# Patient Record
Sex: MALE | Race: WHITE | NOT HISPANIC OR LATINO | Employment: UNEMPLOYED
[De-identification: names, ages, dates, MRNs, and addresses within clinical notes are randomized per-mention and may not be internally consistent; named-entity substitution may affect disease eponyms.]

---

## 2019-10-15 ENCOUNTER — RECORDS - HEALTHEAST (OUTPATIENT)
Dept: ADMINISTRATIVE | Facility: OTHER | Age: 54
End: 2019-10-15

## 2020-06-23 ENCOUNTER — SURGERY - HEALTHEAST (OUTPATIENT)
Dept: CARDIOLOGY | Facility: CLINIC | Age: 55
End: 2020-06-23

## 2020-06-23 ENCOUNTER — SURGERY - HEALTHEAST (OUTPATIENT)
Dept: ADMINISTRATIVE | Facility: CLINIC | Age: 55
End: 2020-06-23

## 2020-06-23 DIAGNOSIS — I21.4 NSTEMI (NON-ST ELEVATED MYOCARDIAL INFARCTION) (H): ICD-10-CM

## 2020-06-23 DIAGNOSIS — I25.10 CAD (CORONARY ARTERY DISEASE): ICD-10-CM

## 2020-06-23 ASSESSMENT — MIFFLIN-ST. JEOR
SCORE: 1711.28

## 2020-06-24 ENCOUNTER — SURGERY - HEALTHEAST (OUTPATIENT)
Dept: SURGERY | Facility: CLINIC | Age: 55
End: 2020-06-24

## 2020-06-24 ENCOUNTER — ANESTHESIA - HEALTHEAST (OUTPATIENT)
Dept: SURGERY | Facility: CLINIC | Age: 55
End: 2020-06-24

## 2020-06-24 ENCOUNTER — SURGERY - HEALTHEAST (OUTPATIENT)
Dept: CARDIOLOGY | Facility: CLINIC | Age: 55
End: 2020-06-24

## 2020-06-25 ASSESSMENT — MIFFLIN-ST. JEOR
SCORE: 1744.39

## 2020-06-26 ASSESSMENT — MIFFLIN-ST. JEOR
SCORE: 1750.29

## 2020-06-27 ASSESSMENT — MIFFLIN-ST. JEOR
SCORE: 1697.22

## 2020-06-28 ASSESSMENT — MIFFLIN-ST. JEOR
SCORE: 1694.04

## 2020-06-29 ASSESSMENT — MIFFLIN-ST. JEOR
SCORE: 1673.63

## 2020-06-30 ENCOUNTER — HOME CARE/HOSPICE - HEALTHEAST (OUTPATIENT)
Dept: HOME HEALTH SERVICES | Facility: HOME HEALTH | Age: 55
End: 2020-06-30

## 2020-06-30 ASSESSMENT — MIFFLIN-ST. JEOR
SCORE: 1665.92

## 2020-07-02 ENCOUNTER — COMMUNICATION - HEALTHEAST (OUTPATIENT)
Dept: CARDIOLOGY | Facility: CLINIC | Age: 55
End: 2020-07-02

## 2020-07-04 ENCOUNTER — HOME CARE/HOSPICE - HEALTHEAST (OUTPATIENT)
Dept: HOME HEALTH SERVICES | Facility: HOME HEALTH | Age: 55
End: 2020-07-04

## 2020-07-06 ENCOUNTER — HOME CARE/HOSPICE - HEALTHEAST (OUTPATIENT)
Dept: HOME HEALTH SERVICES | Facility: HOME HEALTH | Age: 55
End: 2020-07-06

## 2020-07-07 ENCOUNTER — HOME CARE/HOSPICE - HEALTHEAST (OUTPATIENT)
Dept: HOME HEALTH SERVICES | Facility: HOME HEALTH | Age: 55
End: 2020-07-07

## 2020-07-08 ENCOUNTER — HOME CARE/HOSPICE - HEALTHEAST (OUTPATIENT)
Dept: HOME HEALTH SERVICES | Facility: HOME HEALTH | Age: 55
End: 2020-07-08

## 2020-07-09 ENCOUNTER — HOME CARE/HOSPICE - HEALTHEAST (OUTPATIENT)
Dept: HOME HEALTH SERVICES | Facility: HOME HEALTH | Age: 55
End: 2020-07-09

## 2020-07-10 ENCOUNTER — HOME CARE/HOSPICE - HEALTHEAST (OUTPATIENT)
Dept: HOME HEALTH SERVICES | Facility: HOME HEALTH | Age: 55
End: 2020-07-10

## 2020-07-13 ENCOUNTER — HOME CARE/HOSPICE - HEALTHEAST (OUTPATIENT)
Dept: HOME HEALTH SERVICES | Facility: HOME HEALTH | Age: 55
End: 2020-07-13

## 2020-07-14 ENCOUNTER — OFFICE VISIT - HEALTHEAST (OUTPATIENT)
Dept: CARDIOLOGY | Facility: CLINIC | Age: 55
End: 2020-07-14

## 2020-07-14 ENCOUNTER — HOME CARE/HOSPICE - HEALTHEAST (OUTPATIENT)
Dept: HOME HEALTH SERVICES | Facility: HOME HEALTH | Age: 55
End: 2020-07-14

## 2020-07-14 DIAGNOSIS — Z95.1 S/P CABG (CORONARY ARTERY BYPASS GRAFT): ICD-10-CM

## 2020-07-15 ENCOUNTER — COMMUNICATION - HEALTHEAST (OUTPATIENT)
Dept: SCHEDULING | Facility: CLINIC | Age: 55
End: 2020-07-15

## 2020-07-16 ENCOUNTER — HOME CARE/HOSPICE - HEALTHEAST (OUTPATIENT)
Dept: HOME HEALTH SERVICES | Facility: HOME HEALTH | Age: 55
End: 2020-07-16

## 2020-07-17 ENCOUNTER — HOME CARE/HOSPICE - HEALTHEAST (OUTPATIENT)
Dept: HOME HEALTH SERVICES | Facility: HOME HEALTH | Age: 55
End: 2020-07-17

## 2020-07-21 ENCOUNTER — AMBULATORY - HEALTHEAST (OUTPATIENT)
Dept: CARDIAC REHAB | Facility: CLINIC | Age: 55
End: 2020-07-21

## 2020-07-21 DIAGNOSIS — Z95.1 S/P CABG (CORONARY ARTERY BYPASS GRAFT): ICD-10-CM

## 2020-07-28 ENCOUNTER — COMMUNICATION - HEALTHEAST (OUTPATIENT)
Dept: SCHEDULING | Facility: CLINIC | Age: 55
End: 2020-07-28

## 2020-08-03 ENCOUNTER — AMBULATORY - HEALTHEAST (OUTPATIENT)
Dept: CARDIAC REHAB | Facility: CLINIC | Age: 55
End: 2020-08-03

## 2020-08-03 DIAGNOSIS — Z95.1 S/P CABG (CORONARY ARTERY BYPASS GRAFT): ICD-10-CM

## 2020-08-06 ENCOUNTER — COMMUNICATION - HEALTHEAST (OUTPATIENT)
Dept: CARDIOLOGY | Facility: CLINIC | Age: 55
End: 2020-08-06

## 2020-08-06 ENCOUNTER — AMBULATORY - HEALTHEAST (OUTPATIENT)
Dept: CARDIAC REHAB | Facility: CLINIC | Age: 55
End: 2020-08-06

## 2020-08-06 DIAGNOSIS — Z95.1 S/P CABG (CORONARY ARTERY BYPASS GRAFT): ICD-10-CM

## 2020-08-07 ENCOUNTER — OFFICE VISIT - HEALTHEAST (OUTPATIENT)
Dept: CARDIOLOGY | Facility: CLINIC | Age: 55
End: 2020-08-07

## 2020-08-07 DIAGNOSIS — I25.83 CORONARY ARTERY DISEASE DUE TO LIPID RICH PLAQUE: ICD-10-CM

## 2020-08-07 DIAGNOSIS — I25.10 CORONARY ARTERY DISEASE DUE TO LIPID RICH PLAQUE: ICD-10-CM

## 2020-08-07 DIAGNOSIS — E78.5 DYSLIPIDEMIA, GOAL LDL BELOW 70: ICD-10-CM

## 2020-08-07 DIAGNOSIS — I10 ESSENTIAL HYPERTENSION: ICD-10-CM

## 2020-08-07 LAB — LDLC SERPL CALC-MCNC: 41 MG/DL

## 2020-08-07 ASSESSMENT — MIFFLIN-ST. JEOR: SCORE: 1588.35

## 2020-08-10 ENCOUNTER — COMMUNICATION - HEALTHEAST (OUTPATIENT)
Dept: CARDIOLOGY | Facility: CLINIC | Age: 55
End: 2020-08-10

## 2020-08-10 ENCOUNTER — AMBULATORY - HEALTHEAST (OUTPATIENT)
Dept: CARDIAC REHAB | Facility: CLINIC | Age: 55
End: 2020-08-10

## 2020-08-10 DIAGNOSIS — Z95.1 S/P CABG (CORONARY ARTERY BYPASS GRAFT): ICD-10-CM

## 2020-08-13 ENCOUNTER — COMMUNICATION - HEALTHEAST (OUTPATIENT)
Dept: CARDIOLOGY | Facility: CLINIC | Age: 55
End: 2020-08-13

## 2020-08-14 ENCOUNTER — COMMUNICATION - HEALTHEAST (OUTPATIENT)
Dept: CARDIOLOGY | Facility: CLINIC | Age: 55
End: 2020-08-14

## 2020-08-14 ENCOUNTER — AMBULATORY - HEALTHEAST (OUTPATIENT)
Dept: CARDIAC REHAB | Facility: CLINIC | Age: 55
End: 2020-08-14

## 2020-08-14 DIAGNOSIS — Z95.1 S/P CABG (CORONARY ARTERY BYPASS GRAFT): ICD-10-CM

## 2020-08-17 ENCOUNTER — AMBULATORY - HEALTHEAST (OUTPATIENT)
Dept: CARDIAC REHAB | Facility: CLINIC | Age: 55
End: 2020-08-17

## 2020-08-17 DIAGNOSIS — Z95.1 S/P CABG (CORONARY ARTERY BYPASS GRAFT): ICD-10-CM

## 2020-08-19 ENCOUNTER — AMBULATORY - HEALTHEAST (OUTPATIENT)
Dept: CARDIAC REHAB | Facility: CLINIC | Age: 55
End: 2020-08-19

## 2020-08-19 DIAGNOSIS — Z95.1 S/P CABG (CORONARY ARTERY BYPASS GRAFT): ICD-10-CM

## 2020-08-24 ENCOUNTER — AMBULATORY - HEALTHEAST (OUTPATIENT)
Dept: CARDIAC REHAB | Facility: CLINIC | Age: 55
End: 2020-08-24

## 2020-08-24 DIAGNOSIS — Z95.1 S/P CABG (CORONARY ARTERY BYPASS GRAFT): ICD-10-CM

## 2020-08-26 ENCOUNTER — COMMUNICATION - HEALTHEAST (OUTPATIENT)
Dept: INTERNAL MEDICINE | Facility: CLINIC | Age: 55
End: 2020-08-26

## 2020-08-26 ENCOUNTER — COMMUNICATION - HEALTHEAST (OUTPATIENT)
Dept: CARDIOLOGY | Facility: CLINIC | Age: 55
End: 2020-08-26

## 2020-08-28 ENCOUNTER — AMBULATORY - HEALTHEAST (OUTPATIENT)
Dept: CARDIAC REHAB | Facility: CLINIC | Age: 55
End: 2020-08-28

## 2020-08-28 DIAGNOSIS — Z95.1 S/P CABG (CORONARY ARTERY BYPASS GRAFT): ICD-10-CM

## 2020-09-01 ENCOUNTER — COMMUNICATION - HEALTHEAST (OUTPATIENT)
Dept: CARDIOLOGY | Facility: CLINIC | Age: 55
End: 2020-09-01

## 2020-09-01 ENCOUNTER — AMBULATORY - HEALTHEAST (OUTPATIENT)
Dept: CARDIAC REHAB | Facility: CLINIC | Age: 55
End: 2020-09-01

## 2020-09-01 DIAGNOSIS — Z95.1 S/P CABG (CORONARY ARTERY BYPASS GRAFT): ICD-10-CM

## 2020-09-04 ENCOUNTER — COMMUNICATION - HEALTHEAST (OUTPATIENT)
Dept: CARDIOLOGY | Facility: CLINIC | Age: 55
End: 2020-09-04

## 2020-09-04 ENCOUNTER — AMBULATORY - HEALTHEAST (OUTPATIENT)
Dept: CARDIAC REHAB | Facility: CLINIC | Age: 55
End: 2020-09-04

## 2020-09-04 DIAGNOSIS — Z95.1 S/P CABG (CORONARY ARTERY BYPASS GRAFT): ICD-10-CM

## 2020-09-08 ENCOUNTER — AMBULATORY - HEALTHEAST (OUTPATIENT)
Dept: CARDIAC REHAB | Facility: CLINIC | Age: 55
End: 2020-09-08

## 2020-09-08 DIAGNOSIS — Z95.1 S/P CABG (CORONARY ARTERY BYPASS GRAFT): ICD-10-CM

## 2020-09-11 ENCOUNTER — AMBULATORY - HEALTHEAST (OUTPATIENT)
Dept: CARDIAC REHAB | Facility: CLINIC | Age: 55
End: 2020-09-11

## 2020-09-11 DIAGNOSIS — Z95.1 S/P CABG (CORONARY ARTERY BYPASS GRAFT): ICD-10-CM

## 2020-09-15 ENCOUNTER — AMBULATORY - HEALTHEAST (OUTPATIENT)
Dept: CARDIAC REHAB | Facility: CLINIC | Age: 55
End: 2020-09-15

## 2020-09-15 DIAGNOSIS — Z95.1 S/P CABG (CORONARY ARTERY BYPASS GRAFT): ICD-10-CM

## 2020-09-18 ENCOUNTER — AMBULATORY - HEALTHEAST (OUTPATIENT)
Dept: CARDIAC REHAB | Facility: CLINIC | Age: 55
End: 2020-09-18

## 2020-09-18 DIAGNOSIS — Z95.1 S/P CABG (CORONARY ARTERY BYPASS GRAFT): ICD-10-CM

## 2020-09-22 ENCOUNTER — AMBULATORY - HEALTHEAST (OUTPATIENT)
Dept: LAB | Facility: CLINIC | Age: 55
End: 2020-09-22

## 2020-09-22 ENCOUNTER — OFFICE VISIT - HEALTHEAST (OUTPATIENT)
Dept: INTERNAL MEDICINE | Facility: CLINIC | Age: 55
End: 2020-09-22

## 2020-09-22 ENCOUNTER — AMBULATORY - HEALTHEAST (OUTPATIENT)
Dept: CARDIAC REHAB | Facility: CLINIC | Age: 55
End: 2020-09-22

## 2020-09-22 DIAGNOSIS — K29.30 CHRONIC SUPERFICIAL GASTRITIS WITHOUT BLEEDING: ICD-10-CM

## 2020-09-22 DIAGNOSIS — I10 ESSENTIAL HYPERTENSION: ICD-10-CM

## 2020-09-22 DIAGNOSIS — I25.10 CORONARY ARTERY DISEASE DUE TO LIPID RICH PLAQUE: ICD-10-CM

## 2020-09-22 DIAGNOSIS — Z12.5 SCREENING FOR PROSTATE CANCER: ICD-10-CM

## 2020-09-22 DIAGNOSIS — Z95.1 S/P CABG (CORONARY ARTERY BYPASS GRAFT): ICD-10-CM

## 2020-09-22 DIAGNOSIS — K21.00 GASTROESOPHAGEAL REFLUX DISEASE WITH ESOPHAGITIS: ICD-10-CM

## 2020-09-22 DIAGNOSIS — R35.1 NOCTURIA: ICD-10-CM

## 2020-09-22 DIAGNOSIS — R68.81 EARLY SATIETY: ICD-10-CM

## 2020-09-22 DIAGNOSIS — R73.03 PRE-DIABETES: ICD-10-CM

## 2020-09-22 DIAGNOSIS — E78.5 DYSLIPIDEMIA, GOAL LDL BELOW 70: ICD-10-CM

## 2020-09-22 DIAGNOSIS — I25.83 CORONARY ARTERY DISEASE DUE TO LIPID RICH PLAQUE: ICD-10-CM

## 2020-09-22 DIAGNOSIS — F41.8 DEPRESSION WITH ANXIETY: ICD-10-CM

## 2020-09-22 LAB
ALBUMIN UR-MCNC: ABNORMAL MG/DL
APPEARANCE UR: CLEAR
BACTERIA #/AREA URNS HPF: ABNORMAL HPF
BILIRUB UR QL STRIP: NEGATIVE
COLOR UR AUTO: YELLOW
ERYTHROCYTE [DISTWIDTH] IN BLOOD BY AUTOMATED COUNT: 12.8 % (ref 11–14.5)
GLUCOSE UR STRIP-MCNC: NEGATIVE MG/DL
HCT VFR BLD AUTO: 40.8 % (ref 40–54)
HGB BLD-MCNC: 13.6 G/DL (ref 14–18)
HGB UR QL STRIP: NEGATIVE
KETONES UR STRIP-MCNC: NEGATIVE MG/DL
LEUKOCYTE ESTERASE UR QL STRIP: NEGATIVE
MCH RBC QN AUTO: 28.8 PG (ref 27–34)
MCHC RBC AUTO-ENTMCNC: 33.3 G/DL (ref 32–36)
MCV RBC AUTO: 86 FL (ref 80–100)
MUCOUS THREADS #/AREA URNS LPF: ABNORMAL LPF
NITRATE UR QL: NEGATIVE
PH UR STRIP: 5.5 [PH] (ref 4.5–8)
PLATELET # BLD AUTO: 242 THOU/UL (ref 140–440)
PMV BLD AUTO: 10 FL (ref 8.5–12.5)
PSA SERPL-MCNC: 1.1 NG/ML (ref 0–3.5)
RBC # BLD AUTO: 4.73 MILL/UL (ref 4.4–6.2)
RBC #/AREA URNS AUTO: ABNORMAL HPF
SP GR UR STRIP: 1.02 (ref 1–1.03)
SQUAMOUS #/AREA URNS AUTO: ABNORMAL LPF
T4 FREE SERPL-MCNC: 1 NG/DL (ref 0.7–1.8)
TSH SERPL DL<=0.005 MIU/L-ACNC: 0.17 UIU/ML (ref 0.3–5)
UROBILINOGEN UR STRIP-ACNC: ABNORMAL
WBC #/AREA URNS AUTO: ABNORMAL HPF
WBC: 6.1 THOU/UL (ref 4–11)

## 2020-09-22 ASSESSMENT — PATIENT HEALTH QUESTIONNAIRE - PHQ9: SUM OF ALL RESPONSES TO PHQ QUESTIONS 1-9: 0

## 2020-09-25 ENCOUNTER — AMBULATORY - HEALTHEAST (OUTPATIENT)
Dept: CARDIAC REHAB | Facility: CLINIC | Age: 55
End: 2020-09-25

## 2020-09-25 ENCOUNTER — HOSPITAL ENCOUNTER (OUTPATIENT)
Dept: ADMINISTRATIVE | Facility: OTHER | Age: 55
Discharge: HOME | End: 2020-09-25

## 2020-09-25 DIAGNOSIS — Z95.1 S/P CABG (CORONARY ARTERY BYPASS GRAFT): ICD-10-CM

## 2020-09-28 LAB — H PYLORI AG STL QL IA: NEGATIVE

## 2020-09-29 ENCOUNTER — AMBULATORY - HEALTHEAST (OUTPATIENT)
Dept: CARDIAC REHAB | Facility: CLINIC | Age: 55
End: 2020-09-29

## 2020-09-29 DIAGNOSIS — Z95.1 S/P CABG (CORONARY ARTERY BYPASS GRAFT): ICD-10-CM

## 2020-09-29 LAB — SCHISTOSOMA IGG SER QL: NEGATIVE

## 2020-09-30 ENCOUNTER — OFFICE VISIT - HEALTHEAST (OUTPATIENT)
Dept: INTERNAL MEDICINE | Facility: CLINIC | Age: 55
End: 2020-09-30

## 2020-09-30 DIAGNOSIS — E04.2 NON-TOXIC MULTINODULAR GOITER: ICD-10-CM

## 2020-09-30 DIAGNOSIS — I25.10 CORONARY ARTERY DISEASE DUE TO LIPID RICH PLAQUE: ICD-10-CM

## 2020-09-30 DIAGNOSIS — M79.2 NEUROPATHIC PAIN: ICD-10-CM

## 2020-09-30 DIAGNOSIS — E78.5 DYSLIPIDEMIA, GOAL LDL BELOW 70: ICD-10-CM

## 2020-09-30 DIAGNOSIS — N40.1 BENIGN PROSTATIC HYPERPLASIA WITH NOCTURIA: ICD-10-CM

## 2020-09-30 DIAGNOSIS — I25.83 CORONARY ARTERY DISEASE DUE TO LIPID RICH PLAQUE: ICD-10-CM

## 2020-09-30 DIAGNOSIS — F43.10 PTSD (POST-TRAUMATIC STRESS DISORDER): ICD-10-CM

## 2020-09-30 DIAGNOSIS — R14.0 ABDOMINAL DISTENTION: ICD-10-CM

## 2020-09-30 DIAGNOSIS — K21.00 GASTROESOPHAGEAL REFLUX DISEASE WITH ESOPHAGITIS: ICD-10-CM

## 2020-09-30 DIAGNOSIS — I10 ESSENTIAL HYPERTENSION: ICD-10-CM

## 2020-09-30 DIAGNOSIS — R10.84 ABDOMINAL PAIN, GENERALIZED: ICD-10-CM

## 2020-09-30 DIAGNOSIS — F33.41 RECURRENT MAJOR DEPRESSIVE DISORDER, IN PARTIAL REMISSION (H): ICD-10-CM

## 2020-09-30 DIAGNOSIS — J84.115 RESPIRATORY BRONCHIOLITIS ASSOCIATED INTERSTITIAL LUNG DISEASE (H): ICD-10-CM

## 2020-09-30 DIAGNOSIS — R68.81 EARLY SATIETY: ICD-10-CM

## 2020-09-30 DIAGNOSIS — E55.9 VITAMIN D DEFICIENCY: ICD-10-CM

## 2020-09-30 DIAGNOSIS — R35.1 BENIGN PROSTATIC HYPERPLASIA WITH NOCTURIA: ICD-10-CM

## 2020-09-30 RX ORDER — GABAPENTIN 300 MG/1
300 CAPSULE ORAL AT BEDTIME
Qty: 90 CAPSULE | Refills: 3 | Status: SHIPPED | OUTPATIENT
Start: 2020-09-30 | End: 2021-09-13

## 2020-09-30 ASSESSMENT — MIFFLIN-ST. JEOR: SCORE: 1606.5

## 2020-10-05 LAB
ANION GAP SERPL CALCULATED.3IONS-SCNC: 8 MMOL/L (ref 5–18)
BUN SERPL-MCNC: 13 MG/DL (ref 8–22)
CALCIUM SERPL-MCNC: 9.4 MG/DL (ref 8.5–10.5)
CHLORIDE BLD-SCNC: 107 MMOL/L (ref 98–107)
CO2 SERPL-SCNC: 26 MMOL/L (ref 22–31)
CREAT SERPL-MCNC: 0.97 MG/DL (ref 0.7–1.3)
GFR SERPL CREATININE-BSD FRML MDRD: >60 ML/MIN/1.73M2
GLUCOSE BLD-MCNC: 110 MG/DL (ref 70–125)
POTASSIUM BLD-SCNC: 4.3 MMOL/L (ref 3.5–5)
SODIUM SERPL-SCNC: 141 MMOL/L (ref 136–145)

## 2020-10-06 ENCOUNTER — AMBULATORY - HEALTHEAST (OUTPATIENT)
Dept: CARDIAC REHAB | Facility: CLINIC | Age: 55
End: 2020-10-06

## 2020-10-06 ENCOUNTER — COMMUNICATION - HEALTHEAST (OUTPATIENT)
Dept: CARDIOLOGY | Facility: CLINIC | Age: 55
End: 2020-10-06

## 2020-10-06 DIAGNOSIS — Z95.1 S/P CABG (CORONARY ARTERY BYPASS GRAFT): ICD-10-CM

## 2020-10-07 ENCOUNTER — COMMUNICATION - HEALTHEAST (OUTPATIENT)
Dept: CARDIOLOGY | Facility: CLINIC | Age: 55
End: 2020-10-07

## 2020-10-07 ENCOUNTER — AMBULATORY - HEALTHEAST (OUTPATIENT)
Dept: CARDIOLOGY | Facility: CLINIC | Age: 55
End: 2020-10-07

## 2020-10-07 ENCOUNTER — COMMUNICATION - HEALTHEAST (OUTPATIENT)
Dept: INTERNAL MEDICINE | Facility: CLINIC | Age: 55
End: 2020-10-07

## 2020-10-07 DIAGNOSIS — I25.83 CORONARY ARTERY DISEASE DUE TO LIPID RICH PLAQUE: ICD-10-CM

## 2020-10-07 DIAGNOSIS — E55.9 VITAMIN D DEFICIENCY: ICD-10-CM

## 2020-10-07 DIAGNOSIS — M17.2 POST-TRAUMATIC OSTEOARTHRITIS OF BOTH KNEES: ICD-10-CM

## 2020-10-07 DIAGNOSIS — I25.10 CORONARY ARTERY DISEASE DUE TO LIPID RICH PLAQUE: ICD-10-CM

## 2020-10-08 ENCOUNTER — OFFICE VISIT - HEALTHEAST (OUTPATIENT)
Dept: CARDIOLOGY | Facility: CLINIC | Age: 55
End: 2020-10-08

## 2020-10-08 DIAGNOSIS — I10 ESSENTIAL HYPERTENSION: ICD-10-CM

## 2020-10-08 DIAGNOSIS — E78.5 DYSLIPIDEMIA, GOAL LDL BELOW 70: ICD-10-CM

## 2020-10-08 RX ORDER — ROSUVASTATIN CALCIUM 40 MG/1
40 TABLET, COATED ORAL DAILY
Qty: 90 TABLET | Refills: 3 | Status: SHIPPED | OUTPATIENT
Start: 2020-10-08 | End: 2021-10-08

## 2020-10-08 ASSESSMENT — MIFFLIN-ST. JEOR: SCORE: 1614.43

## 2020-10-09 ENCOUNTER — AMBULATORY - HEALTHEAST (OUTPATIENT)
Dept: CARDIAC REHAB | Facility: CLINIC | Age: 55
End: 2020-10-09

## 2020-10-09 DIAGNOSIS — Z95.1 S/P CABG (CORONARY ARTERY BYPASS GRAFT): ICD-10-CM

## 2020-10-13 ENCOUNTER — HOSPITAL ENCOUNTER (OUTPATIENT)
Dept: CT IMAGING | Facility: HOSPITAL | Age: 55
Discharge: HOME | End: 2020-10-13
Attending: INTERNAL MEDICINE

## 2020-10-13 ENCOUNTER — HOSPITAL ENCOUNTER (OUTPATIENT)
Dept: CT IMAGING | Facility: HOSPITAL | Age: 55
Discharge: HOME | End: 2020-10-13
Attending: SURGERY

## 2020-10-13 ENCOUNTER — COMMUNICATION - HEALTHEAST (OUTPATIENT)
Dept: SCHEDULING | Facility: CLINIC | Age: 55
End: 2020-10-13

## 2020-10-13 DIAGNOSIS — R14.0 ABDOMINAL DISTENTION: ICD-10-CM

## 2020-10-13 DIAGNOSIS — I25.83 CORONARY ARTERY DISEASE DUE TO LIPID RICH PLAQUE: ICD-10-CM

## 2020-10-13 DIAGNOSIS — R10.84 ABDOMINAL PAIN, GENERALIZED: ICD-10-CM

## 2020-10-13 DIAGNOSIS — I25.10 CORONARY ARTERY DISEASE DUE TO LIPID RICH PLAQUE: ICD-10-CM

## 2020-10-13 DIAGNOSIS — R68.81 EARLY SATIETY: ICD-10-CM

## 2020-10-16 ENCOUNTER — COMMUNICATION - HEALTHEAST (OUTPATIENT)
Dept: INTERNAL MEDICINE | Facility: CLINIC | Age: 55
End: 2020-10-16

## 2020-10-16 ENCOUNTER — AMBULATORY - HEALTHEAST (OUTPATIENT)
Dept: CARDIAC REHAB | Facility: CLINIC | Age: 55
End: 2020-10-16

## 2020-10-16 DIAGNOSIS — Z95.1 S/P CABG (CORONARY ARTERY BYPASS GRAFT): ICD-10-CM

## 2020-10-19 ENCOUNTER — COMMUNICATION - HEALTHEAST (OUTPATIENT)
Dept: INTERNAL MEDICINE | Facility: CLINIC | Age: 55
End: 2020-10-19

## 2020-10-19 DIAGNOSIS — Z95.1 S/P CABG (CORONARY ARTERY BYPASS GRAFT): ICD-10-CM

## 2020-10-23 ENCOUNTER — OFFICE VISIT - HEALTHEAST (OUTPATIENT)
Dept: CARDIOLOGY | Facility: CLINIC | Age: 55
End: 2020-10-23

## 2020-10-23 ENCOUNTER — AMBULATORY - HEALTHEAST (OUTPATIENT)
Dept: CARDIAC REHAB | Facility: CLINIC | Age: 55
End: 2020-10-23

## 2020-10-23 DIAGNOSIS — M79.652 PAIN IN BOTH THIGHS: ICD-10-CM

## 2020-10-23 DIAGNOSIS — Z95.1 S/P CABG (CORONARY ARTERY BYPASS GRAFT): ICD-10-CM

## 2020-10-23 DIAGNOSIS — M79.651 PAIN IN BOTH THIGHS: ICD-10-CM

## 2020-10-23 DIAGNOSIS — Z95.1 HX OF CABG: ICD-10-CM

## 2020-10-23 ASSESSMENT — MIFFLIN-ST. JEOR: SCORE: 1625.77

## 2020-10-27 ENCOUNTER — AMBULATORY - HEALTHEAST (OUTPATIENT)
Dept: CARDIAC REHAB | Facility: CLINIC | Age: 55
End: 2020-10-27

## 2020-10-27 DIAGNOSIS — Z95.1 S/P CABG (CORONARY ARTERY BYPASS GRAFT): ICD-10-CM

## 2020-10-28 ENCOUNTER — OFFICE VISIT - HEALTHEAST (OUTPATIENT)
Dept: INTERNAL MEDICINE | Facility: CLINIC | Age: 55
End: 2020-10-28

## 2020-10-28 ENCOUNTER — COMMUNICATION - HEALTHEAST (OUTPATIENT)
Dept: INTERNAL MEDICINE | Facility: CLINIC | Age: 55
End: 2020-10-28

## 2020-10-28 DIAGNOSIS — M79.602 LEFT ARM PAIN: ICD-10-CM

## 2020-10-28 DIAGNOSIS — I25.10 CORONARY ARTERY DISEASE DUE TO LIPID RICH PLAQUE: ICD-10-CM

## 2020-10-28 DIAGNOSIS — F33.41 RECURRENT MAJOR DEPRESSIVE DISORDER, IN PARTIAL REMISSION (H): ICD-10-CM

## 2020-10-28 DIAGNOSIS — K21.00 GASTROESOPHAGEAL REFLUX DISEASE WITH ESOPHAGITIS WITHOUT HEMORRHAGE: ICD-10-CM

## 2020-10-28 DIAGNOSIS — R20.2 PARESTHESIA OF BILATERAL LEGS: ICD-10-CM

## 2020-10-28 DIAGNOSIS — F43.10 PTSD (POST-TRAUMATIC STRESS DISORDER): ICD-10-CM

## 2020-10-28 DIAGNOSIS — I10 ESSENTIAL HYPERTENSION: ICD-10-CM

## 2020-10-28 DIAGNOSIS — E04.2 NON-TOXIC MULTINODULAR GOITER: ICD-10-CM

## 2020-10-28 DIAGNOSIS — I25.83 CORONARY ARTERY DISEASE DUE TO LIPID RICH PLAQUE: ICD-10-CM

## 2020-10-28 DIAGNOSIS — Z95.1 S/P CABG (CORONARY ARTERY BYPASS GRAFT): ICD-10-CM

## 2020-10-28 RX ORDER — CLOPIDOGREL BISULFATE 75 MG/1
75 TABLET ORAL EVERY MORNING
Qty: 90 TABLET | Refills: 3 | Status: SHIPPED | OUTPATIENT
Start: 2020-10-28 | End: 2021-07-23

## 2020-10-28 ASSESSMENT — MIFFLIN-ST. JEOR: SCORE: 1628.04

## 2020-10-30 ENCOUNTER — HOSPITAL ENCOUNTER (OUTPATIENT)
Dept: RADIOLOGY | Facility: CLINIC | Age: 55
Discharge: HOME | End: 2020-10-30
Attending: INTERNAL MEDICINE

## 2020-10-30 ENCOUNTER — AMBULATORY - HEALTHEAST (OUTPATIENT)
Dept: CARDIAC REHAB | Facility: CLINIC | Age: 55
End: 2020-10-30

## 2020-10-30 DIAGNOSIS — Z95.1 S/P CABG (CORONARY ARTERY BYPASS GRAFT): ICD-10-CM

## 2020-10-30 DIAGNOSIS — M79.602 LEFT ARM PAIN: ICD-10-CM

## 2020-10-30 DIAGNOSIS — R20.2 PARESTHESIA OF BILATERAL LEGS: ICD-10-CM

## 2020-11-03 ENCOUNTER — RECORDS - HEALTHEAST (OUTPATIENT)
Dept: ADMINISTRATIVE | Facility: OTHER | Age: 55
End: 2020-11-03

## 2020-11-06 ENCOUNTER — AMBULATORY - HEALTHEAST (OUTPATIENT)
Dept: CARDIAC REHAB | Facility: CLINIC | Age: 55
End: 2020-11-06

## 2020-11-06 DIAGNOSIS — Z95.1 S/P CABG (CORONARY ARTERY BYPASS GRAFT): ICD-10-CM

## 2020-11-12 ENCOUNTER — COMMUNICATION - HEALTHEAST (OUTPATIENT)
Dept: INTERNAL MEDICINE | Facility: CLINIC | Age: 55
End: 2020-11-12

## 2020-11-12 DIAGNOSIS — F43.10 PTSD (POST-TRAUMATIC STRESS DISORDER): ICD-10-CM

## 2020-11-13 ENCOUNTER — AMBULATORY - HEALTHEAST (OUTPATIENT)
Dept: CARDIAC REHAB | Facility: CLINIC | Age: 55
End: 2020-11-13

## 2020-11-13 DIAGNOSIS — Z95.1 S/P CABG (CORONARY ARTERY BYPASS GRAFT): ICD-10-CM

## 2020-11-17 ENCOUNTER — COMMUNICATION - HEALTHEAST (OUTPATIENT)
Dept: CARE COORDINATION | Facility: CLINIC | Age: 55
End: 2020-11-17

## 2020-11-17 ENCOUNTER — COMMUNICATION - HEALTHEAST (OUTPATIENT)
Dept: INTERNAL MEDICINE | Facility: CLINIC | Age: 55
End: 2020-11-17

## 2020-11-17 DIAGNOSIS — R35.1 NOCTURIA: ICD-10-CM

## 2020-11-18 RX ORDER — TAMSULOSIN HYDROCHLORIDE 0.4 MG/1
0.4 CAPSULE ORAL DAILY
Qty: 90 CAPSULE | Refills: 3 | Status: SHIPPED | OUTPATIENT
Start: 2020-11-18 | End: 2021-11-15

## 2020-11-19 ENCOUNTER — COMMUNICATION - HEALTHEAST (OUTPATIENT)
Dept: NURSING | Facility: CLINIC | Age: 55
End: 2020-11-19

## 2020-11-19 ASSESSMENT — ACTIVITIES OF DAILY LIVING (ADL): DEPENDENT_IADLS:: CLEANING;COOKING;LAUNDRY;SHOPPING;MEAL PREPARATION;MEDICATION MANAGEMENT;TRANSPORTATION

## 2020-11-20 ENCOUNTER — AMBULATORY - HEALTHEAST (OUTPATIENT)
Dept: CARDIAC REHAB | Facility: CLINIC | Age: 55
End: 2020-11-20

## 2020-11-20 DIAGNOSIS — Z95.1 S/P CABG (CORONARY ARTERY BYPASS GRAFT): ICD-10-CM

## 2020-11-21 ENCOUNTER — COMMUNICATION - HEALTHEAST (OUTPATIENT)
Dept: CARDIOLOGY | Facility: CLINIC | Age: 55
End: 2020-11-21

## 2020-11-23 ENCOUNTER — COMMUNICATION - HEALTHEAST (OUTPATIENT)
Dept: NURSING | Facility: CLINIC | Age: 55
End: 2020-11-23

## 2020-11-23 ASSESSMENT — ACTIVITIES OF DAILY LIVING (ADL): DEPENDENT_IADLS:: CLEANING;COOKING;LAUNDRY;SHOPPING;MEAL PREPARATION;MEDICATION MANAGEMENT;TRANSPORTATION

## 2020-11-25 ENCOUNTER — OFFICE VISIT - HEALTHEAST (OUTPATIENT)
Dept: INTERNAL MEDICINE | Facility: CLINIC | Age: 55
End: 2020-11-25

## 2020-11-25 ENCOUNTER — RECORDS - HEALTHEAST (OUTPATIENT)
Dept: GENERAL RADIOLOGY | Facility: CLINIC | Age: 55
End: 2020-11-25

## 2020-11-25 ENCOUNTER — COMMUNICATION - HEALTHEAST (OUTPATIENT)
Dept: INTERNAL MEDICINE | Facility: CLINIC | Age: 55
End: 2020-11-25

## 2020-11-25 DIAGNOSIS — M79.661 PAIN IN RIGHT LOWER LEG: ICD-10-CM

## 2020-11-25 DIAGNOSIS — R22.1 MASS OF LEFT SIDE OF NECK: ICD-10-CM

## 2020-11-25 DIAGNOSIS — M79.662 PAIN OF LEFT LOWER LEG: ICD-10-CM

## 2020-11-25 DIAGNOSIS — I10 ESSENTIAL HYPERTENSION: ICD-10-CM

## 2020-11-25 DIAGNOSIS — I25.83 CORONARY ARTERY DISEASE DUE TO LIPID RICH PLAQUE: ICD-10-CM

## 2020-11-25 DIAGNOSIS — I25.10 CORONARY ARTERY DISEASE DUE TO LIPID RICH PLAQUE: ICD-10-CM

## 2020-11-25 DIAGNOSIS — R07.89 CHEST WALL PAIN: ICD-10-CM

## 2020-11-25 DIAGNOSIS — M79.662 PAIN IN LEFT LOWER LEG: ICD-10-CM

## 2020-11-25 DIAGNOSIS — F43.10 PTSD (POST-TRAUMATIC STRESS DISORDER): ICD-10-CM

## 2020-11-25 DIAGNOSIS — M79.661 PAIN OF RIGHT LOWER LEG: ICD-10-CM

## 2020-11-25 ASSESSMENT — MIFFLIN-ST. JEOR: SCORE: 1632.57

## 2020-11-27 ENCOUNTER — AMBULATORY - HEALTHEAST (OUTPATIENT)
Dept: CARDIAC REHAB | Facility: CLINIC | Age: 55
End: 2020-11-27

## 2020-11-27 DIAGNOSIS — Z95.1 S/P CABG (CORONARY ARTERY BYPASS GRAFT): ICD-10-CM

## 2020-11-30 ENCOUNTER — COMMUNICATION - HEALTHEAST (OUTPATIENT)
Dept: CARDIOLOGY | Facility: CLINIC | Age: 55
End: 2020-11-30

## 2020-11-30 ENCOUNTER — HOSPITAL ENCOUNTER (OUTPATIENT)
Dept: ULTRASOUND IMAGING | Facility: HOSPITAL | Age: 55
Discharge: HOME | End: 2020-11-30
Attending: INTERNAL MEDICINE

## 2020-11-30 DIAGNOSIS — R22.1 MASS OF LEFT SIDE OF NECK: ICD-10-CM

## 2020-12-01 ENCOUNTER — RECORDS - HEALTHEAST (OUTPATIENT)
Dept: ADMINISTRATIVE | Facility: OTHER | Age: 55
End: 2020-12-01

## 2020-12-04 ENCOUNTER — AMBULATORY - HEALTHEAST (OUTPATIENT)
Dept: CARDIAC REHAB | Facility: CLINIC | Age: 55
End: 2020-12-04

## 2020-12-04 DIAGNOSIS — Z95.1 S/P CABG (CORONARY ARTERY BYPASS GRAFT): ICD-10-CM

## 2020-12-09 ENCOUNTER — COMMUNICATION - HEALTHEAST (OUTPATIENT)
Dept: INTERNAL MEDICINE | Facility: CLINIC | Age: 55
End: 2020-12-09

## 2020-12-09 DIAGNOSIS — Z95.1 S/P CABG (CORONARY ARTERY BYPASS GRAFT): ICD-10-CM

## 2020-12-09 RX ORDER — ASPIRIN 81 MG/1
81 TABLET, CHEWABLE ORAL DAILY
Qty: 90 TABLET | Refills: 3 | Status: SHIPPED | OUTPATIENT
Start: 2020-12-09 | End: 2021-12-28

## 2020-12-11 ENCOUNTER — COMMUNICATION - HEALTHEAST (OUTPATIENT)
Dept: FAMILY MEDICINE | Facility: CLINIC | Age: 55
End: 2020-12-11

## 2020-12-15 ENCOUNTER — COMMUNICATION - HEALTHEAST (OUTPATIENT)
Dept: NURSING | Facility: CLINIC | Age: 55
End: 2020-12-15

## 2020-12-15 ENCOUNTER — AMBULATORY - HEALTHEAST (OUTPATIENT)
Dept: CARDIAC REHAB | Facility: CLINIC | Age: 55
End: 2020-12-15

## 2020-12-15 DIAGNOSIS — Z95.1 S/P CABG (CORONARY ARTERY BYPASS GRAFT): ICD-10-CM

## 2020-12-16 ENCOUNTER — COMMUNICATION - HEALTHEAST (OUTPATIENT)
Dept: CARE COORDINATION | Facility: CLINIC | Age: 55
End: 2020-12-16

## 2020-12-22 ENCOUNTER — AMBULATORY - HEALTHEAST (OUTPATIENT)
Dept: CARDIAC REHAB | Facility: CLINIC | Age: 55
End: 2020-12-22

## 2020-12-22 DIAGNOSIS — Z95.1 S/P CABG (CORONARY ARTERY BYPASS GRAFT): ICD-10-CM

## 2020-12-24 ENCOUNTER — COMMUNICATION - HEALTHEAST (OUTPATIENT)
Dept: NURSING | Facility: CLINIC | Age: 55
End: 2020-12-24

## 2020-12-29 ENCOUNTER — COMMUNICATION - HEALTHEAST (OUTPATIENT)
Dept: INTERNAL MEDICINE | Facility: CLINIC | Age: 55
End: 2020-12-29

## 2020-12-29 DIAGNOSIS — M17.2 POST-TRAUMATIC OSTEOARTHRITIS OF BOTH KNEES: ICD-10-CM

## 2020-12-29 DIAGNOSIS — E55.9 VITAMIN D DEFICIENCY: ICD-10-CM

## 2020-12-30 ENCOUNTER — COMMUNICATION - HEALTHEAST (OUTPATIENT)
Dept: INTERNAL MEDICINE | Facility: CLINIC | Age: 55
End: 2020-12-30

## 2021-01-07 ENCOUNTER — AMBULATORY - HEALTHEAST (OUTPATIENT)
Dept: LAB | Facility: CLINIC | Age: 56
End: 2021-01-07

## 2021-01-07 ENCOUNTER — COMMUNICATION - HEALTHEAST (OUTPATIENT)
Dept: CARDIOLOGY | Facility: CLINIC | Age: 56
End: 2021-01-07

## 2021-01-07 ENCOUNTER — COMMUNICATION - HEALTHEAST (OUTPATIENT)
Dept: INTERNAL MEDICINE | Facility: CLINIC | Age: 56
End: 2021-01-07

## 2021-01-07 DIAGNOSIS — E78.5 DYSLIPIDEMIA, GOAL LDL BELOW 70: ICD-10-CM

## 2021-01-07 DIAGNOSIS — E55.9 VITAMIN D INSUFFICIENCY: ICD-10-CM

## 2021-01-07 LAB
CHOLEST SERPL-MCNC: 94 MG/DL
FASTING STATUS PATIENT QL REPORTED: NO
HDLC SERPL-MCNC: 29 MG/DL
LDLC SERPL CALC-MCNC: 14 MG/DL
TRIGL SERPL-MCNC: 253 MG/DL

## 2021-01-08 LAB
25(OH)D3 SERPL-MCNC: 37.2 NG/ML (ref 30–80)
25(OH)D3 SERPL-MCNC: 37.2 NG/ML (ref 30–80)

## 2021-01-11 ENCOUNTER — COMMUNICATION - HEALTHEAST (OUTPATIENT)
Dept: INTERNAL MEDICINE | Facility: CLINIC | Age: 56
End: 2021-01-11

## 2021-01-20 ENCOUNTER — OFFICE VISIT - HEALTHEAST (OUTPATIENT)
Dept: INTERNAL MEDICINE | Facility: CLINIC | Age: 56
End: 2021-01-20

## 2021-01-20 DIAGNOSIS — I25.83 CORONARY ARTERY DISEASE DUE TO LIPID RICH PLAQUE: ICD-10-CM

## 2021-01-20 DIAGNOSIS — R10.84 ABDOMINAL PAIN, GENERALIZED: ICD-10-CM

## 2021-01-20 DIAGNOSIS — E55.9 VITAMIN D DEFICIENCY: ICD-10-CM

## 2021-01-20 DIAGNOSIS — F43.10 PTSD (POST-TRAUMATIC STRESS DISORDER): ICD-10-CM

## 2021-01-20 DIAGNOSIS — M79.661 PAIN OF RIGHT LOWER LEG: ICD-10-CM

## 2021-01-20 DIAGNOSIS — Z12.11 SCREEN FOR COLON CANCER: ICD-10-CM

## 2021-01-20 DIAGNOSIS — R07.89 CHEST WALL PAIN: ICD-10-CM

## 2021-01-20 DIAGNOSIS — F33.41 RECURRENT MAJOR DEPRESSIVE DISORDER, IN PARTIAL REMISSION (H): ICD-10-CM

## 2021-01-20 DIAGNOSIS — M79.662 PAIN OF LEFT LOWER LEG: ICD-10-CM

## 2021-01-20 DIAGNOSIS — R14.0 ABDOMINAL DISTENTION: ICD-10-CM

## 2021-01-20 DIAGNOSIS — I25.10 CORONARY ARTERY DISEASE DUE TO LIPID RICH PLAQUE: ICD-10-CM

## 2021-01-20 ASSESSMENT — MIFFLIN-ST. JEOR: SCORE: 1668.86

## 2021-01-21 ENCOUNTER — COMMUNICATION - HEALTHEAST (OUTPATIENT)
Dept: NURSING | Facility: CLINIC | Age: 56
End: 2021-01-21

## 2021-01-22 ENCOUNTER — HOSPITAL ENCOUNTER (OUTPATIENT)
Dept: ULTRASOUND IMAGING | Facility: HOSPITAL | Age: 56
Discharge: HOME | End: 2021-01-22
Attending: INTERNAL MEDICINE

## 2021-01-22 ENCOUNTER — COMMUNICATION - HEALTHEAST (OUTPATIENT)
Dept: INTERNAL MEDICINE | Facility: CLINIC | Age: 56
End: 2021-01-22

## 2021-01-22 DIAGNOSIS — R14.0 ABDOMINAL DISTENTION: ICD-10-CM

## 2021-01-22 DIAGNOSIS — R10.84 ABDOMINAL PAIN, GENERALIZED: ICD-10-CM

## 2021-01-23 ENCOUNTER — COMMUNICATION - HEALTHEAST (OUTPATIENT)
Dept: INTERNAL MEDICINE | Facility: CLINIC | Age: 56
End: 2021-01-23

## 2021-01-23 DIAGNOSIS — R07.89 CHEST WALL PAIN: ICD-10-CM

## 2021-01-23 DIAGNOSIS — M79.661 PAIN OF RIGHT LOWER LEG: ICD-10-CM

## 2021-01-23 DIAGNOSIS — M79.662 PAIN OF LEFT LOWER LEG: ICD-10-CM

## 2021-01-25 ENCOUNTER — TRANSFERRED RECORDS (OUTPATIENT)
Dept: HEALTH INFORMATION MANAGEMENT | Facility: CLINIC | Age: 56
End: 2021-01-25
Payer: COMMERCIAL

## 2021-01-26 ENCOUNTER — COMMUNICATION - HEALTHEAST (OUTPATIENT)
Dept: CARE COORDINATION | Facility: CLINIC | Age: 56
End: 2021-01-26

## 2021-01-26 ENCOUNTER — RECORDS - HEALTHEAST (OUTPATIENT)
Dept: ADMINISTRATIVE | Facility: OTHER | Age: 56
End: 2021-01-26

## 2021-01-26 ENCOUNTER — COMMUNICATION - HEALTHEAST (OUTPATIENT)
Dept: INTERNAL MEDICINE | Facility: CLINIC | Age: 56
End: 2021-01-26

## 2021-01-27 ENCOUNTER — COMMUNICATION - HEALTHEAST (OUTPATIENT)
Dept: FAMILY MEDICINE | Facility: CLINIC | Age: 56
End: 2021-01-27

## 2021-01-27 ENCOUNTER — COMMUNICATION - HEALTHEAST (OUTPATIENT)
Dept: ADMINISTRATIVE | Facility: CLINIC | Age: 56
End: 2021-01-27

## 2021-01-28 ENCOUNTER — COMMUNICATION - HEALTHEAST (OUTPATIENT)
Dept: CARE COORDINATION | Facility: CLINIC | Age: 56
End: 2021-01-28

## 2021-01-29 ASSESSMENT — ACTIVITIES OF DAILY LIVING (ADL): DEPENDENT_IADLS:: CLEANING;COOKING;LAUNDRY;SHOPPING;MEAL PREPARATION;MEDICATION MANAGEMENT;TRANSPORTATION

## 2021-02-01 ENCOUNTER — COMMUNICATION - HEALTHEAST (OUTPATIENT)
Dept: INTERNAL MEDICINE | Facility: CLINIC | Age: 56
End: 2021-02-01

## 2021-02-02 ENCOUNTER — RECORDS - HEALTHEAST (OUTPATIENT)
Dept: ADMINISTRATIVE | Facility: OTHER | Age: 56
End: 2021-02-02

## 2021-02-04 ENCOUNTER — COMMUNICATION - HEALTHEAST (OUTPATIENT)
Dept: NURSING | Facility: CLINIC | Age: 56
End: 2021-02-04

## 2021-02-04 ENCOUNTER — COMMUNICATION - HEALTHEAST (OUTPATIENT)
Dept: CARE COORDINATION | Facility: CLINIC | Age: 56
End: 2021-02-04

## 2021-02-17 ENCOUNTER — OFFICE VISIT - HEALTHEAST (OUTPATIENT)
Dept: BEHAVIORAL HEALTH | Facility: CLINIC | Age: 56
End: 2021-02-17

## 2021-02-17 DIAGNOSIS — F43.10 PTSD (POST-TRAUMATIC STRESS DISORDER): ICD-10-CM

## 2021-02-18 ENCOUNTER — COMMUNICATION - HEALTHEAST (OUTPATIENT)
Dept: BEHAVIORAL HEALTH | Facility: CLINIC | Age: 56
End: 2021-02-18

## 2021-02-18 ENCOUNTER — COMMUNICATION - HEALTHEAST (OUTPATIENT)
Dept: CARE COORDINATION | Facility: CLINIC | Age: 56
End: 2021-02-18

## 2021-02-23 ENCOUNTER — OFFICE VISIT - HEALTHEAST (OUTPATIENT)
Dept: INTERNAL MEDICINE | Facility: CLINIC | Age: 56
End: 2021-02-23

## 2021-02-23 DIAGNOSIS — F43.10 PTSD (POST-TRAUMATIC STRESS DISORDER): ICD-10-CM

## 2021-02-23 DIAGNOSIS — E55.9 VITAMIN D DEFICIENCY: ICD-10-CM

## 2021-02-23 DIAGNOSIS — E66.01 SEVERE OBESITY (H): ICD-10-CM

## 2021-02-23 DIAGNOSIS — I10 ESSENTIAL HYPERTENSION: ICD-10-CM

## 2021-02-23 DIAGNOSIS — R10.84 ABDOMINAL PAIN, GENERALIZED: ICD-10-CM

## 2021-02-23 DIAGNOSIS — R14.0 ABDOMINAL DISTENTION: ICD-10-CM

## 2021-02-23 ASSESSMENT — MIFFLIN-ST. JEOR: SCORE: 1683.38

## 2021-02-23 ASSESSMENT — PATIENT HEALTH QUESTIONNAIRE - PHQ9: SUM OF ALL RESPONSES TO PHQ QUESTIONS 1-9: 4

## 2021-02-25 ENCOUNTER — COMMUNICATION - HEALTHEAST (OUTPATIENT)
Dept: CARE COORDINATION | Facility: CLINIC | Age: 56
End: 2021-02-25

## 2021-03-04 ENCOUNTER — COMMUNICATION - HEALTHEAST (OUTPATIENT)
Dept: CARE COORDINATION | Facility: CLINIC | Age: 56
End: 2021-03-04

## 2021-03-05 ENCOUNTER — COMMUNICATION - HEALTHEAST (OUTPATIENT)
Dept: NURSING | Facility: CLINIC | Age: 56
End: 2021-03-05

## 2021-03-12 ENCOUNTER — COMMUNICATION - HEALTHEAST (OUTPATIENT)
Dept: CARE COORDINATION | Facility: CLINIC | Age: 56
End: 2021-03-12

## 2021-03-15 ENCOUNTER — RECORDS - HEALTHEAST (OUTPATIENT)
Dept: ADMINISTRATIVE | Facility: OTHER | Age: 56
End: 2021-03-15

## 2021-03-17 ENCOUNTER — COMMUNICATION - HEALTHEAST (OUTPATIENT)
Dept: CARDIOLOGY | Facility: CLINIC | Age: 56
End: 2021-03-17

## 2021-03-17 ENCOUNTER — COMMUNICATION - HEALTHEAST (OUTPATIENT)
Dept: INTERNAL MEDICINE | Facility: CLINIC | Age: 56
End: 2021-03-17

## 2021-03-18 ENCOUNTER — COMMUNICATION - HEALTHEAST (OUTPATIENT)
Dept: INTERNAL MEDICINE | Facility: CLINIC | Age: 56
End: 2021-03-18

## 2021-03-18 DIAGNOSIS — K21.00 GASTROESOPHAGEAL REFLUX DISEASE WITH ESOPHAGITIS WITHOUT HEMORRHAGE: ICD-10-CM

## 2021-03-18 DIAGNOSIS — F41.8 DEPRESSION WITH ANXIETY: ICD-10-CM

## 2021-03-19 RX ORDER — CLONAZEPAM 0.5 MG/1
0.5 TABLET ORAL 2 TIMES DAILY PRN
Qty: 30 TABLET | Refills: 0 | Status: SHIPPED | OUTPATIENT
Start: 2021-03-19 | End: 2021-07-23

## 2021-03-19 RX ORDER — ESCITALOPRAM OXALATE 20 MG/1
20 TABLET ORAL DAILY
Qty: 90 TABLET | Refills: 1 | Status: SHIPPED | OUTPATIENT
Start: 2021-03-19 | End: 2021-07-23

## 2021-03-22 ENCOUNTER — COMMUNICATION - HEALTHEAST (OUTPATIENT)
Dept: CARDIOLOGY | Facility: CLINIC | Age: 56
End: 2021-03-22

## 2021-03-23 ENCOUNTER — COMMUNICATION - HEALTHEAST (OUTPATIENT)
Dept: INTERNAL MEDICINE | Facility: CLINIC | Age: 56
End: 2021-03-23

## 2021-03-23 ENCOUNTER — OFFICE VISIT - HEALTHEAST (OUTPATIENT)
Dept: INTERNAL MEDICINE | Facility: CLINIC | Age: 56
End: 2021-03-23

## 2021-03-23 ENCOUNTER — OFFICE VISIT - HEALTHEAST (OUTPATIENT)
Dept: CARDIOLOGY | Facility: CLINIC | Age: 56
End: 2021-03-23

## 2021-03-23 DIAGNOSIS — I25.10 CORONARY ARTERY DISEASE DUE TO LIPID RICH PLAQUE: ICD-10-CM

## 2021-03-23 DIAGNOSIS — Z11.4 SCREENING FOR HIV (HUMAN IMMUNODEFICIENCY VIRUS): ICD-10-CM

## 2021-03-23 DIAGNOSIS — I25.83 CORONARY ARTERY DISEASE DUE TO LIPID RICH PLAQUE: ICD-10-CM

## 2021-03-23 DIAGNOSIS — Z11.59 NEED FOR HEPATITIS C SCREENING TEST: ICD-10-CM

## 2021-03-23 DIAGNOSIS — Z11.59 NEED FOR HEPATITIS B SCREENING TEST: ICD-10-CM

## 2021-03-23 DIAGNOSIS — Z11.6: ICD-10-CM

## 2021-03-23 DIAGNOSIS — Z12.11 SCREEN FOR COLON CANCER: ICD-10-CM

## 2021-03-23 DIAGNOSIS — K21.00 GASTROESOPHAGEAL REFLUX DISEASE WITH ESOPHAGITIS WITHOUT HEMORRHAGE: ICD-10-CM

## 2021-03-23 LAB
ALBUMIN SERPL-MCNC: 4.1 G/DL (ref 3.5–5)
ALP SERPL-CCNC: 87 U/L (ref 45–120)
ALT SERPL W P-5'-P-CCNC: 44 U/L (ref 0–45)
ANION GAP SERPL CALCULATED.3IONS-SCNC: 9 MMOL/L (ref 5–18)
AST SERPL W P-5'-P-CCNC: 21 U/L (ref 0–40)
ATRIAL RATE - MUSE: 66 BPM
BILIRUB SERPL-MCNC: 0.6 MG/DL (ref 0–1)
BUN SERPL-MCNC: 15 MG/DL (ref 8–22)
CALCIUM SERPL-MCNC: 8.6 MG/DL (ref 8.5–10.5)
CHLORIDE BLD-SCNC: 106 MMOL/L (ref 98–107)
CO2 SERPL-SCNC: 26 MMOL/L (ref 22–31)
CREAT SERPL-MCNC: 1.17 MG/DL (ref 0.7–1.3)
DIASTOLIC BLOOD PRESSURE - MUSE: NORMAL
ERYTHROCYTE [DISTWIDTH] IN BLOOD BY AUTOMATED COUNT: 11.7 % (ref 11–14.5)
GFR SERPL CREATININE-BSD FRML MDRD: >60 ML/MIN/1.73M2
GLUCOSE BLD-MCNC: 104 MG/DL (ref 70–125)
HCT VFR BLD AUTO: 45.1 % (ref 40–54)
HGB BLD-MCNC: 14.8 G/DL (ref 14–18)
HIV 1+2 AB+HIV1 P24 AG SERPL QL IA: NEGATIVE
INTERPRETATION ECG - MUSE: NORMAL
MCH RBC QN AUTO: 28.4 PG (ref 27–34)
MCHC RBC AUTO-ENTMCNC: 32.8 G/DL (ref 32–36)
MCV RBC AUTO: 86 FL (ref 80–100)
P AXIS - MUSE: 40 DEGREES
PLATELET # BLD AUTO: 208 THOU/UL (ref 140–440)
PMV BLD AUTO: 9.7 FL (ref 7–10)
POTASSIUM BLD-SCNC: 4.3 MMOL/L (ref 3.5–5)
PR INTERVAL - MUSE: 210 MS
PROT SERPL-MCNC: 6.9 G/DL (ref 6–8)
QRS DURATION - MUSE: 100 MS
QT - MUSE: 386 MS
QTC - MUSE: 404 MS
R AXIS - MUSE: -8 DEGREES
RBC # BLD AUTO: 5.22 MILL/UL (ref 4.4–6.2)
SODIUM SERPL-SCNC: 141 MMOL/L (ref 136–145)
SYSTOLIC BLOOD PRESSURE - MUSE: NORMAL
T AXIS - MUSE: 66 DEGREES
VENTRICULAR RATE- MUSE: 66 BPM
WBC: 6.4 THOU/UL (ref 4–11)

## 2021-03-23 ASSESSMENT — MIFFLIN-ST. JEOR: SCORE: 1643.91

## 2021-03-24 ENCOUNTER — COMMUNICATION - HEALTHEAST (OUTPATIENT)
Dept: INTERNAL MEDICINE | Facility: CLINIC | Age: 56
End: 2021-03-24

## 2021-03-24 LAB
HBV SURFACE AG SERPL QL IA: NEGATIVE
HCV AB SERPL QL IA: NEGATIVE
HEPATITIS B SURFACE ANTIBODY LHE- HISTORICAL: POSITIVE

## 2021-03-26 ENCOUNTER — COMMUNICATION - HEALTHEAST (OUTPATIENT)
Dept: CARE COORDINATION | Facility: CLINIC | Age: 56
End: 2021-03-26

## 2021-03-30 LAB — SCHISTOSOMA IGG SER QL: NEGATIVE

## 2021-03-31 ENCOUNTER — COMMUNICATION - HEALTHEAST (OUTPATIENT)
Dept: BEHAVIORAL HEALTH | Facility: CLINIC | Age: 56
End: 2021-03-31

## 2021-04-01 ENCOUNTER — COMMUNICATION - HEALTHEAST (OUTPATIENT)
Dept: CARE COORDINATION | Facility: CLINIC | Age: 56
End: 2021-04-01

## 2021-04-01 ASSESSMENT — ACTIVITIES OF DAILY LIVING (ADL): DEPENDENT_IADLS:: CLEANING;COOKING;LAUNDRY;SHOPPING;MEAL PREPARATION

## 2021-04-02 ENCOUNTER — COMMUNICATION - HEALTHEAST (OUTPATIENT)
Dept: CARE COORDINATION | Facility: CLINIC | Age: 56
End: 2021-04-02

## 2021-04-07 ENCOUNTER — COMMUNICATION - HEALTHEAST (OUTPATIENT)
Dept: BEHAVIORAL HEALTH | Facility: CLINIC | Age: 56
End: 2021-04-07

## 2021-04-08 ENCOUNTER — COMMUNICATION - HEALTHEAST (OUTPATIENT)
Dept: NURSING | Facility: CLINIC | Age: 56
End: 2021-04-08

## 2021-04-10 ENCOUNTER — COMMUNICATION - HEALTHEAST (OUTPATIENT)
Dept: INTERNAL MEDICINE | Facility: CLINIC | Age: 56
End: 2021-04-10

## 2021-04-10 ENCOUNTER — TELEPHONE (OUTPATIENT)
Dept: NURSING | Facility: CLINIC | Age: 56
End: 2021-04-10

## 2021-04-10 ENCOUNTER — COMMUNICATION - HEALTHEAST (OUTPATIENT)
Dept: SCHEDULING | Facility: CLINIC | Age: 56
End: 2021-04-10

## 2021-04-10 DIAGNOSIS — I10 ESSENTIAL HYPERTENSION: ICD-10-CM

## 2021-04-12 RX ORDER — METOPROLOL SUCCINATE 100 MG/1
150 TABLET, EXTENDED RELEASE ORAL AT BEDTIME
Qty: 135 TABLET | Refills: 3 | Status: SHIPPED | OUTPATIENT
Start: 2021-04-12 | End: 2021-09-28

## 2021-04-15 ENCOUNTER — COMMUNICATION - HEALTHEAST (OUTPATIENT)
Dept: CARE COORDINATION | Facility: CLINIC | Age: 56
End: 2021-04-15

## 2021-04-23 ENCOUNTER — OFFICE VISIT - HEALTHEAST (OUTPATIENT)
Dept: INTERNAL MEDICINE | Facility: CLINIC | Age: 56
End: 2021-04-23

## 2021-04-23 DIAGNOSIS — K21.00 GASTROESOPHAGEAL REFLUX DISEASE WITH ESOPHAGITIS WITHOUT HEMORRHAGE: ICD-10-CM

## 2021-04-23 DIAGNOSIS — R07.89 CHEST WALL PAIN: ICD-10-CM

## 2021-04-23 DIAGNOSIS — R07.9 CHEST PAIN, UNSPECIFIED TYPE: ICD-10-CM

## 2021-04-23 DIAGNOSIS — I25.10 CORONARY ARTERY DISEASE DUE TO LIPID RICH PLAQUE: ICD-10-CM

## 2021-04-23 DIAGNOSIS — I25.83 CORONARY ARTERY DISEASE DUE TO LIPID RICH PLAQUE: ICD-10-CM

## 2021-04-23 DIAGNOSIS — R06.09 DOE (DYSPNEA ON EXERTION): ICD-10-CM

## 2021-04-23 DIAGNOSIS — M79.602 PAIN OF LEFT UPPER EXTREMITY: ICD-10-CM

## 2021-04-23 RX ORDER — ACETAMINOPHEN 500 MG
1000 TABLET ORAL EVERY 6 HOURS PRN
Qty: 300 TABLET | Refills: 3 | Status: SHIPPED | OUTPATIENT
Start: 2021-04-23 | End: 2022-05-16

## 2021-04-26 ENCOUNTER — COMMUNICATION - HEALTHEAST (OUTPATIENT)
Dept: INTERNAL MEDICINE | Facility: CLINIC | Age: 56
End: 2021-04-26

## 2021-04-28 ENCOUNTER — COMMUNICATION - HEALTHEAST (OUTPATIENT)
Dept: CARE COORDINATION | Facility: CLINIC | Age: 56
End: 2021-04-28

## 2021-05-06 ENCOUNTER — COMMUNICATION - HEALTHEAST (OUTPATIENT)
Dept: CARE COORDINATION | Facility: CLINIC | Age: 56
End: 2021-05-06

## 2021-05-07 ENCOUNTER — COMMUNICATION - HEALTHEAST (OUTPATIENT)
Dept: NURSING | Facility: CLINIC | Age: 56
End: 2021-05-07

## 2021-05-14 ENCOUNTER — COMMUNICATION - HEALTHEAST (OUTPATIENT)
Dept: CARE COORDINATION | Facility: CLINIC | Age: 56
End: 2021-05-14

## 2021-05-14 ASSESSMENT — ACTIVITIES OF DAILY LIVING (ADL): DEPENDENT_IADLS:: CLEANING;COOKING;LAUNDRY;SHOPPING;MEAL PREPARATION

## 2021-05-21 ENCOUNTER — HOSPITAL ENCOUNTER (OUTPATIENT)
Dept: NUCLEAR MEDICINE | Facility: HOSPITAL | Age: 56
Discharge: HOME | End: 2021-05-21
Attending: INTERNAL MEDICINE

## 2021-05-21 ENCOUNTER — HOSPITAL ENCOUNTER (OUTPATIENT)
Dept: CARDIOLOGY | Facility: HOSPITAL | Age: 56
Discharge: HOME | End: 2021-05-21
Attending: INTERNAL MEDICINE

## 2021-05-21 DIAGNOSIS — I25.10 CORONARY ARTERY DISEASE DUE TO LIPID RICH PLAQUE: ICD-10-CM

## 2021-05-21 DIAGNOSIS — M79.602 PAIN OF LEFT UPPER EXTREMITY: ICD-10-CM

## 2021-05-21 DIAGNOSIS — R06.09 DOE (DYSPNEA ON EXERTION): ICD-10-CM

## 2021-05-21 DIAGNOSIS — I25.83 CORONARY ARTERY DISEASE DUE TO LIPID RICH PLAQUE: ICD-10-CM

## 2021-05-21 DIAGNOSIS — R07.9 CHEST PAIN, UNSPECIFIED TYPE: ICD-10-CM

## 2021-05-21 LAB
CV STRESS CURRENT BP HE: NORMAL
CV STRESS CURRENT HR HE: 103
CV STRESS CURRENT HR HE: 103
CV STRESS CURRENT HR HE: 108
CV STRESS CURRENT HR HE: 110
CV STRESS CURRENT HR HE: 110
CV STRESS CURRENT HR HE: 111
CV STRESS CURRENT HR HE: 111
CV STRESS CURRENT HR HE: 112
CV STRESS CURRENT HR HE: 113
CV STRESS CURRENT HR HE: 61
CV STRESS CURRENT HR HE: 62
CV STRESS CURRENT HR HE: 63
CV STRESS CURRENT HR HE: 64
CV STRESS CURRENT HR HE: 64
CV STRESS CURRENT HR HE: 67
CV STRESS CURRENT HR HE: 68
CV STRESS CURRENT HR HE: 69
CV STRESS CURRENT HR HE: 70
CV STRESS CURRENT HR HE: 71
CV STRESS CURRENT HR HE: 71
CV STRESS CURRENT HR HE: 72
CV STRESS CURRENT HR HE: 73
CV STRESS CURRENT HR HE: 76
CV STRESS CURRENT HR HE: 78
CV STRESS CURRENT HR HE: 78
CV STRESS CURRENT HR HE: 82
CV STRESS CURRENT HR HE: 86
CV STRESS CURRENT HR HE: 88
CV STRESS CURRENT HR HE: 89
CV STRESS CURRENT HR HE: 90
CV STRESS CURRENT HR HE: 93
CV STRESS CURRENT HR HE: 97
CV STRESS CURRENT HR HE: 97
CV STRESS CURRENT HR HE: 98
CV STRESS CURRENT HR HE: 99
CV STRESS DEVIATION TIME HE: NORMAL
CV STRESS ECHO PERCENT HR HE: NORMAL
CV STRESS EXERCISE STAGE HE: NORMAL
CV STRESS EXERCISE STAGE REACHED HE: NORMAL
CV STRESS FINAL RESTING BP HE: NORMAL
CV STRESS FINAL RESTING HR HE: 71
CV STRESS MAX HR HE: 114
CV STRESS MAX TREADMILL GRADE HE: 12
CV STRESS MAX TREADMILL SPEED HE: 2.5
CV STRESS PEAK DIA BP HE: NORMAL
CV STRESS PEAK SYS BP HE: NORMAL
CV STRESS PHASE HE: NORMAL
CV STRESS PROTOCOL HE: NORMAL
CV STRESS RESTING PT POSITION HE: NORMAL
CV STRESS RESTING PT POSITION HE: NORMAL
CV STRESS ST DEVIATION AMOUNT HE: NORMAL
CV STRESS ST DEVIATION ELEVATION HE: NORMAL
CV STRESS ST EVELATION AMOUNT HE: NORMAL
CV STRESS TEST TYPE HE: NORMAL
CV STRESS TOTAL STAGE TIME MIN 1 HE: NORMAL
NUC STRESS EJECTION FRACTION: 65 %
RATE PRESSURE PRODUCT: NORMAL
STRESS ECHO BASELINE DIASTOLIC HE: 64
STRESS ECHO BASELINE HR: 63
STRESS ECHO BASELINE SYSTOLIC BP: 124
STRESS ECHO CALCULATED PERCENT HR: 68 %
STRESS ECHO LAST STRESS DIASTOLIC BP: 70
STRESS ECHO LAST STRESS HR: 113
STRESS ECHO LAST STRESS SYSTOLIC BP: 152
STRESS ECHO TARGET HR: 167

## 2021-05-24 ENCOUNTER — OFFICE VISIT - HEALTHEAST (OUTPATIENT)
Dept: INTERNAL MEDICINE | Facility: CLINIC | Age: 56
End: 2021-05-24

## 2021-05-24 DIAGNOSIS — F43.10 PTSD (POST-TRAUMATIC STRESS DISORDER): ICD-10-CM

## 2021-05-24 DIAGNOSIS — F33.41 RECURRENT MAJOR DEPRESSIVE DISORDER, IN PARTIAL REMISSION (H): ICD-10-CM

## 2021-05-24 DIAGNOSIS — Z12.11 SCREEN FOR COLON CANCER: ICD-10-CM

## 2021-05-24 DIAGNOSIS — R10.9 RIGHT SIDED ABDOMINAL PAIN: ICD-10-CM

## 2021-05-24 DIAGNOSIS — J84.115 RESPIRATORY BRONCHIOLITIS ASSOCIATED INTERSTITIAL LUNG DISEASE (H): ICD-10-CM

## 2021-05-24 DIAGNOSIS — I25.119 ATHEROSCLEROSIS OF NATIVE CORONARY ARTERY WITH ANGINA PECTORIS, UNSPECIFIED WHETHER NATIVE OR TRANSPLANTED HEART (H): ICD-10-CM

## 2021-05-24 LAB
ALBUMIN SERPL-MCNC: 3.9 G/DL (ref 3.5–5)
ALBUMIN UR-MCNC: NEGATIVE G/DL
ALP SERPL-CCNC: 75 U/L (ref 45–120)
ALT SERPL W P-5'-P-CCNC: 38 U/L (ref 0–45)
ANION GAP SERPL CALCULATED.3IONS-SCNC: 8 MMOL/L (ref 5–18)
APPEARANCE UR: CLEAR
AST SERPL W P-5'-P-CCNC: 24 U/L (ref 0–40)
BACTERIA #/AREA URNS HPF: ABNORMAL /[HPF]
BILIRUB SERPL-MCNC: 0.6 MG/DL (ref 0–1)
BILIRUB UR QL STRIP: NEGATIVE
BUN SERPL-MCNC: 16 MG/DL (ref 8–22)
CALCIUM SERPL-MCNC: 8.8 MG/DL (ref 8.5–10.5)
CHLORIDE BLD-SCNC: 106 MMOL/L (ref 98–107)
CO2 SERPL-SCNC: 26 MMOL/L (ref 22–31)
COLOR UR AUTO: YELLOW
CREAT SERPL-MCNC: 1.09 MG/DL (ref 0.7–1.3)
GFR SERPL CREATININE-BSD FRML MDRD: >60 ML/MIN/1.73M2
GLUCOSE BLD-MCNC: 96 MG/DL (ref 70–125)
GLUCOSE UR STRIP-MCNC: NEGATIVE MG/DL
HGB UR QL STRIP: NEGATIVE
KETONES UR STRIP-MCNC: NEGATIVE MG/DL
LEUKOCYTE ESTERASE UR QL STRIP: NEGATIVE
LIPASE SERPL-CCNC: 25 U/L (ref 0–52)
MUCOUS THREADS #/AREA URNS LPF: ABNORMAL LPF
NITRATE UR QL: NEGATIVE
PH UR STRIP: 6 [PH] (ref 5–8)
POTASSIUM BLD-SCNC: 4.4 MMOL/L (ref 3.5–5)
PROT SERPL-MCNC: 6.7 G/DL (ref 6–8)
RBC #/AREA URNS AUTO: ABNORMAL HPF
SODIUM SERPL-SCNC: 140 MMOL/L (ref 136–145)
SP GR UR STRIP: >=1.03 (ref 1–1.03)
SQUAMOUS #/AREA URNS AUTO: ABNORMAL LPF
UROBILINOGEN UR STRIP-ACNC: ABNORMAL
WBC #/AREA URNS AUTO: ABNORMAL HPF

## 2021-05-24 ASSESSMENT — MIFFLIN-ST. JEOR: SCORE: 1665.46

## 2021-05-26 ENCOUNTER — COMMUNICATION - HEALTHEAST (OUTPATIENT)
Dept: CARE COORDINATION | Facility: CLINIC | Age: 56
End: 2021-05-26

## 2021-05-26 VITALS
HEART RATE: 63 BPM | SYSTOLIC BLOOD PRESSURE: 110 MMHG | RESPIRATION RATE: 16 BRPM | TEMPERATURE: 97.7 F | OXYGEN SATURATION: 98 % | DIASTOLIC BLOOD PRESSURE: 78 MMHG

## 2021-05-26 VITALS
DIASTOLIC BLOOD PRESSURE: 78 MMHG | HEART RATE: 72 BPM | OXYGEN SATURATION: 95 % | TEMPERATURE: 98.6 F | SYSTOLIC BLOOD PRESSURE: 126 MMHG

## 2021-05-26 VITALS
HEART RATE: 61 BPM | OXYGEN SATURATION: 97 % | DIASTOLIC BLOOD PRESSURE: 76 MMHG | SYSTOLIC BLOOD PRESSURE: 114 MMHG | TEMPERATURE: 98.5 F

## 2021-05-27 VITALS
OXYGEN SATURATION: 91 % | TEMPERATURE: 97.9 F | RESPIRATION RATE: 20 BRPM | HEART RATE: 76 BPM | SYSTOLIC BLOOD PRESSURE: 110 MMHG | DIASTOLIC BLOOD PRESSURE: 70 MMHG

## 2021-05-27 VITALS
DIASTOLIC BLOOD PRESSURE: 70 MMHG | SYSTOLIC BLOOD PRESSURE: 104 MMHG | OXYGEN SATURATION: 94 % | TEMPERATURE: 97.7 F | HEART RATE: 71 BPM

## 2021-05-27 VITALS
OXYGEN SATURATION: 92 % | DIASTOLIC BLOOD PRESSURE: 76 MMHG | HEART RATE: 71 BPM | TEMPERATURE: 98.1 F | SYSTOLIC BLOOD PRESSURE: 112 MMHG | RESPIRATION RATE: 21 BRPM

## 2021-05-27 VITALS — TEMPERATURE: 97 F | OXYGEN SATURATION: 91 % | SYSTOLIC BLOOD PRESSURE: 114 MMHG | DIASTOLIC BLOOD PRESSURE: 77 MMHG

## 2021-05-27 VITALS
HEART RATE: 72 BPM | OXYGEN SATURATION: 94 % | SYSTOLIC BLOOD PRESSURE: 102 MMHG | DIASTOLIC BLOOD PRESSURE: 69 MMHG | TEMPERATURE: 97.4 F

## 2021-05-27 VITALS
TEMPERATURE: 96.2 F | RESPIRATION RATE: 21 BRPM | DIASTOLIC BLOOD PRESSURE: 72 MMHG | HEART RATE: 82 BPM | OXYGEN SATURATION: 91 % | SYSTOLIC BLOOD PRESSURE: 110 MMHG

## 2021-05-27 VITALS
SYSTOLIC BLOOD PRESSURE: 118 MMHG | OXYGEN SATURATION: 95 % | HEART RATE: 65 BPM | TEMPERATURE: 98.4 F | DIASTOLIC BLOOD PRESSURE: 88 MMHG | DIASTOLIC BLOOD PRESSURE: 66 MMHG | HEART RATE: 67 BPM | TEMPERATURE: 97.1 F | RESPIRATION RATE: 18 BRPM | OXYGEN SATURATION: 96 % | SYSTOLIC BLOOD PRESSURE: 133 MMHG

## 2021-05-27 VITALS
SYSTOLIC BLOOD PRESSURE: 103 MMHG | HEART RATE: 71 BPM | TEMPERATURE: 98.7 F | OXYGEN SATURATION: 95 % | DIASTOLIC BLOOD PRESSURE: 70 MMHG

## 2021-05-27 VITALS
SYSTOLIC BLOOD PRESSURE: 111 MMHG | OXYGEN SATURATION: 94 % | DIASTOLIC BLOOD PRESSURE: 79 MMHG | HEART RATE: 69 BPM | TEMPERATURE: 98 F

## 2021-05-27 ASSESSMENT — PATIENT HEALTH QUESTIONNAIRE - PHQ9
SUM OF ALL RESPONSES TO PHQ QUESTIONS 1-9: 0
SUM OF ALL RESPONSES TO PHQ QUESTIONS 1-9: 4

## 2021-05-30 ENCOUNTER — RECORDS - HEALTHEAST (OUTPATIENT)
Dept: ADMINISTRATIVE | Facility: OTHER | Age: 56
End: 2021-05-30

## 2021-06-04 ENCOUNTER — COMMUNICATION - HEALTHEAST (OUTPATIENT)
Dept: NURSING | Facility: CLINIC | Age: 56
End: 2021-06-04

## 2021-06-04 ENCOUNTER — OFFICE VISIT - HEALTHEAST (OUTPATIENT)
Dept: INTERNAL MEDICINE | Facility: CLINIC | Age: 56
End: 2021-06-04

## 2021-06-04 VITALS — WEIGHT: 182 LBS | BODY MASS INDEX: 30.29 KG/M2

## 2021-06-04 VITALS
WEIGHT: 187 LBS | SYSTOLIC BLOOD PRESSURE: 130 MMHG | BODY MASS INDEX: 30.05 KG/M2 | OXYGEN SATURATION: 98 % | HEIGHT: 66 IN | DIASTOLIC BLOOD PRESSURE: 80 MMHG | HEART RATE: 74 BPM

## 2021-06-04 VITALS — BODY MASS INDEX: 29.95 KG/M2 | WEIGHT: 180 LBS

## 2021-06-04 VITALS — WEIGHT: 178 LBS | BODY MASS INDEX: 29.62 KG/M2

## 2021-06-04 VITALS — BODY MASS INDEX: 30.32 KG/M2 | WEIGHT: 185 LBS

## 2021-06-04 VITALS — BODY MASS INDEX: 30.12 KG/M2 | WEIGHT: 181 LBS

## 2021-06-04 VITALS — WEIGHT: 186 LBS | BODY MASS INDEX: 30.48 KG/M2

## 2021-06-04 VITALS — BODY MASS INDEX: 30.32 KG/M2 | WEIGHT: 182.2 LBS

## 2021-06-04 VITALS
HEIGHT: 65 IN | RESPIRATION RATE: 16 BRPM | HEART RATE: 68 BPM | SYSTOLIC BLOOD PRESSURE: 116 MMHG | WEIGHT: 180 LBS | BODY MASS INDEX: 29.99 KG/M2 | DIASTOLIC BLOOD PRESSURE: 74 MMHG

## 2021-06-04 VITALS — WEIGHT: 188.6 LBS | BODY MASS INDEX: 30.91 KG/M2

## 2021-06-04 VITALS — WEIGHT: 187 LBS | BODY MASS INDEX: 30.65 KG/M2

## 2021-06-04 VITALS
HEIGHT: 65 IN | DIASTOLIC BLOOD PRESSURE: 60 MMHG | BODY MASS INDEX: 30.66 KG/M2 | HEART RATE: 71 BPM | OXYGEN SATURATION: 95 % | SYSTOLIC BLOOD PRESSURE: 108 MMHG | WEIGHT: 184 LBS

## 2021-06-04 VITALS
HEIGHT: 65 IN | WEIGHT: 197.1 LBS | BODY MASS INDEX: 32.84 KG/M2 | HEIGHT: 65 IN | WEIGHT: 197.1 LBS | BODY MASS INDEX: 32.84 KG/M2 | HEIGHT: 65 IN | BODY MASS INDEX: 32.84 KG/M2 | WEIGHT: 197.1 LBS

## 2021-06-04 VITALS
HEIGHT: 66 IN | BODY MASS INDEX: 29.57 KG/M2 | WEIGHT: 184 LBS | RESPIRATION RATE: 16 BRPM | HEART RATE: 63 BPM | SYSTOLIC BLOOD PRESSURE: 108 MMHG | DIASTOLIC BLOOD PRESSURE: 80 MMHG

## 2021-06-04 VITALS — BODY MASS INDEX: 30.45 KG/M2 | WEIGHT: 183 LBS

## 2021-06-04 VITALS — BODY MASS INDEX: 30.52 KG/M2 | WEIGHT: 183.4 LBS

## 2021-06-04 VITALS
WEIGHT: 186.5 LBS | HEART RATE: 72 BPM | BODY MASS INDEX: 29.97 KG/M2 | DIASTOLIC BLOOD PRESSURE: 80 MMHG | RESPIRATION RATE: 16 BRPM | HEIGHT: 66 IN | SYSTOLIC BLOOD PRESSURE: 106 MMHG

## 2021-06-04 VITALS — WEIGHT: 181 LBS | BODY MASS INDEX: 30.12 KG/M2

## 2021-06-04 VITALS — BODY MASS INDEX: 30.29 KG/M2 | WEIGHT: 182 LBS

## 2021-06-04 VITALS — BODY MASS INDEX: 30.48 KG/M2 | WEIGHT: 186 LBS

## 2021-06-04 VITALS — WEIGHT: 180 LBS | BODY MASS INDEX: 29.95 KG/M2

## 2021-06-04 VITALS — WEIGHT: 179.6 LBS | BODY MASS INDEX: 29.89 KG/M2

## 2021-06-04 VITALS — WEIGHT: 183 LBS | BODY MASS INDEX: 30.45 KG/M2

## 2021-06-04 VITALS — WEIGHT: 180.5 LBS | BODY MASS INDEX: 30.04 KG/M2

## 2021-06-04 VITALS — WEIGHT: 179 LBS | BODY MASS INDEX: 29.79 KG/M2

## 2021-06-04 VITALS — BODY MASS INDEX: 30.15 KG/M2 | WEIGHT: 184 LBS

## 2021-06-04 DIAGNOSIS — H11.31 SUBCONJUNCTIVAL HEMORRHAGE OF RIGHT EYE: ICD-10-CM

## 2021-06-04 DIAGNOSIS — I25.119 ATHEROSCLEROSIS OF NATIVE CORONARY ARTERY WITH ANGINA PECTORIS, UNSPECIFIED WHETHER NATIVE OR TRANSPLANTED HEART (H): ICD-10-CM

## 2021-06-04 DIAGNOSIS — I10 ESSENTIAL HYPERTENSION: ICD-10-CM

## 2021-06-04 ASSESSMENT — MIFFLIN-ST. JEOR: SCORE: 1646.41

## 2021-06-05 VITALS
WEIGHT: 194 LBS | SYSTOLIC BLOOD PRESSURE: 112 MMHG | DIASTOLIC BLOOD PRESSURE: 72 MMHG | BODY MASS INDEX: 31.79 KG/M2 | HEART RATE: 66 BPM | OXYGEN SATURATION: 96 %

## 2021-06-05 VITALS
SYSTOLIC BLOOD PRESSURE: 98 MMHG | OXYGEN SATURATION: 96 % | RESPIRATION RATE: 14 BRPM | DIASTOLIC BLOOD PRESSURE: 76 MMHG | HEART RATE: 77 BPM | WEIGHT: 192.6 LBS | BODY MASS INDEX: 31.56 KG/M2

## 2021-06-05 VITALS
DIASTOLIC BLOOD PRESSURE: 76 MMHG | HEIGHT: 66 IN | RESPIRATION RATE: 18 BRPM | HEART RATE: 70 BPM | BODY MASS INDEX: 30.62 KG/M2 | SYSTOLIC BLOOD PRESSURE: 108 MMHG | OXYGEN SATURATION: 96 % | WEIGHT: 190.5 LBS

## 2021-06-05 VITALS — BODY MASS INDEX: 30.81 KG/M2 | WEIGHT: 188 LBS

## 2021-06-05 VITALS
HEIGHT: 66 IN | SYSTOLIC BLOOD PRESSURE: 114 MMHG | BODY MASS INDEX: 32.02 KG/M2 | DIASTOLIC BLOOD PRESSURE: 80 MMHG | OXYGEN SATURATION: 97 % | HEART RATE: 74 BPM | WEIGHT: 199.2 LBS

## 2021-06-05 VITALS — BODY MASS INDEX: 31.79 KG/M2 | WEIGHT: 194 LBS

## 2021-06-05 VITALS
BODY MASS INDEX: 30.22 KG/M2 | WEIGHT: 188 LBS | OXYGEN SATURATION: 97 % | HEIGHT: 66 IN | DIASTOLIC BLOOD PRESSURE: 74 MMHG | HEART RATE: 68 BPM | SYSTOLIC BLOOD PRESSURE: 116 MMHG

## 2021-06-05 VITALS
SYSTOLIC BLOOD PRESSURE: 118 MMHG | OXYGEN SATURATION: 96 % | BODY MASS INDEX: 31.5 KG/M2 | HEART RATE: 75 BPM | HEIGHT: 66 IN | WEIGHT: 196 LBS | DIASTOLIC BLOOD PRESSURE: 78 MMHG

## 2021-06-05 VITALS — WEIGHT: 188 LBS | BODY MASS INDEX: 30.81 KG/M2

## 2021-06-05 VITALS — WEIGHT: 190 LBS | BODY MASS INDEX: 31.14 KG/M2

## 2021-06-09 ENCOUNTER — OFFICE VISIT - HEALTHEAST (OUTPATIENT)
Dept: BEHAVIORAL HEALTH | Facility: CLINIC | Age: 56
End: 2021-06-09

## 2021-06-09 DIAGNOSIS — F41.8 DEPRESSION WITH ANXIETY: ICD-10-CM

## 2021-06-09 DIAGNOSIS — F43.10 PTSD (POST-TRAUMATIC STRESS DISORDER): ICD-10-CM

## 2021-06-09 RX ORDER — HYDROXYZINE PAMOATE 25 MG/1
25 CAPSULE ORAL 3 TIMES DAILY PRN
Qty: 90 CAPSULE | Refills: 1 | Status: SHIPPED | OUTPATIENT
Start: 2021-06-09 | End: 2021-11-15

## 2021-06-09 RX ORDER — QUETIAPINE FUMARATE 100 MG/1
150 TABLET, FILM COATED ORAL AT BEDTIME
Qty: 45 TABLET | Refills: 2 | Status: SHIPPED | OUTPATIENT
Start: 2021-06-09 | End: 2021-11-10

## 2021-06-09 NOTE — PATIENT INSTRUCTIONS - HE
- Hemodynamics are stable on review of home nursing notes. No medication changes were needed today.  - OK to stop Plavix 6/2021  - Continue amlodipine (Norvasc) for at least one month after surgery for radial artery graft protection.   - Incision reportedly healing well.   - Follow up with your cardiologist as scheduled (Dr. Malhotra on 8/7/2020).  - Start and continue Cardiac Rehab until completed.   - Continue strict sternal precautions until 8/5. No lifting >10bs; may gradually increase at this point (6 weeks post-op).   - May start driving next Wednesday, 7/22 (4 weeks post-op) if not using narcotic pain medications.  - No need for further follow-up with CV surgery unless concerns. Feel free to call our office with questions. [phone].

## 2021-06-09 NOTE — ANESTHESIA PROCEDURE NOTES
CECE    Patient location during procedure: OR  Start time: 6/24/2020 2:30 PM  Staffing:  Performing  Anesthesiologist: Mana Gutierrez MD  CECE:  Type/Reason: Monitoring CECE  Technique: blind insertion  Difficulty: easy  Anesthesia Monitoring: see additional note

## 2021-06-09 NOTE — ANESTHESIA POSTPROCEDURE EVALUATION
Patient: Nadya Blake  Procedure(s):  CORONARY ARTERY BYPASS GRAFT X 4 USING INTERNAL MAMMARY ARTERY AND LEFT RADIAL ARTERY, WITH ENDOSCOPIC VESSEL PROCUREMENT, ANESTHESIA TRANSESOPHAGEAL ECHOCARDIOGRAM, EPIAORTIC ULTRASOUND  Anesthesia type: general    Patient location: ICU  Last vitals:   Vitals Value Taken Time   BP  6/24/2020  9:22 PM   Temp  6/24/2020  9:22 PM   Pulse 81 6/24/2020  9:20 PM   Resp 16 6/24/2020  8:10 PM   SpO2 100 % 6/24/2020  9:20 PM   Vitals shown include unvalidated device data.  Post vital signs: stable  Level of consciousness: sedated  Post-anesthesia pain: pain controlled  Post-anesthesia nausea and vomiting: no  Pulmonary: ETT, ventilator  Cardiovascular: stable  Hydration: adequate  Anesthetic events: no    QCDR Measures:  ASA# 11 - Lisa-op Cardiac Arrest: ASA11B - Patient did NOT experience unanticipated cardiac arrest  ASA# 12 - Lisa-op Mortality Rate: ASA12B - Patient did NOT die  ASA# 13 - PACU Re-Intubation Rate: ASA13X - Exclusion: organ donor or direct ICU transfer  ASA# 10 - Composite Anes Safety: ASA10A - No serious adverse event    Additional Notes:

## 2021-06-09 NOTE — PROGRESS NOTES
ITP ASSESSMENT   Assessment Day: Initial    Session Number: 1  Precautions: Sternal    Diagnosis: MI;CAB    Risk Stratification: High    Referring Provider: Mart Ragsdale, *  EXERCISE  Exercise Assessment: Initial       6 Minute Walk Test   Pre   Pre Exercise HR: 68                    Pre Exercise BP: 106/54      Peak  Peak HR: 76                   Peak BP: 120/68    Peak feet: 570    Peak O2 SAT: 98%    Peak RPE: 4    Peak MPH: 1.08      Symptoms:  Peak Symptoms: none      5 mins. Post  5 Min Post HR: 67    5 Min Post BP: 102/60                           Exercise Plan  Goals Next 30 days   STG #1: Patient will be able to walk from the lobby to Cardiac Rehab without taking a break and be able to walk from Cardiac Rehab to the lobby without taking a break once completed exercise session.    STG #2: Patient will tolerate 20-30 minutes on a variety of exercise modalities at a MET level of  2.0-2.5.     LTG: Patient will return to walking 45 minutes 5x/week by 10/21/20. Patient will return to shooting baskets and playing 1 on 1 basketball (4.5-6.0 METs) with his son by 10/21/20.      Education Goals: All goals in this section met    Education Goals Met: Patient can state cardiac s/s and appropriate emergency response.;Has system for taking medication.;Medication review.      Exercise Prescription  Exercise Mode: Treadmill;Nustep;Bike    Frequency: 2-3x/week    Duration: 20-30 minutes    Intensity / THR: 20-30 beats above resting heart rate    RPE 11-14  Progression / Met level: 2.0-3.0    Resistive Training?: No      Current Exercise (mins/week): 70      Interventions  Home Exercise:  Mode: walking    Frequency: 10 minutes    Duration: daily      Education Material : Individual education and counseling      Education Completed  Exercise Education Completed: Cardiac Anatomy;Signs and Symptoms;Medication review;RPE;Emergency Plan;Home Exercise;Warm up/cool down;FITT Principles;BP/HR Reponse to exercise;Benefits of  "Exercise;End point of exercise              Exercise Follow-up/Discharge  Follow up/Discharge: Skilled therapy is needed to continue to monitor for EKG changes (patient had a bout of Afib RVR post op, currently on Amiodarone) and proper CV response to exercise while working towards achieving a MET level of 4.5-6.  Educate on safe progression of activity and exercise. Provide education and support for risk factor modification, continued smoking cessation and pre diabetes management.  Offer emotional support as needed.   NUTRITION  Nutrition Assessment: Initial      Nutrition Risk Factors:  Nutrition Risk Factors: Overweight      Nutrition Plan  Interventions  Other Nutrition Intervention: Diet Class;Therapist/Pt Discussion;Provide with Written Material      Education Completed  Nutrition Education Completed: Low Saturated fat diet;Risk factor overview;Low sodium diet      Goals  Nutrition Goals (Next 30 days): Patient will lose weight;Patient can identify their risk factors for CAD      Goals Met  Nutrition Goals Met: Patient follows a low sodium diet;Completed Nutritional Risk Screen;Provided Rate your Plate Survey;Patient knows appropriate portion size      Height, Weight, and  BMI  Weight: 182 lb 3.2 oz (82.6 kg)  Height: 5' 5\" (1.651 m)  BMI: 30.32      Nutrition Follow-up  Follow-up/Discharge: Patient's wife does all the cooking. Patient has decreased CHO and sugars. Patient is pre diabetic.  Patient is watching sodium and is eating small portion sizes.           Other Risk Factors  Other Risk Factor Assessment: Initial      HTN Risk Factor: Hypertension      Pre Exercise BP: 106/54  Post Exercise BP: 102/60      Hypertension Plan  Goals  HTN Goals: Take medication as prescribed;Follow low sodium diet;Exercises regularly      Goals Met  HTN Goals Met: Take medication as prescribed;Follow low sodium diet      HTN Interventions  HTN Interventions: Diet consult;Therapist/patient discussion;Provide written " material      HTN Education Completed  HTN Education Completed: Low sodium diet;Medication review;Risk factor overview      Tobacco Risk Factor: Tobacco      Initial Use:: 1/2 PPD  Current Use:: none      Tobacco Plan  Tobacco Goals  Tobacco Goals: Patient remain tobacco free      Goals Met  Tobacco Goals Met: Patient remain tobacco free      Tobacco Interventions  Tobacco Interventions: Therapist/patient discussion      Tobacco Education Completed  Tobacco Education Completed: Health benefits of tobacco cessation;Risk factor overview      Risk Factor Follow-up   Follow-up/Discharge: Pt smoked for 34 years. Patient quit 3/2020. Patient is doing well with smoking cessation.     PSYCHOSOCIAL  Psychosocial Assessment: Initial       [facility] Q of L Summary Score: 25      PHQ-9 Total Score: 16 (Pt sees a psychologist once a week and is on antidepressants)      Psychosocial Risk Factor: Stress      Psychosocial Plan  Interventions  If PHQ-9 is >9, send letter to MD  Interventions: Offer educational videos and classes;Provide written material;Individual education and counseling       Education Completed  Education Completed: S/S of depression;Effects of stress on body      Goals  Goals (Next 30 days): Identify stressors      Goals Met  Goals Met: Identified Support system      Psychosocial Follow-up  Follow-up/Discharge: Patient sees a psychologist once a week and is on antidepressants.  Patient has PTSD along with depression/anxiety.  Patient's wife is a good support.             Patient involved in Goal setting?: Yes      Signature: _____________________________________________________________    Date: __________________    Time: __________________

## 2021-06-09 NOTE — ANESTHESIA PROCEDURE NOTES
Arterial Line  Reason for Procedure: hemodynamic monitoring and multiple ABGs  Patient location during procedure: OR pre-induction  Start time: 6/24/2020 1:45 PM  End time: 6/24/2020 1:55 PM  Staffing:  Performing  Anesthesiologist: Mana Gutierrez MD  Sterile Precautions:  sterile barriers used during insertion: cap, mask, sterile gloves, large sheet, and hand hygiene used.  Arterial Line:   Immediately prior to procedure a time out was called to verify the correct patient, procedure, equipment, support staff and site/side marked as required  Laterality: right  Location: brachial  Prepped with: ChloroPrep    Needle gauge: 20 G  Number of Attempts: 1  Secured with: tape, transparent dressing and pressure dressing  Flushed with: saline  1% lidocaine local anesthesia used for skin prep.   See MAR for additional medications given.  Ultrasound evaluation of access site: yes  Vessel patent by US exam    Concurrent real time visualization of needle entry    Permanent ultrasound image captured

## 2021-06-09 NOTE — ANESTHESIA PROCEDURE NOTES
Central line    Start time: 6/24/2020 2:12 PM  End time: 6/24/2020 2:25 PM  Patient location: OR Post-induction  Indications: central pressure monitoring and vascular access  Performing Anesthesiologist: Mana Gutierrez MD  Pre-procedure Checklist  Completed: patient identified, site marked, risks, benefits, and alternatives discussed, timeout performed, consent obtained, hand hygiene performed, all elements of maximal sterile barriers used including cap, mask, gown, sterile gloves, and large sheet and skin prep agent completely dried prior to procedure    Procedure Details:  Preparation: 2% chlorhexidine  Location details: right internal jugular  Catheter type: Introducer with [place]  Introducer type: MAC  Lumens:double lumenWedged at: 55   Withdrawn and locked at: 50Number of attempts: 1    Ultrasound evaluation of access site: yes   Sterile gel and probe cover used in ultrasound-guided central venous catheter insertion  Vessel patent by US exam  Concurrent real time visualization of needle entry  Visualized anatomic structures normal  Ultrasound permanent image saved  Manometry confirmation of venous access    Post-procedure:   line sutured and Antimicrobial disks with CHG applied  Assessment: blood return through all ports and free fluid flow  Complications: none

## 2021-06-09 NOTE — TELEPHONE ENCOUNTER
Patient's wife calling - verbal consent given by patient over the phone.    Says patient just washed his face and some blood came from left nostril.  Was two small blood clots.  Bleeding has stopped.  Wife says he was possibly washing his face too hard.  Patient is on blood thinners due to recent history of heart surgery.    Triaged to disposition of See PCP Within 24 Hours.  Patient's PCP is through [facility].  Wife says she will call in the morning to get an appointment.    Advised to call back if bleeding occurs again, lightheadedness or weakness occurs or he becomes worse.    Neha Newsome, RN  Triage Nurse Advisor    COVID 19 Nurse Triage Plan/Patient Instructions    Please be aware that novel coronavirus (COVID-19) may be circulating in the community. If you develop symptoms such as fever, cough, or SOB or if you have concerns about the presence of another infection including coronavirus (COVID-19), please contact your health care provider or visit [email or web address].     Disposition/Instructions    Virtual Visit with provider recommended. Reference Visit Selection Guide.    Thank you for taking steps to prevent the spread of this virus.  o Limit your contact with others.  o Wear a simple mask to cover your cough.  o Wash your hands well and often.    Resources    [facility]: About COVID-19: [email or web address]    CDC: What to Do If You're Sick: www.cdc.gov/coronavirus/2019-ncov/about/steps-when-sick.html    CDC: Ending Home Isolation: www.cdc.gov/coronavirus/2019-ncov/hcp/disposition-in-home-patients.html     CDC: Caring for Someone: www.cdc.gov/coronavirus/2019-ncov/if-you-are-sick/care-for-someone.html     [facility]: Interim Guidance for Hospital Discharge to Home: www.health.[facility].[place]/diseases/coronavirus/hcp/hospdischarge.pdf    [facility] clinical trials (COVID-19 research studies): clinicalaffairs.[email or web address]/umn-clinical-trials     Below are the COVID-19 hotlines at the [place]  Department of Health ([facility]). Interpreters are available.   o For health questions: Call 739-655-2997 or 1-846.583.9592 (7 a.m. to 7 p.m.)  o For questions about schools and childcare: Call 292-401-6787 or 1-137.341.1487 (7 a.m. to 7 p.m.)   Reason for Disposition    Taking Coumadin (warfarin) or other strong blood thinner, or known bleeding disorder (e.g., thrombocytopenia)    Additional Information    Negative: Fainted or too weak to stand following large blood loss    Negative: Sounds like a life-threatening emergency to the triager    Negative: Nosebleed followed a nose injury    Negative: [1] Bleeding present > 30 minutes AND [2] using correct method of direct pressure    Negative: [1] Bleeding now AND [2] second call after being instructed in correct technique of direct pressure    Negative: Dizziness or lightheadedness    Negative: Pale skin (pallor) of new onset or worsening    Negative: [1] Has nasal packing (inserted by health care provider to control bleeding) AND [2] new rash    Negative: [1] Has nasal packing AND [2] now bleeding around the packing (Exception: few drops or ooze)    Negative: Patient sounds very sick or weak to the triager    Protocols used: NOSEBLEED-ASurjit

## 2021-06-09 NOTE — PROGRESS NOTES
Your patient was discharged from [facility] on 7/16/2020 because all of all goals have been met and pateint/spouse request. Thank you for allowing us to provide care to this patient.  A Discharge summary is available upon request.....[phone]    MICHELLE Walter Deanne E, CNP   [address] [facility] [address]   Phone: [phone]   Fax: [phone]   NPI: [id number]

## 2021-06-09 NOTE — ANESTHESIA PREPROCEDURE EVALUATION
Anesthesia Evaluation      Patient summary reviewed   No history of anesthetic complications     Airway   Mallampati: III  Neck ROM: full   Pulmonary - normal exam   (-) pneumonia                         Cardiovascular - normal exam  Exercise tolerance: > or = 4 METS  (+) hypertension, CAD, angina at rest and with exertion, , PVD    ECG reviewed        Neuro/Psych    (+) neuromuscular disease,  depression, anxiety/panic attacks,     Endo/Other    (+) arthritis, obesity,      GI/Hepatic/Renal    (+) GERD well controlled,             Dental      Comment: Missing teeth                       Anesthesia Plan  Planned anesthetic: general endotracheal  50 mg ketamine IV on induction.  20 mg methadone IV on induction.  4 grams magnesium IV.  CECE  ASA 4   Induction: intravenous   Anesthetic plan and risks discussed with: patient, spouse and  services used  Anesthesia plan special considerations: video-assisted, antiemetics, CVP line, arterial catheterization, pulmonary artery catheterization, IV therapy two IVs, dexmedetomidine  Post-op plan: extended intubation/vent support

## 2021-06-09 NOTE — TELEPHONE ENCOUNTER
Called pt with [place] phone . Wife was comfortable answering the following questions without :    POST OP MONITORING  How is your pain on a 0-10 scale, how are you managing your pain? Pt is taking tylenol at night. Spouse states pt had some chest pain last night that she called about, but never received a call back. Pt described the pain as the same as what he has been experiencing after surgery and while admitted. He made RN aware of pain while admitted. Pain is sharp in nature, and sometimes occurs with a deep breath. It lasted about 30 minutes last night. Advised spouse to bring pt to ED immediately if chest pain changes or worsens in nature. Confirmed contact info for CV team and CV RN.    Pt's vitals last night while experiencing chest pain: /98, HR 80.    ACTIVITY  How is your activity tolerance? Pt is getting up multiple times a day and tolerating it well.  Are you still doing sternal precautions? Yes.  Do you hear any clicking when you are moving or taking a deep breath? No.    Are you weighing yourself daily? Spouse has not weighed pt yet, friends will bring a scale over today that she will use for pt.    SIGNS AND SYMPTOMS OF INFECTION  1. INCREASE IN PAIN - No  2. FEVER - No  3. DRAINAGE - No If so, color: NA  4. REDNESS - No  5. SWELLING - No    ASSISTANCE  Do you have someone at home to assist you with your daily activities? Spouse is assisting pt at home.    MEDICATIONS  Is someone helping you to set up your medications? Spouse and pt are managing meds.  Do you have any questions about your medications? No. Reinforced that pt has refills on newly prescribed medications, and that pt or spouse may transfer the prescription to a local pharmacy if needed.    Are you on a blood thinner? Plavix.  Who is managing your INRs? NA    FOLLOW UP  Are you scheduled for cardiac rehab? 7/9.    You are scheduled to see our surgery advanced practive provider for post operative follow up on  7/14.  You are scheduled to see your cardiologist on 8/7.  You are scheduled to see your primary care physician on - spouse has called for an appt and is waiting to hear back to schedule.    CONTACT INFORMATION  Please feel free to call us with any questions or symptoms that are concerning for you at [phone]. If it is after 4:30 in the afternoon, or a weekend please call [phone] and ask for the on call specialist. We want to do everything we can to help prevent you needing to return to the ED, so please do not hesitate to call us.    Allie Fried, RNCC  Cardiovascular Surgery

## 2021-06-09 NOTE — PROGRESS NOTES
Cardiac Rehab  Phase II Assessment    Assessment Date: 7/21/20    Diagnosis: NSTEMI, CABG x 4     Date of Onset: 6/23/20, 6/24/20  ICD/Pacemaker: No          Parameters: NA  Post-op Complications: afib RVR, pericarditis   ECG History: NSR, ST elevation inferior leads nonspecific ST abnormality EF%:71%  Past Medical History:   Past Medical History:   Diagnosis Date     Adenomatous polyp of colon      Ascending aorta dilatation (H)      Carpal tunnel syndrome      Chronic superficial gastritis      Depression with anxiety      Essential hypertension 9/2/2012     Fatty liver disease, nonalcoholic      GERD (gastroesophageal reflux disease)      IBS (irritable bowel syndrome)      Left lumbar radiculopathy      Multinodular goiter      Old torn meniscus of knee      Perianal abscess      Post herpetic neuralgia      Post-traumatic osteoarthritis of both knees      Primary osteoarthritis of left hip      Primary osteoarthritis of right hip      Pulmonary nodule      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 In the past 12 months, have you been in a relationship where you have been abused physically, emotionally, sexually or financially? No  notified: NA  2. Who do you turn to for emotional support?: wife  3. Do you have cultural or spiritual needs? No  4. Have there been any major life changes in the past 12 months? No    Referral Information  Primary Physician:  Margareth Marcelo CNP  Cardiologist: Crow Malhotra MD  Surgeon: Mart Ragsdale MD    [place]  exercise/Equipment: none    Patient's long-term goal(s): To return to walking for 45 minutes 5x per week. To return to playing basketball 1 on 1 with son.    1. Living Accommodations: [facility] Steps: Yes-10      Support people at home: wife, 2 children 13 and 14   2. Marital Status:   3. Family is able to assist with cares      Faith/Community involvement: NA  4. Recreation/Hobbies: painting

## 2021-06-09 NOTE — ANESTHESIA CARE TRANSFER NOTE
Last vitals:   Vitals:    06/24/20 2010   BP:    Pulse: 80   Resp: 16   Temp:    SpO2: 100%     Patient's level of consciousness is unresponsive  Spontaneous respirations: no: Controlled ventilation.  Maintains airway independently: no: Endotracheal tube in place.  Dentition unchanged: yes  Oropharynx: endotracheal tube in place    QCDR Measures:  ASA# 20 - Surgical Safety Checklist: WHO surgical safety checklist completed prior to induction    PQRS# 430 - Adult PONV Prevention: 4558F - Pt received => 2 anti-emetic agents (different classes) preop & intraop  ASA# 8 - Peds PONV Prevention: NA - Not pediatric patient, not GA or 2 or more risk factors NOT present  PQRS# 424 - Lisa-op Temp Management: 4559F - At least one body temp DOCUMENTED => 35.5C or 95.9F within required timeframe  PQRS# 426 - PACU Transfer Protocol:NA - Patient did not go to PACU  ASA# 14 - Acute Post-op Pain: ASA14B - Patient did NOT experience pain >= 7 out of 10

## 2021-06-09 NOTE — PROGRESS NOTES
CARDIOTHORACIC SURGERY FOLLOW-UP VISIT -- TELEPHONE     Nadya Blake  1965  [id number]                  Reason for visit: Post operative telephone check-in. Patient underwent CABGx4 with Dr. Ragsdale on 6/24/2020.     HPI: Patient is a 54 y.o. male s/p CABG. Patient has past medical history as below. Hospital course was remarkable for a-fib with RVR and reluctance of patient to participate in rehab. Patient was discharged to home with home care.     Patient has been doing overall well since discharge although is lagging a bit from a rehab standpoint at home. PT/OT/RN coming to his home. Patient did follow-up with primary care since leaving the hospital; no concerns at this visit. Reports that incision is healing well. Denies fevers, peripheral edema. Appetite is improving and patient is voiding without difficulty. Weight has been stable. Pain management at this point with Tylenol only. He is on Plavix for one year per surgeon preference for graft protection. BPs recorded at home care are in a good spot (-120s).       Past Medical History:   Diagnosis Date     Adenomatous polyp of colon      Ascending aorta dilatation (H)      Carpal tunnel syndrome      Chronic superficial gastritis      Depression with anxiety      Essential hypertension 9/2/2012     Fatty liver disease, nonalcoholic      GERD (gastroesophageal reflux disease)      IBS (irritable bowel syndrome)      Left lumbar radiculopathy      Multinodular goiter      Old torn meniscus of knee      Perianal abscess      Post herpetic neuralgia      Post-traumatic osteoarthritis of both knees      Primary osteoarthritis of left hip      Primary osteoarthritis of right hip      Pulmonary nodule      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)      Thyroid nodule      TMJ arthritis        Past Surgical History:   Procedure Laterality Date     APPENDECTOMY  1995     CV CORONARY ANGIOGRAM N/A 6/23/2020    Procedure: Coronary  Angiogram;  Surgeon: Ariane Lester MD;  Location: [facility] Cath Lab;  Service: Cardiology     CV IABP INSERT N/A 6/24/2020    Procedure: Intra Aortic Balloon Pump Insertion;  Surgeon: Ariane Lester MD;  Location: [facility] Cath [facility];  Service: Cardiology     CV LEFT HEART CATHETERIZATION WO LEFT VETRICULOGRAM Left 6/23/2020    Procedure: Left Heart Catheterization Without Left Ventriculogram;  Surgeon: Ariane Lester MD;  Location: [facility] Cath Lab;  Service: Cardiology         Current Outpatient Medications:      acetaminophen (TYLENOL) 500 MG tablet, Take 1,000 mg by mouth every 6 (six) hours as needed for pain., Disp: , Rfl:      albuterol (PROAIR HFA;PROVENTIL HFA;VENTOLIN HFA) 90 mcg/actuation inhaler, Inhale 1-2 puffs every 4 (four) hours as needed for wheezing., Disp: , Rfl:      amiodarone (PACERONE) 200 MG tablet, Take 1 tablet (200 mg total) by mouth daily. Take daily for 30 days then stop., Disp: 30 tablet, Rfl: 0     amLODIPine (NORVASC) 2.5 MG tablet, Take 1 tablet (2.5 mg total) by mouth daily. Take for one month than stopl, Disp: 30 tablet, Rfl: 0     aspirin 81 mg chewable tablet, 1 tablet (81 mg total) by Enteral Tube route daily., Disp: , Rfl: 0     budesonide-formoteroL (SYMBICORT) 160-4.5 mcg/actuation inhaler, Inhale 2 puffs 2 (two) times a day., Disp: , Rfl:      clonazePAM (KLONOPIN) 0.5 MG tablet, Take 0.5 mg by mouth 2 (two) times a day as needed for anxiety., Disp: , Rfl:      clopidogreL (PLAVIX) 75 mg tablet, Take 1 tablet (75 mg total) by mouth every morning., Disp: 30 tablet, Rfl: 11     ergocalciferol (VITAMIN D2) 1,250 mcg (50,000 unit) capsule, Take 50,000 Units by mouth 2 (two) times a week. Mondays & Thursdays, Disp: , Rfl:      escitalopram oxalate (LEXAPRO) 20 MG tablet, Take 20 mg by mouth daily., Disp: , Rfl:      gabapentin (NEURONTIN) 100 MG capsule, Take 100 mg by mouth 3 (three) times a day as needed (back pain)., Disp: , Rfl:      lidocaine 4 % patch, Place  2 patches on the skin daily. Remove and discard patch with 12 hours or as directed by MD., Disp: 10 patch, Rfl: 0     lisinopriL (PRINIVIL,ZESTRIL) 2.5 MG tablet, Take 1 tablet (2.5 mg total) by mouth daily., Disp: 30 tablet, Rfl: 3     metoprolol tartrate (LOPRESSOR) 25 MG tablet, Take 1.5 tablets (37.5 mg total) by mouth 2 (two) times a day., Disp: 60 tablet, Rfl: 3     omeprazole (PRILOSEC) 20 MG capsule, Take 40 mg by mouth 2 (two) times a day before meals., Disp: , Rfl:      QUEtiapine (SEROQUEL) 50 MG tablet, Take 50 mg by mouth at bedtime., Disp: , Rfl:      rosuvastatin (CRESTOR) 40 MG tablet, Take 1 tablet (40 mg total) by mouth at bedtime., Disp: 30 tablet, Rfl: 11  No current facility-administered medications for this visit.     Facility-Administered Medications Ordered in Other Visits:      dexmedetomidine (PRECEDEX) 400 mcg/100 mL in NS (4 mcg/mL) infusion, 0.2-1.4 mcg/kg/hr, Intravenous, Continuous, Dana Alves CNP, Last Rate: 11.75 mL/hr at 06/24/20 1432, 0.5 mcg/kg/hr at 06/24/20 1432    Allergies   Allergen Reactions     Atorvastatin Diarrhea     Cortisone Other (See Comments)     pain     Methylprednisolone Other (See Comments)     ?       ROS:  Gen: No fevers, weight loss/gain  CV: Denies chest pain, peripheral edema  Pulm: Denies SOB  GI/: Voiding without problems, appetite improving.      LABS:  None     IMAGING:  None     PHYSICAL EXAM:   None. Telephone visit.       ASSESSMENT/PLAN: Patient is a 54 y.o. male status-post CABG.     - Hemodynamics are stable on review of home nursing notes. No medication changes were needed today.  - OK to stop Plavix 6/2021  - Continue amlodipine (Norvasc) for at least one month after surgery for radial artery graft protection.   - Incision reportedly healing well.   - Follow up with your cardiologist as scheduled (Dr. Malhotra on 8/7/2020).  - Start and continue Cardiac Rehab until completed.   - Continue strict sternal precautions until 8/5. No lifting  >10bs; may gradually increase at this point (6 weeks post-op).   - May start driving next Wednesday, 7/22 (4 weeks post-op) if not using narcotic pain medications.  - No need for further follow-up with CV surgery unless concerns. Feel free to call our office with questions. [phone].          _______  Nelsy Mccormick PA-C  Cardiothoracic Surgery  [phone]

## 2021-06-10 ENCOUNTER — COMMUNICATION - HEALTHEAST (OUTPATIENT)
Dept: NURSING | Facility: CLINIC | Age: 56
End: 2021-06-10

## 2021-06-10 NOTE — TELEPHONE ENCOUNTER
Left message for patient with assistance of [place] , requesting call back to complete Wellness screen prior to clinic appt tomorrow.    Wellness Screening Tool  Symptom Screening:  Do you have one of the following NEW symptoms:    Fever (subjective or >100.0)?  No    A new cough?  No    Shortness of breath?  No     Chills? No     New loss of taste or smell? No     Generalized body aches? No     New persistent headache? No     New sore throat? No     Nausea, vomiting, or diarrhea?  No    Within the past 3 weeks, have you been exposed to someone with a known positive illness below:    COVID-19 (known or suspected)?  per pt, recent covid test as inpt, (-). mg  Component  7/27/20 0243   COVID-19 VIRUS SPECIMEN SOURCE  Nasopharyngeal     2019-nCOV  Not Detected            Chicken pox?  No    Mealses?  No    Pertussis?  No    Patient notified of visitor policy- They may have one person accompany them to their appointment, but they will need to wear a mask and will be screened upon arrival for symptoms: Yes - wife will accompany pt to visit.  mg  Pt informed to wear a mask: Yes  Pt notified if they develop any symptoms listed above, prior to their appointment, they are to call the clinic directly at [phone] for further instructions.  Yes  Patient's appointment status: Patient will be seen in clinic as scheduled on Fri. 8-7-20 @ 0810 in  clinic with Dr. Malhotra.  mg

## 2021-06-10 NOTE — TELEPHONE ENCOUNTER
----- Message from Charis Ndiaye sent at 8/13/2020 11:43 AM CDT -----  Regarding: ALIE PT / UPDATE ON PT'S BP  General phone call:    Caller: Pt's wife, Carlee     Primary cardiologist: ALIE     Detailed reason for call: Carlee wants to update MICHELLE Arana and ALIE that she was able to get an in clinic appt with pt's pcp tomorrow, 8/14 at 1pm. She will also continue to monitor pt's bp throughout the day as well. If there are any questions or concerns, please call Carlee.     New or active symptoms? Yes     Best phone number: [phone]    Best time to contact: Anytime     Ok to leave a detailed message? Yes     Device? No     Additional Info:

## 2021-06-10 NOTE — TELEPHONE ENCOUNTER
New Appointment Needed  What is the reason for the visit:    Briana who is a nurse from the [facility] would like this patient to see Dr Briseno.  [facility] strongly suggest patient see Dr. Briseno.  Provider Preference: PCP only  How soon do you need to be seen?: Dr. Briseno is recommended by multiple providers and staff at the [facility] to see this patient.  Briana from the [facility] is advocating for him.  Waitlist offered?: No  Okay to leave a detailed message:  Yes.  Any questions please call Briana from the [facility] at [phone].

## 2021-06-10 NOTE — PATIENT INSTRUCTIONS - HE
Nadya,    It was a pleasure to see you today at [facility].    Please stop the lisinopril as it is likely to be causing your cough.    Change the twice daily metoprolol tartrate (dispose of it now or take it as 50 mg twice daily until you run out) to once daily 100 mg of metoprolol succinate, which is long acting.    We will call your wife with the LDL results from today.    You may drive. You do not need to restrict your salt intake.     The numbness in your legs and chest may take a year to go away.    You will see Dr. Malhotra in one year after a new lipid profile.     Please call us if you have any questions or concerns about your heart.      Crow Malhotra M.D.  [facility]

## 2021-06-10 NOTE — TELEPHONE ENCOUNTER
Message rec'd 8-13-20 @ 1047:        I called Carlee and reinforced what you said. She made an appointment for him to be seen today at her primary clinic.     Brandon Hong MD

## 2021-06-10 NOTE — PROGRESS NOTES
ITP ASSESSMENT   Assessment Day: 30 Day    Session Number: 5  Precautions: Sternal    Diagnosis: MI;CAB    Risk Stratification: High    Referring Provider: Mart Ragsdale, *  EXERCISE  Exercise Assessment: Reassessment       6 Minute Walk Test   Pre   Pre Exercise HR: 68                    Pre Exercise BP: 106/54      Peak  Peak HR: 76                   Peak BP: 120/68    Peak feet: 570    Peak O2 SAT: 98%    Peak RPE: 4    Peak MPH: 1.08      Symptoms:  Peak Symptoms: none      5 mins. Post  5 Min Post HR: 67    5 Min Post BP: 102/60                           Exercise Plan  Goals Next 30 days  STG #1 Pt. will tolerate 40 min. at a 2.5-3.0 met level doing 2 modalities    STG #2 Pt. pt. will begin using arms on the arm ergometer each session.     LTG: Patient will return to walking 45 minutes 5x/week by 10/21/20. Patient will return to shooting baskets and playing 1 on 1 basketball (4.5-6.0 METs) with his son by 10/21/20.      Education Goals: All goals in this section met    Education Goals Met: Patient can state cardiac s/s and appropriate emergency response.;Has system for taking medication.;Medication review.                          Goals Met  Initial ADL's goals met: Goal met: Pt. is able to walk from lobby to rehab and back each session,    Initial Leisure goals met: Goal met: Pt. is tolerating 35 min of exercise at a 2-2.7 met level for 30-35 min.      Initial Progression: Pt. has shown improvement in activity tolerance, he continues to struggle with his recovery, he gets very anxious and fearful iwith ant increase in activity or if he gets overly tired. Will cont. to provide support.      Exercise Prescription  Exercise Mode: Treadmill;Nustep;Bike    Frequency: 2-3x/week    Duration: 40 minutes    Intensity / THR: 20-30 beats above resting heart rate    RPE 11-14  Progression / Met level: 2.0-3.0    Resistive Training?: Yes      Current Exercise (mins/week): 150      Interventions  Home  "Exercise:  Mode: walking    Frequency: 20-30 min./day    Duration: daily      Education Material : Individual education and counseling      Education Completed  Exercise Education Completed: Cardiac Anatomy;Signs and Symptoms;Medication review;RPE;Emergency Plan;Home Exercise;Warm up/cool down;FITT Principles;BP/HR Reponse to exercise;Benefits of Exercise;End point of exercise              Exercise Follow-up/Discharge  Follow up/Discharge: Skilled therapy is needed to continue to monitor for EKG changes (patient had a bout of Afib RVR post op, currently on Amiodarone) and proper CV response to exercise while working towards achieving a MET level of 4.5-6.  Educate on safe progression of activity and exercise. Provide education and support for risk factor modification, continued smoking cessation and pre diabetes management.  Provide support and feedback on vitals so pt. regains his confidence to exercise independently and increase activity.            NUTRITION  Nutrition Assessment: Reassessment      Nutrition Risk Factors:  Nutrition Risk Factors: Overweight      Nutrition Plan  Interventions  Other Nutrition Intervention: Diet Class;Therapist/Pt Discussion;Provide with Written Material      Education Completed  Nutrition Education Completed: Low Saturated fat diet;Risk factor overview;Low sodium diet      Goals  Nutrition Goals (Next 30 days): Patient will lose weight;Patient can identify their risk factors for CAD      Goals Met  Nutrition Goals Met: Patient follows a low sodium diet;Completed Nutritional Risk Screen;Provided Rate your Plate Survey;Patient knows appropriate portion size      Height, Weight, and  BMI  Weight: 178 lb (80.7 kg)  Height: 5' 5\" (1.651 m)  BMI: 29.62      Nutrition Follow-up  Follow-up/Discharge: Patient's wife does all the cooking. Patient has decreased CHO and sugars. Patient is pre diabetic.  Patient is watching sodium and is eating small portion sizes.         Other Risk " Factors  Other Risk Factor Assessment: Reassessment      HTN Risk Factor: Hypertension      Pre Exercise BP: 116/72  Post Exercise BP: 114/62      Hypertension Plan  Goals  HTN Goals: Take medication as prescribed;Follow low sodium diet;Exercises regularly      Goals Met  HTN Goals Met: Take medication as prescribed;Follow low sodium diet      HTN Interventions  HTN Interventions: Diet consult;Therapist/patient discussion;Provide written material      HTN Education Completed  HTN Education Completed: Low sodium diet;Medication review;Risk factor overview      Tobacco Risk Factor: Tobacco      Initial Use:: 1/2 PPD  Current Use:: none      Tobacco Plan  Tobacco Goals  Tobacco Goals: Patient remain tobacco free      Goals Met  Tobacco Goals Met: Patient remain tobacco free      Tobacco Interventions  Tobacco Interventions: Therapist/patient discussion      Tobacco Education Completed  Tobacco Education Completed: Health benefits of tobacco cessation;Risk factor overview      Risk Factor Follow-up   Follow-up/Discharge: Pt smoked for 34 years. Patient quit 3/2020. Patient is doing well with smoking cessation.         PSYCHOSOCIAL  Psychosocial Assessment: Reassessment       [facility] Q of L Summary Score: 25      PHQ-9 Total Score: 16 (Pt sees a psychologist once a week and is on antidepressants)      Psychosocial Risk Factor: Stress      Psychosocial Plan  Interventions  Interventions: Offer educational videos and classes;Provide written material;Individual education and counseling      Education Completed  Education Completed: S/S of depression;Effects of stress on body      Goals  Goals (Next 30 days): Identify stressors      Goals Met  Goals Met: Identified Support system      Psychosocial Follow-up  Follow-up/Discharge: Patient sees a psychologist once a week and is on antidepressants.  Patient has PTSD along with depression/anxiety.  Patient's wife is a good support.           Patient involved in Goal  setting?: Yes      Signature: _____________________________________________________________    Date: __________________    Time: __________________

## 2021-06-10 NOTE — TELEPHONE ENCOUNTER
Message rec'd 8-13-20 @ 0847:        Please call Carlee and advise her to increase his metoprolol to 150 mg po daily in a single dose. The he should go see his PCP for BP management. This does not need an ER visit.     Brandon Hong MD

## 2021-06-10 NOTE — TELEPHONE ENCOUNTER
Phone call to patient's wife Carlee - informed her of Dr. Malhotra's response/recommendations - Carlee reported that she checked patient's VS this am at 0915 which was just after patient took Metoprolol Succ 100mg  /90, HR 58 - Carlee verbalized concerns about patient's frequent urination and that evening BP's (after 2100) are consistently high - Carlee questioned if patient should take additional 50mg in pm.    Attempted to explain to Carlee that Dr. Malhotra recommended Metoprolol Succ be taken as single dose and that patient should f/u with PCP for BP mgmt - recommended she ck patient's BP 2x/day for next wk and f/u with PCP.  Carlee continued to offer questions/concerns about frequent urination and VS despite continued attempts to address them - instructed Carlee to have patient f/u with PCP this week to address her questions/concerns before making any medication changes - understanding verbalized.    Update forwarded to Dr. Malhotra.  mg

## 2021-06-10 NOTE — TELEPHONE ENCOUNTER
Called Briana back to let her know below and assist in scheduling an appointment.    LVM for Briana to return call.    Tiesha AMARAL LPN .......... 4:53 PM  08/26/20  [facility]

## 2021-06-10 NOTE — TELEPHONE ENCOUNTER
Dr. Briseno,    Your panel is closed. Please advise if you'd like to have patient establish care.    Thank you.    Tiesha AMARAL LPN .......... 2:40 PM  08/26/20  [facility]

## 2021-06-10 NOTE — TELEPHONE ENCOUNTER
Ok for appt, I might now have openings for a while.  I'm pretty sure my Thursdays in September will be in real life, so could schedule then

## 2021-06-11 NOTE — PROGRESS NOTES
ITP ASSESSMENT   Assessment Day: 60 Day    Session Number: 12  Precautions: Surgical    Diagnosis: MI;CAB    Risk Stratification: High    Referring Provider: Mart Ragsdale, *  EXERCISE  Exercise Assessment: Reassessment                            Exercise Plan  Goals Next 30 days   STG #1 Pt. will tolerate 40 min. at a 4 met level doing 2 modalities     STG #2 Pt. pt. will begin U/E wts with 2-3lbs. 2x/week.     LTG: Patient will return to walking 45 minutes 5x/week by 10/21/20. Patient will return to shooting baskets and playing 1 on 1 basketball (4.5-6.0 METs) with his son by 10/21/20.      Education Goals: All goals in this section met    Education Goals Met: Patient can state cardiac s/s and appropriate emergency response.;Has system for taking medication.;Medication review.                          Goals Met  30 day  goals met: Pt. met STG #1:  Pt. tolerated 40 min. at a 2.5-3.0 met level doing 2 modalities    30 day  goals met: Pt. met STG #2 : he tolerated using arm erg. each session,    30 Day Progression: Pt. is tolerating a 3.1-4.5 met level. He continues to have difficulty with sleeping. will increse U/E exercise.      Initial  goals met: Goal met: Pt. is able to walk from lobby to rehab and back each session,    Initial  goals met: Goal met: Pt. is tolerating 35 min of exercise at a 2-2.7 met level for 30-35 min.    Initial Progression: Pt. has shown improvement in activity tolerance, he continues to struggle with his recovery, he gets very anxious and fearful iwith ant increase in activity or if he gets overly tired. Will cont. to provide support.      Exercise Prescription  Exercise Mode: Treadmill;Nustep;Bike    Frequency: 2-3x/week    Duration: 40 minutes    Intensity / THR: 20-30 beats above resting heart rate    RPE 11-14  Progression / Met level: 4    Resistive Training?: Yes      Current Exercise (mins/week): 250      Interventions  Home Exercise:  Mode: walking    Frequency: 30 min  "daily    Duration: daily      Education Material : Individual education and counseling;Educational videos;Provide written material;Offer educational classes      Education Completed  Exercise Education Completed: Cardiac Anatomy;Signs and Symptoms;Medication review;RPE;Emergency Plan;Home Exercise;Warm up/cool down;FITT Principles;BP/HR Reponse to exercise;Benefits of Exercise;End point of exercise              Exercise Follow-up/Discharge  Follow up/Discharge: Skilled therapy is needed to continue to monitor for EKG changes (patient had a bout of Afib RVR post op, currently on Amiodarone) and proper CV response to exercise while working towards achieving a MET level of 4.5-6.  Educate on safe progression of activity and exercise. Provide education and support for risk factor modification, continued smoking cessation and pre diabetes management.  Provide support and feedback on vitals so pt. regains his confidence to exercise independently and increase activity. Pt, continues to struggle with sleep will discuss relaxation and other strategies to asssist with sleep.   NUTRITION  Nutrition Assessment: Reassessment      Nutrition Risk Factors:  Nutrition Risk Factors: Overweight      Nutrition Plan  Interventions  Diet Consult: Completed    Other Nutrition Intervention: Diet Class;Therapist/Pt Discussion;Provide with Written Material      Education Completed  Nutrition Education Completed: Low Saturated fat diet;Low sodium diet;Weight management      Goals  Nutrition Goals (Next 30 days): Patient will follow a low saturated fat diet;Patient knows appropriate portion size      Goals Met  Nutrition Goals Met: Patient follows a low sodium diet;Patient states following a low saturated fat diet;Patient knows appropriate portion size      Height, Weight, and  BMI  Weight: 182 lb (82.6 kg)  Height: 5' 5\" (1.651 m)  BMI: 30.29      Nutrition Follow-up  Follow-up/Discharge: WM diet educ with wife present 8/19/2020      "     Other Risk Factors  Other Risk Factor Assessment: Reassessment      HTN Risk Factor: Hypertension      Pre Exercise BP: 108/60  Post Exercise BP: 110/60      Hypertension Plan  Goals  HTN Goals: Take medication as prescribed;Follow low sodium diet;Exercises regularly      Goals Met  HTN Goals Met: Take medication as prescribed;Follow low sodium diet      HTN Interventions  HTN Interventions: Diet consult;Therapist/patient discussion;Provide written material      HTN Education Completed  HTN Education Completed: Low sodium diet;Medication review;Risk factor overview      Tobacco Risk Factor: Tobacco      Initial Use:: 1/2 PPD  Current Use:: none      Tobacco Plan  Tobacco Goals  Tobacco Goals: Patient remain tobacco free      Goals Met  Tobacco Goals Met: Patient remain tobacco free      Tobacco Interventions  Tobacco Interventions: Therapist/patient discussion      Tobacco Education Completed  Tobacco Education Completed: Health benefits of tobacco cessation;Risk factor overview      Risk Factor Follow-up   Follow-up/Discharge: Pt smoked for 34 years. Patient quit 3/2020. Patient is doing well with smoking cessation.     PSYCHOSOCIAL  Psychosocial Assessment: Reassessment           Psychosocial Risk Factor: Stress      Psychosocial Plan  Interventions  Interventions: Offer educational videos and classes;Provide written material;Individual education and counseling      Education Completed  Education Completed: S/S of depression;Effects of stress on body      Goals  Goals (Next 30 days): Identify stressors      Goals Met  Goals Met: Identified Support system      Psychosocial Follow-up  Follow-up/Discharge: Patient sees a psychologist once a week and is on antidepressants.  Patient has PTSD along with depression/anxiety.  Patient's wife is a good support.             Patient involved in Goal setting?: Yes      Signature: _____________________________________________________________    Date:  __________________    Time: __________________

## 2021-06-11 NOTE — PROGRESS NOTES
"Nadya Blake is a 55 y.o. male who is being evaluated via a billable telephone visit.      The patient has been notified of following:     \"This telephone visit will be conducted via a call between you and your physician/provider. We have found that certain health care needs can be provided without the need for a physical exam.  This service lets us provide the care you need with a short phone conversation.  If a prescription is necessary we can send it directly to your pharmacy.  If lab work is needed we can place an order for that and you can then stop by our lab to have the test done at a later time.    Telephone visits are billed at different rates depending on your insurance coverage. During this emergency period, for some insurers they may be billed the same as an in-person visit.  Please reach out to your insurance provider with any questions.    If during the course of the call the physician/provider feels a telephone visit is not appropriate, you will not be charged for this service.\"    Patient has given verbal consent to a Telephone visit? Yes    What phone number would you like to be contacted at? [phone]    Patient would like to receive their AVS by AVS Preference: Mail a copy.    Additional provider notes: This is a 55-year-old man, complicated, who is seen telephonically with the aid of an  to establish care and for review of a few medical issues.  He has an appoint with me in 1 week in person.  Recent coronary bypass grafting.  He has had some nocturia.  Some loss of appetite mild abdominal discomfort.  No chest pain or shortness of breath.  Overall doing well post surgery.  His blood pressures have been a little bit elevated at home but okay a cardiac rehab    Speech normal breathing unlabored, normal mental status, exceedingly pleasant    Assessment/Plan:  1. Coronary artery disease due to lipid rich plaque  To new current plan, follow blood pressures closely see me in " clinic  - Thyroid Cascade; Future    2. Early satiety  Previously had H. pylori in 2013, believes this was treated, I do not see any follow-up testing.  I did review extensive labs to the [facility] system.  - H. pylori Antigen, Stool; Future  - Schistosoma Antibody, IgG; Future    3. Nocturia  Likely related to BPH although needs further evaluation, check labs as below start Flomax which may help with some of his hypertension and follow-up with me in 1 week  - Urinalysis-UC if Indicated; Future  - Basic Metabolic Panel; Future  - HM2(CBC w/o Differential); Future  - tamsulosin (FLOMAX) 0.4 mg cap; Take 1 capsule (0.4 mg total) by mouth daily.  Dispense: 30 capsule; Refill: 1    4. Screening for prostate cancer  - PSA (Prostatic-Specific Antigen), Annual Screen; Future    5. Chronic superficial gastritis without bleeding  As above    6. Depression with anxiety  We will discuss in 1 week    7. Dyslipidemia, goal LDL below 70  Continue statin    8. Essential hypertension  Home blood pressure is elevated okay a cardiac rehab I have asked him to bring in his device so we can correlate it to our readings as well    9. Gastroesophageal reflux disease with esophagitis  As above    10. Pre-diabetes  We will discuss at visit and 1 week      Phone call duration:  22 minutes    Az Briseno MD

## 2021-06-11 NOTE — TELEPHONE ENCOUNTER
Appt scheduled for 9/30/20.    Tiesha AMARAL LPN .......... 1:46 PM  08/31/20  [facility]

## 2021-06-11 NOTE — PROGRESS NOTES
Office Visit - New Patient   Nadya Blake   55 y.o.  male    Date of visit: 9/30/2020  Physician: Az Briseno MD     Assessment and Plan   1. Coronary artery disease, CABG 6/2020  Continue secondary prevention, cardiac rehab, patient no longer smoking    2. Essential hypertension  Blood pressure control continue same    3. Dyslipidemia, goal LDL below 70  Continue statin    4. Gastroesophageal reflux disease with esophagitis  Tinea PPI    5. Abdominal pain, generalized  Urology of abdominal pain uncertain, check CT.  If this looks okay consider gastric emptying study  - CT Abdomen Pelvis With Oral With IV Contrast; Future    6. Abdominal distention  - CT Abdomen Pelvis With Oral With IV Contrast; Future    7. Early satiety  - CT Abdomen Pelvis With Oral With IV Contrast; Future    8. Benign prostatic hyperplasia with nocturia  Continue Flomax seems to be helping    9. Non-toxic multinodular goiter  Now with suppressed TSH normal T4, follow-up with Dr. Deshawn Falcon over    10. Recurrent major depressive disorder, in partial remission (H)  11. PTSD (post-traumatic stress disorder)  Management per psychiatry    12. 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)  Now not smoking, no respiratory symptoms lungs clear    13. Neuropathic pain  Trial of gabapentin  - gabapentin (NEURONTIN) 300 MG capsule; Take 1 capsule (300 mg total) by mouth at bedtime.  Dispense: 90 capsule; Refill: 3    14. Vitamin D deficiency  Continue vitamin D    Return in about 4 weeks (around 10/28/2020) for recheck.     Chief Complaint   Chief Complaint   Patient presents with     Hospital Visit Follow Up        Patient Profile   Social History     Social History Narrative    , Carlee, BA chemistry and taking AA courses at [employer].  From [place]; bachelors in geology and [employer] science. Son, Grace (2005) and Sherrie (2008).  On SSDI, PTSD.          Past Medical History   Patient Active Problem List    Diagnosis     Ascending aorta dilatation (H)     Recurrent major depressive disorder, in partial remission (H)     Fatty liver disease, nonalcoholic     Gastroesophageal reflux disease with esophagitis     Essential hypertension     Non-toxic multinodular goiter     Post-traumatic osteoarthritis of both knees     Primary osteoarthritis of left hip     Primary osteoarthritis of right hip     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)     Coronary artery disease, CABG 6/2020     Pre-diabetes     Dyslipidemia, goal LDL below 70     Benign prostatic hyperplasia with nocturia     Vitamin D deficiency     PTSD (post-traumatic stress disorder)       Past Surgical History  He has a past surgical history that includes Appendectomy (1995); Coronary Angiogram (N/A, 6/23/2020); Left Heart Catheterization Without Left Ventriculogram (Left, 6/23/2020); Intra Aortic Balloon Pump Insertion (N/A, 6/24/2020); and Coronary artery bypass graft (06/24/2020).     History of Present Illness   This 55 y.o. old man is seen with his wife, to establish care.  Briefly saw him via video visit.  Overall he is doing generally well after his coronary bypass grafting in June 2020.  He continues with cardiac rehab.  His blood pressure has been controlled.  He had some urinary symptoms, nocturia and we checked a UA which looked okay.  Started him on Flomax which is significantly help.  Has had BPH noted on previous imaging studies.  He continues to have early satiety abdominal distention and some right-sided abdominal pain.  He has had multiple EGDs in he does have a goiter and follows with Dr. Deshawn Quevedo  Prior history of H. pylori which is been treated.  H. pylori stool antigen was negative.  And his TSH has been suppressed.  [facility], is on social hearing disability for this and sees psychiatry.  Some very disease felt to be related to smoking breathing better.  Has  had some neuropathic pain mainly in his thighs since the time of surgery.  Also history of vitamin D deficiency taking oral vitamin D    Review of Systems: A comprehensive review of systems was negative except as noted.     Medications and Allergies   Current Outpatient Medications   Medication Sig Dispense Refill     acetaminophen (TYLENOL) 500 MG tablet Take 1,000 mg by mouth every 6 (six) hours as needed for pain.       albuterol (PROAIR HFA;PROVENTIL HFA;VENTOLIN HFA) 90 mcg/actuation inhaler Inhale 1-2 puffs every 4 (four) hours as needed for wheezing.       aspirin 81 mg chewable tablet 1 tablet (81 mg total) by Enteral Tube route daily.  0     budesonide-formoteroL (SYMBICORT) 160-4.5 mcg/actuation inhaler Inhale 2 puffs 2 (two) times a day.       clonazePAM (KLONOPIN) 0.5 MG tablet Take 0.5 mg by mouth 2 (two) times a day as needed for anxiety.       clopidogreL (PLAVIX) 75 mg tablet Take 1 tablet (75 mg total) by mouth every morning. 30 tablet 11     ergocalciferol (VITAMIN D2) 1,250 mcg (50,000 unit) capsule Take 50,000 Units by mouth 2 (two) times a week. Mondays & Thursdays       escitalopram oxalate (LEXAPRO) 20 MG tablet Take 20 mg by mouth daily.       gabapentin (NEURONTIN) 300 MG capsule Take 1 capsule (300 mg total) by mouth at bedtime. 90 capsule 3     metoprolol succinate (TOPROL-XL) 100 MG 24 hr tablet Take 1 tablet (100 mg total) by mouth daily. 90 tablet 3     omeprazole (PRILOSEC) 20 MG capsule Take 20 mg by mouth daily.        QUEtiapine (SEROQUEL) 50 MG tablet Take 50 mg by mouth at bedtime.       rosuvastatin (CRESTOR) 40 MG tablet Take 1 tablet (40 mg total) by mouth at bedtime. 30 tablet 11     tamsulosin (FLOMAX) 0.4 mg cap Take 1 capsule (0.4 mg total) by mouth daily. 30 capsule 1     No current facility-administered medications for this visit.      Allergies   Allergen Reactions     Atorvastatin Diarrhea     Cortisone Other (See Comments)     pain     Lisinopril Cough     started  "after CABG for unclear reason     Methylprednisolone Other (See Comments)     ?        Family and Social History   Family History   Problem Relation Age of Onset     No Medical Problems Mother      Lung cancer Father 56        fatal     No Medical Problems Daughter      Other Son         congenital deformity of right leg        Social History     Tobacco Use     Smoking status: Former Smoker     Packs/day: 1.00     Years: 30.00     Pack years: 30.00     Types: Cigarettes     Last attempt to quit: 3/23/2020     Years since quittin.5     Smokeless tobacco: Never Used   Substance Use Topics     Alcohol use: No     Drug use: Never        Physical Exam   General Appearance:   No acute distress    /60 (Patient Site: Left Arm, Patient Position: Sitting, Cuff Size: Adult Regular)   Pulse 71   Ht 5' 5\" (1.651 m)   Wt 184 lb (83.5 kg)   SpO2 95%   BMI 30.62 kg/m      EYES: Eyelids, conjunctiva, and sclera were normal. Pupils were normal. Cornea, iris, and lens were normal bilaterally.  HEAD, EARS, NOSE, MOUTH, AND THROAT: Head and face were normal. Hearing was normal to voice and the ears were normal to external exam. Nose appearance was normal and there was no discharge. Oropharynx was normal.  NECK: Neck appearance was normal. There were no neck masses and the thyroid was not enlarged.  RESPIRATORY: Breathing pattern was normal and the chest moved symmetrically.  Percussion/auscultatory percussion was normal.  Lung sounds were normal and there were no abnormal sounds.  CARDIOVASCULAR: Heart rate and rhythm were normal.  S1 and S2 were normal and there were no extra sounds or murmurs. Peripheral pulses in arms and legs were normal.  Jugular venous pressure was normal.  There was no peripheral edema.  GASTROINTESTINAL: The abdomen was normal in contour.  Bowel sounds were present.  Percussion detected no organ enlargement or tenderness.  Palpation detected no tenderness, mass, or enlarged organs. "   MUSCULOSKELETAL: Skeletal configuration was normal and muscle mass was normal for age. Joint appearance was overall normal.  LYMPHATIC: There were no enlarged nodes.  SKIN/HAIR/NAILS: Skin color was normal.  There were no skin lesions.  Hair and nails were normal.  NEUROLOGIC: The patient was alert and oriented to person, place, time, and circumstance. Speech was normal. Cranial nerves were normal. Motor strength was normal for age. The patient was normally coordinated.  PSYCHIATRIC:  Mood and affect were normal and the patient had normal recent and remote memory. The patient's judgment and insight were normal.       Additional Information   Review and/or order of clinical lab tests: Reviewed  Review and/or order of radiology tests: Reviewed and ordered  Review and/or order of medicine tests:  Discussion of test results with performing physician:  Decision to obtain old records and/or obtain history from someone other than the patient:  Review and summarization of old records and/or obtaining history from someone other than the patient and.or discussion of case with another health care provider: Tensive review of the chart summarized above  Independent visualization of image, tracing or specimen itself:    Time: total time spent with the patient was 45 minutes of which >50% was spent in counseling and coordination of care     Az Briseno MD  Internal Medicine  Contact me at [phone]

## 2021-06-12 NOTE — PROGRESS NOTES
Cardiac surgery attending    Mr. Blake is a 55 year-old man with severe multivessel coronary artery disease and a history of non-ST elevation myocardial infarction who underwent coronary artery bypass grafting x 4 (radial artery graft to the right posterior descending coronary artery, reversed saphenous vein graft to the obtuse marginal branch of the left circumflex coronary artery, reversed saphenous vein graft to the second diagonal branch of the left anterior descending coronary artery, and pedicled left internal mammary artery to left anterior descending coronary artery) on 6/24/2020.    In follow up evaluation, he endorses tingling left sided chest wall discomfort and bilateral pain or paresthesias of the anterolateral thighs. Physical exam is unremarkable. CT chest demonstrates normal apposition of the sternal edges and no fluid collection.    His left chest tingling is to be expected and not concerning. I think it will resolve in the coming months and I reassured him.    The bilateral thigh paresthesia are puzzling to me. I am going to contact his primary internist, Dr. Suzanna Dunn and see what thoughts he has about this.    Mart Ragsdale MD PhD

## 2021-06-12 NOTE — TELEPHONE ENCOUNTER
Wife calling triage to find out if Nadya needs to fast before his CT scans scheduled for this evening.

## 2021-06-12 NOTE — PROGRESS NOTES
Office Visit - Follow Up   Nadya Blake   55 y.o. male    Date of Visit: 10/28/2020    Chief Complaint   Patient presents with     Follow-up     still has leg pain, pluse was high a few days ago     Establish Care        Assessment and Plan   1. S/P CABG (coronary artery bypass graft)  Doing well postoperatively, communication from Dr. Mart Ragsdale appreciated, continue current plan    2. Gastroesophageal reflux disease with esophagitis without hemorrhage  I think some of his GI symptoms are likely related to reflux.  He has had endoscopies in the past.  We recently checked H. pylori which was negative.  Retrial of omeprazole  - omeprazole (PRILOSEC) 20 MG capsule; Take 1 capsule (20 mg total) by mouth daily before breakfast.  Dispense: 90 capsule; Refill: 3    3. Left arm pain  I think this is likely a neuropathic type pain, either cervical or referred from the carpal tunnel/median nerve.  We will check an x-ray of the cervical spine.  I demonstrated nerve glide exercises that he can do 3 times a day and will follow up in 1 month to see if this is better.    4. Paresthesia of bilateral legs  This is most consistent with meralgia paresthetica.  I described this in detail to him.  We discussed the importance of wearing looser fitting clothing.  I recommended some capsaicin cream and we will check an x-ray of the spine.  I'm not sure re: positioning for cor bypass surgery and if there could have been prolonged pressure on the inguinal canals.      5. Coronary artery disease, CABG 6/2020  As above    6. Essential hypertension  Blood pressure okay continue same    7. Non-toxic multinodular goiter  Reviewed endocrinology notes, follow-up in 3 to 6 months    8. PTSD (post-traumatic stress disorder)  9. Recurrent major depressive disorder, in partial remission (H)  Peers stable, may be some underlying mental health issues in regards to some of his symptomatology.    Return in about 4 weeks (around 11/25/2020)  "for recheck.     History of Present Illness   This 55 y.o. old man comes in with his wife for follow-up.  He continues to have some neuropathic type pain in his bilateral thighs anterior laterally.  This is well demarcated.  It can be itching burning and is very sensitive to light touch.  This came on after surgery.  He had some ulnar radiculopathy of the right arm that is now resolved.  He still has aching especially at night in the left arm.  He also has some paresthesias of his chest wall.  He continues to have some issues with early satiety and what sound like possible reflux.  He had stopped his antacid about the time of surgery.  Able to eat lighter foods but greasy and heavier foods do not sit well with him.    Review of Systems: A comprehensive review of systems was negative except as noted.     Medications, Allergies and Problem List   Reviewed, reconciled and updated  Post Discharge Medication Reconciliation Status:      Physical Exam   General Appearance:   No acute distress    /80   Pulse 74   Ht 5' 5.5\" (1.664 m)   Wt 187 lb (84.8 kg)   SpO2 98%   BMI 30.65 kg/m      HEENT exam is unremarkable  Neck supple no thyromegaly or nodule palpable  Lymphatic no cervical lymphadenopathy  Cardiovascular regular rate and rhythm no murmur gallop or rub  Pulmonary lungs are clear to auscultation bilaterally  Gastrointestinal abdomen soft nontender nondistended no organomegaly  Neurologic exam is non focal  Psychiatric pleasant, no confusion or agitation        Additional Information   Current Outpatient Medications   Medication Sig Dispense Refill     acetaminophen (TYLENOL) 500 MG tablet Take 1,000 mg by mouth every 6 (six) hours as needed for pain.       albuterol (PROAIR HFA;PROVENTIL HFA;VENTOLIN HFA) 90 mcg/actuation inhaler Inhale 1-2 puffs every 4 (four) hours as needed for wheezing.       aspirin 81 mg chewable tablet 1 tablet (81 mg total) by Enteral Tube route daily. 90 tablet 3     " budesonide-formoteroL (SYMBICORT) 160-4.5 mcg/actuation inhaler Inhale 2 puffs 2 (two) times a day.       clonazePAM (KLONOPIN) 0.5 MG tablet Take 0.5 mg by mouth 2 (two) times a day as needed for anxiety.       clopidogreL (PLAVIX) 75 mg tablet Take 1 tablet (75 mg total) by mouth every morning. 90 tablet 3     ergocalciferol (VITAMIN D2) 1,250 mcg (50,000 unit) capsule Take 1 capsule (50,000 Units total) by mouth 2 (two) times a week.  &  24 capsule 0     escitalopram oxalate (LEXAPRO) 20 MG tablet Take 20 mg by mouth daily.       gabapentin (NEURONTIN) 300 MG capsule Take 1 capsule (300 mg total) by mouth at bedtime. 90 capsule 3     metoprolol succinate (TOPROL-XL) 100 MG 24 hr tablet Take 1 tablet (100 mg total) by mouth daily. 90 tablet 3     QUEtiapine (SEROQUEL) 50 MG tablet Take 50 mg by mouth at bedtime.       rosuvastatin (CRESTOR) 40 MG tablet Take 1 tablet (40 mg total) by mouth daily. 90 tablet 3     tamsulosin (FLOMAX) 0.4 mg cap Take 1 capsule (0.4 mg total) by mouth daily. 30 capsule 1     omeprazole (PRILOSEC) 20 MG capsule Take 1 capsule (20 mg total) by mouth daily before breakfast. 90 capsule 3     No current facility-administered medications for this visit.      Allergies   Allergen Reactions     Atorvastatin Diarrhea     Cortisone Other (See Comments)     pain     Lisinopril Cough     started after CABG for unclear reason     Methylprednisolone Other (See Comments)     ?     Social History     Tobacco Use     Smoking status: Former Smoker     Packs/day: 1.00     Years: 30.00     Pack years: 30.00     Types: Cigarettes     Quit date: 3/23/2020     Years since quittin.6     Smokeless tobacco: Never Used   Substance Use Topics     Alcohol use: No     Drug use: Never       Review and/or order of clinical lab tests: Reviewed labs  Review and/or order of radiology tests: Reviewed imaging  Review and/or order of medicine tests:  Discussion of test results with performing  physician:  Decision to obtain old records and/or obtain history from someone other than the patient:  Review and summarization of old records and/or obtaining history from someone other than the patient and.or discussion of case with another health care provider: I communicated directly with Dr. Mart Ragsdale and I reviewed his endocrinology consultative notes  Independent visualization of image, tracing or specimen itself:    Time:      Az Briseno MD

## 2021-06-12 NOTE — PROGRESS NOTES
ITP ASSESSMENT   Assessment Day: 120 Day    Session Number: 22  Precautions: Sternal/ Surgical    Diagnosis: MI;CAB    Risk Stratification: High    Referring Provider: Mart Ragsdale, *  EXERCISE  Exercise Assessment: Reassessment                              Exercise Plan  Goals Next 30 days  ADL'S: STG #1 Pt. will tolerate a 6 met level for (4) 2 min. intervals on the TM for a total of 20 min.    Leisure: STG #2 Pt. will begin U/E wts with 2-3lbs. 2x/week.     Work: LTG: Patient will return to walking 45 minutes 5x/week by 10/21/20. Patient will return to shooting baskets and playing 1 on 1 basketball (4.5-6.0 METs) with his son by 12/31/20.      Education Goals: All goals in this section met    Education Goals Met: Patient can state cardiac s/s and appropriate emergency response.;Has system for taking medication.;Medication review.                          Goals Met    90 day Leisure goals met: STG #3: pt. has kept a BP log  to show M.D. Pt. states its been helpful.      90 Day Progress: This past 30 days have been more difficult for him. his chest has become more sore, a CT scan was done which showed proper healing. The frequency of his PTSD sx has inncreased, definitely affecting his sleep and energy level.  He is tolerating a a 4-5.5 met level consistently.      60 day ADL'S goals met: STG #1 Met. Pt. is tolerating 40 min. at a 4 met level doing 2 modalities    60 Day Progression: Pt. is progressing well with activity tolerating a 5-6 met level. He continues to have an elevated BP at times, lazaro. at home in the evenings. Pt. suffers from PTSD. He is fearful of exercising on his own, and feels reassured when he comes to rehab. His Surgical pain in chest and both of his legs are very sore. Wll try and do wt. training again this ITP, as pt. was too sore after trying them a couple of weeks ago.      30 day ADL'S goals met: Pt. met STG #1:  Pt. tolerated 40 min. at a 2.5-3.0 met level doing 2 modalities    30  day Leisure goals met: Pt. met STG #2 : he tolerated using arm erg. each session,      30 Day Progression: Pt. is tolerating a 3.1-4.5 met level. He continues to have difficulty with sleeping. will increse U/E exercise.      Initial ADL's goals met: Goal met: Pt. is able to walk from lobby to rehab and back each session,    Initial Leisure goals met: Goal met: Pt. is tolerating 35 min of exercise at a 2-2.7 met level for 30-35 min.      Initial Progression: Pt. has shown improvement in activity tolerance, he continues to struggle with his recovery, he gets very anxious and fearful iwith ant increase in activity or if he gets overly tired. Will cont. to provide support.      Exercise Prescription  Exercise Mode: Treadmill;Nustep;Bike    Frequency: 2-3x week    Duration: 40-45 minutes    Intensity / THR: 20-30 beats above resting heart rate    RPE 11-14  Progression / Met level: 6    Resistive Training?: Yes      Current Exercise (mins/week): 250      Interventions  Home Exercise:  Mode: walking    Frequency: 30-60 min    Duration: daily      Education Material : Individual education and counseling;Educational videos;Provide written material;Offer educational classes      Education Completed  Exercise Education Completed: Cardiac Anatomy;Signs and Symptoms;Medication review;RPE;Emergency Plan;Home Exercise;Warm up/cool down;FITT Principles;BP/HR Reponse to exercise;Benefits of Exercise;End point of exercise              Exercise Follow-up/Discharge  Follow up/Discharge: Skilled therapy is needed to monitor for EKG changes (patient had a bout of Afib RVR post op, currently on Amiodarone) and proper CV response to exercise while working towards achieving a MET level of 6 consistently.  Educate on safe progression of activity and exercise. Provide education and support for risk factor modification, continued smoking cessation and pre diabetes management.  Provide support and feedback on vitals so pt. regains his  "confidence to exercise independently and increase activity. Pt. continues to struggle with sleep will reinforce relaxation techniques and othersist with sleep. Pt. will also continue to keep a log of his BP. Will introduce interval training on the TM working towards HIT.   NUTRITION  Nutrition Assessment: Reassessment      Nutrition Risk Factors:  Nutrition Risk Factors: Overweight      Nutrition Plan  Interventions  Diet Consult: Completed    Other Nutrition Intervention: Diet Class;Therapist/Pt Discussion;Provide with Written Material      Education Completed  Nutrition Education Completed: Low Saturated fat diet;Low sodium diet;Weight management      Goals  Nutrition Goals (Next 30 days): Patient will follow a low saturated fat diet;Patient knows appropriate portion size      Goals Met  Nutrition Goals Met: Patient follows a low sodium diet;Patient states following a low saturated fat diet;Patient knows appropriate portion size      Height, Weight, and  BMI  Weight: 184 lb (83.5 kg)  Height: 5' 5\" (1.651 m)  BMI: 30.62      Nutrition Follow-up  Follow-up/Discharge: WM diet educ with wife present 8/19/2020          Other Risk Factors  Other Risk Factor Assessment: Reassessment      HTN Risk Factor: Hypertension      Pre Exercise BP: 114/70  Post Exercise BP: 110/70      Hypertension Plan  Goals  HTN Goals: Take medication as prescribed;Follow low sodium diet;Exercises regularly      Goals Met  HTN Goals Met: Take medication as prescribed;Follow low sodium diet;Exercises regularly      HTN Interventions  HTN Interventions: Diet consult;Therapist/patient discussion;Provide written material      HTN Education Completed  HTN Education Completed: Low sodium diet;Medication review;Risk factor overview      Tobacco Risk Factor: Tobacco      Initial Use:: 1/2 PPD  Current Use:: none      Tobacco Plan  Tobacco Goals  Tobacco Goals: Patient remain tobacco free      Goals Met  Tobacco Goals Met: Patient remain tobacco " free      Tobacco Interventions  Tobacco Interventions: Therapist/patient discussion      Tobacco Education Completed  Tobacco Education Completed: Health benefits of tobacco cessation;Risk factor overview;Identifies triggers      Risk Factor Follow-up   Follow-up/Discharge: Pt smoked for 34 years. Patient quit 3/2020. Patient is doing well with smoking cessation.     PSYCHOSOCIAL  Psychosocial Assessment: Reassessment           PHQ-9 Total Score: 15      Psychosocial Risk Factor: Stress      Psychosocial Plan  Interventions  Interventions: Offer educational videos and classes;Provide written material;Individual education and counseling      Education Completed  Education Completed: S/S of depression;Effects of stress on body;Relaxation/Coping Techniques      Goals  Goals (Next 30 days): Identify stressors      Goals Met  Goals Met: Identified Support system;Identify stressors;Practicing stress management skills      Psychosocial Follow-up  Follow-up/Discharge: Patient sees a psychologist once a week and is on antidepressants.  Patient has PTSD along with depression/anxiety.  Patient's wife is a good support.             Patient involved in Goal setting?: Yes      Signature: _____________________________________________________________    Date: __________________    Time: __________________

## 2021-06-12 NOTE — TELEPHONE ENCOUNTER
Will be addressed at  today.    Tiesha AMARAL LPN .......... 9:21 AM  10/28/20  [facility]

## 2021-06-12 NOTE — PATIENT INSTRUCTIONS - HE
Nadya,    It was a pleasure to see you today at [facility].     If his blood pressure is 240/120, then he should go to the ER. Otherwise, please give us a call in the office to discuss his blood pressure management.    I agree with the plans of Quincy Briseno and Jn to investigate your discomfort. I do not think it is from your heart.    Please call us if you have any questions or concerns about your heart.      Crow Malhotra M.D.  [facility]

## 2021-06-12 NOTE — PROGRESS NOTES
ITP ASSESSMENT   Assessment Day: 90 Day    Session Number: 18  Precautions: Sternal/Surgical precautions    Diagnosis: MI;CAB    Risk Stratification: High    Referring Provider: Mart Ragsdale, *  EXERCISE  Exercise Assessment: Reassessment                              Exercise Plan  Goals Next 30 days  STG #1 Pt. will tolerate 40 min. at a 6 met level doing 2 modalities     STG #2 Pt. pt. will begin U/E wts with 2-3lbs. 2x/week. STG # 3 Pt. will keep log of BP. to have to discuss gema MULLEN    LTG: Patient will return to walking 45 minutes 5x/week by 10/21/20. Patient will return to shooting baskets and playing 1 on 1 basketball (4.5-6.0 METs) with his son by 10/21/20.      Education Goals: All goals in this section met    Education Goals Met: Patient can state cardiac s/s and appropriate emergency response.;Has system for taking medication.;Medication review.                          Goals Met  60 day  goals met: STG #1 Met. Pt. is tolerating 40 min. at a 4 met level doing 2 modalities    60 Day Progression: Pt. is progressing well with activity tolerating a 5-6 met level. He continues to have an elevated BP at times, lazaro. at home in the evenings. Pt. suffers from PTSD. He is fearful of exercising on his own, and feels reassured when he comes to rehab. His Surgical pain in chest and both of his legs are very sore. Wll try and do wt. training again this ITP, as pt. was too sore after trying them a couple of weeks ago.      30 day  goals met: Pt. met STG #1:  Pt. tolerated 40 min. at a 2.5-3.0 met level doing 2 modalities    30 day Leisure goals met: Pt. met STG #2 : he tolerated using arm erg. each session,    30 Day Progression: Pt. is tolerating a 3.1-4.5 met level. He continues to have difficulty with sleeping. will increse U/E exercise.      Initial  goals met: Goal met: Pt. is able to walk from lobby to rehab and back each session,    Initial Leisure goals met: Goal met: Pt. is tolerating 35 min of  exercise at a 2-2.7 met level for 30-35 min.    Initial Progression: Pt. has shown improvement in activity tolerance, he continues to struggle with his recovery, he gets very anxious and fearful iwith ant increase in activity or if he gets overly tired. Will cont. to provide support.      Exercise Prescription  Exercise Mode: Treadmill;Nustep;Bike    Frequency: 2-3x/week    Duration: 40 min    Intensity / THR: 20-30 beats above resting heart rate    RPE 11-14  Progression / Met level: 6    Resistive Training?: Yes      Current Exercise (mins/week): 250      Interventions  Home Exercise:  Mode: walking    Frequency: 30 min daily    Duration: daily      Education Material : Individual education and counseling;Educational videos;Provide written material;Offer educational classes      Education Completed  Exercise Education Completed: Cardiac Anatomy;Signs and Symptoms;Medication review;RPE;Emergency Plan;Home Exercise;Warm up/cool down;FITT Principles;BP/HR Reponse to exercise;Benefits of Exercise;End point of exercise              Exercise Follow-up/Discharge  Follow up/Discharge: Skilled therapy was needed to continue to monitor for EKG changes (patient had a bout of Afib RVR post op, currently on Amiodarone) and proper CV response to exercise while working towards achieving a MET level of 6 consistently.  Educate on safe progression of activity and exercise. Provide education and support for risk factor modification, continued smoking cessation and pre diabetes management.  Provide support and feedback on vitals so pt. regains his confidence to exercise independently and increase activity. Pt. continues to struggle with sleep will reinforce relaxation techniques and other strategies to asssist with sleep. Pt. will also keep a log of his BP.   NUTRITION  Nutrition Assessment: Reassessment      Nutrition Risk Factors:  Nutrition Risk Factors: Overweight      Nutrition Plan  Interventions  Diet Consult:  "Completed    Other Nutrition Intervention: Diet Class;Therapist/Pt Discussion;Provide with Written Material      Education Completed  Nutrition Education Completed: Low Saturated fat diet;Low sodium diet;Weight management      Goals  Nutrition Goals (Next 30 days): Patient will follow a low saturated fat diet;Patient knows appropriate portion size      Goals Met  Nutrition Goals Met: Patient follows a low sodium diet;Patient states following a low saturated fat diet;Patient knows appropriate portion size      Height, Weight, and  BMI  Weight: 182 lb (82.6 kg)  Height: 5' 5\" (1.651 m)  BMI: 30.29      Nutrition Follow-up  Follow-up/Discharge: RD diet educ with wife present 8/19/2020          Other Risk Factors  Other Risk Factor Assessment: Reassessment      HTN Risk Factor: Hypertension      Pre Exercise BP: 122/70  Post Exercise BP: 140/70      Hypertension Plan  Goals  HTN Goals: Take medication as prescribed;Follow low sodium diet;Exercises regularly      Goals Met  HTN Goals Met: Take medication as prescribed;Follow low sodium diet;Exercises regularly      HTN Interventions  HTN Interventions: Diet consult;Therapist/patient discussion;Provide written material      HTN Education Completed  HTN Education Completed: Low sodium diet;Medication review;Risk factor overview      Tobacco Risk Factor: Tobacco      Initial Use:: 1/2 PPD  Current Use:: none      Tobacco Plan  Tobacco Goals  Tobacco Goals: Patient remain tobacco free      Goals Met  Tobacco Goals Met: Patient remain tobacco free      Tobacco Interventions  Tobacco Interventions: Therapist/patient discussion      Tobacco Education Completed  Tobacco Education Completed: Health benefits of tobacco cessation;Risk factor overview;Identifies triggers      Risk Factor Follow-up   Follow-up/Discharge: Pt smoked for 34 years. Patient quit 3/2020. Patient is doing well with smoking cessation.     PSYCHOSOCIAL  Psychosocial Assessment: Reassessment     "     Psychosocial Risk Factor: Stress      Psychosocial Plan  Interventions  Interventions: Offer educational videos and classes;Provide written material;Individual education and counseling      Education Completed  Education Completed: S/S of depression;Effects of stress on body;Relaxation/Coping Techniques      Goals  Goals (Next 30 days): Identify stressors (Will repeat PHQ9, as scored 16 on initial.)      Goals Met  Goals Met: Identified Support system;Identify stressors;Practicing stress management skills      Psychosocial Follow-up  Follow-up/Discharge: Patient sees a psychologist once a week and is on antidepressants.  Patient has PTSD along with depression/anxiety.  Patient's wife is a good support.             Patient involved in Goal setting?: Yes      Signature: _____________________________________________________________    Date: __________________    Time: __________________

## 2021-06-12 NOTE — TELEPHONE ENCOUNTER
Wellness Screening Tool  Symptom Screening:  Do you have one of the following NEW symptoms:    Fever (subjective or >100.0)?  No    A new cough?  No    Shortness of breath?  No     Chills? No     New loss of taste or smell? No     Generalized body aches? No     New persistent headache? No     New sore throat? No     Nausea, vomiting, or diarrhea?  No    Within the past 2 weeks, have you been exposed to someone with a known positive illness below:    COVID-19 (known or suspected)?  No    Chicken pox?  No    Mealses?  No    Pertussis?  No    Patient notified of visitor policy- They may have one person accompany them to their appointment, but they will need to wear a mask and will be screened upon arrival for symptoms: Yes - wife will accompany pt to visit and serve as  per pt request.  mg  Pt informed to wear a mask: Yes  Pt notified if they develop any symptoms listed above, prior to their appointment, they are to call the clinic directly at [phone] for further instructions.  Yes  Patient's appointment status: Patient will be seen in clinic as scheduled on Thurs 10-8-20 @ 0820 in [facility] with Dr. Malhotra.  mg

## 2021-06-13 NOTE — PROGRESS NOTES
Office Visit - Follow Up   Ndaya Blake   55 y.o. male    Date of Visit: 11/25/2020    Chief Complaint   Patient presents with     Hospital Visit Follow Up        Assessment and Plan   1. PTSD (post-traumatic stress disorder)  I think a lot of his symptoms are manifestation of PTSD.  He is taking his beta-blocker in the evening around suppertime.  I asked him to take this even later.  We discussed that when he is feeling stressed and his blood pressure is elevated in the evening to try and employ some of the coping strategies he learned through psychotherapy to relax.  Once he is in a more relaxed state then he should recheck his blood pressure.    2. Essential hypertension  As above, seems to be okay when he is in a relaxed state    3. Coronary artery disease, CABG 6/2020  Tinea secondary prevention    4. Pain of left lower leg  Etiology uncertain, last visit I thought it might be meralgia paresthetica but now he is more tender along the IT band.  Seems myofascial in nature versus neuropathic.  We will check an x-ray.  Consider some physical therapy  - XR Femur Left 2 VWS; Future  - diclofenac sodium (VOLTAREN) 1 % Gel; Apply 4 g topically 4 (four) times a day.  Dispense: 500 g; Refill: 11    5. Pain of right lower leg  As above  - XR Femur Right 2 VWS; Future  - diclofenac sodium (VOLTAREN) 1 % Gel; Apply 4 g topically 4 (four) times a day.  Dispense: 500 g; Refill: 11    6. Mass of left side of neck  Wonder about a lipoma, will evaluate with ultrasound  - US Neck Limited; Future    7. Chest wall pain  Secondary to sternotomy, trial of Voltaren gel which he can apply to areas of discomfort  - diclofenac sodium (VOLTAREN) 1 % Gel; Apply 4 g topically 4 (four) times a day.  Dispense: 500 g; Refill: 11    Return in about 6 weeks (around 1/6/2021) for recheck.     History of Present Illness   This 55 y.o. old man comes in with his wife for ER follow-up.  He was having hypertension at home which prompted ER  "visit.  He had a negative ischemic evaluation including negative cardiac enzyme, troponin, and EKG without ischemic changes.  Unfortunately things were a bit disorganized in the ER and he ended up having to wait quite some time and there were some issues with his ride home.  This was very challenging for him.  Since that time he has relived his experience in the ER especially in the evening.  He continues to experience anxiety and symptoms of PTSD in the evening and then will check his blood pressure and will be quite high.  He seems to do well during the day and has normal blood pressure.  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  He continues to have pain in the lateral aspects of his legs on his chest wall and on the left side of his neck.    Review of Systems: A comprehensive review of systems was negative except as noted.     Medications, Allergies and Problem List   Reviewed, reconciled and updated  Post Discharge Medication Reconciliation Status:      Physical Exam   General Appearance:   Exceedingly pleasant, no acute distress    /74 (Patient Site: Right Arm, Patient Position: Sitting, Cuff Size: Adult Regular)   Pulse 68   Ht 5' 5.5\" (1.664 m)   Wt 188 lb (85.3 kg)   SpO2 97%   BMI 30.81 kg/m      HEENT exam is unremarkable  Neck he does have a fullness in the left side of his neck, question lipoma  Lymphatic no cervical lymphadenopathy  Cardiovascular regular rate and rhythm no murmur gallop or rub  Pulmonary lungs are clear to auscultation bilaterally  Gastrointestinal abdomen soft nontender nondistended no organomegaly  Neurologic exam is non focal  Psychiatric pleasant, no confusion or agitation   Tenderness with pressure over the lateral aspects of his thighs and chest wall     Additional Information   Current Outpatient Medications   Medication Sig Dispense Refill     acetaminophen (TYLENOL) 500 MG tablet Take 1,000 mg by mouth every 6 (six) hours as needed for pain.       " albuterol (PROAIR HFA;PROVENTIL HFA;VENTOLIN HFA) 90 mcg/actuation inhaler Inhale 1-2 puffs every 4 (four) hours as needed for wheezing.       aspirin 81 mg chewable tablet 1 tablet (81 mg total) by Enteral Tube route daily. 90 tablet 3     budesonide-formoteroL (SYMBICORT) 160-4.5 mcg/actuation inhaler Inhale 2 puffs 2 (two) times a day. Taking as needed.       clonazePAM (KLONOPIN) 0.5 MG tablet Take 0.5 mg by mouth 2 (two) times a day as needed for anxiety.       clopidogreL (PLAVIX) 75 mg tablet Take 1 tablet (75 mg total) by mouth every morning. 90 tablet 3     ergocalciferol (VITAMIN D2) 1,250 mcg (50,000 unit) capsule Take 1 capsule (50,000 Units total) by mouth 2 (two) times a week. Mondays & Thursdays 24 capsule 0     escitalopram oxalate (LEXAPRO) 20 MG tablet Take 20 mg by mouth daily.       gabapentin (NEURONTIN) 300 MG capsule Take 1 capsule (300 mg total) by mouth at bedtime. 90 capsule 3     metoprolol succinate (TOPROL-XL) 100 MG 24 hr tablet Take 1 tablet (100 mg total) by mouth daily. 90 tablet 3     omeprazole (PRILOSEC) 20 MG capsule Take 1 capsule (20 mg total) by mouth daily before breakfast. 90 capsule 3     QUEtiapine (SEROQUEL) 50 MG tablet Take 50 mg by mouth at bedtime.       rosuvastatin (CRESTOR) 40 MG tablet Take 1 tablet (40 mg total) by mouth daily. 90 tablet 3     tamsulosin (FLOMAX) 0.4 mg cap Take 1 capsule (0.4 mg total) by mouth daily. 90 capsule 3     diclofenac sodium (VOLTAREN) 1 % Gel Apply 4 g topically 4 (four) times a day. 500 g 11     No current facility-administered medications for this visit.      Allergies   Allergen Reactions     Atorvastatin Diarrhea     Cortisone Other (See Comments)     pain     Lisinopril Cough     started after CABG for unclear reason     Methylprednisolone Other (See Comments)     ?     Social History     Tobacco Use     Smoking status: Former Smoker     Packs/day: 1.00     Years: 30.00     Pack years: 30.00     Types: Cigarettes     Quit date:  3/23/2020     Years since quittin.6     Smokeless tobacco: Never Used   Substance Use Topics     Alcohol use: No     Drug use: Never       Review and/or order of clinical lab tests:  Review and/or order of radiology tests:  Review and/or order of medicine tests:  Discussion of test results with performing physician:  Decision to obtain old records and/or obtain history from someone other than the patient:  Review and summarization of old records and/or obtaining history from someone other than the patient and.or discussion of case with another health care provider:  Independent visualization of image, tracing or specimen itself:    Time:      Az Briseno MD

## 2021-06-13 NOTE — PROGRESS NOTES
Clinic Care Coordination Contact    Follow Up Progress Note      Assessment: Talked to patient using .  He was in ED at [facility] on 11-21 with high blood pressure. He had some tests done and was released.  Patient's wife Carlee has sent My Chart message to Dr. Queen, cardiologist with some concerns.  Carlee was on speaker phone with patient and reviewed their concerns over the ED episode.  Carlee felt that they should have done additional test 3 hours after the first test was done.  But he was discharged to home at that time.  Unfortunately, the staff said a cab would come and they did not use his medical ride service through his insurance.  He waited for two hours.  His phone had lost its charge and no one would provide him with a .  Wife was at home.  Unsure how he notified his wife, but she called a relative who lives far away to go and pick him up. This was a traumatic experience for them both.  He has not taken his blood pressure since coming home as he doesn't want to know. He feels his high blood pressure is due to physical reasons, not anxiety.  Encouraged them to talk with Dr. Queen about their questions about the tests done in the hospital.  Reviewed that they can call triage at [facility] and talk to a RN or can call his insurance which also has nurse line available 24-7 to review symptoms.  He did not feel that they had time to do that before calling 911.  He is unsure if he will feel comfortable with going to ED again.  Encouraged him to discuss with cardiologist and PCP, with whom he has appointment later this week. He is attending cardiac rehab tomorrow.  Empathized with how difficult and scary that was.  Offered phone number for the patient relations contact at [facility] and he declined calling.  He does not have the energy to do that.      Discussed ARMHS program and reviewed criteria and how it works.  He is currently using [facility] for mental health, but they  will be closing him and transferring his care to [facility], which offers ARMHS.  He is comfortable with having writer send referral to [facility] and he is comfortable with either man or woman.  Called [facility] and spoke to Chintan.  He suggests having online referral sent to [place] and she can gather the releases and coordinate with [facility].      Goals addressed this encounter:   Goals Addressed                 This Visit's Progress       Patient Stated      Mental Health Management (pt-stated)   20%     Goal Statement: I would like to have an ARM worker for additional mental health support in the next 3 months.   Date Goal set: 11/19/20  Barriers: History of PTSD  Strengths: Strong advocate for himself. Supportive wife. Currently seeing a therapist and a psychiatrist, support from [facility].   Date to Achieve By: 2/19/21  Patient expressed understanding of goal: Yes  Action steps to achieve this goal:  1. I will speak with [facility] Jannette SPANGLER at scheduled phone visit on 11/23/20 to discuss the process for getting an ARMHS worker. DONE  2. I will provide any necessary documentation and complete any required assessments related to this goal.   3. I will report progress towards this goal at scheduled outreach telephone calls from the CCC team.   11-23 Discussed               Intervention/Education provided during outreach: Discussed patient relations services, and ARMHS      Outreach Frequency: monthly    Plan:   Will complete intake online and send via secured email to [facility].  Patient to call with questions or concerns.  Delegating to CHW to contact in less than 30 days.   Care Coordinator will follow up in 45 days and as needed.   CRYS Soriano  Clinic Care Coordinator  [phone]

## 2021-06-13 NOTE — PROGRESS NOTES
Clinic Care Coordination Contact  Community Health Worker Initial Outreach            Patient accepts CC: Yes. Patient scheduled for assessment with RN CC on November 19th at 9 AM. Patient noted desire to discuss general concerns.      suggested to reserve his time as patient wanted him as his .  Called Language Line and they blocked his schedule for one hour beginning at 9.      Jannette Patel [facility]  Clinic Care Coordinator  [phone]

## 2021-06-13 NOTE — PROGRESS NOTES
Clinic Care Coordination Contact    Follow Up Progress Note      Assessment: CCC RN received a message that patient wanted to talk to him about Clinic Care Coordination. Writer contacted patient today today. Patient stated he did not have any concerns at this time and thanked writer for the call. Discussed his current goal around getting an ARHMS worker with him. He said he has been working with a psychologist at [facility], but has heard nothing about the ARHMS worker yet. Encouraged patient to discuss with his therapist. Will also have [facility] LSW follow up additionally.     Goals addressed this encounter:   Goals        Patient Stated      Mental Health Management (pt-stated)      Goal Statement: I would like to have an ARMHS worker for additional mental health support in the next 3 months.   Date Goal set: 11/19/20  Barriers: History of PTSD  Strengths: Strong advocate for himself. Supportive wife. Currently seeing a therapist and a psychiatrist, support from [facility].   Date to Achieve By: 2/19/21  Patient expressed understanding of goal: Yes  Action steps to achieve this goal:  1. I will speak with [facility] Jannette SPANGLER at scheduled phone visit on 11/23/20 to discuss the process for getting an ARMHS worker. DONE  2. I will provide any necessary documentation and complete any required assessments related to this goal.   3. I will report progress towards this goal at scheduled outreach telephone calls from the CCC team.   11-23 Discussed  12/15/20               Intervention/Education provided during outreach: Discussed the importance of taking his medications as directed. Encouraged patient to attend all scheduled appointments.           Plan: CCC RN will continue to monitor and will be available as nursing needs arise.     Care Coordinator will do Chart Review in 45 days.

## 2021-06-13 NOTE — PROGRESS NOTES
ITP ASSESSMENT   Assessment Day: 180 Day    Session Number: 29  Precautions: Surgical    Diagnosis: MI;CAB    Risk Stratification: High    Referring Provider: Mart Ragsdale, *  EXERCISE  Exercise Assessment: Reassessment                              Exercise Plan  Goals Next 30 days  ADL'S: STG #1 Pt. will tolerate a 6.8 met level consistently for (4) 2 min. intervals on the TM for a total of 20 min.    Leisure: STG #2 Pt. will do U/E wts with 2-3lbs. 2x/week. STG #3: Pt. will try rower and Elliptical for 10-15 min. without sx.    Work: LTG: Patient will return to walking 45 minutes 5x/week by 10/21/20. Patient will return to shooting baskets and playing 1 on 1 basketball (4.5-6.0 METs) with his son by 12/31/20.      Education Goals: All goals in this section met    Education Goals Met: Patient can state cardiac s/s and appropriate emergency response.;Has system for taking medication.;Medication review.                          Goals Met             150+ Day Progress: Pt. is tolerating a 6-6.3 met level, had a set back this ITP, struggling with anxiety, PTSD and family stress. He is not sleeping well, his anxiety is increased . Will continue to provide support and increase intensity as able'                150+ Day Progress: Pt. is tolerating a 6-6.3 met level, had a set back this ITP, struggling with anxiety, PTSD and family stress. He is not sleeping well, his anxiety is increased . Will continue to provide support and increase intensity as able'     120 day ADL'S goals met: STG #1 met. Pt.tolerating a 6 met level for (4) 2 min. intervals on the TM for a total of 20 min.    120 Day Progress: Pt. has been tolerating Interval exercise primarily a 3.7-6.3 met level.  He has been struggling with his PTSD, resulting in high BP and anxiety.        90 day Leisure goals met: STG #3: pt. has kept a BP log  to show M.D. Pt. states its been helpful.      90 Day Progress: This past 30 days have been more difficult for  him. his chest has become more sore, a CT scan was done which showed proper healing. The frequency of his PTSD sx has inncreased, definitly effecting his sleep and energy level.  He is tolerating a a 4-5.5 met level consistently.      60 day ADL'S goals met: STG #1 Met. Pt. is tolerating 40 min. at a 4 met level doing 2 modalities    60 Day Progression: Pt. is progressing well with activity tolerating a 5-6 met level. He continues to have an elevated BP at times, lazaro. at home in the evenings. Pt. suffers from PTSD. He is fearful of exercising on his own, and feels reassured when he comes to rehab. His Surgical pain in chest and both of his legs are very sore. Wll try and do wt. training again this ITP, as pt. was too sore after trying them a couple of weeks ago.      30 day ADL'S goals met: Pt. met STG #1:  Pt. tolerated 40 min. at a 2.5-3.0 met level doing 2 modalities    30 day Leisure goals met: Pt. met STG #2 : he tolerated using arm erg. each session,    No data recorded  30 Day Progression: Pt. is tolerating a 3.1-4.5 met level. He continues to have difficulty with sleeping. will increse U/E exercise.        Exercise Prescription  Exercise Mode: Treadmill;Nustep;Bike;Elliptical (Rower)    Frequency: 2-3x/week    Duration: 40-45 min    Intensity / THR: 20-30 beats above resting heart rate    RPE 11-14  Progression / Met level: 6-6.8    Resistive Training?: Yes      Current Exercise (mins/week): 250      Interventions  Home Exercise:  Mode: walking/TM    Frequency: 30-60 min    Duration: daily      Education Material : Individual education and counseling;Educational videos;Provide written material;Offer educational classes      Education Completed  Exercise Education Completed: Cardiac Anatomy;Signs and Symptoms;Medication review;RPE;Emergency Plan;Home Exercise;Warm up/cool down;FITT Principles;BP/HR Reponse to exercise;Benefits of Exercise;End point of exercise;Strength training;Stretching               "Exercise Follow-up/Discharge  Follow up/Discharge: Skilled therapy is needed to monitor for EKG changes with a hx of Afib RVR post op) and proper CV response to exercise while working towards achieving a MET level of 6-7 consistently.  Educate on safe progression of activity and exercise. Provide  support for risk factor modification, continued smoking cessation and pre diabetes management.  Will encourage confidence and having a positive attitude by providing feedback on vitals, positive reinforcement that he is doing well, so pt. regains his confidence and return to his previous level of independence. Pt. continues to struggle with sleep will reinforce relaxation techniques and improving sleep strategies.sleep.    NUTRITION  Nutrition Assessment: Reassessment      Nutrition Risk Factors:  Nutrition Risk Factors: Overweight      Nutrition Plan  Interventions  Diet Consult: Completed    Other Nutrition Intervention: Diet Class;Therapist/Pt Discussion;Provide with Written Material      Education Completed  Nutrition Education Completed: Low Saturated fat diet;Risk factor overview;Low sodium diet;Weight management      Goals  Nutrition Goals (Next 30 days): Patient will lose weight;Improve Rate Your Plate Survey Score      Goals Met  Nutrition Goals Met: Patient can identify their risk factors for CAD;Patient follows a low sodium diet;Completed Nutritional Risk Screen;Provided Rate your Plate Survey;Reviewed Dietitian schedule;Patient states following a low saturated fat diet;Patient knows appropriate portion size      Height, Weight, and  BMI  Weight: 190 lb (86.2 kg)  Height: 5' 5\" (1.651 m)  BMI: 31.62    Pre BMI: 31.62     Nutrition Follow-up  Follow-up/Discharge: WM diet educ with wife present 8/19/2020          Other Risk Factors  Other Risk Factor Assessment: Reassessment      HTN Risk Factor: Hypertension      Pre Exercise BP: 118/70  Post Exercise BP: 120/64      Hypertension Plan  Goals  HTN Goals: Take " medication as prescribed;Follow low sodium diet;Exercises regularly      Goals Met  HTN Goals Met: Take medication as prescribed;Follow low sodium diet;Exercises regularly      HTN Interventions  HTN Interventions: Diet consult;Therapist/patient discussion;Provide written material      HTN Education Completed  HTN Education Completed: Low sodium diet;Medication review;Risk factor overview      Tobacco Risk Factor: Tobacco      Initial Use:: 1/2 ppd  Current Use:: none      Tobacco Plan  Tobacco Goals  Tobacco Goals: Patient remain tobacco free      Goals Met  Tobacco Goals Met: Patient remain tobacco free      Tobacco Interventions  Tobacco Interventions: Therapist/patient discussion      Tobacco Education Completed  Tobacco Education Completed: Health benefits of tobacco cessation;Risk factor overview;Identifies triggers      Risk Factor Follow-up   Follow-up/Discharge: Pt smoked for 34 years. Patient quit 3/2020. Patient is doing well with smoking cessation.     PSYCHOSOCIAL  Psychosocial Assessment: Reassessment       No data recorded  Pre       PHQ-9 Total Score: 15    Pre PHQ-9 Total Score: 15     Psychosocial Risk Factor: Stress      Psychosocial Plan  Interventions  If PHQ-9 is >9, send letter to MD  Interventions: Offer educational videos and classes;Provide written material;Individual education and counseling      Education Completed  Education Completed: S/S of depression;Effects of stress on body;Relaxation/Coping Techniques      Goals  Goals (Next 30 days): Practicing stress management skills      Goals Met  Goals Met: Identified Support system;Identify stressors;Practicing stress management skills      Psychosocial Follow-up  Follow-up/Discharge: Patient sees a psychologist once a week and is on antidepressants.  Patient has PTSD along with depression/anxiety.  Patient's wife is a good support. His sx have increased over the past few weeks resulting in increase in BP and anxiety.             Patient  involved in Goal setting?: Yes      Signature: _____________________________________________________________    Date: __________________    Time: __________________

## 2021-06-13 NOTE — TELEPHONE ENCOUNTER
Referral Request  Type of referral: Psychiatry. For continuation for services CVT  Who s requesting: Briana from [facility]  Why the request: Continuation of care. Also is it possible for patient to get referral for a care coordinator to support him with his multiple medical issues.   Have you been seen for this request: N/A  Does patient have a preference on a group/provider? No, but would like someone in the [place] area close to where patient resides.  Okay to leave a detailed message?  Yes

## 2021-06-13 NOTE — PROGRESS NOTES
Nadya Blake has participated in 31 sessions of Phase II Cardiac Rehab.    Progress Report:   Cardiac Rehab Treatment Progress Report 11/25/2020 11/27/2020 12/4/2020 12/15/2020 12/22/2020   Weight 188 lbs 188 lbs 188 lbs 190 lbs 194 lbs   Pre Exercise  HR - 66 70 79 74   Pre Exercise BP - 112/78 120/72 118/70 120/72   Pre Blood Sugar (mg/dl) - - - - -   Treadmill Peak HR - 107 134 131 130   Treadmill Peak Blood Pressure - 122/76 138/82 144/82 154/80   Nustep Peak Heart Rate - - 113 - -   Nustep Peak Blood Pressure - - - - -   Heart Rate - 70 72 70 77   Post Exercise BP - 120/70 120/68 120/64 120/70   Post Blood Sugar (mg/dl) - - - - -   ECG - SR-ST SR-ST SR-ST SR-ST   Total Exercise Minutes - 20 30 30 30         Current Status: Blaires weight has been increasing gradually over the past few sessions (6 lbs. Since Dec. 4)  Today he complains of feeling more short of breath with stair climbing. He looks swollen around the edges of his ribs and on the left side of his sternum. His lungs are clear. ECG is SR/ST, BP is WNL. He states he is struggling with his PTSD, the nights are much worse, often having chest discomfort.         If Physician recommends change in treatment plan, please place orders.        __________________________________________________      _____________  Signature                                                                                                  Date

## 2021-06-13 NOTE — TELEPHONE ENCOUNTER
Please see note from Dr. Brandon Malhotra, patient should schedule follow-up with me to discuss his symptoms of stress and PTSD.  We can also recheck his blood pressure at that time.  Unfortunately I am not in clinic until January 11.  I am happy to see him at that time or he could see one of my partners.

## 2021-06-13 NOTE — PROGRESS NOTES
ITP ASSESSMENT   Assessment Day: 150 Day    Session Number: 26  Precautions: Sternal/ Surgical    Diagnosis: MI;CAB    Risk Stratification: High    Referring Provider: Mart Ragsdale, *  EXERCISE  Exercise Assessment: Reassessment                            Exercise Plan  Goals Next 30 days  STG #1 Pt. will tolerate a 6.8 met level consistently for (4) 2 min. intervals on the TM for a total of 20 min.     STG #2 Pt. will begin U/E wts with 2-3lbs. 2x/week.     LTG: Patient will return to walking 45 minutes 5x/week by 10/21/20. Patient will return to shooting baskets and playing 1 on 1 basketball (4.5-6.0 METs) with his son by 12/31/20.      Education Goals: All goals in this section met    Education Goals Met: Patient can state cardiac s/s and appropriate emergency response.;Has system for taking medication.;Medication review.                          Goals Met    120 day goals met: STG #1 met. Pt.tolerating a 6 met level for (4) 2 min. intervals on the TM for a total of 20 min.    120 Day Progress: Pt. has been tolerating Interval exercise primarily a 3.7-6.3 met level.  He has been struggling with his PTSD, resulting in high BP and anxiety.      90 daygoals met: STG #3: pt. has kept a BP log  to show M.D. Pt. states its been helpful.    90 Day Progress: This past 30 days have been more difficult for him. his chest has become more sore, a CT scan was done which showed proper healing. The frequency of his PTSD sx has inncreased, definitly effecting his sleep and energy level.  He is tolerating a a 4-5.5 met level consistently.      60 day  goals met: STG #1 Met. Pt. is tolerating 40 min. at a 4 met level doing 2 modalities    60 Day Progression: Pt. is progressing well with activity tolerating a 5-6 met level. He continues to have an elevated BP at times, lazaro. at home in the evenings. Pt. suffers from PTSD. He is fearful of exercising on his own, and feels reassured when he comes to rehab. His Surgical pain  in chest and both of his legs are very sore. Wll try and do wt. training again this ITP, as pt. was too sore after trying them a couple of weeks ago.      30 day goals met: Pt. met STG #1:  Pt. tolerated 40 min. at a 2.5-3.0 met level doing 2 modalities    30 day goals met: Pt. met STG #2 : he tolerated using arm erg. each session,    30 Day Progression: Pt. is tolerating a 3.1-4.5 met level. He continues to have difficulty with sleeping. will increse U/E exercise.        Exercise Prescription  Exercise Mode: Treadmill;Nustep;Bike    Frequency: 2-3x/week    Duration: 40-45 minutes    Intensity / THR: 20-30 beats above resting heart rate    RPE 11-14  Progression / Met level: 6.1-6.8    Resistive Training?: Yes      Current Exercise (mins/week): 250      Interventions  Home Exercise:  Mode: walking    Frequency: 30-60 min    Duration: daily      Education Material : Individual education and counseling;Educational videos;Provide written material;Offer educational classes      Education Completed  Exercise Education Completed: Cardiac Anatomy;Signs and Symptoms;Medication review;RPE;Emergency Plan;Home Exercise;Warm up/cool down;FITT Principles;BP/HR Reponse to exercise;Benefits of Exercise;End point of exercise              Exercise Follow-up/Discharge  Follow up/Discharge: Skilled therapy is needed to monitor for EKG changes (patient had a bout of Afib RVR post op, currently on Amiodarone) and proper CV response to exercise while working towards achieving a MET level of 6 consistently.  Educate on safe progression of activity and exercise. Provide education and support for risk factor modification, continued smoking cessation and pre diabetes management.  Provide support and feedback on vitals so pt. regains his confidence to exercise independently and increase activity. Pt. continues to struggle with sleep will reinforce relaxation techniques and othersist with sleep. Pt. will also continue to keep a log of his BP.  "   NUTRITION  Nutrition Assessment: Reassessment      Nutrition Risk Factors:  Nutrition Risk Factors: Overweight      Nutrition Plan  Interventions  Diet Consult: Completed    Other Nutrition Intervention: Diet Class;Therapist/Pt Discussion;Provide with Written Material        Education Completed  Nutrition Education Completed: Low Saturated fat diet;Low sodium diet;Weight management      Goals  Nutrition Goals (Next 30 days): Patient will follow a low saturated fat diet;Patient knows appropriate portion size      Goals Met  Nutrition Goals Met: Patient follows a low sodium diet;Patient states following a low saturated fat diet;Patient knows appropriate portion size      Height, Weight, and  BMI  Weight: 188 lb (85.3 kg)  Height: 5' 5\" (1.651 m)  BMI: 31.28    Pre BMI: 31.28     Nutrition Follow-up  Follow-up/Discharge: WM diet educ with wife present 8/19/2020          Other Risk Factors  Other Risk Factor Assessment: Reassessment      HTN Risk Factor: Hypertension      Pre Exercise BP: 108/68  Post Exercise BP: 110/60      Hypertension Plan  Goals  HTN Goals: Take medication as prescribed;Follow low sodium diet;Exercises regularly      Goals Met  HTN Goals Met: Take medication as prescribed;Follow low sodium diet;Exercises regularly      HTN Interventions  HTN Interventions: Diet consult;Therapist/patient discussion;Provide written material      HTN Education Completed  HTN Education Completed: Low sodium diet;Medication review;Risk factor overview      Tobacco Risk Factor: Tobacco      Initial Use:: 1/2 PPD  Current Use:: none      Tobacco Plan  Tobacco Goals  Tobacco Goals: Patient remain tobacco free      Goals Met  Tobacco Goals Met: Patient remain tobacco free      Tobacco Interventions  Tobacco Interventions: Therapist/patient discussion      Tobacco Education Completed  Tobacco Education Completed: Health benefits of tobacco cessation;Risk factor overview;Identifies triggers      Risk Factor Follow-up   " Follow-up/Discharge: Pt smoked for 34 years. Patient quit 3/2020. Patient is doing well with smoking cessation.     PSYCHOSOCIAL  Psychosocial Assessment: Reassessment         Psychosocial Risk Factor: Stress      Psychosocial Plan  Interventions  If PHQ-9 is >9, send letter to MD  Interventions: Offer educational videos and classes;Provide written material;Individual education and counseling      Education Completed  Education Completed: S/S of depression;Effects of stress on body;Relaxation/Coping Techniques      Goals  Goals (Next 30 days): Practicing stress management skills      Goals Met  Goals Met: Identified Support system;Identify stressors;Practicing stress management skills      Psychosocial Follow-up  Follow-up/Discharge: Patient sees a psychologist once a week and is on antidepressants.  Patient has PTSD along with depression/anxiety.  Patient's wife is a good support. His sx have increased over the past few weeks resulting in increase in BP and anxiety.             Patient involved in Goal setting?: Yes      Signature: _____________________________________________________________    Date: __________________    Time: __________________

## 2021-06-13 NOTE — PROGRESS NOTES
Clinic Care Coordination Contact  Care Team Conversations   Message from RNCC asking writer to contact patient regarding his request to get [facility] worker.  He has not been contacted yet.     Sent email to Ny at [facility] to get update on when patient may be assigned a worker.  Email from Ny:   I will be contacting him this week and will begin his documentation and he will be assigned an [facility] practitioner in around 2 weeks. I will be checking in with him until then.   He was referred from T for therapy.   Called patient and he is not feeling well. Would like a call tomorrow to discuss.  Plan- Call patient tomorrow.    CRYS Soriano  Clinic Care Coordinator  [phone]      Clinic Care Coordination Contact    Follow Up Progress Note      Assessment: Called patient using . He is not feeling well and has cancelled all appointments. He has heard from [facility] and told them he is too sick at this time, but will be getting support in the future for [facility] and therapy.  Did not want to talk long. Did not think he needed to set up PCP appointment, but needs to rest.     Goals addressed this encounter:   Goals Addressed                 This Visit's Progress       Patient Stated      Mental Health Management (pt-stated)        Goal Statement: I would like to have an [facility] worker for additional mental health support in the next 3 months.   Date Goal set: 11/19/20  Barriers: History of PTSD  Strengths: Strong advocate for himself. Supportive wife. Currently seeing a therapist and a psychiatrist, support from [facility].   Date to Achieve By: 2/19/21  Patient expressed understanding of goal: Yes  Action steps to achieve this goal:  1. I will speak with [facility] Jannette SPANGLER at scheduled phone visit on 11/23/20 to discuss the process for getting an [facility] worker. DONE  2. I will provide any necessary documentation and complete any required assessments related to this goal.   3. I will report  progress towards this goal at scheduled outreach telephone calls from the CCC team.   11-23 Discussed  12/15/20 discussed               Outreach Frequency: monthly    Plan:   Delegating to CHW to contact in less than one month.   Care Coordinator will follow up in 45 days and as needed.  Jannette Patel, [facility]  Clinic Care Coordinator  [phone]

## 2021-06-13 NOTE — TELEPHONE ENCOUNTER
Reason for Call:  Was told to call Goran to set up care coordination     Detailed comments: No additional comments were left by caller    Phone Number Patient can be reached at: Other phone number: Rochelle FUNEZ @ [phone]    Best Time: not indicated    Can we leave a detailed message on this number?: Yes     This message was left on [facility]  VM on 12/7/20.    Call taken on 12/11/2020 at 11:07 AM by Hanna Temple

## 2021-06-13 NOTE — PROGRESS NOTES
CHW reviewed CCC RN Assessment from 11/19/2020    CHW delegations:  CCC CHW will continue to support patient with current goal through scheduled outreach telephone calls and will provide resources as needed.    Next outreach due: 12/14/2020

## 2021-06-14 NOTE — PROGRESS NOTES
Clinic Care Coordination Contact     Situation: Patient chart reviewed by care coordinator.     Background: Pts initial assessment and enrollment to Care Coordination was 11-.   Patient centered goals were developed with participation from patient.  RN CC handed patient off to CHW for continued outreach every 30 days.      Assessment: CHW has been in contact with patient monthly, leaving one message a week ago. Patient has made progress to goals.       Plan/Recommendations: CHW will involve SW CC as needed or if patient is ready to move to maintenance.  SW CC will continue to monitor progress to goals and CHW outreaches every 6 weeks.     Care Plan updated and mailed to patient: no

## 2021-06-14 NOTE — TELEPHONE ENCOUNTER
Reason for Call:  Medication or medication refill:    Do you use a [facility]?  Name of the pharmacy and phone number for the current request: [id number] [place] [id number] AT [facility] [address]  Name of the medication requested: diclofenac sodium (VOLTAREN) 1 % Gel    Other request: Pt states insurance no longer cover this medication since the new year. Can provider do a PA? Pt needs this gel asap. He has PTSD and this gel helps relieve his pain in chest, shoulders, legs, and knees so he can sleep at night.    Can we leave a detailed message on this number? Yes    Phone number patient can be reached at:   Cell number on file:    Telephone Information:   Mobile [phone]   wife's [phone]    Best Time: anytime    Call taken on 1/27/2021 at 3:49 PM by Whitley Alcantar

## 2021-06-14 NOTE — TELEPHONE ENCOUNTER
Central PA team  [phone]  Pool: HE PA MED ([id number])          PA has been initiated.       PA form completed and faxed insurance via Cover My Meds     Key:  MICHAELA DANIEL (Key: [id number])     Medication:  diclofenac sodium (VOLTAREN) 1 % Gel    Insurance:  [facility] Blue Plus        Response will be received via fax and may take up to 5-10 business days depending on plan

## 2021-06-14 NOTE — TELEPHONE ENCOUNTER
Unclear if patient still needs this high dose twice a week.  Please have him follow-up with Dr. Briseno on his return.  In the meantime he can take 2000 units of vitamin D3 daily which is over-the-counter.

## 2021-06-14 NOTE — TELEPHONE ENCOUNTER
Patient unable to take other medications due to high risk of bleeding, need for dual antiplatelet therapy, history of peptic ulcer disease and coronary heart disease.

## 2021-06-14 NOTE — PROGRESS NOTES
Clinic Care Coordination Contact  [facility]/Voicemail       Clinical Data: Care Coordinator Outreach  Outreach attempted x 1.  Left message on patient's voicemail with call back information and requested return call.  Plan:  Care Coordinator will try to reach patient again in 3-5 business days.  Next outreach due: 1/28/21

## 2021-06-14 NOTE — PROGRESS NOTES
Office Visit - Follow Up   Nadya Blake   55 y.o. male    Date of Visit: 1/20/2021    Chief Complaint   Patient presents with     Diabetes     fasting     Hypertension        Assessment and Plan   1. Coronary artery disease, CABG 6/2020  He is doing well postoperatively, continue cardiac rehab and secondary prevention    2. Chest wall pain  Related to surgery responding nicely to Voltaren gel  - diclofenac sodium (VOLTAREN) 1 % Gel; Apply 4 g topically 4 (four) times a day.  Dispense: 500 g; Refill: 11    3. Pain of left lower leg  As above  - diclofenac sodium (VOLTAREN) 1 % Gel; Apply 4 g topically 4 (four) times a day.  Dispense: 500 g; Refill: 11    4. Pain of right lower leg  As above  - diclofenac sodium (VOLTAREN) 1 % Gel; Apply 4 g topically 4 (four) times a day.  Dispense: 500 g; Refill: 11    5. PTSD (post-traumatic stress disorder)  6. Recurrent major depressive disorder, in partial remission (H)  I think it is quite possible that his symptoms as the day winds down before bed are related to PTSD and anxiety.  He is understanding of this and he will work with his psychiatrist therapist.    7. Abdominal pain, generalized  He appears to have: Weight gain, I do not notice any ascites on examination.  That said he is quite adamant that he is not eating much and he is having some pain and distention.  Obtain ultrasound as below  - US Abdomen Complete; Future    8. Abdominal distention  - US Abdomen Complete; Future    9. Vitamin D deficiency  - cholecalciferol, vitamin D3, (VITAMIN D3) 25 mcg (1,000 unit) capsule; Take 1 capsule (1,000 Units total) by mouth daily.  Dispense: 90 capsule; Refill: 3    10. Screen for colon cancer  - Ambulatory referral for Colonoscopy      Return in about 4 weeks (around 2/17/2021) for recheck.     History of Present Illness   This 55 y.o. old man comes in for follow-up.  Overall doing okay.  He does well with cardiac rehab.  He is exercising with some regularity without  "difficulty.  He has had nice response to Voltaren gel to areas of discomfort including his chest wall and legs.  His main issue is that in the evening when he walks up to bed he starts to have a hard time breathing some chest discomfort and anxiety.  He generally struggles with the evening in regards to PTSD and anxiety.  This is under her care.  He also checks his blood pressure at this time and finds that it is elevated.  When he is at rest which seems much better.  He also is complaining of abdominal distention and states he does not eat much and does not think he should be having fluids aching.    Review of Systems: A comprehensive review of systems was negative except as noted.     Medications, Allergies and Problem List   Reviewed, reconciled and updated  Post Discharge Medication Reconciliation Status:      Physical Exam   General Appearance:   No acute distress    /78 (Patient Site: Left Arm, Patient Position: Sitting, Cuff Size: Adult Regular)   Pulse 75   Ht 5' 5.5\" (1.664 m)   Wt 196 lb (88.9 kg)   SpO2 96%   BMI 32.12 kg/m      HEENT exam is unremarkable  Neck supple no thyromegaly or nodule palpable  Lymphatic no cervical lymphadenopathy  Cardiovascular regular rate and rhythm no murmur gallop or rub  Pulmonary lungs are clear to auscultation bilaterally  Gastrointestinal abdomen soft nontender nondistended no organomegaly  Neurologic exam is non focal  Psychiatric pleasant, no confusion or agitation        Additional Information   Current Outpatient Medications   Medication Sig Dispense Refill     acetaminophen (TYLENOL) 500 MG tablet Take 1,000 mg by mouth every 6 (six) hours as needed for pain.       albuterol (PROAIR HFA;PROVENTIL HFA;VENTOLIN HFA) 90 mcg/actuation inhaler Inhale 1-2 puffs every 4 (four) hours as needed for wheezing.       aspirin 81 mg chewable tablet 1 tablet (81 mg total) by Enteral Tube route daily. 90 tablet 3     budesonide-formoteroL (SYMBICORT) 160-4.5 " mcg/actuation inhaler Inhale 2 puffs 2 (two) times a day. Taking as needed.       clonazePAM (KLONOPIN) 0.5 MG tablet Take 0.5 mg by mouth 2 (two) times a day as needed for anxiety.       clopidogreL (PLAVIX) 75 mg tablet Take 1 tablet (75 mg total) by mouth every morning. 90 tablet 3     diclofenac sodium (VOLTAREN) 1 % Gel Apply 4 g topically 4 (four) times a day. 500 g 11     escitalopram oxalate (LEXAPRO) 20 MG tablet Take 20 mg by mouth daily.       gabapentin (NEURONTIN) 300 MG capsule Take 1 capsule (300 mg total) by mouth at bedtime. 90 capsule 3     metoprolol succinate (TOPROL-XL) 100 MG 24 hr tablet Take 1 tablet (100 mg total) by mouth daily. 90 tablet 3     omeprazole (PRILOSEC) 20 MG capsule Take 1 capsule (20 mg total) by mouth daily before breakfast. 90 capsule 3     QUEtiapine (SEROQUEL) 50 MG tablet Take 50 mg by mouth at bedtime.       rosuvastatin (CRESTOR) 40 MG tablet Take 1 tablet (40 mg total) by mouth daily. 90 tablet 3     tamsulosin (FLOMAX) 0.4 mg cap Take 1 capsule (0.4 mg total) by mouth daily. 90 capsule 3     cholecalciferol, vitamin D3, (VITAMIN D3) 25 mcg (1,000 unit) capsule Take 1 capsule (1,000 Units total) by mouth daily. 90 capsule 3     No current facility-administered medications for this visit.      Allergies   Allergen Reactions     Atorvastatin Diarrhea     Cortisone Other (See Comments)     pain     Lisinopril Cough     started after CABG for unclear reason     Methylprednisolone Other (See Comments)     ?     Social History     Tobacco Use     Smoking status: Former Smoker     Packs/day: 1.00     Years: 30.00     Pack years: 30.00     Types: Cigarettes     Quit date: 3/23/2020     Years since quittin.8     Smokeless tobacco: Never Used   Substance Use Topics     Alcohol use: No     Drug use: Never       Review and/or order of clinical lab tests:  Review and/or order of radiology tests:  Review and/or order of medicine tests:  Discussion of test results with  performing physician:  Decision to obtain old records and/or obtain history from someone other than the patient:  Review and summarization of old records and/or obtaining history from someone other than the patient and.or discussion of case with another health care provider:  Independent visualization of image, tracing or specimen itself:    Time:      Az Briseno MD

## 2021-06-14 NOTE — TELEPHONE ENCOUNTER
Central PA team  [phone]  Pool: [facility] PA MED ([id number])          Medication Appeal has been initiated.       Medication Appeal form completed and faxed insurance via Cover My Meds     Key:  Manually faxed     Medication:  Diclofenac 1% Gel    Insurance:  Blue Plus        Response will be received via fax and may take up to 5-10 business days depending on plan

## 2021-06-14 NOTE — PROGRESS NOTES
Clinic Care Coordination Contact    Follow Up Progress Note      Assessment: Talked to patient and reviewed that the appeal of the PA for medication has been sent to [employer] and hopefully he will hear soon that the pharmacy has it ready for .  He has established with [facility] and has a psychologist Allie that he is working with twice a week. He is very happy with her.  He would like GERALDO SHERIFF to call him weekly as well.      Goals addressed this encounter:   Goals Addressed                 This Visit's Progress       Patient Stated      Mental Health Management (pt-stated)   80%     Goal Statement: I would like to have an [facility] worker for additional mental health support in the next 3 months.   Date Goal set: 11/19/20  Barriers: History of PTSD  Strengths: Strong advocate for himself. Supportive wife. Currently seeing a therapist and a psychiatrist, support from [facility].   Date to Achieve By: 2/19/21  Patient expressed understanding of goal: Yes  Action steps to achieve this goal:  1. I will speak with [facility] Jannette SPANGLER at scheduled phone visit on 11/23/20 to discuss the process for getting an [facility] worker. DONE  2. I will provide any necessary documentation and complete any required assessments related to this goal.   3. I will report progress towards this goal at scheduled outreach telephone calls from the CCC team.   11-23 Discussed  12/15/20 discussed  Not discussed 1-  Discussed 1-             Intervention/Education provided during outreach: Discussed appeal for PA that is pending. Gave him GERALDO SHERIFF phone number.     Outreach Frequency: weekly    Plan:   Will call patient in one week.  Patient to call GERALDO SHERIFF if needs arise prior to then.   CRYS Soriano  Clinic Care Coordinator  [phone]          Clinic Care Coordination Contact    Follow Up Progress Note      Assessment: Call from [facility] RN who works with patient and he wants to talk with care  coordination staff as he has not heard from anyone for some time and didn't have phone number.  Called patient with . Patient frequently interrupted .  He is frustrated that the Voltaren gel he has found works for his pain is not available at this time.  The PA was submitted yesterday and [employer] requires it this year.  It may take 5-10 days to get decision from [employer].        Discussed options he has at this time: pay privately. He cannot pay for it as it costs $264.  He could call [employer] to advocate to get a decision ASAP.  He could work with his PCP to find any other options for a covered prescription while he waits.  Patient was told these options several times and kept focusing on that he cannot afford to buy it out of pocket.      He then wanted to terminate the call as he had to go to appointment with his wife.  He states that he is available anytime to continue the discussion. Did not address his goal.     Goals addressed this encounter:   Goals Addressed                 This Visit's Progress       Patient Stated      Mental Health Management (pt-stated)        Goal Statement: I would like [facility] for additional mental health support in the next 3 months.   Date Goal set: 11/19/20  Barriers: History of PTSD  Strengths: Strong advocate for himself. Supportive wife. Currently seeing a therapist and a psychiatrist, support from [facility].   Date to Achieve By: 2/19/21  Patient expressed understanding of goal: Yes  Action steps to achieve this goal:  1. I will speak with [facility] Jannette SPANGLER at scheduled phone visit on 11/23/20 to discuss the process for getting an [facility] worker. DONE  2. I will provide any necessary documentation and complete any required assessments related to this goal.   3. I will report progress towards this goal at scheduled outreach telephone calls from the CCC team.   11-23 Discussed  12/15/20 discussed  Not discussed 1-              Intervention/Education provided during outreach: Options while waiting for [employer] to make decision on Voltaren.      Outreach Frequency: monthly    Plan:   Will call patient on 1-29 to continue discussion.   Jannette Patel [facility]  Clinic Care Coordinator  [phone]

## 2021-06-14 NOTE — PROGRESS NOTES
ITP ASSESSMENT   Assessment Day: 210 Day    Session Number: 30  Precautions: Sternal/surgical    Diagnosis: MI;CAB    Risk Stratification: High    Referring Provider: Mart Ragsdale, *  EXERCISE  Exercise Assessment: Reassessment                              Exercise Plan  Goals Next 30 days   STG #1 Pt. will tolerate a 6.8 met level consistently for (4) 2 min. intervals on the TM for a total of 20 min.    STG #2 Pt. will do U/E wts with 2-3lbs. 2x/week. STG #3: Pt. will try rower and Elliptical for 10-15 min. without sx.     LTG: Patient will return to walking 45 minutes 5x/week by 2/28/21. Patient will return to shooting baskets and playing 1 on 1 basketball (4.5-6.0 METs) with his son by 2/28/21.      Education Goals: All goals in this section met    Education Goals Met: Patient can state cardiac s/s and appropriate emergency response.;Has system for taking medication.;Medication review.                          Goals Met             150+ Day Progress: Patient has only attended one session since last update.  He has been in the ED for palpitations, swollen neck, chest and abdomen.  He has had to have tests to determine cause of swelling.                    120 day ADL'S goals met: STG #1 met. Pt.tolerating a 6 met level for (4) 2 min. intervals on the TM for a total of 20 min.    120 Day Progress: Pt. has been tolerating Interval exercise primarily a 3.7-6.3 met level.  He has been struggling with his PTSD, resulting in high BP and anxiety.        90 day Leisure goals met: STG #3: pt. has kept a BP log  to show M.D. Pt. states its been helpful.      90 Day Progress: This past 30 days have been more difficult for him. his chest has become more sore, a CT scan was done which showed proper healing. The frequency of his PTSD sx has inncreased, definitly effecting his sleep and energy level.  He is tolerating a a 4-5.5 met level consistently.      60 day ADL'S goals met: STG #1 Met. Pt. is tolerating 40 min. at  a 4 met level doing 2 modalities    60 Day Progression: Pt. is progressing well with activity tolerating a 5-6 met level. He continues to have an elevated BP at times, lazaro. at home in the evenings. Pt. suffers from PTSD. He is fearful of exercising on his own, and feels reassured when he comes to rehab. His Surgical pain in chest and both of his legs are very sore. Wll try and do wt. training again this ITP, as pt. was too sore after trying them a couple of weeks ago.      30 day ADL'S goals met: Pt. met STG #1:  Pt. tolerated 40 min. at a 2.5-3.0 met level doing 2 modalities    30 day Leisure goals met: Pt. met STG #2 : he tolerated using arm erg. each session,      30 Day Progression: Pt. is tolerating a 3.1-4.5 met level. He continues to have difficulty with sleeping. will increse U/E exercise.        Exercise Prescription  Exercise Mode: Treadmill;Nustep;Bike;Elliptical    Frequency: 2 x week    Duration: 30-40 minutes    Intensity / THR: 20-30 beats above resting heart rate    RPE 11-14  Progression / Met level: 6-6.8 METS    Resistive Training?: Yes      Current Exercise (mins/week): 60      Interventions  Home Exercise:  Mode: walking    Frequency: 15-30 minutes    Duration: daily      Education Material : Individual education and counseling;Educational videos;Provide written material;Offer educational classes      Education Completed  Exercise Education Completed: Cardiac Anatomy;Signs and Symptoms;Medication review;RPE;Emergency Plan;Home Exercise;Warm up/cool down;FITT Principles;BP/HR Reponse to exercise;Benefits of Exercise;End point of exercise;Strength training;Stretching              Exercise Follow-up/Discharge  Follow up/Discharge: Skilled therapy is needed to monitor for EKG changes with a hx of Afib RVR post op) and proper CV response to exercise while working towards achieving a MET level of 6-7 consistently.  Educate on safe progression of activity and exercise. Provide  support for risk factor  "modification, continued smoking cessation and pre diabetes management.  Will encourage confidence and having a positive attitude by providing feedback on vitals, positive reinforcement that he is doing well, so pt. regains his confidence and return to his previous level of independence. Pt. continues to struggle with sleep will reinforce relaxation techniques and improving sleep strategies.sleep.    NUTRITION  Nutrition Assessment: Reassessment      Nutrition Risk Factors:  Nutrition Risk Factors: Overweight      Nutrition Plan  Interventions  Diet Consult: Completed    Other Nutrition Intervention: Diet Class;Therapist/Pt Discussion;Provide with Written Material      Education Completed  Nutrition Education Completed: Low Saturated fat diet;Risk factor overview;Low sodium diet;Weight management      Goals  Nutrition Goals (Next 30 days): Patient will lose weight;Improve Rate Your Plate Survey Score      Goals Met  Nutrition Goals Met: Patient can identify their risk factors for CAD;Patient follows a low sodium diet;Completed Nutritional Risk Screen;Provided Rate your Plate Survey;Reviewed Dietitian schedule;Patient states following a low saturated fat diet;Patient knows appropriate portion size      Height, Weight, and  BMI  Weight: 194 lb (88 kg)  Height: 5' 5\" (1.651 m)  BMI: 32.28    Pre BMI: 32.28     Nutrition Follow-up  Follow-up/Discharge: WM diet educ with wife present 8/19/2020          Other Risk Factors  Other Risk Factor Assessment: Reassessment      HTN Risk Factor: Hypertension      Pre Exercise BP: 120/72  Post Exercise BP: 120/70      Hypertension Plan  Goals  HTN Goals: Take medication as prescribed;Follow low sodium diet;Exercises regularly      Goals Met  HTN Goals Met: Take medication as prescribed;Follow low sodium diet;Exercises regularly      HTN Interventions  HTN Interventions: Diet consult;Therapist/patient discussion;Provide written material      HTN Education Completed  HTN Education " Completed: Low sodium diet;Medication review;Risk factor overview      Tobacco Risk Factor: Tobacco      Initial Use:: 1/2 ppd  Current Use:: none      Tobacco Plan  Tobacco Goals  Tobacco Goals: Patient remain tobacco free      Goals Met  Tobacco Goals Met: Patient remain tobacco free      Tobacco Interventions  Tobacco Interventions: Therapist/patient discussion      Tobacco Education Completed  Tobacco Education Completed: Health benefits of tobacco cessation;Risk factor overview;Identifies triggers      Risk Factor Follow-up   Follow-up/Discharge: Pt smoked for 34 years. Patient quit 3/2020. Patient is doing well with smoking cessation.     PSYCHOSOCIAL  Psychosocial Assessment: Reassessment         Psychosocial Risk Factor: Stress      Psychosocial Plan  Interventions  If PHQ-9 is >9, send letter to MD  Interventions: Offer educational videos and classes;Provide written material;Individual education and counseling      Education Completed  Education Completed: S/S of depression;Effects of stress on body;Relaxation/Coping Techniques      Goals  Goals (Next 30 days): Practicing stress management skills      Goals Met  Goals Met: Identified Support system;Identify stressors;Practicing stress management skills      Psychosocial Follow-up  Follow-up/Discharge: Patient sees a psychologist once a week and is on antidepressants.  Patient has PTSD along with depression/anxiety.  Patient's wife is a good support.              Patient involved in Goal setting?: No      Signature: _____________________________________________________________    Date: __________________    Time: __________________

## 2021-06-14 NOTE — PROGRESS NOTES
Patient spoke with [facility] [facility] on 12/16.     CHW delegations: Delegating to CHW to contact in less than one month.     Next outreach due: 1/12/21

## 2021-06-15 NOTE — PROGRESS NOTES
Patient and his spouse spoke with [facility] GERALDO on 3/4/21 to discuss goals and accommodations for his colonoscopy.     CHW delegations: None at this time    Next outreach due: 4/1/21      
Detail Level: Generalized
Detail Level: Detailed

## 2021-06-15 NOTE — PROGRESS NOTES
Clinic Care Coordination Contact  [facility]/Voicemail       Clinical Data: Care Coordinator Outreach  Outreach attempted x 1.  Left message on patient's voicemail with call back information and requested return call.  Plan:  Care Coordinator will try to reach patient again in 3-5 business days.    Call back from patient that went to . He was meeting with his [facility] worker when CC call came in.      Plan- call on Thursday.   Jannette Patel KEN  Clinic Care Coordinator  [phone]
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
## 2021-06-15 NOTE — PROGRESS NOTES
Office Visit - Follow Up   Nadya Blake   55 y.o. male    Date of Visit: 2/23/2021    Chief Complaint   Patient presents with     Follow-up        Assessment and Plan   1. PTSD (post-traumatic stress disorder)  Continue follow-up with psychiatry and with cognitive behavioral therapy.  Sarcoidosis increased but he is only taking 100 mg instead of 150 mg.  He does have follow-up with Dr. Quevedo    2. Abdominal pain, generalized  Etiology uncertain, question from the patient regarding small bowel bacterial overgrowth.  We will have him follow up with gastroenterology  - Ambulatory referral to Gastroenterology    3. Essential hypertension  BP ok, but some tachycardia at night.  Will increase metoprolol in the evening  - metoprolol succinate (TOPROL-XL) 100 MG 24 hr tablet; Take 1.5 tablets (150 mg total) by mouth at bedtime.  Dispense: 135 tablet; Refill: 3    4. Abdominal distention  As above  - Ambulatory referral to Gastroenterology    5. Vitamin D deficiency  - cholecalciferol, vitamin D3, (VITAMIN D3) 25 mcg (1,000 unit) capsule; Take 2 capsules (2,000 Units total) by mouth daily.  Dispense: 90 capsule; Refill: 3    6. Severe obesity (H)  Discussed removing sugar from tea  The following high BMI interventions were performed this visit: encouragement to exercise and lifestyle education regarding diet    Return in about 4 weeks (around 3/23/2021) for recheck.     History of Present Illness   This 55 y.o. old man comes in for follow-up.  He remains stable.  He saw Dr. Franco napier for PTSD.  Seroquel dose increased.  He is only taking 100 mg because of 150 mg made him too sleepy.  He continues to have abdominal pain especially after eating.  He kept a food diary we reviewed this.  He does not appear to need although extremely low I am not familiar with some of the foods he does eat.  He does drink a lot of tea with added sugar.  He has seen gastroenterology in the past and he wonders about fructose  "intolerance or small bowel bacterial overgrowth.  He is most worried about fluid in his abdomen though we did an ultrasound in December showed.    Review of Systems: A comprehensive review of systems was negative except as noted.     Medications, Allergies and Problem List   Reviewed, reconciled and updated  Post Discharge Medication Reconciliation Status:      Physical Exam   General Appearance:   No acute distress    /80 (Patient Site: Left Arm, Patient Position: Sitting, Cuff Size: Adult Regular)   Pulse 74   Ht 5' 5.5\" (1.664 m)   Wt 199 lb 3.2 oz (90.4 kg)   SpO2 97%   BMI 32.64 kg/m           Additional Information   Current Outpatient Medications   Medication Sig Dispense Refill     acetaminophen (TYLENOL) 500 MG tablet Take 1,000 mg by mouth every 6 (six) hours as needed for pain.       albuterol (PROAIR HFA;PROVENTIL HFA;VENTOLIN HFA) 90 mcg/actuation inhaler Inhale 1-2 puffs every 4 (four) hours as needed for wheezing.       aspirin 81 mg chewable tablet 1 tablet (81 mg total) by Enteral Tube route daily. 90 tablet 3     budesonide-formoteroL (SYMBICORT) 160-4.5 mcg/actuation inhaler Inhale 2 puffs 2 (two) times a day. Taking as needed.       cholecalciferol, vitamin D3, (VITAMIN D3) 25 mcg (1,000 unit) capsule Take 2 capsules (2,000 Units total) by mouth daily. 90 capsule 3     clonazePAM (KLONOPIN) 0.5 MG tablet Take 0.5 mg by mouth 2 (two) times a day as needed for anxiety.       clopidogreL (PLAVIX) 75 mg tablet Take 1 tablet (75 mg total) by mouth every morning. 90 tablet 3     diclofenac sodium (VOLTAREN) 1 % Gel Apply 4 g topically 4 (four) times a day. 500 g 11     escitalopram oxalate (LEXAPRO) 20 MG tablet Take 20 mg by mouth daily.       gabapentin (NEURONTIN) 300 MG capsule Take 1 capsule (300 mg total) by mouth at bedtime. 90 capsule 3     metoprolol succinate (TOPROL-XL) 100 MG 24 hr tablet Take 1.5 tablets (150 mg total) by mouth at bedtime. 135 tablet 3     omeprazole (PRILOSEC) " 20 MG capsule Take 1 capsule (20 mg total) by mouth daily before breakfast. 90 capsule 3     QUEtiapine (SEROQUEL) 100 MG tablet Take 1.5 tablets (150 mg total) by mouth at bedtime. (Patient taking differently: Take 100 mg by mouth at bedtime. ) 90 tablet 2     rosuvastatin (CRESTOR) 40 MG tablet Take 1 tablet (40 mg total) by mouth daily. 90 tablet 3     tamsulosin (FLOMAX) 0.4 mg cap Take 1 capsule (0.4 mg total) by mouth daily. 90 capsule 3     No current facility-administered medications for this visit.      Allergies   Allergen Reactions     Atorvastatin Diarrhea     Cortisone Other (See Comments)     pain     Lisinopril Cough     started after CABG for unclear reason     Methylprednisolone Other (See Comments)     ?     Social History     Tobacco Use     Smoking status: Former Smoker     Packs/day: 1.00     Years: 30.00     Pack years: 30.00     Types: Cigarettes     Quit date: 3/23/2020     Years since quittin.9     Smokeless tobacco: Never Used     Tobacco comment: stopped 3/24/2020   Substance Use Topics     Alcohol use: No     Drug use: Never       Review and/or order of clinical lab tests:  Review and/or order of radiology tests:  Review and/or order of medicine tests:  Discussion of test results with performing physician:  Decision to obtain old records and/or obtain history from someone other than the patient:  Review and summarization of old records and/or obtaining history from someone other than the patient and.or discussion of case with another health care provider:  Independent visualization of image, tracing or specimen itself:    Time:      Az Briseno MD

## 2021-06-15 NOTE — PROGRESS NOTES
Clinic Care Coordination Contact    Follow Up Progress Note      Assessment: Talked to patient who is doing well. His Voltaren was approved for one year and he got it yesterday.  Had appointment regarding his thyroid yesterday and does not need to do any follow up.  After his heart surgery, there were some concerns about enzyme, but seems to have resolved.  His mental health is very good and has upcoming psychiatry appointment on 2-17 with Dr. Quevedo.  He is seeing his [facility] worker and therapist through [facility].  Feeling supported.  Wants to get vaccine, and set up goal.     Goals addressed this encounter:   Goals Addressed                 This Visit's Progress       Patient Stated      Medical (pt-stated)        Goal Statement: I will have COVID vaccine as soon as I am able within 4 months.   Date Goal set: 2-4-2021  Barriers: Unclear when I can get it.   Strengths: Motivated to get it ASAP.   Date to Achieve By: 6-4-2021  Patient expressed understanding of goal: yes  Action steps to achieve this goal:  1. I will continue to discuss with my health team.    2. I will schedule appointment at clinic or pharmacy when able.   3. I will update care coordination team monthly and as needed.                Intervention/Education provided during outreach: Vaccines are not yet available for him.  Will discuss during weekly calls.       Outreach Frequency: weekly    Plan:   Will call patient in two weeks. He understands that [facility] is on vacation next week.  Patient to call with any concerns.  Jannette Patel, [facility]  Clinic Care Coordinator  [phone]

## 2021-06-15 NOTE — PROGRESS NOTES
Patient spoke with [facility] SW on 1/28 and discussed goals.   No CHW delegations at this time.    Next outreach due: 2/23/21

## 2021-06-15 NOTE — PROGRESS NOTES
Clinic Care Coordination Contact    Follow Up Progress Note      Assessment: Talked with patient using .  He is doing ok. Had to cancel GI appointment which was set up for 3-29.  He set it up and then got a call from KARY NOLAN telling him that his wife cannot be at the appointment since there was a limit to the number of people who could be in the room. With an , there are too many people.  He is comfortable using a phone  if that would work.  Wife got on the call and explained that she has to attend his appointments as he cannot remember things and has PTSD.      Called KARY NOLAN in [place] and left a message for Mana, scheduling supervisor to call back to discuss options.      3-5 update- [facility] staff Radha.  Discussed options and patient requested in person appointment and there can only be three people in the exam room and they use in person .  She is willing to have  on computer in the room, allowing patient, his wife and provider to be in the room. Called patient and he is agreeable to that plan.  KARY NOLAN will call on 3-8 to set up appointment.   Goals addressed this encounter:   Goals Addressed                 This Visit's Progress       Patient Stated      Medical (pt-stated)        Goal Statement: I will have COVID vaccine as soon as I am able within 4 months.   Date Goal set: 2-4-2021  Barriers: Unclear when I can get it.   Strengths: Motivated to get it ASAP.   Date to Achieve By: 6-4-2021  Patient expressed understanding of goal: yes  Action steps to achieve this goal:  1. I will continue to discuss with my health team.    2. I will schedule appointment at clinic or pharmacy when able.   3. I will update care coordination team monthly and as needed.   2-.3-4 discussed          Mental Health Management (pt-stated)        Goal Statement: I would like to have an [facility] worker for additional mental health support in the next 3 months.   Date Goal set:  11/19/20  Barriers: History of PTSD  Strengths: Strong advocate for himself. Supportive wife. Currently seeing a therapist and a psychiatrist, support from [facility].   Date to Achieve By: 2/19/21  Patient expressed understanding of goal: Yes  Action steps to achieve this goal:  1. I will speak with [facility] Jannette SPANGLER at scheduled phone visit on 11/23/20 to discuss the process for getting an [facility]placed by [facility] worker. DONE  2. I will provide any necessary documentation and complete any required assessments related to this goal.   3. I will report progress towards this goal at scheduled outreach telephone calls from the CCC team.   11-23 Discussed  12/15/20 discussed  Not discussed 1-  Discussed 1-, 2-, 3-4             Intervention/Education provided during outreach: help with getting appointment with KARY NOLAN that his wife can attend.      Outreach Frequency: 2 weeks    Plan:     Care Coordinator will call patient in 3-5 business days.  CRYS Soriano  Clinic Care Coordinator  [phone]

## 2021-06-15 NOTE — PROGRESS NOTES
Clinic Care Coordination Contact    Follow Up Progress Note      Assessment: Called patient and he is very pleased as he has appointment to get vaccine on 3-25 at 9:40 AM at [facility].  No other concerns.    Goals addressed this encounter:   Goals Addressed                 This Visit's Progress       Patient Stated      Medical (pt-stated)   60%     Goal Statement: I will have COVID vaccine as soon as I am able within 4 months.   Date Goal set: 2-4-2021  Barriers: Unclear when I can get it.   Strengths: Motivated to get it ASAP.   Date to Achieve By: 6-4-2021  Patient expressed understanding of goal: yes  Action steps to achieve this goal:  1. I will continue to discuss with my health team.    2. I will schedule appointment at clinic or pharmacy when able.   3. I will update care coordination team monthly and as needed.   2-.3-4 discussed  3-12 scheduled for 3-25 at [facility]              Intervention/Education provided during outreach: None     Outreach Frequency: monthly    Plan:   Do outreach in two weeks.    CRYS Soriano  Clinic Care Coordinator  [phone]

## 2021-06-15 NOTE — PATIENT INSTRUCTIONS - HE
I have increased the patient's Seroquel to 150 mg at at bedtime.  We will continue with his Wellbutrin and Lexapro as ordered.  He will continue with his individual therapies as well as he is case management services.  The patient will return to this clinic in 6 weeks for medication check.  He agrees to call before then or return with any questions or concerns.

## 2021-06-15 NOTE — PROGRESS NOTES
"Clinic Care Coordination Contact    Follow Up Progress Note      Assessment: Talked to patient using . He had his first appointment with psychiatrist Dr. Quevedo yesterday and was somewhat uncomfortable talking to him as he asked a lot of questions and has a \"dry\" manner.  He thought about it overnight. He does have another appointment for 3-31 and will keep that to see if he feels more comfortable. If not, writer can assist with finding other provider. He agreed to the plan.  He is worried about getting the vaccine and sent him link via email for him to sign up for the state vaccine locater website. He will work with his [facility] worker to sign up online.  No other concerns.     Goals addressed this encounter:   Goals Addressed                 This Visit's Progress       Patient Stated      Medical (pt-stated)   0%     Goal Statement: I will have COVID vaccine as soon as I am able within 4 months.   Date Goal set: 2-4-2021  Barriers: Unclear when I can get it.   Strengths: Motivated to get it ASAP.   Date to Achieve By: 6-4-2021  Patient expressed understanding of goal: yes  Action steps to achieve this goal:  1. I will continue to discuss with my health team.    2. I will schedule appointment at clinic or pharmacy when able.   3. I will update care coordination team monthly and as needed.   2- discussed          Mental Health Management (pt-stated)        Goal Statement: I would like to have an [facility] worker for additional mental health support in the next 3 months.   Date Goal set: 11/19/20  Barriers: History of PTSD  Strengths: Strong advocate for himself. Supportive wife. Currently seeing a therapist and a psychiatrist, support from [facility].   Date to Achieve By: 2/19/21  Patient expressed understanding of goal: Yes  Action steps to achieve this goal:  1. I will speak with [facility] Jannette SPANGLER at scheduled phone visit on 11/23/20 to discuss the process for getting an [facility] worker. " DONE  2. I will provide any necessary documentation and complete any required assessments related to this goal.   3. I will report progress towards this goal at scheduled outreach telephone calls from the CCC team.   11-23 Discussed  12/15/20 discussed  Not discussed 1-  Discussed 1-, 2-             Intervention/Education provided during outreach: [facility] website about vaccine distribution.      Outreach Frequency: monthly    Plan:   Will call patient in one week.    Jannette Patel [facility]  Clinic Care Coordinator  [phone]

## 2021-06-16 PROBLEM — K76.0 FATTY LIVER DISEASE, NONALCOHOLIC: Chronic | Status: ACTIVE | Noted: 2017-05-29

## 2021-06-16 PROBLEM — J84.115 RESPIRATORY BRONCHIOLITIS ASSOCIATED INTERSTITIAL LUNG DISEASE (H): Chronic | Status: ACTIVE | Noted: 2020-02-16

## 2021-06-16 PROBLEM — M16.12 PRIMARY OSTEOARTHRITIS OF LEFT HIP: Chronic | Status: ACTIVE | Noted: 2017-04-28

## 2021-06-16 PROBLEM — M16.11 PRIMARY OSTEOARTHRITIS OF RIGHT HIP: Chronic | Status: ACTIVE | Noted: 2017-04-28

## 2021-06-16 PROBLEM — R73.03 PRE-DIABETES: Chronic | Status: ACTIVE | Noted: 2020-06-29

## 2021-06-16 PROBLEM — I25.10 CORONARY ARTERY DISEASE DUE TO LIPID RICH PLAQUE: Chronic | Status: ACTIVE | Noted: 2020-06-23

## 2021-06-16 PROBLEM — E78.5 DYSLIPIDEMIA, GOAL LDL BELOW 70: Chronic | Status: ACTIVE | Noted: 2020-06-23

## 2021-06-16 PROBLEM — I25.83 CORONARY ARTERY DISEASE DUE TO LIPID RICH PLAQUE: Chronic | Status: ACTIVE | Noted: 2020-06-23

## 2021-06-16 PROBLEM — N40.1 BENIGN PROSTATIC HYPERPLASIA WITH NOCTURIA: Chronic | Status: ACTIVE | Noted: 2020-09-30

## 2021-06-16 PROBLEM — F43.10 PTSD (POST-TRAUMATIC STRESS DISORDER): Chronic | Status: ACTIVE | Noted: 2020-09-30

## 2021-06-16 PROBLEM — I25.119 ATHEROSCLEROSIS OF NATIVE CORONARY ARTERY WITH ANGINA PECTORIS, UNSPECIFIED WHETHER NATIVE OR TRANSPLANTED HEART (H): Status: ACTIVE | Noted: 2021-05-24

## 2021-06-16 PROBLEM — R35.1 BENIGN PROSTATIC HYPERPLASIA WITH NOCTURIA: Chronic | Status: ACTIVE | Noted: 2020-09-30

## 2021-06-16 NOTE — TELEPHONE ENCOUNTER
The [employer]  called and advised she has not gotten  Request link and in order to use her... she nadvised the  needs to be requested through JESSIKA PENA, please call prior to appointment at:  Prefers  Frandy Oconnell from [employer] Pena Translation: [phone]  . For  over the phone please call [phone] option 0 or use iPad

## 2021-06-16 NOTE — PROGRESS NOTES
Office Visit - Follow Up   Nadya Blake   55 y.o. male    Date of Visit: 4/23/2021    Chief Complaint   Patient presents with     Follow Up     1 month        Assessment and Plan   1. Chest pain, unspecified type  Given change in his symptoms with some fairly atypical features we will obtain a nuclear medicine stress test.  I discussed with the patient and his wife that he is on excellent medical management and that certainly stress and his fasting could be also contributing.  - NM Exercise Stress Test; Future    2. Pain of left upper extremity  - NM Exercise Stress Test; Future    3. ALVES (dyspnea on exertion)  - NM Exercise Stress Test; Future    4. Coronary artery disease, CABG 6/2020  - NM Exercise Stress Test; Future    5. Gastroesophageal reflux disease with esophagitis without hemorrhage  - omeprazole (PRILOSEC) 20 MG capsule; Take 1 capsule (20 mg total) by mouth 2 (two) times a day before meals.  Dispense: 180 capsule; Refill: 3    6. Chest wall pain  - acetaminophen (TYLENOL) 500 MG tablet; Take 2 tablets (1,000 mg total) by mouth every 6 (six) hours as needed for pain.  Dispense: 300 tablet; Refill: 3      Return in about 4 weeks (around 5/21/2021) for recheck.     History of Present Illness   This 55 y.o. old man comes in for follow-up.  Starting 15 days ago he has had progressive shortness of breath walking up stairs.  This will last for about 10 minutes and then subside.  At night he will developed left-sided chest pain which is tightness and stabbing type pain which radiates down his left arm.  This will resolve spontaneously.  Does not generally occur with his dyspnea with exertion.  He is quite concerned about his heart.  He is currently fasting for Ramadan but his symptoms started before this.    Review of Systems: A comprehensive review of systems was negative except as noted.     Medications, Allergies and Problem List   Reviewed, reconciled and updated  Post Discharge Medication  Reconciliation Status:      Physical Exam   General Appearance:   No acute distress    /72 (Patient Site: Left Arm, Patient Position: Sitting, Cuff Size: Adult Large)   Pulse 66   Wt 194 lb (88 kg)   SpO2 96%   BMI 31.79 kg/m      HEENT exam is unremarkable  Cardiovascular regular rate and rhythm no murmur gallop or rub  Pulmonary lungs are clear to auscultation bilaterally  Gastrointestinal abdomen soft nontender nondistended no organomegaly  Neurologic exam is non focal  Psychiatric pleasant, no confusion or agitation        Additional Information   Current Outpatient Medications   Medication Sig Dispense Refill     acetaminophen (TYLENOL) 500 MG tablet Take 2 tablets (1,000 mg total) by mouth every 6 (six) hours as needed for pain. 300 tablet 3     albuterol (PROAIR HFA;PROVENTIL HFA;VENTOLIN HFA) 90 mcg/actuation inhaler Inhale 1-2 puffs every 4 (four) hours as needed for wheezing.       aspirin 81 mg chewable tablet 1 tablet (81 mg total) by Enteral Tube route daily. 90 tablet 3     budesonide-formoteroL (SYMBICORT) 160-4.5 mcg/actuation inhaler Inhale 2 puffs 2 (two) times a day. Taking as needed.       cholecalciferol, vitamin D3, (VITAMIN D3) 25 mcg (1,000 unit) capsule Take 2 capsules (2,000 Units total) by mouth daily. 90 capsule 3     clonazePAM (KLONOPIN) 0.5 MG tablet Take 1 tablet (0.5 mg total) by mouth 2 (two) times a day as needed for anxiety. 30 tablet 0     clopidogreL (PLAVIX) 75 mg tablet Take 1 tablet (75 mg total) by mouth every morning. 90 tablet 3     diclofenac sodium (VOLTAREN) 1 % Gel Apply 4 g topically 4 (four) times a day. 500 g 11     escitalopram oxalate (LEXAPRO) 20 MG tablet Take 1 tablet (20 mg total) by mouth daily. 90 tablet 1     gabapentin (NEURONTIN) 300 MG capsule Take 1 capsule (300 mg total) by mouth at bedtime. 90 capsule 3     metoprolol succinate (TOPROL-XL) 100 MG 24 hr tablet Take 1.5 tablets (150 mg total) by mouth at bedtime. 135 tablet 3     omeprazole  (PRILOSEC) 20 MG capsule Take 1 capsule (20 mg total) by mouth 2 (two) times a day before meals. 180 capsule 3     QUEtiapine (SEROQUEL) 100 MG tablet Take 1.5 tablets (150 mg total) by mouth at bedtime. (Patient taking differently: Take 100 mg by mouth at bedtime. ) 90 tablet 2     rosuvastatin (CRESTOR) 40 MG tablet Take 1 tablet (40 mg total) by mouth daily. 90 tablet 3     tamsulosin (FLOMAX) 0.4 mg cap Take 1 capsule (0.4 mg total) by mouth daily. 90 capsule 3     No current facility-administered medications for this visit.      Allergies   Allergen Reactions     Atorvastatin Diarrhea     Cortisone Other (See Comments)     pain     Lisinopril Cough     started after CABG for unclear reason     Methylprednisolone Other (See Comments)     ?     Social History     Tobacco Use     Smoking status: Former Smoker     Packs/day: 1.00     Years: 30.00     Pack years: 30.00     Types: Cigarettes     Quit date: 3/23/2020     Years since quittin.0     Smokeless tobacco: Never Used     Tobacco comment: stopped 3/24/2020   Substance Use Topics     Alcohol use: No     Drug use: Never       Review and/or order of clinical lab tests:  Review and/or order of radiology tests:  Review and/or order of medicine tests:  Discussion of test results with performing physician:  Decision to obtain old records and/or obtain history from someone other than the patient:  Review and summarization of old records and/or obtaining history from someone other than the patient and.or discussion of case with another health care provider:  Independent visualization of image, tracing or specimen itself:    Time:      Az Briseno MD

## 2021-06-16 NOTE — PATIENT INSTRUCTIONS - HE
Nadya,    It was a pleasure to see you today at [facility].    Your heart status is stable for GI procedures. If your aspirin and clopidogrel are held, please restart them as soon as possible after the procedure.    You will see Dr. Malhotra in August.     Please call us if you have any questions or concerns about your heart.      Crow Malhotra M.D.  [facility]

## 2021-06-16 NOTE — PROGRESS NOTES
Patient spoke with CCC SW on 4/1 and discussed goals.     CHW delegations: None at this time    Next outreach due: 4/30/21

## 2021-06-16 NOTE — PROGRESS NOTES
Clinic Care Coordination Contact  Care Team Conversations   Call from patient, therapist from [facility], patient's wife and  Vladimir Oconnell.  Patient had appointment with psychiatrist Dr. Siddiqui yesterday and expected to be able to use Samececi Oconnell as the  for the virtual appointment. He had spoken to heart doctor who said they can use this preferred .  When patient arrived at the appointment, there was  from [employer] and they was a dialect difference and he did not feel comfortable using that . He was upset, felt disrespected and was frustrated that this happened since they thought they would be able to use [employer].  This  is used for his therapy and he trusts her.  He did not complete the appointment. They ask for [facility] assistance.  Apologized for the frustrations and reviewed how [facility] uses [employer] and that has been what  [employer] has used. Unsure if system can use Samer, through [employer] interpreters.  Offered to check with supervisor to see what accommodations can be made, and will contact patient with update.      He would like to find a different psychiatric provider in the [facility] system if he can use [employer] interpreters.  If not, he would consider using a psychiatrist elsewhere.     Worked out a plan with supervisor and Wong Mcdonald.  Patient can use [employer]  and he notified  services and Geno Metz.  The preferred  is ok with getting calls directly to her.    Called [facility] to determine if a different psychiatric provider is available to work with patient.  They need to check to see if patient has to establish with a new provider or can schedule as return appointment.  will call back with update.    Plan- contact patient  Jannette Patel, [facility]  Clinic Care Coordinator  [phone]

## 2021-06-16 NOTE — TELEPHONE ENCOUNTER
Patient gave verbal consent to speak with wife regarding health care. They needed clarification called to pharmacy regarding TOPROL-XL, as they are running into insurance issues.    Called [facility] to confirm they had 2-23-21 order. They ran it through and no issues arose.    Mari Diaz RN   [facility] Nurse Advisor    COVID 19 Nurse Triage Plan/Patient Instructions    Please be aware that novel coronavirus (COVID-19) may be circulating in the community. If you develop symptoms such as fever, cough, or SOB or if you have concerns about the presence of another infection including coronavirus (COVID-19), please contact your health care provider or visit  [email or web address].    Disposition/Instructions    Home care recommended. Follow home care protocol based instructions.    Thank you for taking steps to prevent the spread of this virus.  o Limit your contact with others.  o Wear a simple mask to cover your cough.  o Wash your hands well and often.    Resources    [facility]: About COVID-19: [email or web address]    CDC: What to Do If You're Sick: www.cdc.gov/coronavirus/2019-ncov/about/steps-when-sick.html    CDC: Ending Home Isolation: www.cdc.gov/coronavirus/2019-ncov/hcp/disposition-in-home-patients.html     CDC: Caring for Someone: www.cdc.gov/coronavirus/2019-ncov/if-you-are-sick/care-for-someone.html     [facility]: Interim Guidance for Hospital Discharge to Home: www.health.[facility].mn.us/diseases/coronavirus/hcp/hospdischarge.pdf    [facility] clinical trials (COVID-19 research studies): clinicalaffairs.[facility]/umn-clinical-trials     Below are the COVID-19 hotlines at the [facility] ([facility]). Interpreters are available.   o For health questions: Call 343-934-0365 or [phone] (7 a.m. to 7 p.m.)  o For questions about schools and childcare: Call 078-201-6639 or 1-113.776.2360 (7 a.m. to 7 p.m.)

## 2021-06-16 NOTE — PROGRESS NOTES
Clinic Care Coordination Contact  [facility]/Voicemail       Clinical Data: Care Coordinator Outreach  Outreach attempted x 1.  Left message on patient's voicemail with call back information and requested return call.  Plan: Care Coordinator will try to reach patient again in 10 business days.  Jannette Patel [facility]  Clinic Care Coordinator  [phone]

## 2021-06-16 NOTE — PROGRESS NOTES
Clinic Care Coordination Contact  Care Team Conversations   Called [facility] scheduling and the request to set up new provider is with care team. They will review and reach out directly to patient to schedule using .      Plan- call patient in one week to assess needs.  Jannette Patel [facility]  Clinic Care Coordinator  [phone]            Clinic Care Coordination Contact    Follow Up Progress Note      Assessment: Called patient using Frandy Oconnell, with [facility], [phone] and he was informed that he can use this  for all phone visits.  Am working with mental health clinic to schedule psychiatric appt and will call him early next week with an update.     He appreciates the update and the assistance.           Plan:   Will call when new psychiatric provider is identified.  Jannette Patel [facility]  Clinic Care Coordinator  [phone]

## 2021-06-16 NOTE — PROGRESS NOTES
Office Visit - Follow Up   Nadya Blake   55 y.o. male    Date of Visit: 3/23/2021    Chief Complaint   Patient presents with     Follow-up     1 mo f/u        Assessment and Plan   1. Gastroesophageal reflux disease with esophagitis without hemorrhage  Medications adjusted by Dr. Javon Horton and plan for additional evaluation pending cardiac visit with Dr. Brandon Malhotra.  He is doing much better with the change in diet  - omeprazole (PRILOSEC) 20 MG capsule; Take 1 capsule (20 mg total) by mouth 2 (two) times a day before meals.  Dispense: 180 capsule; Refill: 3    2. Coronary artery disease, CABG 6/2020  Continue secondary prevention and follow-up with cardiology as planned in the near future  - HM2(CBC w/o Differential)  - Comprehensive Metabolic Panel    3. Screen for colon cancer  - Cologuard    4. Need for hepatitis C screening test  - Hepatitis C Antibody (Anti-HCV)    5. Need for hepatitis B screening test  - Hepatitis B Surface Antibody (Anti-HBs)  - Hepatitis B Surface Antigen (HBsAG)    6. Screening for HIV (human immunodeficiency virus)  - HIV Antigen/Antibody Screening [place]    7. Screening for schistosomiasis  - Schistosoma Antibody, IgG    Return in about 4 weeks (around 4/20/2021) for recheck.     History of Present Illness   This 55 y.o. old man comes in for follow-up.  Overall he is doing well.  He has lost 9 pounds since I saw him last.  We had reviewed his food diary and he significantly cut down on calories and is especially eliminated sugar in his drinks.  He actually feels much better with the change in diet.  He saw Dr. Javon Horton with [place] [facility].  He outlines plan for comprehensive evaluation Sania's GI complaints but first would like him to follow-up with Dr. Mart Malhotra to make sure that none of his symptoms could be cardiac related.  He continues to have some anxiety in the evening and his blood pressures rise mildly.    Review of Systems: A  "comprehensive review of systems was negative except as noted.     Medications, Allergies and Problem List   Reviewed, reconciled and updated  Post Discharge Medication Reconciliation Status:      Physical Exam   General Appearance:   No acute distress    /76 (Patient Site: Left Arm, Patient Position: Sitting, Cuff Size: Adult Regular)   Pulse 70   Resp 18   Ht 5' 5.5\" (1.664 m)   Wt 190 lb 8 oz (86.4 kg)   SpO2 96%   BMI 31.22 kg/m      HEENT exam is unremarkable  Cardiovascular regular rate and rhythm no murmur gallop or rub  Pulmonary lungs are clear to auscultation bilaterally  Gastrointestinal abdomen soft nontender nondistended no organomegaly  Neurologic exam is non focal  Psychiatric pleasant, no confusion or agitation        Additional Information   Current Outpatient Medications   Medication Sig Dispense Refill     acetaminophen (TYLENOL) 500 MG tablet Take 1,000 mg by mouth every 6 (six) hours as needed for pain.       albuterol (PROAIR HFA;PROVENTIL HFA;VENTOLIN HFA) 90 mcg/actuation inhaler Inhale 1-2 puffs every 4 (four) hours as needed for wheezing.       aspirin 81 mg chewable tablet 1 tablet (81 mg total) by Enteral Tube route daily. 90 tablet 3     budesonide-formoteroL (SYMBICORT) 160-4.5 mcg/actuation inhaler Inhale 2 puffs 2 (two) times a day. Taking as needed.       cholecalciferol, vitamin D3, (VITAMIN D3) 25 mcg (1,000 unit) capsule Take 2 capsules (2,000 Units total) by mouth daily. 90 capsule 3     clonazePAM (KLONOPIN) 0.5 MG tablet Take 1 tablet (0.5 mg total) by mouth 2 (two) times a day as needed for anxiety. 30 tablet 0     clopidogreL (PLAVIX) 75 mg tablet Take 1 tablet (75 mg total) by mouth every morning. 90 tablet 3     diclofenac sodium (VOLTAREN) 1 % Gel Apply 4 g topically 4 (four) times a day. 500 g 11     escitalopram oxalate (LEXAPRO) 20 MG tablet Take 1 tablet (20 mg total) by mouth daily. 90 tablet 1     gabapentin (NEURONTIN) 300 MG capsule Take 1 capsule (300 " mg total) by mouth at bedtime. 90 capsule 3     metoprolol succinate (TOPROL-XL) 100 MG 24 hr tablet Take 1.5 tablets (150 mg total) by mouth at bedtime. 135 tablet 3     QUEtiapine (SEROQUEL) 100 MG tablet Take 1.5 tablets (150 mg total) by mouth at bedtime. (Patient taking differently: Take 100 mg by mouth at bedtime. ) 90 tablet 2     rosuvastatin (CRESTOR) 40 MG tablet Take 1 tablet (40 mg total) by mouth daily. 90 tablet 3     tamsulosin (FLOMAX) 0.4 mg cap Take 1 capsule (0.4 mg total) by mouth daily. 90 capsule 3     omeprazole (PRILOSEC) 20 MG capsule Take 1 capsule (20 mg total) by mouth 2 (two) times a day before meals. 180 capsule 3     omeprazole (PRILOSEC) 40 MG capsule Take 1 capsule (40 mg total) by mouth daily. 90 capsule 1     No current facility-administered medications for this visit.      Allergies   Allergen Reactions     Atorvastatin Diarrhea     Cortisone Other (See Comments)     pain     Lisinopril Cough     started after CABG for unclear reason     Methylprednisolone Other (See Comments)     ?     Social History     Tobacco Use     Smoking status: Former Smoker     Packs/day: 1.00     Years: 30.00     Pack years: 30.00     Types: Cigarettes     Quit date: 3/23/2020     Years since quittin.0     Smokeless tobacco: Never Used     Tobacco comment: stopped 3/24/2020   Substance Use Topics     Alcohol use: No     Drug use: Never       Review and/or order of clinical lab tests:  Review and/or order of radiology tests:  Review and/or order of medicine tests:  Discussion of test results with performing physician:  Decision to obtain old records and/or obtain history from someone other than the patient:  Review and summarization of old records and/or obtaining history from someone other than the patient and.or discussion of case with another health care provider:  Independent visualization of image, tracing or specimen itself:    Time:      Az Briseno MD

## 2021-06-16 NOTE — TELEPHONE ENCOUNTER
Writer tried checking in patient using Language line. Pt got upset that writer could not use his preferred  Frandy Oconnell with [facility].    For telephone visit; staff is to use language line for all virtual visit and check in.    If Patient desires this  then the  should be scheduled at time of visit.  Provider at this time is only scheduling virtual visits via Telephone or video. We do not have control of what language services are used via phone.     All this was explained with  ID [id number] via language line [phone] option 0 Hebrew.     Pt chose to cancel this appointment today. We can not guarantee a certain  when using the language line.       Provider was notified and Pt can schedule again but we will use the language line again.

## 2021-06-16 NOTE — TELEPHONE ENCOUNTER
"Pt's care coordinator Jannette is calling in regards to getting him to connect with a new provider. She advised he no [facility] as he is not using the  the patient wants to use. They are requesting to transfer to another provider, in hopes they do not have to go into another \"NP\" slot.     Care Coordinator Jannette, advised LUCILLE Riley is the provider they are interested in.     She advised care team can follow up with Patient using [facility]  [phone] (in permanent comments)  "

## 2021-06-16 NOTE — PROGRESS NOTES
Clinic Care Coordination Contact    Follow Up Progress Note      Assessment: Call from [facility] therapist who had , patient and patient's wife on the call. He has been scheduled on June 9th with Dr. Quevedo and would like to move that sooner if possible. Tried to clarify who the appointment was scheduled with Dr. Quevedo as he indicated he wanted a new provider.  He does.  Clarified that writer does not have new information and will call to see if he can transfer to a different provider. He is concerned that there may be interactions with his heart medications and his other medications.  Offered to have him speak to [facility] and he declined needing that service.    Called [facility] and spoke to [facility],  and was transferred to nurse triage line where a message was left.    Call back from triage nurse. There is another provider who is leaving [facility] and this is leaving them short staffed.    Goals addressed this encounter:   Goals Addressed                 This Visit's Progress       Patient Stated      Mental Health Management (pt-stated)        Goal Statement: I would like to have an [facility] worker for additional mental health support in the next 3 months.   Date Goal set: 11/19/20  Barriers: History of PTSD  Strengths: Strong advocate for himself. Supportive wife. Currently seeing a therapist and a psychiatrist, support from [facility].   Date to Achieve By: 2/19/21  Patient expressed understanding of goal: Yes  Action steps to achieve this goal:  1. I will speak with [facility] Jannette SPANGLER at scheduled phone visit on 11/23/20 to discuss the process for getting an [facility] worker. DONE  2. I will provide any necessary documentation and complete any required assessments related to this goal.   3. I will report progress towards this goal at scheduled outreach telephone calls from the CCC team.   11-23 Discussed  12/15/20 discussed  Not discussed 1-  Discussed 1-, 2-,  3-4, 4-15             Intervention/Education provided during outreach: none      Outreach Frequency: monthly    Plan:   Will work with [facility] psychiatry to see what options patient has.   Care Coordinator will follow up in one week or sooner if information is gained.   Jannette Patel [facility] Care Coordinator  [phone]    Clinic Care Coordination Contact    Follow Up Progress Note      Assessment: Called [facility] scheduling line and reviewed options within system.  Other psychiatric providers within [facility] see patients between 3-5 times, and then are discharged to their PCP for ongoing support.  Options at [facility] is limited due to staffing.  Coincidently, he talked to Frandy  yesterday about scheduling patient with outside clinic and that was declined.      Checking with co workers.  Recommending [facility].      Called Frandy and left VM to review options with patient.     Plan:   Review options with patient using Frandy .   Care Coordinator will follow up in 5-10 business days.   Jannette Patel [facility]  Clinic Care Coordinator  [phone]

## 2021-06-17 NOTE — TELEPHONE ENCOUNTER
Telephone Encounter by Dominic Gross at 1/28/2021  2:09 PM     Author: Dominic Gross Service: -- Author Type: Patient Access    Filed: 1/28/2021  2:18 PM Encounter Date: 1/27/2021 Status: Signed    : Dominic Gross (Patient Access)       PRIOR AUTHORIZATION DENIED    Denial Rational: The patient needs to have tried/failed 3 drugs for their condition:  CELECOXIB (ORAL)  DICLOFENAC IR TABLETS, DR TABLET, 50MG, 75MG, 100MG  Over The Counter Voltaren 1% Gel and OTC Diclofenac 1% Gel  IBUPROFEN 400 MG, 600 MG, 800 MG (ORAL)  MELOXICAM TABLET (ORAL)  NAPROXEN TABLET (ORAL)  FLURIBIPROFEN (ORAL)  INDOMETHACIN CAPSULE (ORAL)  DICLOFENAC SODIUM (ORAL)  DICLOFENAC SR (ORAL)  SULINDAC (ORAL)  NAPROXEN EC (ORAL)  NABUMETONE (ORAL)  KETOPROFEN (ORAL)  KETOROLAC (ORAL)  NAPROXEN SODIUM (ORAL)        Appeal Information: If the provider would like to appeal this denial, please provide a letter of medical necessity and once it has been completed and placed in the patient's chart, notify the Central PA Team ([facility] MED [id number]) and the appeal can be initiated on behalf of the patient and provider.  Please also include any therapies that the patient has tried and their outcomes.

## 2021-06-17 NOTE — PROGRESS NOTES
Clinic Care Coordination Contact    Follow Up Progress Note      Assessment: Call from Tamiko, therapist who was in session with patient and had  Frandy. Frandy was unable to contact writer as she found full voice mail.  Reviewed options for psychiatry within [facility] and patient will discuss with his wife and will call writer back with decision. Encouraged him to consider outside psychiatrists as well.  Tamiko will talk at her agency to see if there is anyone they work with that would be a good fit as well.    Going forward, writer is to call wife who can interpret for him. If they want an  during that call, then we can add Samera on the line. She is unable to take a call from writer and then call patient.  He needs to be on the line or at a visit then she can be contacted.  Patient and his [facility] worker will contact clinics prior to appointments to clarify his preference for Samera to act as his .      Completed goal for covid vaccine.     Goals addressed this encounter:   Goals Addressed                 This Visit's Progress       Patient Stated      COMPLETED: Medical (pt-stated)   100%     Goal Statement: I will have COVID vaccine as soon as I am able within 4 months.   Date Goal set: 2-4-2021  Barriers: Unclear when I can get it.   Strengths: Motivated to get it ASAP.   Date to Achieve By: 6-4-2021  Patient expressed understanding of goal: yes  Action steps to achieve this goal:  1. I will continue to discuss with my health team.    2. I will schedule appointment at clinic or pharmacy when able.   3. I will update care coordination team monthly and as needed.   2-.3-4 discussed  3-12 scheduled for 3-25 at [facility]   4-28 completed        Mental Health Management (pt-stated)        Goal Statement: I would like to have an [facility] worker for additional mental health support in the next 3 months.   Date Goal set: 11/19/20  Barriers: History of PTSD  Strengths: Strong  advocate for himself. Supportive wife. Currently seeing a therapist and a psychiatrist, support from [facility].   Date to Achieve By: 2/19/21  Patient expressed understanding of goal: Yes  Action steps to achieve this goal:  1. I will speak with [facility] Jannette SPANGLER at scheduled phone visit on 11/23/20 to discuss the process for getting an [facility] worker. DONE  2. I will provide any necessary documentation and complete any required assessments related to this goal.   3. I will report progress towards this goal at scheduled outreach telephone calls from the CCC team.   11-23 Discussed  12/15/20 discussed  Not discussed 1-  Discussed 1-, 2-, 3-4, 4-15. 4-28             Intervention/Education provided during outreach: Clarified  process and options for psychiatry.       Outreach Frequency: monthly    Plan:   Patient to call [facility] with next steps for psychiatry.    Care Coordinator will follow up in one week.  CRYS Soriano  Clinic Care Coordinator  [phone]          Clinic Care Coordination Contact  [facility]/Voicemail       Clinical Data: Care Coordinator Outreach  Outreach attempted x 2.  Left message on Field Agent's  voicemail with call back information and requested return call. Samera is preferred . Follow up on request for new psychiatrist.  Plan:  Care Coordinator will try to reach patient again in 10 business days.  CRYS Sroiano  [facility] Care Coordinator  [phone]

## 2021-06-17 NOTE — TELEPHONE ENCOUNTER
Central PA team  [phone]  Pool: HE PA MED ([id number])          PA has been initiated.       PA form completed and faxed insurance via Cover My Meds     Key:  MICHAELA DANIEL Key: [id number]     Medication:  omeprazole (PRILOSEC) 20 MG capsule    Insurance:  [facility]        Response will be received via fax and may take up to 5-10 business days depending on plan

## 2021-06-17 NOTE — TELEPHONE ENCOUNTER
Telephone Encounter by Dominic Gross at 2/2/2021 10:48 AM     Author: Dominic Gross Service: -- Author Type: Patient Access    Filed: 2/2/2021 10:49 AM Encounter Date: 1/27/2021 Status: Addendum    : Dominic Gross (Patient Access)    Related Notes: Original Note by Dominic Gross (Patient Access) filed at 2/2/2021 10:49 AM       Medication Appeal APPROVED:    Approval start date: 01/01/2021  Approval end date:  02/01/2022    Pharmacy has been notified of approval and will contact patient when medication is ready for pickup.

## 2021-06-17 NOTE — PROGRESS NOTES
Clinic Care Coordination Contact    Follow Up Progress Note      Assessment: Call back from [facility] and they are only seeing internal patients.  Called [facility] and began intake.  He can expect a call in 2-5 days from new patient intake team and they have office in [place].      Called patient and talked with his wife.  He is comfortable with the referral and will work with them to see what options are available.      Patient is having swelling on left shoulder and neck. It is larger than yesterday. Reviewed options and she will send picture to PCP for review. Encouraged going into urgent care if he needs to be seen and evaluated.  She agreed to send pic as first step.     Goals addressed this encounter:   Goals Addressed                 This Visit's Progress       Patient Stated      Healthy Coping (pt-stated)   10%     Goal Statement: I will have a different psychiatrist within 4 months.  Date Goal set: May 6, 2021  Barriers: wants frequent contact with psychiatrist.   Strengths: has insurance.  Date to Achieve By: 9-6-2021  Patient expressed understanding of goal: yes  Action steps to achieve this goal:  1. I will set up appointment once [facility] gives recommendation.    2. I will attend appointments.   3. I will report progress towards this goal at scheduled outreach telephone calls from the [facility] team.   5-14 discussed, referred to [facility]            COMPLETED: Mental Health Management (pt-stated)        Goal Statement: I would like to have an [facility] worker for additional mental health support in the next 3 months.   Date Goal set: 11/19/20  Barriers: History of PTSD  Strengths: Strong advocate for himself. Supportive wife. Currently seeing a therapist and a psychiatrist, support from [facility].   Date to Achieve By: 2/19/21  Patient expressed understanding of goal: Yes  Action steps to achieve this goal:  1. I will speak with [facility] Jannette SPANGLER at scheduled phone visit on 11/23/20 to  discuss the process for getting an [facility] worker. DONE  2. I will provide any necessary documentation and complete any required assessments related to this goal.   3. I will report progress towards this goal at scheduled outreach telephone calls from the CCC team.   11-23 Discussed  12/15/20 discussed  Not discussed 1-  Discussed 1-, 2-, 3-4, 4-15. 4-28, 5-14             Intervention/Education provided during outreach: Referral for Psychiatric support. Discussed how often a psychiatrist may reach out to him so he understands it will not be weekly.      Outreach Frequency: monthly    Plan:   Call patient in two weeks to assess needs.   Jannette Patel [facility] Care Coordinator  [phone]        Clinic Care Coordination Contact  Care Team Conversations   Called [facility] to inquire about psychiatric service.  LM.   Plan- work with clinic to see if this clinic would be option for psychiatric support.   Jannette Patel [facility] Care Coordinator  [phone]

## 2021-06-17 NOTE — TELEPHONE ENCOUNTER
Telephone Encounter by Dominic Gross at 4/30/2021  2:07 PM     Author: Dominic Gross Service: -- Author Type: Patient Access    Filed: 4/30/2021  2:11 PM Encounter Date: 4/26/2021 Status: Signed    : Dominic Gross (Patient Access)       PA APPROVED:    Approval start date: 04/26/21  Approval end date:  04/30/22    Pharmacy has been notified of approval and will contact patient when medication is ready for pickup.

## 2021-06-17 NOTE — TELEPHONE ENCOUNTER
Reason for Call:  Other call back      Detailed comments: Pt calling stating a  new PA needs to be done for the Omeprazole after 120 days    Fax PA to:  [employer]  Phone:  [phone]    Phone Number Patient can be reached at:   Cell number on file:    No relevant phone numbers on file.       Best Time: anytime    Can we leave a detailed message on this number?: Yes    Call taken on 4/26/2021 at 4:30 PM by Ella Anand

## 2021-06-17 NOTE — TELEPHONE ENCOUNTER
Telephone Encounter by Yasmeen Medina RN at 10/7/2020 11:55 AM     Author: Yasmeen Medina RN Service: -- Author Type: Registered Nurse    Filed: 10/7/2020 12:00 PM Encounter Date: 10/6/2020 Status: Signed    : Yasmeen Medina RN (Registered Nurse)       Message rec'd 10-7-20 @ 1131:  Brandon Malhotra MD Gorshe, Maureen, RN; Radha Juarez   Cc: Mart Ragsdale MD; Az Briseno MD   Phone Number: [phone]             Please arrange for Mohammad to see me as soon as possible.     S       instructed to contact patient's wife to arrange RAC appt for tomorrow.  mg

## 2021-06-17 NOTE — PROGRESS NOTES
Patient spoke with CCC SW on 5/6 and discussed goals.    CHW delegations: None    Next outreach due: 6/2/21

## 2021-06-17 NOTE — TELEPHONE ENCOUNTER
Telephone Encounter by Yasmeen Medina RN at 3/17/2021  2:48 PM     Author: Yasmeen Medina RN Service: -- Author Type: Registered Nurse    Filed: 3/17/2021  2:52 PM Encounter Date: 3/17/2021 Status: Signed    : Yasmeen Medina RN (Registered Nurse)       Msg rec'd 3-17-21 @ 1416:  Brandon Malhotra MD Gorshe, Maureen, RN         Please call [facility] and say [facility] [place]. Also, please have him come and see me or his PCP (Dr. Briseno?) ASAP.     S

## 2021-06-17 NOTE — PROGRESS NOTES
"  Office Visit - Follow Up   Nadya Blake   55 y.o. male    Date of Visit: 5/24/2021    Chief Complaint   Patient presents with     Follow-up        Assessment and Plan   1. Right sided abdominal pain  Etiology uncertain, reviewed prior CT scan which looked okay.  Check labs as below if okay continue to monitor this  - Urinalysis-UC if Indicated  - Comprehensive Metabolic Panel  - Lipase    2. Atherosclerosis of native coronary artery with angina pectoris, unspecified whether native or transplanted heart (H)  Recent stress test okay continue secondary prevention    3. 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)  Stable    4. Screen for colon cancer  Schedule colonoscopy    5. Recurrent major depressive disorder, in partial remission (H)  6. PTSD (post-traumatic stress disorder)  Continue management per psychiatry and [facility]    Return in about 4 weeks (around 6/21/2021) for recheck.     History of Present Illness   This 55 y.o. old man comes in for follow-up.  Last visit he had been complaining of chest pain and we did a stress test which looked okay.  He is now complaining of some right-sided abdominal and flank pain.  This can be positional.  Radiates to his mid abdomen.  Has had some heartburn especially after large meal the other night.  No significant vomiting.  No diarrhea.  No constipation.  No burning with urination or blood in the urine.    Review of Systems: A comprehensive review of systems was negative except as noted.     Medications, Allergies and Problem List   Reviewed, reconciled and updated  Post Discharge Medication Reconciliation Status:      Physical Exam   General Appearance:   No acute distress    /68 (Patient Site: Left Arm, Patient Position: Sitting, Cuff Size: Adult Regular)   Pulse 60   Ht 5' 5.5\" (1.664 m)   Wt 195 lb 4 oz (88.6 kg)   SpO2 97%   BMI 32.00 kg/m      HEENT exam is unremarkable  Cardiovascular regular rate and " rhythm no murmur gallop or rub  Pulmonary lungs are clear to auscultation bilaterally  Gastrointestinal professional tenderness over the right lateral flank, no evidence of rash  Neurologic exam is non focal  Psychiatric pleasant, no confusion or agitation        Additional Information   Current Outpatient Medications   Medication Sig Dispense Refill     acetaminophen (TYLENOL) 500 MG tablet Take 2 tablets (1,000 mg total) by mouth every 6 (six) hours as needed for pain. 300 tablet 3     albuterol (PROAIR HFA;PROVENTIL HFA;VENTOLIN HFA) 90 mcg/actuation inhaler Inhale 1-2 puffs every 4 (four) hours as needed for wheezing.       aspirin 81 mg chewable tablet 1 tablet (81 mg total) by Enteral Tube route daily. 90 tablet 3     budesonide-formoteroL (SYMBICORT) 160-4.5 mcg/actuation inhaler Inhale 2 puffs 2 (two) times a day. Taking as needed.       cholecalciferol, vitamin D3, (VITAMIN D3) 25 mcg (1,000 unit) capsule Take 2 capsules (2,000 Units total) by mouth daily. 90 capsule 3     clonazePAM (KLONOPIN) 0.5 MG tablet Take 1 tablet (0.5 mg total) by mouth 2 (two) times a day as needed for anxiety. 30 tablet 0     clopidogreL (PLAVIX) 75 mg tablet Take 1 tablet (75 mg total) by mouth every morning. 90 tablet 3     diclofenac sodium (VOLTAREN) 1 % Gel Apply 4 g topically 4 (four) times a day. 500 g 11     escitalopram oxalate (LEXAPRO) 20 MG tablet Take 1 tablet (20 mg total) by mouth daily. 90 tablet 1     gabapentin (NEURONTIN) 300 MG capsule Take 1 capsule (300 mg total) by mouth at bedtime. 90 capsule 3     metoprolol succinate (TOPROL-XL) 100 MG 24 hr tablet Take 1.5 tablets (150 mg total) by mouth at bedtime. 135 tablet 3     omeprazole (PRILOSEC) 20 MG capsule Take 1 capsule (20 mg total) by mouth 2 (two) times a day before meals. 180 capsule 3     QUEtiapine (SEROQUEL) 100 MG tablet Take 1.5 tablets (150 mg total) by mouth at bedtime. (Patient taking differently: Take 100 mg by mouth at bedtime. ) 90 tablet 2      rosuvastatin (CRESTOR) 40 MG tablet Take 1 tablet (40 mg total) by mouth daily. 90 tablet 3     tamsulosin (FLOMAX) 0.4 mg cap Take 1 capsule (0.4 mg total) by mouth daily. 90 capsule 3     No current facility-administered medications for this visit.      Allergies   Allergen Reactions     Atorvastatin Diarrhea     Cortisone Other (See Comments)     pain     Lisinopril Cough     started after CABG for unclear reason     Methylprednisolone Other (See Comments)     ?     Social History     Tobacco Use     Smoking status: Former Smoker     Packs/day: 1.00     Years: 30.00     Pack years: 30.00     Types: Cigarettes     Quit date: 3/23/2020     Years since quittin.1     Smokeless tobacco: Never Used     Tobacco comment: stopped 3/24/2020   Substance Use Topics     Alcohol use: No     Drug use: Never       Review and/or order of clinical lab tests:  Review and/or order of radiology tests:  Review and/or order of medicine tests:  Discussion of test results with performing physician:  Decision to obtain old records and/or obtain history from someone other than the patient:  Review and summarization of old records and/or obtaining history from someone other than the patient and.or discussion of case with another health care provider:  Independent visualization of image, tracing or specimen itself:    Time:      Az Briseno MD

## 2021-06-17 NOTE — PROGRESS NOTES
Clinic Care Coordination Contact    Follow Up Progress Note      Assessment: Call to patient and he was with his wife in the car. They agreed that GERALDO  should find a new psychiatrist outside of [facility].      Goals addressed this encounter:   Goals Addressed                 This Visit's Progress       Patient Stated      Healthy Coping (pt-stated)        Goal Statement: I will have a different psychiatrist within 4 months.  Date Goal set: May 6, 2021  Barriers: wants frequent contact with psychiatrist.   Strengths: has insurance.  Date to Achieve By: 9-6-2021  Patient expressed understanding of goal: yes  Action steps to achieve this goal:  1. I will set up appointment once GERALDO SHERIFF gives recommendation.    2. I will attend appointments.   3. I will report progress towards this goal at scheduled outreach telephone calls from the CCC team.                  Intervention/Education provided during outreach: Will contact them after identifying some options.      Outreach Frequency: monthly    Plan:   Will ask for recommendations from co workers and will call patient with options.  Care Coordinator will follow up in one week.  CRYS Soriano  Clinic Care Coordinator  [phone]

## 2021-06-21 NOTE — LETTER
Letter by Az Briseno MD at      Author: Az rBiseno MD Service: -- Author Type: --    Filed:  Encounter Date: 2021 Status: (Other)       2021      Patient:  Nadya Blake  :  1965      To Whom it May Concern:      I am writing to request an appeal to the denial of the prescription for Diclofenac Sodium 1% Gel.  The denial letter stated that my patient should try/fail at least three other drugs for his condition (see denial letter for list of drugs).      My patient is unable to take any of these other medications due to high risk of bleeding, need for dual antiplatelet therapy, history of peptic ulcer disease and coronary heart disease.    I urge you to reconsider your denial.  If you have further questions, please do not hesitate to contact my office.      Electronically Signed,      Az Briseno MD

## 2021-06-21 NOTE — LETTER
Letter by Myhre, David J, RN at      Author: Myhre, David J, RN Service: -- Author Type: --    Filed:  Encounter Date: 11/19/2020 Status: (Other)       Care Plan  About Me:    Patient Name:  Nadya Blake    YOB: 1965  Age:         55 y.o.   [facility] MRN:    [id number] Telephone Information:  Home Phone [phone]   Mobile [phone]       Address:  [address]  [place] [id number] Email address:  [email or web address]      Emergency Contact(s)  Extended Emergency Contact Information      Name: Carlee Montes  Address:       [address] [place] [address]  Home Phone Number: [phone]  Relation: Spouse          Primary language:  Urdu     needed? Yes   [employer]:  [phone] op. 1  Other communication barriers: Language barrier  Preferred Method of Communication:  Javierhart  Current living arrangement: I live in a private home with family  Mobility Status/ Medical Equipment: Independent    Health Maintenance Health Maintenance Reviewed: Reviewed will discuss with PCP at next Office Visit.     My Access Plan  Medical Emergency 911   Primary Clinic Line Az Briseno MD - [phone]   24 Hour Appointment Line [phone] or  [phone]) (toll-free)   24 Hour Nurse Line [phone] (toll-free)   Preferred Urgent Care [facility], [phone]   Preferred Hospital [facility]  [phone]   Preferred Pharmacy [id number] [address] AT [facility] [address]     Behavioral Health Crisis Line The National Suicide Prevention Lifeline at 1-162.309.3738 or 911             My Care Team Members  Patient Care Team       Relationship Specialty Notifications Start End    Az Briseno MD PCP - General Internal Medicine  9/22/20     Phone: [phone] Fax: [phone]         [address] [place]  [place] [address]    Az Briseno MD Assigned PCP   9/12/20     Phone: [phone] Fax: [phone]         [address] [place] [address]    Brandon Malhotra MD Assigned Heart and Vascular Provider   10/23/20     Phone: [phone] Fax: [phone]         [address] [facility] [address] [facility] [address]    Myhre, David J, RN Clinic Care Coordinator Primary Care - CC Admissions 11/20/20     Fax: [phone]         Amelie Britton CHW Community Health Worker Primary Care - CC Admissions 11/20/20     Phone: [phone] Fax: [phone]        Jannette Patel SW Lead Care Coordinator Primary Care - CC Admissions 11/23/20      [place]            My Care Plans  Self Management and Treatment Plan  Goals and (Comments)  Goals        General    Mental Health Management (pt-stated)     Notes - Note edited  11/23/2020  6:08 AM by Myhre, David J, RN    Goal Statement: I would like to have an [facility] worker for additional mental health support in the next 3 months.   Date Goal set: 11/19/20  Barriers: History of PTSD  Strengths: Strong advocate for himself. Supportive wife. Currently seeing a therapist and a psychiatrist, support from [facility].   Date to Achieve By: 2/19/21  Patient expressed understanding of goal: Yes  Action steps to achieve this goal:  1. I will speak with [facility] Jannette SPANGLER at scheduled phone visit on 11/23/20 to discuss the process for getting an [facility] worker.   2. I will provide any necessary documentation and complete any required assessments related to this goal.   3. I will report progress towards this goal at scheduled outreach telephone calls from the CCC team.                  Advance Care Plans/Directives Type:        My Medical and Care Information  Problem List   Patient Active Problem List   Diagnosis   ? Recurrent major depressive disorder, in partial remission (H)   ? Fatty liver disease, nonalcoholic   ? Gastroesophageal reflux disease with esophagitis   ?  Essential hypertension   ? Non-toxic multinodular goiter   ? Post-traumatic osteoarthritis of both knees   ? Primary osteoarthritis of left hip   ? Primary osteoarthritis of right hip   ? 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)   ? Coronary artery disease, CABG 6/2020   ? Pre-diabetes   ? Dyslipidemia, goal LDL below 70   ? Benign prostatic hyperplasia with nocturia   ? PTSD (post-traumatic stress disorder)      Current Medications and Allergies:  See printed Medication Report.    Care Coordination Start Date: 11/19/2020   Frequency of Care Coordination:     Form Last Updated: 11/23/2020

## 2021-06-21 NOTE — LETTER
Letter by Myhre, David J, RN at      Author: Myhre, David J, RN Service: -- Author Type: --    Filed:  Encounter Date: 11/19/2020 Status: (Other)       CARE COORDINATION  [facility]  [address] [place] [address]    November 23, 2020    Nadya Blake  [address]  [facility] [address]      Dear Nadya,    I am a clinic care coordinator who works with Az Briseno MD at the [facility]. I wanted to thank you for spending the time to talk with me.  Below is a description of clinic care coordination and how I can further assist you.      The clinic care coordination team is made up of a registered nurse,  and community health worker who understand the health care system. The goal of clinic care coordination is to help you manage your health and improve access to the health care system in the most efficient manner. The team can assist you in meeting your health care goals by providing education, coordinating services, strengthening the communication among your providers and supporting you with any resource needs.    Please feel free to contact the Community Health Worker Amelie at [phone] with any questions or concerns. We are focused on providing you with the highest-quality healthcare experience possible and that all starts with you.     Sincerely,     David Myhre, RN  CCC RN    Enclosed: I have enclosed a copy of the Care Plan. This has helpful information and goals that we have talked about. Please keep this in an easy to access place to use as needed.

## 2021-06-25 ENCOUNTER — OFFICE VISIT - HEALTHEAST (OUTPATIENT)
Dept: INTERNAL MEDICINE | Facility: CLINIC | Age: 56
End: 2021-06-25

## 2021-06-25 DIAGNOSIS — F43.10 PTSD (POST-TRAUMATIC STRESS DISORDER): ICD-10-CM

## 2021-06-25 DIAGNOSIS — I25.119 ATHEROSCLEROSIS OF NATIVE CORONARY ARTERY WITH ANGINA PECTORIS, UNSPECIFIED WHETHER NATIVE OR TRANSPLANTED HEART (H): ICD-10-CM

## 2021-06-25 DIAGNOSIS — Z12.11 SCREEN FOR COLON CANCER: ICD-10-CM

## 2021-06-25 DIAGNOSIS — I10 ESSENTIAL HYPERTENSION: ICD-10-CM

## 2021-06-25 ASSESSMENT — MIFFLIN-ST. JEOR: SCORE: 1645.96

## 2021-06-25 NOTE — PROGRESS NOTES
Clinic Care Coordination Contact    Follow Up Progress Note      Assessment: Call back from patient and his wife.  They had appointments this morning.  They did not hear from [facility] to set up medication management/psychiatric services.  Called [facility] and they left message on May 17th and did not hear from them.  Sent via My Chart the phone number for them to call to set up  [phone].    Patient met with PCP and had heart monitoring done.  The stress test showed no concerns.  And blood work is good.  He is to have colonoscopy and endoscopy and prefers to wait for one year.    Goals addressed this encounter:   Goals Addressed                 This Visit's Progress       Patient Stated      Healthy Coping (pt-stated)        Goal Statement: I will have a different psychiatrist within 4 months.  Date Goal set: May 6, 2021  Barriers: wants frequent contact with psychiatrist.   Strengths: has insurance.  Date to Achieve By: 9-6-2021  Patient expressed understanding of goal: yes  Action steps to achieve this goal:  1. I will set up appointment once [facility] gives recommendation.    2. I will attend appointments.   3. I will report progress towards this goal at scheduled outreach telephone calls from the [facility] team.   5-14 discussed, referred to [facility] 5-26 discussed, hasn't heard from [facility]            Medical (pt-stated)        Goal Statement: I will have colonoscopy and endoscopy within 3 months.   Date Goal set: 5-  Barriers: None identified.   Strengths: willing to have them done.   Date to Achieve By: 8-  Patient expressed understanding of goal: yes  Action steps to achieve this goal:  1. I will scheduled these procedures for this summer.   2. I will discuss with PCP and GI clinic any questions.  3. I will report progress towards this goal at scheduled outreach telephone calls from the CCC team.                       Outreach Frequency: monthly    Plan: Delegating to CHW to try to  reach patient and his wife who speaks English at patient's number in two weeks to check on contact from [facility].    Care Coordinator will follow up in one month.         Clinic Care Coordination Contact  [facility]/Voicemail       Clinical Data: Care Coordinator Outreach  Outreach attempted x 1.  Left message on patient's voicemail with call back information and requested return call.  Plan:Care Coordinator will try to reach patient again in 1 month.  Delegating to CHW to try to reach patient and his wife who speaks English at patient's number in two weeks.  Jannette Patel, [facility]  Clinic Care Coordinator  [phone]

## 2021-06-26 ENCOUNTER — HEALTH MAINTENANCE LETTER (OUTPATIENT)
Age: 56
End: 2021-06-26

## 2021-06-26 NOTE — PROGRESS NOTES
"  Office Visit - Follow Up   Nadya Blake   55 y.o. male    Date of Visit: 6/4/2021    Chief Complaint   Patient presents with     Hospital Visit Follow Up     ED, [facility], 5/31/21, right eye         Assessment and Plan   1. Subconjunctival hemorrhage of right eye  Reassurance provided    2. Atherosclerosis of native coronary artery with angina pectoris, unspecified whether native or transplanted heart (H)  Continue secondary prevention    3. Essential hypertension  Blood pressure controlled continue same    Return in about 4 weeks (around 7/2/2021) for recheck.     History of Present Illness   This 55 y.o. old man comes in for follow-up after he developed a red eye.  This was not painful and has since resolved.  He otherwise is generally feeling okay.  Occasional right-sided cynthia pain unchanged.  No chest pain.  Breathing stable.  Tolerating medications and blood pressure has been controlled.    Review of Systems: A comprehensive review of systems was negative except as noted.     Medications, Allergies and Problem List   Reviewed, reconciled and updated  Post Discharge Medication Reconciliation Status:      Physical Exam   General Appearance:   No acute distress    /82 (Patient Site: Left Arm, Patient Position: Sitting, Cuff Size: Adult Regular)   Pulse 70   Ht 5' 4\" (1.626 m)   Wt 196 lb 4.8 oz (89 kg)   SpO2 96%   BMI 33.69 kg/m      No significant some conjunctival hemorrhage present.     Additional Information   Current Outpatient Medications   Medication Sig Dispense Refill     acetaminophen (TYLENOL) 500 MG tablet Take 2 tablets (1,000 mg total) by mouth every 6 (six) hours as needed for pain. 300 tablet 3     albuterol (PROAIR HFA;PROVENTIL HFA;VENTOLIN HFA) 90 mcg/actuation inhaler Inhale 1-2 puffs every 4 (four) hours as needed for wheezing.       aspirin 81 mg chewable tablet 1 tablet (81 mg total) by Enteral Tube route daily. 90 tablet 3     budesonide-formoteroL " (SYMBICORT) 160-4.5 mcg/actuation inhaler Inhale 2 puffs 2 (two) times a day. Taking as needed.       cholecalciferol, vitamin D3, (VITAMIN D3) 25 mcg (1,000 unit) capsule Take 2 capsules (2,000 Units total) by mouth daily. 90 capsule 3     clonazePAM (KLONOPIN) 0.5 MG tablet Take 1 tablet (0.5 mg total) by mouth 2 (two) times a day as needed for anxiety. 30 tablet 0     clopidogreL (PLAVIX) 75 mg tablet Take 1 tablet (75 mg total) by mouth every morning. 90 tablet 3     diclofenac sodium (VOLTAREN) 1 % Gel Apply 4 g topically 4 (four) times a day. 500 g 11     escitalopram oxalate (LEXAPRO) 20 MG tablet Take 1 tablet (20 mg total) by mouth daily. 90 tablet 1     gabapentin (NEURONTIN) 300 MG capsule Take 1 capsule (300 mg total) by mouth at bedtime. 90 capsule 3     metoprolol succinate (TOPROL-XL) 100 MG 24 hr tablet Take 1.5 tablets (150 mg total) by mouth at bedtime. 135 tablet 3     omeprazole (PRILOSEC) 20 MG capsule Take 1 capsule (20 mg total) by mouth 2 (two) times a day before meals. 180 capsule 3     QUEtiapine (SEROQUEL) 100 MG tablet Take 1.5 tablets (150 mg total) by mouth at bedtime. (Patient taking differently: Take 100 mg by mouth at bedtime. ) 90 tablet 2     rosuvastatin (CRESTOR) 40 MG tablet Take 1 tablet (40 mg total) by mouth daily. 90 tablet 3     tamsulosin (FLOMAX) 0.4 mg cap Take 1 capsule (0.4 mg total) by mouth daily. 90 capsule 3     No current facility-administered medications for this visit.      Allergies   Allergen Reactions     Atorvastatin Diarrhea     Cortisone Other (See Comments)     pain     Lisinopril Cough     started after CABG for unclear reason     Methylprednisolone Other (See Comments)     ?     Social History     Tobacco Use     Smoking status: Former Smoker     Packs/day: 1.00     Years: 30.00     Pack years: 30.00     Types: Cigarettes     Quit date: 3/23/2020     Years since quittin.2     Smokeless tobacco: Never Used     Tobacco comment: stopped 3/24/2020    Substance Use Topics     Alcohol use: No     Drug use: Never       Review and/or order of clinical lab tests:  Review and/or order of radiology tests:  Review and/or order of medicine tests:  Discussion of test results with performing physician:  Decision to obtain old records and/or obtain history from someone other than the patient:  Review and summarization of old records and/or obtaining history from someone other than the patient and.or discussion of case with another health care provider:  Independent visualization of image, tracing or specimen itself:    Time:      Az Briseno MD

## 2021-06-26 NOTE — PROGRESS NOTES
Patient spoke with CCC RN on 6/4/21 and discussed goals     CHW delegations: follow up in 1 month    Next outreach due: 6/30/21

## 2021-06-26 NOTE — PATIENT INSTRUCTIONS - HE
I am going to increase the patient's Seroquel to 150 mg at at bedtime.  Also I have started the patient on Vistaril 25 mg 3 times a day as a as needed for anxiety.  Risks and benefits were discussed.  The patient will return to this clinic in 1 month for medication check.  He agrees to call before then with any questions or concerns.
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
## 2021-06-26 NOTE — PROGRESS NOTES
Clinic Care Coordination Contact    Follow Up Progress Note      Assessment: [facility] RN spoke with patient today to follow up after recent ED visit and to discuss follow up on his goals. Patient said he had a ED follow up with his PCP today. He said he felt reassured by the visit and said the subconjunctival hemorrhage of right eye has resolved.   With regards to his psychiatry goal, patient said he has decided to continue to work with Dr. Quevedo. His next appointment is on 6/9/21.   Briefly discussed his colonoscopy/endoscopy goal. Patient stated he plans to schedule these procedures next month.   No other needs or concerns at this time.     Goals addressed this encounter:   Goals Addressed                 This Visit's Progress       Patient Stated      Healthy Coping (pt-stated)   30%     Goal Statement: I will have a different psychiatrist within 4 months.  Date Goal set: May 6, 2021  Barriers: wants frequent contact with psychiatrist.   Strengths: has insurance.  Date to Achieve By: 9-6-2021  Patient expressed understanding of goal: yes  Action steps to achieve this goal:  1. I will set up appointment once [facility] gives recommendation.    2. I will attend appointments.   3. I will report progress towards this goal at scheduled outreach telephone calls from the [facility] team.   5-14 discussed, referred to [facility] 5-26 discussed, hasn't heard from [facility]              Medical (pt-stated)   30%     Goal Statement: I will have colonoscopy and endoscopy within 3 months.   Date Goal set: 5-  Barriers: None identified.   Strengths: willing to have them done.   Date to Achieve By: 8-  Patient expressed understanding of goal: yes  Action steps to achieve this goal:  1. I will scheduled these procedures for this summer.   2. I will discuss with PCP and GI clinic any questions.  3. I will report progress towards this goal at scheduled outreach telephone calls from the [facility] team.   Discussed on 6/4/21                  Intervention/Education provided during outreach: Encouraged patient to attend all scheduled follow up appointments. Discussed the importance of taking his medications as directed.      Outreach Frequency: monthly    Plan: [facility] RN will continue to monitor and will be available at nursing needs arise.     Care Coordinator will follow as needed.

## 2021-06-28 ENCOUNTER — COMMUNICATION - HEALTHEAST (OUTPATIENT)
Dept: CARE COORDINATION | Facility: CLINIC | Age: 56
End: 2021-06-28

## 2021-06-29 NOTE — PROGRESS NOTES
Progress Notes by Brandon Malhotra MD at 8/7/2020  8:10 AM     Author: Brandon Malhotra MD Service: -- Author Type: Physician    Filed: 8/7/2020  9:00 AM Encounter Date: 8/7/2020 Status: Signed    : Brandon Malhotra MD (Physician)             Assessment:    1. Coronary artery disease due to lipid rich plaque     2. Dyslipidemia, goal LDL below 70  LDL Cholesterol, Direct    Lipid Profile   3. Essential hypertension  metoprolol succinate (TOPROL-XL) 100 MG 24 hr tablet       Plan:    1. Discontinue lisinopril; it is most certainly causing his nocturnal cough and it is not clear to me why it was prescribed during his hospitalization as he has normal left ventricular systolic performance and does not have diabetes mellitus.  2. Increase metoprolol to 100 mg daily of the succinate for ease of dosing.  He may use up his metoprolol tartrate by taking it 50 mg twice daily.  3. He may take rosuvastatin at anytime of the day, it does not need to be a bedtime medication.  4. He was advised that he could start driving as of July 22 by the cardiac surgical team.  5. He will complete his 36 sessions of cardiac rehabilitation.  6. He was counseled that it may take a year or more for his numbness to resolve after surgery.  7. He does not need to be on a 2 g sodium restriction as he does not have heart failure, renal failure, or hepatic failure.  This was instituted at the hospital for unclear reasons.  8. He will discuss his postprandial epigastric discomfort with his primary care team.  9. We will call him with the results of today's direct LDL measurement.  10. He will return to see Dr. Malhotra in 1 year.    An After Visit Summary was printed and given to the patient.    Subjective:    Nadya Blake returned for a planned post hospital follow up visit.  His wife accompanied him to the visit.  Both are college-educated and speak excellent English and state they are American citizens, but an Faroese  was used  via the telephone as scheduled.    He has a history of anxiety and depression and they have persisted after heart surgery.  He reports occasional nocturia, constipation, nocturnal dry cough, muscular weakness and positional chest discomfort.  He also has numbness along his left parasternal area and adjacent to his leg incision at the site of vein harvest.    He had a telephone follow-up with the cardiac surgical team only 2 weeks after discharge.  He was discharged from their care at that time.  Please see that note for details.    He stopped both amlodipine and amiodarone 1 month after surgery as instructed.  His blood pressure checks at home have been satisfactory as they have been at cardiac rehab.  He has not had any symptomatic recurrences of atrial fibrillation.    Past Medical History:    Patient Active Problem List   Diagnosis   ? Ascending aorta dilatation (H)   ? Chronic superficial gastritis   ? Depression with anxiety   ? Fatty liver disease, nonalcoholic   ? Gastroesophageal reflux disease with esophagitis   ? Essential hypertension   ? IBS (irritable bowel syndrome)   ? Left lumbar radiculopathy   ? Post herpetic neuralgia   ? Post-traumatic osteoarthritis of both knees   ? Primary osteoarthritis of left hip   ? Primary osteoarthritis of right hip   ? 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)   ? Coronary artery disease due to lipid rich plaque   ? Pre-diabetes   ? Dyslipidemia, goal LDL below 70       Past Medical History:   Diagnosis Date   ? Acute non-ST elevation myocardial infarction (NSTEMI) (H) 6/22/2020   ? Adenomatous polyp of colon    ? Ascending aorta dilatation (H)    ? Carpal tunnel syndrome    ? Chronic superficial gastritis    ? Coronary artery disease due to lipid rich plaque 6/23/2020   ? Depression with anxiety    ? Dyslipidemia, goal LDL below 70 6/23/2020   ? Essential hypertension 9/2/2012   ? Fatty liver disease, nonalcoholic    ? GERD (gastroesophageal  reflux disease)    ? IBS (irritable bowel syndrome)    ? Left lumbar radiculopathy    ? Multinodular goiter    ? Old torn meniscus of knee    ? Paroxysmal atrial fibrillation after CABG (H)    ? Perianal abscess    ? Post herpetic neuralgia    ? Post-traumatic osteoarthritis of both knees    ? Primary osteoarthritis of left hip    ? Primary osteoarthritis of right hip    ? Pulmonary nodule    ? 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)    ? Thyroid nodule    ? TMJ arthritis    ? Tobacco use disorder 11/1/2013       Past Surgical History:   Procedure Laterality Date   ? APPENDECTOMY  1995   ? CORONARY ARTERY BYPASS GRAFT  06/24/2020    Dr. Ragsdale   ? CV CORONARY ANGIOGRAM N/A 6/23/2020    Procedure: Coronary Angiogram;  Surgeon: Ariane Lester MD;  Location: [facility];  Service: Cardiology   ? CV IABP INSERT N/A 6/24/2020    Procedure: Intra Aortic Balloon Pump Insertion;  Surgeon: Ariane Lester MD;  Location: [facility] Cath [facility];  Service: Cardiology   ? CV LEFT HEART CATHETERIZATION WO LEFT VETRICULOGRAM Left 6/23/2020    Procedure: Left Heart Catheterization Without Left Ventriculogram;  Surgeon: Ariane Lester MD;  Location: [facility] Cath Lab;  Service: Cardiology        reports that he quit smoking about 4 months ago. His smoking use included cigarettes. He has a 30.00 pack-year smoking history. He has never used smokeless tobacco. He reports that he does not drink alcohol or use drugs.    Family History   Problem Relation Age of Onset   ? No Medical Problems Mother    ? Lung cancer Father 56        fatal   ? No Medical Problems Daughter    ? No Medical Problems Son        Outpatient Encounter Medications as of 8/7/2020   Medication Sig Dispense Refill   ? acetaminophen (TYLENOL) 500 MG tablet Take 1,000 mg by mouth every 6 (six) hours as needed for pain.     ? albuterol (PROAIR HFA;PROVENTIL HFA;VENTOLIN HFA) 90 mcg/actuation inhaler Inhale 1-2 puffs every 4  (four) hours as needed for wheezing.     ? aspirin 81 mg chewable tablet 1 tablet (81 mg total) by Enteral Tube route daily.  0   ? budesonide-formoteroL (SYMBICORT) 160-4.5 mcg/actuation inhaler Inhale 2 puffs 2 (two) times a day.     ? clonazePAM (KLONOPIN) 0.5 MG tablet Take 0.5 mg by mouth 2 (two) times a day as needed for anxiety.     ? clopidogreL (PLAVIX) 75 mg tablet Take 1 tablet (75 mg total) by mouth every morning. 30 tablet 11   ? ergocalciferol (VITAMIN D2) 1,250 mcg (50,000 unit) capsule Take 50,000 Units by mouth 2 (two) times a week. Mondays & Thursdays     ? escitalopram oxalate (LEXAPRO) 20 MG tablet Take 20 mg by mouth daily.     ? gabapentin (NEURONTIN) 100 MG capsule Take 100 mg by mouth 3 (three) times a day as needed (back pain).     ? omeprazole (PRILOSEC) 20 MG capsule Take 20 mg by mouth daily.      ? QUEtiapine (SEROQUEL) 50 MG tablet Take 50 mg by mouth at bedtime.     ? rosuvastatin (CRESTOR) 40 MG tablet Take 1 tablet (40 mg total) by mouth at bedtime. 30 tablet 11   ? [DISCONTINUED] lisinopriL (PRINIVIL,ZESTRIL) 2.5 MG tablet Take 1 tablet (2.5 mg total) by mouth daily. 30 tablet 3   ? [DISCONTINUED] metoprolol tartrate (LOPRESSOR) 25 MG tablet Take 1.5 tablets (37.5 mg total) by mouth 2 (two) times a day. 60 tablet 3   ? metoprolol succinate (TOPROL-XL) 100 MG 24 hr tablet Take 1 tablet (100 mg total) by mouth daily. 90 tablet 3   ? [DISCONTINUED] amiodarone (PACERONE) 200 MG tablet Take 1 tablet (200 mg total) by mouth daily. Take daily for 30 days then stop. 30 tablet 0   ? [DISCONTINUED] amLODIPine (NORVASC) 2.5 MG tablet Take 1 tablet (2.5 mg total) by mouth daily. Take for one month than stopl 30 tablet 0   ? [DISCONTINUED] lidocaine 4 % patch Place 2 patches on the skin daily. Remove and discard patch with 12 hours or as directed by MD. 10 patch 0     Facility-Administered Encounter Medications as of 8/7/2020   Medication Dose Route Frequency Provider Last Rate Last Dose   ?  "dexmedetomidine (PRECEDEX) 400 mcg/100 mL in NS (4 mcg/mL) infusion  0.2-1.4 mcg/kg/hr Intravenous Continuous Dana Alves, CNP 11.75 mL/hr at 06/24/20 1432 0.5 mcg/kg/hr at 06/24/20 1432       Review of Systems:     General: WNL  Eyes: WNL  Ears/Nose/Throat: WNL  Lungs: Cough  Heart: Chest Pain  Stomach: Constipation  Bladder: Frequent Urination at Night  Muscle/Joints: Muscle Weakness  Skin: WNL  Nervous System: WNL  Mental Health: Depression, Anxiety     Blood: WNL    Objective:     /74 (Patient Site: Left Arm, Patient Position: Sitting, Cuff Size: Adult Regular)   Pulse 68   Resp 16   Ht 5' 5\" (1.651 m)   Wt 180 lb (81.6 kg)   BMI 29.95 kg/m    Wt Readings from Last 5 Encounters:   08/07/20 180 lb (81.6 kg)   08/06/20 179 lb 9.6 oz (81.5 kg)   08/03/20 182 lb (82.6 kg)   07/27/20 182 lb (82.6 kg)   07/21/20 182 lb 3.2 oz (82.6 kg)        Physical Exam:    GENERAL APPEARANCE: alert, no apparent distress  EYES: no scleral icterus  NECK: no carotid bruits or adenopathy, jugular venous pressure is less than 5 cm  CHEST: symmetric, the lungs are clear to auscultation; there is a small amount of swelling and minimal ecchymosis at the medial border of the left pectoral muscle  CARDIOVASCULAR: regular rhythm without murmur or gallop  EXTREMITIES: no cyanosis, clubbing or edema  SKIN: no xanthelasma  NEUROLOGIC: normal gait and coordination  PSYCHIATRIC: mood and affect are normal    Cardiac Testing:    Echocardiogram:   Results for orders placed during the hospital encounter of 06/23/20   Echo Complete [ECH10] 06/27/2020    Narrative   Normal left ventricular size and systolic function. The left ventricular   wall motion is normal. The calculated left ventricular ejection fraction   is 71%.    Normal right ventricular size and systolic function.    No hemodynamically significant valvular heart abnormalities.    The ascending aorta is mildly dilated.    When compared to the previous study dated 6/23/2020, " no significant   change.          Cardiac Catheterization:   Results for orders placed during the hospital encounter of 06/23/20   Cardiac Catheterization [CATH01] 06/24/2020    Narrative   Angina on nitroglycerin gtt, awaiting CABG today.    50 cc IABP placed via the right common femoral artery using ultrasound   guidance without complication.    Estimated blood loss was <20 ml.          Imaging:    Xr Chest 1 View Portable    Result Date: 7/27/2020  EXAM: XR CHEST 1 VIEW PORTABLE LOCATION: [facility] DATE/TIME: 7/27/2020 1:52 AM INDICATION: Cough COMPARISON: None.     Lordotic positioning accentuates heart size. Allowing for this there is a mild cardiac enlargement. Normal pulmonary vascularity. Sternotomy with mediastinal clips. No focal infiltrate or consolidation. Minimal degenerative changes in the spine.      Lab Results:    Lab Results   Component Value Date    CREATININE 1.18 07/27/2020    BUN 10 07/27/2020     07/27/2020    K 3.7 07/27/2020     07/27/2020    CO2 25 07/27/2020     Lab Results   Component Value Date    CHOL 147 06/23/2020    TRIG 150 (H) 06/23/2020    HDL 30 (L) 06/23/2020    LDLDIRECT 107 06/23/2020     BNP (pg/mL)   Date Value   07/27/2020 79 (H)   06/22/2020 <10     Creatinine (mg/dL)   Date Value   07/27/2020 1.18   06/28/2020 1.01   06/27/2020 1.26   06/26/2020 1.04     Magnesium (mg/dL)   Date Value   06/30/2020 2.2   06/29/2020 2.5   06/28/2020 2.4     LDL Calculated (mg/dL)   Date Value   06/23/2020 87     Lab Results   Component Value Date    WBC 10.0 07/27/2020    HGB 11.9 (L) 07/27/2020    HCT 36.9 (L) 07/27/2020    MCV 89 07/27/2020     07/27/2020           Much or all of the text in this note was generated through the use of the Dragon Dictate voice-to-text software. Errors in spelling or words which seem out of context are unintentional. Sound alike errors, in particular, may have escaped editing.
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
## 2021-06-30 NOTE — PROGRESS NOTES
Progress Notes by Rosana Nayak LPN at 2/17/2021 12:30 PM     Author: Rosana Nayak LPN Service: -- Author Type: Licensed Nurse    Filed: 2/17/2021  2:20 PM Encounter Date: 2/17/2021 Status: Signed    : Rosana Nayak LPN (Licensed Nurse)       This video/telephone visit will be conducted via a call between you and your physician/provider. We have found that certain health care needs can be provided without the need for an in-person physical exam. This service lets us provide the care you need with a video /telephone conversation. If a prescription is necessary we can send it directly to your pharmacy. If lab work is needed we can place an order for that and you can then stop by our lab to have the test done at a later time.   Just as we bill insurance for in-person visits, we also bill insurance for video/telephone visits. If you have questions about your insurance coverage, we recommend that you speak with your insurance company.   Patient has given verbal consent for video/Telephone visit? yes  Patient would like telephone visit, please call: Pt will be available [phone]   For  over the phone please call [phone] option [phone]   TEMO/REBA : Chaitanya FOLEY LPN  Pt told he is still recovering from open heart surgery from last June . he would like to establish care with psychiatrist to review his current med regime und to get his depression/anxiety/PTSD, and insomnia under control.   Sleep: reports waking up every 2 hours, waking up screaming due to nightmares, believes that streess lead to cardiac problems.   C/O of being easily stressed, depression, and ongoing anxiety.   Patient verified allergies, medications and pharmacy via phone.Patient states he  is ready for visit.                ________________________________________  Medications Phoned  to Pharmacy [] yes []no  Name of Pharmacist:  List Medications, including dose, quantity and  instructions    Medications ordered this visit were e-scribed.  Verified by order class [] yes  [] no    Medication changes or discontinuations were communicated to patient's pharmacy: [] yes  [] no    Dictation completed at time of chart check: [] yes  [] no    I have checked the documentation for todays encounters and the above information has been reviewed and completed.

## 2021-06-30 NOTE — PROGRESS NOTES
Progress Notes by Myhre, David J, RN at 11/19/2020  9:15 AM     Author: Myhre, David J, RN Service: -- Author Type: Registered Nurse    Filed: 11/23/2020  6:58 AM Encounter Date: 11/19/2020 Status: Signed    : Myhre, David J, RN (Registered Nurse)       Clinic Care Coordination Contact    Clinic Care Coordination Contact  OUTREACH    Referral Information:  Referral Source: PCP    Primary Diagnosis: Psychosocial    Chief Complaint   Patient presents with   ? Clinic Care Coordination - Initial        Universal Utilization:   Clinic Utilization  Difficulty keeping appointments:: No  Compliance Concerns: No  No-Show Concerns: No  No PCP office visit in Past Year: No  Utilization    Last refreshed: 11/22/2020  1:44 PM: Hospital Admissions 3           Last refreshed: 11/22/2020  1:44 PM: ED Visits 3           Last refreshed: 11/22/2020  1:44 PM: No Show Count (past year) 1              Current as of: 11/22/2020  1:44 PM              Clinical Concerns:  Current Medical Concerns: Patient is a 55 year old man with a history of coronary artery disease, acute non-ST elevation myocardial infarction, ascending aorta dilatation, depression and anxiety, fatty liver, non-alcoholic, paroxysmal atrial fibrillation after CABG, PTSD, primary osteoarthritis in his knees bilaterally.   Patient reported he is doing well after CABG surgical procedure. He is currently participating in Cardiac Rehab at [facility] every Tuesday and Friday. He stated he is also working the with [facility] to get a treadmill so he can work out on a regular basis at home. He said he has an appointment scheduled with an RN from [facility] this morning to discuss this further.   Patient reported let arm pain and paresthesia of both legs. He has discussed these concerns with his PCP and has been given recommendations for the pain. He will again discuss at scheduled follow-up visit on 11/25/20 with his PCP.   Patient  reported he is taking his cardiac medications as directed and continues to attend all scheduled appointments with his Cardiologist, Dr. Mart Ragsdale.    Patient continues to express discomfort after eating. He has not been taking his omeprazole as prescribed by his PCP. It was unclear to this writer why he is not taking this medication during assessment Encouraged him to discuss further with PCP on 11/25/20  Current Behavioral Concerns: Patient reported he history PTSD, depression and anxiety. He stated he a sees a psychiatrist, Dr. Shirlene Dang for his psychiatric medications and sees a therapist through [facility]. Patient is interested in getting an [employer] worker for additional mental health support. He stated he feels lonely at times and would appreciate someone else to talk to on a regular basis. He was scheduled with phone visit with the [facility] LSW to discuss the process for getting an [employer] worker. Patient denied any suicidal ideation during today's assessment.     Education Provided to patient: Discussed the importance of taking his medications as directed. Encouraged patient to attend all upcoming appointments as scheduled. Discussed the importance of washing his hands frequently, keeping his hands away from his face, masking in public and engaging in social distancing at this time related to the COVID-19 pandemic and his chronic health concerns.      Health Maintenance Reviewed: Reviewed during assessment. Patient will discuss with PCP at next Office Visit.      Medication Management:  Patient stated his wife manages his medications. He denied needing assistance with medication set-up at this time. He stated he compliant with all scheduled medications.     Functional Status:  Dependent ADLs:: Independent, Bathing  Dependent IADLs:: Cleaning, Cooking, Laundry, Shopping, Meal Preparation, Medication Management, Transportation  Bed or wheelchair confined:: No  Mobility Status: Independent  Fallen 2 or more times  "in the past year?: No  Any fall with injury in the past year?: No   Patient stated his wife assists him with his bathing currently. She also does the cleaning, cooking, shopping meal preparation, shopping and transportation. Writer discussed PCA services with patient and his wife, but they declined at this time. Patient's wife stated, \"In our culture it is my job to take care of my  and children\". Patient and his wife reported they have two children at home. One of their children is disabled.     Living Situation:  Current living arrangement:: I live in a private home with family  Type of residence:: Town home    Lifestyle & Psychosocial Needs:        Diet:: Low cholesterol, Low saturated fat(Only eats chicken fro protein)  Inadequate nutrition (GOAL):: No  Tube Feeding: No  Inadequate activity/exercise (GOAL):: No  Significant changes in sleep pattern (GOAL): No  Transportation means:: Regular car     Mormonism or spiritual beliefs that impact treatment:: No  Mental health DX:: Yes  Mental health DX how managed:: Medication, Outpatient Counseling, Psychiatrist  Mental health management concern (GOAL):: No  Chemical Dependency Status: Not Applicable  Informal Support system:: Children   Socioeconomic History   ? Marital status:      Spouse name: Not on file   ? Number of children: 2   ? Years of education: Not on file   ? Highest education level: Bachelor's degree (e.g., BA, AB, BS)   Occupational History     Employer: DISABLED     Comment: SSI     Tobacco Use   ? Smoking status: Former Smoker     Packs/day: 1.00     Years: 30.00     Pack years: 30.00     Types: Cigarettes     Quit date: 3/23/2020     Years since quittin.6   ? Smokeless tobacco: Never Used   Substance and Sexual Activity   ? Alcohol use: No   ? Drug use: Never   ? Sexual activity: Yes     Partners: Female          Financial concerns: Patient currently receiving Social Security Disability. He denied any financial concerns currently. "       Resources and Interventions:  Current Resources:      Community Resources: None  Supplies Currently Used at Home: None  Equipment Currently Used at Home: none  Type of Employment: Disability       Advance Care Plan/Directive  Advanced Care Plans/Directives on file:: No  Advanced Care Plan/Directive Status: Declined Further Information    Referrals Placed: None     Goals:   Goals        General    Mental Health Management (pt-stated)     Notes - Note edited  11/23/2020  6:08 AM by Myhre, David J, RN    Goal Statement: I would like to have an [facility] worker for additional mental health support in the next 3 months.   Date Goal set: 11/19/20  Barriers: History of PTSD  Strengths: Strong advocate for himself. Supportive wife. Currently seeing a therapist and a psychiatrist, support from [facility].   Date to Achieve By: 2/19/21  Patient expressed understanding of goal: Yes  Action steps to achieve this goal:  1. I will speak with [facility] Jannette SPANGLER at scheduled phone visit on 11/23/20 to discuss the process for getting an [facility] worker.   2. I will provide any necessary documentation and complete any required assessments related to this goal.   3. I will report progress towards this goal at scheduled outreach telephone calls from the [facility] team.               Patient/Caregiver understanding: Verbalized understanding of goals and other information discussed at today's assessment.        Future Appointments              Today DTN [facility]    Tomorrow  CARDIAC REHAB [facility]    In 2 days Az Briseno MD [facility]    In 4 days  CARDIAC REHAB [facility]    In 1 week  CARDIAC REHAB [facility]          Plan: [facility] RN will continue to monitor and will be available as nursing  needs arise. [facility] LSW will speak with patient at scheduled phone visit regarding [facility] worker on 11/23/20, [facility] CHW will continue to support patient with current goal through scheduled outreach telephone calls and will provide resources as needed.

## 2021-06-30 NOTE — PROGRESS NOTES
Progress Notes by Brandon Malhotra MD at 3/23/2021  3:10 PM     Author: Brandon Malhotra MD Service: -- Author Type: Physician    Filed: 3/23/2021  3:39 PM Encounter Date: 3/23/2021 Status: Signed    : Brandon Malhotra MD (Physician)             Assessment:    1. Coronary artery disease, CABG 6/2020  [facility] - Today       Plan:    1. There are no cardiac contraindications to moderate conscious sedation, endoscopy, and colonoscopy.  If his aspirin and clopidogrel are held prior to the procedures, this should be restarted as soon as possible after the procedures.  2. Return to see Dr. Malhotra in August of this year.    An After Visit Summary was printed and given to the patient.    Subjective:    Nadya Blake returned for an unplanned follow up visit.  He was accompanied by his wife, Carlee.  They both have bachelor's degrees from universities in [place] and is a client using an [employer] .    The reason he is here is that his gastroenterologist wanted the advice of Nadya's cardiologist prior to proceeding with moderate conscious sedation and upper and lower endoscopy.    Nadya has been troubled by nonexertional vomiting for some time.  He is having difficulty eating and is losing weight.  Despite this, he walks for up to an hour every day without angina pectoris, breathlessness or lightheadedness.  He does not experience orthopnea, syncope or lower extremity edema.    His wife states he faithfully takes his cardiovascular medications.    Past Medical History:    Patient Active Problem List   Diagnosis   ? Recurrent major depressive disorder, in partial remission (H)   ? Fatty liver disease, nonalcoholic   ? Gastroesophageal reflux disease with esophagitis   ? Essential hypertension   ? Non-toxic multinodular goiter   ? Post-traumatic osteoarthritis of both knees   ? Primary osteoarthritis of left hip   ? Primary osteoarthritis of right hip   ? Respiratory bronchiolitis associated  interstitial lung disease (H)   ? Coronary artery disease, CABG 6/2020   ? Pre-diabetes   ? Dyslipidemia, goal LDL below 70   ? Benign prostatic hyperplasia with nocturia   ? PTSD (post-traumatic stress disorder)       Past Medical History:   Diagnosis Date   ? Acute non-ST elevation myocardial infarction (NSTEMI) (H) 6/22/2020   ? Adenomatous polyp of colon    ? Ascending aorta dilatation (H)    ? Carpal tunnel syndrome    ? Chronic superficial gastritis    ? Coronary artery disease due to lipid rich plaque 6/23/2020   ? Depression with anxiety    ? Dyslipidemia, goal LDL below 70 6/23/2020   ? Essential hypertension 9/2/2012   ? Fatty liver disease, nonalcoholic    ? GERD (gastroesophageal reflux disease)    ? IBS (irritable bowel syndrome)    ? Left lumbar radiculopathy    ? Multinodular goiter    ? Old torn meniscus of knee    ? Paroxysmal atrial fibrillation after CABG (H)    ? Perianal abscess    ? Post herpetic neuralgia    ? Post-traumatic osteoarthritis of both knees    ? Primary osteoarthritis of left hip    ? Primary osteoarthritis of right hip    ? Pulmonary nodule    ? 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)    ? Thyroid nodule    ? TMJ arthritis    ? Tobacco use disorder 11/1/2013   ? Vitamin D deficiency 9/30/2020       Past Surgical History:   Procedure Laterality Date   ? APPENDECTOMY  1995   ? CORONARY ARTERY BYPASS GRAFT  06/24/2020    Dr. Ragsdale   ? CV CORONARY ANGIOGRAM N/A 6/23/2020    Procedure: Coronary Angiogram;  Surgeon: Ariane Lester MD;  Location: [facility] Lab;  Service: Cardiology   ? CV IABP INSERT N/A 6/24/2020    Procedure: Intra Aortic Balloon Pump Insertion;  Surgeon: Ariane Lester MD;  Location: [facility] Lab;  Service: Cardiology   ? CV LEFT HEART CATHETERIZATION WO LEFT VETRICULOGRAM Left 6/23/2020    Procedure: Left Heart Catheterization Without Left Ventriculogram;  Surgeon: Ariane Lester MD;  Location: [facility]  Lab;  Service: Cardiology        reports that he quit smoking about a year ago. His smoking use included cigarettes. He has a 30.00 pack-year smoking history. He has never used smokeless tobacco. He reports that he does not drink alcohol or use drugs.  Social History     Socioeconomic History   ? Marital status:      Spouse name: Not on file   ? Number of children: 2   ? Years of education: Not on file   ? Highest education level: Bachelor's degree (e.g., BA, AB, BS)   Occupational History     Employer: DISABLED     Comment: SSI   Social Needs   ? Financial resource strain: Not on file   ? Food insecurity     Worry: Not on file     Inability: Not on file   ? Transportation needs     Medical: Not on file     Non-medical: Not on file   Tobacco Use   ? Smoking status: Former Smoker     Packs/day: 1.00     Years: 30.00     Pack years: 30.00     Types: Cigarettes     Quit date: 3/23/2020     Years since quittin.0   ? Smokeless tobacco: Never Used   ? Tobacco comment: stopped 3/24/2020   Substance and Sexual Activity   ? Alcohol use: No   ? Drug use: Never   ? Sexual activity: Yes     Partners: Female   Lifestyle   ? Physical activity     Days per week: Not on file     Minutes per session: Not on file   ? Stress: Not on file   Relationships   ? Social connections     Talks on phone: Not on file     Gets together: Not on file     Attends Mu-ism service: Not on file     Active member of club or organization: Not on file     Attends meetings of clubs or organizations: Not on file     Relationship status: Not on file   ? Intimate partner violence     Fear of current or ex partner: Not on file     Emotionally abused: Not on file     Physically abused: Not on file     Forced sexual activity: Not on file   Other Topics Concern   ? 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  From [place]; bachelors in geology and computer science. Son, Grace () and Sherrie  (2008).  On SSDI, PTSD.         Family History   Problem Relation Age of Onset   ? No Medical Problems Mother    ? Lung cancer Father 56        fatal   ? No Medical Problems Daughter    ? Other Son         congenital deformity of right leg       Outpatient Encounter Medications as of 3/23/2021   Medication Sig Dispense Refill   ? acetaminophen (TYLENOL) 500 MG tablet Take 1,000 mg by mouth every 6 (six) hours as needed for pain.     ? albuterol (PROAIR HFA;PROVENTIL HFA;VENTOLIN HFA) 90 mcg/actuation inhaler Inhale 1-2 puffs every 4 (four) hours as needed for wheezing.     ? aspirin 81 mg chewable tablet 1 tablet (81 mg total) by Enteral Tube route daily. 90 tablet 3   ? budesonide-formoteroL (SYMBICORT) 160-4.5 mcg/actuation inhaler Inhale 2 puffs 2 (two) times a day. Taking as needed.     ? cholecalciferol, vitamin D3, (VITAMIN D3) 25 mcg (1,000 unit) capsule Take 2 capsules (2,000 Units total) by mouth daily. 90 capsule 3   ? clonazePAM (KLONOPIN) 0.5 MG tablet Take 1 tablet (0.5 mg total) by mouth 2 (two) times a day as needed for anxiety. 30 tablet 0   ? clopidogreL (PLAVIX) 75 mg tablet Take 1 tablet (75 mg total) by mouth every morning. 90 tablet 3   ? diclofenac sodium (VOLTAREN) 1 % Gel Apply 4 g topically 4 (four) times a day. 500 g 11   ? escitalopram oxalate (LEXAPRO) 20 MG tablet Take 1 tablet (20 mg total) by mouth daily. 90 tablet 1   ? gabapentin (NEURONTIN) 300 MG capsule Take 1 capsule (300 mg total) by mouth at bedtime. 90 capsule 3   ? metoprolol succinate (TOPROL-XL) 100 MG 24 hr tablet Take 1.5 tablets (150 mg total) by mouth at bedtime. 135 tablet 3   ? omeprazole (PRILOSEC) 20 MG capsule Take 1 capsule (20 mg total) by mouth 2 (two) times a day before meals. 180 capsule 3   ? QUEtiapine (SEROQUEL) 100 MG tablet Take 1.5 tablets (150 mg total) by mouth at bedtime. (Patient taking differently: Take 100 mg by mouth at bedtime. ) 90 tablet 2   ? rosuvastatin (CRESTOR) 40 MG tablet Take 1 tablet  (40 mg total) by mouth daily. 90 tablet 3   ? tamsulosin (FLOMAX) 0.4 mg cap Take 1 capsule (0.4 mg total) by mouth daily. 90 capsule 3     No facility-administered encounter medications on file as of 3/23/2021.        Review of Systems:     General: WNL  Eyes: WNL  Ears/Nose/Throat: WNL  Lungs: WNL  Heart: Chest Pain  Stomach: Heartburn, Nausea  Bladder: Frequent Urination at Night  Muscle/Joints: Joint Pain, Muscle Pain  Skin: WNL  Nervous System: WNL  Mental Health: Depression, Anxiety     Blood: WNL    Objective:     BP 98/76 (Patient Site: Left Arm, Patient Position: Sitting, Cuff Size: Adult Regular)   Pulse 77   Resp 14   Wt 192 lb 9.6 oz (87.4 kg)   SpO2 96%   BMI 31.56 kg/m    Wt Readings from Last 5 Encounters:   03/23/21 192 lb 9.6 oz (87.4 kg)   03/23/21 190 lb 8 oz (86.4 kg)   02/23/21 199 lb 3.2 oz (90.4 kg)   01/20/21 196 lb (88.9 kg)   12/22/20 194 lb (88 kg)        Physical Exam:    GENERAL APPEARANCE: alert, no apparent distress  EYES: no scleral icterus  NECK: no carotid bruits or adenopathy, jugular venous pressure is difficult to evaluate due to obesity  CHEST: symmetric, the lungs are clear to auscultation  CARDIOVASCULAR: regular rhythm without murmur or gallop  EXTREMITIES: no cyanosis, clubbing or edema  SKIN: no xanthelasma  NEUROLOGIC: normal gait and coordination  PSYCHIATRIC: mood and affect are normal    Cardiac Testing:    EKG: Sinus rhythm with first-degree AV block with a SD interval of 210 ms, otherwise normal EKG per my personal review.    Echocardiogram:   Results for orders placed during the hospital encounter of 06/23/20   Echo Complete [ECH10] 06/27/2020    Narrative   Normal left ventricular size and systolic function. The left ventricular   wall motion is normal. The calculated left ventricular ejection fraction   is 71%.    Normal right ventricular size and systolic function.    No hemodynamically significant valvular heart abnormalities.    The ascending aorta is  mildly dilated.    When compared to the previous study dated 6/23/2020, no significant   change.          Cardiac Catheterization:   Results for orders placed during the hospital encounter of 06/23/20   Cardiac Catheterization [CATH01] 06/24/2020    Narrative   Angina on nitroglycerin gtt, awaiting CABG today.    50 cc IABP placed via the right common femoral artery using ultrasound   guidance without complication.    Estimated blood loss was <20 ml.          Imaging:    No results found.    Lab Results:    Lab Results   Component Value Date    CREATININE 1.42 (H) 11/21/2020    BUN 15 11/21/2020     11/21/2020    K 4.0 11/21/2020     11/21/2020    CO2 28 11/21/2020     Lab Results   Component Value Date    CHOL 94 01/07/2021    TRIG 253 (H) 01/07/2021    HDL 29 (L) 01/07/2021    LDLDIRECT 41 08/07/2020     BNP (pg/mL)   Date Value   07/27/2020 79 (H)   06/22/2020 <10     Creatinine (mg/dL)   Date Value   11/21/2020 1.42 (H)   09/22/2020 0.97   07/27/2020 1.18   06/28/2020 1.01     Magnesium (mg/dL)   Date Value   06/30/2020 2.2   06/29/2020 2.5   06/28/2020 2.4     LDL Calculated (mg/dL)   Date Value   01/07/2021 14   06/23/2020 87     Lab Results   Component Value Date    WBC 6.4 03/23/2021    HGB 14.8 03/23/2021    HCT 45.1 03/23/2021    MCV 86 03/23/2021     03/23/2021           Much or all of the text in this note was generated through the use of the Dragon Dictate voice-to-text software. Errors in spelling or words which seem out of context are unintentional. Sound alike errors, in particular, may have escaped editing.

## 2021-07-04 NOTE — LETTER
Letter by Jannette Patel SW at      Author: Jannette Patel SW Service: -- Author Type: --    Filed:  Encounter Date: 5/26/2021 Status: (Other)         Nadya Blake  [address] [place] [facility] [address]      May 26, 2021      Dear Mr. Blake,    I called [facility] and to see about the referral for psychiatry or medication management, and they said they called and left you a message on May 17th.      You can call them to set up appointment by calling [phone] and they are open from 8 AM until 6 PM.   Hope you can get something scheduled soon!    Sincerely,        Electronically signed by GERALDO Padilla

## 2021-07-05 PROBLEM — I25.119 ATHEROSCLEROSIS OF NATIVE CORONARY ARTERY WITH ANGINA PECTORIS, UNSPECIFIED WHETHER NATIVE OR TRANSPLANTED HEART (H): Status: RESOLVED | Noted: 2021-05-24 | Resolved: 2021-06-25

## 2021-07-06 VITALS
HEIGHT: 64 IN | BODY MASS INDEX: 33.51 KG/M2 | SYSTOLIC BLOOD PRESSURE: 136 MMHG | HEART RATE: 70 BPM | DIASTOLIC BLOOD PRESSURE: 82 MMHG | OXYGEN SATURATION: 96 % | WEIGHT: 196.3 LBS

## 2021-07-06 VITALS
SYSTOLIC BLOOD PRESSURE: 114 MMHG | HEART RATE: 60 BPM | BODY MASS INDEX: 31.38 KG/M2 | HEIGHT: 66 IN | WEIGHT: 195.25 LBS | OXYGEN SATURATION: 97 % | DIASTOLIC BLOOD PRESSURE: 68 MMHG

## 2021-07-06 VITALS
DIASTOLIC BLOOD PRESSURE: 72 MMHG | WEIGHT: 196.2 LBS | SYSTOLIC BLOOD PRESSURE: 120 MMHG | OXYGEN SATURATION: 97 % | HEIGHT: 64 IN | BODY MASS INDEX: 33.49 KG/M2 | HEART RATE: 64 BPM

## 2021-07-07 NOTE — PROGRESS NOTES
"  Office Visit - Follow Up   Nadya Blake   55 y.o. male    Date of Visit: 6/25/2021    Chief Complaint   Patient presents with     Follow-up     Colon Cancer Screening     patient due, orders pending     Fatigue     Gastroesophageal Reflux     burning in the chest area     Flank Pain     right side, sharp, short pain        Assessment and Plan   1. Atherosclerosis of native coronary artery with angina pectoris, unspecified whether native or transplanted heart (H)  Continue secondary prevention, reassurance provided on his noncardiac chest pain.      2. PTSD (post-traumatic stress disorder)  [facility], recent adjustment in Seroquel dose.  Some worsening symptoms at the anniversary of his heart surgery    3. Essential hypertension  Blood pressure okay continue same    4. Screen for colon cancer  - Cologuard      Return in about 4 weeks (around 7/23/2021) for recheck.     History of Present Illness   This 55 y.o. old and comes in for follow-up.  He continues to have some superficial chest pain.  Worse with twisting and pushing on his chest wall, reproducible.  Improved with Voltaren gel.  Also very intermittent right-sided stabbing for a few seconds lower chest upper abdominal pain.  This has been stable.  Evaluated with CT ultrasound and labs.  He is on gabapentin.  Recent increase in Seroquel dose.  And worsening PTSD symptoms as he is at the anniversary of his chest surgery    Review of Systems: A comprehensive review of systems was negative except as noted.     Medications, Allergies and Problem List   Reviewed, reconciled and updated  Post Discharge Medication Reconciliation Status:      Physical Exam   General Appearance:   No acute distress    /72 (Patient Site: Left Arm, Patient Position: Sitting, Cuff Size: Adult Regular)   Pulse 64   Ht 5' 4\" (1.626 m)   Wt 196 lb 3.2 oz (89 kg)   SpO2 97%   BMI 33.68 kg/m      His chest pain is " reproducible on exam.  Mild tenderness in the right upper quadrant laterally heart rate controlled lungs clear     Additional Information   Current Outpatient Medications   Medication Sig Dispense Refill     acetaminophen (TYLENOL) 500 MG tablet Take 2 tablets (1,000 mg total) by mouth every 6 (six) hours as needed for pain. 300 tablet 3     albuterol (PROAIR HFA;PROVENTIL HFA;VENTOLIN HFA) 90 mcg/actuation inhaler Inhale 1-2 puffs every 4 (four) hours as needed for wheezing.       aspirin 81 mg chewable tablet 1 tablet (81 mg total) by Enteral Tube route daily. 90 tablet 3     budesonide-formoteroL (SYMBICORT) 160-4.5 mcg/actuation inhaler Inhale 2 puffs 2 (two) times a day. Taking as needed.       cholecalciferol, vitamin D3, (VITAMIN D3) 25 mcg (1,000 unit) capsule Take 2 capsules (2,000 Units total) by mouth daily. 90 capsule 3     clonazePAM (KLONOPIN) 0.5 MG tablet Take 1 tablet (0.5 mg total) by mouth 2 (two) times a day as needed for anxiety. 30 tablet 0     clopidogreL (PLAVIX) 75 mg tablet Take 1 tablet (75 mg total) by mouth every morning. 90 tablet 3     diclofenac sodium (VOLTAREN) 1 % Gel Apply 4 g topically 4 (four) times a day. 500 g 11     escitalopram oxalate (LEXAPRO) 20 MG tablet Take 1 tablet (20 mg total) by mouth daily. 90 tablet 1     gabapentin (NEURONTIN) 300 MG capsule Take 1 capsule (300 mg total) by mouth at bedtime. 90 capsule 3     hydrOXYzine pamoate (VISTARIL) 25 MG capsule Take 1 capsule (25 mg total) by mouth 3 (three) times a day as needed for anxiety. 90 capsule 1     metoprolol succinate (TOPROL-XL) 100 MG 24 hr tablet Take 1.5 tablets (150 mg total) by mouth at bedtime. 135 tablet 3     omeprazole (PRILOSEC) 20 MG capsule Take 1 capsule (20 mg total) by mouth 2 (two) times a day before meals. 180 capsule 3     QUEtiapine (SEROQUEL) 100 MG tablet Take 1.5 tablets (150 mg total) by mouth at bedtime. 45 tablet 2     rosuvastatin (CRESTOR) 40 MG tablet Take 1 tablet (40 mg total)  by mouth daily. 90 tablet 3     tamsulosin (FLOMAX) 0.4 mg cap Take 1 capsule (0.4 mg total) by mouth daily. 90 capsule 3     No current facility-administered medications for this visit.      Allergies   Allergen Reactions     Atorvastatin Diarrhea     Cortisone Other (See Comments)     pain     Lisinopril Cough     started after CABG for unclear reason     Methylprednisolone Other (See Comments)     ?     Social History     Tobacco Use     Smoking status: Former Smoker     Packs/day: 1.00     Years: 30.00     Pack years: 30.00     Types: Cigarettes     Quit date: 3/23/2020     Years since quittin.2     Smokeless tobacco: Never Used     Tobacco comment: stopped 3/24/2020   Substance Use Topics     Alcohol use: No     Drug use: Never       Review and/or order of clinical lab tests:  Review and/or order of radiology tests:  Review and/or order of medicine tests:  Discussion of test results with performing physician:  Decision to obtain old records and/or obtain history from someone other than the patient:  Review and summarization of old records and/or obtaining history from someone other than the patient and.or discussion of case with another health care provider:  Independent visualization of image, tracing or specimen itself:    Time:      Az Briseno MD

## 2021-07-07 NOTE — PROGRESS NOTES
Clinic Care Coordination Contact    Follow Up Progress Note      Assessment: Talked with patient and his wife.  Has been having ongoing chest discomfort and had appt with PCP to review.  One year anniversary from his heart surgery which worsened his PTSD. He is getting out to shop and goes with wife when driving son to appointments.  Their 15 year old son had his covid vaccination today.  Feeling comfortable being out.  He has decided that he is going to keep seeing Dr. Quevedo and thinks he is a good doctor.      Will be setting up colonoscopy and endoscopy sometime later this summer.     Goals addressed this encounter:   Goals Addressed                 This Visit's Progress       Patient Stated      COMPLETED: Healthy Coping (pt-stated)        Goal Statement: I will have a different psychiatrist within 4 months.  Date Goal set: May 6, 2021  Barriers: wants frequent contact with psychiatrist.   Strengths: has insurance.  Date to Achieve By: 9-6-2021  Patient expressed understanding of goal: yes  Action steps to achieve this goal:  1. I will set up appointment once [facility] gives recommendation.    2. I will attend appointments.   3. I will report progress towards this goal at scheduled outreach telephone calls from the [facility] team.   5-14 discussed, referred to [facility] 5-26 discussed, hasn't heard from [facility]  6-28 discussed.  Keeping Dr. Quevedo.            Medical (pt-stated)   30%     Goal Statement: I will have colonoscopy and endoscopy within 3 months.   Date Goal set: 5-  Barriers: None identified.   Strengths: willing to have them done.   Date to Achieve By: 8-  Patient expressed understanding of goal: yes  Action steps to achieve this goal:  1. I will scheduled these procedures for this summer.   2. I will discuss with PCP and GI clinic any questions.  3. I will report progress towards this goal at scheduled outreach telephone calls from the [facility] team.   Discussed on 6/4/21, 6-28                   Intervention/Education provided during outreach: NA     Outreach Frequency: 2 weeks    Plan:   Will call patient in two weeks.  Jannette Patel [facility]  Clinic Care Coordinator  [phone]

## 2021-07-14 PROBLEM — I21.4 ACUTE NON-ST ELEVATION MYOCARDIAL INFARCTION (NSTEMI) (H): Status: RESOLVED | Noted: 2020-06-22 | Resolved: 2020-08-07

## 2021-07-14 PROBLEM — I25.119 ATHEROSCLEROSIS OF NATIVE CORONARY ARTERY WITH ANGINA PECTORIS, UNSPECIFIED WHETHER NATIVE OR TRANSPLANTED HEART (H): Status: RESOLVED | Noted: 2021-05-24 | Resolved: 2021-06-25

## 2021-07-14 PROBLEM — I21.4 NSTEMI (NON-ST ELEVATED MYOCARDIAL INFARCTION) (H): Status: RESOLVED | Noted: 2020-06-23 | Resolved: 2020-08-07

## 2021-07-23 ENCOUNTER — OFFICE VISIT (OUTPATIENT)
Dept: CARDIOLOGY | Facility: CLINIC | Age: 56
End: 2021-07-23
Payer: COMMERCIAL

## 2021-07-23 VITALS
RESPIRATION RATE: 16 BRPM | HEART RATE: 64 BPM | WEIGHT: 196.6 LBS | SYSTOLIC BLOOD PRESSURE: 104 MMHG | BODY MASS INDEX: 33.75 KG/M2 | DIASTOLIC BLOOD PRESSURE: 72 MMHG

## 2021-07-23 DIAGNOSIS — R07.9 CHEST PAIN, UNSPECIFIED TYPE: ICD-10-CM

## 2021-07-23 DIAGNOSIS — I25.10 CORONARY ARTERY DISEASE DUE TO LIPID RICH PLAQUE: Primary | Chronic | ICD-10-CM

## 2021-07-23 DIAGNOSIS — E78.5 DYSLIPIDEMIA, GOAL LDL BELOW 70: Chronic | ICD-10-CM

## 2021-07-23 DIAGNOSIS — I25.83 CORONARY ARTERY DISEASE DUE TO LIPID RICH PLAQUE: Primary | Chronic | ICD-10-CM

## 2021-07-23 PROBLEM — D12.6 ADENOMATOUS POLYP OF COLON: Status: RESOLVED | Noted: 2017-09-19 | Resolved: 2021-07-23

## 2021-07-23 PROBLEM — M26.649 TMJ ARTHRITIS: Status: ACTIVE | Noted: 2017-05-23

## 2021-07-23 PROBLEM — K61.0 PERIANAL ABSCESS: Status: ACTIVE | Noted: 2017-03-24

## 2021-07-23 PROBLEM — I77.810 ASCENDING AORTA DILATATION (H): Status: ACTIVE | Noted: 2020-02-16

## 2021-07-23 PROBLEM — K29.30 CHRONIC SUPERFICIAL GASTRITIS: Status: ACTIVE | Noted: 2017-09-19

## 2021-07-23 PROBLEM — K61.0 PERIANAL ABSCESS: Status: RESOLVED | Noted: 2017-03-24 | Resolved: 2021-07-23

## 2021-07-23 PROBLEM — B02.29 POST HERPETIC NEURALGIA: Status: ACTIVE | Noted: 2018-11-19

## 2021-07-23 PROBLEM — D12.6 ADENOMATOUS POLYP OF COLON: Status: ACTIVE | Noted: 2017-09-19

## 2021-07-23 LAB
ATRIAL RATE - MUSE: 65 BPM
DIASTOLIC BLOOD PRESSURE - MUSE: NORMAL MMHG
INTERPRETATION ECG - MUSE: NORMAL
P AXIS - MUSE: 34 DEGREES
PR INTERVAL - MUSE: 216 MS
QRS DURATION - MUSE: 98 MS
QT - MUSE: 418 MS
QTC - MUSE: 434 MS
R AXIS - MUSE: -26 DEGREES
SYSTOLIC BLOOD PRESSURE - MUSE: NORMAL MMHG
T AXIS - MUSE: 59 DEGREES
VENTRICULAR RATE- MUSE: 65 BPM

## 2021-07-23 PROCEDURE — 93000 ELECTROCARDIOGRAM COMPLETE: CPT | Performed by: INTERNAL MEDICINE

## 2021-07-23 PROCEDURE — 99215 OFFICE O/P EST HI 40 MIN: CPT | Performed by: INTERNAL MEDICINE

## 2021-07-23 ASSESSMENT — PAIN SCALES - GENERAL: PAINLEVEL: SEVERE PAIN (6)

## 2021-07-23 NOTE — PROGRESS NOTES
Assessment:    Problem List Items Addressed This Visit        Endocrine    Dyslipidemia, goal LDL below 70 (Chronic)       Circulatory    Coronary artery disease, CABG 6/2020 - Primary (Chronic)      Other Visit Diagnoses     Chest pain, unspecified type               Plan:    1. Discontinue clopidogrel as it has been more than a year since coronary bypass surgery.  He will stay on 81 mg of aspirin daily lifelong.  2. I advised him to proceed with endoscopy and colonoscopy as already recommended by his gastroenterologist.  Aspirin can be held for the procedure, if clinically necessary.  3. I do not recommend any further cardiac testing at this time for his chronic chest discomfort.  4. I shared with the patient and his wife that I will retire next month and our team will make arrangements for him to see my colleague, Dr. Sloan Burns, in 1 year.    An After Visit Summary was printed and given to the patient.    This visit comprised 40 minutes of time from 0946 to 1026 of which more than 50% was spent in counseling and care coordination.    Subjective:    Nadya Blake returned for a planned follow up visit. His wife, Carlee, accompanied him to the visit.    He continues to have nonexertional chest discomfort, unchanged from my previous descriptions.  He underwent nuclear stress testing for this in May at the request of his personal physician, Dr. Az Briseno, and the examination was normal.  He now describes an intermittent sharp right lower flank pain that is not related to exertion and has no associated symptoms.    When I saw him in March I advised him to go ahead with endoscopy and colonoscopy.  He never did so.    Past Medical History:    Patient Active Problem List   Diagnosis     Recurrent major depressive disorder, in partial remission (H)     Fatty liver disease, nonalcoholic     Gastroesophageal reflux disease with esophagitis     Essential hypertension     Non-toxic multinodular goiter      Post-traumatic osteoarthritis of both knees     Primary osteoarthritis of left hip     Primary osteoarthritis of right hip     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)     Coronary artery disease, CABG 6/2020     Pre-diabetes     Dyslipidemia, goal LDL below 70     Benign prostatic hyperplasia with nocturia     PTSD (post-traumatic stress disorder)     Ascending aorta dilatation (H)     Chronic superficial gastritis     GERD (gastroesophageal reflux disease)     IBS (irritable bowel syndrome)     Post herpetic neuralgia     TMJ arthritis       Past Medical History:   Diagnosis Date     Acute non-ST elevation myocardial infarction (NSTEMI) (H) 6/22/2020     Adenomatous polyp of colon      Ascending aorta dilatation (H)      Carpal tunnel syndrome      Chronic superficial gastritis      Coronary artery disease due to lipid rich plaque 6/23/2020     Depression with anxiety      Dyslipidemia, goal LDL below 70 6/23/2020     Essential hypertension 9/2/2012     Fatty liver disease, nonalcoholic      GERD (gastroesophageal reflux disease)      IBS (irritable bowel syndrome)      Left lumbar radiculopathy      Multinodular goiter      Old torn meniscus of knee      Paroxysmal atrial fibrillation (H)      Perianal abscess      Post herpetic neuralgia      Post-traumatic osteoarthritis of both knees      Primary osteoarthritis of left hip      Primary osteoarthritis of right hip      Pulmonary nodule      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)      Thyroid nodule      TMJ arthritis      Tobacco use disorder 11/1/2013     Vitamin D deficiency 9/30/2020       Past Surgical History:   Procedure Laterality Date     APPENDECTOMY  1995     BYPASS GRAFT ARTERY CORONARY  06/24/2020    Dr. Ragsdale     CV CORONARY ANGIOGRAM N/A 6/23/2020    Procedure: Coronary Angiogram;  Surgeon: Ariane Lester MD;  Location: [facility] Cath Lab;  Service: Cardiology     CV IABP INSERT N/A  2020    Procedure: Intra Aortic Balloon Pump Insertion;  Surgeon: Ariane Lester MD;  Location: [facility] Cath Lab;  Service: Cardiology     CV LEFT HEART CATHETERIZATION WITHOUT LEFT VENTRICULOGRAM Left 2020    Procedure: Left Heart Catheterization Without Left Ventriculogram;  Surgeon: Ariane Lester MD;  Location: [facility] Cath Lab;  Service: Cardiology        reports that he quit smoking about 16 months ago. His smoking use included cigarettes and cigarettes. He has a 30.00 pack-year smoking history. He has never used smokeless tobacco. He reports that he does not drink alcohol and does not use drugs.  Social History     Socioeconomic History     Marital status:      Spouse name: Not on file     Number of children: 2     Years of education: Not on file     Highest education level: Not on file   Occupational History     Not on file   Tobacco Use     Smoking status: Former Smoker     Packs/day: 1.00     Years: 30.00     Pack years: 30.00     Types: Cigarettes, Cigarettes     Quit date: 3/23/2020     Years since quittin.3     Smokeless tobacco: Never Used     Tobacco comment: stopped 3/24/2020   Substance and Sexual Activity     Alcohol use: No     Drug use: Never     Sexual activity: Yes     Partners: Female   Other Topics Concern     Not on file   Social History Narrative    , Carlee, BA chemistry and taking AA courses at [employer].  From [place]; bachelors in geology and computer science. Son, Grace (2005) and Sherrie (2008).  On SSDI, PTSD.       Social Determinants of Health     Financial Resource Strain:      Difficulty of Paying Living Expenses:    Food Insecurity:      Worried About Running Out of Food in the Last Year:      Ran Out of Food in the Last Year:    Transportation Needs:      Lack of Transportation (Medical):      Lack of Transportation (Non-Medical):    Physical Activity:      Days of Exercise per Week:      Minutes of Exercise per Session:    Stress:      Feeling  of Stress :    Social Connections:      Frequency of Communication with Friends and Family:      Frequency of Social Gatherings with Friends and Family:      Attends Oriental orthodox Services:      Active Member of Clubs or Organizations:      Attends Club or Organization Meetings:      Marital Status:    Intimate Partner Violence:      Fear of Current or Ex-Partner:      Emotionally Abused:      Physically Abused:      Sexually Abused:        Family History   Problem Relation Age of Onset     No Known Problems Mother      Lung Cancer Father 56.00        fatal     No Known Problems Daughter      Other - See Comments Son         congenital deformity of right leg       Outpatient Encounter Medications as of 7/23/2021   Medication Sig Dispense Refill     acetaminophen (TYLENOL) 500 MG tablet [ACETAMINOPHEN (TYLENOL) 500 MG TABLET] Take 2 tablets (1,000 mg total) by mouth every 6 (six) hours as needed for pain. 300 tablet 3     albuterol (PROAIR HFA;PROVENTIL HFA;VENTOLIN HFA) 90 mcg/actuation inhaler [ALBUTEROL (PROAIR HFA;PROVENTIL HFA;VENTOLIN HFA) 90 MCG/ACTUATION INHALER] Inhale 1-2 puffs every 4 (four) hours as needed for wheezing.       aspirin 81 mg chewable tablet [ASPIRIN 81 MG CHEWABLE TABLET] 1 tablet (81 mg total) by Enteral Tube route daily. 90 tablet 3     budesonide-formoteroL (SYMBICORT) 160-4.5 mcg/actuation inhaler [BUDESONIDE-FORMOTEROL (SYMBICORT) 160-4.5 MCG/ACTUATION INHALER] Inhale 2 puffs 2 (two) times a day. Taking as needed.       cholecalciferol, vitamin D3, (VITAMIN D3) 25 mcg (1,000 unit) capsule [CHOLECALCIFEROL, VITAMIN D3, (VITAMIN D3) 25 MCG (1,000 UNIT) CAPSULE] Take 2 capsules (2,000 Units total) by mouth daily. 90 capsule 3     clopidogreL (PLAVIX) 75 mg tablet [CLOPIDOGREL (PLAVIX) 75 MG TABLET] Take 1 tablet (75 mg total) by mouth every morning. 90 tablet 3     diclofenac sodium (VOLTAREN) 1 % Gel [DICLOFENAC SODIUM (VOLTAREN) 1 % GEL] Apply 4 g topically 4 (four) times a day. 500 g 11      gabapentin (NEURONTIN) 300 MG capsule [GABAPENTIN (NEURONTIN) 300 MG CAPSULE] Take 1 capsule (300 mg total) by mouth at bedtime. 90 capsule 3     hydrOXYzine pamoate (VISTARIL) 25 MG capsule [HYDROXYZINE PAMOATE (VISTARIL) 25 MG CAPSULE] Take 1 capsule (25 mg total) by mouth 3 (three) times a day as needed for anxiety. 90 capsule 1     metoprolol succinate (TOPROL-XL) 100 MG 24 hr tablet [METOPROLOL SUCCINATE (TOPROL-XL) 100 MG 24 HR TABLET] Take 1.5 tablets (150 mg total) by mouth at bedtime. 135 tablet 3     omeprazole (PRILOSEC) 20 MG capsule [OMEPRAZOLE (PRILOSEC) 20 MG CAPSULE] Take 1 capsule (20 mg total) by mouth 2 (two) times a day before meals. 180 capsule 3     QUEtiapine (SEROQUEL) 100 MG tablet [QUETIAPINE (SEROQUEL) 100 MG TABLET] Take 1.5 tablets (150 mg total) by mouth at bedtime. 45 tablet 2     QUEtiapine (SEROQUEL) 50 MG tablet [QUETIAPINE (SEROQUEL) 50 MG TABLET] Take 1 tablet (50 mg total) by mouth 2 (two) times a day as needed. 60 tablet 3     rosuvastatin (CRESTOR) 40 MG tablet [ROSUVASTATIN (CRESTOR) 40 MG TABLET] Take 1 tablet (40 mg total) by mouth daily. 90 tablet 3     tamsulosin (FLOMAX) 0.4 mg cap [TAMSULOSIN (FLOMAX) 0.4 MG CAP] Take 1 capsule (0.4 mg total) by mouth daily. 90 capsule 3     clonazePAM (KLONOPIN) 0.5 MG tablet [CLONAZEPAM (KLONOPIN) 0.5 MG TABLET] Take 1 tablet (0.5 mg total) by mouth 2 (two) times a day as needed for anxiety. (Patient not taking: Reported on 7/23/2021) 30 tablet 0     escitalopram oxalate (LEXAPRO) 20 MG tablet [ESCITALOPRAM OXALATE (LEXAPRO) 20 MG TABLET] Take 1 tablet (20 mg total) by mouth daily. (Patient not taking: Reported on 7/23/2021) 90 tablet 1     hydrOXYzine pamoate (VISTARIL) 25 MG capsule [HYDROXYZINE PAMOATE (VISTARIL) 25 MG CAPSULE] Take 1-2 capsules (25-50 mg total) by mouth 3 (three) times a day as needed for anxiety. (Patient not taking: Reported on 7/23/2021) 90 capsule 2     No facility-administered encounter medications on file  as of 7/23/2021.       Review of Systems:     Please see the [facility] review of systems report, which I personally reviewed.    Objective:     /72 (BP Location: Left arm, Patient Position: Sitting, Cuff Size: Adult Large)   Pulse 64   Resp 16   Wt 89.2 kg (196 lb 9.6 oz)   BMI 33.75 kg/m    Wt Readings from Last 5 Encounters:   07/23/21 89.2 kg (196 lb 9.6 oz)   06/25/21 89 kg (196 lb 3.2 oz)   06/04/21 89 kg (196 lb 4.8 oz)   05/24/21 88.6 kg (195 lb 4 oz)   04/23/21 88 kg (194 lb)        Physical Exam:    GENERAL APPEARANCE: alert, no apparent distress  EYES: no scleral icterus  NECK: no carotid bruits or adenopathy, jugular venous pressure is difficult to assess due to obesity  CHEST: symmetric, the lungs are clear to auscultation  CARDIOVASCULAR: regular rhythm without murmur or gallop  EXTREMITIES: no cyanosis, clubbing or edema  SKIN: no xanthelasma  NEUROLOGIC: normal gait and coordination  PSYCHIATRIC: mood is subdued and affect is concerned    Cardiac Testing:    EKG: Sinus rhythm with first degree AV block, otherwise normal EKG per my personal review.    Imaging:    NM Lexiscan stress test  Order: [id number]  Status:  Final result   Visible to patient:  Yes (MyChart) Next appt:  07/29/2021 at 08:40 AM in Internal Medicine (Az Briseno MD) Dx:  ALVES (dyspnea on exertion); Pain of le...   0 Result Notes     1 Patient Communication    Reading Physician Reading Date Result Priority   Brandon Malhotra MD  [phone] 5/21/2021 Routine   Provider, Historical 5/21/2021       Result Text     The nuclear stress test is negative for inducible myocardial ischemia or infarction.     The left ventricular ejection fraction at stress is 65%.     There is no prior study for comparison.          Lab Results:    Creatinine   Date Value Ref Range Status   05/24/2021 1.09 0.70 - 1.30 mg/dL Final   03/23/2021 1.17 0.70 - 1.30 mg/dL Final   11/21/2020 1.42 (H) 0.70 - 1.30 mg/dL Final   09/22/2020 0.97 0.70 - 1.30 mg/dL  Final      Potassium   Date Value Ref Range Status   05/24/2021 4.4 3.5 - 5.0 mmol/L Final   03/23/2021 4.3 3.5 - 5.0 mmol/L Final   11/21/2020 4.0 3.5 - 5.0 mmol/L Final      CBC RESULTS:   Recent Labs   Lab Test 03/23/21  1040   WBC 6.4   RBC 5.22   HGB 14.8   HCT 45.1   MCV 86   MCH 28.4   MCHC 32.8   RDW 11.7         LDL Cholesterol Calculated   Date Value Ref Range Status   01/07/2021 14 <=129 mg/dL Final     LDL Cholesterol Direct   Date Value Ref Range Status   08/07/2020 41 <=129 mg/dl Final      INR   Date Value Ref Range Status   07/27/2020 1.10 0.90 - 1.10 Final         Lab Results   Component Value Date    BUN 16 05/24/2021     05/24/2021    CO2 26 05/24/2021     Lab Results   Component Value Date    CHOL 94 01/07/2021    TRIG 253 01/07/2021    HDL 29 01/07/2021     BNP (pg/mL)   Date Value   07/27/2020 79 (H)   06/22/2020 <10     Lab Results   Component Value Date    WBC 6.4 03/23/2021    HGB 14.8 03/23/2021    HCT 45.1 03/23/2021    MCV 86 03/23/2021     03/23/2021           Much or all of the text in this note was generated through the use of the Dragon Dictate voice-to-text software. Errors in spelling or words which seem out of context are unintentional. Sound alike errors, in particular, may have escaped editing.

## 2021-07-23 NOTE — LETTER
7/23/2021    Az Briseno MD  [facility] [address] [facility] [address]    RE: Nadya Blake       Dear Colleague,    I had the pleasure of seeing Nadya Blake in the [facility].          Assessment:    Problem List Items Addressed This Visit        Endocrine    Dyslipidemia, goal LDL below 70 (Chronic)       Circulatory    Coronary artery disease, CABG 6/2020 - Primary (Chronic)      Other Visit Diagnoses     Chest pain, unspecified type               Plan:    1. Discontinue clopidogrel as it has been more than a year since coronary bypass surgery.  He will stay on 81 mg of aspirin daily lifelong.  2. I advised him to proceed with endoscopy and colonoscopy as already recommended by his gastroenterologist.  Aspirin can be held for the procedure, if clinically necessary.  3. I do not recommend any further cardiac testing at this time for his chronic chest discomfort.  4. I shared with the patient and his wife that I will retire next month and our team will make arrangements for him to see my colleague, Dr. Sloan Burns, in 1 year.    An After Visit Summary was printed and given to the patient.    This visit comprised 40 minutes of time from 0946 to 1026 of which more than 50% was spent in counseling and care coordination.    Subjective:    Nadya Blake returned for a planned follow up visit. His wife, Carlee, accompanied him to the visit.    He continues to have nonexertional chest discomfort, unchanged from my previous descriptions.  He underwent nuclear stress testing for this in May at the request of his personal physician, Dr. Az Briseno, and the examination was normal.  He now describes an intermittent sharp right lower flank pain that is not related to exertion and has no associated symptoms.    When I saw him in March I advised him to go ahead with endoscopy and colonoscopy.  He never did so.    Past Medical  History:    Patient Active Problem List   Diagnosis     Recurrent major depressive disorder, in partial remission (H)     Fatty liver disease, nonalcoholic     Gastroesophageal reflux disease with esophagitis     Essential hypertension     Non-toxic multinodular goiter     Post-traumatic osteoarthritis of both knees     Primary osteoarthritis of left hip     Primary osteoarthritis of right hip     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)     Coronary artery disease, CABG 6/2020     Pre-diabetes     Dyslipidemia, goal LDL below 70     Benign prostatic hyperplasia with nocturia     PTSD (post-traumatic stress disorder)     Ascending aorta dilatation (H)     Chronic superficial gastritis     GERD (gastroesophageal reflux disease)     IBS (irritable bowel syndrome)     Post herpetic neuralgia     TMJ arthritis       Past Medical History:   Diagnosis Date     Acute non-ST elevation myocardial infarction (NSTEMI) (H) 6/22/2020     Adenomatous polyp of colon      Ascending aorta dilatation (H)      Carpal tunnel syndrome      Chronic superficial gastritis      Coronary artery disease due to lipid rich plaque 6/23/2020     Depression with anxiety      Dyslipidemia, goal LDL below 70 6/23/2020     Essential hypertension 9/2/2012     Fatty liver disease, nonalcoholic      GERD (gastroesophageal reflux disease)      IBS (irritable bowel syndrome)      Left lumbar radiculopathy      Multinodular goiter      Old torn meniscus of knee      Paroxysmal atrial fibrillation (H)      Perianal abscess      Post herpetic neuralgia      Post-traumatic osteoarthritis of both knees      Primary osteoarthritis of left hip      Primary osteoarthritis of right hip      Pulmonary nodule      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)      Thyroid nodule      TMJ arthritis      Tobacco use disorder 11/1/2013     Vitamin D deficiency 9/30/2020       Past Surgical History:   Procedure Laterality  Date     APPENDECTOMY  1995     BYPASS GRAFT ARTERY CORONARY  2020    Dr. Ragsdale     CV CORONARY ANGIOGRAM N/A 2020    Procedure: Coronary Angiogram;  Surgeon: Ariane Lester MD;  Location: [facility];  Service: Cardiology     CV IABP INSERT N/A 2020    Procedure: Intra Aortic Balloon Pump Insertion;  Surgeon: Ariane Lester MD;  Location: [facility];  Service: Cardiology     CV LEFT HEART CATHETERIZATION WITHOUT LEFT VENTRICULOGRAM Left 2020    Procedure: Left Heart Catheterization Without Left Ventriculogram;  Surgeon: Ariane Lester MD;  Location: [facility];  Service: Cardiology        reports that he quit smoking about 16 months ago. His smoking use included cigarettes and cigarettes. He has a 30.00 pack-year smoking history. He has never used smokeless tobacco. He reports that he does not drink alcohol and does not use drugs.  Social History     Socioeconomic History     Marital status:      Spouse name: Not on file     Number of children: 2     Years of education: Not on file     Highest education level: Not on file   Occupational History     Not on file   Tobacco Use     Smoking status: Former Smoker     Packs/day: 1.00     Years: 30.00     Pack years: 30.00     Types: Cigarettes, Cigarettes     Quit date: 3/23/2020     Years since quittin.3     Smokeless tobacco: Never Used     Tobacco comment: stopped 3/24/2020   Substance and Sexual Activity     Alcohol use: No     Drug use: Never     Sexual activity: Yes     Partners: Female   Other Topics Concern     Not on file   Social History Narrative    , Carlee, BA chemistry and taking AA courses at [employer].  From [place]; bachelors in geology and computer science. Son, Grace () and Sherrie (2008).  On SSDI, PTSD.       Social Determinants of Health     Financial Resource Strain:      Difficulty of Paying Living Expenses:    Food Insecurity:      Worried About Running Out of Food in the  Last Year:      Ran Out of Food in the Last Year:    Transportation Needs:      Lack of Transportation (Medical):      Lack of Transportation (Non-Medical):    Physical Activity:      Days of Exercise per Week:      Minutes of Exercise per Session:    Stress:      Feeling of Stress :    Social Connections:      Frequency of Communication with Friends and Family:      Frequency of Social Gatherings with Friends and Family:      Attends Alevism Services:      Active Member of Clubs or Organizations:      Attends Club or Organization Meetings:      Marital Status:    Intimate Partner Violence:      Fear of Current or Ex-Partner:      Emotionally Abused:      Physically Abused:      Sexually Abused:        Family History   Problem Relation Age of Onset     No Known Problems Mother      Lung Cancer Father 56.00        fatal     No Known Problems Daughter      Other - See Comments Son         congenital deformity of right leg       Outpatient Encounter Medications as of 7/23/2021   Medication Sig Dispense Refill     acetaminophen (TYLENOL) 500 MG tablet [ACETAMINOPHEN (TYLENOL) 500 MG TABLET] Take 2 tablets (1,000 mg total) by mouth every 6 (six) hours as needed for pain. 300 tablet 3     albuterol (PROAIR HFA;PROVENTIL HFA;VENTOLIN HFA) 90 mcg/actuation inhaler [ALBUTEROL (PROAIR HFA;PROVENTIL HFA;VENTOLIN HFA) 90 MCG/ACTUATION INHALER] Inhale 1-2 puffs every 4 (four) hours as needed for wheezing.       aspirin 81 mg chewable tablet [ASPIRIN 81 MG CHEWABLE TABLET] 1 tablet (81 mg total) by Enteral Tube route daily. 90 tablet 3     budesonide-formoteroL (SYMBICORT) 160-4.5 mcg/actuation inhaler [BUDESONIDE-FORMOTEROL (SYMBICORT) 160-4.5 MCG/ACTUATION INHALER] Inhale 2 puffs 2 (two) times a day. Taking as needed.       cholecalciferol, vitamin D3, (VITAMIN D3) 25 mcg (1,000 unit) capsule [CHOLECALCIFEROL, VITAMIN D3, (VITAMIN D3) 25 MCG (1,000 UNIT) CAPSULE] Take 2 capsules (2,000 Units total) by mouth daily. 90 capsule  3     clopidogreL (PLAVIX) 75 mg tablet [CLOPIDOGREL (PLAVIX) 75 MG TABLET] Take 1 tablet (75 mg total) by mouth every morning. 90 tablet 3     diclofenac sodium (VOLTAREN) 1 % Gel [DICLOFENAC SODIUM (VOLTAREN) 1 % GEL] Apply 4 g topically 4 (four) times a day. 500 g 11     gabapentin (NEURONTIN) 300 MG capsule [GABAPENTIN (NEURONTIN) 300 MG CAPSULE] Take 1 capsule (300 mg total) by mouth at bedtime. 90 capsule 3     hydrOXYzine pamoate (VISTARIL) 25 MG capsule [HYDROXYZINE PAMOATE (VISTARIL) 25 MG CAPSULE] Take 1 capsule (25 mg total) by mouth 3 (three) times a day as needed for anxiety. 90 capsule 1     metoprolol succinate (TOPROL-XL) 100 MG 24 hr tablet [METOPROLOL SUCCINATE (TOPROL-XL) 100 MG 24 HR TABLET] Take 1.5 tablets (150 mg total) by mouth at bedtime. 135 tablet 3     omeprazole (PRILOSEC) 20 MG capsule [OMEPRAZOLE (PRILOSEC) 20 MG CAPSULE] Take 1 capsule (20 mg total) by mouth 2 (two) times a day before meals. 180 capsule 3     QUEtiapine (SEROQUEL) 100 MG tablet [QUETIAPINE (SEROQUEL) 100 MG TABLET] Take 1.5 tablets (150 mg total) by mouth at bedtime. 45 tablet 2     QUEtiapine (SEROQUEL) 50 MG tablet [QUETIAPINE (SEROQUEL) 50 MG TABLET] Take 1 tablet (50 mg total) by mouth 2 (two) times a day as needed. 60 tablet 3     rosuvastatin (CRESTOR) 40 MG tablet [ROSUVASTATIN (CRESTOR) 40 MG TABLET] Take 1 tablet (40 mg total) by mouth daily. 90 tablet 3     tamsulosin (FLOMAX) 0.4 mg cap [TAMSULOSIN (FLOMAX) 0.4 MG CAP] Take 1 capsule (0.4 mg total) by mouth daily. 90 capsule 3     clonazePAM (KLONOPIN) 0.5 MG tablet [CLONAZEPAM (KLONOPIN) 0.5 MG TABLET] Take 1 tablet (0.5 mg total) by mouth 2 (two) times a day as needed for anxiety. (Patient not taking: Reported on 7/23/2021) 30 tablet 0     escitalopram oxalate (LEXAPRO) 20 MG tablet [ESCITALOPRAM OXALATE (LEXAPRO) 20 MG TABLET] Take 1 tablet (20 mg total) by mouth daily. (Patient not taking: Reported on 7/23/2021) 90 tablet 1     hydrOXYzine pamoate  (VISTARIL) 25 MG capsule [HYDROXYZINE PAMOATE (VISTARIL) 25 MG CAPSULE] Take 1-2 capsules (25-50 mg total) by mouth 3 (three) times a day as needed for anxiety. (Patient not taking: Reported on 7/23/2021) 90 capsule 2     No facility-administered encounter medications on file as of 7/23/2021.       Review of Systems:     Please see the [facility] review of systems report, which I personally reviewed.    Objective:     /72 (BP Location: Left arm, Patient Position: Sitting, Cuff Size: Adult Large)   Pulse 64   Resp 16   Wt 89.2 kg (196 lb 9.6 oz)   BMI 33.75 kg/m    Wt Readings from Last 5 Encounters:   07/23/21 89.2 kg (196 lb 9.6 oz)   06/25/21 89 kg (196 lb 3.2 oz)   06/04/21 89 kg (196 lb 4.8 oz)   05/24/21 88.6 kg (195 lb 4 oz)   04/23/21 88 kg (194 lb)        Physical Exam:    GENERAL APPEARANCE: alert, no apparent distress  EYES: no scleral icterus  NECK: no carotid bruits or adenopathy, jugular venous pressure is difficult to assess due to obesity  CHEST: symmetric, the lungs are clear to auscultation  CARDIOVASCULAR: regular rhythm without murmur or gallop  EXTREMITIES: no cyanosis, clubbing or edema  SKIN: no xanthelasma  NEUROLOGIC: normal gait and coordination  PSYCHIATRIC: mood is subdued and affect is concerned    Cardiac Testing:    EKG: Sinus rhythm with first degree AV block, otherwise normal EKG per my personal review.    Imaging:    NM Lexiscan stress test  Order: [id number]  Status:  Final result   Visible to patient:  Yes (MyChart) Next appt:  07/29/2021 at 08:40 AM in Internal Medicine (Az Briseno MD) Dx:  ALVES (dyspnea on exertion); Pain of le...   0 Result Notes     1 Patient Communication    Reading Physician Reading Date Result Priority   Brandon Malhotra MD  [phone] 5/21/2021 Routine   Provider, Historical 5/21/2021       Result Text     The nuclear stress test is negative for inducible myocardial ischemia or infarction.     The left ventricular ejection fraction at stress is  65%.     There is no prior study for comparison.          Lab Results:    Creatinine   Date Value Ref Range Status   05/24/2021 1.09 0.70 - 1.30 mg/dL Final   03/23/2021 1.17 0.70 - 1.30 mg/dL Final   11/21/2020 1.42 (H) 0.70 - 1.30 mg/dL Final   09/22/2020 0.97 0.70 - 1.30 mg/dL Final      Potassium   Date Value Ref Range Status   05/24/2021 4.4 3.5 - 5.0 mmol/L Final   03/23/2021 4.3 3.5 - 5.0 mmol/L Final   11/21/2020 4.0 3.5 - 5.0 mmol/L Final      CBC RESULTS:   Recent Labs   Lab Test 03/23/21  1040   WBC 6.4   RBC 5.22   HGB 14.8   HCT 45.1   MCV 86   MCH 28.4   MCHC 32.8   RDW 11.7         LDL Cholesterol Calculated   Date Value Ref Range Status   01/07/2021 14 <=129 mg/dL Final     LDL Cholesterol Direct   Date Value Ref Range Status   08/07/2020 41 <=129 mg/dl Final      INR   Date Value Ref Range Status   07/27/2020 1.10 0.90 - 1.10 Final         Lab Results   Component Value Date    BUN 16 05/24/2021     05/24/2021    CO2 26 05/24/2021     Lab Results   Component Value Date    CHOL 94 01/07/2021    TRIG 253 01/07/2021    HDL 29 01/07/2021     BNP (pg/mL)   Date Value   07/27/2020 79 (H)   06/22/2020 <10     Lab Results   Component Value Date    WBC 6.4 03/23/2021    HGB 14.8 03/23/2021    HCT 45.1 03/23/2021    MCV 86 03/23/2021     03/23/2021           Much or all of the text in this note was generated through the use of the Dragon Dictate voice-to-text software. Errors in spelling or words which seem out of context are unintentional. Sound alike errors, in particular, may have escaped editing.          Thank you for allowing me to participate in the care of your patient.      Sincerely,     Brandon Malhotra MD     [facility]  cc:   No referring provider defined for this encounter.

## 2021-07-23 NOTE — PATIENT INSTRUCTIONS
Nadya,    It was a pleasure to see you today at [facility].    Your chest pain is not coming from your heart.    Please go ahead with the endoscopy and colonoscopy.     Please stop clopidogrel as it has been a year since your heart surgery. Please stay on 81 mg of aspirin by mouth each day.    You will see Dr. Sloan Burns in one year.     Please call us if you have any questions or concerns about your heart.      Crow Malhotra M.D.  [facility]

## 2021-07-25 ENCOUNTER — PATIENT OUTREACH (OUTPATIENT)
Dept: CARE COORDINATION | Facility: CLINIC | Age: 56
End: 2021-07-25

## 2021-07-25 NOTE — PROGRESS NOTES
Contact   Chart Review     Situation: Patient chart reviewed by .    Assessment: chart updated after epic integration.     Plan/Recommendations: CHW delegation in place. [facility] to call on July 29.    Jannette Patel,   [facility]  [phone]

## 2021-07-27 ENCOUNTER — TELEPHONE (OUTPATIENT)
Dept: CARDIOLOGY | Facility: CLINIC | Age: 56
End: 2021-07-27

## 2021-07-27 ENCOUNTER — PATIENT OUTREACH (OUTPATIENT)
Dept: NURSING | Facility: CLINIC | Age: 56
End: 2021-07-27

## 2021-07-27 NOTE — PROGRESS NOTES
Clinic Care Coordination Contact    Follow Up Progress Note      Assessment: Message from wife asking for a call back regarding appt with [facility] being scheduled for late September and they do not want to wait that long.  Called and talked first to patient who is having some chest and shoulder pain.  He had appt with Dr. Malhotra, cardiologist who took him off Plavix and replaced it with baby aspirin. Since then, he has not been able to sleep well at night. Wife is also not sleeping well since he isn't sleeping well. He was sleeping well before the medication change and they wonder if the medication change is the reason. He continues to take his other medications.  He took his blood pressure one night and it was 163/108. He does the driving for the family and if he went to ED by ambulance, she couldn't go with him and that would make him anxious.  They are both sad that Dr. Malhotra is retiring and thye will be seeing Dr. Burns instead.  Discussed options and wife will contact Dr. Malhotra to get advice.  Patient also has appt with pcp in two days.      They called [facility] to set up appointments and were disappointed it is so far away. They do not have a wait list.  She is going to call again to see if there is a PA who may have openings before late September.      Goals addressed this encounter:   Goals Addressed                    This Visit's Progress       Patient Stated       Medical (pt-stated)   50%      Goal Statement: I will have colonoscopy and endoscopy within 3 months.   Date Goal set: 5-   Barriers: None identified.   Strengths: willing to have them done.   Date to Achieve By: 8-   Patient expressed understanding of goal: yes   Action steps to achieve this goal:   1. I will scheduled these procedures for this summer.   2. I will discuss with PCP and GI clinic any questions.   3. I will report progress towards this goal at scheduled outreach telephone calls from the CCC team.   Discussed on 6/4/21,  6-28, 7-27 appt late September with [facility]             Intervention/Education provided during outreach: How to proceed with concerns.      Outreach Frequency: monthly    Plan:   [facility] to do outreach in 2 weeks.   Jannette Patel,   [facility]  [phone]

## 2021-07-27 NOTE — TELEPHONE ENCOUNTER
Per Dr. Malhotra's 7-23-21 visit note:  Plan:     1. Discontinue clopidogrel as it has been more than a year since coronary bypass surgery.  He will stay on 81 mg of aspirin daily lifelong.  2. I advised him to proceed with endoscopy and colonoscopy as already recommended by his gastroenterologist.  Aspirin can be held for the procedure, if clinically necessary.  3. I do not recommend any further cardiac testing at this time for his chronic chest discomfort.  4. I shared with the patient and his wife that I will retire next month and our team will make arrangements for him to see my colleague, Dr. Sloan Burns, in 1 year.

## 2021-07-27 NOTE — TELEPHONE ENCOUNTER
"Return call to patient's wife Carlee who stated patient is not actively having CP or had another episode since Monday night - both episodes started around 1900 and lasted until am - per Carlee, patient described sx as \"feeling like his heart was squeezing\" and discomfort was in chest/shoulders and moved into hands - patient denied sx of dyspnea, dizziness, nausea, diaphoresis during episodes and reported that pain was similar to what he experienced prior to heart surgery in June.    Carlee explained that she does not drive and did not want patient to drive so she offered to call 911 and patient declined - Carlee thinks patient is nervous about recently stopping Plavix which is contributing to sx and takes daily medications for psychiatric issues but does not have prn anti-anxiety Rx.    Informed Carlee that patient's most recent nuc on 5-21-21 did not show any new blood flow issues but if sx recur, patient should be evaluated in ED - encouraged her to have patient f/u with psychiatrist r.e. anxiety.    Carlee verbalized understanding and reported that patient has follow-up with PCP 7-29-21 and will discuss recent events - reassired her that PCP could refer patient to [facility] if urgent cardiology appt warranted and update would also be forwarded to Dr. Malhotra's colleague in his absence.    Please review patient update - any new orders prior to Dr. Malhotra's return?  mg  "

## 2021-07-27 NOTE — TELEPHONE ENCOUNTER
----- Message from Imelda Marshall sent at 7/27/2021 11:52 AM CDT -----  Regarding: ALIE pt  General phone call:    Caller: Spouse - Carlee  Primary cardiologist: ALIE  Detailed reason for call: 7/23 ALIE saw pt and said to stop taking Clopidogrel.  Sunday and Monday night he had chest pain.  He felt pain going to his hands and his BP was 163/118 two nights ago.   He wouldn't go to the ER.   Best phone number: [phone] her # and his cell is [phone]   Best time to contact: any  Ok to leave a detailed message? yes  Device? no    Additional Info:

## 2021-07-29 ENCOUNTER — OFFICE VISIT (OUTPATIENT)
Dept: INTERNAL MEDICINE | Facility: CLINIC | Age: 56
End: 2021-07-29
Payer: COMMERCIAL

## 2021-07-29 VITALS
BODY MASS INDEX: 33.48 KG/M2 | WEIGHT: 196.1 LBS | HEART RATE: 70 BPM | SYSTOLIC BLOOD PRESSURE: 122 MMHG | DIASTOLIC BLOOD PRESSURE: 80 MMHG | OXYGEN SATURATION: 95 % | HEIGHT: 64 IN

## 2021-07-29 DIAGNOSIS — K21.00 GASTROESOPHAGEAL REFLUX DISEASE WITH ESOPHAGITIS WITHOUT HEMORRHAGE: Primary | ICD-10-CM

## 2021-07-29 DIAGNOSIS — I25.83 CORONARY ARTERY DISEASE DUE TO LIPID RICH PLAQUE: Chronic | ICD-10-CM

## 2021-07-29 DIAGNOSIS — I10 ESSENTIAL HYPERTENSION: Chronic | ICD-10-CM

## 2021-07-29 DIAGNOSIS — J02.0 STREPTOCOCCAL SORE THROAT: ICD-10-CM

## 2021-07-29 DIAGNOSIS — J02.0 PHARYNGITIS DUE TO GROUP A BETA HEMOLYTIC STREPTOCOCCI: Primary | ICD-10-CM

## 2021-07-29 DIAGNOSIS — Z12.11 ENCOUNTER FOR SCREENING COLONOSCOPY: ICD-10-CM

## 2021-07-29 DIAGNOSIS — I25.10 CORONARY ARTERY DISEASE DUE TO LIPID RICH PLAQUE: Chronic | ICD-10-CM

## 2021-07-29 DIAGNOSIS — R10.13 DYSPEPSIA: ICD-10-CM

## 2021-07-29 DIAGNOSIS — R13.19 ESOPHAGEAL DYSPHAGIA: ICD-10-CM

## 2021-07-29 LAB — DEPRECATED S PYO AG THROAT QL EIA: POSITIVE

## 2021-07-29 PROCEDURE — 99214 OFFICE O/P EST MOD 30 MIN: CPT | Performed by: INTERNAL MEDICINE

## 2021-07-29 RX ORDER — PENICILLIN V POTASSIUM 500 MG/1
500 TABLET, FILM COATED ORAL 2 TIMES DAILY
Qty: 20 TABLET | Refills: 0 | Status: SHIPPED | OUTPATIENT
Start: 2021-07-29 | End: 2021-08-08

## 2021-07-29 ASSESSMENT — MIFFLIN-ST. JEOR: SCORE: 1640.5

## 2021-07-29 NOTE — PROGRESS NOTES
Office Visit - Follow Up   Nadya Blake   56 year old male    Date of Visit: 7/29/2021    Chief Complaint   Patient presents with     RECHECK     patient has been having chest pain, communicating with Dr. Malhotra, came in for EKG 7/23/21        Assessment and Plan   1. Gastroesophageal reflux disease with esophagitis without hemorrhage  Prior to seeing Dr. Javon Rae will get an upper endoscopy performed  - Adult Gastro Ref - Procedure Only; Future    2. Dyspepsia  - Adult Gastro Ref - Procedure Only; Future    3. Esophageal dysphagia  - Adult Gastro Ref - Procedure Only; Future    4. Encounter for screening colonoscopy  - Adult Gastro Ref - Procedure Only; Future    5. Streptococcal sore throat  Strep test was positive treat with penicillin  - Streptococcus A Rapid Scr w Reflx to PCR - Lab Collect; Future  - Streptococcus A Rapid Scr w Reflx to PCR - Lab Collect    6. Coronary artery disease, CABG 6/2020  Stable,, chest pain most consistent with anxiety, continue same    7. Essential hypertension  Blood pressure okay continue same    Return in about 4 weeks (around 8/26/2021).     History of Present Illness   This 56 year old man comes in for follow-up.  Overall doing well.  Recently stop clopidogrel under the guidance of Dr. Brandon Malhotra as its been over 1 year since his coronary bypass grafting.  Since that time he said increased anxiety about possibility of a heart attack.  He has had some left-sided chest discomfort.  Not currently experiencing this.  This is usually in the evening when his anxiety is worse.  He has no associated diaphoresis or shortness of breath.  Extensive recent evaluation shows patent grafts etc.  Been a bit of a sore throat.  Has not yet had his endoscopy, has follow-up with Dr. Javon Horton in September.    Review of Systems: A comprehensive review of systems was negative except as noted.     Medications, Allergies and Problem List   Reviewed, reconciled and updated  Post  "Discharge Medication Reconciliation Status:      Physical Exam   General Appearance:   No acute distress    /80 (BP Location: Left arm, Patient Position: Sitting, Cuff Size: Adult Regular)   Pulse 70   Ht 1.626 m (5' 4\")   Wt 89 kg (196 lb 1.6 oz)   SpO2 95%   BMI 33.66 kg/m      HEENT exam is unremarkable  Neck supple no thyromegaly or nodule palpable  Lymphatic no cervical lymphadenopathy  Cardiovascular regular rate and rhythm no murmur gallop or rub  Pulmonary lungs are clear to auscultation bilaterally  Gastrointestinal abdomen soft nontender nondistended no organomegaly  Neurologic exam is non focal  Psychiatric pleasant, no confusion or agitation        Additional Information   Current Outpatient Medications   Medication Sig Dispense Refill     acetaminophen (TYLENOL) 500 MG tablet [ACETAMINOPHEN (TYLENOL) 500 MG TABLET] Take 2 tablets (1,000 mg total) by mouth every 6 (six) hours as needed for pain. 300 tablet 3     albuterol (PROAIR HFA;PROVENTIL HFA;VENTOLIN HFA) 90 mcg/actuation inhaler [ALBUTEROL (PROAIR HFA;PROVENTIL HFA;VENTOLIN HFA) 90 MCG/ACTUATION INHALER] Inhale 1-2 puffs every 4 (four) hours as needed for wheezing.       aspirin 81 mg chewable tablet [ASPIRIN 81 MG CHEWABLE TABLET] 1 tablet (81 mg total) by Enteral Tube route daily. 90 tablet 3     budesonide-formoteroL (SYMBICORT) 160-4.5 mcg/actuation inhaler [BUDESONIDE-FORMOTEROL (SYMBICORT) 160-4.5 MCG/ACTUATION INHALER] Inhale 2 puffs 2 (two) times a day. Taking as needed.       cholecalciferol, vitamin D3, (VITAMIN D3) 25 mcg (1,000 unit) capsule [CHOLECALCIFEROL, VITAMIN D3, (VITAMIN D3) 25 MCG (1,000 UNIT) CAPSULE] Take 2 capsules (2,000 Units total) by mouth daily. 90 capsule 3     diclofenac sodium (VOLTAREN) 1 % Gel [DICLOFENAC SODIUM (VOLTAREN) 1 % GEL] Apply 4 g topically 4 (four) times a day. 500 g 11     gabapentin (NEURONTIN) 300 MG capsule [GABAPENTIN (NEURONTIN) 300 MG CAPSULE] Take 1 capsule (300 mg total) by mouth " at bedtime. 90 capsule 3     hydrOXYzine pamoate (VISTARIL) 25 MG capsule [HYDROXYZINE PAMOATE (VISTARIL) 25 MG CAPSULE] Take 1 capsule (25 mg total) by mouth 3 (three) times a day as needed for anxiety. 90 capsule 1     metoprolol succinate (TOPROL-XL) 100 MG 24 hr tablet [METOPROLOL SUCCINATE (TOPROL-XL) 100 MG 24 HR TABLET] Take 1.5 tablets (150 mg total) by mouth at bedtime. 135 tablet 3     omeprazole (PRILOSEC) 20 MG capsule [OMEPRAZOLE (PRILOSEC) 20 MG CAPSULE] Take 1 capsule (20 mg total) by mouth 2 (two) times a day before meals. 180 capsule 3     QUEtiapine (SEROQUEL) 100 MG tablet [QUETIAPINE (SEROQUEL) 100 MG TABLET] Take 1.5 tablets (150 mg total) by mouth at bedtime. 45 tablet 2     QUEtiapine (SEROQUEL) 50 MG tablet [QUETIAPINE (SEROQUEL) 50 MG TABLET] Take 1 tablet (50 mg total) by mouth 2 (two) times a day as needed. 60 tablet 3     rosuvastatin (CRESTOR) 40 MG tablet [ROSUVASTATIN (CRESTOR) 40 MG TABLET] Take 1 tablet (40 mg total) by mouth daily. 90 tablet 3     tamsulosin (FLOMAX) 0.4 mg cap [TAMSULOSIN (FLOMAX) 0.4 MG CAP] Take 1 capsule (0.4 mg total) by mouth daily. 90 capsule 3     penicillin V (VEETID) 500 MG tablet Take 1 tablet (500 mg) by mouth 2 times daily for 10 days 20 tablet 0     Allergies   Allergen Reactions     Atorvastatin Diarrhea     Cortisone Other (See Comments)     pain     Lisinopril Cough     started after CABG for unclear reason     Methylprednisolone Other (See Comments)     ?     Social History     Tobacco Use     Smoking status: Former Smoker     Packs/day: 1.00     Years: 30.00     Pack years: 30.00     Types: Cigarettes, Cigarettes     Quit date: 3/23/2020     Years since quittin.3     Smokeless tobacco: Never Used     Tobacco comment: stopped 3/24/2020   Substance Use Topics     Alcohol use: No     Drug use: Never       Review and/or order of clinical lab tests:  Review and/or order of radiology tests:  Review and/or order of medicine tests:  Discussion of test  results with performing physician:  Decision to obtain old records and/or obtain history from someone other than the patient:  Review and summarization of old records and/or obtaining history from someone other than the patient and.or discussion of case with another health care provider:  Independent visualization of image, tracing or specimen itself:    Time:      Az Briseno MD

## 2021-08-03 PROBLEM — I77.810 ASCENDING AORTA DILATATION (H): Chronic | Status: RESOLVED | Noted: 2020-02-16 | Resolved: 2020-10-08

## 2021-08-10 ENCOUNTER — PATIENT OUTREACH (OUTPATIENT)
Dept: CARE COORDINATION | Facility: CLINIC | Age: 56
End: 2021-08-10

## 2021-08-10 NOTE — PROGRESS NOTES
Patient spoke with [facility] SW on 7/27/21 and discussed goals     CHW delegations: CHW will continue to support patient with goals through routine scheduled outreach.     Next outreach due: 8/26/21

## 2021-08-17 ENCOUNTER — PATIENT OUTREACH (OUTPATIENT)
Dept: NURSING | Facility: CLINIC | Age: 56
End: 2021-08-17

## 2021-08-17 NOTE — PROGRESS NOTES
Clinic Care Coordination Contact    Follow Up Progress Note      Assessment: Talked with patient and his wife.  They have had two deaths in their family and this has been very difficult for them. A 32 yr old nephew  in motorcycle crash. He was an orphan and they have known him since he was a child.  They also had a cousin who  of covid.  Patient is having what his wife thinks, is grief reaction. He is feeling that he has nerve pain in different parts of his body, like an electrical shock.  She thinks he is anxious and thinking a lot of the family who have .  She will continue to monitor. They have an appt with GI for mid September and are comfortable with waiting until then.      Care Gaps:    Health Maintenance Due   Topic Date Due     PREVENTIVE CARE VISIT  Never done     ADVANCE CARE PLANNING  Never done     DEPRESSION ACTION PLAN  Never done     Pneumococcal Vaccine: Pediatrics (0 to 5 Years) and At-Risk Patients (6 to 64 Years) (1 of 2 - PPSV23) Never done     COLORECTAL CANCER SCREENING  Never done     ZOSTER IMMUNIZATION (1 of 2) Never done       Postponed to not wanting to set up at this time.      Goals addressed this encounter:   Goals Addressed                    This Visit's Progress       Patient Stated       Medical (pt-stated)         Goal Statement: I will have colonoscopy and endoscopy within 3 months.   Date Goal set: 2021   Barriers: None identified.   Strengths: willing to have them done.   Date to Achieve By: 2021   Patient expressed understanding of goal: yes   Action steps to achieve this goal:   1. I will scheduled these procedures for this summer.   2. I will discuss with PCP and GI clinic any questions.   3. I will report progress towards this goal at scheduled outreach telephone calls from the CCC team.   Discussed on 21, ,  appt late September with [facility],  discussed            Intervention/Education provided during outreach: Will send care plan via My  Chart. Has appt with Dr. Quevedo next week and they like him.      Outreach Frequency: 2 weeks    Plan:   Will call in two weeks.    Jannette Patel,   [facility]  [phone]

## 2021-08-17 NOTE — LETTER
[facility] CARE COORDINATION  [facility]    August 17, 2021        Nadya Blake  [address]  [place] [address]          Dear Jacinda Covington is an updated Complex Care Plan for your continued enrollment in Care Coordination. Please let us know if you have additional questions, concerns, or goals that we can assist with.    Sincerely,    Jannette Patel,   [facility] [phone]        [facility]  Patient Centered Plan of Care  About Me:        Patient Name:  Nadya Blake    YOB: 1965  Age:         56 year old   [place] MRN:    [id number] Telephone Information:  Home Phone [phone]   Mobile [phone]       Address:  [address]  [place] [address] Email address:  [email or web address]      Emergency Contact(s)    Name Relationship Lgl Grd Work Phone Home Phone Mobile Phone   1. RUSSELLJUANITA CAMP Spouse   [phone]            Primary language:  Turkish     needed? Yes   [place] [facility]:  [phone] op. 1  Other communication barriers: Language barrier  Preferred Method of Communication:     Current living arrangement:    Mobility Status/ Medical Equipment:      Health Maintenance  Health Maintenance Reviewed: Due/Overdue   Health Maintenance Due   Topic Date Due     PREVENTIVE CARE VISIT  Never done     ADVANCE CARE PLANNING  Never done     DEPRESSION ACTION PLAN  Never done     Pneumococcal Vaccine: Pediatrics (0 to 5 Years) and At-Risk Patients (6 to 64 Years) (1 of 2 - PPSV23) Never done     COLORECTAL CANCER SCREENING  Never done     ZOSTER IMMUNIZATION (1 of 2) Never done         My Access Plan  Medical Emergency 911   Primary Clinic Line [facility] - [phone]   24 Hour Appointment Line [phone] or  [phone]) (toll-free)   24 Hour Nurse Line [phone] (toll-free)   Preferred Urgent Care     Preferred Hospital     Preferred  Pharmacy [id number] - [place], [place] - [address] AT [facility] [address]     Behavioral Health Crisis Line The National Suicide Prevention Lifeline at 1-824.878.6167 or 911             My Care Team Members  Patient Care Team       Relationship Specialty Notifications Start End    Az Briseno MD PCP - General   9/22/20     Phone: [phone] Fax: [phone]         [facility] [address] [facility] [address]    Amelie Britton Community Health Worker Primary Care - CC Admissions 11/20/20     Phone: [phone] Fax: [phone]        Jannette Patel LSW Lead Care Coordinator Primary Care - [facility] Admissions 11/23/20     Fax: [phone]         Az Briseno MD Assigned PCP   6/16/21     Phone: [phone] Fax: [phone]         [facility] [address] [facility] [address]    Brandon Malhotra MD Assigned Heart and Vascular Provider   7/16/21     Phone: [phone] Fax: [phone] [address] [facility] [address] [facility] [address]    Nelsy Mccormick PA-C Assigned Surgical Provider   7/16/21     Phone: [phone] Fax: [phone]         [address] [facility] [address]    Franco Quevedo MD Assigned Behavioral Health Provider   7/16/21     Phone: [phone]          [address] [facility] [address] [facility] [address]            My Care Plans  Self Management and Treatment Plan  Goals and (Comments)  Goals        General     Medical (pt-stated)      Notes - Note edited  8/17/2021  1:37 PM by Jannette aPtel LSW     Goal Statement: I will have colonoscopy and endoscopy within 3 months.   Date Goal set: 5-   Barriers: None identified.   Strengths: willing to have them done.   Date to Achieve By: 8-   Patient expressed understanding of goal: yes   Action steps to achieve this goal:   1. I will scheduled these procedures for this summer.   2. I will discuss with PCP and GI clinic any  questions.   3. I will report progress towards this goal at scheduled outreach telephone calls from the CCC team.   Discussed on 6/4/21, 6-28, 7-27 appt late September with TIERRA, 8-17 discussed             Action Plans on File:                       Advance Care Plans/Directives Type:        My Medical and Care Information  Problem List   Patient Active Problem List   Diagnosis     Recurrent major depressive disorder, in partial remission (H)     Fatty liver disease, nonalcoholic     Gastroesophageal reflux disease with esophagitis     Essential hypertension     Non-toxic multinodular goiter     Post-traumatic osteoarthritis of both knees     Primary osteoarthritis of left hip     Primary osteoarthritis of right hip     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)     Coronary artery disease, CABG 6/2020     Pre-diabetes     Dyslipidemia, goal LDL below 70     Benign prostatic hyperplasia with nocturia     PTSD (post-traumatic stress disorder)     Ascending aorta dilatation (H)     Chronic superficial gastritis     GERD (gastroesophageal reflux disease)     IBS (irritable bowel syndrome)     Post herpetic neuralgia     TMJ arthritis      Current Medications and Allergies:   Current Outpatient Medications   Medication Instructions     acetaminophen (TYLENOL) 1,000 mg, Oral, EVERY 6 HOURS PRN     albuterol (PROAIR HFA;PROVENTIL HFA;VENTOLIN HFA) 90 mcg/actuation inhaler 1-2 puffs, EVERY 4 HOURS PRN     aspirin (ASA) 81 mg, Per Feeding Tube, DAILY     budesonide-formoteroL (SYMBICORT) 160-4.5 mcg/actuation inhaler 2 puffs, 2 TIMES DAILY     cholecalciferol (VITAMIN D3) 2,000 Units, Oral, DAILY     diclofenac (VOLTAREN) 4 g, Topical, 4 TIMES DAILY     gabapentin (NEURONTIN) 300 mg, Oral, AT BEDTIME     hydrOXYzine (VISTARIL) 25 mg, Oral, 3 TIMES DAILY PRN     metoprolol succinate ER (TOPROL-XL) 150 mg, Oral, AT BEDTIME     omeprazole (PRILOSEC) 20 mg, Oral, 2 TIMES DAILY BEFORE MEALS     QUEtiapine  (SEROQUEL) 150 mg, Oral, AT BEDTIME     QUEtiapine (SEROQUEL) 50 mg, Oral, 2 TIMES DAILY PRN     rosuvastatin (CRESTOR) 40 mg, Oral, DAILY     tamsulosin (FLOMAX) 0.4 mg, Oral, DAILY         Care Coordination Start Date: 11/19/2020   Frequency of Care Coordination: 2 weeks   Form Last Updated: 08/17/2021

## 2021-08-23 ENCOUNTER — VIRTUAL VISIT (OUTPATIENT)
Dept: BEHAVIORAL HEALTH | Facility: CLINIC | Age: 56
End: 2021-08-23
Payer: COMMERCIAL

## 2021-08-23 DIAGNOSIS — F43.10 PTSD (POST-TRAUMATIC STRESS DISORDER): Primary | ICD-10-CM

## 2021-08-23 PROCEDURE — 99213 OFFICE O/P EST LOW 20 MIN: CPT | Mod: 95 | Performed by: PSYCHIATRY & NEUROLOGY

## 2021-08-23 RX ORDER — ESCITALOPRAM OXALATE 10 MG/1
10 TABLET ORAL DAILY
Qty: 30 TABLET | Refills: 3 | Status: SHIPPED | OUTPATIENT
Start: 2021-08-23 | End: 2021-12-01

## 2021-08-23 NOTE — PROGRESS NOTES
This video/telephone visit will be conducted via a call between you and your physician/provider. We have found that certain health care needs can be provided without the need for an in-person physical exam. This service lets us provide the care you need with a video /telephone conversation. If a prescription is necessary we can send it directly to your pharmacy. If lab work is needed we can place an order for that and you can then stop by our lab to have the test done at a later time.    Just as we bill insurance for in-person visits, we also bill insurance for video/telephone visits. If you have questions about your insurance coverage, we recommend that you speak with your insurance company.    Patient has given verbal consent for video/Telephone visit? yes  Patient would like the video visit invitation sent by: Call to 's phone:[phone]    Patient verified allergies, medications and pharmacy via phone. Patient states he  is ready for visit.
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
## 2021-08-23 NOTE — PROGRESS NOTES
________________________________________  Medications Phoned  to Pharmacy [] yes [x]no  Name of Pharmacist:  List Medications, including dose, quantity and instructions    Medications ordered this visit were e-scribed.  Verified by order class [x] yes  [] no   escitalopram 10mg    Medication changes or discontinuations were communicated to patient's pharmacy: [] yes  [x] no    Dictation completed at time of chart check: [] yes  [x] no    I have checked the documentation for today s encounters and the above information has been reviewed and completed.

## 2021-08-30 PROBLEM — K60.30 ANAL FISTULA: Status: ACTIVE | Noted: 2021-08-30

## 2021-08-31 ENCOUNTER — PATIENT OUTREACH (OUTPATIENT)
Dept: NURSING | Facility: CLINIC | Age: 56
End: 2021-08-31

## 2021-08-31 SDOH — ECONOMIC STABILITY: FOOD INSECURITY: WITHIN THE PAST 12 MONTHS, THE FOOD YOU BOUGHT JUST DIDN'T LAST AND YOU DIDN'T HAVE MONEY TO GET MORE.: NEVER TRUE

## 2021-08-31 SDOH — ECONOMIC STABILITY: TRANSPORTATION INSECURITY
IN THE PAST 12 MONTHS, HAS LACK OF TRANSPORTATION KEPT YOU FROM MEETINGS, WORK, OR FROM GETTING THINGS NEEDED FOR DAILY LIVING?: NO

## 2021-08-31 SDOH — ECONOMIC STABILITY: TRANSPORTATION INSECURITY
IN THE PAST 12 MONTHS, HAS THE LACK OF TRANSPORTATION KEPT YOU FROM MEDICAL APPOINTMENTS OR FROM GETTING MEDICATIONS?: NO

## 2021-08-31 SDOH — ECONOMIC STABILITY: INCOME INSECURITY: IN THE LAST 12 MONTHS, WAS THERE A TIME WHEN YOU WERE NOT ABLE TO PAY THE MORTGAGE OR RENT ON TIME?: NO

## 2021-08-31 SDOH — ECONOMIC STABILITY: FOOD INSECURITY: WITHIN THE PAST 12 MONTHS, YOU WORRIED THAT YOUR FOOD WOULD RUN OUT BEFORE YOU GOT MONEY TO BUY MORE.: NEVER TRUE

## 2021-08-31 NOTE — PROGRESS NOTES
Clinic Care Coordination Contact    Follow Up Progress Note      Assessment: Talked to patient briefly and requested writer call his wife on the cell phone.  Talked to Carlee and he had appt with PCP yesterday and they discussed his anal fistula. Since he already has appoitment with [facility] in September, they will discuss this concern at that time.  He is taking his medication Lexapro each morning. He had stopped taking it until he was instructed to take it after last appt with Dr. Quevedo.      He is grieving the deaths in the family and is most comfortable being outside in the greenery or [place]. He will drive there and sit.  Wife checks on him frequently on the phone when he is out.  No other concerns.     Care Gaps:    Health Maintenance Due   Topic Date Due     PREVENTIVE CARE VISIT  Never done     ADVANCE CARE PLANNING  Never done     DEPRESSION ACTION PLAN  Never done     Pneumococcal Vaccine: Pediatrics (0 to 5 Years) and At-Risk Patients (6 to 64 Years) (1 of 2 - PPSV23) Never done     COLORECTAL CANCER SCREENING  Never done     ZOSTER IMMUNIZATION (1 of 2) Never done     INFLUENZA VACCINE (1) 09/01/2021       Declined due to getting other appointments done.      Goals addressed this encounter:   Goals Addressed                    This Visit's Progress       Patient Stated       Medical (pt-stated)   60%      Goal Statement: I will have colonoscopy and endoscopy within 3 months.   Date Goal set: 5-   Barriers: None identified.   Strengths: willing to have them done.   Date to Achieve By: 8-   Patient expressed understanding of goal: yes   Action steps to achieve this goal:   1. I will scheduled these procedures for this summer.   2. I will discuss with PCP and GI clinic any questions.   3. I will report progress towards this goal at scheduled outreach telephone calls from the CCC team.   Discussed on 6/4/21, 6-28, 7-27 appt late September with [facility], 8-17, 8-31 discussed             Intervention/Education provided during outreach: none provided.      Outreach Frequency: 2 weeks    Plan:   Will call patient/wife in 2 weeks.   Care Coordinator will follow up in as needed.  Jannette Patel,   [facility]  [phone]

## 2021-09-14 ENCOUNTER — PATIENT OUTREACH (OUTPATIENT)
Dept: CARE COORDINATION | Facility: CLINIC | Age: 56
End: 2021-09-14

## 2021-09-14 NOTE — PROGRESS NOTES
Patient spoke with CCC SW on 8/31/21 and discussed goals     CHW delegations: none listed    Next outreach due: 9/30/21

## 2021-09-17 ENCOUNTER — TELEPHONE (OUTPATIENT)
Dept: INTERNAL MEDICINE | Facility: CLINIC | Age: 56
End: 2021-09-17

## 2021-09-28 ENCOUNTER — TELEPHONE (OUTPATIENT)
Dept: INTERNAL MEDICINE | Facility: CLINIC | Age: 56
End: 2021-09-28

## 2021-09-28 ENCOUNTER — OFFICE VISIT (OUTPATIENT)
Dept: INTERNAL MEDICINE | Facility: CLINIC | Age: 56
End: 2021-09-28
Payer: COMMERCIAL

## 2021-09-28 VITALS
DIASTOLIC BLOOD PRESSURE: 78 MMHG | HEIGHT: 64 IN | HEART RATE: 58 BPM | OXYGEN SATURATION: 98 % | BODY MASS INDEX: 32.08 KG/M2 | WEIGHT: 187.9 LBS | SYSTOLIC BLOOD PRESSURE: 112 MMHG

## 2021-09-28 DIAGNOSIS — K60.30 ANAL FISTULA: ICD-10-CM

## 2021-09-28 DIAGNOSIS — I25.83 CORONARY ARTERY DISEASE DUE TO LIPID RICH PLAQUE: Chronic | ICD-10-CM

## 2021-09-28 DIAGNOSIS — M25.512 CHRONIC LEFT SHOULDER PAIN: ICD-10-CM

## 2021-09-28 DIAGNOSIS — K21.9 GASTROESOPHAGEAL REFLUX DISEASE WITHOUT ESOPHAGITIS: ICD-10-CM

## 2021-09-28 DIAGNOSIS — G89.29 CHRONIC LEFT SHOULDER PAIN: ICD-10-CM

## 2021-09-28 DIAGNOSIS — I25.10 CORONARY ARTERY DISEASE DUE TO LIPID RICH PLAQUE: Chronic | ICD-10-CM

## 2021-09-28 DIAGNOSIS — I10 ESSENTIAL HYPERTENSION: Primary | ICD-10-CM

## 2021-09-28 PROCEDURE — 90471 IMMUNIZATION ADMIN: CPT | Performed by: INTERNAL MEDICINE

## 2021-09-28 PROCEDURE — 99214 OFFICE O/P EST MOD 30 MIN: CPT | Mod: 25 | Performed by: INTERNAL MEDICINE

## 2021-09-28 PROCEDURE — 90682 RIV4 VACC RECOMBINANT DNA IM: CPT | Performed by: INTERNAL MEDICINE

## 2021-09-28 RX ORDER — METOPROLOL SUCCINATE 100 MG/1
150 TABLET, EXTENDED RELEASE ORAL AT BEDTIME
Qty: 135 TABLET | Refills: 3 | Status: SHIPPED | OUTPATIENT
Start: 2021-09-28 | End: 2022-09-14

## 2021-09-28 ASSESSMENT — MIFFLIN-ST. JEOR: SCORE: 1603.31

## 2021-09-28 NOTE — PROGRESS NOTES
"  Office Visit - Follow Up   Nadya Balke   56 year old male    Date of Visit: 9/28/2021    Chief Complaint   Patient presents with     RECHECK        Assessment and Plan   1. Essential hypertension  Blood pressure okay continue same  - metoprolol succinate ER (TOPROL-XL) 100 MG 24 hr tablet; Take 1.5 tablets (150 mg) by mouth At Bedtime  Dispense: 135 tablet; Refill: 3    2. Chronic left shoulder pain  - Physical Therapy Referral; Future    3. Gastroesophageal reflux disease without esophagitis  We will have an upper endoscopy in October, will be seeing Dr. Javon Horton tomorrow    4. Anal fistula  Encourage increase in fiber, may need to see colorectal surgery will also be seeing Dr. Javon Rae tomorrow    5. Coronary artery disease, CABG 6/2020  Stable continue same    Return in about 4 weeks (around 10/26/2021) for Follow up.     History of Present Illness   This 56 year old man comes in for follow-up.  Overall doing okay.  Has been having some left shoulder pain.  Worse with overhead range of motion.  No exertional chest pain or shortness of breath.  He has a possible anal fistula which has previously been evaluated by colorectal surgery.  He does see Dr. Javon Rae tomorrow.  Upcoming colonoscopy and upper endoscopy and October    Review of Systems: A comprehensive review of systems was negative except as noted.     Medications, Allergies and Problem List   Reviewed, reconciled and updated  Post Discharge Medication Reconciliation Status:      Physical Exam   General Appearance:   No acute distress    /78 (BP Location: Left arm, Patient Position: Sitting, Cuff Size: Adult Regular)   Pulse 58   Ht 1.626 m (5' 4\")   Wt 85.2 kg (187 lb 14.4 oz)   SpO2 98%   BMI 32.25 kg/m      HEENT exam is unremarkable  Cardiovascular regular rate and rhythm no murmur gallop or rub  Pulmonary lungs are clear to auscultation bilaterally  Gastrointestinal abdomen soft nontender nondistended no " organomegaly  Neurologic exam is non focal  Psychiatric pleasant, no confusion or agitation        Additional Information   Current Outpatient Medications   Medication Sig Dispense Refill     acetaminophen (TYLENOL) 500 MG tablet [ACETAMINOPHEN (TYLENOL) 500 MG TABLET] Take 2 tablets (1,000 mg total) by mouth every 6 (six) hours as needed for pain. 300 tablet 3     albuterol (PROAIR HFA;PROVENTIL HFA;VENTOLIN HFA) 90 mcg/actuation inhaler [ALBUTEROL (PROAIR HFA;PROVENTIL HFA;VENTOLIN HFA) 90 MCG/ACTUATION INHALER] Inhale 1-2 puffs every 4 (four) hours as needed for wheezing.       aspirin 81 mg chewable tablet [ASPIRIN 81 MG CHEWABLE TABLET] 1 tablet (81 mg total) by Enteral Tube route daily. 90 tablet 3     budesonide-formoteroL (SYMBICORT) 160-4.5 mcg/actuation inhaler [BUDESONIDE-FORMOTEROL (SYMBICORT) 160-4.5 MCG/ACTUATION INHALER] Inhale 2 puffs 2 (two) times a day. Taking as needed.       cholecalciferol, vitamin D3, (VITAMIN D3) 25 mcg (1,000 unit) capsule [CHOLECALCIFEROL, VITAMIN D3, (VITAMIN D3) 25 MCG (1,000 UNIT) CAPSULE] Take 2 capsules (2,000 Units total) by mouth daily. 90 capsule 3     diclofenac sodium (VOLTAREN) 1 % Gel [DICLOFENAC SODIUM (VOLTAREN) 1 % GEL] Apply 4 g topically 4 (four) times a day. 500 g 11     escitalopram (LEXAPRO) 10 MG tablet Take 1 tablet (10 mg) by mouth daily 30 tablet 3     gabapentin (NEURONTIN) 300 MG capsule Take 1 capsule (300 mg) by mouth daily 90 capsule 3     hydrOXYzine pamoate (VISTARIL) 25 MG capsule [HYDROXYZINE PAMOATE (VISTARIL) 25 MG CAPSULE] Take 1 capsule (25 mg total) by mouth 3 (three) times a day as needed for anxiety. 90 capsule 1     metoprolol succinate ER (TOPROL-XL) 100 MG 24 hr tablet Take 1.5 tablets (150 mg) by mouth At Bedtime 135 tablet 3     omeprazole (PRILOSEC) 20 MG capsule [OMEPRAZOLE (PRILOSEC) 20 MG CAPSULE] Take 1 capsule (20 mg total) by mouth 2 (two) times a day before meals. 180 capsule 3     QUEtiapine (SEROQUEL) 100 MG tablet  [QUETIAPINE (SEROQUEL) 100 MG TABLET] Take 1.5 tablets (150 mg total) by mouth at bedtime. 45 tablet 2     QUEtiapine (SEROQUEL) 50 MG tablet [QUETIAPINE (SEROQUEL) 50 MG TABLET] Take 1 tablet (50 mg total) by mouth 2 (two) times a day as needed. 60 tablet 3     rosuvastatin (CRESTOR) 40 MG tablet [ROSUVASTATIN (CRESTOR) 40 MG TABLET] Take 1 tablet (40 mg total) by mouth daily. 90 tablet 3     tamsulosin (FLOMAX) 0.4 mg cap [TAMSULOSIN (FLOMAX) 0.4 MG CAP] Take 1 capsule (0.4 mg total) by mouth daily. 90 capsule 3     Allergies   Allergen Reactions     Atorvastatin Diarrhea     Cortisone Other (See Comments)     pain     Lisinopril Cough     started after CABG for unclear reason     Methylprednisolone Other (See Comments)     ?     Social History     Tobacco Use     Smoking status: Former Smoker     Packs/day: 1.00     Years: 30.00     Pack years: 30.00     Types: Cigarettes, Cigarettes     Quit date: 3/23/2020     Years since quittin.5     Smokeless tobacco: Never Used     Tobacco comment: stopped 3/24/2020   Substance Use Topics     Alcohol use: No     Drug use: Never       Review and/or order of clinical lab tests:  Review and/or order of radiology tests:  Review and/or order of medicine tests:  Discussion of test results with performing physician:  Decision to obtain old records and/or obtain history from someone other than the patient:  Review and summarization of old records and/or obtaining history from someone other than the patient and.or discussion of case with another health care provider:  Independent visualization of image, tracing or specimen itself:    Time:      Az Briseno MD

## 2021-09-29 ENCOUNTER — TRANSFERRED RECORDS (OUTPATIENT)
Dept: HEALTH INFORMATION MANAGEMENT | Facility: CLINIC | Age: 56
End: 2021-09-29

## 2021-10-01 ENCOUNTER — PATIENT OUTREACH (OUTPATIENT)
Dept: CARE COORDINATION | Facility: CLINIC | Age: 56
End: 2021-10-01

## 2021-10-01 NOTE — PROGRESS NOTES
Clinic Care Coordination Contact    Follow Up Progress Note      Assessment: Call back from wife.  Patient is doing ok. His colonoscopy is on 10-25 in [place] with Dr. Sanders. He is finding sessions with psychiatrist helpful.  He is attending all of his appointments. No other concerns.     Care Gaps:    Health Maintenance Due   Topic Date Due     PREVENTIVE CARE VISIT  Never done     ADVANCE CARE PLANNING  Never done     Pneumococcal Vaccine: Pediatrics (0 to 5 Years) and At-Risk Patients (6 to 64 Years) (1 of 2 - PPSV23) Never done     COLORECTAL CANCER SCREENING  Never done     ZOSTER IMMUNIZATION (1 of 2) Never done       Postponed to next PCP appointment     Goals addressed this encounter:   Goals Addressed                    This Visit's Progress       Patient Stated       Medical (pt-stated)         Goal Statement: I will have colonoscopy and endoscopy within 3 months.   Date Goal set: 5-   Barriers: None identified.   Strengths: willing to have them done.   Date to Achieve By: 8-   Patient expressed understanding of goal: yes   Action steps to achieve this goal:   1. I will scheduled these procedures for this summer.   2. I will discuss with PCP and GI clinic any questions.   3. I will report progress towards this goal at scheduled outreach telephone calls from the CCC team.   Discussed on 6/4/21, 6-28, 7-27 appt late September with [facility], 8-17, 8-31 discussed, 10-25 scheduled colonoscopy            Intervention/Education provided during outreach: NA     Outreach Frequency: 2 weeks    Plan:     Care Coordinator will follow up in 2-3 weeks.     Contact  [facility]/Voicemail    Referral Source: Care Team  Clinical Data:  Outreach  Outreach attempted x 1.  Left message on wife's voicemail with call back information and requested return call.  Plan:   will try to reach patient again in 3-5 business days.  Jannette Patel,   [facility]  [facility]  [phone]

## 2021-10-07 ENCOUNTER — MYC MEDICAL ADVICE (OUTPATIENT)
Dept: INTERNAL MEDICINE | Facility: CLINIC | Age: 56
End: 2021-10-07

## 2021-10-07 DIAGNOSIS — E78.5 DYSLIPIDEMIA, GOAL LDL BELOW 70: ICD-10-CM

## 2021-10-08 RX ORDER — ROSUVASTATIN CALCIUM 40 MG/1
40 TABLET, COATED ORAL DAILY
Qty: 90 TABLET | Refills: 3 | Status: SHIPPED | OUTPATIENT
Start: 2021-10-08 | End: 2022-05-25

## 2021-10-14 ENCOUNTER — OFFICE VISIT (OUTPATIENT)
Dept: INTERNAL MEDICINE | Facility: CLINIC | Age: 56
End: 2021-10-14
Payer: COMMERCIAL

## 2021-10-14 VITALS
HEART RATE: 54 BPM | WEIGHT: 187.4 LBS | BODY MASS INDEX: 31.99 KG/M2 | DIASTOLIC BLOOD PRESSURE: 62 MMHG | OXYGEN SATURATION: 97 % | HEIGHT: 64 IN | SYSTOLIC BLOOD PRESSURE: 116 MMHG

## 2021-10-14 DIAGNOSIS — Z12.5 SCREENING FOR PROSTATE CANCER: ICD-10-CM

## 2021-10-14 DIAGNOSIS — G89.29 CHRONIC LEFT SHOULDER PAIN: ICD-10-CM

## 2021-10-14 DIAGNOSIS — K21.9 GASTROESOPHAGEAL REFLUX DISEASE WITHOUT ESOPHAGITIS: ICD-10-CM

## 2021-10-14 DIAGNOSIS — I10 ESSENTIAL HYPERTENSION: ICD-10-CM

## 2021-10-14 DIAGNOSIS — I25.10 CORONARY ARTERY DISEASE DUE TO LIPID RICH PLAQUE: ICD-10-CM

## 2021-10-14 DIAGNOSIS — M25.512 CHRONIC LEFT SHOULDER PAIN: ICD-10-CM

## 2021-10-14 DIAGNOSIS — R22.1 NECK FULLNESS: ICD-10-CM

## 2021-10-14 DIAGNOSIS — Z23 HIGH PRIORITY FOR 2019-NCOV VACCINE: ICD-10-CM

## 2021-10-14 DIAGNOSIS — M54.2 NECK PAIN ON LEFT SIDE: Primary | ICD-10-CM

## 2021-10-14 DIAGNOSIS — K60.30 ANAL FISTULA: ICD-10-CM

## 2021-10-14 DIAGNOSIS — I25.83 CORONARY ARTERY DISEASE DUE TO LIPID RICH PLAQUE: ICD-10-CM

## 2021-10-14 LAB
ALBUMIN SERPL-MCNC: 4.1 G/DL (ref 3.5–5)
ALP SERPL-CCNC: 84 U/L (ref 45–120)
ALT SERPL W P-5'-P-CCNC: 73 U/L (ref 0–45)
ANION GAP SERPL CALCULATED.3IONS-SCNC: 10 MMOL/L (ref 5–18)
AST SERPL W P-5'-P-CCNC: 34 U/L (ref 0–40)
BILIRUB SERPL-MCNC: 0.5 MG/DL (ref 0–1)
BUN SERPL-MCNC: 13 MG/DL (ref 8–22)
CALCIUM SERPL-MCNC: 9.4 MG/DL (ref 8.5–10.5)
CHLORIDE BLD-SCNC: 103 MMOL/L (ref 98–107)
CHOLEST SERPL-MCNC: 109 MG/DL
CO2 SERPL-SCNC: 27 MMOL/L (ref 22–31)
CREAT SERPL-MCNC: 1.29 MG/DL (ref 0.7–1.3)
ERYTHROCYTE [DISTWIDTH] IN BLOOD BY AUTOMATED COUNT: 12.1 % (ref 10–15)
FASTING STATUS PATIENT QL REPORTED: YES
GFR SERPL CREATININE-BSD FRML MDRD: 62 ML/MIN/1.73M2
GLUCOSE BLD-MCNC: 101 MG/DL (ref 70–125)
HCT VFR BLD AUTO: 46.1 % (ref 40–53)
HDLC SERPL-MCNC: 33 MG/DL
HGB BLD-MCNC: 15.1 G/DL (ref 13.3–17.7)
LDLC SERPL CALC-MCNC: 53 MG/DL
MCH RBC QN AUTO: 28.4 PG (ref 26.5–33)
MCHC RBC AUTO-ENTMCNC: 32.8 G/DL (ref 31.5–36.5)
MCV RBC AUTO: 87 FL (ref 78–100)
PLATELET # BLD AUTO: 207 10E3/UL (ref 150–450)
POTASSIUM BLD-SCNC: 4.6 MMOL/L (ref 3.5–5)
PROT SERPL-MCNC: 7 G/DL (ref 6–8)
PSA SERPL-MCNC: 1.03 UG/L (ref 0–3.5)
RBC # BLD AUTO: 5.31 10E6/UL (ref 4.4–5.9)
SODIUM SERPL-SCNC: 140 MMOL/L (ref 136–145)
TRIGL SERPL-MCNC: 115 MG/DL
WBC # BLD AUTO: 5.9 10E3/UL (ref 4–11)

## 2021-10-14 PROCEDURE — 99214 OFFICE O/P EST MOD 30 MIN: CPT | Mod: 25 | Performed by: INTERNAL MEDICINE

## 2021-10-14 PROCEDURE — 91300 COVID-19,PF,PFIZER (12+ YRS): CPT | Performed by: INTERNAL MEDICINE

## 2021-10-14 PROCEDURE — 36415 COLL VENOUS BLD VENIPUNCTURE: CPT | Performed by: INTERNAL MEDICINE

## 2021-10-14 PROCEDURE — 0003A COVID-19,PF,PFIZER (12+ YRS): CPT | Performed by: INTERNAL MEDICINE

## 2021-10-14 PROCEDURE — 80061 LIPID PANEL: CPT | Performed by: INTERNAL MEDICINE

## 2021-10-14 PROCEDURE — G0103 PSA SCREENING: HCPCS | Performed by: INTERNAL MEDICINE

## 2021-10-14 PROCEDURE — 80053 COMPREHEN METABOLIC PANEL: CPT | Performed by: INTERNAL MEDICINE

## 2021-10-14 PROCEDURE — 85027 COMPLETE CBC AUTOMATED: CPT | Performed by: INTERNAL MEDICINE

## 2021-10-14 ASSESSMENT — MIFFLIN-ST. JEOR: SCORE: 1601.04

## 2021-10-14 NOTE — PROGRESS NOTES
Office Visit - Follow Up   Nadya Blake   56 year old male    Date of Visit: 10/14/2021    Chief Complaint   Patient presents with     Rectal Problem     pain, bleeding x 1 week     Neck Problem     swelling on the left side     Chest Pain     x 10 days     Imm/Inj     COVID-19 VACCINE        Assessment and Plan   1. Neck pain on left side  - CT Soft Tissue Neck w/o Contrast; Future    2. Neck fullness  - CT Soft Tissue Neck w/o Contrast; Future    3. Chest pain  This is generally stable    4. Chronic left shoulder pain  We will have him do some PT  - Physical Therapy Referral; Future    5. High priority for 2019-nCoV vaccine  - COVID-19,PF,PFIZER    6. Essential hypertension  Pressure okay continue    7. Coronary artery disease due to lipid rich plaque  Continue secondary prevention  - CBC with platelets; Future  - Comprehensive metabolic panel; Future  - Lipid panel reflex to direct LDL Fasting; Future  - CBC with platelets  - Comprehensive metabolic panel  - Lipid panel reflex to direct LDL Fasting    8. Screening for prostate cancer  - Prostate Specific Antigen Screen; Future  - Prostate Specific Antigen Screen    9. Anal fistula  He will be having a colonoscopy and this will be evaluated, consider colorectal surgery consultation pending GI evaluation    10. Gastroesophageal reflux disease without esophagitis  I believe he will also have an upper endoscopy with his colonoscopy attending October      Return in about 4 weeks (around 11/11/2021) for Follow up.     History of Present Illness   This 56 year old man comes in for follow-up.  He continues to struggle with some left-sided chest and neck pain and left shoulder pain.  Physical therapy: Is scheduled but he was unable to schedule at the time he has not received a return phone call.  He continues to note some fullness in his left neck.  We reviewed his ultrasound from last fall which looked okay, nothing abnormal.  He recently met with GI, struggling  "with what he believes is an anal fistula.  He will be having upcoming endoscopy.    Review of Systems: A comprehensive review of systems was negative except as noted.     Medications, Allergies and Problem List   Reviewed, reconciled and updated  Post Discharge Medication Reconciliation Status:      Physical Exam   General Appearance:   No acute distress    /62 (BP Location: Left arm, Patient Position: Sitting, Cuff Size: Adult Regular)   Pulse 54   Ht 1.626 m (5' 4\")   Wt 85 kg (187 lb 6.4 oz)   SpO2 97%   BMI 32.17 kg/m      HEENT exam is unremarkable  Neck does appear to be a bit asymmetrical but no masses or nodules palpated  Lymphatic no cervical lymphadenopathy  Cardiovascular regular rate and rhythm no murmur gallop or rub  Pulmonary lungs are clear to auscultation bilaterally  Gastrointestinal abdomen soft nontender nondistended no organomegaly  Neurologic exam is non focal  Psychiatric pleasant, no confusion or agitation        Additional Information   Current Outpatient Medications   Medication Sig Dispense Refill     acetaminophen (TYLENOL) 500 MG tablet [ACETAMINOPHEN (TYLENOL) 500 MG TABLET] Take 2 tablets (1,000 mg total) by mouth every 6 (six) hours as needed for pain. 300 tablet 3     albuterol (PROAIR HFA;PROVENTIL HFA;VENTOLIN HFA) 90 mcg/actuation inhaler [ALBUTEROL (PROAIR HFA;PROVENTIL HFA;VENTOLIN HFA) 90 MCG/ACTUATION INHALER] Inhale 1-2 puffs every 4 (four) hours as needed for wheezing.       aspirin 81 mg chewable tablet [ASPIRIN 81 MG CHEWABLE TABLET] 1 tablet (81 mg total) by Enteral Tube route daily. 90 tablet 3     budesonide-formoteroL (SYMBICORT) 160-4.5 mcg/actuation inhaler [BUDESONIDE-FORMOTEROL (SYMBICORT) 160-4.5 MCG/ACTUATION INHALER] Inhale 2 puffs 2 (two) times a day. Taking as needed.       cholecalciferol, vitamin D3, (VITAMIN D3) 25 mcg (1,000 unit) capsule [CHOLECALCIFEROL, VITAMIN D3, (VITAMIN D3) 25 MCG (1,000 UNIT) CAPSULE] Take 2 capsules (2,000 Units total) " by mouth daily. 90 capsule 3     diclofenac sodium (VOLTAREN) 1 % Gel [DICLOFENAC SODIUM (VOLTAREN) 1 % GEL] Apply 4 g topically 4 (four) times a day. 500 g 11     escitalopram (LEXAPRO) 10 MG tablet Take 1 tablet (10 mg) by mouth daily 30 tablet 3     gabapentin (NEURONTIN) 300 MG capsule Take 1 capsule (300 mg) by mouth daily 90 capsule 3     hydrOXYzine pamoate (VISTARIL) 25 MG capsule [HYDROXYZINE PAMOATE (VISTARIL) 25 MG CAPSULE] Take 1 capsule (25 mg total) by mouth 3 (three) times a day as needed for anxiety. 90 capsule 1     metoprolol succinate ER (TOPROL-XL) 100 MG 24 hr tablet Take 1.5 tablets (150 mg) by mouth At Bedtime 135 tablet 3     omeprazole (PRILOSEC) 20 MG capsule [OMEPRAZOLE (PRILOSEC) 20 MG CAPSULE] Take 1 capsule (20 mg total) by mouth 2 (two) times a day before meals. 180 capsule 3     QUEtiapine (SEROQUEL) 100 MG tablet [QUETIAPINE (SEROQUEL) 100 MG TABLET] Take 1.5 tablets (150 mg total) by mouth at bedtime. 45 tablet 2     QUEtiapine (SEROQUEL) 50 MG tablet [QUETIAPINE (SEROQUEL) 50 MG TABLET] Take 1 tablet (50 mg total) by mouth 2 (two) times a day as needed. 60 tablet 3     rosuvastatin (CRESTOR) 40 MG tablet Take 1 tablet (40 mg) by mouth daily 90 tablet 3     tamsulosin (FLOMAX) 0.4 mg cap [TAMSULOSIN (FLOMAX) 0.4 MG CAP] Take 1 capsule (0.4 mg total) by mouth daily. 90 capsule 3     Allergies   Allergen Reactions     Atorvastatin Diarrhea     Cortisone Other (See Comments)     pain     Lisinopril Cough     started after CABG for unclear reason     Methylprednisolone Other (See Comments)     ?     Social History     Tobacco Use     Smoking status: Former Smoker     Packs/day: 1.00     Years: 30.00     Pack years: 30.00     Types: Cigarettes, Cigarettes     Quit date: 3/23/2020     Years since quittin.5     Smokeless tobacco: Never Used     Tobacco comment: stopped 3/24/2020   Substance Use Topics     Alcohol use: No     Drug use: Never       Review and/or order of clinical lab  tests:  Review and/or order of radiology tests:  Review and/or order of medicine tests:  Discussion of test results with performing physician:  Decision to obtain old records and/or obtain history from someone other than the patient:  Review and summarization of old records and/or obtaining history from someone other than the patient and.or discussion of case with another health care provider:  Independent visualization of image, tracing or specimen itself:    Time:      Az Briseno MD

## 2021-10-20 ENCOUNTER — PATIENT OUTREACH (OUTPATIENT)
Dept: CARE COORDINATION | Facility: CLINIC | Age: 56
End: 2021-10-20

## 2021-10-20 NOTE — PROGRESS NOTES
Contact  [facility]/Voicemail    Referral Source: Care Team  Clinical Data:  Outreach  Outreach attempted x 1.  Left message on voicemail with call back information and requested return call.    VM from wife- He is continuing to have a sore shoulder, doesn't eat well.  He has his colonoscopy and endoscopy on 10-25 and she will help with the prep.  He is worried about it.      Plan:   will try to reach patient/wife again in 10 business days.  Jannette Patel,   [facility]  [phone]

## 2021-10-22 DIAGNOSIS — F43.10 PTSD (POST-TRAUMATIC STRESS DISORDER): ICD-10-CM

## 2021-10-22 NOTE — TELEPHONE ENCOUNTER
Date of Last Office Visit: 8/23/21  Date of Next Office Visit: 12/10/21  No shows since last visit: None   Cancellations since last visit: None     Medication requested: QUEtiapine (SEROQUEL) 50 MG tablet Date last ordered: 7/5/2021 Qty: 60 Refills: 3     Review of MN ?: N/A    Lapse in medication adherence greater than 5 days?: PRN  If yes, call patient and gather details: N/A  Medication refill request verified as identical to current order?: yes  Result of Last DAM, VPA, Li+ Level, CBC, or Carbamazepine Level (at or since last visit): N/A    Last visit treatment plan:     []Medication refilled per  Medication Refill in Ambulatory Care  policy.  [x]Medication unable to be refilled by RN due to criteria not met as indicated below:    []Eligibility - not seen in the last year   []Supervision - no future appointment   []Compliance - no shows, cancellations or lapse in therapy   [x]Verification - order discrepancy   []Controlled medication   [x]Medication not included in policy   []90-day supply request   []Other

## 2021-10-23 RX ORDER — QUETIAPINE FUMARATE 50 MG/1
TABLET, FILM COATED ORAL
Qty: 60 TABLET | Refills: 3 | Status: SHIPPED | OUTPATIENT
Start: 2021-10-23 | End: 2022-10-05

## 2021-10-25 ENCOUNTER — TRANSFERRED RECORDS (OUTPATIENT)
Dept: HEALTH INFORMATION MANAGEMENT | Facility: CLINIC | Age: 56
End: 2021-10-25
Payer: COMMERCIAL

## 2021-10-27 ENCOUNTER — HOSPITAL ENCOUNTER (OUTPATIENT)
Dept: CT IMAGING | Facility: HOSPITAL | Age: 56
Discharge: HOME | End: 2021-10-27
Attending: INTERNAL MEDICINE | Admitting: INTERNAL MEDICINE
Payer: COMMERCIAL

## 2021-10-27 DIAGNOSIS — R22.1 NECK FULLNESS: ICD-10-CM

## 2021-10-27 DIAGNOSIS — M54.2 NECK PAIN ON LEFT SIDE: ICD-10-CM

## 2021-10-27 PROCEDURE — 70490 CT SOFT TISSUE NECK W/O DYE: CPT

## 2021-10-28 DIAGNOSIS — E04.1 THYROID NODULE: Primary | ICD-10-CM

## 2021-11-01 ENCOUNTER — PATIENT OUTREACH (OUTPATIENT)
Dept: NURSING | Facility: CLINIC | Age: 56
End: 2021-11-01

## 2021-11-01 NOTE — PROGRESS NOTES
Clinic Care Coordination Contact  Care Team Conversations  11-2  Call from wife who saw her pcp and was diagnosed with bronchitis and was prescribed antibiotics. Patient has a virtual appt tomorrow and she is concerned that pcp may not prescribe antibiotics due to being a virtual visit.  She thinks he also has bronchitis due to having the very same symptoms.  She was comfortable with forwarding this message onto PCP for consideration.  She was also offered having patient seen today at the walk in clinic or urgent care or [facility].  She will keep this in mind and keep a close watch over him today.  She is willing to change the appointment to in person if needed.    Plan- will call patient in 2 weeks and am available as needed.  Jannette Patel,   [facility]  [phone]  Clinic Care Coordination Contact    Follow Up Progress Note      Assessment: Call from wife who is concerned about her family who all have cough and flu symptoms and are having trouble sleeping due to cough.  She has raspy voice.   She and patient did covid test at [facility] which was negative.  The kids were fine and went to school today, but nurse called and they have covid symptoms and were sent home.  One child is sleeping.  Patient does not have temperature, but has green and yellow phelm he is coughing up.  She is worried that he has a bacterial infection and may need medicines.  She already sent a My Chart message into PCP at 4:30 AM.  She tried to set up appt with Dr. Briseno on My Chart, but next available is not until 11-9.  She is anxious to learn what they should be doing. Reviewed option through [facility] at [place].  She would rather to discuss options with Dr. Briseno.      Care Gaps: did not review care gaps or goal as wife is worried about today's illness.     Intervention/Education provided during outreach: Options for walk in clinic.      Outreach Frequency: 2 weeks    Plan:   Will route to PCP  pool for follow up.    Care Coordinator will follow up in two weeks and as needed.  Jannette Patel,   [facility]  [phone]

## 2021-11-03 ENCOUNTER — VIRTUAL VISIT (OUTPATIENT)
Dept: INTERNAL MEDICINE | Facility: CLINIC | Age: 56
End: 2021-11-03
Payer: COMMERCIAL

## 2021-11-03 DIAGNOSIS — J20.9 ACUTE BRONCHITIS, UNSPECIFIED ORGANISM: Primary | ICD-10-CM

## 2021-11-03 DIAGNOSIS — R05.9 COUGH: ICD-10-CM

## 2021-11-03 PROCEDURE — 99214 OFFICE O/P EST MOD 30 MIN: CPT | Mod: 95 | Performed by: INTERNAL MEDICINE

## 2021-11-03 RX ORDER — BENZONATATE 200 MG/1
100 CAPSULE ORAL 3 TIMES DAILY PRN
Qty: 30 CAPSULE | Refills: 1 | Status: SHIPPED | OUTPATIENT
Start: 2021-11-03 | End: 2021-12-15

## 2021-11-03 RX ORDER — AZITHROMYCIN 250 MG/1
TABLET, FILM COATED ORAL
Qty: 6 TABLET | Refills: 0 | Status: SHIPPED | OUTPATIENT
Start: 2021-11-03 | End: 2021-11-08

## 2021-11-03 NOTE — TELEPHONE ENCOUNTER
Contacted patient using [facility]  but did not know Patient already seen today by Dr. Briseno. No concerns or questions at this time from the patient.

## 2021-11-03 NOTE — PROGRESS NOTES
Nadya is a 56 year old who is being evaluated via a billable video visit.      How would you like to obtain your AVS? MyChart  If the video visit is dropped, the invitation should be resent by: Text to cell phone: [phone]  Will anyone else be joining your video visit? No      Video Start Time: 8:09 AM    1. Acute bronchitis, unspecified organism  Patient with symptoms of bronchitis, viral versus bacterial.  Covid testing negative.  Wife improving on medications as below and so we will give him similar medications.  - azithromycin (ZITHROMAX) 250 MG tablet; Take 2 tablets (500 mg) by mouth daily for 1 day, THEN 1 tablet (250 mg) daily for 4 days.  Dispense: 6 tablet; Refill: 0  - benzonatate (TESSALON) 200 MG capsule; Take 0.5 capsules (100 mg) by mouth 3 times daily as needed for cough  Dispense: 30 capsule; Refill: 1    2. Cough  - benzonatate (TESSALON) 200 MG capsule; Take 0.5 capsules (100 mg) by mouth 3 times daily as needed for cough  Dispense: 30 capsule; Refill: 1    Subjective   Nadya is a 56 year old who presents for the following health issues this 62-year-old man is seen telephonically for symptoms of upper respiratory infection.  Is been going on past week.  His wife has the same problem as well as his daughter and son.  A lot tested negative for Covid.  They have been vaccinated against Covid.  His wife is now on azithromycin and Tessalon Perles and some Flonase and has had significant improvement.  He is looking for the same.  He has no fever.  Breathing okay.  Cough with inflammatory sputum.    HPI           Review of Systems         Objective           Vitals:  No vitals were obtained today due to virtual visit.    Physical Exam                 Video-Visit Details    Type of service:  Video Visit    Video End Time:8:24 AM    Originating Location (pt. Location): Home    Distant Location (provider location):  [facility]     Platform used for Video Visit:  Doximity

## 2021-11-04 ENCOUNTER — HOSPITAL ENCOUNTER (OUTPATIENT)
Dept: ULTRASOUND IMAGING | Facility: HOSPITAL | Age: 56
Discharge: HOME | End: 2021-11-04
Attending: INTERNAL MEDICINE | Admitting: INTERNAL MEDICINE
Payer: COMMERCIAL

## 2021-11-04 DIAGNOSIS — E04.1 THYROID NODULE: ICD-10-CM

## 2021-11-04 DIAGNOSIS — J20.9 ACUTE BRONCHITIS, UNSPECIFIED ORGANISM: Primary | ICD-10-CM

## 2021-11-04 PROCEDURE — 76536 US EXAM OF HEAD AND NECK: CPT

## 2021-11-04 RX ORDER — BENZONATATE 200 MG/1
200 CAPSULE ORAL 3 TIMES DAILY PRN
Qty: 30 CAPSULE | Refills: 1 | Status: SHIPPED | OUTPATIENT
Start: 2021-11-04 | End: 2021-12-15

## 2021-11-04 NOTE — TELEPHONE ENCOUNTER
Current Rx for Tessalon 200 mg capsule - can't be cut in half due to capsule form.    Pharmacy is requesting ok for 100 mg capsule instead. Rx pended to sign if agreeable.

## 2021-11-05 DIAGNOSIS — E04.1 THYROID NODULE: Primary | ICD-10-CM

## 2021-11-08 DIAGNOSIS — Z11.59 ENCOUNTER FOR SCREENING FOR OTHER VIRAL DISEASES: ICD-10-CM

## 2021-11-10 DIAGNOSIS — F43.10 PTSD (POST-TRAUMATIC STRESS DISORDER): ICD-10-CM

## 2021-11-10 RX ORDER — QUETIAPINE FUMARATE 100 MG/1
150 TABLET, FILM COATED ORAL AT BEDTIME
Qty: 45 TABLET | Refills: 10 | Status: SHIPPED | OUTPATIENT
Start: 2021-11-10 | End: 2022-10-05 | Stop reason: DRUGHIGH

## 2021-11-10 NOTE — TELEPHONE ENCOUNTER
Refill Request:    Pt has recently received an adjustment of a medication  The MG do not match the bottle that patient currently has.     Quetiapine dose is 150mg tablet, patient has 50mg tablets and needs a refill. Would like the 100mg tablets rather than then 50 mg tablets    Writer is requesting a refill of pt's 100mg tablets of Quetiapine.     Ariane Wolf RN  [facility] Nurse Advisor 12:43 PM 11/10/2021

## 2021-11-10 NOTE — TELEPHONE ENCOUNTER
"Refill failed, but it is confirmed that patient was seen by PCP within the last 30 days.    Last Written Prescription Date:  6/9/2021  Last Fill Quantity: 45,  # refills: 2   Last office visit provider:  10/14/2021     Requested Prescriptions   Pending Prescriptions Disp Refills     QUEtiapine (SEROQUEL) 100 MG tablet 45 tablet 2     Sig: Take 1.5 tablets (150 mg) by mouth At Bedtime       Antipsychotic Medications Failed - 11/10/2021 12:45 PM        Failed - Recent (6 mo) or future (30 days) visit within the authorizing provider's specialty     Patient had office visit in the last 6 months or has a visit in the next 30 days with authorizing provider or within the authorizing provider's specialty.  See \"Patient Info\" tab in inbasket, or \"Choose Columns\" in Meds & Orders section of the refill encounter.            Passed - Blood pressure under 140/90 in past 12 months     BP Readings from Last 3 Encounters:   10/14/21 116/62   09/28/21 112/78   08/30/21 118/78                 Passed - Patient is 12 years of age or older        Passed - Lipid panel on file within the past 12 months     Recent Labs   Lab Test 10/14/21  0903   CHOL 109   TRIG 115   HDL 33*   LDL 53               Passed - CBC on file in past 12 months     Recent Labs   Lab Test 10/14/21  0903   WBC 5.9   RBC 5.31   HGB 15.1   HCT 46.1                    Passed - Heart Rate on file within past 12 months     Pulse Readings from Last 3 Encounters:   10/14/21 54   09/28/21 58   08/30/21 58               Passed - A1c or Glucose on file in past 12 months     Recent Labs   Lab Test 10/14/21  0903 06/24/20  0811 06/24/20  0631      < >  --    A1C  --   --  5.9*    < > = values in this interval not displayed.       Please review patients last 3 weights. If a weight gain of >10 lbs exists, you may refill the prescription once after instructing the patient to schedule an appointment within the next 30 days.    Wt Readings from Last 3 Encounters: "   10/14/21 85 kg (187 lb 6.4 oz)   09/28/21 85.2 kg (187 lb 14.4 oz)   08/30/21 88.5 kg (195 lb)             Passed - Medication is active on med list

## 2021-11-15 ENCOUNTER — OFFICE VISIT (OUTPATIENT)
Dept: INTERNAL MEDICINE | Facility: CLINIC | Age: 56
End: 2021-11-15
Payer: COMMERCIAL

## 2021-11-15 ENCOUNTER — PATIENT OUTREACH (OUTPATIENT)
Dept: CARE COORDINATION | Facility: CLINIC | Age: 56
End: 2021-11-15

## 2021-11-15 VITALS
BODY MASS INDEX: 32.27 KG/M2 | SYSTOLIC BLOOD PRESSURE: 120 MMHG | HEART RATE: 58 BPM | HEIGHT: 64 IN | OXYGEN SATURATION: 95 % | WEIGHT: 189 LBS | DIASTOLIC BLOOD PRESSURE: 66 MMHG

## 2021-11-15 DIAGNOSIS — R35.1 NOCTURIA: ICD-10-CM

## 2021-11-15 DIAGNOSIS — M25.512 CHRONIC LEFT SHOULDER PAIN: ICD-10-CM

## 2021-11-15 DIAGNOSIS — G89.29 CHRONIC LEFT SHOULDER PAIN: ICD-10-CM

## 2021-11-15 DIAGNOSIS — E04.1 THYROID NODULE: Primary | ICD-10-CM

## 2021-11-15 DIAGNOSIS — R06.09 DOE (DYSPNEA ON EXERTION): ICD-10-CM

## 2021-11-15 DIAGNOSIS — F43.10 PTSD (POST-TRAUMATIC STRESS DISORDER): ICD-10-CM

## 2021-11-15 PROCEDURE — 99214 OFFICE O/P EST MOD 30 MIN: CPT | Performed by: INTERNAL MEDICINE

## 2021-11-15 RX ORDER — HYDROXYZINE PAMOATE 25 MG/1
25 CAPSULE ORAL 3 TIMES DAILY PRN
Qty: 90 CAPSULE | Refills: 1 | Status: SHIPPED | OUTPATIENT
Start: 2021-11-15 | End: 2022-05-04

## 2021-11-15 RX ORDER — TAMSULOSIN HYDROCHLORIDE 0.4 MG/1
0.4 CAPSULE ORAL DAILY
Qty: 90 CAPSULE | Refills: 3 | Status: SHIPPED | OUTPATIENT
Start: 2021-11-15 | End: 2022-05-25

## 2021-11-15 ASSESSMENT — MIFFLIN-ST. JEOR: SCORE: 1608.3

## 2021-11-15 NOTE — LETTER
November 15, 2021      Nadya Blake  [address]  [place] [address]        Dear [facility]:    Nadya Blake is a patient of mine with complex medical problems.  He requires reliable transportation to attend numerous medical appointments each month.  Any assistance that you can provide Mr. Blake and his family to repair his car is greatly appreciated.          Sincerely,        Az Briseno MD

## 2021-11-15 NOTE — PROGRESS NOTES
Contact  [facility]/Voicemail    Referral Source: Care Team  Clinical Data:  Outreach  VM left by [facility] worker Inessa asking for a call back to discuss patient need for a letter by his PCP. He is trying to access funding to repair their car and needs a letter documenting that the car is needed for him to attend to medical appointments.      She asked for a call back to discuss.   Outreach Left message on voicemail with call back information and requested return call.    Talked to Inessa.  Patient's car has a leaking oil pan and it order to access a todd to help cover the cost of the repair, they need a statement/letter from PCP documenting that patient needs to have a car in order to attend his medical appointments. It can be sent to his My Chart when completed.       Plan:  will work with [facility] worker and PCP regarding the application and letter from PCP.  Social Alex Soriano  [facility]  [phone]

## 2021-11-15 NOTE — PROGRESS NOTES
Office Visit - Follow Up   Nadya Blake   56 year old male    Date of Visit: 11/15/2021    Chief Complaint   Patient presents with     RECHECK        Assessment and Plan   1. Thyroid nodule  Biopsy ordered    2. PTSD (post-traumatic stress disorder)  - hydrOXYzine (VISTARIL) 25 MG capsule; Take 1 capsule (25 mg) by mouth 3 times daily as needed  Dispense: 90 capsule; Refill: 1    3. Nocturia  - tamsulosin (FLOMAX) 0.4 MG capsule; Take 1 capsule (0.4 mg) by mouth daily  Dispense: 90 capsule; Refill: 3    4. Chronic left shoulder pain  Noncardiac musculoskeletal pain, will be working with physical therapy continue with Voltaren gel    5. ALVES (dyspnea on exertion)  We reviewed his recent cardiac evaluation which looked okay.  At this point I do not think we need to do additional cardiac evaluation.  He will be establishing with Dr. Sloan Burns in cardiology and can discuss at that appointment as well.  In the meantime I will see him back in 1 month and we can reassess his symptoms.    Return in about 4 weeks (around 12/13/2021) for Follow up.     History of Present Illness   This 56 year old man comes in for follow-up.  Recently we obtained an ultrasound which showed some thyroid nodules.  He is set up to have a biopsy of this on 11/22/2021.  Chest wall pain on the left.  Voltaren gel helps with this.  He has been having some dyspnea exertion when he walks upstairs.  No other features such as diaphoresis or exertional chest pain.  He had a stress test in May which looked okay.  He is taking his medication.  He is going to establish care with cardiology, Dr. Obed Burns.    Review of Systems: A comprehensive review of systems was negative except as noted.     Medications, Allergies and Problem List   Reviewed, reconciled and updated  Post Discharge Medication Reconciliation Status:      Physical Exam   General Appearance:   No acute distress    /66 (BP Location: Left arm, Patient Position: Sitting, Cuff  "Size: Adult Regular)   Pulse 58   Ht 1.626 m (5' 4\")   Wt 85.7 kg (189 lb)   SpO2 95%   BMI 32.44 kg/m      HEENT exam is unremarkable  Tenderness to palpation over his chest wall on the left  Cardiovascular regular rate and rhythm no murmur gallop or rub  Pulmonary lungs are clear to auscultation bilaterally  Gastrointestinal abdomen soft nontender nondistended no organomegaly  Neurologic exam is non focal  Psychiatric pleasant, no confusion or agitation        Additional Information   Current Outpatient Medications   Medication Sig Dispense Refill     acetaminophen (TYLENOL) 500 MG tablet [ACETAMINOPHEN (TYLENOL) 500 MG TABLET] Take 2 tablets (1,000 mg total) by mouth every 6 (six) hours as needed for pain. 300 tablet 3     albuterol (PROAIR HFA;PROVENTIL HFA;VENTOLIN HFA) 90 mcg/actuation inhaler [ALBUTEROL (PROAIR HFA;PROVENTIL HFA;VENTOLIN HFA) 90 MCG/ACTUATION INHALER] Inhale 1-2 puffs every 4 (four) hours as needed for wheezing.       aspirin 81 mg chewable tablet [ASPIRIN 81 MG CHEWABLE TABLET] 1 tablet (81 mg total) by Enteral Tube route daily. 90 tablet 3     benzonatate (TESSALON) 200 MG capsule Take 1 capsule (200 mg) by mouth 3 times daily as needed for cough 30 capsule 1     benzonatate (TESSALON) 200 MG capsule Take 0.5 capsules (100 mg) by mouth 3 times daily as needed for cough 30 capsule 1     budesonide-formoteroL (SYMBICORT) 160-4.5 mcg/actuation inhaler [BUDESONIDE-FORMOTEROL (SYMBICORT) 160-4.5 MCG/ACTUATION INHALER] Inhale 2 puffs 2 (two) times a day. Taking as needed.       cholecalciferol, vitamin D3, (VITAMIN D3) 25 mcg (1,000 unit) capsule [CHOLECALCIFEROL, VITAMIN D3, (VITAMIN D3) 25 MCG (1,000 UNIT) CAPSULE] Take 2 capsules (2,000 Units total) by mouth daily. 90 capsule 3     diclofenac sodium (VOLTAREN) 1 % Gel [DICLOFENAC SODIUM (VOLTAREN) 1 % GEL] Apply 4 g topically 4 (four) times a day. 500 g 11     escitalopram (LEXAPRO) 10 MG tablet Take 1 tablet (10 mg) by mouth daily 30 " tablet 3     gabapentin (NEURONTIN) 300 MG capsule Take 1 capsule (300 mg) by mouth daily 90 capsule 3     hydrOXYzine (VISTARIL) 25 MG capsule Take 1 capsule (25 mg) by mouth 3 times daily as needed 90 capsule 1     metoprolol succinate ER (TOPROL-XL) 100 MG 24 hr tablet Take 1.5 tablets (150 mg) by mouth At Bedtime 135 tablet 3     omeprazole (PRILOSEC) 20 MG capsule [OMEPRAZOLE (PRILOSEC) 20 MG CAPSULE] Take 1 capsule (20 mg total) by mouth 2 (two) times a day before meals. 180 capsule 3     QUEtiapine (SEROQUEL) 100 MG tablet Take 1.5 tablets (150 mg) by mouth At Bedtime 45 tablet 10     QUEtiapine (SEROQUEL) 50 MG tablet TAKE 1 TABLET(50 MG) BY MOUTH TWICE DAILY AS NEEDED 60 tablet 3     rosuvastatin (CRESTOR) 40 MG tablet Take 1 tablet (40 mg) by mouth daily 90 tablet 3     tamsulosin (FLOMAX) 0.4 MG capsule Take 1 capsule (0.4 mg) by mouth daily 90 capsule 3     Allergies   Allergen Reactions     Atorvastatin Diarrhea     Cortisone Other (See Comments)     pain     Lisinopril Cough     started after CABG for unclear reason     Methylprednisolone Other (See Comments)     ?     Social History     Tobacco Use     Smoking status: Former Smoker     Packs/day: 1.00     Years: 30.00     Pack years: 30.00     Types: Cigarettes, Cigarettes     Quit date: 3/23/2020     Years since quittin.6     Smokeless tobacco: Never Used     Tobacco comment: stopped 3/24/2020   Substance Use Topics     Alcohol use: No     Drug use: Never       Review and/or order of clinical lab tests:  Review and/or order of radiology tests:  Review and/or order of medicine tests:  Discussion of test results with performing physician:  Decision to obtain old records and/or obtain history from someone other than the patient:  Review and summarization of old records and/or obtaining history from someone other than the patient and.or discussion of case with another health care provider:  Independent visualization of image, tracing or specimen  itself:    Time:      Az Briseno MD

## 2021-11-17 ENCOUNTER — HOSPITAL ENCOUNTER (OUTPATIENT)
Dept: PHYSICAL THERAPY | Facility: REHABILITATION | Age: 56
End: 2021-11-17
Attending: INTERNAL MEDICINE
Payer: COMMERCIAL

## 2021-11-17 DIAGNOSIS — G89.29 CHRONIC LEFT SHOULDER PAIN: ICD-10-CM

## 2021-11-17 DIAGNOSIS — M25.512 CHRONIC LEFT SHOULDER PAIN: ICD-10-CM

## 2021-11-17 PROCEDURE — 97110 THERAPEUTIC EXERCISES: CPT | Mod: GP

## 2021-11-17 PROCEDURE — 97161 PT EVAL LOW COMPLEX 20 MIN: CPT | Mod: GP

## 2021-11-17 NOTE — PROGRESS NOTES
[facility]    OUTPATIENT PHYSICAL THERAPY ORTHOPEDIC EVALUATION  PLAN OF TREATMENT FOR OUTPATIENT REHABILITATION  (COMPLETE FOR INITIAL CLAIMS ONLY)  Patient's Last Name, First Name, M.I.  YOB: 1965  Nadya Blake    Provider s Name:  [facility]   Medical Record No.  [id number]   Start of Care Date:  11/17/21   Onset Date:      Type:     _X__PT   ___OT   ___SLP Medical Diagnosis:  M25.512, G89.29 (ICD-10-CM) - Chronic left shoulder pain     PT Diagnosis:  L pectoral/shoulder/neck pain, shoulder and scapular weakness, decreased cervical and shoulder ROM   Visits from SOC:  1      _________________________________________________________________________________  Plan of Treatment/Functional Goals:  ADL retraining,balance training,joint mobilization,manual therapy,ROM,strengthening,stretching,neuromuscular re-education     Biofeedback,Cryotherapy,Electrical stimulation,Hot packs,TENS,Traction,Ultrasound     Goals  Goal Identifier: HEP  Goal Description: Patient will demonstrate independence with home exercise program to facilitate improvement in function.   Target Date: 12/15/21    Goal Identifier: Pain  Goal Description: Patient will report no greater than 3/10 pain to improve quality of life.   Target Date: 12/29/21    Goal Identifier: L shoulder ROM  Goal Description: Patient will acheive L shoulder ROM WNL in all planes to improve functional mobility.   Target Date: 01/12/22    Goal Identifier: Sleeping  Goal Description: Patient will be able to sleep through a full night without pain to improve health and quality of life.   Target Date: 01/12/22                  Therapy Frequency:  1 time/week  Predicted Duration of Therapy Intervention:  10 weeks    Scott Parmer, PT, DPT                 I CERTIFY THE NEED FOR THESE SERVICES FURNISHED UNDER        THIS PLAN  OF TREATMENT AND WHILE UNDER MY CARE     (Physician co-signature of this document indicates review and certification of the therapy plan).                       Certification Date From:  11/17/21   Certification Date To:  02/14/22    Referring Provider:  Az Briseno MD    Initial Assessment        See Epic Evaluation Start of Care Date: 11/17/21 11/17/21 1000   General Information   Type of Visit Initial OP Ortho PT Evaluation   Start of Care Date 11/17/21   Referring Physician Az Briseno MD   Orders Evaluate and Treat   Date of Order 10/14/21   Certification Required? Yes   Medical Diagnosis M25.512, G89.29 (ICD-10-CM) - Chronic left shoulder pain   Surgical/Medical history reviewed Yes   Precautions/Limitations no known precautions/limitations   General Information Comments Patient is s/p heart bypass surgery, 6/20/2020       Present No   Body Part(s)   Body Part(s) Cervical Spine;Shoulder   Presentation and Etiology   Pertinent history of current problem (include personal factors and/or comorbidities that impact the POC) Patient had heart bypass surgery on June 20th, 2020 and has been having some L chest and shoulder pain. Patient has had trouble sleeping due to pain since the surgery. Patient has also had trouble reaching and lifting. Patient has difficulty with morning stiffness, particularly in the mornings.  Patient walks every day for exercise.    Impairments A. Pain   Functional Limitations perform activities of daily living;perform desired leisure / sports activities   Chronicity Chronic   Pain rating (0-10 point scale) Best (/10);Worst (/10)   Best (/10) 4-5   Worst (/10) 9   Pain quality B. Dull;C. Aching;E. Shooting   Frequency of pain/symptoms A. Constant   Pain/symptoms are: Worse during the night   Pain/symptoms exacerbated by H. Overhead reach;C. Lifting;D. Carrying;I. Bending   Pain/symptoms eased by C. Rest;H. Cold;I. OTC medication(s)   Progression of  symptoms since onset: Unchanged   Fall Risk Screen   Fall screen completed by PT   Have you fallen 2 or more times in the past year? No   Have you fallen and had an injury in the past year? No   Is patient a fall risk? No   Abuse Screen (yes response referral indicated)   Feels Unsafe at Home or Work/School no   Feels Threatened by Someone no   Does Anyone Try to Keep You From Having Contact with Others or Doing Things Outside Your Home? no   Physical Signs of Abuse Present no   Cervical Spine   Observation Patient has forward posture   Integumentary  Well healed scar over sternum    Posture Forward head, forward shoulders, thoracic kyphosis, internally rotated shoulders   Cervical Flexion ROM 52   Cervical Extension ROM 23   Cervical Right Side Bending ROM 17   Cervical Left Side Bending ROM 28   Cervical Right Rotation ROM 32   Cervical Left Rotation ROM 45   Shoulder Shrug (C2-C4) Strength 5/5 B, pain L    Shoulder Abd (C5) Strength 5/5 R, 4/5 L    Shoulder ER (C5, C6) Strength 5/5 R, 4/5 L pain   Shoulder IR (C5, C6) Strength 5/5 R, 4/5 L pain   Elbow Flexion (C5, C6) Strength 5/5 B, L pain   Elbow Extension (C7) Strength 5/5 B, L pain    Wrist Extension (C6) Strength 5/5 B   Wrist Flexion (C7) Strength 5/5 B   Thumb Abd (C8) Strength 5/5 B   Upper Trapezius Flexibility Limited L    Levator Scapula Flexibility Limited L    Scalene Flexibility Limited L    Pectoralis Minor Flexibility Assessed in supine, protracted shoulder L    Spurling Test (+) L    Cervical Distraction Test Decreased symptoms with manual cervical distraction    Neck Flexor Endurance Test (normal 39 sec) unable to complete due to pain   Mayorga Impingement Test (+) L    Palpation Tenderness noted to the L UT, L levator, posterior shoulder, anterior shoulder/bicipital groove, through L pectoral muscle belly    Dermatome/Sensory Testing Intact through dermatomal pattern B    UE Neural Tension UE Neural Tension Tests   ULTT I (Median and Anterior  Interosseous) Positive   ULTT II (Median and Musculocutaneous and Axillary) Positive   ULTT III (Radial) Positive   ULTT IV (Ulnar) Negative   Shoulder Objective Findings   Side (if bilateral, select both right and left) Right;Left   Scapulothoracic Rhythm Early elevation and early return on L    Right Shoulder Flexion AROM 150   Right Shoulder Flexion PROM 175   Right Shoulder Abduction AROM 180   Right Shoulder ER AROM T1   Right Shoulder ER PROM 85   Right Shoulder IR AROM T5   Right Shoulder IR PROM 50   Left Shoulder Flexion AROM 110, pain   Left Shoulder Flexion PROM 130 with guarding due to pain   Left Shoulder Abduction AROM 107, pain   Left Shoulder ER AROM L cranium   Left Shoulder ER PROM 90 abd: 65 degrees pain   Left Shoulder IR AROM L iliac crest    Planned Therapy Interventions   Planned Therapy Interventions ADL retraining;balance training;joint mobilization;manual therapy;ROM;strengthening;stretching;neuromuscular re-education   Planned Modality Interventions   Planned Modality Interventions Biofeedback;Cryotherapy;Electrical stimulation;Hot packs;TENS;Traction;Ultrasound   Clinical Impression   Criteria for Skilled Therapeutic Interventions Met yes, treatment indicated   PT Diagnosis L pectoral/shoulder/neck pain, shoulder and scapular weakness, decreased cervical and shoulder ROM   Functional limitations due to impairments Reaching, lifting/carrying, head turning, driving, sleeping   Clinical Presentation Evolving/Changing   Clinical Presentation Rationale s/p cardiac bypass, PTSD    Clinical Decision Making (Complexity) Moderate complexity   Therapy Frequency 1 time/week   Predicted Duration of Therapy Intervention (days/wks) 10 weeks   Risk & Benefits of therapy have been explained Yes   Patient, Family & other staff in agreement with plan of care Yes   Clinical Impression Comments Patient is a 56 year old male presenting to physical therapy s/p cardiac bypass surgery in June of 2020 with  cervical, pectoral, and L sided shoulder pain. Patient's pain likely triggered from L sided cervical and pec tightness from the surgery and muscle guarding. Patient presents with the following impairments: L sided cervical, pectoral, and shoulder pain, L shoulder and scapular weakness, decreased L shoulder ROM, difficulty reaching and lifting. Patient's prognosis to improve pain and function is good due to patient's willingness to participate in physical therapy. Patient would benefit from continued physical therapy to address noted limitations.    ORTHO GOALS   PT Ortho Eval Goals 1;2;3;4   Ortho Goal 1   Goal Identifier HEP   Goal Description Patient will demonstrate independence with home exercise program to facilitate improvement in function.    Target Date 12/15/21   Ortho Goal 2   Goal Identifier Pain   Goal Description Patient will report no greater than 3/10 pain to improve quality of life.    Target Date 12/29/21   Ortho Goal 3   Goal Identifier L shoulder ROM   Goal Description Patient will acheive L shoulder ROM WNL in all planes to improve functional mobility.    Target Date 01/12/22   Ortho Goal 4   Goal Identifier Sleeping   Goal Description Patient will be able to sleep through a full night without pain to improve health and quality of life.    Target Date 01/12/22   Total Evaluation Time   PT Millie Low Complexity Minutes (55332) 30   Therapy Certification   Certification date from 11/17/21   Certification date to 02/14/22   Medical Diagnosis M25.512, G89.29 (ICD-10-CM) - Chronic left shoulder pain     Scott Parmer, PT, DPT

## 2021-11-19 ENCOUNTER — LAB (OUTPATIENT)
Dept: FAMILY MEDICINE | Facility: CLINIC | Age: 56
End: 2021-11-19
Attending: INTERNAL MEDICINE
Payer: COMMERCIAL

## 2021-11-19 DIAGNOSIS — Z11.59 ENCOUNTER FOR SCREENING FOR OTHER VIRAL DISEASES: ICD-10-CM

## 2021-11-19 PROCEDURE — U0003 INFECTIOUS AGENT DETECTION BY NUCLEIC ACID (DNA OR RNA); SEVERE ACUTE RESPIRATORY SYNDROME CORONAVIRUS 2 (SARS-COV-2) (CORONAVIRUS DISEASE [COVID-19]), AMPLIFIED PROBE TECHNIQUE, MAKING USE OF HIGH THROUGHPUT TECHNOLOGIES AS DESCRIBED BY CMS-2020-01-R: HCPCS

## 2021-11-19 PROCEDURE — U0005 INFEC AGEN DETEC AMPLI PROBE: HCPCS

## 2021-11-20 LAB — SARS-COV-2 RNA RESP QL NAA+PROBE: NEGATIVE

## 2021-11-22 ENCOUNTER — HOSPITAL ENCOUNTER (OUTPATIENT)
Dept: ULTRASOUND IMAGING | Facility: HOSPITAL | Age: 56
Discharge: HOME | End: 2021-11-22
Attending: INTERNAL MEDICINE | Admitting: INTERNAL MEDICINE
Payer: COMMERCIAL

## 2021-11-22 DIAGNOSIS — E04.1 THYROID NODULE: ICD-10-CM

## 2021-11-22 PROCEDURE — 88173 CYTOPATH EVAL FNA REPORT: CPT | Mod: TC | Performed by: INTERNAL MEDICINE

## 2021-11-22 PROCEDURE — 88305 TISSUE EXAM BY PATHOLOGIST: CPT | Mod: TC | Performed by: INTERNAL MEDICINE

## 2021-11-22 PROCEDURE — 10005 FNA BX W/US GDN 1ST LES: CPT

## 2021-11-24 ENCOUNTER — PATIENT OUTREACH (OUTPATIENT)
Dept: NURSING | Facility: CLINIC | Age: 56
End: 2021-11-24

## 2021-11-24 LAB
PATH REPORT.COMMENTS IMP SPEC: NO
PATH REPORT.COMMENTS IMP SPEC: NORMAL
PATH REPORT.FINAL DX SPEC: NORMAL
PATH REPORT.GROSS SPEC: NORMAL
PATH REPORT.MICROSCOPIC SPEC OTHER STN: NORMAL
PATH REPORT.RELEVANT HX SPEC: NORMAL

## 2021-11-24 PROCEDURE — 88173 CYTOPATH EVAL FNA REPORT: CPT | Mod: 26 | Performed by: PATHOLOGY

## 2021-11-24 PROCEDURE — 88305 TISSUE EXAM BY PATHOLOGIST: CPT | Mod: 26 | Performed by: PATHOLOGY

## 2021-11-24 NOTE — PROGRESS NOTES
Clinic Care Coordination Contact    Follow Up Progress Note      Assessment: Talked with patient and his wife who were driving to  kids from school.  They are all feeling well. Patient had thyroid biopsy recently and are waiting for results.  It was a painful event.  They got the letter from PCP for documentation for application for care repair assistance.  He completed the colonoscopy and endoscopy.      Care Gaps:    Health Maintenance Due   Topic Date Due     PREVENTIVE CARE VISIT  Never done     ADVANCE CARE PLANNING  Never done     Pneumococcal Vaccine: Pediatrics (0 to 5 Years) and At-Risk Patients (6 to 64 Years) (1 of 2 - PPSV23) Never done     ZOSTER IMMUNIZATION (1 of 2) Never done     LUNG CANCER SCREENING  10/13/2021       Postponed to next appointment     Goals addressed this encounter:   Goals Addressed                    This Visit's Progress       Patient Stated       Medical (pt-stated)   100%      Goal Statement: I will have colonoscopy and endoscopy within 3 months.   Date Goal set: 5-   Barriers: None identified.   Strengths: willing to have them done.   Date to Achieve By: 8-   Patient expressed understanding of goal: yes   Action steps to achieve this goal:   1. I will scheduled these procedures for this summer.   2. I will discuss with PCP and GI clinic any questions.   3. I will report progress towards this goal at scheduled outreach telephone calls from the CCC team.   Discussed on 6/4/21, 6-28, 7-27 appt late September with [facility], 8-17, 8-31 discussed, 10-25 scheduled colonoscopy, 11-24 done,             Intervention/Education provided during outreach: None provided.      Outreach Frequency: 2 weeks     Plan:   At next outreach, set up re assessment and set up new goal.   Care Coordinator will follow up in two weeks.    Jannette Patel,   [facility]  [phone]

## 2021-11-30 ENCOUNTER — TELEPHONE (OUTPATIENT)
Dept: CARDIOLOGY | Facility: CLINIC | Age: 56
End: 2021-11-30

## 2021-11-30 ENCOUNTER — HOSPITAL ENCOUNTER (OUTPATIENT)
Dept: PHYSICAL THERAPY | Facility: REHABILITATION | Age: 56
End: 2021-11-30
Payer: COMMERCIAL

## 2021-11-30 DIAGNOSIS — G89.29 CHRONIC LEFT SHOULDER PAIN: Primary | ICD-10-CM

## 2021-11-30 DIAGNOSIS — M25.512 CHRONIC LEFT SHOULDER PAIN: Primary | ICD-10-CM

## 2021-11-30 PROCEDURE — 97140 MANUAL THERAPY 1/> REGIONS: CPT | Mod: GP

## 2021-11-30 PROCEDURE — 97110 THERAPEUTIC EXERCISES: CPT | Mod: GP

## 2021-11-30 NOTE — TELEPHONE ENCOUNTER
Noted. No call needed at this time.  ---------------------------------  Sloan Burns MD  You 28 minutes ago (9:25 AM)     BW    Agree with recommendations. No new recs. Will assess further at his follow-up appointment with me next week.         pt presents with c/o abd pain since 11am. Pt states she has hx of diverticulosis. Pt denies n/v.

## 2021-11-30 NOTE — TELEPHONE ENCOUNTER
----- Message from Imelda Marshall sent at 11/30/2021  8:42 AM CST -----  Regarding: ERBECCA PTformer ALIE pt  General phone call:    Caller: IAIN - ALEX  Primary cardiologist: REBECCA FORMER ALIE PT   Detailed reason for call: Patient is having shoulder and chest pain more recently   Best phone number: [phone]  Best time to contact: any  Ok to leave a detailedmessage? yes  Device? no    Additional Info:

## 2021-11-30 NOTE — TELEPHONE ENCOUNTER
"Dr. Burns,  Please see patient update.   Follow up is scheduled with you 12/8/21.  Any new orders or recommendations in the interim?  Thank you  Eloisa  --------------------------------------  Reached out to patient's wife, Carlee, to discuss concerns.  Patient is experiencing left sided chest and shoulder pain \"especially at night\" and shortness of breath after going up stairs. She feels \"something is different\" because symptoms are not better.  Noted tele note 7/27/21 with similar symptoms.   Review of chart shows patient evaluation by Dr. Briseno   11/15/21. NM Stress Test 5/21/21:  Result Text     The nuclear stress test is negative for inducible myocardial ischemia or infarction.     The left ventricular ejection fraction at stress is 65%.     There is no prior study for comparison    Follow up is planned with Dr. Briseno for re-evaluation of symptoms 12/15/21.  Patient will have second PT session today for chronic left shoulder pain. He is using Voltaren gel for left shoulder discomfort.   Offered RAC to evaluate patient's symptoms and Carlee request follow up with only Dr. Burns as per recommendation from Dr. Malhotra.   Instructed Carlee to call 911 for Sania if he should have ongoing or unrelieved chest discomfort.   Charlette verbalized understanding and is agreeable to plan.    Call transferred to scheduling to arrange follow up with Dr. uBrns which is scheduled 12/8/21.          "

## 2021-12-01 ENCOUNTER — VIRTUAL VISIT (OUTPATIENT)
Dept: BEHAVIORAL HEALTH | Facility: CLINIC | Age: 56
End: 2021-12-01
Payer: COMMERCIAL

## 2021-12-01 DIAGNOSIS — F43.10 PTSD (POST-TRAUMATIC STRESS DISORDER): ICD-10-CM

## 2021-12-01 DIAGNOSIS — F43.10 PTSD (POST-TRAUMATIC STRESS DISORDER): Primary | ICD-10-CM

## 2021-12-01 PROCEDURE — 99214 OFFICE O/P EST MOD 30 MIN: CPT | Mod: 95 | Performed by: PSYCHIATRY & NEUROLOGY

## 2021-12-01 RX ORDER — ESCITALOPRAM OXALATE 10 MG/1
15 TABLET ORAL DAILY
Qty: 30 TABLET | Refills: 3 | Status: SHIPPED | OUTPATIENT
Start: 2021-12-01 | End: 2022-01-26

## 2021-12-01 RX ORDER — QUETIAPINE FUMARATE 50 MG/1
TABLET, FILM COATED ORAL
Qty: 60 TABLET | Refills: 3 | Status: CANCELLED | OUTPATIENT
Start: 2021-12-01

## 2021-12-01 NOTE — TELEPHONE ENCOUNTER
PA Initiation    Medication: escitalopram (LEXAPRO) 10 MG tab- INITIATED  Insurance Company: [employer] - Phone [phone] Fax [phone]  Pharmacy Filling the Rx: [id number] [place], [place] - [facility] [facility] [address]  Filling Pharmacy Phone: [phone]  Filling Pharmacy Fax: [phone]  Start Date: 12/1/2021

## 2021-12-01 NOTE — PROGRESS NOTES
This video/telephone visit will be conducted via a call between you and your physician/provider. We have found that certain health care needs can be provided without the need for an in-person physical exam. This service lets us provide the care you need with a video /telephone conversation. If a prescription is necessary we can send it directly to your pharmacy. If lab work is needed we can place an order for that and you can then stop by our lab to have the test done at a later time.    Just as we bill insurance for in-person visits, we also bill insurance for video/telephone visits. If you have questions about your insurance coverage, we recommend that you speak with your insurance company.    Patient has given verbal consent for Telephone visit? Yes   Patient would like telephone visit please call: Nepali  [phone] option 0 Nepali Pts: [phone]  TEMO/REBA GONZALEZ CMA   Per Pt Dr. Briseno started him on Seroquel 150 mg at HS. Pt is no longer taking Seroquel 50 mg BID PRN since starting the bedtime dose.  Patient verified allergies, medications and pharmacy via phone.  Patient states he is ready for visit.   [place]  to be reviewed by provider.
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
## 2021-12-01 NOTE — PROGRESS NOTES
________________________________  Medications Phoned  to Pharmacy [] yes [x]no  Name of Pharmacist:  List Medications, including dose, quantity and instructions     Medications ordered this visit were e-scribed.  Verified by order class [x] yes  [] no  Lexapro 10 mg (increased dose to 15 mg daily; Provider did not adjust QTY with this dose adjustment)  Medication changes or discontinuations were communicated to patient's pharmacy: [] yes  [x] no     Dictation completed at time of chart check: [x] yes  [] no     I have checked the documentation for today s encounters and the above information has been reviewed and completed.      Ana Carballo on December 1, 2021 at 1:01 PM

## 2021-12-01 NOTE — TELEPHONE ENCOUNTER
Prior Authorization Retail Medication Request    Medication/Dose: escitalopram (LEXAPRO) 10 MG tablet  ICD code (if different than what is on RX): PTSD (post-traumatic stress disorder) [F43.10]  Previously Tried and Failed:   Rationale:      Insurance Name:  [facility]  Insurance ID:  [id number]    Pharmacy Information (if different than what is on RX)  Name: [id number] [place], [place] - [address] AT [facility] [address]  Phone:  [phone]

## 2021-12-01 NOTE — PATIENT INSTRUCTIONS
I have increased the patient's Lexapro to 15 mg a day.  He should follow-up with this clinic in 3 months.  He agrees to call before then with any questions or concerns.  He will continue with his medical follow-up and physical therapy as ordered.

## 2021-12-02 NOTE — TELEPHONE ENCOUNTER
If the insurance company will not allow 15 mg by using a 10 and a 5 we can instead prescribed 20 mg a day.  I would prefer 15 mg a day but apparently that is not a possibility.  Would you do that for me?

## 2021-12-02 NOTE — TELEPHONE ENCOUNTER
PRIOR AUTHORIZATION DENIED    Medication: escitalopram (LEXAPRO) 10 MG tab- DENIED    Denial Date: 12/2/2021    Denial Rational: QUANTITY LIMIT          Appeal Information:

## 2021-12-03 NOTE — TELEPHONE ENCOUNTER
I do not know that that is the case.  It seems like I jump through these loops no matter if it is 1 or 2 pills.  Would you mind asking them first if they would except it so we do not have to play phone tag with another incorrect prescription?

## 2021-12-04 NOTE — TELEPHONE ENCOUNTER
I am fine with sending a letter of necessity.  Could you have the clinic do that?  In the past this was typically done by the staff without calling me in to the loop.  If we are things differently and I need to do letters of necessity for my medication orders I would like to reexamine our clinic protocol.

## 2021-12-06 NOTE — TELEPHONE ENCOUNTER
"Refill request r'cd from [facility] via fax for Escitalopram 10 mg denied due to request too soon. Faxed \"not authorized\" back to pharmacy.    Abbey Burnett RN December 6, 2021 8:13 AM    "

## 2021-12-06 NOTE — TELEPHONE ENCOUNTER
Central Prior Authorization Team   Phone: [phone]      Our team is directed to send denials directly too the provider if a care team or clinic pool is not provided, in this case we sent the denial directly to the provider. If there is a certain person you would like to handle the appeal from the clinic the request can be sent to them. The only requirement is the providers signature at the bottom of the letter.

## 2021-12-06 NOTE — TELEPHONE ENCOUNTER
Central Prior Authorization Team   Phone: [phone]      Yes, if you have a care team to provide to us we can direct your denials to them moving forward.

## 2021-12-06 NOTE — TELEPHONE ENCOUNTER
Instead of sending into the provider could we send it to the clinical staff that should have the support services in place to do those types of letters and responses?  Thanks.

## 2021-12-07 NOTE — TELEPHONE ENCOUNTER
I do not know who the contact people would be for this clinic or my other clinic.  Could you call the clinic and figure out where they wanted to go.  The clinic phone number is [phone].  Thank you.

## 2021-12-07 NOTE — TELEPHONE ENCOUNTER
Tried calling the pt's pharmacy to ask them that since the prescription was denied for the prescription listed below here, would the insurance pay for the prescriptions if Dr. Quevedo was to send 2 separate prescriptions, one that is 10 mg and one that is 5 mg, totaling to 15 mg.   If the pharmacy says yes, will set up the prescriptions for Dr. Quevedo to approve.    escitalopram (LEXAPRO) 10 MG tablet 30 tablet 3 12/1/2021  No   Sig - Route: Take 1.5 tablets (15 mg) by mouth daily - Oral         [id number] - [place], [place] - [address] AT [facility] [address] at number [phone] but I was on hold for 18 mins and 34 seconds. I will try again tomorrow.

## 2021-12-07 NOTE — TELEPHONE ENCOUNTER
I left a message with the physcology dept at the number [phone] asking for a routing pool for Dr. Quevedo patients.     Debbie [facility] Prior Authorization Team  Phone: [phone]

## 2021-12-08 ENCOUNTER — OFFICE VISIT (OUTPATIENT)
Dept: CARDIOLOGY | Facility: CLINIC | Age: 56
End: 2021-12-08
Attending: INTERNAL MEDICINE
Payer: COMMERCIAL

## 2021-12-08 VITALS
RESPIRATION RATE: 16 BRPM | BODY MASS INDEX: 32.47 KG/M2 | DIASTOLIC BLOOD PRESSURE: 82 MMHG | HEIGHT: 64 IN | HEART RATE: 64 BPM | SYSTOLIC BLOOD PRESSURE: 108 MMHG | WEIGHT: 190.2 LBS

## 2021-12-08 DIAGNOSIS — I10 BENIGN ESSENTIAL HYPERTENSION: Primary | ICD-10-CM

## 2021-12-08 DIAGNOSIS — R06.09 DYSPNEA ON EXERTION: ICD-10-CM

## 2021-12-08 DIAGNOSIS — Z87.891 FORMER SMOKER: ICD-10-CM

## 2021-12-08 DIAGNOSIS — E78.5 DYSLIPIDEMIA, GOAL LDL BELOW 70: Chronic | ICD-10-CM

## 2021-12-08 DIAGNOSIS — I25.709 CORONARY ARTERY DISEASE INVOLVING CORONARY BYPASS GRAFT OF NATIVE HEART WITH ANGINA PECTORIS (H): ICD-10-CM

## 2021-12-08 PROCEDURE — 99214 OFFICE O/P EST MOD 30 MIN: CPT | Performed by: GENERAL ACUTE CARE HOSPITAL

## 2021-12-08 RX ORDER — ESCITALOPRAM OXALATE 5 MG/1
5 TABLET ORAL DAILY
Qty: 30 TABLET | Refills: 0 | Status: SHIPPED | OUTPATIENT
Start: 2021-12-08 | End: 2021-12-10

## 2021-12-08 RX ORDER — ESCITALOPRAM OXALATE 10 MG/1
10 TABLET ORAL DAILY
Qty: 30 TABLET | Refills: 0 | Status: SHIPPED | OUTPATIENT
Start: 2021-12-08 | End: 2021-12-15

## 2021-12-08 ASSESSMENT — MIFFLIN-ST. JEOR: SCORE: 1613.74

## 2021-12-08 NOTE — LETTER
12/8/2021    Az Briseno MD  [address] [facility] [address]    RE: Nadya Blake       Dear Colleague,     I had the pleasure of seeing Nadya Blake in the [facility].  HEART CARE ENCOUNTER NOTE          Assessment/Recommendations   Assessment:    1. Dyspnea on exertion. This could be an anginal equivalent, possibly development of new obstructive disease in one of his bypass grafts. He is not showing any other signs or symptoms of congestive heart failure although his LVEDP on his cardiac catheterization prior to surgery was elevated at 20 mm Hg.  2. Coronary artery disease status post four-vessel coronary artery bypass grafting (left internal mammary artery to the left anterior descending artery, right radial artery to the right posterior descending artery, reversed saphenous venous grafts to obtuse marginal and diagonal artery branches) on 6/24/2020. Nuclear stress testing earlier this year was negative for ischemia, but he is now having new symptoms which may be anginal.  3. Benign essential hypertension. Controlled.  4. Dyslipidemia. Last LDL 53.  5. Former smoker.  6. BMI 32.65.    Plan:  1. Cardiac stress MRI.  2. If there is no evidence of ischemia, we could try a course of a loop diuretic.  3. Continue current cardiac medications.  4. Follow-up with me in 2 months.       A total time of 40 minutes was spent on the date of this encounter.    History of Present Illness   Mr. Nadya Blake is a 56 year old male with a significant past history of CAD with history of NSTEMI s/p 4V CABG (LIMA to LAD, right radial artery to RPDA, reversed SVGs to an OM and diagonal artery branch) on 6/24/2020, HTN, dyslipidemia, and former smoker presenting to establish care and discuss dyspnea on exertion. He previously followed in cardiology clinic with my former colleague Dr. Brandon Malhotra, who has since retired.    For the past few months, he has been feeling out of breath with  activity such as going up the stairs. Earlier in the year, he was having chest pain which was different than how he presented with his NSTEMI. A nuclear stress test done on 7/23/2021 was negative for ischemia, but he says his dyspnea started after this. He continues to deny exertional chest discomfort.     He occasionally feels lightheaded but no near syncope or syncope. He lower extremity swelling, palpitations, paroxysmal nocturnal dyspnea (PND), or orthopnea.     Cardiac Problems and Cardiac Diagnostics     Most Recent Cardiac testing:  ECG dated 7/23/2021 (personaly reviewed and interpreted): SR, 1st degree AV block with  ms, left axis deviation    ECHO 6/27/2020 (report reviewed):     Normal left ventricular size and systolic function. The left ventricular wall motion is normal. The calculated left ventricular ejection fraction is 71%.    Normal right ventricular size and systolic function.    No hemodynamically significant valvular heart abnormalities.    The ascending aorta is mildly dilated.    When compared to the previous study dated 6/23/2020, no significant change.    Stress test 5/21/2021 (report reviewed):     The nuclear stress test is negative for inducible myocardial ischemia or infarction.     The left ventricular ejection fraction at stress is 65%.     There is no prior study for comparison.    Cardiac cath 6/23/2020 (report reviewed):     Severe mid LAD and critical first diagonal disease.    Severe OM-3 disease; OM-3 is the dominant lateral wall vessel.    Severe proximal RCA and right PDA disease.    LV EDP 20 mmHg       Medications  Allergies   Current Outpatient Medications   Medication Sig Dispense Refill     acetaminophen (TYLENOL) 500 MG tablet [ACETAMINOPHEN (TYLENOL) 500 MG TABLET] Take 2 tablets (1,000 mg total) by mouth every 6 (six) hours as needed for pain. 300 tablet 3     albuterol (PROAIR HFA;PROVENTIL HFA;VENTOLIN HFA) 90 mcg/actuation inhaler [ALBUTEROL (PROAIR  HFA;PROVENTIL HFA;VENTOLIN HFA) 90 MCG/ACTUATION INHALER] Inhale 1-2 puffs every 4 (four) hours as needed for wheezing.       aspirin 81 mg chewable tablet [ASPIRIN 81 MG CHEWABLE TABLET] 1 tablet (81 mg total) by Enteral Tube route daily. 90 tablet 3     budesonide-formoteroL (SYMBICORT) 160-4.5 mcg/actuation inhaler [BUDESONIDE-FORMOTEROL (SYMBICORT) 160-4.5 MCG/ACTUATION INHALER] Inhale 2 puffs 2 (two) times a day. Taking as needed.       cholecalciferol, vitamin D3, (VITAMIN D3) 25 mcg (1,000 unit) capsule [CHOLECALCIFEROL, VITAMIN D3, (VITAMIN D3) 25 MCG (1,000 UNIT) CAPSULE] Take 2 capsules (2,000 Units total) by mouth daily. 90 capsule 3     diclofenac sodium (VOLTAREN) 1 % Gel [DICLOFENAC SODIUM (VOLTAREN) 1 % GEL] Apply 4 g topically 4 (four) times a day. 500 g 11     escitalopram (LEXAPRO) 10 MG tablet Take 1.5 tablets (15 mg) by mouth daily 30 tablet 3     gabapentin (NEURONTIN) 300 MG capsule Take 1 capsule (300 mg) by mouth daily 90 capsule 3     hydrOXYzine (VISTARIL) 25 MG capsule Take 1 capsule (25 mg) by mouth 3 times daily as needed 90 capsule 1     metoprolol succinate ER (TOPROL-XL) 100 MG 24 hr tablet Take 1.5 tablets (150 mg) by mouth At Bedtime 135 tablet 3     omeprazole (PRILOSEC) 20 MG capsule [OMEPRAZOLE (PRILOSEC) 20 MG CAPSULE] Take 1 capsule (20 mg total) by mouth 2 (two) times a day before meals. 180 capsule 3     QUEtiapine (SEROQUEL) 100 MG tablet Take 1.5 tablets (150 mg) by mouth At Bedtime 45 tablet 10     rosuvastatin (CRESTOR) 40 MG tablet Take 1 tablet (40 mg) by mouth daily 90 tablet 3     tamsulosin (FLOMAX) 0.4 MG capsule Take 1 capsule (0.4 mg) by mouth daily 90 capsule 3     benzonatate (TESSALON) 200 MG capsule Take 1 capsule (200 mg) by mouth 3 times daily as needed for cough (Patient not taking: Reported on 12/8/2021) 30 capsule 1     benzonatate (TESSALON) 200 MG capsule Take 0.5 capsules (100 mg) by mouth 3 times daily as needed for cough (Patient not taking: Reported  "on 12/8/2021) 30 capsule 1     QUEtiapine (SEROQUEL) 50 MG tablet TAKE 1 TABLET(50 MG) BY MOUTH TWICE DAILY AS NEEDED (Patient not taking: Reported on 12/1/2021) 60 tablet 3      Allergies   Allergen Reactions     Atorvastatin Diarrhea     Cortisone Other (See Comments)     pain     Lisinopril Cough     started after CABG for unclear reason     Methylprednisolone Other (See Comments)     ?        Physical Examination Review of Systems   /82 (BP Location: Right arm, Patient Position: Sitting, Cuff Size: Adult Regular)   Pulse 64   Resp 16   Ht 1.626 m (5' 4\")   Wt 86.3 kg (190 lb 3.2 oz)   BMI 32.65 kg/m    Body mass index is 32.65 kg/m .  Wt Readings from Last 3 Encounters:   12/08/21 86.3 kg (190 lb 3.2 oz)   11/15/21 85.7 kg (189 lb)   10/14/21 85 kg (187 lb 6.4 oz)       General Appearance:   Pleasant  male, appears  stated age. no acute distress, obese body habitus   ENT/Mouth: Facemask.     EYES:  no scleral icterus, normal conjunctivae   Neck: no carotid bruits. No anterior cervical lymphadenopaty   Respiratory:   lungs are clear to auscultation, no rales or wheezing, well-healed sternal scar, equal chest wall expansion    Cardiovascular:   Regular rhythm, normal rate. Normal first and second heart sounds with no murmurs, rubs, or gallops; the carotid, radial and posterior tibial pulses are intact, Jugular venous pressure normal, no edema bilaterally    Abdomen/GI:  no organomegaly, masses, bruits, or tenderness; bowel sounds are present   Extremities: no cyanosis or clubbing   Skin: no xanthelasma, warm.    Heme/lymph/ Immunology No apparent bleeding noted.   Neurologic: Alert and oriented. normal gait, no tremors     Psychiatric: Pleasant, calm, appropriate affect.    A complete 10 system review of systems was performed and is negative except as mentioned in the HPI/subjective.         Past History   Past Medical History:   Past Medical History:   Diagnosis Date     Acute non-ST elevation " myocardial infarction (NSTEMI) (H) 6/22/2020     Adenomatous polyp of colon      Ascending aorta dilatation (H)      Carpal tunnel syndrome      Chronic superficial gastritis      Coronary artery disease due to lipid rich plaque 6/23/2020     Depression with anxiety      Dyslipidemia, goal LDL below 70 6/23/2020     Essential hypertension 9/2/2012     Fatty liver disease, nonalcoholic      GERD (gastroesophageal reflux disease)      IBS (irritable bowel syndrome)      Left lumbar radiculopathy      Multinodular goiter      Old torn meniscus of knee      Paroxysmal atrial fibrillation (H)      Perianal abscess      Post herpetic neuralgia      Post-traumatic osteoarthritis of both knees      Primary osteoarthritis of left hip      Primary osteoarthritis of right hip      Pulmonary nodule      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 Ragsdale     CV CORONARY ANGIOGRAM N/A 6/23/2020    Procedure: Coronary Angiogram;  Surgeon: Ariane Lester MD;  Location: [facility] Cath Lab;  Service: Cardiology     CV IABP INSERT N/A 6/24/2020    Procedure: Intra Aortic Balloon Pump Insertion;  Surgeon: Ariane Lester MD;  Location: [facility] Lab;  Service: Cardiology     CV LEFT HEART CATHETERIZATION WITHOUT LEFT VENTRICULOGRAM Left 6/23/2020    Procedure: Left Heart Catheterization Without Left Ventriculogram;  Surgeon: Ariane Lester MD;  Location: [facility] Cath Lab;  Service: Cardiology       Family History:   Family History   Problem Relation Age of Onset     Lung Cancer Father 56        fatal     No Known Problems Mother      No Known Problems Daughter      Other - See Comments Son         congenital deformity of right  leg; he uses a leg prosthesis       Social History:   Social History     Socioeconomic History     Marital status:      Spouse name: Carlee     Number of children: 2     Years of education: Not on file     Highest education level: Not on file   Occupational History     Not on file   Tobacco Use     Smoking status: Former Smoker     Packs/day: 1.00     Years: 30.00     Pack years: 30.00     Types: Cigarettes, Cigarettes     Quit date: 3/23/2020     Years since quittin.7     Smokeless tobacco: Never Used     Tobacco comment: stopped 3/24/2020   Substance and Sexual Activity     Alcohol use: No     Drug use: Never     Sexual activity: Yes     Partners: Female   Other Topics Concern     Not on file   Social History Narrative    , Carlee, BA chemistry and taking AA courses at [employer].  From [place]; bachelors in geology and computer science. Son, Grace (2005) and Sherrie (2008).  On SSDI, PTSD.       Social Determinants of Health     Financial Resource Strain: Not on file   Food Insecurity: No Food Insecurity     Worried About Running Out of Food in the Last Year: Never true     Ran Out of Food in the Last Year: Never true   Transportation Needs: No Transportation Needs     Lack of Transportation (Medical): No     Lack of Transportation (Non-Medical): No   Physical Activity: Not on file   Stress: Not on file   Social Connections: Not on file   Intimate Partner Violence: Not on file   Housing Stability: Low Risk      Unable to Pay for Housing in the Last Year: No     Number of Places Lived in the Last Year: 1     Unstable Housing in the Last Year: No              Lab Results    Chemistry/lipid CBC Cardiac Enzymes/BNP/TSH/INR   Lab Results   Component Value Date    CHOL 109 10/14/2021    HDL 33 (L) 10/14/2021    LDL 53 10/14/2021    TRIG 115 10/14/2021    CR 1.29 10/14/2021    BUN 13 10/14/2021    POTASSIUM 4.6 10/14/2021     10/14/2021    CO2 27 10/14/2021      Lab Results   Component Value Date    WBC  5.9 10/14/2021    HGB 15.1 10/14/2021    HCT 46.1 10/14/2021    MCV 87 10/14/2021     10/14/2021    A1C 5.9 (H) 06/24/2020     Lab Results   Component Value Date    A1C 5.9 (H) 06/24/2020    Lab Results   Component Value Date    TROPONINI <0.01 11/21/2020    BNP 79 (H) 07/27/2020    TSH 0.17 (L) 09/22/2020    INR 1.10 07/27/2020            Sloan Burns MD [facility]  Non-Invasive Cardiologist  [facility]  Pager [phone]      cc:   Brandon Malhotra MD  [address] [place] [address]  [address]  [place] [address]

## 2021-12-08 NOTE — PATIENT INSTRUCTIONS
1. We will schedule an MRI stress test of your heart.  2. Continue on your current medications.  3. See me back in 2 months.

## 2021-12-08 NOTE — PROGRESS NOTES
HEART CARE ENCOUNTER NOTE          Assessment/Recommendations   Assessment:    1. Dyspnea on exertion. This could be an anginal equivalent, possibly development of new obstructive disease in one of his bypass grafts. He is not showing any other signs or symptoms of congestive heart failure although his LVEDP on his cardiac catheterization prior to surgery was elevated at 20 mm Hg.  2. Coronary artery disease status post four-vessel coronary artery bypass grafting (left internal mammary artery to the left anterior descending artery, right radial artery to the right posterior descending artery, reversed saphenous venous grafts to obtuse marginal and diagonal artery branches) on 6/24/2020. Nuclear stress testing earlier this year was negative for ischemia, but he is now having new symptoms which may be anginal.  3. Benign essential hypertension. Controlled.  4. Dyslipidemia. Last LDL 53.  5. Former smoker.  6. BMI 32.65.    Plan:  1. Cardiac stress MRI.  2. If there is no evidence of ischemia, we could try a course of a loop diuretic.  3. Continue current cardiac medications.  4. Follow-up with me in 2 months.       A total time of 40 minutes was spent on the date of this encounter.    History of Present Illness   Mr. Nadya Blake is a 56 year old male with a significant past history of CAD with history of NSTEMI s/p 4V CABG (LIMA to LAD, right radial artery to RPDA, reversed SVGs to an OM and diagonal artery branch) on 6/24/2020, HTN, dyslipidemia, and former smoker presenting to establish care and discuss dyspnea on exertion. He previously followed in cardiology clinic with my former colleague Dr. Brandon Malhotra, who has since retired.    For the past few months, he has been feeling out of breath with activity such as going up the stairs. Earlier in the year, he was having chest pain which was different than how he presented with his NSTEMI. A nuclear stress test done on 7/23/2021 was negative for ischemia, but  he says his dyspnea started after this. He continues to deny exertional chest discomfort.     He occasionally feels lightheaded but no near syncope or syncope. He lower extremity swelling, palpitations, paroxysmal nocturnal dyspnea (PND), or orthopnea.     Cardiac Problems and Cardiac Diagnostics     Most Recent Cardiac testing:  ECG dated 7/23/2021 (personaly reviewed and interpreted): SR, 1st degree AV block with  ms, left axis deviation    ECHO 6/27/2020 (report reviewed):     Normal left ventricular size and systolic function. The left ventricular wall motion is normal. The calculated left ventricular ejection fraction is 71%.    Normal right ventricular size and systolic function.    No hemodynamically significant valvular heart abnormalities.    The ascending aorta is mildly dilated.    When compared to the previous study dated 6/23/2020, no significant change.    Stress test 5/21/2021 (report reviewed):     The nuclear stress test is negative for inducible myocardial ischemia or infarction.     The left ventricular ejection fraction at stress is 65%.     There is no prior study for comparison.    Cardiac cath 6/23/2020 (report reviewed):     Severe mid LAD and critical first diagonal disease.    Severe OM-3 disease; OM-3 is the dominant lateral wall vessel.    Severe proximal RCA and right PDA disease.    LV EDP 20 mmHg       Medications  Allergies   Current Outpatient Medications   Medication Sig Dispense Refill     acetaminophen (TYLENOL) 500 MG tablet [ACETAMINOPHEN (TYLENOL) 500 MG TABLET] Take 2 tablets (1,000 mg total) by mouth every 6 (six) hours as needed for pain. 300 tablet 3     albuterol (PROAIR HFA;PROVENTIL HFA;VENTOLIN HFA) 90 mcg/actuation inhaler [ALBUTEROL (PROAIR HFA;PROVENTIL HFA;VENTOLIN HFA) 90 MCG/ACTUATION INHALER] Inhale 1-2 puffs every 4 (four) hours as needed for wheezing.       aspirin 81 mg chewable tablet [ASPIRIN 81 MG CHEWABLE TABLET] 1 tablet (81 mg total) by Enteral  Tube route daily. 90 tablet 3     budesonide-formoteroL (SYMBICORT) 160-4.5 mcg/actuation inhaler [BUDESONIDE-FORMOTEROL (SYMBICORT) 160-4.5 MCG/ACTUATION INHALER] Inhale 2 puffs 2 (two) times a day. Taking as needed.       cholecalciferol, vitamin D3, (VITAMIN D3) 25 mcg (1,000 unit) capsule [CHOLECALCIFEROL, VITAMIN D3, (VITAMIN D3) 25 MCG (1,000 UNIT) CAPSULE] Take 2 capsules (2,000 Units total) by mouth daily. 90 capsule 3     diclofenac sodium (VOLTAREN) 1 % Gel [DICLOFENAC SODIUM (VOLTAREN) 1 % GEL] Apply 4 g topically 4 (four) times a day. 500 g 11     escitalopram (LEXAPRO) 10 MG tablet Take 1.5 tablets (15 mg) by mouth daily 30 tablet 3     gabapentin (NEURONTIN) 300 MG capsule Take 1 capsule (300 mg) by mouth daily 90 capsule 3     hydrOXYzine (VISTARIL) 25 MG capsule Take 1 capsule (25 mg) by mouth 3 times daily as needed 90 capsule 1     metoprolol succinate ER (TOPROL-XL) 100 MG 24 hr tablet Take 1.5 tablets (150 mg) by mouth At Bedtime 135 tablet 3     omeprazole (PRILOSEC) 20 MG capsule [OMEPRAZOLE (PRILOSEC) 20 MG CAPSULE] Take 1 capsule (20 mg total) by mouth 2 (two) times a day before meals. 180 capsule 3     QUEtiapine (SEROQUEL) 100 MG tablet Take 1.5 tablets (150 mg) by mouth At Bedtime 45 tablet 10     rosuvastatin (CRESTOR) 40 MG tablet Take 1 tablet (40 mg) by mouth daily 90 tablet 3     tamsulosin (FLOMAX) 0.4 MG capsule Take 1 capsule (0.4 mg) by mouth daily 90 capsule 3     benzonatate (TESSALON) 200 MG capsule Take 1 capsule (200 mg) by mouth 3 times daily as needed for cough (Patient not taking: Reported on 12/8/2021) 30 capsule 1     benzonatate (TESSALON) 200 MG capsule Take 0.5 capsules (100 mg) by mouth 3 times daily as needed for cough (Patient not taking: Reported on 12/8/2021) 30 capsule 1     QUEtiapine (SEROQUEL) 50 MG tablet TAKE 1 TABLET(50 MG) BY MOUTH TWICE DAILY AS NEEDED (Patient not taking: Reported on 12/1/2021) 60 tablet 3      Allergies   Allergen Reactions      "Atorvastatin Diarrhea     Cortisone Other (See Comments)     pain     Lisinopril Cough     started after CABG for unclear reason     Methylprednisolone Other (See Comments)     ?        Physical Examination Review of Systems   /82 (BP Location: Right arm, Patient Position: Sitting, Cuff Size: Adult Regular)   Pulse 64   Resp 16   Ht 1.626 m (5' 4\")   Wt 86.3 kg (190 lb 3.2 oz)   BMI 32.65 kg/m    Body mass index is 32.65 kg/m .  Wt Readings from Last 3 Encounters:   12/08/21 86.3 kg (190 lb 3.2 oz)   11/15/21 85.7 kg (189 lb)   10/14/21 85 kg (187 lb 6.4 oz)       General Appearance:   Pleasant  male, appears  stated age. no acute distress, obese body habitus   ENT/Mouth: Facemask.     EYES:  no scleral icterus, normal conjunctivae   Neck: no carotid bruits. No anterior cervical lymphadenopaty   Respiratory:   lungs are clear to auscultation, no rales or wheezing, well-healed sternal scar, equal chest wall expansion    Cardiovascular:   Regular rhythm, normal rate. Normal first and second heart sounds with no murmurs, rubs, or gallops; the carotid, radial and posterior tibial pulses are intact, Jugular venous pressure normal, no edema bilaterally    Abdomen/GI:  no organomegaly, masses, bruits, or tenderness; bowel sounds are present   Extremities: no cyanosis or clubbing   Skin: no xanthelasma, warm.    Heme/lymph/ Immunology No apparent bleeding noted.   Neurologic: Alert and oriented. normal gait, no tremors     Psychiatric: Pleasant, calm, appropriate affect.    A complete 10 system review of systems was performed and is negative except as mentioned in the HPI/subjective.         Past History   Past Medical History:   Past Medical History:   Diagnosis Date     Acute non-ST elevation myocardial infarction (NSTEMI) (H) 6/22/2020     Adenomatous polyp of colon      Ascending aorta dilatation (H)      Carpal tunnel syndrome      Chronic superficial gastritis      Coronary artery disease due to lipid rich " plaque 6/23/2020     Depression with anxiety      Dyslipidemia, goal LDL below 70 6/23/2020     Essential hypertension 9/2/2012     Fatty liver disease, nonalcoholic      GERD (gastroesophageal reflux disease)      IBS (irritable bowel syndrome)      Left lumbar radiculopathy      Multinodular goiter      Old torn meniscus of knee      Paroxysmal atrial fibrillation (H)      Perianal abscess      Post herpetic neuralgia      Post-traumatic osteoarthritis of both knees      Primary osteoarthritis of left hip      Primary osteoarthritis of right hip      Pulmonary nodule      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 Ragsdale     CV CORONARY ANGIOGRAM N/A 6/23/2020    Procedure: Coronary Angiogram;  Surgeon: Ariane Lester MD;  Location: [facility];  Service: Cardiology     CV IABP INSERT N/A 6/24/2020    Procedure: Intra Aortic Balloon Pump Insertion;  Surgeon: Ariane Lester MD;  Location: [facility];  Service: Cardiology     CV LEFT HEART CATHETERIZATION WITHOUT LEFT VENTRICULOGRAM Left 6/23/2020    Procedure: Left Heart Catheterization Without Left Ventriculogram;  Surgeon: Ariane Lester MD;  Location: [facility];  Service: Cardiology       Family History:   Family History   Problem Relation Age of Onset     Lung Cancer Father 56        fatal     No Known Problems Mother      No Known Problems Daughter      Other - See Comments Son         congenital deformity of right leg; he uses a leg prosthesis       Social History:   Social History     Socioeconomic History     Marital status:      Spouse name: Carlee     Number of children: 2     Years of education: Not on file     Highest  education level: Not on file   Occupational History     Not on file   Tobacco Use     Smoking status: Former Smoker     Packs/day: 1.00     Years: 30.00     Pack years: 30.00     Types: Cigarettes, Cigarettes     Quit date: 3/23/2020     Years since quittin.7     Smokeless tobacco: Never Used     Tobacco comment: stopped 3/24/2020   Substance and Sexual Activity     Alcohol use: No     Drug use: Never     Sexual activity: Yes     Partners: Female   Other Topics Concern     Not on file   Social History Narrative    , Carlee, BA chemistry and taking AA courses at [employer].  From [place]; bachelors in geology and ResQU science. Son, Grace (2005) and Sherrie (2008).  On SSDI, PTSD.       Social Determinants of Health     Financial Resource Strain: Not on file   Food Insecurity: No Food Insecurity     Worried About Running Out of Food in the Last Year: Never true     Ran Out of Food in the Last Year: Never true   Transportation Needs: No Transportation Needs     Lack of Transportation (Medical): No     Lack of Transportation (Non-Medical): No   Physical Activity: Not on file   Stress: Not on file   Social Connections: Not on file   Intimate Partner Violence: Not on file   Housing Stability: Low Risk      Unable to Pay for Housing in the Last Year: No     Number of Places Lived in the Last Year: 1     Unstable Housing in the Last Year: No              Lab Results    Chemistry/lipid CBC Cardiac Enzymes/BNP/TSH/INR   Lab Results   Component Value Date    CHOL 109 10/14/2021    HDL 33 (L) 10/14/2021    LDL 53 10/14/2021    TRIG 115 10/14/2021    CR 1.29 10/14/2021    BUN 13 10/14/2021    POTASSIUM 4.6 10/14/2021     10/14/2021    CO2 27 10/14/2021      Lab Results   Component Value Date    WBC 5.9 10/14/2021    HGB 15.1 10/14/2021    HCT 46.1 10/14/2021    MCV 87 10/14/2021     10/14/2021    A1C 5.9 (H) 2020     Lab Results   Component Value Date    A1C 5.9 (H) 2020    Lab Results   Component  Value Date    TROPONINI <0.01 11/21/2020    BNP 79 (H) 07/27/2020    TSH 0.17 (L) 09/22/2020    INR 1.10 07/27/2020          Sloan Burns MD [facility]  Non-Invasive Cardiologist  [facility]  Pager [phone]

## 2021-12-08 NOTE — TELEPHONE ENCOUNTER
I called and spoke to Olvin at the pharmacy and per him, you can send 2 prescriptions and the insurance should cover them. I've set them up for you to review and authorize if appropriate.     Medication T'd for review and signature    RHINA Schneider on 12/8/2021 at 11:41 AM

## 2021-12-09 ENCOUNTER — PATIENT OUTREACH (OUTPATIENT)
Dept: NURSING | Facility: CLINIC | Age: 56
End: 2021-12-09
Payer: COMMERCIAL

## 2021-12-09 NOTE — PROGRESS NOTES
Clinic Care Coordination Contact    Follow Up Progress Note      Assessment: Call from wife as they have been communicating with Dr. Quevedo's office as he prescribed a 5 mg increase in Escitalopram and they are having trouble getting the medication.  Chart reviewed and looks like it has been sent to pharmacy. Wife will check.  Call back from wife and it is waiting for them to .  She is grateful.     Care Gaps:    Health Maintenance Due   Topic Date Due     PREVENTIVE CARE VISIT  Never done     ADVANCE CARE PLANNING  Never done     Pneumococcal Vaccine: Pediatrics (0 to 5 Years) and At-Risk Patients (6 to 64 Years) (1 of 2 - PPSV23) Never done     ZOSTER IMMUNIZATION (1 of 2) Never done     LUNG CANCER SCREENING  10/13/2021       Postponed to next appointment.     Goals addressed this encounter:   Goals Addressed                    This Visit's Progress       Patient Stated       COMPLETED: Medical (pt-stated)         Goal Statement: I will have colonoscopy and endoscopy within 3 months.   Date Goal set: 5-   Barriers: None identified.   Strengths: willing to have them done.   Date to Achieve By: 8-   Patient expressed understanding of goal: yes   Action steps to achieve this goal:   1. I will scheduled these procedures for this summer.   2. I will discuss with PCP and GI clinic any questions.   3. I will report progress towards this goal at scheduled outreach telephone calls from the CCC team.   Discussed on 6/4/21, 6-28, 7-27 appt late September with [facility], 8-17, 8-31 discussed, 10-25 scheduled colonoscopy, 11-24 done,             Intervention/Education provided during outreach: help with medications     Outreach Frequency: 2 weeks    Plan:   Will send My Chart to wife to set up reassessment which is due.    Care Coordinator will follow up in one week.

## 2021-12-09 NOTE — LETTER
Miguel Gaspar,   Hope there were no other concerns with the medication.       We first completed your assessment to get into our program over a year ago. We should redo it sometime soon.  I see you have appointment on the 15th here in the clinic. If you want, we could meet after that if you have time, and are comfortable meeting in person.  Otherwise, we can set up a phone visit.  I am off on Monday and Tuesday, but will be in here on Wednesday.  Thanks again, Jannette

## 2021-12-10 ENCOUNTER — HOSPITAL ENCOUNTER (OUTPATIENT)
Dept: PHYSICAL THERAPY | Facility: REHABILITATION | Age: 56
End: 2021-12-10
Payer: COMMERCIAL

## 2021-12-10 DIAGNOSIS — G89.29 CHRONIC LEFT SHOULDER PAIN: Primary | ICD-10-CM

## 2021-12-10 DIAGNOSIS — F43.10 PTSD (POST-TRAUMATIC STRESS DISORDER): ICD-10-CM

## 2021-12-10 DIAGNOSIS — M25.512 CHRONIC LEFT SHOULDER PAIN: Primary | ICD-10-CM

## 2021-12-10 PROCEDURE — 97110 THERAPEUTIC EXERCISES: CPT | Mod: GP

## 2021-12-10 PROCEDURE — 97140 MANUAL THERAPY 1/> REGIONS: CPT | Mod: GP

## 2021-12-10 RX ORDER — ESCITALOPRAM OXALATE 5 MG/1
TABLET ORAL
Qty: 30 TABLET | Refills: 0 | Status: SHIPPED | OUTPATIENT
Start: 2021-12-10 | End: 2022-01-26

## 2021-12-10 NOTE — TELEPHONE ENCOUNTER
Refill request for Escitalopram 5mg  Last f/u 12/1. Next f/u 3/2/22 with Dr. Emy Wasserman T'andreina for review and signature  Rod JOHANSEN ATC  
152

## 2021-12-14 DIAGNOSIS — E55.9 VITAMIN D DEFICIENCY: ICD-10-CM

## 2021-12-15 ENCOUNTER — OFFICE VISIT (OUTPATIENT)
Dept: INTERNAL MEDICINE | Facility: CLINIC | Age: 56
End: 2021-12-15
Payer: COMMERCIAL

## 2021-12-15 VITALS
SYSTOLIC BLOOD PRESSURE: 118 MMHG | BODY MASS INDEX: 32.44 KG/M2 | HEART RATE: 54 BPM | DIASTOLIC BLOOD PRESSURE: 68 MMHG | HEIGHT: 64 IN | OXYGEN SATURATION: 95 % | WEIGHT: 190 LBS

## 2021-12-15 DIAGNOSIS — E04.1 THYROID NODULE: ICD-10-CM

## 2021-12-15 DIAGNOSIS — G89.29 CHRONIC LEFT SHOULDER PAIN: ICD-10-CM

## 2021-12-15 DIAGNOSIS — R06.09 DOE (DYSPNEA ON EXERTION): ICD-10-CM

## 2021-12-15 DIAGNOSIS — F43.10 PTSD (POST-TRAUMATIC STRESS DISORDER): Primary | ICD-10-CM

## 2021-12-15 DIAGNOSIS — M25.512 CHRONIC LEFT SHOULDER PAIN: ICD-10-CM

## 2021-12-15 DIAGNOSIS — I25.10 CORONARY ARTERY DISEASE DUE TO LIPID RICH PLAQUE: ICD-10-CM

## 2021-12-15 DIAGNOSIS — K21.00 GASTROESOPHAGEAL REFLUX DISEASE WITH ESOPHAGITIS WITHOUT HEMORRHAGE: ICD-10-CM

## 2021-12-15 DIAGNOSIS — K29.50 CHRONIC GASTRITIS WITHOUT BLEEDING, UNSPECIFIED GASTRITIS TYPE: ICD-10-CM

## 2021-12-15 DIAGNOSIS — I10 ESSENTIAL HYPERTENSION: Chronic | ICD-10-CM

## 2021-12-15 DIAGNOSIS — I25.83 CORONARY ARTERY DISEASE DUE TO LIPID RICH PLAQUE: ICD-10-CM

## 2021-12-15 PROCEDURE — 90471 IMMUNIZATION ADMIN: CPT | Performed by: INTERNAL MEDICINE

## 2021-12-15 PROCEDURE — 90732 PPSV23 VACC 2 YRS+ SUBQ/IM: CPT | Performed by: INTERNAL MEDICINE

## 2021-12-15 PROCEDURE — 99214 OFFICE O/P EST MOD 30 MIN: CPT | Mod: 25 | Performed by: INTERNAL MEDICINE

## 2021-12-15 RX ORDER — HYDROXYZINE PAMOATE 25 MG/1
25 CAPSULE ORAL 3 TIMES DAILY PRN
Qty: 90 CAPSULE | Refills: 1 | Status: CANCELLED | OUTPATIENT
Start: 2021-12-15

## 2021-12-15 ASSESSMENT — MIFFLIN-ST. JEOR: SCORE: 1612.83

## 2021-12-15 NOTE — LETTER
December 15, 2021      Nadya Blake  [address]  [facility] [address]        To Whom It May Concern:    Nadya Blake  was seen on 12/15/2021 and was accompanied by his wife, Carlee.  Please provide appropriate accommodation for the time she lost in test preparation while accompanying her  to his doctor visit.        Sincerely,        Az Briseno MD

## 2021-12-15 NOTE — PROGRESS NOTES
Office Visit - Follow Up   Nadya Blake   56 year old male    Date of Visit: 12/15/2021    Chief Complaint   Patient presents with     Recheck Medication     fasting for labs if needed        Assessment and Plan   1. PTSD (post-traumatic stress disorder)  Working with psychiatry and psychology, significant anxiety in the evening likely contributing to his symptoms of dyspnea with exertion and his elevated blood pressure.  He will be getting a cardiac evaluation as ordered by Dr. Sloan Burns    2. Thyroid nodule  Biopsy benign    3. Chronic left shoulder pain  Working with physical therapy and having improvement    4. Coronary artery disease due to lipid rich plaque  As above, will get a cardiac MRI    5. Essential hypertension  Controlled except in the evenings likely anxiety driven    6. ALVES (dyspnea on exertion)  As above    7. Chronic gastritis without bleeding, unspecified gastritis type  8. Gastroesophageal reflux disease with esophagitis without hemorrhage  Increase omeprazole to 20 mg twice a day    Return in about 4 weeks (around 1/12/2022) for Follow up.     History of Present Illness   This 56 year old man comes in for follow-up.  He has had a few test done since I last saw him.  He had an upper endoscopy and a colonoscopy.  He is found to have some gastritis and esophagitis.  No evidence of eosinophilic esophagitis on pathology.  Inflammation most consistent with NSAID type injury.  He has been taking omeprazole once a day.  Having some reflux symptoms.  He also had a thyroid biopsy which showed a benign thyroid nodule.  He met with cardiology and will be having a cardiac MRI.  He continues to have significant anxiety in the evenings and elevated blood pressure corresponding to this.  He continues to have dyspnea with exertion especially going upstairs in the evening.    Review of Systems: A comprehensive review of systems was negative except as noted.     Medications, Allergies and Problem List  "  Reviewed, reconciled and updated  Post Discharge Medication Reconciliation Status:      Physical Exam   General Appearance:   No acute distress    /68   Pulse 54   Ht 1.626 m (5' 4\")   Wt 86.2 kg (190 lb)   SpO2 95%   BMI 32.61 kg/m           Additional Information   Current Outpatient Medications   Medication Sig Dispense Refill     acetaminophen (TYLENOL) 500 MG tablet [ACETAMINOPHEN (TYLENOL) 500 MG TABLET] Take 2 tablets (1,000 mg total) by mouth every 6 (six) hours as needed for pain. 300 tablet 3     albuterol (PROAIR HFA;PROVENTIL HFA;VENTOLIN HFA) 90 mcg/actuation inhaler [ALBUTEROL (PROAIR HFA;PROVENTIL HFA;VENTOLIN HFA) 90 MCG/ACTUATION INHALER] Inhale 1-2 puffs every 4 (four) hours as needed for wheezing.       aspirin 81 mg chewable tablet [ASPIRIN 81 MG CHEWABLE TABLET] 1 tablet (81 mg total) by Enteral Tube route daily. 90 tablet 3     budesonide-formoteroL (SYMBICORT) 160-4.5 mcg/actuation inhaler [BUDESONIDE-FORMOTEROL (SYMBICORT) 160-4.5 MCG/ACTUATION INHALER] Inhale 2 puffs 2 (two) times a day. Taking as needed.       diclofenac sodium (VOLTAREN) 1 % Gel [DICLOFENAC SODIUM (VOLTAREN) 1 % GEL] Apply 4 g topically 4 (four) times a day. 500 g 11     escitalopram (LEXAPRO) 10 MG tablet Take 1.5 tablets (15 mg) by mouth daily 30 tablet 3     escitalopram (LEXAPRO) 5 MG tablet TAKE 1 TABLET BY MOUTH DAILY ALONG WITH 10MG 30 tablet 0     gabapentin (NEURONTIN) 300 MG capsule Take 1 capsule (300 mg) by mouth daily 90 capsule 3     hydrOXYzine (VISTARIL) 25 MG capsule Take 1 capsule (25 mg) by mouth 3 times daily as needed 90 capsule 1     metoprolol succinate ER (TOPROL-XL) 100 MG 24 hr tablet Take 1.5 tablets (150 mg) by mouth At Bedtime 135 tablet 3     omeprazole (PRILOSEC) 20 MG capsule [OMEPRAZOLE (PRILOSEC) 20 MG CAPSULE] Take 1 capsule (20 mg total) by mouth 2 (two) times a day before meals. 180 capsule 3     QUEtiapine (SEROQUEL) 100 MG tablet Take 1.5 tablets (150 mg) by mouth At " Bedtime 45 tablet 10     QUEtiapine (SEROQUEL) 50 MG tablet TAKE 1 TABLET(50 MG) BY MOUTH TWICE DAILY AS NEEDED 60 tablet 3     rosuvastatin (CRESTOR) 40 MG tablet Take 1 tablet (40 mg) by mouth daily 90 tablet 3     tamsulosin (FLOMAX) 0.4 MG capsule Take 1 capsule (0.4 mg) by mouth daily 90 capsule 3     cholecalciferol (VITAMIN D3) 25 mcg (1000 units) capsule Take 1 capsule (25 mcg) by mouth daily [CHOLECALCIFEROL, VITAMIN D3, (VITAMIN D3) 25 MCG (1,000 UNIT) CAPSULE] Take 2 capsules (2,000 Units total) by mouth daily. 180 capsule 1     Allergies   Allergen Reactions     Atorvastatin Diarrhea     Cortisone Other (See Comments)     pain     Lisinopril Cough     started after CABG for unclear reason     Methylprednisolone Other (See Comments)     ?     Social History     Tobacco Use     Smoking status: Former Smoker     Packs/day: 1.00     Years: 30.00     Pack years: 30.00     Types: Cigarettes, Cigarettes     Quit date: 3/23/2020     Years since quittin.7     Smokeless tobacco: Never Used     Tobacco comment: stopped 3/24/2020   Substance Use Topics     Alcohol use: No     Drug use: Never       Review and/or order of clinical lab tests:  Review and/or order of radiology tests:  Review and/or order of medicine tests:  Discussion of test results with performing physician:  Decision to obtain old records and/or obtain history from someone other than the patient:  Review and summarization of old records and/or obtaining history from someone other than the patient and.or discussion of case with another health care provider:  Independent visualization of image, tracing or specimen itself:    Time:      Az Briseno MD

## 2021-12-17 ENCOUNTER — HOSPITAL ENCOUNTER (OUTPATIENT)
Dept: PHYSICAL THERAPY | Facility: REHABILITATION | Age: 56
End: 2021-12-17
Payer: COMMERCIAL

## 2021-12-17 DIAGNOSIS — M25.512 CHRONIC LEFT SHOULDER PAIN: Primary | ICD-10-CM

## 2021-12-17 DIAGNOSIS — G89.29 CHRONIC LEFT SHOULDER PAIN: Primary | ICD-10-CM

## 2021-12-17 PROCEDURE — 97140 MANUAL THERAPY 1/> REGIONS: CPT | Mod: GP

## 2021-12-17 PROCEDURE — 97110 THERAPEUTIC EXERCISES: CPT | Mod: GP

## 2021-12-22 ENCOUNTER — PATIENT OUTREACH (OUTPATIENT)
Dept: NURSING | Facility: CLINIC | Age: 56
End: 2021-12-22
Payer: COMMERCIAL

## 2021-12-22 NOTE — PROGRESS NOTES
Clinic Care Coordination Contact     Contact   Chart Review   12-23 update  Assessment: Call from wife who sees results from his MRI and EKG on My Chart and has questions.  Gave her phone number to call Dr. Burns's cardiac clinic to discuss any questions.      Plan/Recommendations: Plan below.          Clinic Care Coordination Contact    Follow Up Progress Note      Assessment: Called wife and they would like to stay involved with care coordination.  No current goal.  Set up re assessment with RN CC for 1-4 at 10 AM.  He has cardiac MRI tomorrow as he is getting winded going up stairs.  He is followed closely by cardiology.     Care Gaps:    Health Maintenance Due   Topic Date Due     PREVENTIVE CARE VISIT  Never done     ADVANCE CARE PLANNING  Never done     ZOSTER IMMUNIZATION (1 of 2) Never done     LUNG CANCER SCREENING  10/13/2021       Postponed to next PCP appointment     Intervention/Education provided during outreach: Set up re assessment.       Outreach Frequency: 2 weeks    Plan:   RN CC to complete phone re assessment with wife, no  needed.   Care Coordinator will follow up in one month.

## 2021-12-23 ENCOUNTER — HOSPITAL ENCOUNTER (OUTPATIENT)
Dept: MRI IMAGING | Facility: HOSPITAL | Age: 56
End: 2021-12-23
Attending: GENERAL ACUTE CARE HOSPITAL
Payer: COMMERCIAL

## 2021-12-23 VITALS — DIASTOLIC BLOOD PRESSURE: 90 MMHG | OXYGEN SATURATION: 95 % | SYSTOLIC BLOOD PRESSURE: 130 MMHG | HEART RATE: 73 BPM

## 2021-12-23 DIAGNOSIS — I25.709 CORONARY ARTERY DISEASE INVOLVING CORONARY BYPASS GRAFT OF NATIVE HEART WITH ANGINA PECTORIS (H): ICD-10-CM

## 2021-12-23 DIAGNOSIS — R06.09 DYSPNEA ON EXERTION: ICD-10-CM

## 2021-12-23 LAB
ATRIAL RATE - MUSE: 65 BPM
ATRIAL RATE - MUSE: 68 BPM
DIASTOLIC BLOOD PRESSURE - MUSE: NORMAL MMHG
DIASTOLIC BLOOD PRESSURE - MUSE: NORMAL MMHG
INTERPRETATION ECG - MUSE: NORMAL
INTERPRETATION ECG - MUSE: NORMAL
P AXIS - MUSE: 28 DEGREES
P AXIS - MUSE: 32 DEGREES
PR INTERVAL - MUSE: 194 MS
PR INTERVAL - MUSE: 196 MS
QRS DURATION - MUSE: 96 MS
QRS DURATION - MUSE: 98 MS
QT - MUSE: 392 MS
QT - MUSE: 422 MS
QTC - MUSE: 407 MS
QTC - MUSE: 448 MS
R AXIS - MUSE: -12 DEGREES
R AXIS - MUSE: -17 DEGREES
SYSTOLIC BLOOD PRESSURE - MUSE: NORMAL MMHG
SYSTOLIC BLOOD PRESSURE - MUSE: NORMAL MMHG
T AXIS - MUSE: 60 DEGREES
T AXIS - MUSE: 67 DEGREES
VENTRICULAR RATE- MUSE: 65 BPM
VENTRICULAR RATE- MUSE: 68 BPM

## 2021-12-23 PROCEDURE — 93018 CV STRESS TEST I&R ONLY: CPT | Performed by: INTERNAL MEDICINE

## 2021-12-23 PROCEDURE — 93010 ELECTROCARDIOGRAM REPORT: CPT | Performed by: INTERNAL MEDICINE

## 2021-12-23 PROCEDURE — 93016 CV STRESS TEST SUPVJ ONLY: CPT | Performed by: INTERNAL MEDICINE

## 2021-12-23 PROCEDURE — 75563 CARD MRI W/STRESS IMG & DYE: CPT

## 2021-12-23 PROCEDURE — A9585 GADOBUTROL INJECTION: HCPCS | Performed by: GENERAL ACUTE CARE HOSPITAL

## 2021-12-23 PROCEDURE — 255N000002 HC RX 255 OP 636: Performed by: GENERAL ACUTE CARE HOSPITAL

## 2021-12-23 PROCEDURE — 93005 ELECTROCARDIOGRAM TRACING: CPT

## 2021-12-23 PROCEDURE — 999N000122 MR MYOCARDIUM  OVERREAD

## 2021-12-23 PROCEDURE — 250N000011 HC RX IP 250 OP 636: Performed by: GENERAL ACUTE CARE HOSPITAL

## 2021-12-23 PROCEDURE — 75563 CARD MRI W/STRESS IMG & DYE: CPT | Mod: 26 | Performed by: INTERNAL MEDICINE

## 2021-12-23 RX ORDER — GADOBUTROL 604.72 MG/ML
16 INJECTION INTRAVENOUS ONCE
Status: COMPLETED | OUTPATIENT
Start: 2021-12-23 | End: 2021-12-23

## 2021-12-23 RX ORDER — REGADENOSON 0.08 MG/ML
0.4 INJECTION, SOLUTION INTRAVENOUS ONCE
Status: COMPLETED | OUTPATIENT
Start: 2021-12-23 | End: 2021-12-23

## 2021-12-23 RX ORDER — CAFFEINE 200 MG
200 TABLET ORAL
Status: DISCONTINUED | OUTPATIENT
Start: 2021-12-23 | End: 2021-12-23 | Stop reason: HOSPADM

## 2021-12-23 RX ORDER — CAFFEINE CITRATE 20 MG/ML
60 SOLUTION INTRAVENOUS
Status: DISCONTINUED | OUTPATIENT
Start: 2021-12-23 | End: 2021-12-23 | Stop reason: HOSPADM

## 2021-12-23 RX ORDER — AMINOPHYLLINE 25 MG/ML
50 INJECTION, SOLUTION INTRAVENOUS
Status: DISCONTINUED | OUTPATIENT
Start: 2021-12-23 | End: 2021-12-23 | Stop reason: HOSPADM

## 2021-12-23 RX ORDER — ALBUTEROL SULFATE 0.83 MG/ML
2.5 SOLUTION RESPIRATORY (INHALATION)
Status: DISCONTINUED | OUTPATIENT
Start: 2021-12-23 | End: 2021-12-23 | Stop reason: HOSPADM

## 2021-12-23 RX ADMIN — GADOBUTROL 16 ML: 604.72 INJECTION INTRAVENOUS at 13:29

## 2021-12-23 RX ADMIN — REGADENOSON 0.4 MG: 0.08 INJECTION, SOLUTION INTRAVENOUS at 13:34

## 2021-12-26 DIAGNOSIS — Z95.1 S/P CABG (CORONARY ARTERY BYPASS GRAFT): ICD-10-CM

## 2021-12-27 ENCOUNTER — TELEPHONE (OUTPATIENT)
Dept: CARDIOLOGY | Facility: CLINIC | Age: 56
End: 2021-12-27
Payer: COMMERCIAL

## 2021-12-27 DIAGNOSIS — R06.09 DYSPNEA ON EXERTION: Primary | ICD-10-CM

## 2021-12-27 RX ORDER — FUROSEMIDE 40 MG
40 TABLET ORAL DAILY
Qty: 90 TABLET | Refills: 3 | Status: SHIPPED | OUTPATIENT
Start: 2021-12-27 | End: 2022-01-14

## 2021-12-27 NOTE — TELEPHONE ENCOUNTER
Called patient via [place] , [place] [phone]. Patient verbalized understanding and continues feeling short of breath. He is willing to start furosemide and will have a bmp checked in 2 weeks at the outpatient [facility]. Encouraged patient to call prior to follow up scheduled 2/10/21 with any questions or concerns. Understanding verbalized. Rx sent to patient's preferred pharmacy.

## 2021-12-27 NOTE — TELEPHONE ENCOUNTER
----- Message from Sloan Burns MD sent at 12/24/2021  4:29 PM CST -----  Miguel Amin,    Can you let Nadya know that his MRI shows his heart is functioning normally and there is no evidence he has any new blocked arteries or problems with his bypass grafts? He could be a bit congested with fluid in the heart. If he is still feeling out of breath, I would try furosemide 40 mg once daily with a basic metabolic panel checked in 2 weeks. If he is feeling better, we can wait on doing anything until he sees me back in follow-up.    Thanks,  Sloan

## 2021-12-28 RX ORDER — ASPIRIN 81 MG/1
TABLET, CHEWABLE ORAL
Qty: 90 TABLET | Refills: 1 | Status: SHIPPED | OUTPATIENT
Start: 2021-12-28 | End: 2022-06-16

## 2021-12-28 NOTE — TELEPHONE ENCOUNTER
"Last Written Prescription Date:  12/9/20  Last Fill Quantity: 90,  # refills: 3   Last office visit provider:  6/25/21     Requested Prescriptions   Pending Prescriptions Disp Refills     aspirin (ASA) 81 MG chewable tablet [Pharmacy Med Name: ASPIRIN 81MG CHEWABLE TABLETS] 90 tablet 3     Sig: TAKE 1 TABLET BY ENTERAL TUBE ROUTE DAILY       Analgesics (Non-Narcotic Tylenol and ASA Only) Passed - 12/26/2021  5:04 PM        Passed - Recent (12 mo) or future (30 days) visit within the authorizing provider's specialty     Patient has had an office visit with the authorizing provider or a provider within the authorizing providers department within the previous 12 mos or has a future within next 30 days. See \"Patient Info\" tab in inbasket, or \"Choose Columns\" in Meds & Orders section of the refill encounter.              Passed - Patient is age 20 years or older     If ASA is flagged for ages under 20 years old. Forward to provider for confirmation Ryes Syndrome is not a concern.              Passed - Medication is active on med list             mayra powell RN 12/28/21 3:59 PM  "

## 2021-12-31 ENCOUNTER — HOSPITAL ENCOUNTER (OUTPATIENT)
Dept: PHYSICAL THERAPY | Facility: REHABILITATION | Age: 56
End: 2021-12-31
Payer: COMMERCIAL

## 2021-12-31 DIAGNOSIS — M25.512 CHRONIC LEFT SHOULDER PAIN: Primary | ICD-10-CM

## 2021-12-31 DIAGNOSIS — G89.29 CHRONIC LEFT SHOULDER PAIN: Primary | ICD-10-CM

## 2021-12-31 PROCEDURE — 97140 MANUAL THERAPY 1/> REGIONS: CPT | Mod: GP

## 2022-01-04 ENCOUNTER — PATIENT OUTREACH (OUTPATIENT)
Dept: NURSING | Facility: CLINIC | Age: 57
End: 2022-01-04
Payer: COMMERCIAL

## 2022-01-04 ASSESSMENT — ACTIVITIES OF DAILY LIVING (ADL): DEPENDENT_IADLS:: CLEANING;COOKING;LAUNDRY;SHOPPING;MEAL PREPARATION;MEDICATION MANAGEMENT

## 2022-01-04 NOTE — LETTER
[facility] CARE COORDINATION  [facility]    January 18, 2022    Nadya Blake  [address]  [facility] [address]      Dear Nadya,    I am a clinic care coordinator who works with Az Briseno MD at the [facility]. I wanted to thank you for spending the time to talk with me.  Below is a description of clinic care coordination and how I can further assist you.      The clinic care coordination team is made up of a registered nurse,  and community health worker who understand the health care system. The goal of clinic care coordination is to help you manage your health and improve access to the health care system in the most efficient manner. The team can assist you in meeting your health care goals by providing education, coordinating services, strengthening the communication among your providers and supporting you with any resource needs.    Please feel free to contact me at [phone] with any questions or concerns. We are focused on providing you with the highest-quality healthcare experience possible and that all starts with you.     Sincerely,     David Myhre, RN  CCC RN    Enclosed: I have enclosed a copy of the Patient Centered Plan of Care. This has helpful information and goals that we have talked about. Please keep this in an easy to access place to use as needed.

## 2022-01-04 NOTE — LETTER
[facility]  Patient Centered Plan of Care  About Me:        Patient Name:  Nadya Blake    YOB: 1965  Age:         56 year old   [place]acy MRN:    [id number] Telephone Information:  Home Phone [phone]   Mobile [phone]       Address:  [address]  [facility] [address] Email address:  [email or web address]      Emergency Contact(s)    Name Relationship Lgl Grd Work Phone Home Phone Mobile Phone   1. JUANITA BEASLEY Spouse   [phone]            Primary language:  Spanish     needed? No   [facility] [employer]:  [phone] op. 1  Other communication barriers:None    Preferred Method of Communication:     Current living arrangement: I live in a private home with family    Mobility Status/ Medical Equipment: Independent        Health Maintenance  Health Maintenance Reviewed:     Health Maintenance Due   Topic Date Due     PREVENTIVE CARE VISIT  Never done     ADVANCE CARE PLANNING  Never done     ZOSTER IMMUNIZATION (1 of 2) Never done     LUNG CANCER SCREENING  10/13/2021         My Access Plan  Medical Emergency 911   Primary Clinic Line [facility] - [phone]   24 Hour Appointment Line [phone] or  [phone]) (toll-free)   24 Hour Nurse Line [phone] (toll-free)   Preferred Urgent Care [facility] [place], [phone]     [facility]  [phone]     [id number] [address] AT [facility] [address]     Behavioral Health Crisis Line The National Suicide Prevention Lifeline at 1-345.860.9360 or 911             My Care Team Members  Patient Care Team       Relationship Specialty Notifications Start End    Az Briseno MD PCP - General   9/22/20     Phone: [phone] Fax: [phone] [address] [facility] [address]    Az Briseno MD Assigned PCP   6/16/21      Phone: [phone] Fax: [phone]         [address] [facility] [address]    Nelsy Mccormick PA-C Assigned Surgical Provider   7/16/21     Phone: [phone] Fax: [phone]         [address] [facility] [address]    Franco Quevedo MD Assigned Behavioral Health Provider   7/16/21     Phone: [phone] Fax: [phone]         [address] [facility] [address] [facility] [address]    Inessa Cade ARM worker   11/15/21     Phone: [phone]         Sloan Burns MD Assigned Heart and Vascular Provider   12/12/21     Phone: [phone] Fax: [phone]         [address] [facility] [address] [facility] [address]    Myhre, David J, RN Lead Care Coordinator Primary Care - CC Admissions 1/18/22      Phone: [phone]            My Care Plans  Self Management and Treatment Plan  Goals and (Comments)  Goals        General     Psychosocial (pt-stated)      Notes - Note created  1/18/2022 12:02 PM by Myhre, David J, RN     Goal Statement: I would like to start painting as an outlet to express myself in the next 6 months.   Date Goal set: 1/4/22  Barriers: None  Strengths: Motivated. Strong family support.   Date to Achieve By: 6/4/22  Patient expressed understanding of goal: Yes  Action steps to achieve this goal:  1. I will look into the options of what type of painting interests me.   2. I will also look into community offered classes that may help get me started towards his goal.   3. I will report progress towards this goal at schedule outreach telephone calls from my CCC team.         Psychosocial (pt-stated)      Notes - Note created  1/18/2022 12:14 PM by Myhre, David J, RN     Goal Statement: I would like to be more supportive of my son in the next 6 months.   Date Goal set: 1/4/22  Barriers: Health concerns.  Strengths: Motivated. Strong support from family. Strong advocate for himself.   Date to Achieve By: 6/4/22  Patient expressed understanding of goal: Yes  Action steps to  achieve this goal:  1. I will support my son with the activities her participates in, like basket ball.   2. I will continue to find time during the day to spend time with my son that may allow us to build a closer relationship.   3. I will report progress towards this goal at scheduled outreach telephone calls from my CCC team.               Action Plans on File:                       Advance Care Plans/Directives Type:   No data recorded    My Medical and Care Information  Problem List   Patient Active Problem List   Diagnosis     Recurrent major depressive disorder, in partial remission (H)     Fatty liver disease, nonalcoholic     Gastroesophageal reflux disease with esophagitis     Essential hypertension     Non-toxic multinodular goiter     Post-traumatic osteoarthritis of both knees     Primary osteoarthritis of left hip     Primary osteoarthritis of right hip     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)     Coronary artery disease, CABG 6/2020     Pre-diabetes     Dyslipidemia, goal LDL below 70     Benign prostatic hyperplasia with nocturia     PTSD (post-traumatic stress disorder)     Ascending aorta dilatation (H)     Chronic superficial gastritis     GERD (gastroesophageal reflux disease)     IBS (irritable bowel syndrome)     Post herpetic neuralgia     TMJ arthritis     Anal fistula      Current Medications and Allergies:  See printed Medication Report.    Care Coordination Start Date: 11/19/2020   Frequency of Care Coordination: 2 weeks     Form Last Updated: 01/18/2022

## 2022-01-06 ENCOUNTER — LAB (OUTPATIENT)
Dept: LAB | Facility: HOSPITAL | Age: 57
End: 2022-01-06
Payer: COMMERCIAL

## 2022-01-06 DIAGNOSIS — R06.09 DYSPNEA ON EXERTION: ICD-10-CM

## 2022-01-06 LAB
ANION GAP SERPL CALCULATED.3IONS-SCNC: 9 MMOL/L (ref 5–18)
BUN SERPL-MCNC: 17 MG/DL (ref 8–22)
CALCIUM SERPL-MCNC: 9.6 MG/DL (ref 8.5–10.5)
CHLORIDE BLD-SCNC: 101 MMOL/L (ref 98–107)
CO2 SERPL-SCNC: 32 MMOL/L (ref 22–31)
CREAT SERPL-MCNC: 1.39 MG/DL (ref 0.7–1.3)
GFR SERPL CREATININE-BSD FRML MDRD: 60 ML/MIN/1.73M2
GLUCOSE BLD-MCNC: 115 MG/DL (ref 70–125)
POTASSIUM BLD-SCNC: 4 MMOL/L (ref 3.5–5)
SODIUM SERPL-SCNC: 142 MMOL/L (ref 136–145)

## 2022-01-06 PROCEDURE — 82374 ASSAY BLOOD CARBON DIOXIDE: CPT

## 2022-01-06 PROCEDURE — 36415 COLL VENOUS BLD VENIPUNCTURE: CPT

## 2022-01-07 ENCOUNTER — TELEPHONE (OUTPATIENT)
Dept: CARDIOLOGY | Facility: CLINIC | Age: 57
End: 2022-01-07
Payer: COMMERCIAL

## 2022-01-07 NOTE — TELEPHONE ENCOUNTER
Spoke with wife with  service. She states pt is much better with wt and the sob. He is able to go upstairs and has more energy. Instructed to stay on Lasix and see Dr. Burns in Feb.

## 2022-01-07 NOTE — TELEPHONE ENCOUNTER
----- Message from Sloan Burns MD sent at 1/7/2022  7:53 AM CST -----  Can we call Nadya to see if he is feeling any better with the diuretic? His labs show he may be a little dried out, but if he is feeling better, I may have him continue this until he sees me back in follow-up.    Thanks,  Sloan

## 2022-01-14 ENCOUNTER — OFFICE VISIT (OUTPATIENT)
Dept: INTERNAL MEDICINE | Facility: CLINIC | Age: 57
End: 2022-01-14
Payer: COMMERCIAL

## 2022-01-14 ENCOUNTER — HOSPITAL ENCOUNTER (OUTPATIENT)
Dept: PHYSICAL THERAPY | Facility: REHABILITATION | Age: 57
End: 2022-01-14
Payer: COMMERCIAL

## 2022-01-14 VITALS
DIASTOLIC BLOOD PRESSURE: 82 MMHG | OXYGEN SATURATION: 97 % | HEART RATE: 64 BPM | BODY MASS INDEX: 32.39 KG/M2 | SYSTOLIC BLOOD PRESSURE: 128 MMHG | HEIGHT: 64 IN | WEIGHT: 189.7 LBS

## 2022-01-14 DIAGNOSIS — R06.09 DYSPNEA ON EXERTION: ICD-10-CM

## 2022-01-14 DIAGNOSIS — I25.83 CORONARY ARTERY DISEASE DUE TO LIPID RICH PLAQUE: Chronic | ICD-10-CM

## 2022-01-14 DIAGNOSIS — M25.512 CHRONIC LEFT SHOULDER PAIN: Primary | ICD-10-CM

## 2022-01-14 DIAGNOSIS — K92.1 HEMATOCHEZIA: ICD-10-CM

## 2022-01-14 DIAGNOSIS — K60.2 ANAL FISSURE: Primary | ICD-10-CM

## 2022-01-14 DIAGNOSIS — G89.29 CHRONIC LEFT SHOULDER PAIN: Primary | ICD-10-CM

## 2022-01-14 DIAGNOSIS — I25.10 CORONARY ARTERY DISEASE DUE TO LIPID RICH PLAQUE: Chronic | ICD-10-CM

## 2022-01-14 PROCEDURE — 99214 OFFICE O/P EST MOD 30 MIN: CPT | Performed by: INTERNAL MEDICINE

## 2022-01-14 PROCEDURE — 97140 MANUAL THERAPY 1/> REGIONS: CPT | Mod: GP

## 2022-01-14 RX ORDER — FUROSEMIDE 20 MG
20 TABLET ORAL DAILY
Qty: 90 TABLET | Refills: 3 | Status: SHIPPED | OUTPATIENT
Start: 2022-01-14 | End: 2022-02-15

## 2022-01-14 ASSESSMENT — MIFFLIN-ST. JEOR: SCORE: 1611.47

## 2022-01-14 NOTE — PROGRESS NOTES
"  Office Visit - Follow Up   Nadya Blake   56 year old male    Date of Visit: 1/14/2022    Chief Complaint   Patient presents with     RECHECK        Assessment and Plan   1. Anal fissure  - Colorectal Surgery Referral; Future    2. Hematochezia  - Colorectal Surgery Referral; Future    3. Coronary artery disease, CABG 6/2020  Reviewed cardiac MRI, looks okay, continue secondary prevention  4. Dyspnea on exertion  Has done better with furosemide but his bicarb was 32 last check he is quite thirsty, I would recommend backing off on furosemide to 20 mg daily  - furosemide (LASIX) 20 MG tablet; Take 1 tablet (20 mg) by mouth daily  Dispense: 90 tablet; Refill: 3    Return in about 4 weeks (around 2/11/2022) for Follow up.     History of Present Illness   This 56 year old man comes in for follow-up.  Since I last saw him he has had a cardiac MRI stress test which was unremarkable.  He was having some dyspnea with exertion and his cardiologist started him on furosemide 40 mg daily which is worked quite well for his dyspnea.  Only issue is that he is quite thirsty.  He also is experiencing some bleeding with passage of stool and thinks his anal fissure is return.  He is previously seen colorectal surgery.  Recent colonoscopy looked okay.    Review of Systems: A comprehensive review of systems was negative except as noted.     Medications, Allergies and Problem List   Reviewed, reconciled and updated  Post Discharge Medication Reconciliation Status:      Physical Exam   General Appearance:   No acute distress    /82 (BP Location: Right arm, Patient Position: Sitting, Cuff Size: Adult Regular)   Pulse 64   Ht 1.626 m (5' 4\")   Wt 86 kg (189 lb 11.2 oz)   SpO2 97%   BMI 32.56 kg/m      HEENT exam is unremarkable  Cardiovascular regular rate and rhythm no murmur gallop or rub  Pulmonary lungs are clear to auscultation bilaterally  Gastrointestinal abdomen soft nontender nondistended no " organomegaly  Neurologic exam is non focal  Psychiatric pleasant, no confusion or agitation        Additional Information   Current Outpatient Medications   Medication Sig Dispense Refill     acetaminophen (TYLENOL) 500 MG tablet [ACETAMINOPHEN (TYLENOL) 500 MG TABLET] Take 2 tablets (1,000 mg total) by mouth every 6 (six) hours as needed for pain. 300 tablet 3     albuterol (PROAIR HFA;PROVENTIL HFA;VENTOLIN HFA) 90 mcg/actuation inhaler [ALBUTEROL (PROAIR HFA;PROVENTIL HFA;VENTOLIN HFA) 90 MCG/ACTUATION INHALER] Inhale 1-2 puffs every 4 (four) hours as needed for wheezing.       aspirin (ASA) 81 MG chewable tablet TAKE 1 TABLET BY ENTERAL TUBE ROUTE DAILY 90 tablet 1     budesonide-formoteroL (SYMBICORT) 160-4.5 mcg/actuation inhaler [BUDESONIDE-FORMOTEROL (SYMBICORT) 160-4.5 MCG/ACTUATION INHALER] Inhale 2 puffs 2 (two) times a day. Taking as needed.       cholecalciferol (VITAMIN D3) 25 mcg (1000 units) capsule Take 1 capsule (25 mcg) by mouth daily [CHOLECALCIFEROL, VITAMIN D3, (VITAMIN D3) 25 MCG (1,000 UNIT) CAPSULE] Take 2 capsules (2,000 Units total) by mouth daily. 180 capsule 1     diclofenac sodium (VOLTAREN) 1 % Gel [DICLOFENAC SODIUM (VOLTAREN) 1 % GEL] Apply 4 g topically 4 (four) times a day. 500 g 11     escitalopram (LEXAPRO) 10 MG tablet Take 1.5 tablets (15 mg) by mouth daily 30 tablet 3     escitalopram (LEXAPRO) 5 MG tablet TAKE 1 TABLET BY MOUTH DAILY ALONG WITH 10MG 30 tablet 0     furosemide (LASIX) 20 MG tablet Take 1 tablet (20 mg) by mouth daily 90 tablet 3     gabapentin (NEURONTIN) 300 MG capsule Take 1 capsule (300 mg) by mouth daily 90 capsule 3     hydrOXYzine (VISTARIL) 25 MG capsule Take 1 capsule (25 mg) by mouth 3 times daily as needed 90 capsule 1     metoprolol succinate ER (TOPROL-XL) 100 MG 24 hr tablet Take 1.5 tablets (150 mg) by mouth At Bedtime 135 tablet 3     omeprazole (PRILOSEC) 20 MG capsule [OMEPRAZOLE (PRILOSEC) 20 MG CAPSULE] Take 1 capsule (20 mg total) by mouth  2 (two) times a day before meals. 180 capsule 3     QUEtiapine (SEROQUEL) 100 MG tablet Take 1.5 tablets (150 mg) by mouth At Bedtime 45 tablet 10     QUEtiapine (SEROQUEL) 50 MG tablet TAKE 1 TABLET(50 MG) BY MOUTH TWICE DAILY AS NEEDED 60 tablet 3     rosuvastatin (CRESTOR) 40 MG tablet Take 1 tablet (40 mg) by mouth daily 90 tablet 3     tamsulosin (FLOMAX) 0.4 MG capsule Take 1 capsule (0.4 mg) by mouth daily 90 capsule 3     Allergies   Allergen Reactions     Atorvastatin Diarrhea     Cortisone Other (See Comments)     pain     Lisinopril Cough     started after CABG for unclear reason     Methylprednisolone Other (See Comments)     ?     Social History     Tobacco Use     Smoking status: Former Smoker     Packs/day: 1.00     Years: 30.00     Pack years: 30.00     Types: Cigarettes, Cigarettes     Quit date: 3/23/2020     Years since quittin.8     Smokeless tobacco: Never Used     Tobacco comment: stopped 3/24/2020   Substance Use Topics     Alcohol use: No     Drug use: Never       Review and/or order of clinical lab tests:  Review and/or order of radiology tests:  Review and/or order of medicine tests:  Discussion of test results with performing physician:  Decision to obtain old records and/or obtain history from someone other than the patient:  Review and summarization of old records and/or obtaining history from someone other than the patient and.or discussion of case with another health care provider:  Independent visualization of image, tracing or specimen itself:    Time:      Az Briseno MD

## 2022-01-18 NOTE — PROGRESS NOTES
Clinic Care Coordination Contact    Clinic Care Coordination Contact  OUTREACH    Referral Information:  Referral Source: PCP    Primary Diagnosis: Psychosocial    Chief Complaint   Patient presents with     Clinic Care Coordination - Initial        Universal Utilization:   Clinic Utilization  Difficulty keeping appointments:: No  Compliance Concerns: No  No-Show Concerns: No  No PCP office visit in Past Year: No  Utilization    Hospital Admissions  0             ED Visits  0             No Show Count (past year)  2                Current as of: 1/18/2022  2:40 AM              Clinical Concerns:  Current Medical Concerns: patient is a 56 year old man with a history of fatty liver, GERD, pre-diabetes, Coronary artery disease, CABG 6/2020, 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   Patient denied any medical concerns during today's assessment he stated takes his medication daily as directed and is good about attending his medical appointments. Patient stated he had been experiencing some shortness of breath, but this has gotten better after his Cardiologist prescribed furosemide to him.   Current Behavioral Concerns: Patient reported a history of depression, anxiety and depression. He stated he feels his mental health is stable. Patient currently working with a therapist, psychiatrist and [facility] worker. He stated he feels he has enough mental health support at this time. Patient reported he is taking his psychiatric medications daily as directed. He denied any suicidal ideation during today's assessment. Patient centered goals were established today around starting to paint as a way of self expression and building a closer relationship with his son per his request.    Education Provided to patient: Discussed the importance of taking his medications as directed. Encouraged him to to attend all upcoming appointments.  Encouraged him to work on his goals and to contact writer for any additional needs or concerns.   Pain  Pain (GOAL):: No  Health Maintenance Reviewed:     Health Maintenance Due   Topic Date Due     PREVENTIVE CARE VISIT  Never done     ADVANCE CARE PLANNING  Never done     ZOSTER IMMUNIZATION (1 of 2) Never done     LUNG CANCER SCREENING  10/13/2021       Medication Management:  Medication review status: Medications reviewed and no changes reported per patient.        Patient's wife Carlee assist him his medications. He stated he is compliant with his medications.      Functional Status:  Dependent ADLs:: Independent,Bathing  Dependent IADLs:: Cleaning,Cooking,Laundry,Shopping,Meal Preparation,Medication Management  Bed or wheelchair confined:: No  Mobility Status: Independent  Fallen 2 or more times in the past year?: No  Any fall with injury in the past year?: No    Living Situation:  Current living arrangement:: I live in a private home with family  Type of residence:: [facility]    Lifestyle & Psychosocial Needs:    Social Determinants of Health     Tobacco Use: Medium Risk     Smoking Tobacco Use: Former Smoker     Smokeless Tobacco Use: Never Used   Alcohol Use: Not on file   Financial Resource Strain: Not on file   Food Insecurity: No Food Insecurity     Worried About Running Out of Food in the Last Year: Never true     Ran Out of Food in the Last Year: Never true   Transportation Needs: No Transportation Needs     Lack of Transportation (Medical): No     Lack of Transportation (Non-Medical): No   Physical Activity: Not on file   Stress: Not on file   Social Connections: Not on file   Intimate Partner Violence: Not on file   Depression: Not at risk     PHQ-2 Score: 0   Housing Stability: Low Risk      Unable to Pay for Housing in the Last Year: No     Number of Places Lived in the Last Year: 1     Unstable Housing in the Last Year: No     Diet:: Regular  Inadequate nutrition (GOAL):: No  Tube Feeding:  No  Inadequate activity/exercise (GOAL):: No  Significant changes in sleep pattern (GOAL): No  Transportation means:: Regular car,Medical transport     Anabaptist or spiritual beliefs that impact treatment:: No  Mental health DX:: Yes  Mental health DX how managed:: Medication,Outpatient Counseling,Psychiatrist ([facility])  Mental health management concern (GOAL):: No  Chemical Dependency Status: No Current Concerns  Informal Support system:: Children,Spouse,Family        Financial Concerns: Patient denied any financial concerns at this time.      Resources and Interventions:  Current Resources:      Community Resources: PCA,County Worker,OP Mental Health ([facility])  Supplies used at home:: None  Equipment Currently Used at Home: none  Employment Status: disabled         Advance Care Plan/Directive  Advanced Care Plans/Directives on file:: No  Advanced Care Plan/Directive Status: Declined Further Information    Referrals Placed: None     Goals:   Goals        General     Psychosocial (pt-stated)      Notes - Note created  1/18/2022 12:02 PM by Myhre, David J, RN     Goal Statement: I would like to start painting as an outlet to express myself in the next 6 months.   Date Goal set: 1/4/22  Barriers: None  Strengths: Motivated. Strong family support.   Date to Achieve By: 6/4/22  Patient expressed understanding of goal: Yes  Action steps to achieve this goal:  1. I will look into the options of what type of painting interests me.   2. I will also look into community offered classes that may help get me started towards his goal.   3. I will report progress towards this goal at schedule outreach telephone calls from my CCC team.         Psychosocial (pt-stated)      Notes - Note created  1/18/2022 12:14 PM by Myhre, David J, RN     Goal Statement: I would like to be more supportive of my son in the next 6 months.   Date Goal set: 1/4/22  Barriers: Health concerns.  Strengths: Motivated. Strong support from family. Strong  advocate for himself.   Date to Achieve By: 6/4/22  Patient expressed understanding of goal: Yes  Action steps to achieve this goal:  1. I will support my son with the activities her participates in, like basket ball.   2. I will continue to find time during the day to spend time with my son that may allow us to build a closer relationship.   3. I will report progress towards this goal at scheduled outreach telephone calls from my CCC team.              Patient/Caregiver understanding: Verbalized understanding of goals and other information discussed at today's assessment.     Outreach Frequency: 2 weeks  Future Appointments              In 3 days Parmer, Scott, PT [facility], [facility]    In 1 week Parmer, Scott, PT [facility], [facility]    In 2 weeks Parmer, Scott, PT [facility], [facility]    In 3 weeks Sloan Burns MD [facility], [facility]    In 4 weeks Az Briseno MD [facility], [facility]    In 1 month Franco Quevedo MD M [facility], [facility]          Plan: CCC RN will continue to monitor, support patient with current goals and will be available to assist as nursing needs arise.

## 2022-01-19 ENCOUNTER — TRANSFERRED RECORDS (OUTPATIENT)
Dept: HEALTH INFORMATION MANAGEMENT | Facility: CLINIC | Age: 57
End: 2022-01-19
Payer: COMMERCIAL

## 2022-01-26 ENCOUNTER — MYC MEDICAL ADVICE (OUTPATIENT)
Dept: BEHAVIORAL HEALTH | Facility: CLINIC | Age: 57
End: 2022-01-26
Payer: COMMERCIAL

## 2022-01-26 DIAGNOSIS — F43.10 PTSD (POST-TRAUMATIC STRESS DISORDER): ICD-10-CM

## 2022-01-26 RX ORDER — ESCITALOPRAM OXALATE 5 MG/1
TABLET ORAL
Qty: 30 TABLET | Refills: 0 | Status: SHIPPED | OUTPATIENT
Start: 2022-01-26 | End: 2022-02-28

## 2022-01-26 RX ORDER — ESCITALOPRAM OXALATE 10 MG/1
15 TABLET ORAL DAILY
Qty: 30 TABLET | Refills: 3 | Status: SHIPPED | OUTPATIENT
Start: 2022-01-26 | End: 2022-02-28

## 2022-01-26 NOTE — TELEPHONE ENCOUNTER
"RN spoke with Olvin who stated Lexapro 10 mg stated \"Closed out per dr instruction\" Reports they had issues at the beginning of the year with a closing of a company.     Date of Last Office Visit: 12/1/2021  Date of Next Office Visit: 3/2/22  No shows since last visit: 0  Cancellations since last visit: 0    Medication requested: escitalopram (LEXAPRO) 5 MG tablet Date last ordered: 12/10/2021 Qty: 30 Refills: 0    Medication requested: escitalopram (LEXAPRO) 10 MG tablet Date last ordered: 12/1/21 Qty: 30 Refills: 3      Review of [place] ?: NA    Lapse in medication adherence greater than 5 days?: NO  If yes, call patient and gather details: Refer to MollyWatrt message and RN message from pharmacistOlvin, written above  Medication refill request verified as identical to current order?: yes  Result of Last DAM, VPA, Li+ Level, CBC, or Carbamazepine Level (at or since last visit): N/A    Last visit treatment plan: I am going to increase the Lexapro to 15 mg a day.  Risks and benefits were discussed.  He will continue with his therapies.     The patient will return to clinic in 3 months. He agrees to call before then with any questions or concerns.    []Medication refilled per  Medication Refill in Ambulatory Care  policy.  [x]Medication unable to be refilled by RN due to criteria not met as indicated below:    []Eligibility - not seen in the last year   []Supervision - no future appointment   []Compliance - no shows, cancellations or lapse in therapy   [x]Verification - order discrepancy   []Controlled medication   [x]Medication not included in policy   [x]90-day supply request   []Other    "

## 2022-01-28 ENCOUNTER — HOSPITAL ENCOUNTER (OUTPATIENT)
Dept: PHYSICAL THERAPY | Facility: REHABILITATION | Age: 57
End: 2022-01-28
Payer: COMMERCIAL

## 2022-01-28 DIAGNOSIS — M25.512 CHRONIC LEFT SHOULDER PAIN: Primary | ICD-10-CM

## 2022-01-28 DIAGNOSIS — G89.29 CHRONIC LEFT SHOULDER PAIN: Primary | ICD-10-CM

## 2022-01-28 PROCEDURE — 97140 MANUAL THERAPY 1/> REGIONS: CPT | Mod: GP

## 2022-02-04 ENCOUNTER — HOSPITAL ENCOUNTER (OUTPATIENT)
Dept: PHYSICAL THERAPY | Facility: REHABILITATION | Age: 57
End: 2022-02-04
Payer: COMMERCIAL

## 2022-02-04 DIAGNOSIS — G89.29 CHRONIC LEFT SHOULDER PAIN: Primary | ICD-10-CM

## 2022-02-04 DIAGNOSIS — M25.512 CHRONIC LEFT SHOULDER PAIN: Primary | ICD-10-CM

## 2022-02-04 PROCEDURE — 97140 MANUAL THERAPY 1/> REGIONS: CPT | Mod: GP

## 2022-02-07 DIAGNOSIS — B02.29 POST HERPETIC NEURALGIA: Primary | ICD-10-CM

## 2022-02-07 DIAGNOSIS — M16.11 PRIMARY OSTEOARTHRITIS OF RIGHT HIP: ICD-10-CM

## 2022-02-07 DIAGNOSIS — M16.12 PRIMARY OSTEOARTHRITIS OF LEFT HIP: ICD-10-CM

## 2022-02-10 ENCOUNTER — OFFICE VISIT (OUTPATIENT)
Dept: CARDIOLOGY | Facility: CLINIC | Age: 57
End: 2022-02-10
Payer: COMMERCIAL

## 2022-02-10 VITALS
DIASTOLIC BLOOD PRESSURE: 80 MMHG | BODY MASS INDEX: 32.61 KG/M2 | HEIGHT: 64 IN | RESPIRATION RATE: 12 BRPM | SYSTOLIC BLOOD PRESSURE: 118 MMHG | HEART RATE: 57 BPM | WEIGHT: 191 LBS

## 2022-02-10 DIAGNOSIS — I50.32 CHRONIC HEART FAILURE WITH PRESERVED EJECTION FRACTION (H): Primary | ICD-10-CM

## 2022-02-10 DIAGNOSIS — E78.5 DYSLIPIDEMIA, GOAL LDL BELOW 70: ICD-10-CM

## 2022-02-10 DIAGNOSIS — Z87.891 FORMER SMOKER: ICD-10-CM

## 2022-02-10 DIAGNOSIS — I25.709 CORONARY ARTERY DISEASE INVOLVING CORONARY BYPASS GRAFT OF NATIVE HEART WITH ANGINA PECTORIS (H): ICD-10-CM

## 2022-02-10 DIAGNOSIS — I10 BENIGN ESSENTIAL HYPERTENSION: ICD-10-CM

## 2022-02-10 PROCEDURE — 99214 OFFICE O/P EST MOD 30 MIN: CPT | Performed by: GENERAL ACUTE CARE HOSPITAL

## 2022-02-10 RX ORDER — POLYETHYLENE GLYCOL 3350 17 G/17G
1 POWDER, FOR SOLUTION ORAL DAILY
COMMUNITY
End: 2022-09-14

## 2022-02-10 ASSESSMENT — MIFFLIN-ST. JEOR: SCORE: 1617.37

## 2022-02-10 NOTE — PATIENT INSTRUCTIONS
1. We can stay off the furosemide for now. If you have more shortness of breath, let us know and we may have you restart the furosemide.  2. See me back in 3 months.

## 2022-02-10 NOTE — PROGRESS NOTES
HEART CARE ENCOUNTER NOTE        Assessment/Recommendations   Assessment:    1. Dyspnea on exertion. Suspect he had some degree of fluid-overload, improved after a course of loop diuretics. No ischemia or valvular disease seen on his stress cardiac MRI.  2. Chronic congestive heart failure with preserved left ventricular ejection fraction. Appears euvolemic.  3. Coronary artery disease status post four-vessel coronary artery bypass grafting (left internal mammary artery to the left anterior descending artery, right radial artery to the right posterior descending artery, reversed saphenous venous grafts to obtuse marginal and diagonal artery branches) on 6/24/2020. Denies anginal symptoms, no ischemia seen on stress cardiac MRI on 12/23/2021.  4. Benign essential hypertension. Controlled.  5. Dyslipidemia. Last LDL 53.  6. Former smoker.  7. BMI 32.79.    Plan:  1. Continue to hold furosemide. If his symptoms worsen, we can try resuming 20 mg once daily.  2. Continue other cardiac medications.  3. Follow-up with me in 3 months.         History of Present Illness   Mr. Nadya Blake is a 56 year old male with a significant past history of CAD with history of NSTEMI s/p 4V CABG (LIMA to LAD, right radial artery to RPDA, reversed SVGs to an OM and diagonal artery branch) on 6/24/2020, HTN, dyslipidemia, and former smoker presenting for follow-up.     At his last visit, he was complaining of dyspnea on exertion. He was started on furosemide 40 mg daily. He felt his breathing improved on this but he did note increased thirst. He then had the dose reduced to 20 mg daily. He felt his thirst sensation improved without much changing in his breathing. He was instructed to stop furosemide a week ago.    He still gets out of breath with exertion but it is better. He can go up a flight of stairs without difficulty. He denies any chest pain/pressure/tightness, shortness of breath at rest, light headedness/dizziness,  pre-syncope, syncope, lower extremity swelling, palpitations, paroxysmal nocturnal dyspnea (PND), or orthopnea.     Cardiac Problems and Cardiac Diagnostics     Most Recent Cardiac testing:  ECG dated 7/23/2021 (personaly reviewed and interpreted): SR, 1st degree AV block with  ms, left axis deviation     ECHO 6/27/2020 (report reviewed):     Normal left ventricular size and systolic function. The left ventricular wall motion is normal. The calculated left ventricular ejection fraction is 71%.    Normal right ventricular size and systolic function.    No hemodynamically significant valvular heart abnormalities.    The ascending aorta is mildly dilated.    When compared to the previous study dated 6/23/2020, no significant change.     Stress cardiac MRI 12/23/2021 (report reviewed):  1.  Pharmacological Regadenoson stress cardiac MRI is negative for inducible myocardial ischemia.   2.  Pharmacological stress ECG is negative for inducible myocardial ischemia.   3. Normal left ventricular size, wall thickness and systolic function. The quantified left ventricular  ejection fraction is 59 %.  Very small area of non-transmural myocardial scar in the mid inferior wall is  identified.    4.  Normal right ventricular size and systolic function.    5.  No significant valvular abnormalities.  6. Ascending aorta measures 38 mm.     Stress test 5/21/2021 (report reviewed):     The nuclear stress test is negative for inducible myocardial ischemia or infarction.     The left ventricular ejection fraction at stress is 65%.     There is no prior study for comparison.     Cardiac cath 6/23/2020 (report reviewed):     Severe mid LAD and critical first diagonal disease.    Severe OM-3 disease; OM-3 is the dominant lateral wall vessel.    Severe proximal RCA and right PDA disease.    LV EDP 20 mmHg     Medications  Allergies   Current Outpatient Medications   Medication Sig Dispense Refill     acetaminophen (TYLENOL) 500 MG  tablet [ACETAMINOPHEN (TYLENOL) 500 MG TABLET] Take 2 tablets (1,000 mg total) by mouth every 6 (six) hours as needed for pain. 300 tablet 3     albuterol (PROAIR HFA;PROVENTIL HFA;VENTOLIN HFA) 90 mcg/actuation inhaler [ALBUTEROL (PROAIR HFA;PROVENTIL HFA;VENTOLIN HFA) 90 MCG/ACTUATION INHALER] Inhale 1-2 puffs every 4 (four) hours as needed for wheezing.       aspirin (ASA) 81 MG chewable tablet TAKE 1 TABLET BY ENTERAL TUBE ROUTE DAILY 90 tablet 1     budesonide-formoteroL (SYMBICORT) 160-4.5 mcg/actuation inhaler [BUDESONIDE-FORMOTEROL (SYMBICORT) 160-4.5 MCG/ACTUATION INHALER] Inhale 2 puffs 2 (two) times a day. Taking as needed.       cholecalciferol (VITAMIN D3) 25 mcg (1000 units) capsule Take 1 capsule (25 mcg) by mouth daily [CHOLECALCIFEROL, VITAMIN D3, (VITAMIN D3) 25 MCG (1,000 UNIT) CAPSULE] Take 2 capsules (2,000 Units total) by mouth daily. 180 capsule 1     diclofenac sodium (VOLTAREN) 1 % Gel [DICLOFENAC SODIUM (VOLTAREN) 1 % GEL] Apply 4 g topically 4 (four) times a day. 500 g 11     escitalopram (LEXAPRO) 5 MG tablet TAKE 1 TABLET BY MOUTH DAILY ALONG WITH 10MG 30 tablet 0     furosemide (LASIX) 20 MG tablet Take 1 tablet (20 mg) by mouth daily 90 tablet 3     gabapentin (NEURONTIN) 300 MG capsule Take 1 capsule (300 mg) by mouth daily 90 capsule 3     hydrOXYzine (VISTARIL) 25 MG capsule Take 1 capsule (25 mg) by mouth 3 times daily as needed 90 capsule 1     Lidocaine 0.5 % GEL        metoprolol succinate ER (TOPROL-XL) 100 MG 24 hr tablet Take 1.5 tablets (150 mg) by mouth At Bedtime 135 tablet 3     omeprazole (PRILOSEC) 20 MG capsule [OMEPRAZOLE (PRILOSEC) 20 MG CAPSULE] Take 1 capsule (20 mg total) by mouth 2 (two) times a day before meals. 180 capsule 3     polyethylene glycol (MIRALAX) 17 g packet Take 1 packet by mouth daily       QUEtiapine (SEROQUEL) 50 MG tablet TAKE 1 TABLET(50 MG) BY MOUTH TWICE DAILY AS NEEDED 60 tablet 3     rosuvastatin (CRESTOR) 40 MG tablet Take 1 tablet (40  "mg) by mouth daily 90 tablet 3     tamsulosin (FLOMAX) 0.4 MG capsule Take 1 capsule (0.4 mg) by mouth daily 90 capsule 3     diclofenac (VOLTAREN) 1 % topical gel Apply 4 g topically 4 times daily (Patient not taking: Reported on 2/10/2022) 500 g 2     escitalopram (LEXAPRO) 10 MG tablet Take 1.5 tablets (15 mg) by mouth daily (Patient not taking: Reported on 2/10/2022) 30 tablet 3     QUEtiapine (SEROQUEL) 100 MG tablet Take 1.5 tablets (150 mg) by mouth At Bedtime (Patient not taking: Reported on 2/10/2022) 45 tablet 10      Allergies   Allergen Reactions     Atorvastatin Diarrhea     Cortisone Other (See Comments)     pain     Lisinopril Cough     started after CABG for unclear reason     Methylprednisolone Other (See Comments)     ?        Physical Examination Review of Systems   /80 (BP Location: Left arm, Patient Position: Sitting, Cuff Size: Adult Large)   Pulse 57   Resp 12   Ht 1.626 m (5' 4\")   Wt 86.6 kg (191 lb)   BMI 32.79 kg/m    Body mass index is 32.79 kg/m .  Wt Readings from Last 3 Encounters:   02/10/22 86.6 kg (191 lb)   01/14/22 86 kg (189 lb 11.2 oz)   12/15/21 86.2 kg (190 lb)       General Appearance:   Pleasant  male, appears  stated age. no acute distress, normal body habitus   ENT/Mouth: Facemask.   EYES:  no scleral icterus, normal conjunctivae   Neck: no carotid bruits. No anterior cervical lymphadenopaty   Respiratory:   lungs are clear to auscultation, no rales or wheezing, well-healed sternal scar, equal chest wall expansion    Cardiovascular:   Regular rhythm, normal rate. Normal first and second heart sounds with no murmurs, rubs, or gallops; the carotid, radial and posterior tibial pulses are intact, Jugular venous pressure normal, no edema bilaterally    Abdomen/GI:  no organomegaly, masses, bruits, or tenderness; bowel sounds are present   Extremities: no cyanosis or clubbing   Skin: no xanthelasma, warm.    Heme/lymph/ Immunology No apparent bleeding noted. "   Neurologic: Alert and oriented. normal gait, no tremors     Psychiatric: Pleasant, calm, appropriate affect.    A complete 10 system review of systems was performed and is negative except as mentioned in the HPI/subjective.         Past History   Past Medical History:   Past Medical History:   Diagnosis Date     Acute non-ST elevation myocardial infarction (NSTEMI) (H) 6/22/2020     Adenomatous polyp of colon      Ascending aorta dilatation (H)      Carpal tunnel syndrome      Chronic superficial gastritis      Coronary artery disease due to lipid rich plaque 6/23/2020     Depression with anxiety      Dyslipidemia, goal LDL below 70 6/23/2020     Essential hypertension 9/2/2012     Fatty liver disease, nonalcoholic      GERD (gastroesophageal reflux disease)      IBS (irritable bowel syndrome)      Left lumbar radiculopathy      Multinodular goiter      Old torn meniscus of knee      Paroxysmal atrial fibrillation (H)      Perianal abscess      Post herpetic neuralgia      Post-traumatic osteoarthritis of both knees      Primary osteoarthritis of left hip      Primary osteoarthritis of right hip      Pulmonary nodule      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 Ragsdale     CV CORONARY ANGIOGRAM N/A 6/23/2020    Procedure: Coronary Angiogram;  Surgeon: Ariane Lester MD;  Location: [facility];  Service: Cardiology     CV IABP INSERT N/A 6/24/2020    Procedure: Intra Aortic Balloon Pump Insertion;  Surgeon: Ariane Lester MD;  Location: [facility];  Service: Cardiology     CV LEFT HEART CATHETERIZATION WITHOUT LEFT VENTRICULOGRAM Left 6/23/2020    Procedure: Left Heart Catheterization Without Left  Ventriculogram;  Surgeon: Ariane Lester MD;  Location: [facility];  Service: Cardiology       Family History:   Family History   Problem Relation Age of Onset     Lung Cancer Father 56        fatal     No Known Problems Mother      No Known Problems Daughter      Other - See Comments Son         congenital deformity of right leg; he uses a leg prosthesis     Social History:   Social History     Socioeconomic History     Marital status:      Spouse name: Carlee     Number of children: 2     Years of education: Not on file     Highest education level: Not on file   Occupational History     Not on file   Tobacco Use     Smoking status: Former Smoker     Packs/day: 1.00     Years: 30.00     Pack years: 30.00     Types: Cigarettes, Cigarettes     Quit date: 3/23/2020     Years since quittin.8     Smokeless tobacco: Never Used     Tobacco comment: stopped 3/24/2020   Substance and Sexual Activity     Alcohol use: No     Drug use: Never     Sexual activity: Yes     Partners: Female   Other Topics Concern     Not on file   Social History Narrative    , Carlee, BA chemistry and taking AA courses at [employer].  From [place]; bachelors in geology and computer science. Son, Grace () and Sherrie (2008).  On SSDI, PTSD.       Social Determinants of Health     Financial Resource Strain: Not on file   Food Insecurity: No Food Insecurity     Worried About Running Out of Food in the Last Year: Never true     Ran Out of Food in the Last Year: Never true   Transportation Needs: No Transportation Needs     Lack of Transportation (Medical): No     Lack of Transportation (Non-Medical): No   Physical Activity: Not on file   Stress: Not on file   Social Connections: Not on file   Intimate Partner Violence: Not on file   Housing Stability: Low Risk      Unable to Pay for Housing in the Last Year: No     Number of Places Lived in the Last Year: 1     Unstable Housing in the Last Year: No              Lab Results     Chemistry/lipid CBC Cardiac Enzymes/BNP/TSH/INR   Lab Results   Component Value Date    CHOL 109 10/14/2021    HDL 33 (L) 10/14/2021    LDL 53 10/14/2021    TRIG 115 10/14/2021    CR 1.39 (H) 01/06/2022    BUN 17 01/06/2022    POTASSIUM 4.0 01/06/2022     01/06/2022    CO2 32 (H) 01/06/2022      Lab Results   Component Value Date    WBC 5.9 10/14/2021    HGB 15.1 10/14/2021    HCT 46.1 10/14/2021    MCV 87 10/14/2021     10/14/2021    A1C 5.9 (H) 06/24/2020     Lab Results   Component Value Date    A1C 5.9 (H) 06/24/2020    Lab Results   Component Value Date    TROPONINI <0.01 11/21/2020    BNP 79 (H) 07/27/2020    TSH 0.17 (L) 09/22/2020    INR 1.10 07/27/2020          Sloan Burns MD [facility]  Non-Invasive Cardiologist  [facility]  Pager [phone]

## 2022-02-10 NOTE — LETTER
2/10/2022    Az Briseno MD  [address] [facility] [id number]    RE: Nadya Blake       Dear Colleague,     I had the pleasure of seeing Nadya Blake in the [facility].  HEART CARE ENCOUNTER NOTE        Assessment/Recommendations   Assessment:    1. Dyspnea on exertion. Suspect he had some degree of fluid-overload, improved after a course of loop diuretics. No ischemia or valvular disease seen on his stress cardiac MRI.  2. Chronic congestive heart failure with preserved left ventricular ejection fraction. Appears euvolemic.  3. Coronary artery disease status post four-vessel coronary artery bypass grafting (left internal mammary artery to the left anterior descending artery, right radial artery to the right posterior descending artery, reversed saphenous venous grafts to obtuse marginal and diagonal artery branches) on 6/24/2020. Denies anginal symptoms, no ischemia seen on stress cardiac MRI on 12/23/2021.  4. Benign essential hypertension. Controlled.  5. Dyslipidemia. Last LDL 53.  6. Former smoker.  7. BMI 32.79.    Plan:  1. Continue to hold furosemide. If his symptoms worsen, we can try resuming 20 mg once daily.  2. Continue other cardiac medications.  3. Follow-up with me in 3 months.         History of Present Illness   Mr. Nadya Blake is a 56 year old male with a significant past history of CAD with history of NSTEMI s/p 4V CABG (LIMA to LAD, right radial artery to RPDA, reversed SVGs to an OM and diagonal artery branch) on 6/24/2020, HTN, dyslipidemia, and former smoker presenting for follow-up.     At his last visit, he was complaining of dyspnea on exertion. He was started on furosemide 40 mg daily. He felt his breathing improved on this but he did note increased thirst. He then had the dose reduced to 20 mg daily. He felt his thirst sensation improved without much changing in his breathing. He was instructed to stop furosemide a week ago.    He still gets  out of breath with exertion but it is better. He can go up a flight of stairs without difficulty. He denies any chest pain/pressure/tightness, shortness of breath at rest, light headedness/dizziness, pre-syncope, syncope, lower extremity swelling, palpitations, paroxysmal nocturnal dyspnea (PND), or orthopnea.     Cardiac Problems and Cardiac Diagnostics     Most Recent Cardiac testing:  ECG dated 7/23/2021 (personaly reviewed and interpreted): SR, 1st degree AV block with  ms, left axis deviation     ECHO 6/27/2020 (report reviewed):     Normal left ventricular size and systolic function. The left ventricular wall motion is normal. The calculated left ventricular ejection fraction is 71%.    Normal right ventricular size and systolic function.    No hemodynamically significant valvular heart abnormalities.    The ascending aorta is mildly dilated.    When compared to the previous study dated 6/23/2020, no significant change.     Stress cardiac MRI 12/23/2021 (report reviewed):  1.  Pharmacological Regadenoson stress cardiac MRI is negative for inducible myocardial ischemia.   2.  Pharmacological stress ECG is negative for inducible myocardial ischemia.   3. Normal left ventricular size, wall thickness and systolic function. The quantified left ventricular  ejection fraction is 59 %.  Very small area of non-transmural myocardial scar in the mid inferior wall is  identified.    4.  Normal right ventricular size and systolic function.    5.  No significant valvular abnormalities.  6. Ascending aorta measures 38 mm.     Stress test 5/21/2021 (report reviewed):     The nuclear stress test is negative for inducible myocardial ischemia or infarction.     The left ventricular ejection fraction at stress is 65%.     There is no prior study for comparison.     Cardiac cath 6/23/2020 (report reviewed):     Severe mid LAD and critical first diagonal disease.    Severe OM-3 disease; OM-3 is the dominant lateral wall  vessel.    Severe proximal RCA and right PDA disease.    LV EDP 20 mmHg     Medications  Allergies   Current Outpatient Medications   Medication Sig Dispense Refill     acetaminophen (TYLENOL) 500 MG tablet [ACETAMINOPHEN (TYLENOL) 500 MG TABLET] Take 2 tablets (1,000 mg total) by mouth every 6 (six) hours as needed for pain. 300 tablet 3     albuterol (PROAIR HFA;PROVENTIL HFA;VENTOLIN HFA) 90 mcg/actuation inhaler [ALBUTEROL (PROAIR HFA;PROVENTIL HFA;VENTOLIN HFA) 90 MCG/ACTUATION INHALER] Inhale 1-2 puffs every 4 (four) hours as needed for wheezing.       aspirin (ASA) 81 MG chewable tablet TAKE 1 TABLET BY ENTERAL TUBE ROUTE DAILY 90 tablet 1     budesonide-formoteroL (SYMBICORT) 160-4.5 mcg/actuation inhaler [BUDESONIDE-FORMOTEROL (SYMBICORT) 160-4.5 MCG/ACTUATION INHALER] Inhale 2 puffs 2 (two) times a day. Taking as needed.       cholecalciferol (VITAMIN D3) 25 mcg (1000 units) capsule Take 1 capsule (25 mcg) by mouth daily [CHOLECALCIFEROL, VITAMIN D3, (VITAMIN D3) 25 MCG (1,000 UNIT) CAPSULE] Take 2 capsules (2,000 Units total) by mouth daily. 180 capsule 1     diclofenac sodium (VOLTAREN) 1 % Gel [DICLOFENAC SODIUM (VOLTAREN) 1 % GEL] Apply 4 g topically 4 (four) times a day. 500 g 11     escitalopram (LEXAPRO) 5 MG tablet TAKE 1 TABLET BY MOUTH DAILY ALONG WITH 10MG 30 tablet 0     furosemide (LASIX) 20 MG tablet Take 1 tablet (20 mg) by mouth daily 90 tablet 3     gabapentin (NEURONTIN) 300 MG capsule Take 1 capsule (300 mg) by mouth daily 90 capsule 3     hydrOXYzine (VISTARIL) 25 MG capsule Take 1 capsule (25 mg) by mouth 3 times daily as needed 90 capsule 1     Lidocaine 0.5 % GEL        metoprolol succinate ER (TOPROL-XL) 100 MG 24 hr tablet Take 1.5 tablets (150 mg) by mouth At Bedtime 135 tablet 3     omeprazole (PRILOSEC) 20 MG capsule [OMEPRAZOLE (PRILOSEC) 20 MG CAPSULE] Take 1 capsule (20 mg total) by mouth 2 (two) times a day before meals. 180 capsule 3     polyethylene glycol (MIRALAX) 17 g  "packet Take 1 packet by mouth daily       QUEtiapine (SEROQUEL) 50 MG tablet TAKE 1 TABLET(50 MG) BY MOUTH TWICE DAILY AS NEEDED 60 tablet 3     rosuvastatin (CRESTOR) 40 MG tablet Take 1 tablet (40 mg) by mouth daily 90 tablet 3     tamsulosin (FLOMAX) 0.4 MG capsule Take 1 capsule (0.4 mg) by mouth daily 90 capsule 3     diclofenac (VOLTAREN) 1 % topical gel Apply 4 g topically 4 times daily (Patient not taking: Reported on 2/10/2022) 500 g 2     escitalopram (LEXAPRO) 10 MG tablet Take 1.5 tablets (15 mg) by mouth daily (Patient not taking: Reported on 2/10/2022) 30 tablet 3     QUEtiapine (SEROQUEL) 100 MG tablet Take 1.5 tablets (150 mg) by mouth At Bedtime (Patient not taking: Reported on 2/10/2022) 45 tablet 10      Allergies   Allergen Reactions     Atorvastatin Diarrhea     Cortisone Other (See Comments)     pain     Lisinopril Cough     started after CABG for unclear reason     Methylprednisolone Other (See Comments)     ?        Physical Examination Review of Systems   /80 (BP Location: Left arm, Patient Position: Sitting, Cuff Size: Adult Large)   Pulse 57   Resp 12   Ht 1.626 m (5' 4\")   Wt 86.6 kg (191 lb)   BMI 32.79 kg/m    Body mass index is 32.79 kg/m .  Wt Readings from Last 3 Encounters:   02/10/22 86.6 kg (191 lb)   01/14/22 86 kg (189 lb 11.2 oz)   12/15/21 86.2 kg (190 lb)       General Appearance:   Pleasant  male, appears  stated age. no acute distress, normal body habitus   ENT/Mouth: Facemask.   EYES:  no scleral icterus, normal conjunctivae   Neck: no carotid bruits. No anterior cervical lymphadenopaty   Respiratory:   lungs are clear to auscultation, no rales or wheezing, well-healed sternal scar, equal chest wall expansion    Cardiovascular:   Regular rhythm, normal rate. Normal first and second heart sounds with no murmurs, rubs, or gallops; the carotid, radial and posterior tibial pulses are intact, Jugular venous pressure normal, no edema bilaterally    Abdomen/GI:  no " organomegaly, masses, bruits, or tenderness; bowel sounds are present   Extremities: no cyanosis or clubbing   Skin: no xanthelasma, warm.    Heme/lymph/ Immunology No apparent bleeding noted.   Neurologic: Alert and oriented. normal gait, no tremors     Psychiatric: Pleasant, calm, appropriate affect.    A complete 10 system review of systems was performed and is negative except as mentioned in the HPI/subjective.         Past History   Past Medical History:   Past Medical History:   Diagnosis Date     Acute non-ST elevation myocardial infarction (NSTEMI) (H) 6/22/2020     Adenomatous polyp of colon      Ascending aorta dilatation (H)      Carpal tunnel syndrome      Chronic superficial gastritis      Coronary artery disease due to lipid rich plaque 6/23/2020     Depression with anxiety      Dyslipidemia, goal LDL below 70 6/23/2020     Essential hypertension 9/2/2012     Fatty liver disease, nonalcoholic      GERD (gastroesophageal reflux disease)      IBS (irritable bowel syndrome)      Left lumbar radiculopathy      Multinodular goiter      Old torn meniscus of knee      Paroxysmal atrial fibrillation (H)      Perianal abscess      Post herpetic neuralgia      Post-traumatic osteoarthritis of both knees      Primary osteoarthritis of left hip      Primary osteoarthritis of right hip      Pulmonary nodule      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 Ragsdale     CV CORONARY ANGIOGRAM N/A 6/23/2020    Procedure: Coronary Angiogram;  Surgeon: Ariane Lester MD;  Location: [facility] Cath Lab;  Service: Cardiology     CV IABP INSERT N/A 6/24/2020    Procedure: Intra Aortic Balloon Pump Insertion;  Surgeon: Ariane Lester  MD;  Location: [facility] Cath Lab;  Service: Cardiology     CV LEFT HEART CATHETERIZATION WITHOUT LEFT VENTRICULOGRAM Left 2020    Procedure: Left Heart Catheterization Without Left Ventriculogram;  Surgeon: Ariane Lester MD;  Location: [facility] Cath Lab;  Service: Cardiology       Family History:   Family History   Problem Relation Age of Onset     Lung Cancer Father 56        fatal     No Known Problems Mother      No Known Problems Daughter      Other - See Comments Son         congenital deformity of right leg; he uses a leg prosthesis     Social History:   Social History     Socioeconomic History     Marital status:      Spouse name: Carlee     Number of children: 2     Years of education: Not on file     Highest education level: Not on file   Occupational History     Not on file   Tobacco Use     Smoking status: Former Smoker     Packs/day: 1.00     Years: 30.00     Pack years: 30.00     Types: Cigarettes, Cigarettes     Quit date: 3/23/2020     Years since quittin.8     Smokeless tobacco: Never Used     Tobacco comment: stopped 3/24/2020   Substance and Sexual Activity     Alcohol use: No     Drug use: Never     Sexual activity: Yes     Partners: Female   Other Topics Concern     Not on file   Social History Narrative    , Carlee, BA chemistry and taking AA courses at [employer].  From [place]; bachelors in geology and computer science. Son, Grace (2005) and Sherrie (2008).  On SSDI, PTSD.       Social Determinants of Health     Financial Resource Strain: Not on file   Food Insecurity: No Food Insecurity     Worried About Running Out of Food in the Last Year: Never true     Ran Out of Food in the Last Year: Never true   Transportation Needs: No Transportation Needs     Lack of Transportation (Medical): No     Lack of Transportation (Non-Medical): No   Physical Activity: Not on file   Stress: Not on file   Social Connections: Not on file   Intimate Partner Violence: Not on file   Housing  Stability: Low Risk      Unable to Pay for Housing in the Last Year: No     Number of Places Lived in the Last Year: 1     Unstable Housing in the Last Year: No              Lab Results    Chemistry/lipid CBC Cardiac Enzymes/BNP/TSH/INR   Lab Results   Component Value Date    CHOL 109 10/14/2021    HDL 33 (L) 10/14/2021    LDL 53 10/14/2021    TRIG 115 10/14/2021    CR 1.39 (H) 01/06/2022    BUN 17 01/06/2022    POTASSIUM 4.0 01/06/2022     01/06/2022    CO2 32 (H) 01/06/2022      Lab Results   Component Value Date    WBC 5.9 10/14/2021    HGB 15.1 10/14/2021    HCT 46.1 10/14/2021    MCV 87 10/14/2021     10/14/2021    A1C 5.9 (H) 06/24/2020     Lab Results   Component Value Date    A1C 5.9 (H) 06/24/2020    Lab Results   Component Value Date    TROPONINI <0.01 11/21/2020    BNP 79 (H) 07/27/2020    TSH 0.17 (L) 09/22/2020    INR 1.10 07/27/2020            Sloan Burns MD [facility]  Non-Invasive Cardiologist  [facility]  Pager [phone]

## 2022-02-11 ENCOUNTER — HOSPITAL ENCOUNTER (OUTPATIENT)
Dept: PHYSICAL THERAPY | Facility: REHABILITATION | Age: 57
End: 2022-02-11
Payer: COMMERCIAL

## 2022-02-11 DIAGNOSIS — G89.29 CHRONIC LEFT SHOULDER PAIN: Primary | ICD-10-CM

## 2022-02-11 DIAGNOSIS — M25.512 CHRONIC LEFT SHOULDER PAIN: Primary | ICD-10-CM

## 2022-02-11 PROCEDURE — 97140 MANUAL THERAPY 1/> REGIONS: CPT | Mod: GP

## 2022-02-15 ENCOUNTER — MYC MEDICAL ADVICE (OUTPATIENT)
Dept: INTERNAL MEDICINE | Facility: CLINIC | Age: 57
End: 2022-02-15

## 2022-02-15 ENCOUNTER — OFFICE VISIT (OUTPATIENT)
Dept: INTERNAL MEDICINE | Facility: CLINIC | Age: 57
End: 2022-02-15
Payer: COMMERCIAL

## 2022-02-15 VITALS
HEIGHT: 64 IN | HEART RATE: 56 BPM | SYSTOLIC BLOOD PRESSURE: 112 MMHG | WEIGHT: 192.4 LBS | DIASTOLIC BLOOD PRESSURE: 74 MMHG | BODY MASS INDEX: 32.85 KG/M2 | OXYGEN SATURATION: 98 %

## 2022-02-15 DIAGNOSIS — R73.9 HYPERGLYCEMIA: ICD-10-CM

## 2022-02-15 DIAGNOSIS — I10 ESSENTIAL HYPERTENSION: ICD-10-CM

## 2022-02-15 DIAGNOSIS — K60.2 ANAL FISSURE: ICD-10-CM

## 2022-02-15 DIAGNOSIS — E55.9 VITAMIN D DEFICIENCY: Primary | ICD-10-CM

## 2022-02-15 DIAGNOSIS — R35.89 POLYURIA: Primary | ICD-10-CM

## 2022-02-15 DIAGNOSIS — E55.9 VITAMIN D DEFICIENCY: ICD-10-CM

## 2022-02-15 LAB
ALBUMIN UR-MCNC: ABNORMAL MG/DL
ANION GAP SERPL CALCULATED.3IONS-SCNC: 9 MMOL/L (ref 5–18)
APPEARANCE UR: CLEAR
BACTERIA #/AREA URNS HPF: ABNORMAL /HPF
BILIRUB UR QL STRIP: NEGATIVE
BUN SERPL-MCNC: 15 MG/DL (ref 8–22)
CALCIUM SERPL-MCNC: 9.5 MG/DL (ref 8.5–10.5)
CHLORIDE BLD-SCNC: 104 MMOL/L (ref 98–107)
CO2 SERPL-SCNC: 28 MMOL/L (ref 22–31)
COLOR UR AUTO: YELLOW
CREAT SERPL-MCNC: 1.23 MG/DL (ref 0.7–1.3)
GFR SERPL CREATININE-BSD FRML MDRD: 70 ML/MIN/1.73M2
GLUCOSE BLD-MCNC: 109 MG/DL (ref 70–125)
GLUCOSE UR STRIP-MCNC: NEGATIVE MG/DL
HBA1C MFR BLD: 5.8 % (ref 0–5.6)
HGB UR QL STRIP: NEGATIVE
KETONES UR STRIP-MCNC: NEGATIVE MG/DL
LEUKOCYTE ESTERASE UR QL STRIP: NEGATIVE
MUCOUS THREADS #/AREA URNS LPF: PRESENT /LPF
NITRATE UR QL: NEGATIVE
PH UR STRIP: 5.5 [PH] (ref 5–8)
POTASSIUM BLD-SCNC: 4.4 MMOL/L (ref 3.5–5)
RBC #/AREA URNS AUTO: ABNORMAL /HPF
SODIUM SERPL-SCNC: 141 MMOL/L (ref 136–145)
SP GR UR STRIP: >=1.03 (ref 1–1.03)
SQUAMOUS #/AREA URNS AUTO: ABNORMAL /LPF
UROBILINOGEN UR STRIP-ACNC: 0.2 E.U./DL
WBC #/AREA URNS AUTO: ABNORMAL /HPF

## 2022-02-15 PROCEDURE — 99214 OFFICE O/P EST MOD 30 MIN: CPT | Performed by: INTERNAL MEDICINE

## 2022-02-15 PROCEDURE — 80048 BASIC METABOLIC PNL TOTAL CA: CPT | Performed by: INTERNAL MEDICINE

## 2022-02-15 PROCEDURE — 36415 COLL VENOUS BLD VENIPUNCTURE: CPT | Performed by: INTERNAL MEDICINE

## 2022-02-15 PROCEDURE — 83036 HEMOGLOBIN GLYCOSYLATED A1C: CPT | Performed by: INTERNAL MEDICINE

## 2022-02-15 PROCEDURE — 81001 URINALYSIS AUTO W/SCOPE: CPT | Performed by: INTERNAL MEDICINE

## 2022-02-15 RX ORDER — ACETAMINOPHEN 160 MG
1 TABLET,DISINTEGRATING ORAL DAILY
Qty: 90 CAPSULE | Refills: 3 | Status: SHIPPED | OUTPATIENT
Start: 2022-02-15 | End: 2022-12-21

## 2022-02-15 ASSESSMENT — MIFFLIN-ST. JEOR: SCORE: 1623.72

## 2022-02-15 NOTE — PROGRESS NOTES
Office Visit - Follow Up   Nadya Blake   56 year old male    Date of Visit: 2/15/2022    Chief Complaint   Patient presents with     RECHECK        Assessment and Plan   1. Polyuria  Check labs, hold off on furosemide for now although he did seem to have less shortness of breath when he was on it, reassess in 1 month  - UA with Microscopic reflex to Culture - lab collect; Future  - UA with Microscopic reflex to Culture - lab collect  - Urine Microscopic    2. Hyperglycemia  - Hemoglobin A1c; Future  - Hemoglobin A1c    3. Anal fissure  Continue management per colorectal surgery, has follow-up appointment in March    4. Essential hypertension  Blood pressure controlled continue same  - Basic metabolic panel  (Ca, Cl, CO2, Creat, Gluc, K, Na, BUN); Future  - Basic metabolic panel  (Ca, Cl, CO2, Creat, Gluc, K, Na, BUN)    5. Vitamin D deficiency  - cholecalciferol (VITAMIN D3) 25 mcg (1000 units) capsule; Take 2 capsules (50 mcg) by mouth daily  Dispense: 180 capsule; Refill: 1      Return in about 4 weeks (around 3/15/2022) for Follow up.     History of Present Illness   This 56 year old man comes in for follow-up.  Since I saw him last he has been seen in the colorectal surgery clinic and diagnosed with a anal fissure.  This is his most bothersome symptom currently.  He has been using some diltiazem ointment and also taking some MiraLAX.  Valium at night was recommended but he has not started this as he is on Seroquel.  He is also complaining of increased thirst and urination.  He has stopped furosemide which I recommended he take 20 mg daily.  His breathing difficulties going up stairs have worsened somewhat.  He has had slight hyperglycemia in the past.    Review of Systems: A comprehensive review of systems was negative except as noted.     Medications, Allergies and Problem List   Reviewed, reconciled and updated  Post Discharge Medication Reconciliation Status:      Physical Exam   General Appearance:    "No acute distress    /74 (BP Location: Left arm, Patient Position: Sitting, Cuff Size: Adult Regular)   Pulse 56   Ht 1.626 m (5' 4\")   Wt 87.3 kg (192 lb 6.4 oz)   SpO2 98%   BMI 33.03 kg/m      Cardiovascular regular rate and rhythm no murmur gallop or rub  Pulmonary lungs are clear to auscultation bilaterally  Neurologic exam is non focal  Psychiatric pleasant, no confusion or agitation        Additional Information   Current Outpatient Medications   Medication Sig Dispense Refill     acetaminophen (TYLENOL) 500 MG tablet [ACETAMINOPHEN (TYLENOL) 500 MG TABLET] Take 2 tablets (1,000 mg total) by mouth every 6 (six) hours as needed for pain. 300 tablet 3     albuterol (PROAIR HFA;PROVENTIL HFA;VENTOLIN HFA) 90 mcg/actuation inhaler [ALBUTEROL (PROAIR HFA;PROVENTIL HFA;VENTOLIN HFA) 90 MCG/ACTUATION INHALER] Inhale 1-2 puffs every 4 (four) hours as needed for wheezing.       aspirin (ASA) 81 MG chewable tablet TAKE 1 TABLET BY ENTERAL TUBE ROUTE DAILY 90 tablet 1     budesonide-formoteroL (SYMBICORT) 160-4.5 mcg/actuation inhaler [BUDESONIDE-FORMOTEROL (SYMBICORT) 160-4.5 MCG/ACTUATION INHALER] Inhale 2 puffs 2 (two) times a day. Taking as needed.       cholecalciferol (VITAMIN D3) 25 mcg (1000 units) capsule Take 2 capsules (50 mcg) by mouth daily 180 capsule 1     diclofenac (VOLTAREN) 1 % topical gel Apply 4 g topically 4 times daily 500 g 2     diclofenac sodium (VOLTAREN) 1 % Gel [DICLOFENAC SODIUM (VOLTAREN) 1 % GEL] Apply 4 g topically 4 (four) times a day. 500 g 11     escitalopram (LEXAPRO) 10 MG tablet Take 1.5 tablets (15 mg) by mouth daily 30 tablet 3     escitalopram (LEXAPRO) 5 MG tablet TAKE 1 TABLET BY MOUTH DAILY ALONG WITH 10MG 30 tablet 0     gabapentin (NEURONTIN) 300 MG capsule Take 1 capsule (300 mg) by mouth daily 90 capsule 3     hydrOXYzine (VISTARIL) 25 MG capsule Take 1 capsule (25 mg) by mouth 3 times daily as needed 90 capsule 1     Lidocaine 0.5 % GEL        metoprolol " succinate ER (TOPROL-XL) 100 MG 24 hr tablet Take 1.5 tablets (150 mg) by mouth At Bedtime 135 tablet 3     omeprazole (PRILOSEC) 20 MG capsule [OMEPRAZOLE (PRILOSEC) 20 MG CAPSULE] Take 1 capsule (20 mg total) by mouth 2 (two) times a day before meals. 180 capsule 3     polyethylene glycol (MIRALAX) 17 g packet Take 1 packet by mouth daily       QUEtiapine (SEROQUEL) 100 MG tablet Take 1.5 tablets (150 mg) by mouth At Bedtime 45 tablet 10     QUEtiapine (SEROQUEL) 50 MG tablet TAKE 1 TABLET(50 MG) BY MOUTH TWICE DAILY AS NEEDED 60 tablet 3     rosuvastatin (CRESTOR) 40 MG tablet Take 1 tablet (40 mg) by mouth daily 90 tablet 3     tamsulosin (FLOMAX) 0.4 MG capsule Take 1 capsule (0.4 mg) by mouth daily 90 capsule 3     UNABLE TO FIND MEDICATION NAME: Hemorrhoid Spray       UNABLE TO FIND MEDICATION NAME: Diltiazem 2% Ointment       Allergies   Allergen Reactions     Atorvastatin Diarrhea     Cortisone Other (See Comments)     pain     Lisinopril Cough     started after CABG for unclear reason     Methylprednisolone Other (See Comments)     ?     Social History     Tobacco Use     Smoking status: Former Smoker     Packs/day: 1.00     Years: 30.00     Pack years: 30.00     Types: Cigarettes, Cigarettes     Quit date: 3/23/2020     Years since quittin.9     Smokeless tobacco: Never Used     Tobacco comment: stopped 3/24/2020   Substance Use Topics     Alcohol use: No     Drug use: Never       Review and/or order of clinical lab tests:  Review and/or order of radiology tests:  Review and/or order of medicine tests:  Discussion of test results with performing physician:  Decision to obtain old records and/or obtain history from someone other than the patient:  Review and summarization of old records and/or obtaining history from someone other than the patient and.or discussion of case with another health care provider:  Independent visualization of image, tracing or specimen itself:    Time:      Az Briseno  MD

## 2022-02-21 ENCOUNTER — TRANSFERRED RECORDS (OUTPATIENT)
Dept: HEALTH INFORMATION MANAGEMENT | Facility: CLINIC | Age: 57
End: 2022-02-21
Payer: COMMERCIAL

## 2022-02-26 DIAGNOSIS — F43.10 PTSD (POST-TRAUMATIC STRESS DISORDER): ICD-10-CM

## 2022-02-28 ENCOUNTER — PATIENT OUTREACH (OUTPATIENT)
Dept: NURSING | Facility: CLINIC | Age: 57
End: 2022-02-28
Payer: COMMERCIAL

## 2022-02-28 RX ORDER — ESCITALOPRAM OXALATE 5 MG/1
TABLET ORAL
Qty: 30 TABLET | Refills: 3 | Status: SHIPPED | OUTPATIENT
Start: 2022-02-28 | End: 2022-04-25

## 2022-02-28 RX ORDER — ESCITALOPRAM OXALATE 10 MG/1
10 TABLET ORAL DAILY
Qty: 30 TABLET | Refills: 3 | Status: SHIPPED | OUTPATIENT
Start: 2022-02-28 | End: 2022-04-25

## 2022-02-28 NOTE — PROGRESS NOTES
Follow Up Progress Note      Assessment: CCC RN spoke with patient's wife Carlee today. Carlee said patient has started painting and appears to enjoy this very much. She additionally stated he has been supportive of his son and feels that he is getting closer to him. Carlee said patient has an appointment with his surgeon to discuss concerns about his fistula on 3/4/22. No other needs or concerns at this time.     Care Gaps:    Health Maintenance Due   Topic Date Due     PREVENTIVE CARE VISIT  Never done     ADVANCE CARE PLANNING  Never done     ZOSTER IMMUNIZATION (1 of 2) Never done     LUNG CANCER SCREENING  10/13/2021       Scheduled with PCP on 3/21/22      Goals addressed this encounter:   Goals Addressed                    This Visit's Progress       1. Psychosocial (pt-stated)   30%      Goal Statement: I would like to be more supportive of my son in the next 6 months.   Date Goal set: 1/4/22  Barriers: Health concerns.  Strengths: Motivated. Strong support from family. Strong advocate for himself.   Date to Achieve By: 6/4/22  Patient expressed understanding of goal: Yes  Action steps to achieve this goal:  1. I will support my son with the activities her participates in, like basket ball.   2. I will continue to find time during the day to spend time with my son that may allow us to build a closer relationship.   3. I will report progress towards this goal at scheduled outreach telephone calls from my CCC team.      Discussed on 2/28/22         2. 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   Date Goal set: 1/4/22  Barriers: None  Strengths: Motivated. Strong family support.   Date to Achieve By: 6/4/22  Patient expressed understanding of goal: Yes  Action steps to achieve this goal:  1. I will look into the options of what type of painting interests me.   2. I will also look into community offered classes that may  help get me started towards his goal.   3. I will report progress towards this goal at schedule outreach telephone calls from my CCC team.      Discussed on 2/28/22            Intervention/Education provided during outreach: Discussed the importance of patient taking his medications as directed. Encouraged attendance at all upcoming appointments.      Outreach Frequency: monthly    Plan: CCC RN will continue to monitor, support patient with current goals and will be available to assist as nursing needs arise. CCC CHW will continue to reach out to patient on a monthly basis to discuss progression of his goals.     Care Coordinator will follow up in one month.

## 2022-02-28 NOTE — TELEPHONE ENCOUNTER
According to chart review, pt should be on Escitalopram 15 mg a day. Contacted pharmacy and spoke to Campos, this has to be prescribed in two doses  - 10 and 5 mg due to ins requirement/quantity limit. Orders pended for provider to review and sign     Date of Last Office Visit: 12/1/21  Date of Next Office Visit: 3/2/22  No shows since last visit: 0  Cancellations since last visit: 0    Medication requested: Lexapro -TDD 15 mg/day Date last ordered: 1/26/22 Qty: 30 Refills: 3       Disp Refills Start End RENE   escitalopram (LEXAPRO) 10 MG tablet 30 tablet 3 1/26/2022  No   Sig - Route: Take 1.5 tablets (15 mg) by mouth daily - Oral   Sent to pharmacy as: Escitalopram Oxalate 10 MG Oral Tablet (LEXAPRO)   Class: E-Prescribe      Disp Refills Start End RENE   escitalopram (LEXAPRO) 5 MG tablet 30 tablet 0 1/26/2022  No   Sig: TAKE 1 TABLET BY MOUTH DAILY ALONG WITH 10MG   Sent to pharmacy as: Escitalopram Oxalate 5 MG Oral Tablet (LEXAPRO)   Class: E-Prescribe            Review of MN ?: NA      Lapse in medication adherence greater than 5 days?: no  If yes, call patient and gather details: NA  Medication refill request verified as identical to current order?: see note  above  Result of Last DAM, VPA, Li+ Level, CBC, or Carbamazepine Level (at or since last visit): See labs    Last treatment plan:      Plan:  I am going to increase the Lexapro to 15 mg a day.  Risks and benefits were discussed.  He will continue with his therapies.     The patient will return to clinic in 3 months. He agrees to call before then with any questions or concerns.     Total time spent on chart review, patient interview and documentation was 24 minutes.       []Medication refilled per  Medication Refill in Ambulatory Care  policy.  [x]Medication unable to be refilled by RN due to criteria not met as indicated below:    []Eligibility - not seen in the last year   []Supervision - no future appointment   []Compliance - no shows, cancellations or  lapse in therapy   [x]Verification - order discrepancy   []Controlled medication   [x]Medication not included in policy   []90-day supply request   [x]Other: LPN is processing request

## 2022-03-02 ENCOUNTER — VIRTUAL VISIT (OUTPATIENT)
Dept: BEHAVIORAL HEALTH | Facility: CLINIC | Age: 57
End: 2022-03-02
Payer: COMMERCIAL

## 2022-03-02 DIAGNOSIS — F43.10 PTSD (POST-TRAUMATIC STRESS DISORDER): Primary | ICD-10-CM

## 2022-03-02 PROCEDURE — 99214 OFFICE O/P EST MOD 30 MIN: CPT | Mod: 95 | Performed by: PSYCHIATRY & NEUROLOGY
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
## 2022-03-02 NOTE — PROGRESS NOTES
This video/telephone visit will be conducted via a call between you and your physician/provider. We have found that certain health care needs can be provided without the need for an in-person physical exam. This service lets us provide the care you need with a video /telephone conversation. If a prescription is necessary we can send it directly to your pharmacy. If lab work is needed we can place an order for that and you can then stop by our lab to have the test done at a later time.    Just as we bill insurance for in-person visits, we also bill insurance for video/telephone visits. If you have questions about your insurance coverage, we recommend that you speak with your insurance company.    Patient has given verbal consent for video/Telephone visit? yes    Patient would like video visit, please connect : Pt requested over the phone    [phone] opt 0. Patient phone : [phone]  Reason for visit: c/o of pain, according to wife he is following up with cardiology on fluid retention in lungs and around his heart.     Patient verified allergies, medications and pharmacy via phone.     Patient states she  is ready for visit.    Rosana Nayak March 2, 2022 1:37 PM    [facility] Post-Appointment Cart -check      Correct pharmacy verified with patient and updated in chart? [x] yes []no    Charge captured ? [] yes  [x] no [] n/a-virtual     Medications ordered this visit were e-scribed.  Verified by order class [] yes  [x] no    List Medications:    Medication changes or discontinuations were communicated to patient's pharmacy: [] yes  [x] no    UA collected [] yes  [x] no  [] n/a-virtual     Outside referrals / labs, etc support staff to follow up: [] yes  [x] no    Future appointment was made: [x] yes  [] no  [] n/a    Dictation completed at time of chart check: [x] yes  [] no    I have checked the documentation for today s encounters and the above information has been reviewed and completed.       Rosana Nayak on March 2, 2022 at 4:20 PM

## 2022-03-04 ENCOUNTER — HOSPITAL ENCOUNTER (OUTPATIENT)
Dept: PHYSICAL THERAPY | Facility: REHABILITATION | Age: 57
End: 2022-03-04
Payer: COMMERCIAL

## 2022-03-04 ENCOUNTER — TRANSFERRED RECORDS (OUTPATIENT)
Dept: HEALTH INFORMATION MANAGEMENT | Facility: CLINIC | Age: 57
End: 2022-03-04

## 2022-03-04 DIAGNOSIS — M25.512 CHRONIC LEFT SHOULDER PAIN: Primary | ICD-10-CM

## 2022-03-04 DIAGNOSIS — G89.29 CHRONIC LEFT SHOULDER PAIN: Primary | ICD-10-CM

## 2022-03-04 PROCEDURE — 97140 MANUAL THERAPY 1/> REGIONS: CPT | Mod: GP

## 2022-03-04 NOTE — PROGRESS NOTES
[facility]    OUTPATIENT PHYSICAL THERAPY  PLAN OF TREATMENT FOR OUTPATIENT REHABILITATION AND PROGRESS NOTE           Patient's Last Name, First Name, Nadya Ordonez Date of Birth  1965   Provider's Name  [facility] Medical Record No.  [id number]    Onset Date  11/17/2022 Start of Care Date  11/17/2022   Type:     _X_PT   ___OT   ___SLP Medical Diagnosis  S/p coronary bypass; L shoulder pain   PT Diagnosis  L shoulder pain; L shoulder weakness; decreased L shoulder ROM; postural dysfunction Plan of Treatment  Frequency/Duration: Every other week/6 weeks  Certification date from 3/4/2022 to 6/3/2022     Goals:  Goal Identifier HEP   Goal Description Patient will demonstrate independence with home exercise program to facilitate improvement in function.    Target Date 12/15/21   Date Met      Progress (detail required for progress note): MET     Goal Identifier Pain   Goal Description Patient will report no greater than 3/10 pain to improve quality of life.    Target Date 12/29/21   Date Met      Progress (detail required for progress note): Progressing - 5/10     Goal Identifier L shoulder ROM   Goal Description Patient will acheive L shoulder ROM WNL in all planes to improve functional mobility.    Target Date 01/12/22   Date Met      Progress (detail required for progress note): Progressing - improved, but not quite met      Goal Identifier Sleeping   Goal Description Patient will be able to sleep through a full night without pain to improve health and quality of life.    Target Date 01/12/22   Date Met      Progress (detail required for progress note): Progressing - improved         Beginning/End Dates of Progress Note Reporting Period:  11/17/2022 to 3/4/2022    Progress Toward Goals:   Progress this reporting period: see above    Client Self  (Subjective) Report for Progress Note Reporting Period: (P) Patient reports continued improvement in L shoulder pain. Notes additional pectoral tightness today.              I CERTIFY THE NEED FOR THESE SERVICES FURNISHED UNDER        THIS PLAN OF TREATMENT AND WHILE UNDER MY CARE     (Physician co-signature of this document indicates review and certification of the therapy plan).                Referring Provider: Thomas Hauth, MD Scott Parmer, PT

## 2022-03-21 ENCOUNTER — OFFICE VISIT (OUTPATIENT)
Dept: INTERNAL MEDICINE | Facility: CLINIC | Age: 57
End: 2022-03-21
Payer: COMMERCIAL

## 2022-03-21 VITALS
HEART RATE: 64 BPM | BODY MASS INDEX: 32.95 KG/M2 | OXYGEN SATURATION: 97 % | WEIGHT: 193 LBS | HEIGHT: 64 IN | DIASTOLIC BLOOD PRESSURE: 78 MMHG | SYSTOLIC BLOOD PRESSURE: 110 MMHG

## 2022-03-21 DIAGNOSIS — K60.2 ANAL FISSURE: Primary | ICD-10-CM

## 2022-03-21 DIAGNOSIS — R80.9 PROTEINURIA, UNSPECIFIED TYPE: ICD-10-CM

## 2022-03-21 DIAGNOSIS — I25.83 CORONARY ARTERY DISEASE DUE TO LIPID RICH PLAQUE: ICD-10-CM

## 2022-03-21 DIAGNOSIS — I10 ESSENTIAL HYPERTENSION: ICD-10-CM

## 2022-03-21 DIAGNOSIS — I25.10 CORONARY ARTERY DISEASE DUE TO LIPID RICH PLAQUE: ICD-10-CM

## 2022-03-21 LAB
ALBUMIN MFR UR ELPH: 12.3 MG/DL
CREAT UR-MCNC: 282 MG/DL
PROT/CREAT 24H UR: 0.04 MG/MG CR

## 2022-03-21 PROCEDURE — 84156 ASSAY OF PROTEIN URINE: CPT | Performed by: INTERNAL MEDICINE

## 2022-03-21 PROCEDURE — 99214 OFFICE O/P EST MOD 30 MIN: CPT | Performed by: INTERNAL MEDICINE

## 2022-03-21 NOTE — PROGRESS NOTES
"  Office Visit - Follow Up   Nadya Blake   56 year old male    Date of Visit: 3/21/2022    Chief Complaint   Patient presents with     RECHECK        Assessment and Plan   1. Anal fissure  Continue to work with colorectal surgery, MiraLAX, topical creams    2. Essential hypertension  Blood pressure okay continue    3. Coronary artery disease, CABG 6/2020  Stable, his symptoms walking up the stairs are likely multifactorial and certainly it seems that his PTSD plays into this.  There is no current evidence of coronary ischemia and he does follow-up with Dr. Sloan Burns.    4. Proteinuria, unspecified type  - Protein  random urine; Future  - Protein  random urine    Return in about 4 weeks (around 4/18/2022) for Follow up.     History of Present Illness   This 56 year old man comes in for follow-up.  Overall he is doing okay.  His main issue is the anal fissure and he is working with colorectal surgery and using topical cream as well as MiraLAX.  This is helping somewhat although he has pain and has had problems sitting for prolonged periods and also has symptoms walking after about 15 minutes.  He continues to have some chest symptoms when he walks upstairs but this is mainly in the evening.  No exertional symptoms when he goes for a walk during the day.    Review of Systems: A comprehensive review of systems was negative except as noted.     Medications, Allergies and Problem List   Reviewed, reconciled and updated  Post Discharge Medication Reconciliation Status:      Physical Exam   General Appearance:   No acute distress    /78 (BP Location: Left arm, Patient Position: Sitting, Cuff Size: Adult Regular)   Pulse 64   Ht 1.626 m (5' 4\")   Wt 87.5 kg (193 lb)   SpO2 97%   BMI 33.13 kg/m      Cardiovascular regular rate and rhythm no murmur gallop or rub  Pulmonary lungs are clear to auscultation bilaterally  Neurologic exam is non focal  Psychiatric pleasant, no confusion or agitation    "     Additional Information   Current Outpatient Medications   Medication Sig Dispense Refill     acetaminophen (TYLENOL) 500 MG tablet [ACETAMINOPHEN (TYLENOL) 500 MG TABLET] Take 2 tablets (1,000 mg total) by mouth every 6 (six) hours as needed for pain. 300 tablet 3     albuterol (PROAIR HFA;PROVENTIL HFA;VENTOLIN HFA) 90 mcg/actuation inhaler [ALBUTEROL (PROAIR HFA;PROVENTIL HFA;VENTOLIN HFA) 90 MCG/ACTUATION INHALER] Inhale 1-2 puffs every 4 (four) hours as needed for wheezing.       aspirin (ASA) 81 MG chewable tablet TAKE 1 TABLET BY ENTERAL TUBE ROUTE DAILY 90 tablet 1     budesonide-formoteroL (SYMBICORT) 160-4.5 mcg/actuation inhaler [BUDESONIDE-FORMOTEROL (SYMBICORT) 160-4.5 MCG/ACTUATION INHALER] Inhale 2 puffs 2 (two) times a day. Taking as needed.       Cholecalciferol (VITAMIN D3) 50 MCG (2000 UT) CAPS Take 1 capsule by mouth daily 90 capsule 3     diclofenac (VOLTAREN) 1 % topical gel Apply 4 g topically 4 times daily 500 g 2     diclofenac sodium (VOLTAREN) 1 % Gel [DICLOFENAC SODIUM (VOLTAREN) 1 % GEL] Apply 4 g topically 4 (four) times a day. 500 g 11     escitalopram (LEXAPRO) 10 MG tablet Take 1 tablet (10 mg) by mouth daily 30 tablet 3     escitalopram (LEXAPRO) 5 MG tablet TAKE 1 TABLET BY MOUTH DAILY WITH 10 MG TABLET FOR TOTAL DAILY DOSE OF 15 MG 30 tablet 3     gabapentin (NEURONTIN) 300 MG capsule Take 1 capsule (300 mg) by mouth daily 90 capsule 3     hydrOXYzine (VISTARIL) 25 MG capsule Take 1 capsule (25 mg) by mouth 3 times daily as needed 90 capsule 1     Lidocaine 0.5 % GEL        metoprolol succinate ER (TOPROL-XL) 100 MG 24 hr tablet Take 1.5 tablets (150 mg) by mouth At Bedtime 135 tablet 3     omeprazole (PRILOSEC) 20 MG capsule [OMEPRAZOLE (PRILOSEC) 20 MG CAPSULE] Take 1 capsule (20 mg total) by mouth 2 (two) times a day before meals. 180 capsule 3     polyethylene glycol (MIRALAX) 17 g packet Take 1 packet by mouth daily       QUEtiapine (SEROQUEL) 100 MG tablet Take 1.5  tablets (150 mg) by mouth At Bedtime 45 tablet 10     QUEtiapine (SEROQUEL) 50 MG tablet TAKE 1 TABLET(50 MG) BY MOUTH TWICE DAILY AS NEEDED 60 tablet 3     rosuvastatin (CRESTOR) 40 MG tablet Take 1 tablet (40 mg) by mouth daily 90 tablet 3     tamsulosin (FLOMAX) 0.4 MG capsule Take 1 capsule (0.4 mg) by mouth daily 90 capsule 3     UNABLE TO FIND MEDICATION NAME: Hemorrhoid Spray       UNABLE TO FIND MEDICATION NAME: Diltiazem 2% Ointment       Allergies   Allergen Reactions     Atorvastatin Diarrhea     Cortisone Other (See Comments)     pain     Lisinopril Cough     started after CABG for unclear reason     Methylprednisolone Other (See Comments)     ?     Social History     Tobacco Use     Smoking status: Former Smoker     Packs/day: 1.00     Years: 30.00     Pack years: 30.00     Types: Cigarettes, Cigarettes     Quit date: 3/23/2020     Years since quittin.9     Smokeless tobacco: Never Used     Tobacco comment: stopped 3/24/2020   Substance Use Topics     Alcohol use: No     Drug use: Never       Review and/or order of clinical lab tests:  Review and/or order of radiology tests:  Review and/or order of medicine tests:  Discussion of test results with performing physician:  Decision to obtain old records and/or obtain history from someone other than the patient:  Review and summarization of old records and/or obtaining history from someone other than the patient and.or discussion of case with another health care provider:  Independent visualization of image, tracing or specimen itself:    Time:      Az Briseno MD

## 2022-04-08 ENCOUNTER — TELEPHONE (OUTPATIENT)
Dept: PHYSICAL THERAPY | Facility: REHABILITATION | Age: 57
End: 2022-04-08
Payer: COMMERCIAL

## 2022-04-21 ENCOUNTER — PATIENT OUTREACH (OUTPATIENT)
Dept: NURSING | Facility: CLINIC | Age: 57
End: 2022-04-21
Payer: COMMERCIAL

## 2022-04-21 ASSESSMENT — ACTIVITIES OF DAILY LIVING (ADL): DEPENDENT_IADLS:: CLEANING;COOKING;LAUNDRY;SHOPPING;MEAL PREPARATION;MEDICATION MANAGEMENT

## 2022-04-21 NOTE — PROGRESS NOTES
Clinic Care Coordination Contact    Follow Up Progress Note      Assessment: CCC RN spoke with patient's wife Carlee this morning. She stated patient is still struggling with pain related to anal fissure. She stated he is getting some relief from two ointments prescribed by his Surgeon's Office. She said pain has not been walking as much related to rectal pain, but she is not good for her heart issues. Patient does have follow up with his PCP on Monday, 4/25/22. She stated he also has an appointment with PT next week to help him with his shoulder pain.   She said patient has been doing some painting, but his pain at times keeps him from doing the things he enjoys. Carlee said patient's relationship with his son has been good.   No other needs or concerns at this time. Carlee has writer phone number if needs or concerns should arise.     Health Maintenance Due   Topic Date Due     PREVENTIVE CARE VISIT  Never done     ADVANCE CARE PLANNING  Never done     ZOSTER IMMUNIZATION (1 of 2) Never done     LUNG CANCER SCREENING  10/13/2021     DTAP/TDAP/TD IMMUNIZATION (2 - Td or Tdap) 04/30/2022       Scheduled with PCP on 4/25/22      Goals addressed this encounter:    Goals Addressed                    This Visit's Progress       1. Psychosocial (pt-stated)         Goal Statement: I would like to be more supportive of my son in the next 6 months.   Date Goal set: 1/4/22  Barriers: Health concerns.  Strengths: Motivated. Strong support from family. Strong advocate for himself.   Date to Achieve By: 6/4/22  Patient expressed understanding of goal: Yes  Action steps to achieve this goal:  1. I will support my son with the activities her participates in, like basket ball.   2. I will continue to find time during the day to spend time with my son that may allow us to build a closer relationship.   3. I will report progress towards this goal at scheduled outreach telephone calls from my CCC team.      Discussed on 4/21/22            2. Psychosocial (pt-stated)         Goal Statement: I would like to start painting as an outlet to express myself in the next 6 months.   Date Goal set: 1/4/22  Barriers: None  Strengths: Motivated. Strong family support.   Date to Achieve By: 6/4/22  Patient expressed understanding of goal: Yes  Action steps to achieve this goal:  1. I will look into the options of what type of painting interests me.   2. I will also look into community offered classes that may help get me started towards his goal.   3. I will report progress towards this goal at schedule outreach telephone calls from my CCC team.      Discussed on 4/21/22              Intervention/Education provided during outreach: Discussed the importance of patient taking his medications as directed. Encouraged attendance at all scheduled upcoming appointments.      Outreach Frequency: monthly        Plan: CCC RN will continue to monitor, support patient with current goals and will be able to assist as nursing needs arise.     Care Coordinator will follow up in one month.

## 2022-04-21 NOTE — LETTER
[facility]  Patient Centered Plan of Care  About Me:        Patient Name:  Nadya Covington,     Hope you are doing well. Here is a copy of the your Care Plan with the goals we are supporting you with at this time. Please let me know if there anything else we can do for you.     Thanks,     Dave Myhre, RN   CCC RN  [phone]    YOB: 1965  Age:         56 year old   Tracy MRN:    [id number] Telephone Information:  Home Phone [phone]   Mobile [phone]       Address:  [facility] [address]  [facility] [place] [facility] [address] Email address:  [email or web address]      Emergency Contact(s)    Name Relationship Lgl Grd Work Phone Home Phone Mobile Phone   1. RUSSELLJUANITA CAMP Spouse   [phone]            Primary language:  Nepali     needed? No   [place] [facility]:  [phone] op. 1  Other communication barriers:None    Preferred Method of Communication:     Current living arrangement: I live in a private home with family    Mobility Status/ Medical Equipment: Independent        Health Maintenance  Health Maintenance Reviewed:   Health Maintenance Due   Topic Date Due     PREVENTIVE CARE VISIT  Never done     ADVANCE CARE PLANNING  Never done     ZOSTER IMMUNIZATION (1 of 2) Never done     LUNG CANCER SCREENING  10/13/2021     DTAP/TDAP/TD IMMUNIZATION (2 - Td or Tdap) 04/30/2022       My Access Plan  Medical Emergency 911   Primary Clinic Line [facility] - [phone]   24 Hour Appointment Line [phone] or  [phone]) (toll-free)   24 Hour Nurse Line [phone] (toll-free)   Preferred Urgent Care [facility] - [place], [phone]     Preferred Hospital [facility]  [phone]     Preferred Pharmacy [id number] - [place], [place] - [address] [facility] [address]     Behavioral Health Crisis Line The National Suicide Prevention Lifeline at  8-311-925-3240 or 911             My Care Team Members  Patient Care Team       Relationship Specialty Notifications Start End    Az Briseno MD PCP - General   9/22/20     Phone: [phone] Fax: [phone]         [address] [facility] [address]    Az Briseno MD Assigned PCP   6/16/21     Phone: [phone] Fax: [phone]         [address] [facility] [address]    Franco Quevedo MD Assigned Behavioral Health Provider   7/16/21     Phone: [phone] Fax: [phone]         [address] [facility] [address] [facility] [address]    Inessa Cade [facility] worker   11/15/21     Phone: [phone]         Sloan Burns MD Assigned Heart and Vascular Provider   12/12/21     Phone: [phone] Fax: [phone]         [address] [facility] [address] [facility] [address]    Myhre, David J, RN Lead Care Coordinator Primary Care - CC Admissions 1/18/22      Phone: [phone]            My Care Plans  Self Management and Treatment Plan  Goals and (Comments)   Goals        General     1. Psychosocial (pt-stated)      Notes - Note edited  4/21/2022  8:51 AM by Myhre, David J, RN     Goal Statement: I would like to be more supportive of my son in the next 6 months.   Date Goal set: 1/4/22  Barriers: Health concerns.  Strengths: Motivated. Strong support from family. Strong advocate for himself.   Date to Achieve By: 6/4/22  Patient expressed understanding of goal: Yes  Action steps to achieve this goal:  1. I will support my son with the activities her participates in, like basket ball.   2. I will continue to find time during the day to spend time with my son that may allow us to build a closer relationship.   3. I will report progress towards this goal at scheduled outreach telephone calls from my CCC team.      Discussed on 4/21/22         2. Psychosocial (pt-stated)      Notes - Note edited  4/21/2022  8:50 AM by Myhre, David J, RN     Goal Statement: I would like to start painting as an outlet  to express myself in the next 6 months.   Date Goal set: 1/4/22  Barriers: None  Strengths: Motivated. Strong family support.   Date to Achieve By: 6/4/22  Patient expressed understanding of goal: Yes  Action steps to achieve this goal:  1. I will look into the options of what type of painting interests me.   2. I will also look into community offered classes that may help get me started towards his goal.   3. I will report progress towards this goal at schedule outreach telephone calls from my CCC team.      Discussed on 4/21/22               Action Plans on File:                       Advance Care Plans/Directives Type:   No data recorded    My Medical and Care Information  Problem List   Patient Active Problem List   Diagnosis     Recurrent major depressive disorder, in partial remission (H)     Fatty liver disease, nonalcoholic     Gastroesophageal reflux disease with esophagitis     Essential hypertension     Non-toxic multinodular goiter     Post-traumatic osteoarthritis of both knees     Primary osteoarthritis of left hip     Primary osteoarthritis of right hip     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)     Coronary artery disease, CABG 6/2020     Pre-diabetes     Dyslipidemia, goal LDL below 70     Benign prostatic hyperplasia with nocturia     PTSD (post-traumatic stress disorder)     Ascending aorta dilatation (H)     Chronic superficial gastritis     GERD (gastroesophageal reflux disease)     IBS (irritable bowel syndrome)     Post herpetic neuralgia     TMJ arthritis     Anal fistula      Current Medications and Allergies:  See printed Medication Report.    Care Coordination Start Date: 11/19/2020   Frequency of Care Coordination: monthly     Form Last Updated: 04/21/2022

## 2022-04-25 ENCOUNTER — OFFICE VISIT (OUTPATIENT)
Dept: INTERNAL MEDICINE | Facility: CLINIC | Age: 57
End: 2022-04-25
Payer: COMMERCIAL

## 2022-04-25 VITALS
WEIGHT: 200.3 LBS | RESPIRATION RATE: 18 BRPM | DIASTOLIC BLOOD PRESSURE: 76 MMHG | BODY MASS INDEX: 34.19 KG/M2 | HEIGHT: 64 IN | HEART RATE: 58 BPM | OXYGEN SATURATION: 97 % | SYSTOLIC BLOOD PRESSURE: 106 MMHG

## 2022-04-25 DIAGNOSIS — Z23 HIGH PRIORITY FOR 2019-NCOV VACCINE: ICD-10-CM

## 2022-04-25 DIAGNOSIS — F43.10 PTSD (POST-TRAUMATIC STRESS DISORDER): ICD-10-CM

## 2022-04-25 DIAGNOSIS — I25.83 CORONARY ARTERY DISEASE DUE TO LIPID RICH PLAQUE: Chronic | ICD-10-CM

## 2022-04-25 DIAGNOSIS — I10 ESSENTIAL HYPERTENSION: Chronic | ICD-10-CM

## 2022-04-25 DIAGNOSIS — I25.10 CORONARY ARTERY DISEASE DUE TO LIPID RICH PLAQUE: Chronic | ICD-10-CM

## 2022-04-25 DIAGNOSIS — K60.2 ANAL FISSURE: Primary | ICD-10-CM

## 2022-04-25 PROCEDURE — 91305 COVID-19,PF,PFIZER (12+ YRS): CPT | Performed by: INTERNAL MEDICINE

## 2022-04-25 PROCEDURE — 0054A COVID-19,PF,PFIZER (12+ YRS): CPT | Performed by: INTERNAL MEDICINE

## 2022-04-25 PROCEDURE — 99214 OFFICE O/P EST MOD 30 MIN: CPT | Performed by: INTERNAL MEDICINE

## 2022-04-25 RX ORDER — ESCITALOPRAM OXALATE 10 MG/1
10 TABLET ORAL DAILY
Qty: 90 TABLET | Refills: 3 | Status: SHIPPED | OUTPATIENT
Start: 2022-04-25 | End: 2022-05-04 | Stop reason: DRUGHIGH

## 2022-04-25 RX ORDER — ESCITALOPRAM OXALATE 5 MG/1
TABLET ORAL
Qty: 90 TABLET | Refills: 3 | Status: SHIPPED | OUTPATIENT
Start: 2022-04-25 | End: 2022-05-04 | Stop reason: DRUGHIGH

## 2022-04-25 ASSESSMENT — PATIENT HEALTH QUESTIONNAIRE - PHQ9: SUM OF ALL RESPONSES TO PHQ QUESTIONS 1-9: 14

## 2022-04-25 NOTE — PROGRESS NOTES
Office Visit - Follow Up   Nadya Blake   56 year old male    Date of Visit: 4/25/2022    Chief Complaint   Patient presents with     Follow Up     Imm/Inj     COVID-19 VACCINE        Assessment and Plan   1. Anal fissure  We will try and get him some nifedipine compounded that he can put on 2-4 times a day.  I am currently on hold with the [place] compounding pharmacy  - nifedipine 0.2% in white petrolatum 0.2 % OINT ointment; Apply topically 4 times daily as needed (anal fissure)  Dispense: 100 g; Refill: 1    2. PTSD (post-traumatic stress disorder)  Stable  - escitalopram (LEXAPRO) 10 MG tablet; Take 1 tablet (10 mg) by mouth daily  Dispense: 90 tablet; Refill: 3  - escitalopram (LEXAPRO) 5 MG tablet; TAKE 1 TABLET BY MOUTH DAILY WITH 10 MG TABLET FOR TOTAL DAILY DOSE OF 15 MG  Dispense: 90 tablet; Refill: 3    3. High priority for 2019-nCoV vaccine  - COVID-19,PF,PFIZER (12+ Yrs GRAY LABEL)    4. Essential hypertension  Blood pressure controlled continue same    5. Coronary artery disease, CABG 6/2020  Continue secondary prevention      Return in about 4 weeks (around 5/23/2022) for Follow up.     History of Present Illness   This 56 year old man comes in for follow-up.  His main issue is his ongoing pain from presumed anal fissure.  He has been using MiraLAX and he is also been using topical lidocaine.  Pain is quite intense and he is having a hard time exercising because of it.  He has met with Dr. Kvng Campoverde from colorectal surgery who has recommended ongoing conservative management although surgery would be an option.  He has not tried any topical vasodilators that I am aware of.  He continues to work with physical therapy for his left shoulder and neck pain    Review of Systems: A comprehensive review of systems was negative except as noted.     Medications, Allergies and Problem List   Reviewed, reconciled and updated  Post Discharge Medication Reconciliation Status:   Social History  "    Social History Narrative    , Carlee, BA chemistry and taking AA courses at [employer].  From [place]; bachelors in geology and computer science. Son, Grace (2005) and Sherrie (2008).  On SSDI, PTSD.          Physical Exam   General Appearance:   No acute distress    /76 (BP Location: Left arm, Patient Position: Sitting, Cuff Size: Adult Large)   Pulse 58   Resp 18   Ht 1.626 m (5' 4\")   Wt 90.9 kg (200 lb 4.8 oz)   SpO2 97%   BMI 34.38 kg/m           Additional Information   Current Outpatient Medications   Medication Sig Dispense Refill     escitalopram (LEXAPRO) 10 MG tablet Take 1 tablet (10 mg) by mouth daily 90 tablet 3     escitalopram (LEXAPRO) 5 MG tablet TAKE 1 TABLET BY MOUTH DAILY WITH 10 MG TABLET FOR TOTAL DAILY DOSE OF 15 MG 90 tablet 3     nifedipine 0.2% in white petrolatum 0.2 % OINT ointment Apply topically 4 times daily as needed (anal fissure) 100 g 1     acetaminophen (TYLENOL) 500 MG tablet [ACETAMINOPHEN (TYLENOL) 500 MG TABLET] Take 2 tablets (1,000 mg total) by mouth every 6 (six) hours as needed for pain. 300 tablet 3     albuterol (PROAIR HFA;PROVENTIL HFA;VENTOLIN HFA) 90 mcg/actuation inhaler [ALBUTEROL (PROAIR HFA;PROVENTIL HFA;VENTOLIN HFA) 90 MCG/ACTUATION INHALER] Inhale 1-2 puffs every 4 (four) hours as needed for wheezing.       aspirin (ASA) 81 MG chewable tablet TAKE 1 TABLET BY ENTERAL TUBE ROUTE DAILY 90 tablet 1     budesonide-formoteroL (SYMBICORT) 160-4.5 mcg/actuation inhaler [BUDESONIDE-FORMOTEROL (SYMBICORT) 160-4.5 MCG/ACTUATION INHALER] Inhale 2 puffs 2 (two) times a day. Taking as needed.       Cholecalciferol (VITAMIN D3) 50 MCG (2000 UT) CAPS Take 1 capsule by mouth daily 90 capsule 3     diclofenac (VOLTAREN) 1 % topical gel Apply 4 g topically 4 times daily 500 g 2     diclofenac sodium (VOLTAREN) 1 % Gel [DICLOFENAC SODIUM (VOLTAREN) 1 % GEL] Apply 4 g topically 4 (four) times a day. 500 g 11     gabapentin (NEURONTIN) 300 MG capsule Take 1 capsule " (300 mg) by mouth daily 90 capsule 3     hydrOXYzine (VISTARIL) 25 MG capsule Take 1 capsule (25 mg) by mouth 3 times daily as needed 90 capsule 1     Lidocaine 0.5 % GEL        metoprolol succinate ER (TOPROL-XL) 100 MG 24 hr tablet Take 1.5 tablets (150 mg) by mouth At Bedtime 135 tablet 3     omeprazole (PRILOSEC) 20 MG capsule [OMEPRAZOLE (PRILOSEC) 20 MG CAPSULE] Take 1 capsule (20 mg total) by mouth 2 (two) times a day before meals. 180 capsule 3     polyethylene glycol (MIRALAX) 17 g packet Take 1 packet by mouth daily       QUEtiapine (SEROQUEL) 100 MG tablet Take 1.5 tablets (150 mg) by mouth At Bedtime 45 tablet 10     QUEtiapine (SEROQUEL) 50 MG tablet TAKE 1 TABLET(50 MG) BY MOUTH TWICE DAILY AS NEEDED 60 tablet 3     rosuvastatin (CRESTOR) 40 MG tablet Take 1 tablet (40 mg) by mouth daily 90 tablet 3     tamsulosin (FLOMAX) 0.4 MG capsule Take 1 capsule (0.4 mg) by mouth daily 90 capsule 3     UNABLE TO FIND MEDICATION NAME: Hemorrhoid Spray       UNABLE TO FIND MEDICATION NAME: Diltiazem 2% Ointment       Allergies   Allergen Reactions     Atorvastatin Diarrhea     Cortisone Other (See Comments)     pain     Lisinopril Cough     started after CABG for unclear reason     Methylprednisolone Other (See Comments)     ?     Social History     Tobacco Use     Smoking status: Former Smoker     Packs/day: 1.00     Years: 30.00     Pack years: 30.00     Types: Cigarettes, Cigarettes     Quit date: 3/23/2020     Years since quittin.0     Smokeless tobacco: Never Used     Tobacco comment: stopped 3/24/2020   Substance Use Topics     Alcohol use: No     Drug use: Never       Review and/or order of clinical lab tests:  Review and/or order of radiology tests:  Review and/or order of medicine tests:  Discussion of test results with performing physician:  Decision to obtain old records and/or obtain history from someone other than the patient:  Review and summarization of old records and/or obtaining history from  someone other than the patient and.or discussion of case with another health care provider:  Independent visualization of image, tracing or specimen itself:    Time:      Az Briseno MD

## 2022-04-25 NOTE — PATIENT INSTRUCTIONS
I sent the nifedipine 0.2% ointment to [facility] pharmacy.  They should contact you in the next 24 hours or so to schedule delivery and to make sure insurance covers etc.  The direct number to the [facility] is [phone].

## 2022-05-03 ENCOUNTER — MYC MEDICAL ADVICE (OUTPATIENT)
Dept: INTERNAL MEDICINE | Facility: CLINIC | Age: 57
End: 2022-05-03
Payer: COMMERCIAL

## 2022-05-03 DIAGNOSIS — M54.2 NECK PAIN ON LEFT SIDE: Primary | ICD-10-CM

## 2022-05-04 ENCOUNTER — VIRTUAL VISIT (OUTPATIENT)
Dept: BEHAVIORAL HEALTH | Facility: CLINIC | Age: 57
End: 2022-05-04
Attending: PSYCHIATRY & NEUROLOGY
Payer: COMMERCIAL

## 2022-05-04 VITALS — BODY MASS INDEX: 34.33 KG/M2 | WEIGHT: 200 LBS

## 2022-05-04 DIAGNOSIS — F43.10 PTSD (POST-TRAUMATIC STRESS DISORDER): ICD-10-CM

## 2022-05-04 PROCEDURE — 99214 OFFICE O/P EST MOD 30 MIN: CPT | Performed by: PSYCHIATRY & NEUROLOGY

## 2022-05-04 RX ORDER — HYDROXYZINE PAMOATE 25 MG/1
25-50 CAPSULE ORAL 3 TIMES DAILY PRN
Qty: 90 CAPSULE | Refills: 1 | Status: SHIPPED | OUTPATIENT
Start: 2022-05-04 | End: 2022-08-05

## 2022-05-04 RX ORDER — ESCITALOPRAM OXALATE 20 MG/1
20 TABLET ORAL DAILY
Qty: 30 TABLET | Refills: 3 | Status: SHIPPED | OUTPATIENT
Start: 2022-05-04 | End: 2022-09-02

## 2022-05-04 NOTE — NURSING NOTE
This video/telephone visit will be conducted via a call between you and your physician/provider. We have found that certain health care needs can be provided without the need for an in-person physical exam. This service lets us provide the care you need with a video /telephone conversation. If a prescription is necessary we can send it directly to your pharmacy. If lab work is needed we can place an order for that and you can then stop by our lab to have the test done at a later time.    Just as we bill insurance for in-person visits, we also bill insurance for video/telephone visits. If you have questions about your insurance coverage, we recommend that you speak with your insurance company.    Patient has given verbal consent for video/Telephone visit? yes    Patient would like telephone visit, please connect : please call  services at [phone] option 0. Patient phone: [phone]    Reason for visit: follow up  Patient verified allergies, medications and pharmacy via phone.     ISI-7 scores:  No flowsheet data found.    PHQ-9 scores:   PHQ-9 SCORE 2/23/2021 4/25/2022   PHQ-9 Total Score 4 14       Patient states he  is ready for visit.  Unable to fall asleep at night. Would like to discuss alternative or increase dosage    Jamar Gasca MA May 4, 2022 2:31 PM
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
## 2022-05-06 ENCOUNTER — OFFICE VISIT (OUTPATIENT)
Dept: CARDIOLOGY | Facility: CLINIC | Age: 57
End: 2022-05-06
Payer: COMMERCIAL

## 2022-05-06 VITALS
HEART RATE: 72 BPM | WEIGHT: 199 LBS | DIASTOLIC BLOOD PRESSURE: 80 MMHG | SYSTOLIC BLOOD PRESSURE: 118 MMHG | RESPIRATION RATE: 14 BRPM | BODY MASS INDEX: 34.16 KG/M2

## 2022-05-06 DIAGNOSIS — Z87.891 FORMER SMOKER: ICD-10-CM

## 2022-05-06 DIAGNOSIS — E78.5 DYSLIPIDEMIA: ICD-10-CM

## 2022-05-06 DIAGNOSIS — I10 ESSENTIAL HYPERTENSION: ICD-10-CM

## 2022-05-06 DIAGNOSIS — I25.709 CORONARY ARTERY DISEASE INVOLVING CORONARY BYPASS GRAFT OF NATIVE HEART WITH ANGINA PECTORIS (H): ICD-10-CM

## 2022-05-06 DIAGNOSIS — I50.32 CHRONIC HEART FAILURE WITH PRESERVED EJECTION FRACTION (H): Primary | ICD-10-CM

## 2022-05-06 PROCEDURE — 99214 OFFICE O/P EST MOD 30 MIN: CPT | Performed by: GENERAL ACUTE CARE HOSPITAL

## 2022-05-06 RX ORDER — SPIRONOLACTONE 25 MG/1
25 TABLET ORAL DAILY
Qty: 90 TABLET | Refills: 3 | Status: SHIPPED | OUTPATIENT
Start: 2022-05-06 | End: 2022-12-21

## 2022-05-06 RX ORDER — ISOSORBIDE MONONITRATE 30 MG/1
30 TABLET, EXTENDED RELEASE ORAL DAILY
Qty: 90 TABLET | Refills: 3 | Status: SHIPPED | OUTPATIENT
Start: 2022-05-06 | End: 2022-12-21

## 2022-05-06 NOTE — LETTER
5/6/2022    Az Briseno MD  [address] [facility] [address]    RE: Nadya Blake       Dear Colleague,     I had the pleasure of seeing Nadya Blake in the [facility].  HEART CARE ENCOUNTER NOTE          Assessment/Recommendations   Assessment:    1. Chronic congestive heart failure with preserved left ventricular ejection fraction. He seems euvolemic but is still having symptoms.  2. Coronary artery disease status post four-vessel coronary artery bypass grafting (left internal mammary artery to the left anterior descending artery, right radial artery to the right posterior descending artery, reversed saphenous venous grafts to obtuse marginal and diagonal artery branches) on 6/24/2020. No ischemia seen on stress cardiac MRI on 12/23/2021 but unclear any of his exertional dyspnea could be anginal.  3. Benign essential hypertension. Controlled.  4. Dyslipidemia. Last LDL 53.  5. Former smoker.  6. BMI 34.16.    Plan:  1. Start spironolactone 25 mg daily.  2. Start isosorbide mononitrate 30 mg daily.  3. Check basic metabolic panel in 1 week.  4. Continue to hold furosemide, but if he continues to gain weight and/or does not have improvement in his symptoms, we may resume furosemide 20 mg once daily.  5. Continue other cardiac medications.  6. Follow-up with me in 2 months.         History of Present Illness   Mr. Nadya Blake is a 56 year old male with a significant past history of CAD with history of NSTEMI s/p 4V CABG (LIMA to LAD, right radial artery to RPDA, reversed SVGs to an OM and diagonal artery branch) on 6/24/2020, HTN, dyslipidemia, and former smoker presenting for follow-up.     At his last visit, he felt his breathing was better but he was noted a lot of dry mouth and thirst. Furosemide was stopped. Since then, he has gained a bit of weight. Breathing has good days and bad days. He feels pain in his neck and left shoulder which seems worse with movement. Not  really having any exertional chest discomfort. He is checking his blood pressure a lot, sometimes at night, finding it to be up to 150s at times. He is still struggling with pain from an anal fissure.    He denies any shortness of breath at rest, light headedness/dizziness, pre-syncope, syncope, lower extremity swelling, palpitations, paroxysmal nocturnal dyspnea (PND), or orthopnea.     Cardiac Problems and Cardiac Diagnostics     Most Recent Cardiac testing:  ECG dated 7/23/2021 (personaly reviewed and interpreted): SR, 1st degree AV block with  ms, left axis deviation     ECHO 6/27/2020 (report reviewed):     Normal left ventricular size and systolic function. The left ventricular wall motion is normal. The calculated left ventricular ejection fraction is 71%.    Normal right ventricular size and systolic function.    No hemodynamically significant valvular heart abnormalities.    The ascending aorta is mildly dilated.    When compared to the previous study dated 6/23/2020, no significant change.     Stress cardiac MRI 12/23/2021 (report reviewed):  1.  Pharmacological Regadenoson stress cardiac MRI is negative for inducible myocardial ischemia.   2.  Pharmacological stress ECG is negative for inducible myocardial ischemia.   3. Normal left ventricular size, wall thickness and systolic function. The quantified left ventricular  ejection fraction is 59 %.  Very small area of non-transmural myocardial scar in the mid inferior wall is  identified.    4.  Normal right ventricular size and systolic function.    5.  No significant valvular abnormalities.  6. Ascending aorta measures 38 mm.      Stress test 5/21/2021 (report reviewed):     The nuclear stress test is negative for inducible myocardial ischemia or infarction.     The left ventricular ejection fraction at stress is 65%.     There is no prior study for comparison.     Cardiac cath 6/23/2020 (report reviewed):     Severe mid LAD and critical first  diagonal disease.    Severe OM-3 disease; OM-3 is the dominant lateral wall vessel.    Severe proximal RCA and right PDA disease.    LV EDP 20 mmHg     Medications  Allergies   Current Outpatient Medications   Medication Sig Dispense Refill     acetaminophen (TYLENOL) 500 MG tablet [ACETAMINOPHEN (TYLENOL) 500 MG TABLET] Take 2 tablets (1,000 mg total) by mouth every 6 (six) hours as needed for pain. 300 tablet 3     albuterol (PROAIR HFA;PROVENTIL HFA;VENTOLIN HFA) 90 mcg/actuation inhaler [ALBUTEROL (PROAIR HFA;PROVENTIL HFA;VENTOLIN HFA) 90 MCG/ACTUATION INHALER] Inhale 1-2 puffs every 4 (four) hours as needed for wheezing.       aspirin (ASA) 81 MG chewable tablet TAKE 1 TABLET BY ENTERAL TUBE ROUTE DAILY 90 tablet 1     budesonide-formoteroL (SYMBICORT) 160-4.5 mcg/actuation inhaler [BUDESONIDE-FORMOTEROL (SYMBICORT) 160-4.5 MCG/ACTUATION INHALER] Inhale 2 puffs 2 (two) times a day. Taking as needed.       Cholecalciferol (VITAMIN D3) 50 MCG (2000 UT) CAPS Take 1 capsule by mouth daily 90 capsule 3     diclofenac (VOLTAREN) 1 % topical gel Apply 4 g topically 4 times daily 500 g 2     diclofenac sodium (VOLTAREN) 1 % Gel [DICLOFENAC SODIUM (VOLTAREN) 1 % GEL] Apply 4 g topically 4 (four) times a day. 500 g 11     escitalopram (LEXAPRO) 20 MG tablet Take 1 tablet (20 mg) by mouth daily 30 tablet 3     gabapentin (NEURONTIN) 300 MG capsule Take 1 capsule (300 mg) by mouth daily 90 capsule 3     hydrOXYzine (VISTARIL) 25 MG capsule Take 1-2 capsules (25-50 mg) by mouth 3 times daily as needed for anxiety 90 capsule 1     Lidocaine 0.5 % GEL        metoprolol succinate ER (TOPROL-XL) 100 MG 24 hr tablet Take 1.5 tablets (150 mg) by mouth At Bedtime 135 tablet 3     nifedipine 0.2% in white petrolatum 0.2 % OINT ointment Apply topically 4 times daily as needed (anal fissure) 100 g 1     omeprazole (PRILOSEC) 20 MG capsule [OMEPRAZOLE (PRILOSEC) 20 MG CAPSULE] Take 1 capsule (20 mg total) by mouth 2 (two) times a day  before meals. 180 capsule 3     polyethylene glycol (MIRALAX) 17 g packet Take 1 packet by mouth daily       QUEtiapine (SEROQUEL) 100 MG tablet Take 1.5 tablets (150 mg) by mouth At Bedtime 45 tablet 10     QUEtiapine (SEROQUEL) 50 MG tablet TAKE 1 TABLET(50 MG) BY MOUTH TWICE DAILY AS NEEDED 60 tablet 3     rosuvastatin (CRESTOR) 40 MG tablet Take 1 tablet (40 mg) by mouth daily 90 tablet 3     tamsulosin (FLOMAX) 0.4 MG capsule Take 1 capsule (0.4 mg) by mouth daily 90 capsule 3     UNABLE TO FIND MEDICATION NAME: Hemorrhoid Spray       UNABLE TO FIND MEDICATION NAME: Diltiazem 2% Ointment        Allergies   Allergen Reactions     Atorvastatin Diarrhea     Cortisone Other (See Comments)     pain     Lisinopril Cough     started after CABG for unclear reason     Methylprednisolone Other (See Comments)     ?        Physical Examination Review of Systems   /80 (BP Location: Left arm, Patient Position: Sitting, Cuff Size: Adult Small)   Pulse 72   Resp 14   Wt 90.3 kg (199 lb)   BMI 34.16 kg/m    Body mass index is 34.16 kg/m .  Wt Readings from Last 3 Encounters:   05/06/22 90.3 kg (199 lb)   04/25/22 90.9 kg (200 lb 4.8 oz)   03/21/22 87.5 kg (193 lb)       General Appearance:   Pleasant  male, appears  stated age. no acute distress, obese body habitus   ENT/Mouth: Facemask.    EYES:  no scleral icterus, normal conjunctivae   Neck: no carotid bruits. No anterior cervical lymphadenopaty   Respiratory:   lungs are clear to auscultation, no rales or wheezing, equal chest wall expansion    Cardiovascular:   Regular rhythm, normal rate. Normal first and second heart sounds with no murmurs, rubs, or gallops; the carotid, radial and posterior tibial pulses are intact, Jugular venous pressure normal, no edema bilaterally    Abdomen/GI:  no organomegaly, masses, bruits, or tenderness; bowel sounds are present   Extremities: no cyanosis or clubbing   Skin: no xanthelasma, warm.    Heme/lymph/ Immunology No apparent  bleeding noted.   Neurologic: Alert and oriented. normal gait, no tremors     Psychiatric: Pleasant, calm, appropriate affect.    A complete 10 system review of systems was performed and is negative except as mentioned in the HPI/subjective.         Past History   Past Medical History:   Past Medical History:   Diagnosis Date     Acute non-ST elevation myocardial infarction (NSTEMI) (H) 6/22/2020     Adenomatous polyp of colon      Ascending aorta dilatation (H)      Carpal tunnel syndrome      Chronic superficial gastritis      Coronary artery disease due to lipid rich plaque 6/23/2020     Depression with anxiety      Dyslipidemia, goal LDL below 70 6/23/2020     Essential hypertension 9/2/2012     Fatty liver disease, nonalcoholic      GERD (gastroesophageal reflux disease)      IBS (irritable bowel syndrome)      Left lumbar radiculopathy      Multinodular goiter      Old torn meniscus of knee      Paroxysmal atrial fibrillation (H)      Perianal abscess      Post herpetic neuralgia      Post-traumatic osteoarthritis of both knees      Primary osteoarthritis of left hip      Primary osteoarthritis of right hip      Pulmonary nodule      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 Ragsdale     CV CORONARY ANGIOGRAM N/A 6/23/2020    Procedure: Coronary Angiogram;  Surgeon: Ariane Lester MD;  Location: [facility];  Service: Cardiology     CV IABP INSERT N/A 6/24/2020    Procedure: Intra Aortic Balloon Pump Insertion;  Surgeon: Ariane Lester MD;  Location: [facility];  Service: Cardiology     CV LEFT HEART CATHETERIZATION WITHOUT LEFT VENTRICULOGRAM Left 6/23/2020    Procedure: Left Heart Catheterization  Without Left Ventriculogram;  Surgeon: Ariane Lester MD;  Location: [facility];  Service: Cardiology       Family History:   Family History   Problem Relation Age of Onset     Lung Cancer Father 56        fatal     No Known Problems Mother      No Known Problems Daughter      Other - See Comments Son         congenital deformity of right leg; he uses a leg prosthesis        Social History:   Social History     Socioeconomic History     Marital status:      Spouse name: Carlee     Number of children: 2     Years of education: Not on file     Highest education level: Not on file   Occupational History     Not on file   Tobacco Use     Smoking status: Former Smoker     Packs/day: 1.00     Years: 30.00     Pack years: 30.00     Types: Cigarettes, Cigarettes     Quit date: 3/23/2020     Years since quittin.1     Smokeless tobacco: Never Used     Tobacco comment: stopped 3/24/2020   Substance and Sexual Activity     Alcohol use: No     Drug use: Never     Sexual activity: Yes     Partners: Female   Other Topics Concern     Not on file   Social History Narrative    , Carlee, BA chemistry and taking AA courses at [employer].  From [place]; bachelors in geology and computer science. Son, Radhasharif () and Sherrie (2008).  On SSDI, PTSD.       Social Determinants of Health     Financial Resource Strain: Not on file   Food Insecurity: No Food Insecurity     Worried About Running Out of Food in the Last Year: Never true     Ran Out of Food in the Last Year: Never true   Transportation Needs: No Transportation Needs     Lack of Transportation (Medical): No     Lack of Transportation (Non-Medical): No   Physical Activity: Not on file   Stress: Not on file   Social Connections: Not on file   Intimate Partner Violence: Not on file   Housing Stability: Low Risk      Unable to Pay for Housing in the Last Year: No     Number of Places Lived in the Last Year: 1     Unstable Housing in the Last Year: No               Lab Results    Chemistry/lipid CBC Cardiac Enzymes/BNP/TSH/INR   Lab Results   Component Value Date    CHOL 109 10/14/2021    HDL 33 (L) 10/14/2021    LDL 53 10/14/2021    TRIG 115 10/14/2021    CR 1.23 02/15/2022    BUN 15 02/15/2022    POTASSIUM 4.4 02/15/2022     02/15/2022    CO2 28 02/15/2022      Lab Results   Component Value Date    WBC 5.9 10/14/2021    HGB 15.1 10/14/2021    HCT 46.1 10/14/2021    MCV 87 10/14/2021     10/14/2021    A1C 5.8 (H) 02/15/2022     Lab Results   Component Value Date    A1C 5.8 (H) 02/15/2022    Lab Results   Component Value Date    TROPONINI <0.01 11/21/2020    BNP 79 (H) 07/27/2020    TSH 0.17 (L) 09/22/2020    INR 1.10 07/27/2020          Sloan Burns MD [facility]  Non-Invasive Cardiologist  [facility]  Pager [phone]    Thank you for allowing me to participate in the care of your patient.      Sincerely,     Sloan Burns MD     [facility]  cc:   Referred Self,

## 2022-05-06 NOTE — PATIENT INSTRUCTIONS
We will start two new heart medicines: spironolactone and isosorbide mononitrate.   Come in for a blood draw in 1 week.  See me back in 2-3 months.
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
## 2022-05-06 NOTE — PROGRESS NOTES
HEART CARE ENCOUNTER NOTE          Assessment/Recommendations   Assessment:    1. Chronic congestive heart failure with preserved left ventricular ejection fraction. He seems euvolemic but is still having symptoms.  2. Coronary artery disease status post four-vessel coronary artery bypass grafting (left internal mammary artery to the left anterior descending artery, right radial artery to the right posterior descending artery, reversed saphenous venous grafts to obtuse marginal and diagonal artery branches) on 6/24/2020. No ischemia seen on stress cardiac MRI on 12/23/2021 but unclear any of his exertional dyspnea could be anginal.  3. Benign essential hypertension. Controlled.  4. Dyslipidemia. Last LDL 53.  5. Former smoker.  6. BMI 34.16.    Plan:  1. Start spironolactone 25 mg daily.  2. Start isosorbide mononitrate 30 mg daily.  3. Check basic metabolic panel in 1 week.  4. Continue to hold furosemide, but if he continues to gain weight and/or does not have improvement in his symptoms, we may resume furosemide 20 mg once daily.  5. Continue other cardiac medications.  6. Follow-up with me in 2 months.         History of Present Illness   Mr. Nadya Blake is a 56 year old male with a significant past history of CAD with history of NSTEMI s/p 4V CABG (LIMA to LAD, right radial artery to RPDA, reversed SVGs to an OM and diagonal artery branch) on 6/24/2020, HTN, dyslipidemia, and former smoker presenting for follow-up.     At his last visit, he felt his breathing was better but he was noted a lot of dry mouth and thirst. Furosemide was stopped. Since then, he has gained a bit of weight. Breathing has good days and bad days. He feels pain in his neck and left shoulder which seems worse with movement. Not really having any exertional chest discomfort. He is checking his blood pressure a lot, sometimes at night, finding it to be up to 150s at times. He is still struggling with pain from an anal fissure.    He  denies any shortness of breath at rest, light headedness/dizziness, pre-syncope, syncope, lower extremity swelling, palpitations, paroxysmal nocturnal dyspnea (PND), or orthopnea.     Cardiac Problems and Cardiac Diagnostics     Most Recent Cardiac testing:  ECG dated 7/23/2021 (personaly reviewed and interpreted): SR, 1st degree AV block with  ms, left axis deviation     ECHO 6/27/2020 (report reviewed):     Normal left ventricular size and systolic function. The left ventricular wall motion is normal. The calculated left ventricular ejection fraction is 71%.    Normal right ventricular size and systolic function.    No hemodynamically significant valvular heart abnormalities.    The ascending aorta is mildly dilated.    When compared to the previous study dated 6/23/2020, no significant change.     Stress cardiac MRI 12/23/2021 (report reviewed):  1.  Pharmacological Regadenoson stress cardiac MRI is negative for inducible myocardial ischemia.   2.  Pharmacological stress ECG is negative for inducible myocardial ischemia.   3. Normal left ventricular size, wall thickness and systolic function. The quantified left ventricular  ejection fraction is 59 %.  Very small area of non-transmural myocardial scar in the mid inferior wall is  identified.    4.  Normal right ventricular size and systolic function.    5.  No significant valvular abnormalities.  6. Ascending aorta measures 38 mm.      Stress test 5/21/2021 (report reviewed):     The nuclear stress test is negative for inducible myocardial ischemia or infarction.     The left ventricular ejection fraction at stress is 65%.     There is no prior study for comparison.     Cardiac cath 6/23/2020 (report reviewed):     Severe mid LAD and critical first diagonal disease.    Severe OM-3 disease; OM-3 is the dominant lateral wall vessel.    Severe proximal RCA and right PDA disease.    LV EDP 20 mmHg     Medications  Allergies   Current Outpatient Medications    Medication Sig Dispense Refill     acetaminophen (TYLENOL) 500 MG tablet [ACETAMINOPHEN (TYLENOL) 500 MG TABLET] Take 2 tablets (1,000 mg total) by mouth every 6 (six) hours as needed for pain. 300 tablet 3     albuterol (PROAIR HFA;PROVENTIL HFA;VENTOLIN HFA) 90 mcg/actuation inhaler [ALBUTEROL (PROAIR HFA;PROVENTIL HFA;VENTOLIN HFA) 90 MCG/ACTUATION INHALER] Inhale 1-2 puffs every 4 (four) hours as needed for wheezing.       aspirin (ASA) 81 MG chewable tablet TAKE 1 TABLET BY ENTERAL TUBE ROUTE DAILY 90 tablet 1     budesonide-formoteroL (SYMBICORT) 160-4.5 mcg/actuation inhaler [BUDESONIDE-FORMOTEROL (SYMBICORT) 160-4.5 MCG/ACTUATION INHALER] Inhale 2 puffs 2 (two) times a day. Taking as needed.       Cholecalciferol (VITAMIN D3) 50 MCG (2000 UT) CAPS Take 1 capsule by mouth daily 90 capsule 3     diclofenac (VOLTAREN) 1 % topical gel Apply 4 g topically 4 times daily 500 g 2     diclofenac sodium (VOLTAREN) 1 % Gel [DICLOFENAC SODIUM (VOLTAREN) 1 % GEL] Apply 4 g topically 4 (four) times a day. 500 g 11     escitalopram (LEXAPRO) 20 MG tablet Take 1 tablet (20 mg) by mouth daily 30 tablet 3     gabapentin (NEURONTIN) 300 MG capsule Take 1 capsule (300 mg) by mouth daily 90 capsule 3     hydrOXYzine (VISTARIL) 25 MG capsule Take 1-2 capsules (25-50 mg) by mouth 3 times daily as needed for anxiety 90 capsule 1     Lidocaine 0.5 % GEL        metoprolol succinate ER (TOPROL-XL) 100 MG 24 hr tablet Take 1.5 tablets (150 mg) by mouth At Bedtime 135 tablet 3     nifedipine 0.2% in white petrolatum 0.2 % OINT ointment Apply topically 4 times daily as needed (anal fissure) 100 g 1     omeprazole (PRILOSEC) 20 MG capsule [OMEPRAZOLE (PRILOSEC) 20 MG CAPSULE] Take 1 capsule (20 mg total) by mouth 2 (two) times a day before meals. 180 capsule 3     polyethylene glycol (MIRALAX) 17 g packet Take 1 packet by mouth daily       QUEtiapine (SEROQUEL) 100 MG tablet Take 1.5 tablets (150 mg) by mouth At Bedtime 45 tablet 10      QUEtiapine (SEROQUEL) 50 MG tablet TAKE 1 TABLET(50 MG) BY MOUTH TWICE DAILY AS NEEDED 60 tablet 3     rosuvastatin (CRESTOR) 40 MG tablet Take 1 tablet (40 mg) by mouth daily 90 tablet 3     tamsulosin (FLOMAX) 0.4 MG capsule Take 1 capsule (0.4 mg) by mouth daily 90 capsule 3     UNABLE TO FIND MEDICATION NAME: Hemorrhoid Spray       UNABLE TO FIND MEDICATION NAME: Diltiazem 2% Ointment        Allergies   Allergen Reactions     Atorvastatin Diarrhea     Cortisone Other (See Comments)     pain     Lisinopril Cough     started after CABG for unclear reason     Methylprednisolone Other (See Comments)     ?        Physical Examination Review of Systems   /80 (BP Location: Left arm, Patient Position: Sitting, Cuff Size: Adult Small)   Pulse 72   Resp 14   Wt 90.3 kg (199 lb)   BMI 34.16 kg/m    Body mass index is 34.16 kg/m .  Wt Readings from Last 3 Encounters:   05/06/22 90.3 kg (199 lb)   04/25/22 90.9 kg (200 lb 4.8 oz)   03/21/22 87.5 kg (193 lb)       General Appearance:   Pleasant  male, appears  stated age. no acute distress, obese body habitus   ENT/Mouth: Facemask.    EYES:  no scleral icterus, normal conjunctivae   Neck: no carotid bruits. No anterior cervical lymphadenopaty   Respiratory:   lungs are clear to auscultation, no rales or wheezing, equal chest wall expansion    Cardiovascular:   Regular rhythm, normal rate. Normal first and second heart sounds with no murmurs, rubs, or gallops; the carotid, radial and posterior tibial pulses are intact, Jugular venous pressure normal, no edema bilaterally    Abdomen/GI:  no organomegaly, masses, bruits, or tenderness; bowel sounds are present   Extremities: no cyanosis or clubbing   Skin: no xanthelasma, warm.    Heme/lymph/ Immunology No apparent bleeding noted.   Neurologic: Alert and oriented. normal gait, no tremors     Psychiatric: Pleasant, calm, appropriate affect.    A complete 10 system review of systems was performed and is negative except  as mentioned in the HPI/subjective.         Past History   Past Medical History:   Past Medical History:   Diagnosis Date     Acute non-ST elevation myocardial infarction (NSTEMI) (H) 6/22/2020     Adenomatous polyp of colon      Ascending aorta dilatation (H)      Carpal tunnel syndrome      Chronic superficial gastritis      Coronary artery disease due to lipid rich plaque 6/23/2020     Depression with anxiety      Dyslipidemia, goal LDL below 70 6/23/2020     Essential hypertension 9/2/2012     Fatty liver disease, nonalcoholic      GERD (gastroesophageal reflux disease)      IBS (irritable bowel syndrome)      Left lumbar radiculopathy      Multinodular goiter      Old torn meniscus of knee      Paroxysmal atrial fibrillation (H)      Perianal abscess      Post herpetic neuralgia      Post-traumatic osteoarthritis of both knees      Primary osteoarthritis of left hip      Primary osteoarthritis of right hip      Pulmonary nodule      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 Ragsdale     CV CORONARY ANGIOGRAM N/A 6/23/2020    Procedure: Coronary Angiogram;  Surgeon: Ariane Lester MD;  Location: [facility];  Service: Cardiology     CV IABP INSERT N/A 6/24/2020    Procedure: Intra Aortic Balloon Pump Insertion;  Surgeon: Ariane Lester MD;  Location: [facility];  Service: Cardiology     CV LEFT HEART CATHETERIZATION WITHOUT LEFT VENTRICULOGRAM Left 6/23/2020    Procedure: Left Heart Catheterization Without Left Ventriculogram;  Surgeon: Ariane Lester MD;  Location: [facility];  Service: Cardiology       Family History:   Family History   Problem Relation Age of Onset     Lung Cancer  Father 56        fatal     No Known Problems Mother      No Known Problems Daughter      Other - See Comments Son         congenital deformity of right leg; he uses a leg prosthesis        Social History:   Social History     Socioeconomic History     Marital status:      Spouse name: Carlee     Number of children: 2     Years of education: Not on file     Highest education level: Not on file   Occupational History     Not on file   Tobacco Use     Smoking status: Former Smoker     Packs/day: 1.00     Years: 30.00     Pack years: 30.00     Types: Cigarettes, Cigarettes     Quit date: 3/23/2020     Years since quittin.1     Smokeless tobacco: Never Used     Tobacco comment: stopped 3/24/2020   Substance and Sexual Activity     Alcohol use: No     Drug use: Never     Sexual activity: Yes     Partners: Female   Other Topics Concern     Not on file   Social History Narrative    , Carlee, BA chemistry and taking AA courses at [employer].  From [place]; bachelors in geology and computer science. Son, Grace () and Sherrie ().  On SSDI, PTSD.       Social Determinants of Health     Financial Resource Strain: Not on file   Food Insecurity: No Food Insecurity     Worried About Running Out of Food in the Last Year: Never true     Ran Out of Food in the Last Year: Never true   Transportation Needs: No Transportation Needs     Lack of Transportation (Medical): No     Lack of Transportation (Non-Medical): No   Physical Activity: Not on file   Stress: Not on file   Social Connections: Not on file   Intimate Partner Violence: Not on file   Housing Stability: Low Risk      Unable to Pay for Housing in the Last Year: No     Number of Places Lived in the Last Year: 1     Unstable Housing in the Last Year: No              Lab Results    Chemistry/lipid CBC Cardiac Enzymes/BNP/TSH/INR   Lab Results   Component Value Date    CHOL 109 10/14/2021    HDL 33 (L) 10/14/2021    LDL 53 10/14/2021    TRIG 115 10/14/2021    CR  1.23 02/15/2022    BUN 15 02/15/2022    POTASSIUM 4.4 02/15/2022     02/15/2022    CO2 28 02/15/2022      Lab Results   Component Value Date    WBC 5.9 10/14/2021    HGB 15.1 10/14/2021    HCT 46.1 10/14/2021    MCV 87 10/14/2021     10/14/2021    A1C 5.8 (H) 02/15/2022     Lab Results   Component Value Date    A1C 5.8 (H) 02/15/2022    Lab Results   Component Value Date    TROPONINI <0.01 11/21/2020    BNP 79 (H) 07/27/2020    TSH 0.17 (L) 09/22/2020    INR 1.10 07/27/2020          Sloan Burns MD [facility]  Non-Invasive Cardiologist  [facility]  Pager [phone]

## 2022-05-09 ENCOUNTER — OFFICE VISIT (OUTPATIENT)
Dept: PHYSICAL MEDICINE AND REHAB | Facility: CLINIC | Age: 57
End: 2022-05-09
Attending: INTERNAL MEDICINE
Payer: COMMERCIAL

## 2022-05-09 VITALS
BODY MASS INDEX: 34.06 KG/M2 | DIASTOLIC BLOOD PRESSURE: 80 MMHG | SYSTOLIC BLOOD PRESSURE: 118 MMHG | HEIGHT: 64 IN | HEART RATE: 59 BPM | WEIGHT: 199.5 LBS

## 2022-05-09 DIAGNOSIS — M54.2 NECK PAIN ON LEFT SIDE: ICD-10-CM

## 2022-05-09 DIAGNOSIS — M54.16 LUMBAR RADICULITIS: ICD-10-CM

## 2022-05-09 DIAGNOSIS — M54.12 CERVICAL RADICULOPATHY: ICD-10-CM

## 2022-05-09 DIAGNOSIS — R22.1 NECK SWELLING: Primary | ICD-10-CM

## 2022-05-09 DIAGNOSIS — R68.84 JAW PAIN: ICD-10-CM

## 2022-05-09 PROCEDURE — 99204 OFFICE O/P NEW MOD 45 MIN: CPT | Performed by: PHYSICIAN ASSISTANT

## 2022-05-09 ASSESSMENT — PAIN SCALES - GENERAL: PAINLEVEL: EXTREME PAIN (8)
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
## 2022-05-09 NOTE — PATIENT INSTRUCTIONS
[facility] Scheduling    Please call [phone] to schedule your image(s) (select option#1). There are 3 different locations, see below. You can do walk-in visits for xray only images if you want.     [facility]  [address] [place] [id number]      [address] [address]

## 2022-05-13 ENCOUNTER — LAB (OUTPATIENT)
Dept: CARDIOLOGY | Facility: CLINIC | Age: 57
End: 2022-05-13
Payer: COMMERCIAL

## 2022-05-13 DIAGNOSIS — I50.32 CHRONIC HEART FAILURE WITH PRESERVED EJECTION FRACTION (H): ICD-10-CM

## 2022-05-13 DIAGNOSIS — R68.84 JAW PAIN: ICD-10-CM

## 2022-05-13 LAB
ANION GAP SERPL CALCULATED.3IONS-SCNC: 6 MMOL/L (ref 5–18)
BUN SERPL-MCNC: 16 MG/DL (ref 8–22)
C REACTIVE PROTEIN LHE: 0.5 MG/DL (ref 0–0.8)
CALCIUM SERPL-MCNC: 9 MG/DL (ref 8.5–10.5)
CHLORIDE BLD-SCNC: 105 MMOL/L (ref 98–107)
CO2 SERPL-SCNC: 28 MMOL/L (ref 22–31)
CREAT SERPL-MCNC: 1.34 MG/DL (ref 0.7–1.3)
ERYTHROCYTE [SEDIMENTATION RATE] IN BLOOD BY WESTERGREN METHOD: 2 MM/HR (ref 0–15)
GFR SERPL CREATININE-BSD FRML MDRD: 63 ML/MIN/1.73M2
GLUCOSE BLD-MCNC: 107 MG/DL (ref 70–125)
POTASSIUM BLD-SCNC: 4.6 MMOL/L (ref 3.5–5)
SODIUM SERPL-SCNC: 139 MMOL/L (ref 136–145)

## 2022-05-13 PROCEDURE — 86140 C-REACTIVE PROTEIN: CPT

## 2022-05-13 PROCEDURE — 80048 BASIC METABOLIC PNL TOTAL CA: CPT

## 2022-05-13 PROCEDURE — 36415 COLL VENOUS BLD VENIPUNCTURE: CPT

## 2022-05-13 PROCEDURE — 85652 RBC SED RATE AUTOMATED: CPT

## 2022-05-14 DIAGNOSIS — R07.89 CHEST WALL PAIN: ICD-10-CM

## 2022-05-16 RX ORDER — PSEUDOEPHED/ACETAMINOPH/DIPHEN 30MG-500MG
TABLET ORAL
Qty: 360 TABLET | Refills: 3 | Status: SHIPPED | OUTPATIENT
Start: 2022-05-16 | End: 2023-11-29

## 2022-05-16 NOTE — TELEPHONE ENCOUNTER
"Last Written Prescription Date:  4/23/21  Last Fill Quantity: 300,  # refills: 3   Last office visit provider:  4/25/22     Requested Prescriptions   Pending Prescriptions Disp Refills     ACETAMINOPHEN EXTRA STRENGTH 500 MG tablet [Pharmacy Med Name: ACETAMINOPHEN 500MG E/S CAPLETS] 300 tablet 3     Sig: TAKE 2 TABLETS(1000 MG) BY MOUTH EVERY 6 HOURS AS NEEDED FOR PAIN       Analgesics (Non-Narcotic Tylenol and ASA Only) Passed - 5/16/2022 12:40 PM        Passed - Recent (12 mo) or future (30 days) visit within the authorizing provider's specialty     Patient has had an office visit with the authorizing provider or a provider within the authorizing providers department within the previous 12 mos or has a future within next 30 days. See \"Patient Info\" tab in inbasket, or \"Choose Columns\" in Meds & Orders section of the refill encounter.              Passed - Patient is 7 months old or older     If patient is a peds patient of the age 7 mos -12 years, ok to refill using weight-based dosing.     If >3g daily and/or sig is not \"prn\", check for liver enzymes. If normal in the last year, ok to refill.  If not, refer to the provider.          Passed - Medication is active on med list             Rick Kim RN 05/16/22 12:40 PM  "

## 2022-05-17 ENCOUNTER — TELEPHONE (OUTPATIENT)
Dept: INTERNAL MEDICINE | Facility: CLINIC | Age: 57
End: 2022-05-17
Payer: COMMERCIAL

## 2022-05-17 NOTE — TELEPHONE ENCOUNTER
Reason for Call:  Other appointment    Detailed comments: patients wife Carlee called today and would like patient to be seen ASAP with only Suzanna Reyes at [facility].  Still sore throat symptoms.  COVID neg 1 week ago.  Please contact wife (if consent)  Thank you.    Phone Number Patient can be reached at: Other phone number:  [phone] Carlee*    Best Time: any    Can we leave a detailed message on this number? NO    Call taken on 5/17/2022 at 8:29 AM by Sherri Perry

## 2022-05-18 ENCOUNTER — NURSE TRIAGE (OUTPATIENT)
Dept: NURSING | Facility: CLINIC | Age: 57
End: 2022-05-18
Payer: COMMERCIAL

## 2022-05-18 ENCOUNTER — MYC MEDICAL ADVICE (OUTPATIENT)
Dept: INTERNAL MEDICINE | Facility: CLINIC | Age: 57
End: 2022-05-18
Payer: COMMERCIAL

## 2022-05-18 NOTE — TELEPHONE ENCOUNTER
Declined .   5/7 daughter tested positive for COVID.  had headache 10 days ago. 2 days ago sore throat. They had been testing every 3 days.   COVID test was positive today. His wife had been instructed to call if positive test for antiviral treatment. Hx of bypass surgery and other health issues.   Vaccinated and booster.   Pulse oximeter during this call = 94% P=65.   Temperature = 96.5 via forehead  Pharmacy: [facility] on [address]  Requesting antiviral medication for COVID.     Message will be forwarded to Dr ADI Briseno.    Kika Christensen RN Triage Nurse Advisor 3:48 PM 5/18/2022  Reason for Disposition    HIGH RISK for severe COVID complications (e.g., weak immune system, age > 64 years, obesity with BMI > 25, pregnant, chronic lung disease or other chronic medical condition)  (Exception: Already seen by PCP and no new or worsening symptoms.)    Additional Information    Negative: SEVERE difficulty breathing (e.g., struggling for each breath, speaks in single words)    Negative: Difficult to awaken or acting confused (e.g., disoriented, slurred speech)    Negative: Bluish (or gray) lips or face now    Negative: Shock suspected (e.g., cold/pale/clammy skin, too weak to stand, low BP, rapid pulse)    Negative: Sounds like a life-threatening emergency to the triager    Negative: [1] Diagnosed or suspected COVID-19 AND [2] symptoms lasting 3 or more weeks    Negative: [1] COVID-19 exposure AND [2] no symptoms    Negative: COVID-19 vaccine reaction suspected (e.g., fever, headache, muscle aches) occurring 1 to 3 days after getting vaccine    Negative: COVID-19 vaccine, questions about    Negative: [1] Lives with someone known to have influenza (flu test positive) AND [2] flu-like symptoms (e.g., cough, runny nose, sore throat, SOB; with or without fever)    Negative: [1] Adult with possible COVID-19 symptoms AND [2] triager concerned about severity of symptoms or other causes     Negative: COVID-19 and breastfeeding, questions about    Negative: SEVERE or constant chest pain or pressure  (Exception: Mild central chest pain, present only when coughing.)    Negative: MODERATE difficulty breathing (e.g., speaks in phrases, SOB even at rest, pulse 100-120)    Negative: [1] Headache AND [2] stiff neck (can't touch chin to chest)    Negative: Oxygen level (e.g., pulse oximetry) 90 percent or lower    Negative: Chest pain or pressure    Negative: Patient sounds very sick or weak to the triager    Negative: MILD difficulty breathing (e.g., minimal/no SOB at rest, SOB with walking, pulse <100)    Negative: Fever > 103 F (39.4 C)    Negative: [1] Fever > 101 F (38.3 C) AND [2] age > 60 years    Negative: [1] Fever > 100.0 F (37.8 C) AND [2] bedridden (e.g., nursing home patient, CVA, chronic illness, recovering from surgery)    Protocols used: CORONAVIRUS (COVID-19) DIAGNOSED OR [id number] 1.18.2022

## 2022-05-19 ENCOUNTER — VIRTUAL VISIT (OUTPATIENT)
Dept: FAMILY MEDICINE | Facility: CLINIC | Age: 57
End: 2022-05-19

## 2022-05-19 ENCOUNTER — MYC MEDICAL ADVICE (OUTPATIENT)
Dept: BEHAVIORAL HEALTH | Facility: CLINIC | Age: 57
End: 2022-05-19

## 2022-05-19 DIAGNOSIS — U07.1 INFECTION DUE TO 2019 NOVEL CORONAVIRUS: Primary | ICD-10-CM

## 2022-05-19 PROCEDURE — 99213 OFFICE O/P EST LOW 20 MIN: CPT | Mod: 95 | Performed by: PHYSICIAN ASSISTANT

## 2022-05-19 NOTE — PROGRESS NOTES
"Nadya is a 56 year old who is being evaluated via a billable video visit.      How would you like to obtain your AVS? MyChart  If the video visit is dropped, the invitation should be resent by: Text to cell phone: [phone]  Will anyone else be joining your video visit? No    Video Start Time: 9:31 AM     Assessment & Plan     Infection due to 2019 novel coronavirus  Advised to hold tamsulosin and crestor. For 8 days while taking paxlovid. Advised close follow-up for SOB, chest pain,or any increase in symptosm.   - nirmatrelvir and ritonavir (PAXLOVID) therapy pack; Take 3 tablets by mouth 2 times daily      BMI:   Estimated body mass index is 34.24 kg/m  as calculated from the following:    Height as of 5/9/22: 1.626 m (5' 4\").    Weight as of 5/9/22: 90.5 kg (199 lb 8 oz).       COVID-19 positive patient.  Encounter for consideration of medication intervention. Patient does qualify for a prescription. Full discussion with patient including medication options, risks and benefits. Potential drug interactions reviewed with patient.     Treatment Planned Paxlovid, Rx sent to [facility]    Temporary change to home medications: stop crestor and flomax while taking paxlovid   None     Estimated body mass index is 34.24 kg/m  as calculated from the following:    Height as of 5/9/22: 1.626 m (5' 4\").    Weight as of 5/9/22: 90.5 kg (199 lb 8 oz).  GFR Estimate   Date Value Ref Range Status   05/13/2022 63 >60 mL/min/1.73m2 Final     Comment:     Effective December 21, 2021 eGFRcr in adults is calculated using the 2021 CKD-EPI creatinine equation which includes age and gender (Tim et al., NEJM, DOI: 10.1056/OJVOyk2971409)   05/24/2021 >60 >60 mL/min/1.73m2 Final     Lab Results   Component Value Date    [id number] Negative 11/19/2021       No follow-ups on file.    Ramona Ann Aaseby-Aguilera, PA-C  [facility]    Meryl Covington is a 56 year old who presents for the following " health issues     HPI     Tested positive via at-home COVID test on 5/18/2022    Patient did note that his creatinine was elevated from his BMP lab on 5/13/22    COVID-19 Symptom Review    How many days ago did these symptoms start? Ongoing for 3 Days  Has stuffy nose and sore throat and headache  Are any of the following symptoms significant for you?    New or worsening difficulty breathing? No    Worsening cough? No    Fever or chills? No    Headache: YES- Occipital HA     Sore throat: YES    Chest pain: no    Diarrhea: no    Body aches? no    What treatments has patient tried? Acetaminophen has helped with HA  Does patient live in a nursing home, group home, or shelter? no  Does patient have a way to get food/medications during quarantined? Yes, I have a friend or family member who can help me.            Review of Systems   Constitutional, HEENT, cardiovascular, pulmonary, gi and gu systems are negative, except as otherwise noted.      Objective           Vitals:  No vitals were obtained today due to virtual visit.    Physical Exam   GENERAL: Healthy, alert and no distress  EYES: Eyes grossly normal to inspection.  No discharge or erythema, or obvious scleral/conjunctival abnormalities.  RESP: No audible wheeze, cough, or visible cyanosis.  No visible retractions or increased work of breathing.    SKIN: Visible skin clear. No significant rash, abnormal pigmentation or lesions.  NEURO: Cranial nerves grossly intact.  Mentation and speech appropriate for age.  PSYCH: Mentation appears normal, affect normal/bright, judgement and insight intact, normal speech and appearance well-groomed.        Video-Visit Details    Type of service:  Video Visit    Video End Time:9:56 AM    Originating Location (pt. Location): Home    Distant Location (provider location):  [facility]     Platform used for Video Visit: "[employer]

## 2022-05-19 NOTE — TELEPHONE ENCOUNTER
Please schedule him for a virtual visit today.  You can add him on and let him know that I will call him when I get a chance between now and 1 PM but I need a visit to be able to prescribe the medication.

## 2022-05-19 NOTE — TELEPHONE ENCOUNTER
Patient has a virtual visit scheduled with another provider today at 9:20 am to discuss Covid treatments. Do you still want to add him on for a virtual visit? Please advise.

## 2022-05-20 NOTE — TELEPHONE ENCOUNTER
Mary msg:  Good afternoon Nadya Harmon and I tested positive Covid-19 right now.   Please let me know what we should take.      Nadya Callaway's wife       Pt was seen virtually regarding treatment for Covid.      Goran Blanco Jr., CMA on 5/20/2022 at 3:41 PM

## 2022-05-25 ENCOUNTER — VIRTUAL VISIT (OUTPATIENT)
Dept: INTERNAL MEDICINE | Facility: CLINIC | Age: 57
End: 2022-05-25
Payer: COMMERCIAL

## 2022-05-25 DIAGNOSIS — U07.1 INFECTION DUE TO 2019 NOVEL CORONAVIRUS: Primary | ICD-10-CM

## 2022-05-25 DIAGNOSIS — R35.1 NOCTURIA: ICD-10-CM

## 2022-05-25 DIAGNOSIS — K60.30 ANAL FISTULA: ICD-10-CM

## 2022-05-25 DIAGNOSIS — E78.5 DYSLIPIDEMIA, GOAL LDL BELOW 70: ICD-10-CM

## 2022-05-25 PROCEDURE — 99214 OFFICE O/P EST MOD 30 MIN: CPT | Mod: 95 | Performed by: INTERNAL MEDICINE

## 2022-05-25 RX ORDER — TAMSULOSIN HYDROCHLORIDE 0.4 MG/1
0.4 CAPSULE ORAL DAILY
Qty: 90 CAPSULE | Refills: 3
Start: 2022-05-25 | End: 2022-07-05

## 2022-05-25 RX ORDER — ROSUVASTATIN CALCIUM 40 MG/1
40 TABLET, COATED ORAL DAILY
Qty: 90 TABLET | Refills: 3
Start: 2022-05-25 | End: 2022-09-30

## 2022-05-25 NOTE — PROGRESS NOTES
Nadya is a 56 year old who is being evaluated via a billable video visit.      How would you like to obtain your AVS? MyChart  If the video visit is dropped, the invitation should be resent by: Text to cell phone: [phone]  Will anyone else be joining your video visit? No      Video Start Time: 9:25 AM    1. Infection due to 2019 novel coronavirus  Status posttreatment with antivirals 8 days ago can resume other medications    2. Nocturia  - tamsulosin (FLOMAX) 0.4 MG capsule; Take 1 capsule (0.4 mg) by mouth daily  Dispense: 90 capsule; Refill: 3    3. Dyslipidemia, goal LDL below 70  - rosuvastatin (CRESTOR) 40 MG tablet; Take 1 tablet (40 mg) by mouth daily  Dispense: 90 tablet; Refill: 3    4. Anal fistula  Doing well with nifedipine cream    Subjective   Nadya is a 56 year old who presents for the following health issues this 56-year-old man is seen with his wife for follow-up of COVID infection.  He was started on antiviral medication and his medications were reviewed and adjusted based on this therapy.  It has now been 8 days and he can resume tamsulosin and rosuvastatin.  He has had improvement in his anal fissure with nifedipine cream which was compounded by her [place] pharmacy.  Otherwise doing well a little bit of sore throat still and a little bit of shortness of breath but feeling much better    HPI           Review of Systems         Objective           Vitals:  No vitals were obtained today due to virtual visit.    Physical Exam                   Video-Visit Details    Type of service:  Video Visit    Video End Time:9:34 AM    Originating Location (pt. Location): Home    Distant Location (provider location):  [facility]     Platform used for Video Visit: [employer]

## 2022-06-02 ENCOUNTER — OFFICE VISIT (OUTPATIENT)
Dept: INTERNAL MEDICINE | Facility: CLINIC | Age: 57
End: 2022-06-02
Payer: COMMERCIAL

## 2022-06-02 VITALS
RESPIRATION RATE: 16 BRPM | TEMPERATURE: 97.9 F | SYSTOLIC BLOOD PRESSURE: 101 MMHG | HEART RATE: 60 BPM | BODY MASS INDEX: 33.53 KG/M2 | HEIGHT: 64 IN | DIASTOLIC BLOOD PRESSURE: 76 MMHG | OXYGEN SATURATION: 96 % | WEIGHT: 196.4 LBS

## 2022-06-02 DIAGNOSIS — I10 ESSENTIAL HYPERTENSION: ICD-10-CM

## 2022-06-02 DIAGNOSIS — F43.10 PTSD (POST-TRAUMATIC STRESS DISORDER): ICD-10-CM

## 2022-06-02 DIAGNOSIS — K60.2 ANAL FISSURE: ICD-10-CM

## 2022-06-02 DIAGNOSIS — U07.1 INFECTION DUE TO 2019 NOVEL CORONAVIRUS: Primary | ICD-10-CM

## 2022-06-02 DIAGNOSIS — R07.89 CHEST WALL PAIN: ICD-10-CM

## 2022-06-02 PROCEDURE — 99214 OFFICE O/P EST MOD 30 MIN: CPT | Performed by: INTERNAL MEDICINE

## 2022-06-02 NOTE — PROGRESS NOTES
"  Office Visit - Follow Up   Nadya Blake   56 year old male    Date of Visit: 6/2/2022    Chief Complaint   Patient presents with     Follow Up     Recheck Medication     Monthly F/U  from [facility] surgery        Assessment and Plan   1. Infection due to 2019 novel coronavirus  Doing well    2. Essential hypertension  Blood pressure okay continue to    3. Anal fissure  Continue with nifedipine cream seems to be helpful    4. Chest wall pain  Clearly musculoskeletal, continue with Voltaren gel    5. PTSD (post-traumatic stress disorder)  Fairly stable follows with psychiatry    Return in about 4 weeks (around 6/30/2022) for Follow up.     History of Present Illness   This 56 year old man comes in for follow-up.  Recently had COVID.  Still feeling a bit tired.  Had a bad headache with this which is 50% improved.  Has responded nicely to nifedipine for his anal fissure.  Still with chest wall pain anteriorly.    Review of Systems: A comprehensive review of systems was negative except as noted.     Medications, Allergies and Problem List   Reviewed, reconciled and updated  Post Discharge Medication Reconciliation Status:   Social History     Social History Narrative    , Carlee, BA chemistry and taking AA courses at [employer].  From [place]; bachelors in geology and [employer] science. Son, Grace (2005) and Sherrie (2008).  On SSDI, PTSD.          Physical Exam   General Appearance:   No acute distress    /76 (BP Location: Left arm, Patient Position: Sitting, Cuff Size: Adult Regular)   Pulse 60   Temp 97.9  F (36.6  C) (Oral)   Resp 16   Ht 1.626 m (5' 4\")   Wt 89.1 kg (196 lb 6.4 oz)   SpO2 96%   BMI 33.71 kg/m      Cardiovascular regular rate and rhythm no murmur gallop or rub  Pulmonary lungs are clear to auscultation bilaterally  Gastrointestinal abdomen soft nontender nondistended no organomegaly  Neurologic exam is non focal  Psychiatric pleasant, no confusion or agitation   Pain over the costal " sternal joint on the left side of his anterior chest wall     Additional Information   Current Outpatient Medications   Medication Sig Dispense Refill     ACETAMINOPHEN EXTRA STRENGTH 500 MG tablet TAKE 2 TABLETS(1000 MG) BY MOUTH EVERY 6 HOURS AS NEEDED FOR PAIN 360 tablet 3     albuterol (PROAIR HFA;PROVENTIL HFA;VENTOLIN HFA) 90 mcg/actuation inhaler [ALBUTEROL (PROAIR HFA;PROVENTIL HFA;VENTOLIN HFA) 90 MCG/ACTUATION INHALER] Inhale 1-2 puffs every 4 (four) hours as needed for wheezing.       aspirin (ASA) 81 MG chewable tablet TAKE 1 TABLET BY ENTERAL TUBE ROUTE DAILY 90 tablet 1     budesonide-formoteroL (SYMBICORT) 160-4.5 mcg/actuation inhaler [BUDESONIDE-FORMOTEROL (SYMBICORT) 160-4.5 MCG/ACTUATION INHALER] Inhale 2 puffs 2 (two) times a day. Taking as needed.       Cholecalciferol (VITAMIN D3) 50 MCG (2000 UT) CAPS Take 1 capsule by mouth daily 90 capsule 3     diclofenac (VOLTAREN) 1 % topical gel Apply 4 g topically 4 times daily 500 g 2     diclofenac sodium (VOLTAREN) 1 % Gel [DICLOFENAC SODIUM (VOLTAREN) 1 % GEL] Apply 4 g topically 4 (four) times a day. 500 g 11     escitalopram (LEXAPRO) 20 MG tablet Take 1 tablet (20 mg) by mouth daily 30 tablet 3     gabapentin (NEURONTIN) 300 MG capsule Take 1 capsule (300 mg) by mouth daily 90 capsule 3     hydrOXYzine (VISTARIL) 25 MG capsule Take 1-2 capsules (25-50 mg) by mouth 3 times daily as needed for anxiety 90 capsule 1     isosorbide mononitrate (IMDUR) 30 MG 24 hr tablet Take 1 tablet (30 mg) by mouth daily 90 tablet 3     Lidocaine 0.5 % GEL        metoprolol succinate ER (TOPROL-XL) 100 MG 24 hr tablet Take 1.5 tablets (150 mg) by mouth At Bedtime 135 tablet 3     nifedipine 0.2% in white petrolatum 0.2 % OINT ointment Apply topically 4 times daily as needed (anal fissure) 100 g 1     omeprazole (PRILOSEC) 20 MG capsule [OMEPRAZOLE (PRILOSEC) 20 MG CAPSULE] Take 1 capsule (20 mg total) by mouth 2 (two) times a day before meals. 180 capsule 3      polyethylene glycol (MIRALAX) 17 g packet Take 1 packet by mouth daily       QUEtiapine (SEROQUEL) 100 MG tablet Take 1.5 tablets (150 mg) by mouth At Bedtime 45 tablet 10     QUEtiapine (SEROQUEL) 50 MG tablet TAKE 1 TABLET(50 MG) BY MOUTH TWICE DAILY AS NEEDED 60 tablet 3     rosuvastatin (CRESTOR) 40 MG tablet Take 1 tablet (40 mg) by mouth daily 90 tablet 3     spironolactone (ALDACTONE) 25 MG tablet Take 1 tablet (25 mg) by mouth daily 90 tablet 3     tamsulosin (FLOMAX) 0.4 MG capsule Take 1 capsule (0.4 mg) by mouth daily 90 capsule 3     Allergies   Allergen Reactions     Atorvastatin Diarrhea     Cortisone Other (See Comments)     pain     Lisinopril Cough     started after CABG for unclear reason     Methylprednisolone Other (See Comments)     ?     Social History     Tobacco Use     Smoking status: Former Smoker     Packs/day: 1.00     Years: 30.00     Pack years: 30.00     Types: Cigarettes, Cigarettes     Quit date: 3/23/2020     Years since quittin.1     Smokeless tobacco: Never Used     Tobacco comment: stopped 3/24/2020   Vaping Use     Vaping Use: Never used   Substance Use Topics     Alcohol use: No     Drug use: Never       Review and/or order of clinical lab tests:  Review and/or order of radiology tests:  Review and/or order of medicine tests:  Discussion of test results with performing physician:  Decision to obtain old records and/or obtain history from someone other than the patient:  Review and summarization of old records and/or obtaining history from someone other than the patient and.or discussion of case with another health care provider:  Independent visualization of image, tracing or specimen itself:    Time:      Az Briseno MD  Answers for HPI/ROS submitted by the patient on 2022  Nitroglycerin use:: never  Do you take an aspirin every day?: Yes  How many servings of fruits and vegetables do you eat daily?: 2-3  On average, how many sweetened beverages do you drink each  day (Examples: soda, juice, sweet tea, etc.  Do NOT count diet or artificially sweetened beverages)?: 2  How many minutes a day do you exercise enough to make your heart beat faster?: 9 or less  How many days a week do you exercise enough to make your heart beat faster?: 3 or less  How many days per week do you miss taking your medication?: 0

## 2022-06-06 ENCOUNTER — HOSPITAL ENCOUNTER (OUTPATIENT)
Dept: PHYSICAL THERAPY | Facility: CLINIC | Age: 57
Discharge: HOME | End: 2022-06-06
Attending: PHYSICIAN ASSISTANT
Payer: COMMERCIAL

## 2022-06-06 DIAGNOSIS — M54.16 LUMBAR RADICULITIS: ICD-10-CM

## 2022-06-06 PROCEDURE — 97110 THERAPEUTIC EXERCISES: CPT | Mod: GP

## 2022-06-06 PROCEDURE — 97161 PT EVAL LOW COMPLEX 20 MIN: CPT | Mod: GP

## 2022-06-07 NOTE — PROGRESS NOTES
[facility]    OUTPATIENT PHYSICAL THERAPY ORTHOPEDIC EVALUATION  PLAN OF TREATMENT FOR OUTPATIENT REHABILITATION  (COMPLETE FOR INITIAL CLAIMS ONLY)  Patient's Last Name, First Name, M.I.  YOB: 1965  Nadya Blake    Provider s Name:  [facility]   Medical Record No.  [id number]   Start of Care Date:  06/06/22   Onset Date:  05/09/22   Type:     _X__PT   ___OT   ___SLP Medical Diagnosis:  M54.16 (ICD-10-CM) - Lumbar radiculitis     PT Diagnosis:  L sided low back pain with radicular symptoms; core and hip weakness; decreased trunk ROM   Visits from SOC:  1      _________________________________________________________________________________  Plan of Treatment/Functional Goals:  ADL retraining, balance training, joint mobilization, manual therapy, neuromuscular re-education, ROM, strengthening, stretching     Biofeedback, Cryotherapy, Electrical stimulation, Hot packs, TENS, Traction     Goals  Goal Identifier: SERAFIN  Goal Description: Patient will report improvement in SERAFIN by at least 10% to show improvement in function.  Target Date: 08/16/22    Goal Identifier: Standing  Goal Description: Patient will improve standing tolerance to at least 30 minutes with no pain to improve ability to complete upright ADLs.  Target Date: 08/16/22    Goal Identifier: Bending  Goal Description: Patient will tolerate bending to pick an object off floor to improve ability for ADLs.  Target Date: 08/16/22                          Therapy Frequency:  1 time/week  Predicted Duration of Therapy Intervention:  10 weeks    Scott Parmer, PT                 I CERTIFY THE NEED FOR THESE SERVICES FURNISHED UNDER        THIS PLAN OF TREATMENT AND WHILE UNDER MY CARE     (Physician co-signature of this document indicates review and certification of the therapy plan).                      Certification Date From:  06/06/22   Certification Date To:  08/16/22    Referring Provider:  Shara Velásquez PA-C    Initial Assessment        See Epic Evaluation Start of Care Date: 06/06/22 06/06/22 1000   General Information   Type of Visit Initial OP Ortho PT Evaluation   Start of Care Date 06/06/22   Referring Physician Shara Velásquez PA-C   Orders Evaluate and Treat   Date of Order 05/09/22   Certification Required? Yes   Medical Diagnosis M54.16 (ICD-10-CM) - Lumbar radiculitis   Surgical/Medical history reviewed Yes   General Information Comments s/p cardiac bypass surgery   Body Part(s)   Body Part(s) Hip;Lumbar Spine/SI   Presentation and Etiology   Pertinent history of current problem (include personal factors and/or comorbidities that impact the POC) Patient reports a more recent onset of low back pain over the last few months or so. He gets pain from prolonged standing for more than 15 minutes at a time. He also can not walk or do much work for more than 10 minutes at a time. He gets pain across both sides of the low back L>R, and it radiates down to the L LE. He had a consult for spine surgery about four years ago which he did not end up getting.   Impairments A. Pain;D. Decreased ROM;E. Decreased flexibility;F. Decreased strength and endurance   Functional Limitations perform activities of daily living;perform required work activities   Symptom Location Low back, L LE   Onset date of current episode/exacerbation 05/09/22   Chronicity Chronic   Pain rating (0-10 point scale) Best (/10);Worst (/10)   Pain quality B. Dull;C. Aching   Frequency of pain/symptoms B. Intermittent   Pain/symptoms are: Worse during the day   Pain/symptoms exacerbated by B. Walking;C. Lifting;D. Carrying;G. Certain positions;I. Bending;J. ADL;K. Home tasks   Pain/symptoms eased by C. Rest;E. Changing positions   Fall Risk Screen   Fall screen completed by PT   Have you fallen 2 or more times in the past year? No    Have you fallen and had an injury in the past year? No   Is patient a fall risk? No   Abuse Screen (yes response referral indicated)   Feels Unsafe at Home or Work/School no   Feels Threatened by Someone no   Does Anyone Try to Keep You From Having Contact with Others or Doing Things Outside Your Home? no   Physical Signs of Abuse Present no   System Outcome Measures   Outcome Measures Low Back Pain (see Oswestry and Jose)  (58%)   Lumbar Spine/SI Objective Findings   Balance/Proprioception (Single Leg Stance) Pain on L   Hamstring Flexibility 90/90: -30 deg L; -25 deg R   Piriformis Flexibility Limited with pain L   Flexion ROM 27 cm fingertip to toes   Extension ROM mod loss with pain   Right Side Bending ROM Fingertip to knee no pain   Left Side Bending ROM Fingertip to mid thigh with pain   Pelvic Screen Landmarks level   Hip Screen Decreased PROM L hip due to pain and stiffness   Hip Flexion (L2) Strength 5/5   Hip Abduction Strength 4-/5 L, 5/5 R   Knee Flexion Strength 5/5   Knee Extension (L3) Strength 5/5   Ankle Dorsiflexion (L4) Strength 5/5   Great Toe Extension (L5) Strength 5/5   Ankle Plantar Flexion (S1) Strength 5/5   SLR (+) L   Segmental Mobility Pain with PA mobs to lower lumbar spine   Palpation Tender to the L lumbar paraspinals, L QL, B hip rotators   Slump Test (+) L   Integumentary Edema to the B lateral cervical area; no deficits to low back   Posture Forward head, forward shoulders, thoracic kyphosis   Sensation Testing Intact   Planned Therapy Interventions   Planned Therapy Interventions ADL retraining;balance training;joint mobilization;manual therapy;neuromuscular re-education;ROM;strengthening;stretching   Planned Modality Interventions   Planned Modality Interventions Biofeedback;Cryotherapy;Electrical stimulation;Hot packs;TENS;Traction   Clinical Impression   Criteria for Skilled Therapeutic Interventions Met yes, treatment indicated   PT Diagnosis L sided low back pain with  radicular symptoms; core and hip weakness; decreased trunk ROM   Functional limitations due to impairments Bending, twisting, lifting, walking, standing   Clinical Presentation Stable/Uncomplicated   Clinical Decision Making (Complexity) Low complexity   Therapy Frequency 1 time/week   Predicted Duration of Therapy Intervention (days/wks) 10 weeks   Risk & Benefits of therapy have been explained Yes   Patient, Family & other staff in agreement with plan of care Yes   Clinical Impression Comments Patient is a 56 year old male with a complex PMH including PTSD and a cardiac bypass surgery following MI. He has had chronic radiating L sided low back pain for many years which has worsened since his heart surgery and over the last several months. Patient presents with decreased activity tolerance and has difficulty with bending, twisting, walking, and lifting. He is appropriate for skilled PT services.   ORTHO GOALS   PT Ortho Eval Goals 1;2;3;4   Ortho Goal 1   Goal Identifier SERAFIN   Goal Description Patient will report improvement in SERAFIN by at least 10% to show improvement in function.   Target Date 08/16/22   Ortho Goal 2   Goal Identifier Standing   Goal Description Patient will improve standing tolerance to at least 30 minutes with no pain to improve ability to complete upright ADLs.   Target Date 08/16/22   Ortho Goal 3   Goal Identifier Bending   Goal Description Patient will tolerate bending to pick an object off floor to improve ability for ADLs.   Target Date 08/16/22   Total Evaluation Time   PT Eval, Low Complexity Minutes (36276) 23   Therapy Certification   Certification date from 06/06/22   Certification date to 08/16/22   Medical Diagnosis M54.16 (ICD-10-CM) - Lumbar radiculitis     Clinical Impression from  (6/6/2022):   Patient is a 56 year old male with a complex PMH including PTSD and a cardiac bypass surgery following MI. He has had chronic radiating L sided low back pain for many years which has  worsened since his heart surgery and over the last several months. Patient presents with decreased activity tolerance and has difficulty with bending, twisting, walking, and lifting. He is appropriate for skilled PT services.    Date 6/7/22   Exercise    Hooklying Hip Adduction X 10, 5 sec holds   Supine Pretzel Stretch X 30 sec holds B    Seated slump sliders X 15 B   Seated forward bend stretch X 30 sec holds                                     Scott Parmer, PT, DPT

## 2022-06-15 ENCOUNTER — MYC MEDICAL ADVICE (OUTPATIENT)
Dept: INTERNAL MEDICINE | Facility: CLINIC | Age: 57
End: 2022-06-15
Payer: COMMERCIAL

## 2022-06-15 DIAGNOSIS — Z95.1 S/P CABG (CORONARY ARTERY BYPASS GRAFT): ICD-10-CM

## 2022-06-15 DIAGNOSIS — J84.115 RESPIRATORY BRONCHIOLITIS ASSOCIATED INTERSTITIAL LUNG DISEASE (H): Primary | ICD-10-CM

## 2022-06-16 ENCOUNTER — HOSPITAL ENCOUNTER (OUTPATIENT)
Dept: PHYSICAL THERAPY | Facility: CLINIC | Age: 57
Discharge: HOME | End: 2022-06-16
Payer: COMMERCIAL

## 2022-06-16 DIAGNOSIS — G89.29 CHRONIC LEFT SHOULDER PAIN: ICD-10-CM

## 2022-06-16 DIAGNOSIS — M54.16 LUMBAR RADICULITIS: Primary | ICD-10-CM

## 2022-06-16 DIAGNOSIS — M25.512 CHRONIC LEFT SHOULDER PAIN: ICD-10-CM

## 2022-06-16 PROCEDURE — 97140 MANUAL THERAPY 1/> REGIONS: CPT | Mod: GP

## 2022-06-16 PROCEDURE — 97110 THERAPEUTIC EXERCISES: CPT | Mod: GP

## 2022-06-16 NOTE — PROGRESS NOTES
Assessment:   Patient is a 56 year old male with a complex PMH including PTSD and a cardiac bypass surgery following MI. He has had chronic radiating L sided low back pain for many years which has worsened since his heart surgery and over the last several months. Responded to manual therapy and exercise well today with reports of decreased pain following session. He would continue to benefit from skilled PT services.     Date 6/16/22 6/7/22   Exercise     Hooklying Hip Adduction X 10, 5 sec holds X 10, 5 sec holds   Supine Pretzel Stretch X 30 sec holds X 30 sec holds B    Seated slump sliders X 15 B X 15 B   Seated forward bend stretch  X 30 sec holds   Supine TA sets X 10, 5 sec holds                                         Scott Parmer, PT, DPT

## 2022-06-17 ENCOUNTER — HOSPITAL ENCOUNTER (OUTPATIENT)
Dept: MRI IMAGING | Facility: HOSPITAL | Age: 57
Discharge: HOME | End: 2022-06-17
Attending: PHYSICIAN ASSISTANT
Payer: COMMERCIAL

## 2022-06-17 DIAGNOSIS — R22.1 NECK SWELLING: ICD-10-CM

## 2022-06-17 DIAGNOSIS — M54.12 CERVICAL RADICULOPATHY: ICD-10-CM

## 2022-06-17 DIAGNOSIS — M54.16 LUMBAR RADICULITIS: ICD-10-CM

## 2022-06-17 PROCEDURE — 72148 MRI LUMBAR SPINE W/O DYE: CPT

## 2022-06-17 PROCEDURE — 72141 MRI NECK SPINE W/O DYE: CPT

## 2022-06-17 RX ORDER — ALBUTEROL SULFATE 90 UG/1
1-2 AEROSOL, METERED RESPIRATORY (INHALATION) EVERY 4 HOURS PRN
Qty: 18 G | Refills: 11 | Status: SHIPPED | OUTPATIENT
Start: 2022-06-17 | End: 2023-03-08

## 2022-06-17 RX ORDER — BUDESONIDE AND FORMOTEROL FUMARATE DIHYDRATE 160; 4.5 UG/1; UG/1
2 AEROSOL RESPIRATORY (INHALATION) 2 TIMES DAILY
Qty: 10.2 G | Refills: 11 | Status: SHIPPED | OUTPATIENT
Start: 2022-06-17 | End: 2022-08-05

## 2022-06-17 RX ORDER — ASPIRIN 81 MG/1
81 TABLET, CHEWABLE ORAL DAILY
Qty: 90 TABLET | Refills: 3 | Status: SHIPPED | OUTPATIENT
Start: 2022-06-17 | End: 2022-09-14

## 2022-06-17 NOTE — TELEPHONE ENCOUNTER
"Routing refill request to provider for review/approval because:  Allergy/contraindications  Provider, Historical    Last Written Prescription Date:  ?  Last Fill Quantity: ?,  # refills: ?   Last office visit provider:  6/2/22     Requested Prescriptions   Pending Prescriptions Disp Refills     albuterol (PROAIR HFA/PROVENTIL HFA/VENTOLIN HFA) 108 (90 Base) MCG/ACT inhaler 18 g      Sig: Inhale 1-2 puffs into the lungs every 4 hours as needed       Asthma Maintenance Inhalers - Anticholinergics Passed - 6/16/2022 12:06 PM        Passed - Patient is age 12 years or older        Passed - Recent (12 mo) or future (30 days) visit within the authorizing provider's specialty     Patient has had an office visit with the authorizing provider or a provider within the authorizing providers department within the previous 12 mos or has a future within next 30 days. See \"Patient Info\" tab in inbasket, or \"Choose Columns\" in Meds & Orders section of the refill encounter.              Passed - Medication is active on med list       Short-Acting Beta Agonist Inhalers Protocol  Passed - 6/16/2022 12:06 PM        Passed - Patient is age 12 or older        Passed - Recent (12 mo) or future (30 days) visit within the authorizing provider's specialty     Patient has had an office visit with the authorizing provider or a provider within the authorizing providers department within the previous 12 mos or has a future within next 30 days. See \"Patient Info\" tab in inbasket, or \"Choose Columns\" in Meds & Orders section of the refill encounter.              Passed - Medication is active on med list           aspirin (ASA) 81 MG chewable tablet 90 tablet 1       Analgesics (Non-Narcotic Tylenol and ASA Only) Passed - 6/16/2022 12:06 PM        Passed - Recent (12 mo) or future (30 days) visit within the authorizing provider's specialty     Patient has had an office visit with the authorizing provider or a provider within the authorizing " "providers department within the previous 12 mos or has a future within next 30 days. See \"Patient Info\" tab in inbasket, or \"Choose Columns\" in Meds & Orders section of the refill encounter.              Passed - Patient is age 20 years or older     If ASA is flagged for ages under 20 years old. Forward to provider for confirmation Ryes Syndrome is not a concern.              Passed - Medication is active on med list           budesonide-formoterol (SYMBICORT) 160-4.5 MCG/ACT Inhaler       Sig: Inhale 2 puffs into the lungs 2 times daily       Inhaled Steroids Protocol Passed - 6/16/2022 12:06 PM        Passed - Patient is age 12 or older        Passed - Recent (12 mo) or future (30 days) visit within the authorizing provider's specialty     Patient has had an office visit with the authorizing provider or a provider within the authorizing providers department within the previous 12 mos or has a future within next 30 days. See \"Patient Info\" tab in inbasket, or \"Choose Columns\" in Meds & Orders section of the refill encounter.              Passed - Medication is active on med list       Long-Acting Beta Agonist Inhalers Protocol  Passed - 6/16/2022 12:06 PM        Passed - Patient is age 12 or older        Passed - Recent (12 mo) or future (30 days) visit within the authorizing provider's specialty     Patient has had an office visit with the authorizing provider or a provider within the authorizing providers department within the previous 12 mos or has a future within next 30 days. See \"Patient Info\" tab in inbasket, or \"Choose Columns\" in Meds & Orders section of the refill encounter.              Passed - Medication is active on med list             Khalida Suresh 06/16/22 8:42 PM  "

## 2022-06-19 DIAGNOSIS — R93.89 ABNORMAL MRI: Primary | ICD-10-CM

## 2022-06-20 ENCOUNTER — HOSPITAL ENCOUNTER (OUTPATIENT)
Dept: PHYSICAL THERAPY | Facility: CLINIC | Age: 57
Discharge: HOME | End: 2022-06-20
Payer: COMMERCIAL

## 2022-06-20 DIAGNOSIS — M54.16 LUMBAR RADICULITIS: Primary | ICD-10-CM

## 2022-06-20 DIAGNOSIS — G89.29 CHRONIC LEFT SHOULDER PAIN: ICD-10-CM

## 2022-06-20 DIAGNOSIS — M25.512 CHRONIC LEFT SHOULDER PAIN: ICD-10-CM

## 2022-06-20 PROCEDURE — 97110 THERAPEUTIC EXERCISES: CPT | Mod: GP

## 2022-06-20 PROCEDURE — 97140 MANUAL THERAPY 1/> REGIONS: CPT | Mod: GP

## 2022-06-20 NOTE — PROGRESS NOTES
Assessment:   Patient is a 56 year old male with a complex PMH including PTSD and a cardiac bypass surgery following MI. He has had chronic radiating L sided low back pain for many years which has worsened since his heart surgery and over the last several months. Responded to manual therapy and exercise well today with reports of decreased pain following session. He would continue to benefit from skilled PT services.     Date 6/20/22 6/16/22 6/7/22   Exercise      Hooklying Hip Adduction  X 10, 5 sec holds X 10, 5 sec holds   Supine Pretzel Stretch  X 30 sec holds X 30 sec holds B    Seated slump sliders  X 15 B X 15 B   Seated forward bend stretch   X 30 sec holds   Supine TA sets X 10, 5 sec holds     Supine marching with TA 2 X 10     Supine hip flexion X 1 min holds                                     Scott Parmer, PT, DPT

## 2022-06-22 NOTE — PROGRESS NOTES
Assessment:   Nadya Blake is a 56 year old y.o. male with past medical history significant for  postherpetic neuralgia, GERD, prediabetes, dyslipidemia, hypertension, coronary artery disease, ascending aorta dilatation, BPH, depression, PTSD  who presents today for follow-up regarding 2 areas of pain.  Patient ports both areas have improved by 20% with physical therapy x3.  1.  Chronic left neck pain and left upper extremity pain.  My review of an MRI cervical spine shows mild bilateral foraminal stenosis at C6-7 related to a small disc osteophyte complex.  Neck pain appears largely myofascial.  I do not think his upper extremity pain is radicular in nature.  It does not follow a dermatomal pattern.  He has only mild foraminal stenosis at C6-7 and that is bilateral, but he does not have any right-sided pain.  He is concerned it may be related to his heart as he does have a history of coronary artery disease.  Recommended follow-up with his cardiologist.  2.  Chronic left low back pain with radiation into the left lower extremity, worse over the past 3 months.  My review of an MRI lumbar spine shows bilateral L5 pars defects with 2 mm spondylolisthesis.  There is a broad-based disc bulge at this level with moderate right and mild to moderate left foraminal stenosis.  Left lower extremity pain does follow a left L5 pattern.  - He is neurologically intact.       Plan:     A shared decision making plan was used.  The patient's values and choices were respected.  The following represents what was discussed and decided upon by the physician assistant and the patient.      1.  DIAGNOSTIC TESTS: I reviewed the MRI scans of the cervical and lumbar spine.  No further diagnostic tests were ordered.  - Patient is scheduled for an ultrasound of his thyroid June 28, 2022 after the MRI cervical spine showed a left thyroid nodule.    2.  PHYSICAL THERAPY: Patient is currently in physical therapy for his neck and lower back  pain.  He has had 3 sessions of far.  Encouraged him to continue with physical therapy and the home exercises.    3.  MEDICATIONS:  No changes are made to the patient's medications.  Patient does not want to add any more medications to his regimen because he feels he already takes too many medications.  - Patient can continue Tylenol as needed.  - Patient can continue gabapentin 300 mg daily.  We can potentially titrate his dose higher if needed.  - Patient can continue applying diclofenac gel as needed.    4.  INTERVENTIONS: No interventions were ordered.  Patient wants to trial additional physical therapy first.  - In regards to left low back and leg pain, I recommended left L5-S1 transforaminal epidural steroid injection.  - If leg pain improves but he continues to have axial low back pain I recommend left L4-5, L5-S1 facet joint injections.    5.  PATIENT EDUCATION: Patient is in agreement the above plan.  All questions were answered.  - Recommended patient follow-up with his cardiologist regarding his chest and arm pain.    6.  FOLLOW-UP: Patient is going to follow-up with me in about 6 weeks, after he completes physical therapy.  If he has questions or concerns in the meantime, he should not hesitate to call.    Subjective:     Nadya Blake is a 56 year old male who presents today for follow-up regarding neck and back pain.  I saw the patient in consultation May 9, 2022.  At that time I ordered MRI scans of the cervical and lumbar spine.  He returns today to review the results and discuss treatment options.  He denies any change in his pain since he was last seen.    Patient complains of left neck pain.  Pain radiates into the left arm.  He feels like the entire left arm is affected.  He also some left chest pain.  This is worse with exertion.  Denies numbness, tingling, weakness down the arms.    Patient complains of left low back pain.  Pain radiates into left buttock, skips the thigh, but involves the  left dorsal foot and lateral calf.  Denies numbness, tingling, weakness down the leg.    Patient rates his pain today as a 4 out of 10.  Pain is aggravated with turning his neck.  He denies any alleviating factors to the pain.  Denies any new symptoms since he was last seen.    Patient is currently in physical therapy.  He has had 3 sessions so far.  He does his home exercises.  He takes Tylenol 1000 mg every 6 hours as needed.  He applies diclofenac gel.  He takes gabapentin 300 mg at night.  Medications are helpful.    Review of Systems:  Positive for inability to urinate, headache.  Negative for numbness/tingling, weakness, loss of bowel/bladder control, pain much worse at night, trip/stumble/falls, difficulty swallowing, difficulty with hand skills, fevers, unintentional weight loss.     Objective:   CONSTITUTIONAL:  Vital signs as above.  No acute distress.  The patient is well nourished and well groomed.    PSYCHIATRIC:  The patient is awake, alert, oriented to person, place and time.  The patient is answering questions appropriately with clear speech.  Normal affect.  HEENT: Normocephalic, atraumatic.  Sclera clear.    LYMPH: No cervical lymphadenopathy  SKIN:  Skin over the face, neck bilateral upper extremities is clean, dry, intact without rashes.  MUSCULOSKELETAL: Tender to palpation left cervical paraspinous muscles.  Tender to palpation left lower lumbar paraspinous muscles.  The patient has 5/5 strength for the bilateral shoulder abductors, elbow flexors/extensors, wrist extensors, finger flexors/abductors, hip flexors, knee flexors/extensors, ankle dorsi/plantar flexors.   NEUROLOGICAL: 1-2+ biceps, brachioradialis, triceps, patellar, and Achilles reflexes bilaterally.  No ankle clonus bilaterally.  Sensation to light touch is intact bilateral upper and lower extremities throughout.    RESULTS:    I reviewed the MRI cervical spine from [facility] dated June 17, 2022.  At C6-7 there is a minimal disc  osteophyte complex with mild bilateral foraminal stenosis.    I reviewed the MRI lumbar spine from [facility] dated June 17, 2022.  This shows mild multilevel degenerative change.  At L3-4 there is a small disc bulge with mild right and mild to moderate left foraminal stenosis.  At L5-S1 there are bilateral pars defects with 2 mm spondylolisthesis.  There is a broad-based disc bulge with moderate right and mild left foraminal stenosis.  There is facet arthropathy throughout the lumbar spine up to moderate bilaterally L2-3, L4-5, L5-S1.

## 2022-06-23 ENCOUNTER — OFFICE VISIT (OUTPATIENT)
Dept: PHYSICAL MEDICINE AND REHAB | Facility: CLINIC | Age: 57
End: 2022-06-23
Payer: COMMERCIAL

## 2022-06-23 ENCOUNTER — PATIENT OUTREACH (OUTPATIENT)
Dept: NURSING | Facility: CLINIC | Age: 57
End: 2022-06-23

## 2022-06-23 VITALS
DIASTOLIC BLOOD PRESSURE: 78 MMHG | HEART RATE: 58 BPM | BODY MASS INDEX: 33.46 KG/M2 | HEIGHT: 64 IN | WEIGHT: 196 LBS | SYSTOLIC BLOOD PRESSURE: 111 MMHG

## 2022-06-23 DIAGNOSIS — M54.16 LUMBAR RADICULITIS: ICD-10-CM

## 2022-06-23 DIAGNOSIS — M54.2 CERVICALGIA: Primary | ICD-10-CM

## 2022-06-23 DIAGNOSIS — M43.16 SPONDYLOLISTHESIS OF LUMBAR REGION: ICD-10-CM

## 2022-06-23 DIAGNOSIS — M47.816 LUMBAR FACET ARTHROPATHY: ICD-10-CM

## 2022-06-23 PROCEDURE — 99214 OFFICE O/P EST MOD 30 MIN: CPT | Performed by: PHYSICIAN ASSISTANT

## 2022-06-23 ASSESSMENT — PAIN SCALES - GENERAL: PAINLEVEL: MODERATE PAIN (4)

## 2022-06-23 NOTE — PATIENT INSTRUCTIONS
I recommend that you continue physical therapy for your neck and lower back.    I recommend you follow-up with your heart doctor about your left chest and arm pain.  I do not think your arm pain is coming from your cervical spine.    If left low back/leg pain does not improve (enough) with physical therapy, we could try a epidural steroid injection (cortisone shot).

## 2022-06-23 NOTE — PROGRESS NOTES
Follow Up Progress Note      Assessment: CCC RN spoke with patient's wife Carlee today. She stated patient was resting at the time of the call. She stated over all patient was doing ok. She said he is attending all of his medical appointments as scheduled and is taking his medications as directed. She stated he continues to work with a physical therapist related to his neck and back pain. Carlee stated they are walking in the evening, which she said also helps relieve some of his pain. Patient continues to work with his psychiatrist, therapist and [facility] worker to support his mental health.   With regards to her goals, patient is painting on occasion. She said he continues to be involved in the lives of both of his children and is very proud of their accomplishments.   No needs or concerns at this time.   Care Gaps:    Health Maintenance Due   Topic Date Due     PREVENTIVE CARE VISIT  Never done     ADVANCE CARE PLANNING  Never done     ZOSTER IMMUNIZATION (1 of 2) Never done     LUNG CANCER SCREENING  10/13/2021     DTAP/TDAP/TD IMMUNIZATION (2 - Td or Tdap) 04/30/2022       Scheduled with his PCP on 7/5/22      Goals addressed this encounter:    Goals Addressed                    This Visit's Progress       1. Psychosocial (pt-stated)   50%      Goal Statement: I would like to be more supportive of my son in the next 6 months.   Date Goal set: 1/4/22  Barriers: Health concerns.  Strengths: Motivated. Strong support from family. Strong advocate for himself.   Date to Achieve By: 8/4/22  Patient expressed understanding of goal: Yes  Action steps to achieve this goal:  1. I will support my son with the activities her participates in, like basket ball.   2. I will continue to find time during the day to spend time with my son that may allow us to build a closer relationship.   3. I will report progress towards this goal at scheduled outreach telephone calls from my CCC team.      Discussed on 6/23/22           2.  Psychosocial (pt-stated)   50%      Goal Statement: I would like to start painting as an outlet to express myself in the next 8 months.   Date Goal set: 1/4/22  Barriers: None  Strengths: Motivated. Strong family support.   Date to Achieve By:8/4/22  Patient expressed understanding of goal: Yes  Action steps to achieve this goal:  1. I will look into the options of what type of painting interests me.   2. I will also look into community offered classes that may help get me started towards his goal.   3. I will report progress towards this goal at schedule outreach telephone calls from my CCC team.      Discussed on 6/23/22              Intervention/Education provided during outreach: Discussed the importance of taking his medications as directed and and attendance at all scheduled follow up appointments. Encouraged them to contact CCC for any needs or concerns.      Outreach Frequency: monthly        Plan: CCC RN will continue to monitor, support patient with current goals and will be available to assist as nursing needs arise.     Care Coordinator will follow up in on month.

## 2022-06-23 NOTE — LETTER
6/23/2022         RE: Nadya Blake  [address] [facility] [id number]        Dear Colleague,    Thank you for referring your patient, Nadya Blake, to the [facility]. Please see a copy of my visit note below.    Assessment:   Nadya Blake is a 56 year old y.o. male with past medical history significant for  postherpetic neuralgia, GERD, prediabetes, dyslipidemia, hypertension, coronary artery disease, ascending aorta dilatation, BPH, depression, PTSD  who presents today for follow-up regarding 2 areas of pain.  Patient ports both areas have improved by 20% with physical therapy x3.  1.  Chronic left neck pain and left upper extremity pain.  My review of an MRI cervical spine shows mild bilateral foraminal stenosis at C6-7 related to a small disc osteophyte complex.  Neck pain appears largely myofascial.  I do not think his upper extremity pain is radicular in nature.  It does not follow a dermatomal pattern.  He has only mild foraminal stenosis at C6-7 and that is bilateral, but he does not have any right-sided pain.  He is concerned it may be related to his heart as he does have a history of coronary artery disease.  Recommended follow-up with his cardiologist.  2.  Chronic left low back pain with radiation into the left lower extremity, worse over the past 3 months.  My review of an MRI lumbar spine shows bilateral L5 pars defects with 2 mm spondylolisthesis.  There is a broad-based disc bulge at this level with moderate right and mild to moderate left foraminal stenosis.  Left lower extremity pain does follow a left L5 pattern.  - He is neurologically intact.       Plan:     A shared decision making plan was used.  The patient's values and choices were respected.  The following represents what was discussed and decided upon by the physician assistant and the patient.      1.  DIAGNOSTIC TESTS: I reviewed the MRI scans of the cervical and lumbar spine.  No  further diagnostic tests were ordered.  - Patient is scheduled for an ultrasound of his thyroid June 28, 2022 after the MRI cervical spine showed a left thyroid nodule.    2.  PHYSICAL THERAPY: Patient is currently in physical therapy for his neck and lower back pain.  He has had 3 sessions of far.  Encouraged him to continue with physical therapy and the home exercises.    3.  MEDICATIONS:  No changes are made to the patient's medications.  Patient does not want to add any more medications to his regimen because he feels he already takes too many medications.  - Patient can continue Tylenol as needed.  - Patient can continue gabapentin 300 mg daily.  We can potentially titrate his dose higher if needed.  - Patient can continue applying diclofenac gel as needed.    4.  INTERVENTIONS: No interventions were ordered.  Patient wants to trial additional physical therapy first.  - In regards to left low back and leg pain, I recommended left L5-S1 transforaminal epidural steroid injection.  - If leg pain improves but he continues to have axial low back pain I recommend left L4-5, L5-S1 facet joint injections.    5.  PATIENT EDUCATION: Patient is in agreement the above plan.  All questions were answered.  - Recommended patient follow-up with his cardiologist regarding his chest and arm pain.    6.  FOLLOW-UP: Patient is going to follow-up with me in about 6 weeks, after he completes physical therapy.  If he has questions or concerns in the meantime, he should not hesitate to call.    Subjective:     Nadya Blake is a 56 year old male who presents today for follow-up regarding neck and back pain.  I saw the patient in consultation May 9, 2022.  At that time I ordered MRI scans of the cervical and lumbar spine.  He returns today to review the results and discuss treatment options.  He denies any change in his pain since he was last seen.    Patient complains of left neck pain.  Pain radiates into the left arm.  He feels  like the entire left arm is affected.  He also some left chest pain.  This is worse with exertion.  Denies numbness, tingling, weakness down the arms.    Patient complains of left low back pain.  Pain radiates into left buttock, skips the thigh, but involves the left dorsal foot and lateral calf.  Denies numbness, tingling, weakness down the leg.    Patient rates his pain today as a 4 out of 10.  Pain is aggravated with turning his neck.  He denies any alleviating factors to the pain.  Denies any new symptoms since he was last seen.    Patient is currently in physical therapy.  He has had 3 sessions so far.  He does his home exercises.  He takes Tylenol 1000 mg every 6 hours as needed.  He applies diclofenac gel.  He takes gabapentin 300 mg at night.  Medications are helpful.    Review of Systems:  Positive for inability to urinate, headache.  Negative for numbness/tingling, weakness, loss of bowel/bladder control, pain much worse at night, trip/stumble/falls, difficulty swallowing, difficulty with hand skills, fevers, unintentional weight loss.     Objective:   CONSTITUTIONAL:  Vital signs as above.  No acute distress.  The patient is well nourished and well groomed.    PSYCHIATRIC:  The patient is awake, alert, oriented to person, place and time.  The patient is answering questions appropriately with clear speech.  Normal affect.  HEENT: Normocephalic, atraumatic.  Sclera clear.    LYMPH: No cervical lymphadenopathy  SKIN:  Skin over the face, neck bilateral upper extremities is clean, dry, intact without rashes.  MUSCULOSKELETAL: Tender to palpation left cervical paraspinous muscles.  Tender to palpation left lower lumbar paraspinous muscles.  The patient has 5/5 strength for the bilateral shoulder abductors, elbow flexors/extensors, wrist extensors, finger flexors/abductors, hip flexors, knee flexors/extensors, ankle dorsi/plantar flexors.   NEUROLOGICAL: 1-2+ biceps, brachioradialis, triceps, patellar, and  Achilles reflexes bilaterally.  No ankle clonus bilaterally.  Sensation to light touch is intact bilateral upper and lower extremities throughout.    RESULTS:    I reviewed the MRI cervical spine from [facility] dated June 17, 2022.  At C6-7 there is a minimal disc osteophyte complex with mild bilateral foraminal stenosis.    I reviewed the MRI lumbar spine from [facility] dated June 17, 2022.  This shows mild multilevel degenerative change.  At L3-4 there is a small disc bulge with mild right and mild to moderate left foraminal stenosis.  At L5-S1 there are bilateral pars defects with 2 mm spondylolisthesis.  There is a broad-based disc bulge with moderate right and mild left foraminal stenosis.  There is facet arthropathy throughout the lumbar spine up to moderate bilaterally L2-3, L4-5, L5-S1.           Again, thank you for allowing me to participate in the care of your patient.        Sincerely,        Shara Velásquez PA-C

## 2022-06-28 ENCOUNTER — HOSPITAL ENCOUNTER (OUTPATIENT)
Dept: ULTRASOUND IMAGING | Facility: HOSPITAL | Age: 57
Discharge: HOME | End: 2022-06-28
Attending: PHYSICIAN ASSISTANT | Admitting: PHYSICIAN ASSISTANT
Payer: COMMERCIAL

## 2022-06-28 DIAGNOSIS — R93.89 ABNORMAL MRI: ICD-10-CM

## 2022-06-28 PROCEDURE — 76536 US EXAM OF HEAD AND NECK: CPT

## 2022-07-05 ENCOUNTER — OFFICE VISIT (OUTPATIENT)
Dept: INTERNAL MEDICINE | Facility: CLINIC | Age: 57
End: 2022-07-05
Payer: COMMERCIAL

## 2022-07-05 VITALS
TEMPERATURE: 97.6 F | WEIGHT: 197.6 LBS | OXYGEN SATURATION: 96 % | DIASTOLIC BLOOD PRESSURE: 79 MMHG | RESPIRATION RATE: 16 BRPM | HEART RATE: 67 BPM | BODY MASS INDEX: 32.92 KG/M2 | SYSTOLIC BLOOD PRESSURE: 111 MMHG | HEIGHT: 65 IN

## 2022-07-05 DIAGNOSIS — R35.1 NOCTURIA: ICD-10-CM

## 2022-07-05 DIAGNOSIS — R42 VERTIGO: ICD-10-CM

## 2022-07-05 DIAGNOSIS — N40.1 BENIGN PROSTATIC HYPERPLASIA WITH NOCTURIA: Chronic | ICD-10-CM

## 2022-07-05 DIAGNOSIS — F43.10 PTSD (POST-TRAUMATIC STRESS DISORDER): Chronic | ICD-10-CM

## 2022-07-05 DIAGNOSIS — K60.30 ANAL FISTULA: ICD-10-CM

## 2022-07-05 DIAGNOSIS — J41.0 SIMPLE CHRONIC BRONCHITIS (H): ICD-10-CM

## 2022-07-05 DIAGNOSIS — I25.10 CORONARY ARTERY DISEASE DUE TO LIPID RICH PLAQUE: Primary | Chronic | ICD-10-CM

## 2022-07-05 DIAGNOSIS — I25.83 CORONARY ARTERY DISEASE DUE TO LIPID RICH PLAQUE: Primary | Chronic | ICD-10-CM

## 2022-07-05 DIAGNOSIS — R35.1 BENIGN PROSTATIC HYPERPLASIA WITH NOCTURIA: Chronic | ICD-10-CM

## 2022-07-05 PROBLEM — J84.115 RESPIRATORY BRONCHIOLITIS ASSOCIATED INTERSTITIAL LUNG DISEASE (H): Chronic | Status: RESOLVED | Noted: 2020-02-16 | Resolved: 2022-07-05

## 2022-07-05 PROCEDURE — 99214 OFFICE O/P EST MOD 30 MIN: CPT | Performed by: INTERNAL MEDICINE

## 2022-07-05 RX ORDER — TAMSULOSIN HYDROCHLORIDE 0.4 MG/1
0.8 CAPSULE ORAL EVERY EVENING
Qty: 180 CAPSULE | Refills: 3 | Status: SHIPPED | OUTPATIENT
Start: 2022-07-05 | End: 2022-12-21

## 2022-07-05 ASSESSMENT — PATIENT HEALTH QUESTIONNAIRE - PHQ9
SUM OF ALL RESPONSES TO PHQ QUESTIONS 1-9: 4
10. IF YOU CHECKED OFF ANY PROBLEMS, HOW DIFFICULT HAVE THESE PROBLEMS MADE IT FOR YOU TO DO YOUR WORK, TAKE CARE OF THINGS AT HOME, OR GET ALONG WITH OTHER PEOPLE: SOMEWHAT DIFFICULT
SUM OF ALL RESPONSES TO PHQ QUESTIONS 1-9: 4

## 2022-07-05 NOTE — PROGRESS NOTES
Office Visit - Follow Up   Chetmanasandreina DAE Blake   56 year old male    Date of Visit: 7/5/2022    Chief Complaint   Patient presents with     Follow Up     Recheck Medication     F/U monthly Bipass surgery        Assessment and Plan   1. Coronary artery disease, CABG 6/2020  Continue same, I think some of his dyspnea with exertion may be too soon deconditioning and I recommended.he do some additional work through the cardiac clinic although he has completed phase 2 of cardiac rehab  - Cardiac Rehab Referral; Future    2. Nocturia  Given his ongoing symptoms I recommended increasing Flomax to 2 capsules in the evening  - tamsulosin (FLOMAX) 0.4 MG capsule; Take 2 capsules (0.8 mg) by mouth every evening  Dispense: 180 capsule; Refill: 3    3. Vertigo  His symptoms are most consistent with vertigo and I recommended that he work with physical or occupational therapy on this  - Physical Therapy Referral; Future    4. PTSD (post-traumatic stress disorder)  Generally stable    5. Benign prostatic hyperplasia with nocturia  As above    6. Simple chronic bronchitis (H)  He could try some albuterol before he exerts himself like going upstairs to see if this helps with his breathing    7. Anal fistula  Better, continue fiber and nifedipine    Return in about 4 weeks (around 8/2/2022) for Routine preventive.     History of Present Illness   This 56 year old man comes in for his follow-up.  He is with his wife.  Overall doing okay.  Has recovered nicely from COVID.  He is still having some dyspnea with exertion going up stairs.  He does have some underlying lung disease from his history of smoking.  Not having any exertional chest tightness.  Occasional musculoskeletal chest wall pain and is working with physical therapy on this.  Occasionally will feel dizzy in the morning since his COVID infection.  This is usually with positional change and lasts just a few seconds and then resolves.  If few more urinary symptoms at night  "since COVID.  Anal fistula improved with nifedipine    Review of Systems: A comprehensive review of systems was negative except as noted.     Medications, Allergies and Problem List   Reviewed, reconciled and updated  Post Discharge Medication Reconciliation Status:      Physical Exam   General Appearance:   No acute distress    /79 (BP Location: Left arm, Patient Position: Sitting, Cuff Size: Adult Regular)   Pulse 67   Temp 97.6  F (36.4  C) (Tympanic)   Resp 16   Ht 1.651 m (5' 5\")   Wt 89.6 kg (197 lb 9.6 oz)   SpO2 96%   BMI 32.88 kg/m      Cardiovascular regular rate and rhythm no murmur gallop or rub  Pulmonary lungs are clear to auscultation bilaterally  Gastrointestinal abdomen soft nontender nondistended no organomegaly  Neurologic exam is non focal  Psychiatric pleasant, no confusion or agitation        Additional Information   Current Outpatient Medications   Medication Sig Dispense Refill     ACETAMINOPHEN EXTRA STRENGTH 500 MG tablet TAKE 2 TABLETS(1000 MG) BY MOUTH EVERY 6 HOURS AS NEEDED FOR PAIN 360 tablet 3     albuterol (PROAIR HFA/PROVENTIL HFA/VENTOLIN HFA) 108 (90 Base) MCG/ACT inhaler Inhale 1-2 puffs into the lungs every 4 hours as needed for shortness of breath / dyspnea 18 g 11     aspirin (ASA) 81 MG chewable tablet Take 1 tablet (81 mg) by mouth daily 90 tablet 3     budesonide-formoterol (SYMBICORT) 160-4.5 MCG/ACT Inhaler Inhale 2 puffs into the lungs 2 times daily 10.2 g 11     Cholecalciferol (VITAMIN D3) 50 MCG (2000 UT) CAPS Take 1 capsule by mouth daily 90 capsule 3     diclofenac (VOLTAREN) 1 % topical gel Apply 4 g topically 4 times daily 500 g 2     diclofenac sodium (VOLTAREN) 1 % Gel [DICLOFENAC SODIUM (VOLTAREN) 1 % GEL] Apply 4 g topically 4 (four) times a day. 500 g 11     escitalopram (LEXAPRO) 20 MG tablet Take 1 tablet (20 mg) by mouth daily 30 tablet 3     gabapentin (NEURONTIN) 300 MG capsule Take 1 capsule (300 mg) by mouth daily 90 capsule 3     " hydrOXYzine (VISTARIL) 25 MG capsule Take 1-2 capsules (25-50 mg) by mouth 3 times daily as needed for anxiety 90 capsule 1     isosorbide mononitrate (IMDUR) 30 MG 24 hr tablet Take 1 tablet (30 mg) by mouth daily 90 tablet 3     Lidocaine 0.5 % GEL        metoprolol succinate ER (TOPROL-XL) 100 MG 24 hr tablet Take 1.5 tablets (150 mg) by mouth At Bedtime 135 tablet 3     nifedipine 0.2% in white petrolatum 0.2 % OINT ointment Apply topically 4 times daily as needed (anal fissure) 100 g 1     omeprazole (PRILOSEC) 20 MG capsule [OMEPRAZOLE (PRILOSEC) 20 MG CAPSULE] Take 1 capsule (20 mg total) by mouth 2 (two) times a day before meals. 180 capsule 3     polyethylene glycol (MIRALAX) 17 g packet Take 1 packet by mouth daily       QUEtiapine (SEROQUEL) 100 MG tablet Take 1.5 tablets (150 mg) by mouth At Bedtime 45 tablet 10     QUEtiapine (SEROQUEL) 50 MG tablet TAKE 1 TABLET(50 MG) BY MOUTH TWICE DAILY AS NEEDED 60 tablet 3     rosuvastatin (CRESTOR) 40 MG tablet Take 1 tablet (40 mg) by mouth daily 90 tablet 3     spironolactone (ALDACTONE) 25 MG tablet Take 1 tablet (25 mg) by mouth daily 90 tablet 3     tamsulosin (FLOMAX) 0.4 MG capsule Take 2 capsules (0.8 mg) by mouth every evening 180 capsule 3     Allergies   Allergen Reactions     Atorvastatin Diarrhea     Cortisone Other (See Comments)     pain     Lisinopril Cough     started after CABG for unclear reason     Methylprednisolone Other (See Comments)     ?     Social History     Tobacco Use     Smoking status: Former Smoker     Packs/day: 1.00     Years: 30.00     Pack years: 30.00     Types: Cigarettes, Cigarettes     Quit date: 3/23/2020     Years since quittin.2     Smokeless tobacco: Never Used     Tobacco comment: stopped 3/24/2020   Vaping Use     Vaping Use: Never used   Substance Use Topics     Alcohol use: No     Drug use: Never       Time:      Az Briseno MD  Answers for HPI/ROS submitted by the patient on 2022  If you checked off  any problems, how difficult have these problems made it for you to do your work, take care of things at home, or get along with other people?: Somewhat difficult  PHQ9 TOTAL SCORE: 4  Nitroglycerin use:: never  Do you take an aspirin every day?: Yes  How many servings of fruits and vegetables do you eat daily?: 2-3  On average, how many sweetened beverages do you drink each day (Examples: soda, juice, sweet tea, etc.  Do NOT count diet or artificially sweetened beverages)?: 2  How many minutes a day do you exercise enough to make your heart beat faster?: 10 to 19  How many days a week do you exercise enough to make your heart beat faster?: 4  How many days per week do you miss taking your medication?: 0

## 2022-07-06 ENCOUNTER — OFFICE VISIT (OUTPATIENT)
Dept: CARDIOLOGY | Facility: CLINIC | Age: 57
End: 2022-07-06
Attending: GENERAL ACUTE CARE HOSPITAL
Payer: COMMERCIAL

## 2022-07-06 VITALS
RESPIRATION RATE: 14 BRPM | HEART RATE: 60 BPM | SYSTOLIC BLOOD PRESSURE: 100 MMHG | DIASTOLIC BLOOD PRESSURE: 74 MMHG | WEIGHT: 196 LBS | BODY MASS INDEX: 32.62 KG/M2

## 2022-07-06 DIAGNOSIS — E78.5 DYSLIPIDEMIA: ICD-10-CM

## 2022-07-06 DIAGNOSIS — I50.32 CHRONIC HEART FAILURE WITH PRESERVED EJECTION FRACTION (H): ICD-10-CM

## 2022-07-06 DIAGNOSIS — U07.1 INFECTION DUE TO 2019 NOVEL CORONAVIRUS: ICD-10-CM

## 2022-07-06 DIAGNOSIS — Z87.891 FORMER SMOKER: ICD-10-CM

## 2022-07-06 DIAGNOSIS — I10 ESSENTIAL HYPERTENSION: Primary | ICD-10-CM

## 2022-07-06 DIAGNOSIS — I25.709 CORONARY ARTERY DISEASE INVOLVING CORONARY BYPASS GRAFT OF NATIVE HEART WITH ANGINA PECTORIS (H): ICD-10-CM

## 2022-07-06 PROCEDURE — 99214 OFFICE O/P EST MOD 30 MIN: CPT | Performed by: GENERAL ACUTE CARE HOSPITAL

## 2022-07-06 NOTE — LETTER
7/6/2022    Az Briseno MD  [address] [facility] [address]    RE: Nadya Blake       Dear Colleague,     I had the pleasure of seeing Nadya lBake in the [facility].  HEART CARE ENCOUNTER NOTE          Assessment/Recommendations   Assessment:    1. Chronic congestive heart failure with preserved left ventricular ejection fraction. He seems euvolemic but is still having symptoms. This could indicate progression of coronary artery disease despite his normal stress test. He may also be inadequately diuresed or be developing pulmonary hypertension. His recent COVID-19 infection could also be having residual symptoms.  2. Coronary artery disease status post four-vessel coronary artery bypass grafting (left internal mammary artery to the left anterior descending artery, right radial artery to the right posterior descending artery, reversed saphenous venous grafts to obtuse marginal and diagonal artery branches) on 6/24/2020. No ischemia seen on stress cardiac MRI on 12/23/2021 but unclear any of his exertional dyspnea could be anginal.  3. Benign essential hypertension. Controlled.  4. Dyslipidemia. Last LDL 53 mg/dL.  5. Former smoker.  6. COVID-19 infection on 5/18/2022.  7. BMI 32.62.    Plan:  1. If is symptoms persist, I think the next step would be right and left heart catheterization with coronary angiography.  2. Continue spironolactone 25 mg daily.  3. Continue isosorbide mononitrate 30 mg daily.  4. Continue to hold furosemide as I do not think he is fluid-overloaded at this time.  5. Continue other cardiac medications.  6. Follow-up with me in 2 months.         History of Present Illness   Mr. Nadya Blake is a 56 year old male with a significant past history of CAD with history of NSTEMI s/p 4V CABG (LIMA to LAD, right radial artery to RPDA, reversed SVGs to an OM and diagonal artery branch) on 6/24/2020, HFpEF, HTN, dyslipidemia, and former smoker presenting for  follow-up.     At his last visit, he was noting progressive dyspnea on exertion. He was started on spironolactone and isosorbide mononitrate. He did note some improvement with this, but then he contracted COVID-19 on 5/18/2022. He has not felt as well since. He is also limited in walking from pain from an anal fissure.     He denies any chest pain/pressure/tightness, shortness of breath at rest, light headedness/dizziness, pre-syncope, syncope, lower extremity swelling, palpitations, paroxysmal nocturnal dyspnea (PND), or orthopnea.     Cardiac Problems and Cardiac Diagnostics     Most Recent Cardiac testing:  ECG dated 7/23/2021 (personaly reviewed and interpreted): SR, 1st degree AV block with  ms, left axis deviation     ECHO 6/27/2020 (report reviewed):     Normal left ventricular size and systolic function. The left ventricular wall motion is normal. The calculated left ventricular ejection fraction is 71%.    Normal right ventricular size and systolic function.    No hemodynamically significant valvular heart abnormalities.    The ascending aorta is mildly dilated.    When compared to the previous study dated 6/23/2020, no significant change.     Stress cardiac MRI 12/23/2021 (report reviewed):  1.  Pharmacological Regadenoson stress cardiac MRI is negative for inducible myocardial ischemia.   2.  Pharmacological stress ECG is negative for inducible myocardial ischemia.   3. Normal left ventricular size, wall thickness and systolic function. The quantified left ventricular  ejection fraction is 59 %.  Very small area of non-transmural myocardial scar in the mid inferior wall is  identified.    4.  Normal right ventricular size and systolic function.    5.  No significant valvular abnormalities.  6. Ascending aorta measures 38 mm.      Stress test 5/21/2021 (report reviewed):     The nuclear stress test is negative for inducible myocardial ischemia or infarction.     The left ventricular ejection fraction  at stress is 65%.     There is no prior study for comparison.     Cardiac cath 6/23/2020 (report reviewed):     Severe mid LAD and critical first diagonal disease.    Severe OM-3 disease; OM-3 is the dominant lateral wall vessel.    Severe proximal RCA and right PDA disease.    LV EDP 20 mmHg     Medications  Allergies   Current Outpatient Medications   Medication Sig Dispense Refill     ACETAMINOPHEN EXTRA STRENGTH 500 MG tablet TAKE 2 TABLETS(1000 MG) BY MOUTH EVERY 6 HOURS AS NEEDED FOR PAIN 360 tablet 3     albuterol (PROAIR HFA/PROVENTIL HFA/VENTOLIN HFA) 108 (90 Base) MCG/ACT inhaler Inhale 1-2 puffs into the lungs every 4 hours as needed for shortness of breath / dyspnea 18 g 11     aspirin (ASA) 81 MG chewable tablet Take 1 tablet (81 mg) by mouth daily 90 tablet 3     budesonide-formoterol (SYMBICORT) 160-4.5 MCG/ACT Inhaler Inhale 2 puffs into the lungs 2 times daily (Patient taking differently: Inhale 2 puffs into the lungs 2 times daily as needed) 10.2 g 11     Cholecalciferol (VITAMIN D3) 50 MCG (2000 UT) CAPS Take 1 capsule by mouth daily 90 capsule 3     diclofenac (VOLTAREN) 1 % topical gel Apply 4 g topically 4 times daily 500 g 2     diclofenac sodium (VOLTAREN) 1 % Gel [DICLOFENAC SODIUM (VOLTAREN) 1 % GEL] Apply 4 g topically 4 (four) times a day. 500 g 11     escitalopram (LEXAPRO) 20 MG tablet Take 1 tablet (20 mg) by mouth daily 30 tablet 3     gabapentin (NEURONTIN) 300 MG capsule Take 1 capsule (300 mg) by mouth daily 90 capsule 3     hydrOXYzine (VISTARIL) 25 MG capsule Take 1-2 capsules (25-50 mg) by mouth 3 times daily as needed for anxiety 90 capsule 1     isosorbide mononitrate (IMDUR) 30 MG 24 hr tablet Take 1 tablet (30 mg) by mouth daily 90 tablet 3     Lidocaine 0.5 % GEL        metoprolol succinate ER (TOPROL-XL) 100 MG 24 hr tablet Take 1.5 tablets (150 mg) by mouth At Bedtime 135 tablet 3     nifedipine 0.2% in white petrolatum 0.2 % OINT ointment Apply topically 4 times daily as  needed (anal fissure) 100 g 1     omeprazole (PRILOSEC) 20 MG capsule [OMEPRAZOLE (PRILOSEC) 20 MG CAPSULE] Take 1 capsule (20 mg total) by mouth 2 (two) times a day before meals. 180 capsule 3     polyethylene glycol (MIRALAX) 17 g packet Take 1 packet by mouth daily       QUEtiapine (SEROQUEL) 100 MG tablet Take 1.5 tablets (150 mg) by mouth At Bedtime 45 tablet 10     QUEtiapine (SEROQUEL) 50 MG tablet TAKE 1 TABLET(50 MG) BY MOUTH TWICE DAILY AS NEEDED 60 tablet 3     rosuvastatin (CRESTOR) 40 MG tablet Take 1 tablet (40 mg) by mouth daily 90 tablet 3     spironolactone (ALDACTONE) 25 MG tablet Take 1 tablet (25 mg) by mouth daily 90 tablet 3     tamsulosin (FLOMAX) 0.4 MG capsule Take 2 capsules (0.8 mg) by mouth every evening 180 capsule 3      Allergies   Allergen Reactions     Atorvastatin Diarrhea     Cortisone Other (See Comments)     pain     Lisinopril Cough     started after CABG for unclear reason     Methylprednisolone Other (See Comments)     ?        Physical Examination Review of Systems   /74 (BP Location: Right arm, Patient Position: Sitting, Cuff Size: Adult Regular)   Pulse 60   Resp 14   Wt 88.9 kg (196 lb)   BMI 32.62 kg/m    Body mass index is 32.62 kg/m .  Wt Readings from Last 3 Encounters:   07/06/22 88.9 kg (196 lb)   07/05/22 89.6 kg (197 lb 9.6 oz)   06/23/22 88.9 kg (196 lb)       General Appearance:   Pleasant  male, appears  stated age. no acute distress, normal body habitus   ENT/Mouth: Facemask.     EYES:  no scleral icterus, normal conjunctivae   Neck: no carotid bruits. No anterior cervical lymphadenopaty   Respiratory:   lungs are clear to auscultation, no rales or wheezing, well-healed sternal scar, equal chest wall expansion    Cardiovascular:   Regular rhythm, normal rate. Normal first and second heart sounds with no murmurs, rubs, or gallops; the carotid, radial and posterior tibial pulses are intact, Jugular venous pressure normal, no edema bilaterally     Abdomen/GI:  no organomegaly, masses, bruits, or tenderness; bowel sounds are present   Extremities: no cyanosis or clubbing   Skin: no xanthelasma, warm.    Heme/lymph/ Immunology No apparent bleeding noted.   Neurologic: Alert and oriented. normal gait, no tremors     Psychiatric: Pleasant, calm, appropriate affect.    A complete 10 system review of systems was performed and is negative except as mentioned in the HPI/subjective.         Past History   Past Medical History:   Past Medical History:   Diagnosis Date     Acute non-ST elevation myocardial infarction (NSTEMI) (H) 6/22/2020     Adenomatous polyp of colon      Ascending aorta dilatation (H)      Carpal tunnel syndrome      Chronic superficial gastritis      Coronary artery disease due to lipid rich plaque 6/23/2020     Depression with anxiety      Dyslipidemia, goal LDL below 70 6/23/2020     Essential hypertension 9/2/2012     Fatty liver disease, nonalcoholic      GERD (gastroesophageal reflux disease)      IBS (irritable bowel syndrome)      Left lumbar radiculopathy      Multinodular goiter      Old torn meniscus of knee      Paroxysmal atrial fibrillation (H)      Perianal abscess      Post herpetic neuralgia      Post-traumatic osteoarthritis of both knees      Primary osteoarthritis of left hip      Primary osteoarthritis of right hip      Pulmonary nodule      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 Ragsdale     CV CORONARY ANGIOGRAM N/A 6/23/2020    Procedure: Coronary Angiogram;  Surgeon: Ariane Lester MD;  Location: [facility];  Service: Cardiology     CV IABP INSERT N/A 6/24/2020    Procedure: Intra Aortic Balloon Pump Insertion;   Surgeon: Ariane Lester MD;  Location: [facility];  Service: Cardiology     CV LEFT HEART CATHETERIZATION WITHOUT LEFT VENTRICULOGRAM Left 2020    Procedure: Left Heart Catheterization Without Left Ventriculogram;  Surgeon: Ariane Lester MD;  Location: [facility];  Service: Cardiology       Family History:   Family History   Problem Relation Age of Onset     Lung Cancer Father 56        fatal     No Known Problems Mother      No Known Problems Daughter      Other - See Comments Son         congenital deformity of right leg; he uses a leg prosthesis       Social History:   Social History     Socioeconomic History     Marital status:      Spouse name: Carlee     Number of children: 2     Years of education: Not on file     Highest education level: Not on file   Occupational History     Not on file   Tobacco Use     Smoking status: Former Smoker     Packs/day: 1.00     Years: 30.00     Pack years: 30.00     Types: Cigarettes, Cigarettes     Quit date: 3/23/2020     Years since quittin.2     Smokeless tobacco: Never Used     Tobacco comment: stopped 3/24/2020   Vaping Use     Vaping Use: Never used   Substance and Sexual Activity     Alcohol use: No     Drug use: Never     Sexual activity: Yes     Partners: Female   Other Topics Concern     Not on file   Social History Narrative    , Carlee, BA chemistry and taking AA courses at [employer].  From [place]; bachelors in geology and computer science. Son, Grace () and Sherrei (2008).  On SSDI, PTSD.       Social Determinants of Health     Financial Resource Strain: Not on file   Food Insecurity: No Food Insecurity     Worried About Running Out of Food in the Last Year: Never true     Ran Out of Food in the Last Year: Never true   Transportation Needs: No Transportation Needs     Lack of Transportation (Medical): No     Lack of Transportation (Non-Medical): No   Physical Activity: Not on file   Stress: Not on file   Social  Connections: Not on file   Intimate Partner Violence: Not on file   Housing Stability: Low Risk      Unable to Pay for Housing in the Last Year: No     Number of Places Lived in the Last Year: 1     Unstable Housing in the Last Year: No              Lab Results    Chemistry/lipid CBC Cardiac Enzymes/BNP/TSH/INR   Lab Results   Component Value Date    CHOL 109 10/14/2021    HDL 33 (L) 10/14/2021    LDL 53 10/14/2021    TRIG 115 10/14/2021    CR 1.34 (H) 05/13/2022    BUN 16 05/13/2022    POTASSIUM 4.6 05/13/2022     05/13/2022    CO2 28 05/13/2022      Lab Results   Component Value Date    WBC 5.9 10/14/2021    HGB 15.1 10/14/2021    HCT 46.1 10/14/2021    MCV 87 10/14/2021     10/14/2021    A1C 5.8 (H) 02/15/2022     Lab Results   Component Value Date    A1C 5.8 (H) 02/15/2022    Lab Results   Component Value Date    TROPONINI <0.01 11/21/2020    BNP 79 (H) 07/27/2020    TSH 0.17 (L) 09/22/2020    INR 1.10 07/27/2020          Sloan Burns MD [facility]  Non-Invasive Cardiologist  [facility]  Pager [phone]      Thank you for allowing me to participate in the care of your patient.      Sincerely,     Sloan Burns MD     [facility]  cc:   Sloan Burns MD  [address] [place], [place] [id number]  [place] [address]

## 2022-07-06 NOTE — PROGRESS NOTES
HEART CARE ENCOUNTER NOTE          Assessment/Recommendations   Assessment:    1. Chronic congestive heart failure with preserved left ventricular ejection fraction. He seems euvolemic but is still having symptoms. This could indicate progression of coronary artery disease despite his normal stress test. He may also be inadequately diuresed or be developing pulmonary hypertension. His recent COVID-19 infection could also be having residual symptoms.  2. Coronary artery disease status post four-vessel coronary artery bypass grafting (left internal mammary artery to the left anterior descending artery, right radial artery to the right posterior descending artery, reversed saphenous venous grafts to obtuse marginal and diagonal artery branches) on 6/24/2020. No ischemia seen on stress cardiac MRI on 12/23/2021 but unclear any of his exertional dyspnea could be anginal.  3. Benign essential hypertension. Controlled.  4. Dyslipidemia. Last LDL 53 mg/dL.  5. Former smoker.  6. COVID-19 infection on 5/18/2022.  7. BMI 32.62.    Plan:  1. If is symptoms persist, I think the next step would be right and left heart catheterization with coronary angiography.  2. Continue spironolactone 25 mg daily.  3. Continue isosorbide mononitrate 30 mg daily.  4. Continue to hold furosemide as I do not think he is fluid-overloaded at this time.  5. Continue other cardiac medications.  6. Follow-up with me in 2 months.         History of Present Illness   Mr. Nadya Blake is a 56 year old male with a significant past history of CAD with history of NSTEMI s/p 4V CABG (LIMA to LAD, right radial artery to RPDA, reversed SVGs to an OM and diagonal artery branch) on 6/24/2020, HFpEF, HTN, dyslipidemia, and former smoker presenting for follow-up.     At his last visit, he was noting progressive dyspnea on exertion. He was started on spironolactone and isosorbide mononitrate. He did note some improvement with this, but then he contracted  COVID-19 on 5/18/2022. He has not felt as well since. He is also limited in walking from pain from an anal fissure.     He denies any chest pain/pressure/tightness, shortness of breath at rest, light headedness/dizziness, pre-syncope, syncope, lower extremity swelling, palpitations, paroxysmal nocturnal dyspnea (PND), or orthopnea.     Cardiac Problems and Cardiac Diagnostics     Most Recent Cardiac testing:  ECG dated 7/23/2021 (personaly reviewed and interpreted): SR, 1st degree AV block with  ms, left axis deviation     ECHO 6/27/2020 (report reviewed):     Normal left ventricular size and systolic function. The left ventricular wall motion is normal. The calculated left ventricular ejection fraction is 71%.    Normal right ventricular size and systolic function.    No hemodynamically significant valvular heart abnormalities.    The ascending aorta is mildly dilated.    When compared to the previous study dated 6/23/2020, no significant change.     Stress cardiac MRI 12/23/2021 (report reviewed):  1.  Pharmacological Regadenoson stress cardiac MRI is negative for inducible myocardial ischemia.   2.  Pharmacological stress ECG is negative for inducible myocardial ischemia.   3. Normal left ventricular size, wall thickness and systolic function. The quantified left ventricular  ejection fraction is 59 %.  Very small area of non-transmural myocardial scar in the mid inferior wall is  identified.    4.  Normal right ventricular size and systolic function.    5.  No significant valvular abnormalities.  6. Ascending aorta measures 38 mm.      Stress test 5/21/2021 (report reviewed):     The nuclear stress test is negative for inducible myocardial ischemia or infarction.     The left ventricular ejection fraction at stress is 65%.     There is no prior study for comparison.     Cardiac cath 6/23/2020 (report reviewed):     Severe mid LAD and critical first diagonal disease.    Severe OM-3 disease; OM-3 is the  dominant lateral wall vessel.    Severe proximal RCA and right PDA disease.    LV EDP 20 mmHg     Medications  Allergies   Current Outpatient Medications   Medication Sig Dispense Refill     ACETAMINOPHEN EXTRA STRENGTH 500 MG tablet TAKE 2 TABLETS(1000 MG) BY MOUTH EVERY 6 HOURS AS NEEDED FOR PAIN 360 tablet 3     albuterol (PROAIR HFA/PROVENTIL HFA/VENTOLIN HFA) 108 (90 Base) MCG/ACT inhaler Inhale 1-2 puffs into the lungs every 4 hours as needed for shortness of breath / dyspnea 18 g 11     aspirin (ASA) 81 MG chewable tablet Take 1 tablet (81 mg) by mouth daily 90 tablet 3     budesonide-formoterol (SYMBICORT) 160-4.5 MCG/ACT Inhaler Inhale 2 puffs into the lungs 2 times daily (Patient taking differently: Inhale 2 puffs into the lungs 2 times daily as needed) 10.2 g 11     Cholecalciferol (VITAMIN D3) 50 MCG (2000 UT) CAPS Take 1 capsule by mouth daily 90 capsule 3     diclofenac (VOLTAREN) 1 % topical gel Apply 4 g topically 4 times daily 500 g 2     diclofenac sodium (VOLTAREN) 1 % Gel [DICLOFENAC SODIUM (VOLTAREN) 1 % GEL] Apply 4 g topically 4 (four) times a day. 500 g 11     escitalopram (LEXAPRO) 20 MG tablet Take 1 tablet (20 mg) by mouth daily 30 tablet 3     gabapentin (NEURONTIN) 300 MG capsule Take 1 capsule (300 mg) by mouth daily 90 capsule 3     hydrOXYzine (VISTARIL) 25 MG capsule Take 1-2 capsules (25-50 mg) by mouth 3 times daily as needed for anxiety 90 capsule 1     isosorbide mononitrate (IMDUR) 30 MG 24 hr tablet Take 1 tablet (30 mg) by mouth daily 90 tablet 3     Lidocaine 0.5 % GEL        metoprolol succinate ER (TOPROL-XL) 100 MG 24 hr tablet Take 1.5 tablets (150 mg) by mouth At Bedtime 135 tablet 3     nifedipine 0.2% in white petrolatum 0.2 % OINT ointment Apply topically 4 times daily as needed (anal fissure) 100 g 1     omeprazole (PRILOSEC) 20 MG capsule [OMEPRAZOLE (PRILOSEC) 20 MG CAPSULE] Take 1 capsule (20 mg total) by mouth 2 (two) times a day before meals. 180 capsule 3      polyethylene glycol (MIRALAX) 17 g packet Take 1 packet by mouth daily       QUEtiapine (SEROQUEL) 100 MG tablet Take 1.5 tablets (150 mg) by mouth At Bedtime 45 tablet 10     QUEtiapine (SEROQUEL) 50 MG tablet TAKE 1 TABLET(50 MG) BY MOUTH TWICE DAILY AS NEEDED 60 tablet 3     rosuvastatin (CRESTOR) 40 MG tablet Take 1 tablet (40 mg) by mouth daily 90 tablet 3     spironolactone (ALDACTONE) 25 MG tablet Take 1 tablet (25 mg) by mouth daily 90 tablet 3     tamsulosin (FLOMAX) 0.4 MG capsule Take 2 capsules (0.8 mg) by mouth every evening 180 capsule 3      Allergies   Allergen Reactions     Atorvastatin Diarrhea     Cortisone Other (See Comments)     pain     Lisinopril Cough     started after CABG for unclear reason     Methylprednisolone Other (See Comments)     ?        Physical Examination Review of Systems   /74 (BP Location: Right arm, Patient Position: Sitting, Cuff Size: Adult Regular)   Pulse 60   Resp 14   Wt 88.9 kg (196 lb)   BMI 32.62 kg/m    Body mass index is 32.62 kg/m .  Wt Readings from Last 3 Encounters:   07/06/22 88.9 kg (196 lb)   07/05/22 89.6 kg (197 lb 9.6 oz)   06/23/22 88.9 kg (196 lb)       General Appearance:   Pleasant  male, appears  stated age. no acute distress, normal body habitus   ENT/Mouth: Facemask.     EYES:  no scleral icterus, normal conjunctivae   Neck: no carotid bruits. No anterior cervical lymphadenopaty   Respiratory:   lungs are clear to auscultation, no rales or wheezing, well-healed sternal scar, equal chest wall expansion    Cardiovascular:   Regular rhythm, normal rate. Normal first and second heart sounds with no murmurs, rubs, or gallops; the carotid, radial and posterior tibial pulses are intact, Jugular venous pressure normal, no edema bilaterally    Abdomen/GI:  no organomegaly, masses, bruits, or tenderness; bowel sounds are present   Extremities: no cyanosis or clubbing   Skin: no xanthelasma, warm.    Heme/lymph/ Immunology No apparent bleeding  noted.   Neurologic: Alert and oriented. normal gait, no tremors     Psychiatric: Pleasant, calm, appropriate affect.    A complete 10 system review of systems was performed and is negative except as mentioned in the HPI/subjective.         Past History   Past Medical History:   Past Medical History:   Diagnosis Date     Acute non-ST elevation myocardial infarction (NSTEMI) (H) 6/22/2020     Adenomatous polyp of colon      Ascending aorta dilatation (H)      Carpal tunnel syndrome      Chronic superficial gastritis      Coronary artery disease due to lipid rich plaque 6/23/2020     Depression with anxiety      Dyslipidemia, goal LDL below 70 6/23/2020     Essential hypertension 9/2/2012     Fatty liver disease, nonalcoholic      GERD (gastroesophageal reflux disease)      IBS (irritable bowel syndrome)      Left lumbar radiculopathy      Multinodular goiter      Old torn meniscus of knee      Paroxysmal atrial fibrillation (H)      Perianal abscess      Post herpetic neuralgia      Post-traumatic osteoarthritis of both knees      Primary osteoarthritis of left hip      Primary osteoarthritis of right hip      Pulmonary nodule      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 Ragsdale     CV CORONARY ANGIOGRAM N/A 6/23/2020    Procedure: Coronary Angiogram;  Surgeon: Ariane Lester MD;  Location: [facility] Cath Lab;  Service: Cardiology     CV IABP INSERT N/A 6/24/2020    Procedure: Intra Aortic Balloon Pump Insertion;  Surgeon: Ariane Lester MD;  Location: [facility] Cath Lab;  Service: Cardiology     CV LEFT HEART CATHETERIZATION WITHOUT LEFT VENTRICULOGRAM Left 6/23/2020    Procedure: Left Heart Catheterization Without  Left Ventriculogram;  Surgeon: Ariane Lester MD;  Location: [facility] Cath Lab;  Service: Cardiology       Family History:   Family History   Problem Relation Age of Onset     Lung Cancer Father 56        fatal     No Known Problems Mother      No Known Problems Daughter      Other - See Comments Son         congenital deformity of right leg; he uses a leg prosthesis       Social History:   Social History     Socioeconomic History     Marital status:      Spouse name: Carlee     Number of children: 2     Years of education: Not on file     Highest education level: Not on file   Occupational History     Not on file   Tobacco Use     Smoking status: Former Smoker     Packs/day: 1.00     Years: 30.00     Pack years: 30.00     Types: Cigarettes, Cigarettes     Quit date: 3/23/2020     Years since quittin.2     Smokeless tobacco: Never Used     Tobacco comment: stopped 3/24/2020   Vaping Use     Vaping Use: Never used   Substance and Sexual Activity     Alcohol use: No     Drug use: Never     Sexual activity: Yes     Partners: Female   Other Topics Concern     Not on file   Social History Narrative    , Carlee, BA chemistry and taking AA courses at [employer].  From [place]; bachelors in geology and computer science. Son, Grace (2005) and Sherrie (2008).  On SSDI, PTSD.       Social Determinants of Health     Financial Resource Strain: Not on file   Food Insecurity: No Food Insecurity     Worried About Running Out of Food in the Last Year: Never true     Ran Out of Food in the Last Year: Never true   Transportation Needs: No Transportation Needs     Lack of Transportation (Medical): No     Lack of Transportation (Non-Medical): No   Physical Activity: Not on file   Stress: Not on file   Social Connections: Not on file   Intimate Partner Violence: Not on file   Housing Stability: Low Risk      Unable to Pay for Housing in the Last Year: No     Number of Places Lived in the Last Year: 1     Unstable Housing in  the Last Year: No              Lab Results    Chemistry/lipid CBC Cardiac Enzymes/BNP/TSH/INR   Lab Results   Component Value Date    CHOL 109 10/14/2021    HDL 33 (L) 10/14/2021    LDL 53 10/14/2021    TRIG 115 10/14/2021    CR 1.34 (H) 05/13/2022    BUN 16 05/13/2022    POTASSIUM 4.6 05/13/2022     05/13/2022    CO2 28 05/13/2022      Lab Results   Component Value Date    WBC 5.9 10/14/2021    HGB 15.1 10/14/2021    HCT 46.1 10/14/2021    MCV 87 10/14/2021     10/14/2021    A1C 5.8 (H) 02/15/2022     Lab Results   Component Value Date    A1C 5.8 (H) 02/15/2022    Lab Results   Component Value Date    TROPONINI <0.01 11/21/2020    BNP 79 (H) 07/27/2020    TSH 0.17 (L) 09/22/2020    INR 1.10 07/27/2020          Sloan Burns MD [facility]  Non-Invasive Cardiologist  [facility]  Pager [phone]

## 2022-07-06 NOTE — PATIENT INSTRUCTIONS
We will give you another couple months to recover from COVID.  If your breathing is not getting better, we can consider doing a heart angiogram to get a look at your heart.  See me back in 2 months.

## 2022-07-07 ENCOUNTER — HOSPITAL ENCOUNTER (OUTPATIENT)
Dept: PHYSICAL THERAPY | Facility: CLINIC | Age: 57
Discharge: HOME | End: 2022-07-07
Payer: COMMERCIAL

## 2022-07-07 DIAGNOSIS — M54.16 LUMBAR RADICULITIS: Primary | ICD-10-CM

## 2022-07-07 PROCEDURE — 97110 THERAPEUTIC EXERCISES: CPT | Mod: GP

## 2022-07-07 NOTE — PROGRESS NOTES
Assessment:   Patient is a 56 year old male with a complex PMH including PTSD and a cardiac bypass surgery following MI. He has had chronic radiating L sided low back pain for many years which has worsened since his heart surgery and over the last several months. Responded to exercise well today with reports of decreased pain following session. He felt the reformer went very well, we took long breaks due to his decreased activity tolerance. He would continue to benefit from skilled PT services.     Date 7/7/22 6/20/22 6/16/22 6/7/22   Exercise       Hooklying Hip Adduction   X 10, 5 sec holds X 10, 5 sec holds   Supine Pretzel Stretch   X 30 sec holds X 30 sec holds B    Seated slump sliders   X 15 B X 15 B   Seated forward bend stretch    X 30 sec holds   Supine TA sets  X 10, 5 sec holds     Supine marching with TA  2 X 10     Supine hip flexion  X 1 min holds     NuStep X 7 min WL5      Reformer Leg Press X 10 all DL  X 10 all SL      Reformer Calf Press X 15 all      Reformer bridge X 12 no carriage movement               Scott Parmer, PT, DPT

## 2022-07-21 ENCOUNTER — HOSPITAL ENCOUNTER (OUTPATIENT)
Dept: PHYSICAL THERAPY | Facility: CLINIC | Age: 57
Discharge: HOME | End: 2022-07-21
Payer: COMMERCIAL

## 2022-07-21 DIAGNOSIS — M25.512 CHRONIC LEFT SHOULDER PAIN: ICD-10-CM

## 2022-07-21 DIAGNOSIS — M54.16 LUMBAR RADICULITIS: Primary | ICD-10-CM

## 2022-07-21 DIAGNOSIS — G89.29 CHRONIC LEFT SHOULDER PAIN: ICD-10-CM

## 2022-07-21 PROCEDURE — 97110 THERAPEUTIC EXERCISES: CPT | Mod: GP

## 2022-07-21 NOTE — PROGRESS NOTES
Assessment:   Patient is a 56 year old male with a complex PMH including PTSD and a cardiac bypass surgery following MI. He has had chronic radiating L sided low back pain for many years which has worsened since his heart surgery and over the last several months. Has had increased shortness of breath over the last couple of weeks. He has a check up with his PCP in about 2 weeks from now. Him and his wife were advised to call or go in sooner if the shortness of breath continues to worsen. Did not tolerate exercise as well today due to his decreased activity tolerance. He would continue to benefit from skilled PT services.     Date 7/21/22 7/7/22 6/20/22 6/16/22 6/7/22   Exercise        Hooklying Hip Adduction    X 10, 5 sec holds X 10, 5 sec holds   Supine Pretzel Stretch    X 30 sec holds X 30 sec holds B    Seated slump sliders    X 15 B X 15 B   Seated forward bend stretch     X 30 sec holds   Supine TA sets   X 10, 5 sec holds     Supine marching with TA   2 X 10     Supine hip flexion   X 1 min holds     NuStep X 8 min WL5 X 7 min WL5      Reformer Leg Press X 10 all DL  X 10 all SL X 10 all DL  X 10 all SL      Reformer Calf Press  X 15 all      Reformer bridge X 8 no carriage movement X 12 no carriage movement                Scott Parmer, PT, DPT

## 2022-07-23 ENCOUNTER — HEALTH MAINTENANCE LETTER (OUTPATIENT)
Age: 57
End: 2022-07-23

## 2022-07-25 ENCOUNTER — HOSPITAL ENCOUNTER (OUTPATIENT)
Dept: PHYSICAL THERAPY | Facility: CLINIC | Age: 57
Discharge: HOME | End: 2022-07-25
Payer: COMMERCIAL

## 2022-07-25 DIAGNOSIS — G89.29 CHRONIC LEFT SHOULDER PAIN: ICD-10-CM

## 2022-07-25 DIAGNOSIS — M54.16 LUMBAR RADICULITIS: Primary | ICD-10-CM

## 2022-07-25 DIAGNOSIS — M25.512 CHRONIC LEFT SHOULDER PAIN: ICD-10-CM

## 2022-07-25 PROCEDURE — 97110 THERAPEUTIC EXERCISES: CPT | Mod: GP

## 2022-07-25 NOTE — PROGRESS NOTES
Assessment:   Patient is a 56 year old male with a complex PMH including PTSD and a cardiac bypass surgery following MI. He has had chronic radiating L sided low back pain for many years which has worsened since his heart surgery and over the last several months. Has had increased shortness of breath over the last couple of weeks. He has a check up with his PCP in about 2 weeks from now. Him and his wife were advised to call or go in sooner if the shortness of breath continues to worsen. Progressed HEP today with stretching and glute strengthening.     Date 7/25/22 7/21/22 7/7/22 6/20/22 6/16/22 6/7/22   Exercise         Hooklying Hip Adduction     X 10, 5 sec holds X 10, 5 sec holds   Supine Pretzel Stretch     X 30 sec holds X 30 sec holds B    Seated slump sliders X 10 L    X 15 B X 15 B   S/l clamshells X 10 B - added to HEP        Supine LTR X 10 B        Supine bridge X 10 B        Seated forward bend stretch      X 30 sec holds   Supine TA sets    X 10, 5 sec holds     Supine marching with TA    2 X 10     Supine hip flexion    X 1 min holds     NuStep X 7 min WL5 X 8 min WL5 X 7 min WL5      Reformer Leg Press  X 10 all DL  X 10 all SL X 10 all DL  X 10 all SL      Reformer Calf Press   X 15 all      Reformer bridge  X 8 no carriage movement X 12 no carriage movement                 Scott Parmer, PT, DPT

## 2022-08-02 NOTE — PROGRESS NOTES
Assessment:   Nadya Blake is a 57 year old y.o. male with past medical history significant for  postherpetic neuralgia, GERD, prediabetes, dyslipidemia, hypertension, coronary artery disease, ascending aorta dilatation, BPH, depression, PTSD  who presents today for follow-up regarding 2 areas of pain.  1.  Chronic left neck pain and left upper extremity pain.  My review of an MRI cervical spine shows mild bilateral foraminal stenosis at C6-7 related to a small disc osteophyte complex.  Neck pain appears largely myofascial.  I do not think his upper extremity pain is radicular in nature.  It does not follow a dermatomal pattern.  He has only mild foraminal stenosis at C6-7 and that is bilateral, but he does not have any right-sided pain.    2.  Chronic left low back pain with radiation into the left greater than right lower extremity.  My review of an MRI lumbar spine shows bilateral L5 pars defects with 2 mm spondylolisthesis.  There is a broad-based disc bulge at this level with moderate right and mild to moderate left foraminal stenosis.  Left lower extremity pain does follow a left L5 pattern.  Patient notes some improvement in this pain with physical therapy x6, although he still has limited walking and standing tolerance.  - He is neurologically intact.       Plan:     A shared decision making plan was used.  The patient's values and choices were respected.  The following represents what was discussed and decided upon by the physician assistant and the patient.      1.  DIAGNOSTIC TESTS: I reviewed the MRI scans of the cervical and lumbar spine.  No further diagnostic tests were ordered.    2.  PHYSICAL THERAPY: Patient is currently in physical therapy for his neck and lower back pain.  He has had 6 sessions of far.  Encouraged him to continue with physical therapy and the home exercises.    3.  MEDICATIONS:  No changes are made to the patient's medications.    - Patient can continue Tylenol as needed.  -  Patient can continue gabapentin 300 mg daily.  We can potentially titrate his dose higher if needed.  - Patient can continue applying diclofenac gel as needed.    4.  INTERVENTIONS: No interventions were ordered today.  Patient does not wish to pursue interventional pain management at this time.  -If left leg pain fails to improve I would recommend a left L5-S1 transforaminal epidural steroid injection.  X-ray of left leg pain improved but he continues to have right leg pain we could repeat the procedure on the right.  - If leg pain improves but he continues to have axial low back pain I recommend left L4-5, L5-S1 facet joint injections.  - I am not overly optimistic an injection will help with his neck pain.  His neck and arm pain do not seem radicular.    5.  PATIENT EDUCATION: Patient is in agreement the above plan.  All questions were answered.    6.  FOLLOW-UP: Patient is going to follow-up with me in 2 months.  If he has questions or concerns in the meantime, he should not hesitate to call.    Subjective:     Nadya Blake is a 57 year old male who presents today for follow-up regarding neck and back pain.  I last saw this patient June 23, 2022.  Since then patient has continued to work with physical therapy.  Patient notes improvement in his low back and leg pain since he was last seen.  He states that his neck and arm pain are stable.  He has seen his cardiologist.  Patient's wife reports they may repeat all his cardiac testing next month for further evaluation to see if his pain may be related to his heart.  They wanted to wait to do the testing until he has had some more time to recover from recent COVID infection.    Patient complains of left neck pain.  He complains of pain that radiates all the way down the left arm involving the entire arm.  Denies numbness, tingling, weakness down the arms.    Patient also complains of left greater than right low back pain.  Pain radiates into left buttock, down  the lateral thigh, into the lateral calf.  He has intermittent shocking sensation in his left foot.  He states that he occasionally feels similar pain down the right leg but it is not nearly as severe as the left.  Pain is aggravated with standing for more than 5 minutes and walking more than 15 minutes.  Pain is alleviated with lying down.  He denies any numbness, tingling, weakness down the legs.    Patient rates his pain as a 0-10 currently.  At its worst it is a 10 out of 10.      Patient is currently in physical therapy.  He has had 6 sessions so far.  He does his home exercises.  He takes Tylenol 1000 mg every 6 hours as needed.  He applies diclofenac gel.  He takes gabapentin 300 mg at night.  Medications are helpful.    Review of Systems:  Positive for  headache.  Negative for inability to urinate, numbness/tingling, weakness, loss of bowel/bladder control, pain much worse at night, trip/stumble/falls, difficulty swallowing, difficulty with hand skills, fevers, unintentional weight loss.     Objective:   CONSTITUTIONAL:  Vital signs as above.  No acute distress.  The patient is well nourished and well groomed.    PSYCHIATRIC:  The patient is awake, alert, oriented to person, place and time.  The patient is answering questions appropriately with clear speech.  Normal affect.  HEENT: Normocephalic, atraumatic.  Sclera clear.    LYMPH: No cervical lymphadenopathy  SKIN:  Skin over the face, neck bilateral upper extremities is clean, dry, intact without rashes.  MUSCULOSKELETAL: Tender to palpation left cervical paraspinous muscles.  Tender to palpation left lower lumbar paraspinous muscles.  The patient has 5/5 strength for the bilateral shoulder abductors, elbow flexors/extensors, wrist extensors, finger flexors/abductors, hip flexors, knee flexors/extensors, ankle dorsi/plantar flexors.   NEUROLOGICAL:   No ankle clonus bilaterally.  Sensation to light touch is intact bilateral upper and lower extremities  throughout.    RESULTS:    I reviewed the MRI cervical spine from [facility] dated June 17, 2022.  At C6-7 there is a minimal disc osteophyte complex with mild bilateral foraminal stenosis.    I reviewed the MRI lumbar spine from [facility] dated June 17, 2022.  This shows mild multilevel degenerative change.  At L3-4 there is a small disc bulge with mild right and mild to moderate left foraminal stenosis.  At L5-S1 there are bilateral pars defects with 2 mm spondylolisthesis.  There is a broad-based disc bulge with moderate right and mild left foraminal stenosis.  There is facet arthropathy throughout the lumbar spine up to moderate bilaterally L2-3, L4-5, L5-S1.

## 2022-08-04 ENCOUNTER — OFFICE VISIT (OUTPATIENT)
Dept: PHYSICAL MEDICINE AND REHAB | Facility: CLINIC | Age: 57
End: 2022-08-04
Payer: COMMERCIAL

## 2022-08-04 VITALS
DIASTOLIC BLOOD PRESSURE: 77 MMHG | SYSTOLIC BLOOD PRESSURE: 127 MMHG | BODY MASS INDEX: 32.65 KG/M2 | HEIGHT: 65 IN | WEIGHT: 196 LBS | HEART RATE: 67 BPM

## 2022-08-04 DIAGNOSIS — M47.816 LUMBAR FACET ARTHROPATHY: ICD-10-CM

## 2022-08-04 DIAGNOSIS — M54.2 CERVICALGIA: Primary | ICD-10-CM

## 2022-08-04 DIAGNOSIS — M43.16 SPONDYLOLISTHESIS OF LUMBAR REGION: ICD-10-CM

## 2022-08-04 DIAGNOSIS — M54.16 LUMBAR RADICULITIS: ICD-10-CM

## 2022-08-04 PROCEDURE — 99213 OFFICE O/P EST LOW 20 MIN: CPT | Performed by: PHYSICIAN ASSISTANT

## 2022-08-04 ASSESSMENT — PAIN SCALES - GENERAL: PAINLEVEL: NO PAIN (0)

## 2022-08-04 NOTE — PATIENT INSTRUCTIONS
I am so happy that you are feeling better!    Keep up the great work with physical therapy.    You can call [phone] to schedule additional physical therapy.

## 2022-08-04 NOTE — LETTER
8/4/2022         RE: Nadya Blake  [address]  [place] [address]        Dear Colleague,    Thank you for referring your patient, Nadya Blake, to the [facility]. Please see a copy of my visit note below.    Assessment:   Nadya Blake is a 57 year old y.o. male with past medical history significant for  postherpetic neuralgia, GERD, prediabetes, dyslipidemia, hypertension, coronary artery disease, ascending aorta dilatation, BPH, depression, PTSD  who presents today for follow-up regarding 2 areas of pain.  1.  Chronic left neck pain and left upper extremity pain.  My review of an MRI cervical spine shows mild bilateral foraminal stenosis at C6-7 related to a small disc osteophyte complex.  Neck pain appears largely myofascial.  I do not think his upper extremity pain is radicular in nature.  It does not follow a dermatomal pattern.  He has only mild foraminal stenosis at C6-7 and that is bilateral, but he does not have any right-sided pain.    2.  Chronic left low back pain with radiation into the left greater than right lower extremity.  My review of an MRI lumbar spine shows bilateral L5 pars defects with 2 mm spondylolisthesis.  There is a broad-based disc bulge at this level with moderate right and mild to moderate left foraminal stenosis.  Left lower extremity pain does follow a left L5 pattern.  Patient notes some improvement in this pain with physical therapy x6, although he still has limited walking and standing tolerance.  - He is neurologically intact.       Plan:     A shared decision making plan was used.  The patient's values and choices were respected.  The following represents what was discussed and decided upon by the physician assistant and the patient.      1.  DIAGNOSTIC TESTS: I reviewed the MRI scans of the cervical and lumbar spine.  No further diagnostic tests were ordered.    2.  PHYSICAL THERAPY: Patient is currently in physical  therapy for his neck and lower back pain.  He has had 6 sessions of far.  Encouraged him to continue with physical therapy and the home exercises.    3.  MEDICATIONS:  No changes are made to the patient's medications.    - Patient can continue Tylenol as needed.  - Patient can continue gabapentin 300 mg daily.  We can potentially titrate his dose higher if needed.  - Patient can continue applying diclofenac gel as needed.    4.  INTERVENTIONS: No interventions were ordered today.  Patient does not wish to pursue interventional pain management at this time.  -If left leg pain fails to improve I would recommend a left L5-S1 transforaminal epidural steroid injection.  X-ray of left leg pain improved but he continues to have right leg pain we could repeat the procedure on the right.  - If leg pain improves but he continues to have axial low back pain I recommend left L4-5, L5-S1 facet joint injections.  - I am not overly optimistic an injection will help with his neck pain.  His neck and arm pain do not seem radicular.    5.  PATIENT EDUCATION: Patient is in agreement the above plan.  All questions were answered.    6.  FOLLOW-UP: Patient is going to follow-up with me in 2 months.  If he has questions or concerns in the meantime, he should not hesitate to call.    Subjective:     Nadya Blake is a 57 year old male who presents today for follow-up regarding neck and back pain.  I last saw this patient June 23, 2022.  Since then patient has continued to work with physical therapy.  Patient notes improvement in his low back and leg pain since he was last seen.  He states that his neck and arm pain are stable.  He has seen his cardiologist.  Patient's wife reports they may repeat all his cardiac testing next month for further evaluation to see if his pain may be related to his heart.  They wanted to wait to do the testing until he has had some more time to recover from recent COVID infection.    Patient complains of  left neck pain.  He complains of pain that radiates all the way down the left arm involving the entire arm.  Denies numbness, tingling, weakness down the arms.    Patient also complains of left greater than right low back pain.  Pain radiates into left buttock, down the lateral thigh, into the lateral calf.  He has intermittent shocking sensation in his left foot.  He states that he occasionally feels similar pain down the right leg but it is not nearly as severe as the left.  Pain is aggravated with standing for more than 5 minutes and walking more than 15 minutes.  Pain is alleviated with lying down.  He denies any numbness, tingling, weakness down the legs.    Patient rates his pain as a 0-10 currently.  At its worst it is a 10 out of 10.      Patient is currently in physical therapy.  He has had 6 sessions so far.  He does his home exercises.  He takes Tylenol 1000 mg every 6 hours as needed.  He applies diclofenac gel.  He takes gabapentin 300 mg at night.  Medications are helpful.    Review of Systems:  Positive for  headache.  Negative for inability to urinate, numbness/tingling, weakness, loss of bowel/bladder control, pain much worse at night, trip/stumble/falls, difficulty swallowing, difficulty with hand skills, fevers, unintentional weight loss.     Objective:   CONSTITUTIONAL:  Vital signs as above.  No acute distress.  The patient is well nourished and well groomed.    PSYCHIATRIC:  The patient is awake, alert, oriented to person, place and time.  The patient is answering questions appropriately with clear speech.  Normal affect.  HEENT: Normocephalic, atraumatic.  Sclera clear.    LYMPH: No cervical lymphadenopathy  SKIN:  Skin over the face, neck bilateral upper extremities is clean, dry, intact without rashes.  MUSCULOSKELETAL: Tender to palpation left cervical paraspinous muscles.  Tender to palpation left lower lumbar paraspinous muscles.  The patient has 5/5 strength for the bilateral shoulder  abductors, elbow flexors/extensors, wrist extensors, finger flexors/abductors, hip flexors, knee flexors/extensors, ankle dorsi/plantar flexors.   NEUROLOGICAL:   No ankle clonus bilaterally.  Sensation to light touch is intact bilateral upper and lower extremities throughout.    RESULTS:    I reviewed the MRI cervical spine from [facility] dated June 17, 2022.  At C6-7 there is a minimal disc osteophyte complex with mild bilateral foraminal stenosis.    I reviewed the MRI lumbar spine from [facility] dated June 17, 2022.  This shows mild multilevel degenerative change.  At L3-4 there is a small disc bulge with mild right and mild to moderate left foraminal stenosis.  At L5-S1 there are bilateral pars defects with 2 mm spondylolisthesis.  There is a broad-based disc bulge with moderate right and mild left foraminal stenosis.  There is facet arthropathy throughout the lumbar spine up to moderate bilaterally L2-3, L4-5, L5-S1.           Again, thank you for allowing me to participate in the care of your patient.        Sincerely,        Shara Velásquez PA-C

## 2022-08-05 ENCOUNTER — OFFICE VISIT (OUTPATIENT)
Dept: INTERNAL MEDICINE | Facility: CLINIC | Age: 57
End: 2022-08-05
Payer: COMMERCIAL

## 2022-08-05 VITALS
OXYGEN SATURATION: 95 % | BODY MASS INDEX: 32.51 KG/M2 | HEIGHT: 66 IN | HEART RATE: 58 BPM | SYSTOLIC BLOOD PRESSURE: 110 MMHG | DIASTOLIC BLOOD PRESSURE: 80 MMHG | RESPIRATION RATE: 18 BRPM | TEMPERATURE: 97.3 F | WEIGHT: 202.3 LBS

## 2022-08-05 DIAGNOSIS — E04.2 NON-TOXIC MULTINODULAR GOITER: Chronic | ICD-10-CM

## 2022-08-05 DIAGNOSIS — I10 ESSENTIAL HYPERTENSION: Chronic | ICD-10-CM

## 2022-08-05 DIAGNOSIS — I25.83 CORONARY ARTERY DISEASE DUE TO LIPID RICH PLAQUE: Chronic | ICD-10-CM

## 2022-08-05 DIAGNOSIS — Z12.5 SCREENING FOR PROSTATE CANCER: ICD-10-CM

## 2022-08-05 DIAGNOSIS — J41.0 SIMPLE CHRONIC BRONCHITIS (H): ICD-10-CM

## 2022-08-05 DIAGNOSIS — F43.10 PTSD (POST-TRAUMATIC STRESS DISORDER): Chronic | ICD-10-CM

## 2022-08-05 DIAGNOSIS — F33.41 RECURRENT MAJOR DEPRESSIVE DISORDER, IN PARTIAL REMISSION (H): Chronic | ICD-10-CM

## 2022-08-05 DIAGNOSIS — M17.2 POST-TRAUMATIC OSTEOARTHRITIS OF BOTH KNEES: Chronic | ICD-10-CM

## 2022-08-05 DIAGNOSIS — R35.1 BENIGN PROSTATIC HYPERPLASIA WITH NOCTURIA: Chronic | ICD-10-CM

## 2022-08-05 DIAGNOSIS — N40.1 BENIGN PROSTATIC HYPERPLASIA WITH NOCTURIA: Chronic | ICD-10-CM

## 2022-08-05 DIAGNOSIS — K21.00 GASTROESOPHAGEAL REFLUX DISEASE WITH ESOPHAGITIS WITHOUT HEMORRHAGE: ICD-10-CM

## 2022-08-05 DIAGNOSIS — M16.11 PRIMARY OSTEOARTHRITIS OF RIGHT HIP: Chronic | ICD-10-CM

## 2022-08-05 DIAGNOSIS — R73.03 PRE-DIABETES: Chronic | ICD-10-CM

## 2022-08-05 DIAGNOSIS — I25.10 CORONARY ARTERY DISEASE DUE TO LIPID RICH PLAQUE: Chronic | ICD-10-CM

## 2022-08-05 DIAGNOSIS — Z87.891 PERSONAL HISTORY OF TOBACCO USE: ICD-10-CM

## 2022-08-05 DIAGNOSIS — K60.30 ANAL FISTULA: ICD-10-CM

## 2022-08-05 DIAGNOSIS — Z00.00 ANNUAL PHYSICAL EXAM: Primary | ICD-10-CM

## 2022-08-05 DIAGNOSIS — E55.9 VITAMIN D DEFICIENCY: ICD-10-CM

## 2022-08-05 DIAGNOSIS — K76.0 FATTY LIVER DISEASE, NONALCOHOLIC: Chronic | ICD-10-CM

## 2022-08-05 DIAGNOSIS — E78.5 DYSLIPIDEMIA, GOAL LDL BELOW 70: Chronic | ICD-10-CM

## 2022-08-05 DIAGNOSIS — M16.12 PRIMARY OSTEOARTHRITIS OF LEFT HIP: Chronic | ICD-10-CM

## 2022-08-05 DIAGNOSIS — I77.810 ASCENDING AORTA DILATATION (H): ICD-10-CM

## 2022-08-05 PROBLEM — B02.29 POST HERPETIC NEURALGIA: Status: RESOLVED | Noted: 2018-11-19 | Resolved: 2022-08-05

## 2022-08-05 PROBLEM — K29.30 CHRONIC SUPERFICIAL GASTRITIS: Status: RESOLVED | Noted: 2017-09-19 | Resolved: 2022-08-05

## 2022-08-05 LAB
ALBUMIN SERPL BCG-MCNC: 4.3 G/DL (ref 3.5–5.2)
ALBUMIN UR-MCNC: NEGATIVE MG/DL
ALP SERPL-CCNC: 79 U/L (ref 40–129)
ALT SERPL W P-5'-P-CCNC: 61 U/L (ref 10–50)
ANION GAP SERPL CALCULATED.3IONS-SCNC: 9 MMOL/L (ref 7–15)
APPEARANCE UR: CLEAR
AST SERPL W P-5'-P-CCNC: 38 U/L (ref 10–50)
BILIRUB SERPL-MCNC: 0.4 MG/DL
BILIRUB UR QL STRIP: NEGATIVE
BUN SERPL-MCNC: 14.2 MG/DL (ref 6–20)
CALCIUM SERPL-MCNC: 9 MG/DL (ref 8.6–10)
CHLORIDE SERPL-SCNC: 101 MMOL/L (ref 98–107)
CHOLEST SERPL-MCNC: 104 MG/DL
COLOR UR AUTO: YELLOW
CREAT SERPL-MCNC: 1.32 MG/DL (ref 0.67–1.17)
DEPRECATED HCO3 PLAS-SCNC: 27 MMOL/L (ref 22–29)
ERYTHROCYTE [DISTWIDTH] IN BLOOD BY AUTOMATED COUNT: 12.2 % (ref 10–15)
GFR SERPL CREATININE-BSD FRML MDRD: 64 ML/MIN/1.73M2
GLUCOSE SERPL-MCNC: 112 MG/DL (ref 70–99)
GLUCOSE UR STRIP-MCNC: NEGATIVE MG/DL
HBA1C MFR BLD: 6 % (ref 0–5.6)
HCT VFR BLD AUTO: 41 % (ref 40–53)
HDLC SERPL-MCNC: 28 MG/DL
HGB BLD-MCNC: 13.7 G/DL (ref 13.3–17.7)
HGB UR QL STRIP: NEGATIVE
KETONES UR STRIP-MCNC: NEGATIVE MG/DL
LDLC SERPL CALC-MCNC: 42 MG/DL
LEUKOCYTE ESTERASE UR QL STRIP: NEGATIVE
MCH RBC QN AUTO: 28.6 PG (ref 26.5–33)
MCHC RBC AUTO-ENTMCNC: 33.4 G/DL (ref 31.5–36.5)
MCV RBC AUTO: 86 FL (ref 78–100)
NITRATE UR QL: NEGATIVE
NONHDLC SERPL-MCNC: 76 MG/DL
PH UR STRIP: 6 [PH] (ref 5–8)
PLATELET # BLD AUTO: 196 10E3/UL (ref 150–450)
POTASSIUM SERPL-SCNC: 4.5 MMOL/L (ref 3.4–5.3)
PROT SERPL-MCNC: 7 G/DL (ref 6.4–8.3)
PSA SERPL-MCNC: 0.85 NG/ML (ref 0–3.5)
RBC # BLD AUTO: 4.79 10E6/UL (ref 4.4–5.9)
SODIUM SERPL-SCNC: 137 MMOL/L (ref 136–145)
SP GR UR STRIP: 1.02 (ref 1–1.03)
TRIGL SERPL-MCNC: 172 MG/DL
TSH SERPL DL<=0.005 MIU/L-ACNC: 3.4 UIU/ML (ref 0.3–4.2)
URATE SERPL-MCNC: 5.6 MG/DL (ref 3.4–7)
UROBILINOGEN UR STRIP-ACNC: 0.2 E.U./DL
WBC # BLD AUTO: 7.3 10E3/UL (ref 4–11)

## 2022-08-05 PROCEDURE — G0296 VISIT TO DETERM LDCT ELIG: HCPCS | Performed by: INTERNAL MEDICINE

## 2022-08-05 PROCEDURE — 80061 LIPID PANEL: CPT | Performed by: INTERNAL MEDICINE

## 2022-08-05 PROCEDURE — 84443 ASSAY THYROID STIM HORMONE: CPT | Performed by: INTERNAL MEDICINE

## 2022-08-05 PROCEDURE — 83036 HEMOGLOBIN GLYCOSYLATED A1C: CPT | Performed by: INTERNAL MEDICINE

## 2022-08-05 PROCEDURE — 99396 PREV VISIT EST AGE 40-64: CPT | Performed by: INTERNAL MEDICINE

## 2022-08-05 PROCEDURE — 80053 COMPREHEN METABOLIC PANEL: CPT | Performed by: INTERNAL MEDICINE

## 2022-08-05 PROCEDURE — 82306 VITAMIN D 25 HYDROXY: CPT | Performed by: INTERNAL MEDICINE

## 2022-08-05 PROCEDURE — 36415 COLL VENOUS BLD VENIPUNCTURE: CPT | Performed by: INTERNAL MEDICINE

## 2022-08-05 PROCEDURE — 81003 URINALYSIS AUTO W/O SCOPE: CPT | Performed by: INTERNAL MEDICINE

## 2022-08-05 PROCEDURE — 85027 COMPLETE CBC AUTOMATED: CPT | Performed by: INTERNAL MEDICINE

## 2022-08-05 PROCEDURE — 99214 OFFICE O/P EST MOD 30 MIN: CPT | Mod: 25 | Performed by: INTERNAL MEDICINE

## 2022-08-05 PROCEDURE — 84550 ASSAY OF BLOOD/URIC ACID: CPT | Performed by: INTERNAL MEDICINE

## 2022-08-05 PROCEDURE — G0103 PSA SCREENING: HCPCS | Performed by: INTERNAL MEDICINE

## 2022-08-05 RX ORDER — HYDROXYZINE PAMOATE 25 MG/1
25-50 CAPSULE ORAL 3 TIMES DAILY PRN
Qty: 90 CAPSULE | Refills: 11 | Status: SHIPPED | OUTPATIENT
Start: 2022-08-05 | End: 2022-10-18

## 2022-08-05 RX ORDER — BUDESONIDE AND FORMOTEROL FUMARATE DIHYDRATE 160; 4.5 UG/1; UG/1
2 AEROSOL RESPIRATORY (INHALATION) 2 TIMES DAILY
Qty: 10.2 G | Refills: 11 | Status: SHIPPED
Start: 2022-08-05 | End: 2023-03-08

## 2022-08-05 ASSESSMENT — ENCOUNTER SYMPTOMS
EYE PAIN: 0
COUGH: 0
NERVOUS/ANXIOUS: 1
PARESTHESIAS: 0
FREQUENCY: 1
ARTHRALGIAS: 1
SORE THROAT: 0
NAUSEA: 0
FEVER: 0
SHORTNESS OF BREATH: 1
PALPITATIONS: 0
CHILLS: 0
ABDOMINAL PAIN: 0
DYSURIA: 0
MYALGIAS: 1
DIARRHEA: 0
WEAKNESS: 1
HEARTBURN: 0
DIZZINESS: 0
HEADACHES: 1
HEMATOCHEZIA: 0
HEMATURIA: 0
CONSTIPATION: 1
JOINT SWELLING: 0

## 2022-08-05 NOTE — PROGRESS NOTES
Lung Cancer Screening Shared Decision Making Visit     Nadya Blake, a 57 year old male, is eligible for lung cancer screening    History   Smoking Status     Former Smoker     Packs/day: 1.00     Years: 30.00     Types: Cigarettes, Cigarettes     Quit date: 3/23/2020   Smokeless Tobacco     Never Used     Comment: stopped 3/24/2020       I have discussed with patient the risks and benefits of screening for lung cancer with low-dose CT.     The risks include:    radiation exposure: one low dose chest CT has as much ionizing radiation as about 15 chest x-rays, or 6 months of background radiation living in [place]      false positives: most findings/nodules are NOT cancer, but some might still require additional diagnostic evaluation, including biopsy    over-diagnosis: some slow growing cancers that might never have been clinically significant will be detected and treated unnecessarily     The benefit of early detection of lung cancer is contingent upon adherence to annual screening or more frequent follow up if indicated.     Furthermore, to benefit from screening, Nadya must be willing and able to undergo diagnostic procedures, if indicated. Although no specific guide is available for determining severity of comorbidities, it is reasonable to withhold screening in patients who have greater mortality risk from other diseases.     We did discuss that the best way to prevent lung cancer is to not smoke.    Some patients may value a numeric estimation of lung cancer risk when evaluating if lung cancer screening is right for them, here is one calculator:    ShouldIScreen  Answers for HPI/ROS submitted by the patient on 8/5/2022  Frequency of exercise:: None  Getting at least 3 servings of Calcium per day:: Yes  Diet:: Other  Taking medications regularly:: Yes  Medication side effects:: None  Bi-annual eye exam:: Yes  Dental care twice a year:: NO  Sleep apnea or symptoms of sleep apnea:: None  abdominal  pain: No  Blood in stool: No  Blood in urine: No  chest pain: Yes  chills: No  congestion: No  constipation: Yes  cough: No  diarrhea: No  dizziness: No  ear pain: No  eye pain: No  nervous/anxious: Yes  fever: No  frequency: Yes  genital sores: No  headaches: Yes  hearing loss: No  heartburn: No  arthralgias: Yes  joint swelling: No  peripheral edema: No  mood changes: No  myalgias: Yes  nausea: No  dysuria: No  palpitations: No  Skin sensation changes: No  sore throat: No  urgency: No  rash: No  shortness of breath: Yes  visual disturbance: No  weakness: Yes  Additional concerns today:: No

## 2022-08-05 NOTE — PATIENT INSTRUCTIONS
Lung Cancer Screening   Frequently Asked Questions  If you are at high-risk for lung cancer, getting screened with low-dose computed tomography (LDCT) every year can help save your life. This handout offers answers to some of the most common questions about lung cancer screening. If you have other questions, please call [phone] ([phone]).     What is it?  Lung cancer screening uses special X-ray technology to create an image of your lung tissue. The exam is quick and easy and takes less than 10 seconds. We don t give you any medicine or use any needles. You can eat before and after the exam. You don t need to change your clothes as long as the clothing on your chest doesn t contain metal. But, you do need to be able to hold your breath for at least 6 seconds during the exam.    What is the goal of lung cancer screening?  The goal of lung cancer screening is to save lives. Many times, lung cancer is not found until a person starts having physical symptoms. Lung cancer screening can help detect lung cancer in the earliest stages when it may be easier to treat.    Who should be screened for lung cancer?  We suggest lung cancer screening for anyone who is at high-risk for lung cancer. You are in the high-risk group if you:      are between the ages of 55 and 79, and    have smoked at least 1 pack of cigarettes a day for 20 or more years, and    still smoke or have quit within the past 15 years.    However, if you have a new cough or shortness of breath, you should talk to your doctor before being screened.    Why does it matter if I have symptoms?  Certain symptoms can be a sign that you have a condition in your lungs that should be checked and treated by your doctor. These symptoms include fever, chest pain, a new or changing cough, shortness of breath that you have never felt before, coughing up blood or unexplained weight loss. Having any of these symptoms can greatly affect the results of lung  cancer screening.       Should all smokers get an LDCT lung cancer screening exam?  It depends. Lung cancer screening is for a very specific group of men and women who have a history of heavy smoking over a long period of time (see  Who should be screened for lung cancer  above).  I am in the high-risk group, but have been diagnosed with cancer in the past. Is LDCT lung cancer screening right for me?  In some cases, you should not have LDCT lung screening, such as when your doctor is already following your cancer with CT scan studies. Your doctor will help you decide if LDCT lung screening is right for you.  Do I need to have a screening exam every year?  Yes. If you are in the high-risk group described earlier, you should get an LDCT lung cancer screening exam every year until you are 79, or are no longer willing or able to undergo screening and possible procedures to diagnose and treat lung cancer.  How effective is LDCT at preventing death from lung cancer?  Studies have shown that LDCT lung cancer screening can lower the risk of death from lung cancer by 20 percent in people who are at high-risk.  What are the risks?  There are some risks and limitations of LDCT lung cancer screening. We want to make sure you understand the risks and benefits, so please let us know if you have any questions. Your doctor may want to talk with you more about these risks.    Radiation exposure: As with any exam that uses radiation, there is a very small increased risk of cancer. The amount of radiation in LDCT is small--about the same amount a person would get from a mammogram. Your doctor orders the exam when he or she feels the potential benefits outweigh the risks.    False negatives: No test is perfect, including LDCT. It is possible that you may have a medical condition, including lung cancer, that is not found during your exam. This is called a false negative result.    False positives and more testing: LDCT very often finds  something in the lung that could be cancer, but in fact is not. This is called a false positive result. False positive tests often cause anxiety. To make sure these findings are not cancer, you may need to have more tests. These tests will be done only if you give us permission. Sometimes patients need a treatment that can have side effects, such as a biopsy. For more information on false positives, see  What can I expect from the results?     Findings not related to lung cancer: Your LDCT exam also takes pictures of areas of your body next to your lungs. In a very small number of cases, the CT scan will show an abnormal finding in one of these areas, such as your kidneys, adrenal glands, liver or thyroid. This finding may not be serious, but you may need more tests. Your doctor can help you decide what other tests you may need, if any.  What can I expect from the results?  About 1 out of 4 LDCT exams will find something that may need more tests. Most of the time, these findings are lung nodules. Lung nodules are very small collections of tissue in the lung. These nodules are very common, and the vast majority--more than 97 percent--are not cancer (benign). Most are normal lymph nodes or small areas of scarring from past infections.  But, if a small lung nodule is found to be cancer, the cancer can be cured more than 90 percent of the time. To know if the nodule is cancer, we may need to get more images before your next yearly screening exam. If the nodule has suspicious features (for example, it is large, has an odd shape or grows over time), we will refer you to a specialist for further testing.  Will my doctor also get the results?  Yes. Your doctor will get a copy of your results.  Is it okay to keep smoking now that there s a cancer screening exam?  No. Tobacco is one of the strongest cancer-causing agents. It causes not only lung cancer, but other cancers and cardiovascular (heart) diseases as well. The damage  caused by smoking builds over time. This means that the longer you smoke, the higher your risk of disease. While it is never too late to quit, the sooner you quit, the better.  Where can I find help to quit smoking?  The best way to prevent lung cancer is to stop smoking. If you have already quit smoking, congratulations and keep it up! For help on quitting smoking, please call [employer] at 2-179-QUITNOW (1-351.882.1571) or the [employer] at 1-561.349.7852 to find local resources near you.  One-on-one health coaching:  If you d prefer to work individually with a health care provider on tobacco cessation, we offer:      Medication Therapy Management:  Our specially trained pharmacists work closely with you and your doctor to help you quit smoking.  Call [phone] or [phone] (toll free).

## 2022-08-05 NOTE — PROGRESS NOTES
Office Visit - Physical   Nadya Blake   57 year old  male    Date of visit: 8/5/2022  Physician: Az Briseno MD     Assessment and Plan   1. Annual physical exam  This is a 57-year-old man with issues as discussed below.  Ongoing healthy lifestyle discussed and recommended    2. Personal history of tobacco use  Smoking 2020, over 30-year pack history  - Prof fee: Shared Decision Making for Lung Cancer Screening  - CT Chest Lung Cancer Scrn Low Dose wo; Future    3. Coronary artery disease, CABG 6/2020  Continue secondary prevention and I do not believe that his current chest pain is related to anginal ischemia as it appears musculoskeletal and has had extensive cardiovascular evaluation without evidence of inducible ischemia recently    4. Dyslipidemia, goal LDL below 70  Continue statin  - Lipid panel reflex to direct LDL Fasting; Future  - Lipid panel reflex to direct LDL Fasting    5. Essential hypertension  Controlled, continue same  - CBC with platelets; Future  - Comprehensive metabolic panel; Future  - TSH with free T4 reflex; Future  - UA reflex to Microscopic and Culture; Future  - CBC with platelets  - Comprehensive metabolic panel  - TSH with free T4 reflex  - UA reflex to Microscopic and Culture    6. Ascending aorta dilatation (H)  Follow-up per cardiology recommendation    7. Pre-diabetes  - Hemoglobin A1c; Future  - Hemoglobin A1c    8. Fatty liver disease, nonalcoholic  Continue with low carbohydrate diet, regular exercise and modest weight loss    9. Gastroesophageal reflux disease with esophagitis without hemorrhage  Continue same    10. Non-toxic multinodular goiter  Follows up with endocrinology    11. Post-traumatic osteoarthritis of both knees  12. Primary osteoarthritis of left hip  13. Primary osteoarthritis of right hip  Continue same  - Uric acid; Future  - Uric acid    14. PTSD (post-traumatic stress disorder)  15. Recurrent major depressive disorder, in partial remission  (H)  [facility]    16. Simple chronic bronchitis (H)  I have asked him to use this more regularly to see if it helps with his dyspnea.  Have also asked him to increase his physical activity as I suspect part of it is deconditioning  - budesonide-formoterol (SYMBICORT) 160-4.5 MCG/ACT Inhaler; Inhale 2 puffs into the lungs 2 times daily  Dispense: 10.2 g; Refill: 11    17. Anal fistula  Continue with topical ointment which is helpful    18. Benign prostatic hyperplasia with nocturia  Stable    19. Screening for prostate cancer  - Prostate Specific Antigen Screen; Future  - Prostate Specific Antigen Screen    20. Vitamin D deficiency  - Vitamin D Deficiency; Future  - Vitamin D Deficiency      The following high BMI interventions were performed this visit: encouragement to exercise and lifestyle education regarding diet    Return in about 4 weeks (around 9/2/2022) for Follow up.     Chief Complaint   Chief Complaint   Patient presents with     Physical        Patient Profile   Social History     Social History Narrative    , Carlee, BA chemistry and taking AA courses at [employer].  From [place]; bachelors in geology and [employer] science. Son, Grace (2005) and Sherrie (2008).  On SSDI, PTSD.          Past Medical History   Patient Active Problem List   Diagnosis     Recurrent major depressive disorder, in partial remission (H)     Fatty liver disease, nonalcoholic     Gastroesophageal reflux disease with esophagitis without hemorrhage     Essential hypertension     Non-toxic multinodular goiter     Post-traumatic osteoarthritis of both knees     Primary osteoarthritis of left hip     Primary osteoarthritis of right hip     Coronary artery disease, CABG 6/2020     Pre-diabetes     Dyslipidemia, goal LDL below 70     Benign prostatic hyperplasia with nocturia     PTSD (post-traumatic stress disorder)     Ascending aorta dilatation (H)     TMJ arthritis     Anal fistula      Simple chronic bronchitis (H)       Past Surgical History  He has a past surgical history that includes appendectomy (1995); Cv Coronary Angiogram (N/A, 6/23/2020); Cv Left Heart Catheterization Without Left Ventriculogram (Left, 6/23/2020); Cv Iabp Insert (N/A, 6/24/2020); and Bypass graft artery coronary (06/24/2020).     History of Present Illness   This 57 year old man comes in for annual physical and follow-up of numerous medical issues.  Overall things are stable.  He continues to have left-sided chest pain.  This is with movement of his shoulder and pushing on the chest wall.  He continues to feel winded when he walks upstairs.  He had a short reprieve when some medications were adjusted by cardiology but since his COVID infection this is continued.  He is not as active as he used to be because of some low back pain and is working with the spine clinic.  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  His anal fissure is doing much better with topical ointment that has been provided.  He has a history of smoking and quit in 2020.  He has over 30-pack-year history.  He has significant PTSD and depression and anxiety and follows with psychiatry as well as [facility].  He has thyroid nodules and [facility].  Chronic reflux which is stable.    Review of Systems: A comprehensive review of systems was negative except as noted.     Medications and Allergies   Current Outpatient Medications   Medication Sig Dispense Refill     ACETAMINOPHEN EXTRA STRENGTH 500 MG tablet TAKE 2 TABLETS(1000 MG) BY MOUTH EVERY 6 HOURS AS NEEDED FOR PAIN 360 tablet 3     albuterol (PROAIR HFA/PROVENTIL HFA/VENTOLIN HFA) 108 (90 Base) MCG/ACT inhaler Inhale 1-2 puffs into the lungs every 4 hours as needed for shortness of breath / dyspnea 18 g 11     aspirin (ASA) 81 MG chewable tablet Take 1 tablet (81 mg) by mouth daily 90 tablet 3     budesonide-formoterol  (SYMBICORT) 160-4.5 MCG/ACT Inhaler Inhale 2 puffs into the lungs 2 times daily 10.2 g 11     Cholecalciferol (VITAMIN D3) 50 MCG (2000 UT) CAPS Take 1 capsule by mouth daily 90 capsule 3     diclofenac (VOLTAREN) 1 % topical gel Apply 4 g topically 4 times daily 500 g 2     escitalopram (LEXAPRO) 20 MG tablet Take 1 tablet (20 mg) by mouth daily 30 tablet 3     gabapentin (NEURONTIN) 300 MG capsule Take 1 capsule (300 mg) by mouth daily 90 capsule 3     hydrOXYzine (VISTARIL) 25 MG capsule Take 1-2 capsules (25-50 mg) by mouth 3 times daily as needed for anxiety 90 capsule 11     isosorbide mononitrate (IMDUR) 30 MG 24 hr tablet Take 1 tablet (30 mg) by mouth daily 90 tablet 3     Lidocaine 0.5 % GEL        metoprolol succinate ER (TOPROL-XL) 100 MG 24 hr tablet Take 1.5 tablets (150 mg) by mouth At Bedtime 135 tablet 3     nifedipine 0.2% in white petrolatum 0.2 % OINT ointment Apply topically 4 times daily as needed (anal fissure) 100 g 1     omeprazole (PRILOSEC) 20 MG capsule [OMEPRAZOLE (PRILOSEC) 20 MG CAPSULE] Take 1 capsule (20 mg total) by mouth 2 (two) times a day before meals. 180 capsule 3     polyethylene glycol (MIRALAX) 17 g packet Take 1 packet by mouth daily       QUEtiapine (SEROQUEL) 100 MG tablet Take 1.5 tablets (150 mg) by mouth At Bedtime 45 tablet 10     QUEtiapine (SEROQUEL) 50 MG tablet TAKE 1 TABLET(50 MG) BY MOUTH TWICE DAILY AS NEEDED 60 tablet 3     rosuvastatin (CRESTOR) 40 MG tablet Take 1 tablet (40 mg) by mouth daily 90 tablet 3     spironolactone (ALDACTONE) 25 MG tablet Take 1 tablet (25 mg) by mouth daily 90 tablet 3     tamsulosin (FLOMAX) 0.4 MG capsule Take 2 capsules (0.8 mg) by mouth every evening 180 capsule 3     Allergies   Allergen Reactions     Atorvastatin Diarrhea     Cortisone Other (See Comments)     pain     Lisinopril Cough     started after CABG for unclear reason     Methylprednisolone Other (See Comments)     ?        Family and Social History   Family History  "  Problem Relation Age of Onset     No Known Problems Mother      Lung Cancer Father 56        fatal     No Known Problems Daughter      Other - See Comments Son         congenital deformity of right leg; he uses a leg prosthesis        Social History     Tobacco Use     Smoking status: Former Smoker     Packs/day: 1.00     Years: 30.00     Pack years: 30.00     Types: Cigarettes, Cigarettes     Quit date: 3/23/2020     Years since quittin.3     Smokeless tobacco: Never Used     Tobacco comment: stopped 3/24/2020   Vaping Use     Vaping Use: Never used   Substance Use Topics     Alcohol use: No     Drug use: Never        Physical Exam   General Appearance:   No acute distress    /80 (BP Location: Left arm, Patient Position: Sitting, Cuff Size: Adult Large)   Pulse 58   Temp 97.3  F (36.3  C) (Tympanic)   Resp 18   Ht 1.67 m (5' 5.75\")   Wt 91.8 kg (202 lb 4.8 oz)   SpO2 95%   BMI 32.90 kg/m      EYES: Eyelids, conjunctiva, and sclera were normal. Pupils were normal. Cornea, iris, and lens were normal bilaterally.  HEAD, EARS, NOSE, MOUTH, AND THROAT: Head and face were normal. Hearing was normal to voice and the ears were normal to external exam.   NECK: Neck appearance was normal. There were no neck masses and the thyroid was not enlarged.  RESPIRATORY: Breathing pattern was normal and the chest moved symmetrically.  Percussion/auscultatory percussion was normal.  Lung sounds were normal and there were no abnormal sounds.  CARDIOVASCULAR: Heart rate and rhythm were normal.  S1 and S2 were normal and there were no extra sounds or murmurs. Peripheral pulses in arms and legs were normal.  Jugular venous pressure was normal.  There was no peripheral edema.  GASTROINTESTINAL: The abdomen was normal in contour.  Bowel sounds were present.  Percussion detected no organ enlargement or tenderness.  Palpation detected no tenderness, mass, or enlarged organs.   MUSCULOSKELETAL: Skeletal configuration was " normal and muscle mass was normal for age. Joint appearance was overall normal.  LYMPHATIC: There were no enlarged nodes.  SKIN/HAIR/NAILS: Skin color was normal.  There were no skin lesions.  Hair and nails were normal.  NEUROLOGIC: The patient was alert and oriented to person, place, time, and circumstance. Speech was normal. Cranial nerves were normal. Motor strength was normal for age. The patient was normally coordinated.  PSYCHIATRIC:  Mood and affect were normal and the patient had normal recent and remote memory. The patient's judgment and insight were normal.       Additional Information   In addition to his physical, 25 minutes was spent discussing his chest pain, breathing difficulties, recent COVID infection anal fissure, back pain, assessment and management of these.     Az Briseno MD  Internal Medicine  Contact me at [phone]  Answers for HPI/ROS submitted by the patient on 8/5/2022  Frequency of exercise:: None  Getting at least 3 servings of Calcium per day:: Yes  Diet:: Other  Taking medications regularly:: Yes  Medication side effects:: None  Bi-annual eye exam:: Yes  Dental care twice a year:: NO  Sleep apnea or symptoms of sleep apnea:: None  abdominal pain: No  Blood in stool: No  Blood in urine: No  chest pain: Yes  chills: No  congestion: No  constipation: Yes  cough: No  diarrhea: No  dizziness: No  ear pain: No  eye pain: No  nervous/anxious: Yes  fever: No  frequency: Yes  genital sores: No  headaches: Yes  hearing loss: No  heartburn: No  arthralgias: Yes  joint swelling: No  peripheral edema: No  mood changes: No  myalgias: Yes  nausea: No  dysuria: No  palpitations: No  Skin sensation changes: No  sore throat: No  urgency: No  rash: No  shortness of breath: Yes  visual disturbance: No  weakness: Yes  Additional concerns today:: No

## 2022-08-06 LAB — DEPRECATED CALCIDIOL+CALCIFEROL SERPL-MC: 38 UG/L (ref 20–75)

## 2022-08-08 ENCOUNTER — HOSPITAL ENCOUNTER (OUTPATIENT)
Dept: PHYSICAL THERAPY | Facility: REHABILITATION | Age: 57
Discharge: HOME | End: 2022-08-08
Attending: INTERNAL MEDICINE
Payer: COMMERCIAL

## 2022-08-08 DIAGNOSIS — R42 VERTIGO: ICD-10-CM

## 2022-08-08 PROCEDURE — 97161 PT EVAL LOW COMPLEX 20 MIN: CPT | Mod: GP | Performed by: PHYSICAL THERAPIST

## 2022-08-08 PROCEDURE — 95992 CANALITH REPOSITIONING PROC: CPT | Mod: GP | Performed by: PHYSICAL THERAPIST

## 2022-08-08 NOTE — PROGRESS NOTES
[facility]                                                                                   OUTPATIENT PHYSICAL THERAPY FUNCTIONAL EVALUATION  PLAN OF TREATMENT FOR OUTPATIENT REHABILITATION  (COMPLETE FOR INITIAL CLAIMS ONLY)  Patient's Last Name, First Name, M.I.  YOB: 1965  Nadya Blake     Provider's Name   [facility]   Medical Record No.  [id number]     Start of Care Date:  08/08/22   Onset Date:      Type:     _X__PT   ____OT  ____SLP Medical Diagnosis:  Vertigo     PT Diagnosis:  Dizziness Visits from SOC:  1                              __________________________________________________________________________________  Plan of Treatment/Functional Goals:  neuromuscular re-education  Canalith Repositioning procedure        GOALS     (P) Pt will verbalize understanding of dizziness and to stay hydrated to assist with symptom management .                   Therapy Frequency:      Predicted Duration of Therapy Intervention:       Cherie Staples, PT                                    I CERTIFY THE NEED FOR THESE SERVICES FURNISHED UNDER        THIS PLAN OF TREATMENT AND WHILE UNDER MY CARE     (Physician co-signature of this document indicates review and certification of the therapy plan).              Certification Date From:  08/08/22   Certification Date To:  08/08/22    Referring Provider:  Dr. Briseno    Initial Assessment  See Epic Evaluation- Start of Care Date: 08/08/22 08/08/22 0700   Quick Adds   Quick Adds Certification;Vestibular Eval   Type of Visit Initial OP PT Evaluation   General Information   Start of Care Date 08/08/22   Referring Physician Dr. Briseno   Orders Evaluate and Treat as Indicated   Order Date 07/05/22   Medical Diagnosis Vertigo   Surgical/Medical history reviewed Yes   Pertinent history of current problem (include personal  factors and/or comorbidities that impact the POC) Pt reports that he began to have dizziness 2 months ago with bed mobility.  It lasts just a few seconds.  Describes as feeling out of control.  English is not primary language for pt; wife often speaks for him; declines    Prior level of function comment Independent with all mobility   Current Community Support Family/friend caregiver   Patient role/Employment history Unemployed   [facility]   [facility]   Assistive Devices Comments None   Fall Risk Screen   Fall screen completed by PT   Have you fallen 2 or more times in the past year? No   Have you fallen and had an injury in the past year? No   Is patient a fall risk? No   Abuse Screen (yes response referral indicated)   Feels Unsafe at Home or Work/School no   Feels Threatened by Someone no   Does Anyone Try to Keep You From Having Contact with Others or Doing Things Outside Your Home? no   Physical Signs of Abuse Present no   Patient needs abuse support services and resources No   Pain   Patient currently in pain Denies   Cognitive Status Examination   Orientation orientation to person, place and time   Level of Consciousness alert   Follows Commands and Answers Questions 75% of the time   Personal Safety and Judgment intact   Memory intact   Cognitive Comment Wife often speaks for pt.   Gait   Gait Comments WNL; no evidence of imbalance.  Good pace.   Cervicogenic Screen   Neck ROM WNL   Vertebral Artery Test Normal   Alar Ligament Test Normal   Transverse Ligament Test Normal   Oculomotor Exam   Smooth Pursuit Normal   Saccades Normal   VOR Normal   VOR Cancellation Normal   Rapid Head Thrust Normal   Infrared Goggle Exam or Frenzel Lense Exam   Vestibular Suppressant in Last 24 Hours? No   Exam completed with Infrared Goggles   Spontaneous Nystagmus Negative   Gaze Evoked Nystagmus Negative   Head Shake Horizontal Nystagmus Negative    Morteza-Hallpike (right) Negative   Morteza-Hallpike (Left) Negative   HSCC Supine Roll Test (Right) Negative   HSCC Supine Roll Test (Left) Negative  (c/o brief dizziness <5 seconds)   Planned Therapy Interventions   Planned Therapy Interventions neuromuscular re-education   Planned Therapy Interventions Comment Canalith Repositioning procedure   Clinical Impression   Criteria for Skilled Therapeutic Interventions Met evaluation only   PT Diagnosis Dizziness   Influenced by the following impairments Dizziness   Functional limitations due to impairments Dizziness   Clinical Presentation Stable/Uncomplicated   Clinical Decision Making (Complexity) Low complexity   Risk & Benefits of therapy have been explained Yes   Patient, Family & other staff in agreement with plan of care Yes   Clinical Impression Comments Pt is a 56 yo male who has a complicated PMH and is on several medications that cause dizziness.  PT eval reveals no peripheral source of dizziness with the exception of self c/o dizziness with L horizontal canal roll test.  PT did treat pt with a 270 deg turn technique at [facility].  No nystagmus witnessed.  Pt admits he doesn't drink any water, just mostly coffee.  PT educated pt regarding importance of hydration which can impact dizziness.  Pt verbalized understanding.  No further 1:1 PT is warranted at this time.   Education Assessment   Barriers to Learning Language   GOALS   PT Eval Goals 1   Goal 1   Goal Description Pt will verbalize understanding of dizziness and to stay hydrated to assist with symptom management .   Goal Progress MET at [facility]   Total Evaluation Time   PT Eval, Low Complexity Minutes (77193) 20   Therapy Certification   Certification date from 08/08/22   Certification date to 08/08/22   Medical Diagnosis Vertigo   Certification I certify the need for these services furnished under this plan of treatment and while under my care.  (Physician co-signature of this document indicates review and  certification of the therapy plan).

## 2022-08-15 ENCOUNTER — HOSPITAL ENCOUNTER (OUTPATIENT)
Dept: PHYSICAL THERAPY | Facility: CLINIC | Age: 57
Discharge: HOME | End: 2022-08-15
Payer: COMMERCIAL

## 2022-08-15 DIAGNOSIS — M54.16 LUMBAR RADICULITIS: Primary | ICD-10-CM

## 2022-08-15 PROCEDURE — 97110 THERAPEUTIC EXERCISES: CPT | Mod: GP

## 2022-08-15 PROCEDURE — 97140 MANUAL THERAPY 1/> REGIONS: CPT | Mod: GP

## 2022-08-15 NOTE — PROGRESS NOTES
Assessment:   Patient is a 56 year old male with a complex PMH including PTSD and a cardiac bypass surgery following MI. He has had chronic radiating L sided low back pain for many years which has worsened since his heart surgery and over the last several months. He has begun a walking program - about 20 minutes per day which he struggles with back pain and shortness of breath. Encouraged patient to continue program for his health and to work on low back stretches and can ice following to manage the pain. Will plan to progress standing core stability to help with this in future sessions.     Date 8/15/22 7/25/22 7/21/22 7/7/22 6/20/22 6/16/22 6/7/22   Exercise          Hooklying Hip Adduction      X 10, 5 sec holds X 10, 5 sec holds   Supine Pretzel Stretch      X 30 sec holds X 30 sec holds B    Seated slump sliders  X 10 L    X 15 B X 15 B   S/l clamshells  X 10 B - added to HEP        Supine LTR  X 10 B        Supine bridge  X 10 B        Seated forward bend stretch       X 30 sec holds   Supine TA sets     X 10, 5 sec holds     Supine marching with TA     2 X 10     Supine hip flexion     X 1 min holds     NuStep X 6 min WL4 X 7 min WL5 X 8 min WL5 X 7 min WL5      Reformer Leg Press 2 X 10 3R 1 B  X 10 all DL  X 10 all SL X 10 all DL  X 10 all SL      Reformer Calf Press X 15 B   X 15 all      Reformer bridge   X 8 no carriage movement X 12 no carriage movement                  Scott Parmer, PT, DPT

## 2022-08-16 ENCOUNTER — PATIENT OUTREACH (OUTPATIENT)
Dept: CARE COORDINATION | Facility: CLINIC | Age: 57
End: 2022-08-16

## 2022-08-16 NOTE — LETTER
[facility]  Patient Centered Plan of Care  About Me:        Patient Name:  Nadya Covington and Juanita,     It was nice to talk to you today. Here is a copy of your Care Plan with the goals we are supporting you with at this time. Please let me know if I can support you in any other way.       Thanks,     Dave Myhre, RN  CCC RN  [phone]      YOB: 1965  Age:         57 year old   Tracy MRN:    [id number] Telephone Information:  Home Phone [phone]   Mobile Not on file.       Address:  [facility] [address]  [facility] [address] Email address:  [email or web address]      Emergency Contact(s)    Name Relationship Lgl Grd Work Phone Home Phone Mobile Phone   1. JUANITA BEASLEY Spouse   [phone]            Primary language:  T[place]ight     needed? No   [employer]:  [phone] op. 1  Other communication barriers:None    Preferred Method of Communication:     Current living arrangement: I live in a private home with family    Mobility Status/ Medical Equipment: Independent        Health Maintenance  Health Maintenance Reviewed: Due/Overdue   Health Maintenance Due   Topic Date Due     ADVANCE CARE PLANNING  Never done     ZOSTER IMMUNIZATION (1 of 2) Never done     LUNG CANCER SCREENING  10/13/2021     DTAP/TDAP/TD IMMUNIZATION (2 - Td or Tdap) 04/30/2022         My Access Plan  Medical Emergency 911   Primary Clinic Line [facility] - [phone]   24 Hour Appointment Line [phone] or  [phone]) (toll-free)   24 Hour Nurse Line [phone] (toll-free)   Preferred Urgent Care [facility] [place], [phone]     Preferred Hospital [facility]  [phone]     [id number] - [place], [place] - [address] AT [facility] [address]     Behavioral Health Crisis Line The National Suicide Prevention Lifeline at  3-925-520-6042 or 988             My Care Team Members  Patient Care Team       Relationship Specialty Notifications Start End    Az Briseno MD PCP - General   9/22/20     Phone: [phone] Fax: [phone]         [address] [facility] [address]    Az Briseno MD Assigned PCP   6/16/21     Phone: [phone] Fax: [phone]         [address] [facility] [address]    Franco Quevedo MD Assigned Behavioral Health Provider   7/16/21     Phone: [phone] Fax: [phone]         [address] [facility] [place] [address] [facility] [address]    Inessa Cade [facility] worker   11/15/21     Phone: [phone]         Sloan Burns MD Assigned Heart and Vascular Provider   12/12/21     Phone: [phone] Fax: [phone]         [address] [facility] [address] [facility] [address]    Myhre, David J, RN Lead Care Coordinator Primary Care - CC Admissions 1/18/22      Phone: [phone]            My Care Plans  Self Management and Treatment Plan  Goals and (Comments)   Goals        General     1. Psychosocial (pt-stated)      Notes - Note edited  8/16/2022  9:58 AM by Myhre, David J, RN     Goal Statement: I would like to be more supportive of my son in the next 10 months.   Date Goal set: 1/4/22  Barriers: Health concerns.  Strengths: Motivated. Strong support from family. Strong advocate for himself.   Date to Achieve By:10/4/22  Patient expressed understanding of goal: Yes  Action steps to achieve this goal:  1. I will support my son with the activities her participates in, like basket ball.   2. I will continue to find time during the day to spend time with my son that may allow us to build a closer relationship.   3. I will report progress towards this goal at scheduled outreach telephone calls from my CCC team.      Discussed on 8/16/22         2. Psychosocial (pt-stated)      Notes - Note edited  8/16/2022  9:58 AM by Myhre, David J, RN     Goal Statement: I would like to start painting as an outlet  to express myself in the next 10 months.   Date Goal set: 1/4/22  Barriers: None  Strengths: Motivated. Strong family support.   Date to Achieve By: 10/4/22  Patient expressed understanding of goal: Yes  Action steps to achieve this goal:  1. I will look into the options of what type of painting interests me.   2. I will also look into community offered classes that may help get me started towards his goal.   3. I will report progress towards this goal at schedule outreach telephone calls from my CCC team.      Discussed on 8/16/22               Action Plans on File:                       Advance Care Plans/Directives Type:   No data recorded    My Medical and Care Information  Problem List   Patient Active Problem List   Diagnosis     Recurrent major depressive disorder, in partial remission (H)     Fatty liver disease, nonalcoholic     Gastroesophageal reflux disease with esophagitis without hemorrhage     Essential hypertension     Non-toxic multinodular goiter     Post-traumatic osteoarthritis of both knees     Primary osteoarthritis of left hip     Primary osteoarthritis of right hip     Coronary artery disease, CABG 6/2020     Pre-diabetes     Dyslipidemia, goal LDL below 70     Benign prostatic hyperplasia with nocturia     PTSD (post-traumatic stress disorder)     Ascending aorta dilatation (H)     TMJ arthritis     Anal fistula     Simple chronic bronchitis (H)      Current Medications and Allergies:  See printed Medication Report.    Care Coordination Start Date: 11/19/2020   Frequency of Care Coordination: monthly     Form Last Updated: 08/16/2022

## 2022-08-16 NOTE — PROGRESS NOTES
Clinic Care Coordination Contact    Follow Up Progress Note      Assessment: Spoke with patient's wife Carlee today to follow up on goals and to discuss and needs. Continues have some back pain and neck pain. Currently working PT with regards to the issue. She stated patient is also getting some relief from the gabapentin and Voltaren gel.   Charlette said patient has been good about attending his appointments and taking his medications. 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 Currently using Lidocaine ointment for pain relief. Surgery has been recommended, but Carlee said pateint is not interested in pursuing this at this time.   Carlee said he continues to paint when he is feeling good. She stated she continues to be active in the lives of his children despite his health concerns.   No other needs or concerns at this time. Carlee said she was grateful for the call today.              Care Gaps:    Health Maintenance Due   Topic Date Due     ADVANCE CARE PLANNING  Never done     ZOSTER IMMUNIZATION (1 of 2) Never done     LUNG CANCER SCREENING  10/13/2021     DTAP/TDAP/TD IMMUNIZATION (2 - Td or Tdap) 04/30/2022       Patient is scheduled for a follow up visit with is PCP on 9/14/22    Goals addressed this encounter:    Goals Addressed                    This Visit's Progress       1. Psychosocial (pt-stated)   60%      Goal Statement: I would like to be more supportive of my son in the next 10 months.   Date Goal set: 1/4/22  Barriers: Health concerns.  Strengths: Motivated. Strong support from family. Strong advocate for himself.   Date to Achieve By:10/4/22  Patient expressed understanding of goal: Yes  Action steps to achieve this goal:  1. I will support my son with the activities her participates in, like basket ball.   2. I will continue to find time during the day to spend time with my son that may allow us to build a closer relationship.   3. I will report progress towards this  goal at scheduled outreach telephone calls from my CCC team.      Discussed on 8/16/22           2. Psychosocial (pt-stated)   60%      Goal Statement: I would like to start painting as an outlet to express myself in the next 10 months.   Date Goal set: 1/4/22  Barriers: None  Strengths: Motivated. Strong family support.   Date to Achieve By: 10/4/22  Patient expressed understanding of goal: Yes  Action steps to achieve this goal:  1. I will look into the options of what type of painting interests me.   2. I will also look into community offered classes that may help get me started towards his goal.   3. I will report progress towards this goal at schedule outreach telephone calls from my CCC team.      Discussed on 8/16/22              Intervention/Education provided during outreach: Discussed the importance of patient taking his medications as directed. Encouraged attendance at all scheduled follow up appointments.      Outreach Frequency: monthly        Plan: CCC RN will continue to monitor, support patient with current goals and will be available to assist as nursing needs arise.     Care Coordinator will follow up in one month.

## 2022-08-22 ENCOUNTER — HOSPITAL ENCOUNTER (OUTPATIENT)
Dept: CT IMAGING | Facility: HOSPITAL | Age: 57
Discharge: HOME | End: 2022-08-22
Attending: INTERNAL MEDICINE | Admitting: INTERNAL MEDICINE
Payer: COMMERCIAL

## 2022-08-22 DIAGNOSIS — Z87.891 PERSONAL HISTORY OF TOBACCO USE: ICD-10-CM

## 2022-08-22 PROCEDURE — 71271 CT THORAX LUNG CANCER SCR C-: CPT

## 2022-08-25 ENCOUNTER — HOSPITAL ENCOUNTER (OUTPATIENT)
Dept: PHYSICAL THERAPY | Facility: CLINIC | Age: 57
Discharge: HOME | End: 2022-08-25
Payer: COMMERCIAL

## 2022-08-25 DIAGNOSIS — M54.16 LUMBAR RADICULITIS: Primary | ICD-10-CM

## 2022-08-25 PROCEDURE — 97140 MANUAL THERAPY 1/> REGIONS: CPT | Mod: GP

## 2022-08-25 PROCEDURE — 97110 THERAPEUTIC EXERCISES: CPT | Mod: GP

## 2022-08-25 NOTE — PROGRESS NOTES
[facility]    OUTPATIENT PHYSICAL THERAPY  PLAN OF TREATMENT FOR OUTPATIENT REHABILITATION AND PROGRESS NOTE           Patient's Last Name, First Name, Nadya Ordonez Date of Birth  1965   Provider's Name  [facility] Medical Record No.  [id number]    Onset Date  6/6/2022 Start of Care Date  6/6/2022   Type:     _X_PT   ___OT   ___SLP Medical Diagnosis  Lumbar radiculitis   PT Diagnosis  Lumbar pain; core and hip weakness; decreased trunk ROM Plan of Treatment  Frequency/Duration: 1X/week, 5 weeks  Certification date from 8/25/22 to 9/31/22     Goals:  Goal Identifier     Goal Description     Target Date     Date Met      Progress (detail required for progress note):       Goal Identifier     Goal Description     Target Date     Date Met      Progress (detail required for progress note):       Goal Identifier     Goal Description     Target Date     Date Met      Progress (detail required for progress note):       Goal Identifier     Goal Description     Target Date     Date Met      Progress (detail required for progress note):       Goal Identifier     Goal Description     Target Date     Date Met      Progress (detail required for progress note):       Goal Identifier     Goal Description     Target Date     Date Met      Progress (detail required for progress note):       Goal Identifier     Goal Description     Target Date     Date Met      Progress (detail required for progress note):       Goal Identifier     Goal Description     Target Date     Date Met      Progress (detail required for progress note):         Beginning/End Dates of Progress Note Reporting Period:  6/6/22 to 8/25/22    Progress Toward Goals:   Progress this reporting period: Limited due to recent setback    Client Self (Subjective) Report for Progress Note Reporting Period:  Patient is in a bit more pain today. He fell out of bed last week onto the floor due to a nightmare he was having. Since then, he has had increased low back and neck soreness.         I CERTIFY THE NEED FOR THESE SERVICES FURNISHED UNDER        THIS PLAN OF TREATMENT AND WHILE UNDER MY CARE     (Physician co-signature of this document indicates review and certification of the therapy plan).                Referring Provider: Nora Tholl, PA-C Scott Parmer, PT      Assessment:   Patient is a 56 year old male with a complex PMH including PTSD and a cardiac bypass surgery following MI. He has had chronic radiating L sided low back pain for many years which has worsened since his heart surgery and over the last several months. He has begun a walking program - about 20 minutes per day which he struggles with back pain and shortness of breath. Encouraged patient to continue program for his health and to work on low back stretches and can ice following to manage the pain. Will plan to progress standing core stability to help with this in future sessions. Of note - he fell out of bed in his sleep last week and is having increased pain. Focused on manual therapy and stretches today for symptom management.     Date 8/25/22 8/15/22 7/25/22 7/21/22 7/7/22 6/20/22 6/16/22 6/7/22   Exercise           Hooklying Hip Adduction       X 10, 5 sec holds X 10, 5 sec holds   Supine Pretzel Stretch X 30 sec holds B      X 30 sec holds X 30 sec holds B    Seated slump sliders   X 10 L    X 15 B X 15 B   S/l clamshells   X 10 B - added to HEP        Supine LTR X 10 B  X 10 B        Supine bridge   X 10 B        Seated forward bend stretch        X 30 sec holds   Supine TA sets      X 10, 5 sec holds     Supine marching with TA      2 X 10     Supine hip flexion      X 1 min holds     NuStep  X 6 min WL4 X 7 min WL5 X 8 min WL5 X 7 min WL5      Reformer Leg Press  2 X 10 3R 1 B  X 10 all DL  X 10 all SL X 10 all DL  X 10 all SL       Reformer Calf Press  X 15 B   X 15 all      Reformer bridge    X 8 no carriage movement X 12 no carriage movement      DKTC X 30 sec holds            Scott Parmer, PT, DPT

## 2022-08-30 ENCOUNTER — PATIENT OUTREACH (OUTPATIENT)
Dept: CARE COORDINATION | Facility: CLINIC | Age: 57
End: 2022-08-30

## 2022-08-30 NOTE — PROGRESS NOTES
Clinic Care Coordination - Chart Review Only    Situation/Background: Patient chart reviewed by care coordinator related to [employer] conversion.    Assessment: Patient continues to be followed by Clinic Care Coordination.    Plan: Patient's chart updated to align with [employer] program for ongoing patient management.    David Myhre, RN  CCC RN

## 2022-09-01 ENCOUNTER — MYC MEDICAL ADVICE (OUTPATIENT)
Dept: NEUROLOGY | Facility: CLINIC | Age: 57
End: 2022-09-01

## 2022-09-01 DIAGNOSIS — F43.10 PTSD (POST-TRAUMATIC STRESS DISORDER): ICD-10-CM

## 2022-09-02 RX ORDER — ESCITALOPRAM OXALATE 20 MG/1
20 TABLET ORAL DAILY
Qty: 30 TABLET | Refills: 0 | Status: SHIPPED | OUTPATIENT
Start: 2022-09-02 | End: 2022-09-30

## 2022-09-02 NOTE — TELEPHONE ENCOUNTER
Date of Last Office Visit: 5/4/22  Date of Next Office Visit: 10/5/22  No shows since last visit: none  Cancellations since last visit: none    Medication requested: Escitalopram 20 mg Date last ordered: 5/4/22 Qty: 30 Refills: 3     Review of [place] ?: NA    Lapse in medication adherence greater than 5 days?: no  If yes, call patient and gather details: NA  Medication refill request verified as identical to current order?: yes  Result of Last DAM, VPA, Li+ Level, CBC, or Carbamazepine Level (at or since last visit): N/A    Last visit treatment plan:   Plan:  I will increase the patient's Lexapro to 20 mg a day.  I have also increased his as needed Vistaril to 25 to 50 mg 3 times a day.     The patient will return to clinic in 3 months. He agrees to call before then with any questions or concerns.      []Medication refilled per  Medication Refill in Ambulatory Care  policy.  []Medication unable to be refilled by RN due to criteria not met as indicated below:    []Eligibility - not seen in the last year   []Supervision - no future appointment   []Compliance - no shows, cancellations or lapse in therapy   []Verification - order discrepancy   []Controlled medication   []Medication not included in policy   []90-day supply request   []Other

## 2022-09-12 ENCOUNTER — DOCUMENTATION ONLY (OUTPATIENT)
Dept: CARDIOLOGY | Facility: CLINIC | Age: 57
End: 2022-09-12

## 2022-09-12 ENCOUNTER — OFFICE VISIT (OUTPATIENT)
Dept: CARDIOLOGY | Facility: CLINIC | Age: 57
End: 2022-09-12
Attending: GENERAL ACUTE CARE HOSPITAL
Payer: COMMERCIAL

## 2022-09-12 ENCOUNTER — PREP FOR PROCEDURE (OUTPATIENT)
Dept: CARDIOLOGY | Facility: CLINIC | Age: 57
End: 2022-09-12

## 2022-09-12 VITALS
HEART RATE: 69 BPM | BODY MASS INDEX: 33.76 KG/M2 | SYSTOLIC BLOOD PRESSURE: 108 MMHG | HEIGHT: 65 IN | RESPIRATION RATE: 16 BRPM | DIASTOLIC BLOOD PRESSURE: 80 MMHG | WEIGHT: 202.6 LBS

## 2022-09-12 DIAGNOSIS — Z87.891 FORMER SMOKER: ICD-10-CM

## 2022-09-12 DIAGNOSIS — I10 ESSENTIAL HYPERTENSION: Primary | ICD-10-CM

## 2022-09-12 DIAGNOSIS — R29.818 SUSPECTED SLEEP APNEA: ICD-10-CM

## 2022-09-12 DIAGNOSIS — I25.709 CORONARY ARTERY DISEASE INVOLVING CORONARY BYPASS GRAFT OF NATIVE HEART WITH ANGINA PECTORIS (H): ICD-10-CM

## 2022-09-12 DIAGNOSIS — I50.32 CHRONIC HEART FAILURE WITH PRESERVED EJECTION FRACTION (H): Primary | ICD-10-CM

## 2022-09-12 DIAGNOSIS — I50.32 CHRONIC HEART FAILURE WITH PRESERVED EJECTION FRACTION (H): ICD-10-CM

## 2022-09-12 DIAGNOSIS — E78.5 DYSLIPIDEMIA, GOAL LDL BELOW 70: ICD-10-CM

## 2022-09-12 DIAGNOSIS — U07.1 INFECTION DUE TO 2019 NOVEL CORONAVIRUS: ICD-10-CM

## 2022-09-12 PROCEDURE — 99215 OFFICE O/P EST HI 40 MIN: CPT | Performed by: GENERAL ACUTE CARE HOSPITAL

## 2022-09-12 RX ORDER — ASPIRIN 81 MG/1
243 TABLET, CHEWABLE ORAL ONCE
Status: CANCELLED | OUTPATIENT
Start: 2022-09-27

## 2022-09-12 RX ORDER — ASPIRIN 325 MG
325 TABLET ORAL ONCE
Status: CANCELLED | OUTPATIENT
Start: 2022-09-27 | End: 2022-09-12

## 2022-09-12 RX ORDER — LIDOCAINE 40 MG/G
CREAM TOPICAL
Status: CANCELLED | OUTPATIENT
Start: 2022-09-12

## 2022-09-12 RX ORDER — DIAZEPAM 10 MG
10 TABLET ORAL
Status: CANCELLED | OUTPATIENT
Start: 2022-09-27

## 2022-09-12 RX ORDER — SODIUM CHLORIDE 9 MG/ML
INJECTION, SOLUTION INTRAVENOUS CONTINUOUS
Status: CANCELLED | OUTPATIENT
Start: 2022-09-27

## 2022-09-12 RX ORDER — FENTANYL CITRATE 50 UG/ML
25 INJECTION, SOLUTION INTRAMUSCULAR; INTRAVENOUS
Status: CANCELLED | OUTPATIENT
Start: 2022-09-27

## 2022-09-12 NOTE — LETTER
9/12/2022    Az Briseno MD  [address] [facility] [address]    RE: Nadya Blake       Dear Colleague,     I had the pleasure of seeing Nadya Blake in the [facility].  HEART CARE ENCOUNTER NOTE            Assessment/Recommendations   Assessment:    1. Chronic congestive heart failure with preserved left ventricular ejection fraction. He seems euvolemic but is still having symptoms. This could indicate progression of coronary artery disease despite his normal stress test. He may also be inadequately diuresed or be developing pulmonary hypertension. His recent COVID-19 infection could also be causing residual symptoms.  2. Coronary artery disease status post four-vessel coronary artery bypass grafting (left internal mammary artery to the left anterior descending artery, right radial artery to the right posterior descending artery, reversed saphenous venous grafts to obtuse marginal and diagonal artery branches) on 6/24/2020. No ischemia seen on stress cardiac MRI on 12/23/2021 but unclear any of his exertional dyspnea could be anginal.  3. Benign essential hypertension. Controlled.  4. Dyslipidemia. Last LDL 53 mg/dL.  5. Former smoker.  6. COVID-19 infection on 5/18/2022.  7. Possible obstructive sleep apnea.  8. BMI 33.71.    Plan:  1. Proceed with right and left heart catheterization with coronary angiography.  2. Continue spironolactone 25 mg daily.  3. Continue isosorbide mononitrate 30 mg daily.  4. Continue to hold furosemide as I do not think he is fluid-overloaded at this time.  5. Continue other cardiac medications.  6. Sleep medicine referral.  7. If his cardiac catheterization does not reveal an etiology for his symptoms, referral to pulmonology and/or the post COVID-19 infection clinic may be warranted.  8. Follow-up with me in 2 months.         History of Present Illness   Mr. Nadya Blake is a 57 year old male with a significant past history of CAD with  history of NSTEMI s/p 4V CABG (LIMA to LAD, right radial artery to RPDA, reversed SVGs to an OM and diagonal artery branch) on 6/24/2020, HFpEF, HTN, dyslipidemia, and former smoker presenting for follow-up.     He continues to have exertional dyspnea with minimal activity such as going up a flight of stairs. He also has had some left-sided chest pain, although it does not seem exertional. He previously was started on spironolactone and isosorbide mononitrate. He did note some improvement with this, but then he contracted COVID-19 on 5/18/2022. He has not felt as well since. He is also limited in walking from pain from an anal fissure and lower back pain. He feels tired a lot and does snore.    He denies any shortness of breath at rest, light headedness/dizziness, pre-syncope, syncope, lower extremity swelling, palpitations, paroxysmal nocturnal dyspnea (PND), or orthopnea.     Cardiac Problems and Cardiac Diagnostics     Most Recent Cardiac testing:  ECG dated 7/23/2021 (personaly reviewed and interpreted): SR, 1st degree AV block with  ms, left axis deviation     ECHO 6/27/2020 (report reviewed):     Normal left ventricular size and systolic function. The left ventricular wall motion is normal. The calculated left ventricular ejection fraction is 71%.    Normal right ventricular size and systolic function.    No hemodynamically significant valvular heart abnormalities.    The ascending aorta is mildly dilated.    When compared to the previous study dated 6/23/2020, no significant change.     Stress cardiac MRI 12/23/2021 (report reviewed):  1.  Pharmacological Regadenoson stress cardiac MRI is negative for inducible myocardial ischemia.   2.  Pharmacological stress ECG is negative for inducible myocardial ischemia.   3. Normal left ventricular size, wall thickness and systolic function. The quantified left ventricular  ejection fraction is 59 %.  Very small area of non-transmural myocardial scar in the mid  inferior wall is  identified.    4.  Normal right ventricular size and systolic function.    5.  No significant valvular abnormalities.  6. Ascending aorta measures 38 mm.      Stress test 5/21/2021 (report reviewed):     The nuclear stress test is negative for inducible myocardial ischemia or infarction.     The left ventricular ejection fraction at stress is 65%.     There is no prior study for comparison.     Cardiac cath 6/23/2020 (report reviewed):     Severe mid LAD and critical first diagonal disease.    Severe OM-3 disease; OM-3 is the dominant lateral wall vessel.    Severe proximal RCA and right PDA disease.    LV EDP 20 mmHg     Medications  Allergies   Current Outpatient Medications   Medication Sig Dispense Refill     ACETAMINOPHEN EXTRA STRENGTH 500 MG tablet TAKE 2 TABLETS(1000 MG) BY MOUTH EVERY 6 HOURS AS NEEDED FOR PAIN 360 tablet 3     albuterol (PROAIR HFA/PROVENTIL HFA/VENTOLIN HFA) 108 (90 Base) MCG/ACT inhaler Inhale 1-2 puffs into the lungs every 4 hours as needed for shortness of breath / dyspnea 18 g 11     aspirin (ASA) 81 MG chewable tablet Take 1 tablet (81 mg) by mouth daily 90 tablet 3     budesonide-formoterol (SYMBICORT) 160-4.5 MCG/ACT Inhaler Inhale 2 puffs into the lungs 2 times daily 10.2 g 11     Cholecalciferol (VITAMIN D3) 50 MCG (2000 UT) CAPS Take 1 capsule by mouth daily 90 capsule 3     diclofenac (VOLTAREN) 1 % topical gel Apply 4 g topically 4 times daily 500 g 2     escitalopram (LEXAPRO) 20 MG tablet Take 1 tablet (20 mg) by mouth daily 30 tablet 0     gabapentin (NEURONTIN) 300 MG capsule Take 1 capsule (300 mg) by mouth daily 90 capsule 3     hydrOXYzine (VISTARIL) 25 MG capsule Take 1-2 capsules (25-50 mg) by mouth 3 times daily as needed for anxiety 90 capsule 11     isosorbide mononitrate (IMDUR) 30 MG 24 hr tablet Take 1 tablet (30 mg) by mouth daily 90 tablet 3     Lidocaine 0.5 % GEL        metoprolol succinate ER (TOPROL-XL) 100 MG 24 hr tablet Take 1.5  "tablets (150 mg) by mouth At Bedtime 135 tablet 3     nifedipine 0.2% in white petrolatum 0.2 % OINT ointment Apply topically 4 times daily as needed (anal fissure) 100 g 1     omeprazole (PRILOSEC) 20 MG capsule [OMEPRAZOLE (PRILOSEC) 20 MG CAPSULE] Take 1 capsule (20 mg total) by mouth 2 (two) times a day before meals. 180 capsule 3     polyethylene glycol (MIRALAX) 17 g packet Take 1 packet by mouth daily       polyethylene glycol (MIRALAX) 17 GM/Dose powder Take 17 g (1 capful) by mouth daily 850 g 3     QUEtiapine (SEROQUEL) 100 MG tablet Take 1.5 tablets (150 mg) by mouth At Bedtime 45 tablet 10     QUEtiapine (SEROQUEL) 50 MG tablet TAKE 1 TABLET(50 MG) BY MOUTH TWICE DAILY AS NEEDED 60 tablet 3     rosuvastatin (CRESTOR) 40 MG tablet Take 1 tablet (40 mg) by mouth daily 90 tablet 3     spironolactone (ALDACTONE) 25 MG tablet Take 1 tablet (25 mg) by mouth daily 90 tablet 3     tamsulosin (FLOMAX) 0.4 MG capsule Take 2 capsules (0.8 mg) by mouth every evening 180 capsule 3      Allergies   Allergen Reactions     Atorvastatin Diarrhea     Cortisone Other (See Comments)     pain     Lisinopril Cough     started after CABG for unclear reason     Methylprednisolone Other (See Comments)     ?        Physical Examination Review of Systems   /80 (BP Location: Right arm, Patient Position: Sitting, Cuff Size: Adult Regular)   Pulse 69   Resp 16   Ht 1.651 m (5' 5\")   Wt 91.9 kg (202 lb 9.6 oz)   BMI 33.71 kg/m    Body mass index is 33.71 kg/m .  Wt Readings from Last 3 Encounters:   09/12/22 91.9 kg (202 lb 9.6 oz)   08/05/22 91.8 kg (202 lb 4.8 oz)   08/04/22 88.9 kg (196 lb)       General Appearance:   Pleasant  male, appears  stated age. no acute distress, obese body habitus   ENT/Mouth: Facemask.     EYES:  no scleral icterus, normal conjunctivae   Neck: no carotid bruits. No anterior cervical lymphadenopaty   Respiratory:   lungs are clear to auscultation, no rales or wheezing, equal chest wall " expansion    Cardiovascular:   Regular rhythm, normal rate. Normal first and second heart sounds with no murmurs, rubs, or gallops; the carotid, radial and posterior tibial pulses are intact, Jugular venous pressure normal, no edema bilaterally    Abdomen/GI:  no organomegaly, masses, bruits, or tenderness; bowel sounds are present   Extremities: no cyanosis or clubbing   Skin: no xanthelasma, warm.    Heme/lymph/ Immunology No apparent bleeding noted.   Neurologic: Alert and oriented. normal gait, no tremors     Psychiatric: Pleasant, calm, appropriate affect.    A complete 10 system review of systems was performed and is negative except as mentioned in the HPI/subjective.         Past History   Past Medical History:   Past Medical History:   Diagnosis Date     Acute non-ST elevation myocardial infarction (NSTEMI) (H) 6/22/2020     Adenomatous polyp of colon      Ascending aorta dilatation (H)      Carpal tunnel syndrome      Chronic superficial gastritis      Coronary artery disease due to lipid rich plaque 6/23/2020     Depression with anxiety      Dyslipidemia, goal LDL below 70 6/23/2020     Essential hypertension 9/2/2012     Fatty liver disease, nonalcoholic      GERD (gastroesophageal reflux disease)      IBS (irritable bowel syndrome)      Left lumbar radiculopathy      Multinodular goiter      Old torn meniscus of knee      Paroxysmal atrial fibrillation (H)      Perianal abscess      Post herpetic neuralgia      Post-traumatic osteoarthritis of both knees      Primary osteoarthritis of left hip      Primary osteoarthritis of right hip      Pulmonary nodule      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 Ragsdale      CV CORONARY ANGIOGRAM N/A 2020    Procedure: Coronary Angiogram;  Surgeon: Ariane Lester MD;  Location: [facility];  Service: Cardiology     CV IABP INSERT N/A 2020    Procedure: Intra Aortic Balloon Pump Insertion;  Surgeon: Ariane Lester MD;  Location: [facility];  Service: Cardiology     CV LEFT HEART CATHETERIZATION WITHOUT LEFT VENTRICULOGRAM Left 2020    Procedure: Left Heart Catheterization Without Left Ventriculogram;  Surgeon: Ariane Lester MD;  Location: [facility] Lab;  Service: Cardiology       Family History:   Family History   Problem Relation Age of Onset     No Known Problems Mother      Lung Cancer Father 56        fatal     No Known Problems Daughter      Other - See Comments Son         congenital deformity of right leg; he uses a leg prosthesis       Social History:   Social History     Socioeconomic History     Marital status:      Spouse name: Carlee     Number of children: 2     Years of education: Not on file     Highest education level: Not on file   Occupational History     Not on file   Tobacco Use     Smoking status: Former Smoker     Packs/day: 1.00     Years: 30.00     Pack years: 30.00     Types: Cigarettes, Cigarettes     Quit date: 3/23/2020     Years since quittin.4     Smokeless tobacco: Never Used     Tobacco comment: stopped 3/24/2020   Vaping Use     Vaping Use: Never used   Substance and Sexual Activity     Alcohol use: No     Drug use: Never     Sexual activity: Yes     Partners: Female   Other Topics Concern     Not on file   Social History Narrative    , Carlee, BA chemistry and taking AA courses at [employer].  From [place]; bachelors in geology and computer science. Son, Grace () and Sherrie ().  On SSDI, PTSD.       Social Determinants of Health     Financial Resource Strain: Not on file   Food Insecurity: No Food Insecurity     Worried About Running Out of Food in the Last Year: Never true     Ran Out of  Food in the Last Year: Never true   Transportation Needs: No Transportation Needs     Lack of Transportation (Medical): No     Lack of Transportation (Non-Medical): No   Physical Activity: Not on file   Stress: Not on file   Social Connections: Not on file   Intimate Partner Violence: Not on file   Housing Stability: Not on file              Lab Results    Chemistry/lipid CBC Cardiac Enzymes/BNP/TSH/INR   Lab Results   Component Value Date    CHOL 104 08/05/2022    HDL 28 (L) 08/05/2022    LDL 42 08/05/2022    TRIG 172 (H) 08/05/2022    CR 1.32 (H) 08/05/2022    BUN 14.2 08/05/2022    POTASSIUM 4.5 08/05/2022     08/05/2022    CO2 27 08/05/2022      Lab Results   Component Value Date    WBC 7.3 08/05/2022    HGB 13.7 08/05/2022    HCT 41.0 08/05/2022    MCV 86 08/05/2022     08/05/2022    A1C 6.0 (H) 08/05/2022     Lab Results   Component Value Date    A1C 6.0 (H) 08/05/2022    Lab Results   Component Value Date    TROPONINI <0.01 11/21/2020    BNP 79 (H) 07/27/2020    TSH 3.40 08/05/2022    INR 1.10 07/27/2020          Sloan Burns MD [facility]  Non-Invasive Cardiologist  [facility]  Pager [phone]    Thank you for allowing me to participate in the care of your patient.      Sincerely,     Sloan Burns MD     [facility]  cc:   Sloan Burns MD  [address] [facility] [address]  [place] [address]

## 2022-09-12 NOTE — PATIENT INSTRUCTIONS
We will schedule you for a heart catheterization.  I would also like you to talk with a sleep doctor.  See me back in 2 months.

## 2022-09-12 NOTE — PROGRESS NOTES
UPDATED BEW ORDERING  Received: 9-12-22 @ 0930  Jose Herrera Maureen, RN  Case type: LHC, RHC, CA POSS PCI     Procedure Physician(s): JANELLE     Procedure Date and Patient Arrival Time: 9/27 7:30AM ARRIVAL     H&P: 9/12 BEW     Alerts: PT HAS HAD BYPASS     COVID Testing: AT HOME     THANK YOU,   MARIS

## 2022-09-12 NOTE — PROGRESS NOTES
HEART CARE ENCOUNTER NOTE            Assessment/Recommendations   Assessment:    1. Chronic congestive heart failure with preserved left ventricular ejection fraction. He seems euvolemic but is still having symptoms. This could indicate progression of coronary artery disease despite his normal stress test. He may also be inadequately diuresed or be developing pulmonary hypertension. His recent COVID-19 infection could also be causing residual symptoms.  2. Coronary artery disease status post four-vessel coronary artery bypass grafting (left internal mammary artery to the left anterior descending artery, right radial artery to the right posterior descending artery, reversed saphenous venous grafts to obtuse marginal and diagonal artery branches) on 6/24/2020. No ischemia seen on stress cardiac MRI on 12/23/2021 but unclear any of his exertional dyspnea could be anginal.  3. Benign essential hypertension. Controlled.  4. Dyslipidemia. Last LDL 53 mg/dL.  5. Former smoker.  6. COVID-19 infection on 5/18/2022.  7. Possible obstructive sleep apnea.  8. BMI 33.71.    Plan:  1. Proceed with right and left heart catheterization with coronary angiography.  2. Continue spironolactone 25 mg daily.  3. Continue isosorbide mononitrate 30 mg daily.  4. Continue to hold furosemide as I do not think he is fluid-overloaded at this time.  5. Continue other cardiac medications.  6. Sleep medicine referral.  7. If his cardiac catheterization does not reveal an etiology for his symptoms, referral to pulmonology and/or the post COVID-19 infection clinic may be warranted.  8. Follow-up with me in 2 months.         History of Present Illness   Mr. Nadya Blake is a 57 year old male with a significant past history of CAD with history of NSTEMI s/p 4V CABG (LIMA to LAD, right radial artery to RPDA, reversed SVGs to an OM and diagonal artery branch) on 6/24/2020, HFpEF, HTN, dyslipidemia, and former smoker presenting for  follow-up.     He continues to have exertional dyspnea with minimal activity such as going up a flight of stairs. He also has had some left-sided chest pain, although it does not seem exertional. He previously was started on spironolactone and isosorbide mononitrate. He did note some improvement with this, but then he contracted COVID-19 on 5/18/2022. He has not felt as well since. He is also limited in walking from pain from an anal fissure and lower back pain. He feels tired a lot and does snore.    He denies any shortness of breath at rest, light headedness/dizziness, pre-syncope, syncope, lower extremity swelling, palpitations, paroxysmal nocturnal dyspnea (PND), or orthopnea.     Cardiac Problems and Cardiac Diagnostics     Most Recent Cardiac testing:  ECG dated 7/23/2021 (personaly reviewed and interpreted): SR, 1st degree AV block with  ms, left axis deviation     ECHO 6/27/2020 (report reviewed):     Normal left ventricular size and systolic function. The left ventricular wall motion is normal. The calculated left ventricular ejection fraction is 71%.    Normal right ventricular size and systolic function.    No hemodynamically significant valvular heart abnormalities.    The ascending aorta is mildly dilated.    When compared to the previous study dated 6/23/2020, no significant change.     Stress cardiac MRI 12/23/2021 (report reviewed):  1.  Pharmacological Regadenoson stress cardiac MRI is negative for inducible myocardial ischemia.   2.  Pharmacological stress ECG is negative for inducible myocardial ischemia.   3. Normal left ventricular size, wall thickness and systolic function. The quantified left ventricular  ejection fraction is 59 %.  Very small area of non-transmural myocardial scar in the mid inferior wall is  identified.    4.  Normal right ventricular size and systolic function.    5.  No significant valvular abnormalities.  6. Ascending aorta measures 38 mm.      Stress test 5/21/2021  (report reviewed):     The nuclear stress test is negative for inducible myocardial ischemia or infarction.     The left ventricular ejection fraction at stress is 65%.     There is no prior study for comparison.     Cardiac cath 6/23/2020 (report reviewed):     Severe mid LAD and critical first diagonal disease.    Severe OM-3 disease; OM-3 is the dominant lateral wall vessel.    Severe proximal RCA and right PDA disease.    LV EDP 20 mmHg     Medications  Allergies   Current Outpatient Medications   Medication Sig Dispense Refill     ACETAMINOPHEN EXTRA STRENGTH 500 MG tablet TAKE 2 TABLETS(1000 MG) BY MOUTH EVERY 6 HOURS AS NEEDED FOR PAIN 360 tablet 3     albuterol (PROAIR HFA/PROVENTIL HFA/VENTOLIN HFA) 108 (90 Base) MCG/ACT inhaler Inhale 1-2 puffs into the lungs every 4 hours as needed for shortness of breath / dyspnea 18 g 11     aspirin (ASA) 81 MG chewable tablet Take 1 tablet (81 mg) by mouth daily 90 tablet 3     budesonide-formoterol (SYMBICORT) 160-4.5 MCG/ACT Inhaler Inhale 2 puffs into the lungs 2 times daily 10.2 g 11     Cholecalciferol (VITAMIN D3) 50 MCG (2000 UT) CAPS Take 1 capsule by mouth daily 90 capsule 3     diclofenac (VOLTAREN) 1 % topical gel Apply 4 g topically 4 times daily 500 g 2     escitalopram (LEXAPRO) 20 MG tablet Take 1 tablet (20 mg) by mouth daily 30 tablet 0     gabapentin (NEURONTIN) 300 MG capsule Take 1 capsule (300 mg) by mouth daily 90 capsule 3     hydrOXYzine (VISTARIL) 25 MG capsule Take 1-2 capsules (25-50 mg) by mouth 3 times daily as needed for anxiety 90 capsule 11     isosorbide mononitrate (IMDUR) 30 MG 24 hr tablet Take 1 tablet (30 mg) by mouth daily 90 tablet 3     Lidocaine 0.5 % GEL        metoprolol succinate ER (TOPROL-XL) 100 MG 24 hr tablet Take 1.5 tablets (150 mg) by mouth At Bedtime 135 tablet 3     nifedipine 0.2% in white petrolatum 0.2 % OINT ointment Apply topically 4 times daily as needed (anal fissure) 100 g 1     omeprazole (PRILOSEC) 20 MG  "capsule [OMEPRAZOLE (PRILOSEC) 20 MG CAPSULE] Take 1 capsule (20 mg total) by mouth 2 (two) times a day before meals. 180 capsule 3     polyethylene glycol (MIRALAX) 17 g packet Take 1 packet by mouth daily       polyethylene glycol (MIRALAX) 17 GM/Dose powder Take 17 g (1 capful) by mouth daily 850 g 3     QUEtiapine (SEROQUEL) 100 MG tablet Take 1.5 tablets (150 mg) by mouth At Bedtime 45 tablet 10     QUEtiapine (SEROQUEL) 50 MG tablet TAKE 1 TABLET(50 MG) BY MOUTH TWICE DAILY AS NEEDED 60 tablet 3     rosuvastatin (CRESTOR) 40 MG tablet Take 1 tablet (40 mg) by mouth daily 90 tablet 3     spironolactone (ALDACTONE) 25 MG tablet Take 1 tablet (25 mg) by mouth daily 90 tablet 3     tamsulosin (FLOMAX) 0.4 MG capsule Take 2 capsules (0.8 mg) by mouth every evening 180 capsule 3      Allergies   Allergen Reactions     Atorvastatin Diarrhea     Cortisone Other (See Comments)     pain     Lisinopril Cough     started after CABG for unclear reason     Methylprednisolone Other (See Comments)     ?        Physical Examination Review of Systems   /80 (BP Location: Right arm, Patient Position: Sitting, Cuff Size: Adult Regular)   Pulse 69   Resp 16   Ht 1.651 m (5' 5\")   Wt 91.9 kg (202 lb 9.6 oz)   BMI 33.71 kg/m    Body mass index is 33.71 kg/m .  Wt Readings from Last 3 Encounters:   09/12/22 91.9 kg (202 lb 9.6 oz)   08/05/22 91.8 kg (202 lb 4.8 oz)   08/04/22 88.9 kg (196 lb)       General Appearance:   Pleasant  male, appears  stated age. no acute distress, obese body habitus   ENT/Mouth: Facemask.     EYES:  no scleral icterus, normal conjunctivae   Neck: no carotid bruits. No anterior cervical lymphadenopaty   Respiratory:   lungs are clear to auscultation, no rales or wheezing, equal chest wall expansion    Cardiovascular:   Regular rhythm, normal rate. Normal first and second heart sounds with no murmurs, rubs, or gallops; the carotid, radial and posterior tibial pulses are intact, Jugular venous " pressure normal, no edema bilaterally    Abdomen/GI:  no organomegaly, masses, bruits, or tenderness; bowel sounds are present   Extremities: no cyanosis or clubbing   Skin: no xanthelasma, warm.    Heme/lymph/ Immunology No apparent bleeding noted.   Neurologic: Alert and oriented. normal gait, no tremors     Psychiatric: Pleasant, calm, appropriate affect.    A complete 10 system review of systems was performed and is negative except as mentioned in the HPI/subjective.         Past History   Past Medical History:   Past Medical History:   Diagnosis Date     Acute non-ST elevation myocardial infarction (NSTEMI) (H) 6/22/2020     Adenomatous polyp of colon      Ascending aorta dilatation (H)      Carpal tunnel syndrome      Chronic superficial gastritis      Coronary artery disease due to lipid rich plaque 6/23/2020     Depression with anxiety      Dyslipidemia, goal LDL below 70 6/23/2020     Essential hypertension 9/2/2012     Fatty liver disease, nonalcoholic      GERD (gastroesophageal reflux disease)      IBS (irritable bowel syndrome)      Left lumbar radiculopathy      Multinodular goiter      Old torn meniscus of knee      Paroxysmal atrial fibrillation (H)      Perianal abscess      Post herpetic neuralgia      Post-traumatic osteoarthritis of both knees      Primary osteoarthritis of left hip      Primary osteoarthritis of right hip      Pulmonary nodule      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 Ragsdale     CV CORONARY ANGIOGRAM N/A 6/23/2020    Procedure: Coronary Angiogram;  Surgeon: Ariane Lester MD;  Location: [facility] Cath Lab;  Service: Cardiology     CV IABP INSERT N/A 6/24/2020    Procedure: Intra  Aortic Balloon Pump Insertion;  Surgeon: Ariane Lester MD;  Location: [facility] Cath Lab;  Service: Cardiology     CV LEFT HEART CATHETERIZATION WITHOUT LEFT VENTRICULOGRAM Left 2020    Procedure: Left Heart Catheterization Without Left Ventriculogram;  Surgeon: Ariane Lester MD;  Location: [facility] Cath Lab;  Service: Cardiology       Family History:   Family History   Problem Relation Age of Onset     No Known Problems Mother      Lung Cancer Father 56        fatal     No Known Problems Daughter      Other - See Comments Son         congenital deformity of right leg; he uses a leg prosthesis       Social History:   Social History     Socioeconomic History     Marital status:      Spouse name: Carlee     Number of children: 2     Years of education: Not on file     Highest education level: Not on file   Occupational History     Not on file   Tobacco Use     Smoking status: Former Smoker     Packs/day: 1.00     Years: 30.00     Pack years: 30.00     Types: Cigarettes, Cigarettes     Quit date: 3/23/2020     Years since quittin.4     Smokeless tobacco: Never Used     Tobacco comment: stopped 3/24/2020   Vaping Use     Vaping Use: Never used   Substance and Sexual Activity     Alcohol use: No     Drug use: Never     Sexual activity: Yes     Partners: Female   Other Topics Concern     Not on file   Social History Narrative    , Carlee, BA chemistry and taking AA courses at [employer].  From [place]; bachelors in geology and computer science. Son, Radhasharif () and Sherrie (2008).  On SSDI, PTSD.       Social Determinants of Health     Financial Resource Strain: Not on file   Food Insecurity: No Food Insecurity     Worried About Running Out of Food in the Last Year: Never true     Ran Out of Food in the Last Year: Never true   Transportation Needs: No Transportation Needs     Lack of Transportation (Medical): No     Lack of Transportation (Non-Medical): No   Physical Activity: Not on file    Stress: Not on file   Social Connections: Not on file   Intimate Partner Violence: Not on file   Housing Stability: Not on file              Lab Results    Chemistry/lipid CBC Cardiac Enzymes/BNP/TSH/INR   Lab Results   Component Value Date    CHOL 104 08/05/2022    HDL 28 (L) 08/05/2022    LDL 42 08/05/2022    TRIG 172 (H) 08/05/2022    CR 1.32 (H) 08/05/2022    BUN 14.2 08/05/2022    POTASSIUM 4.5 08/05/2022     08/05/2022    CO2 27 08/05/2022      Lab Results   Component Value Date    WBC 7.3 08/05/2022    HGB 13.7 08/05/2022    HCT 41.0 08/05/2022    MCV 86 08/05/2022     08/05/2022    A1C 6.0 (H) 08/05/2022     Lab Results   Component Value Date    A1C 6.0 (H) 08/05/2022    Lab Results   Component Value Date    TROPONINI <0.01 11/21/2020    BNP 79 (H) 07/27/2020    TSH 3.40 08/05/2022    INR 1.10 07/27/2020          Sloan Burns MD [facility]  Non-Invasive Cardiologist  [facility]  Pager [phone]

## 2022-09-12 NOTE — PROGRESS NOTES
Mohammaandreina Larryeed  [address] [facility] [id number] [phone] (home)     Procedure cardiologist:  Dr. Lester  PCP:  Az Briseno  H&P completed by:  Dr. Burns 9-12-22  Admit date Tues 9-27-22  Arrival time:  0730  Anticoagulation:  NA  CPAP: No  Previous PCI: No  Bypass Grafts: Yes - 6-24-20 CABG x 4  Renal Issues: No  Diabetic?: No  Device?: No  Type:  NA  Ambulation status: independent    Patient requested use of his own Macedonian  via phone - her name is Stefanie - contact # [phone].    Reason for Visit:  Patient seen for pre-procedure education in preparation for: Right Heart Catheterization, Left Heart Catheterization and Coronary Angiogram with Possible PCI    Procedure Prep:  Primary Cardiologist note dated: 9-12-22  EKG results obtained, dated: To be completed on day of cath lab procedure  Hemogram results obtained: To be completed on day of cath lab procedure  Basic Metabolic Panel results obtained: To be completed on day of cath lab procedure  Lipid Profile results obtained: 8-5-22  Pertinent cardiac test results: 12-23-21 cardMRI, 5-21-21 Lexiscan nuc, 6-27-20 echo  Orders placed per cosign required protocol 9-12-22.    COVID-19 test: home rapid antigen 9-25-22 or 9-26-22 - pt instructed to bring picture of negative results to present on admission and call nurse if results positive.    Patient Education  1. Your arrival time is 0730.  Location is [facility] - [address] [place] [id number] - Main Entrance of the Hospital  2. Please plan on being at the hospital all day.  3. At any time, emergencies and/or urgent cases may come up which could delay the start of your procedure.    COVID Testing Instructions  *Mandatory COVID Testing:   ALL Patients will need to complete a COVID test no sooner than 4 days prior to their procedure (regardless of vaccination status).      To schedule COVID testing Please call [phone]    If you want to  complete this at an outside facility please call them to find out if they will have the results within the appropriate time frame and their fax number.  You will need to provide us with that information so we can send the order.    The facility completing the test will need to fax the results to [phone]    If you are running into and issues please call us.     Pre-procedure instructions  Patient instructed to not Eat or Drink after midnight.  Patient instructed to shower the evening before or the morning of the procedure.  Patient instructed to arrange for transportation home following procedure from a responsible family member of friend (wife will be ).  No driving for at least 24 hours.  Patient instructed to have a responsible adult with them for 24 hours post-procedure.  Post-procedure follow up process.  Conscious sedation discussed.  Patient instructed on antiplatelet medication.  Received procedure education handouts in person.    Pre-procedure medication instructions.  Continue medications as scheduled, with a small amount of water on the day of the procedure unless indicated. (NO Diabetic Medications or Blood Thinners)  Pt instructed not to consume Alcohol, Tobacco, Caffeine 12 hours prior to procedure.  Patient instructed to take 4-81 mg of Aspirin, in addition to Symbicort, Lexapro, Imdur, Prilosec, Crestor, Miralax morning of procedure.  Hold Spironolactone.    Patient states understanding of procedure and agrees to proceed.    *PATIENTS RECORDS AVAILABLE IN Wire UNLESS OTHERWISE INDICATED*      Patient Active Problem List   Diagnosis     Recurrent major depressive disorder, in partial remission (H)     Fatty liver disease, nonalcoholic     Gastroesophageal reflux disease with esophagitis without hemorrhage     Essential hypertension     Non-toxic multinodular goiter     Post-traumatic osteoarthritis of both knees     Primary osteoarthritis of left hip     Primary osteoarthritis of right hip      Coronary artery disease, CABG 6/2020     Pre-diabetes     Dyslipidemia, goal LDL below 70     Benign prostatic hyperplasia with nocturia     PTSD (post-traumatic stress disorder)     Ascending aorta dilatation (H)     TMJ arthritis     Anal fistula     Simple chronic bronchitis (H)       Current Outpatient Medications   Medication Sig Dispense Refill     ACETAMINOPHEN EXTRA STRENGTH 500 MG tablet TAKE 2 TABLETS(1000 MG) BY MOUTH EVERY 6 HOURS AS NEEDED FOR PAIN 360 tablet 3     albuterol (PROAIR HFA/PROVENTIL HFA/VENTOLIN HFA) 108 (90 Base) MCG/ACT inhaler Inhale 1-2 puffs into the lungs every 4 hours as needed for shortness of breath / dyspnea 18 g 11     aspirin (ASA) 81 MG chewable tablet Take 1 tablet (81 mg) by mouth daily 90 tablet 3     budesonide-formoterol (SYMBICORT) 160-4.5 MCG/ACT Inhaler Inhale 2 puffs into the lungs 2 times daily 10.2 g 11     Cholecalciferol (VITAMIN D3) 50 MCG (2000 UT) CAPS Take 1 capsule by mouth daily 90 capsule 3     diclofenac (VOLTAREN) 1 % topical gel Apply 4 g topically 4 times daily 500 g 2     escitalopram (LEXAPRO) 20 MG tablet Take 1 tablet (20 mg) by mouth daily 30 tablet 0     gabapentin (NEURONTIN) 300 MG capsule Take 1 capsule (300 mg) by mouth daily 90 capsule 3     hydrOXYzine (VISTARIL) 25 MG capsule Take 1-2 capsules (25-50 mg) by mouth 3 times daily as needed for anxiety 90 capsule 11     isosorbide mononitrate (IMDUR) 30 MG 24 hr tablet Take 1 tablet (30 mg) by mouth daily 90 tablet 3     Lidocaine 0.5 % GEL        metoprolol succinate ER (TOPROL-XL) 100 MG 24 hr tablet Take 1.5 tablets (150 mg) by mouth At Bedtime 135 tablet 3     nifedipine 0.2% in white petrolatum 0.2 % OINT ointment Apply topically 4 times daily as needed (anal fissure) 100 g 1     omeprazole (PRILOSEC) 20 MG capsule [OMEPRAZOLE (PRILOSEC) 20 MG CAPSULE] Take 1 capsule (20 mg total) by mouth 2 (two) times a day before meals. 180 capsule 3     polyethylene glycol (MIRALAX) 17 g packet  Take 1 packet by mouth daily       polyethylene glycol (MIRALAX) 17 GM/Dose powder Take 17 g (1 capful) by mouth daily 850 g 3     QUEtiapine (SEROQUEL) 100 MG tablet Take 1.5 tablets (150 mg) by mouth At Bedtime 45 tablet 10     QUEtiapine (SEROQUEL) 50 MG tablet TAKE 1 TABLET(50 MG) BY MOUTH TWICE DAILY AS NEEDED 60 tablet 3     rosuvastatin (CRESTOR) 40 MG tablet Take 1 tablet (40 mg) by mouth daily 90 tablet 3     spironolactone (ALDACTONE) 25 MG tablet Take 1 tablet (25 mg) by mouth daily 90 tablet 3     tamsulosin (FLOMAX) 0.4 MG capsule Take 2 capsules (0.8 mg) by mouth every evening 180 capsule 3       Allergies   Allergen Reactions     Atorvastatin Diarrhea     Cortisone Other (See Comments)     pain     Lisinopril Cough     started after CABG for unclear reason     Methylprednisolone Other (See Comments)     ?       Yasmeen Medina RN on 9/12/2022 at 10:59 AM

## 2022-09-14 ENCOUNTER — OFFICE VISIT (OUTPATIENT)
Dept: INTERNAL MEDICINE | Facility: CLINIC | Age: 57
End: 2022-09-14
Payer: COMMERCIAL

## 2022-09-14 VITALS
WEIGHT: 201.5 LBS | HEART RATE: 58 BPM | BODY MASS INDEX: 33.53 KG/M2 | OXYGEN SATURATION: 96 % | RESPIRATION RATE: 17 BRPM | SYSTOLIC BLOOD PRESSURE: 119 MMHG | DIASTOLIC BLOOD PRESSURE: 83 MMHG

## 2022-09-14 DIAGNOSIS — Z95.1 S/P CABG X 4: ICD-10-CM

## 2022-09-14 DIAGNOSIS — I25.83 CORONARY ARTERY DISEASE DUE TO LIPID RICH PLAQUE: Primary | Chronic | ICD-10-CM

## 2022-09-14 DIAGNOSIS — F43.10 PTSD (POST-TRAUMATIC STRESS DISORDER): Chronic | ICD-10-CM

## 2022-09-14 DIAGNOSIS — F33.41 RECURRENT MAJOR DEPRESSIVE DISORDER, IN PARTIAL REMISSION (H): Chronic | ICD-10-CM

## 2022-09-14 DIAGNOSIS — M79.2 NEUROPATHIC PAIN: ICD-10-CM

## 2022-09-14 DIAGNOSIS — J41.0 SIMPLE CHRONIC BRONCHITIS (H): ICD-10-CM

## 2022-09-14 DIAGNOSIS — I10 ESSENTIAL HYPERTENSION: ICD-10-CM

## 2022-09-14 DIAGNOSIS — Z95.1 S/P CABG (CORONARY ARTERY BYPASS GRAFT): ICD-10-CM

## 2022-09-14 DIAGNOSIS — K21.00 GASTROESOPHAGEAL REFLUX DISEASE WITH ESOPHAGITIS WITHOUT HEMORRHAGE: ICD-10-CM

## 2022-09-14 DIAGNOSIS — I25.10 CORONARY ARTERY DISEASE DUE TO LIPID RICH PLAQUE: Primary | Chronic | ICD-10-CM

## 2022-09-14 PROBLEM — K76.0 STEATOSIS OF LIVER: Status: ACTIVE | Noted: 2017-07-12

## 2022-09-14 PROBLEM — R73.03 PRE-DIABETES: Chronic | Status: RESOLVED | Noted: 2020-06-29 | Resolved: 2022-09-14

## 2022-09-14 PROBLEM — R19.7 DIARRHEA: Status: ACTIVE | Noted: 2017-07-12

## 2022-09-14 PROBLEM — R12 HEARTBURN: Status: ACTIVE | Noted: 2017-07-12

## 2022-09-14 PROBLEM — K64.9 HEMORRHOIDS: Status: ACTIVE | Noted: 2021-10-25

## 2022-09-14 PROBLEM — R10.11 RIGHT UPPER QUADRANT PAIN: Status: RESOLVED | Noted: 2022-09-14 | Resolved: 2022-09-14

## 2022-09-14 PROBLEM — R19.7 DIARRHEA: Status: RESOLVED | Noted: 2017-07-12 | Resolved: 2022-09-14

## 2022-09-14 PROBLEM — Z87.19 HISTORY OF IRRITABLE BOWEL SYNDROME: Status: RESOLVED | Noted: 2017-07-12 | Resolved: 2022-09-14

## 2022-09-14 PROBLEM — D12.4 BENIGN NEOPLASM OF DESCENDING COLON: Status: RESOLVED | Noted: 2017-09-18 | Resolved: 2022-09-14

## 2022-09-14 PROBLEM — K29.50 CHRONIC GASTRITIS: Status: RESOLVED | Noted: 2017-09-18 | Resolved: 2022-09-14

## 2022-09-14 PROBLEM — D12.4 BENIGN NEOPLASM OF DESCENDING COLON: Status: ACTIVE | Noted: 2017-09-18

## 2022-09-14 PROBLEM — R10.9 NONSPECIFIC ABDOMINAL PAIN: Status: RESOLVED | Noted: 2021-03-15 | Resolved: 2022-09-14

## 2022-09-14 PROBLEM — K64.9 HEMORRHOIDS: Status: RESOLVED | Noted: 2021-10-25 | Resolved: 2022-09-14

## 2022-09-14 PROBLEM — R10.30 LOWER ABDOMINAL PAIN: Status: RESOLVED | Noted: 2017-07-12 | Resolved: 2022-09-14

## 2022-09-14 PROBLEM — K76.0 FATTY LIVER DISEASE, NONALCOHOLIC: Chronic | Status: RESOLVED | Noted: 2017-05-29 | Resolved: 2022-09-14

## 2022-09-14 PROBLEM — R11.2 NAUSEA AND VOMITING: Status: RESOLVED | Noted: 2017-07-12 | Resolved: 2022-09-14

## 2022-09-14 PROBLEM — K29.50 CHRONIC GASTRITIS: Status: ACTIVE | Noted: 2017-09-18

## 2022-09-14 PROBLEM — M26.649 TMJ ARTHRITIS: Status: RESOLVED | Noted: 2017-05-23 | Resolved: 2022-09-14

## 2022-09-14 PROBLEM — K29.80 DUODENITIS: Status: RESOLVED | Noted: 2021-10-25 | Resolved: 2022-09-14

## 2022-09-14 PROBLEM — D12.5 BENIGN NEOPLASM OF SIGMOID COLON: Status: ACTIVE | Noted: 2017-09-18

## 2022-09-14 PROBLEM — K21.9 GASTROESOPHAGEAL REFLUX DISEASE WITHOUT ESOPHAGITIS: Status: ACTIVE | Noted: 2017-09-14

## 2022-09-14 PROBLEM — R10.9 NONSPECIFIC ABDOMINAL PAIN: Status: ACTIVE | Noted: 2021-03-15

## 2022-09-14 PROBLEM — K21.9 GASTROESOPHAGEAL REFLUX DISEASE WITHOUT ESOPHAGITIS: Status: RESOLVED | Noted: 2017-09-14 | Resolved: 2022-09-14

## 2022-09-14 PROBLEM — R11.2 NAUSEA AND VOMITING: Status: ACTIVE | Noted: 2017-07-12

## 2022-09-14 PROBLEM — R10.30 LOWER ABDOMINAL PAIN: Status: ACTIVE | Noted: 2017-07-12

## 2022-09-14 PROBLEM — D12.3 BENIGN NEOPLASM OF TRANSVERSE COLON: Status: RESOLVED | Noted: 2021-10-27 | Resolved: 2022-09-14

## 2022-09-14 PROBLEM — K29.80 DUODENITIS: Status: ACTIVE | Noted: 2021-10-25

## 2022-09-14 PROBLEM — R12 HEARTBURN: Status: RESOLVED | Noted: 2017-07-12 | Resolved: 2022-09-14

## 2022-09-14 PROBLEM — D12.3 BENIGN NEOPLASM OF TRANSVERSE COLON: Status: ACTIVE | Noted: 2021-10-27

## 2022-09-14 PROBLEM — K63.5 POLYP OF COLON: Status: ACTIVE | Noted: 2017-09-14

## 2022-09-14 PROBLEM — D12.5 BENIGN NEOPLASM OF SIGMOID COLON: Status: RESOLVED | Noted: 2017-09-18 | Resolved: 2022-09-14

## 2022-09-14 PROBLEM — Z87.19 HISTORY OF IRRITABLE BOWEL SYNDROME: Status: ACTIVE | Noted: 2017-07-12

## 2022-09-14 PROBLEM — R10.11 RIGHT UPPER QUADRANT PAIN: Status: ACTIVE | Noted: 2022-09-14

## 2022-09-14 PROBLEM — I21.4 NSTEMI (NON-ST ELEVATION MYOCARDIAL INFARCTION) (H): Status: ACTIVE | Noted: 2020-06-24

## 2022-09-14 PROBLEM — K76.0 NONALCOHOLIC FATTY LIVER DISEASE: Status: ACTIVE | Noted: 2017-07-12

## 2022-09-14 PROBLEM — K76.0 STEATOSIS OF LIVER: Status: RESOLVED | Noted: 2017-07-12 | Resolved: 2022-09-14

## 2022-09-14 PROCEDURE — 90682 RIV4 VACC RECOMBINANT DNA IM: CPT | Performed by: INTERNAL MEDICINE

## 2022-09-14 PROCEDURE — 90471 IMMUNIZATION ADMIN: CPT | Performed by: INTERNAL MEDICINE

## 2022-09-14 PROCEDURE — 99213 OFFICE O/P EST LOW 20 MIN: CPT | Mod: 25 | Performed by: INTERNAL MEDICINE

## 2022-09-14 RX ORDER — METOPROLOL SUCCINATE 100 MG/1
150 TABLET, EXTENDED RELEASE ORAL AT BEDTIME
Qty: 135 TABLET | Refills: 4 | Status: SHIPPED | OUTPATIENT
Start: 2022-09-14 | End: 2023-08-28

## 2022-09-14 RX ORDER — ASPIRIN 81 MG/1
81 TABLET, CHEWABLE ORAL DAILY
Qty: 90 TABLET | Refills: 4 | Status: SHIPPED | OUTPATIENT
Start: 2022-09-14 | End: 2023-10-05

## 2022-09-14 RX ORDER — GABAPENTIN 300 MG/1
300 CAPSULE ORAL DAILY
Qty: 90 CAPSULE | Refills: 4 | Status: SHIPPED | OUTPATIENT
Start: 2022-09-14 | End: 2022-10-18

## 2022-09-14 ASSESSMENT — ENCOUNTER SYMPTOMS
FREQUENCY: 1
SHORTNESS OF BREATH: 1
MYALGIAS: 1
NERVOUS/ANXIOUS: 1
HEADACHES: 1

## 2022-09-14 ASSESSMENT — PAIN SCALES - GENERAL: PAINLEVEL: NO PAIN (0)

## 2022-09-14 NOTE — PATIENT INSTRUCTIONS
Dr. Burns has recommended a sleep medicine evaluation. I agree with this recommendation.  If you do not hear from the sleep clinic this week, please call  [phone] to make an appointment

## 2022-09-14 NOTE — H&P (VIEW-ONLY)
Office Visit - Follow Up   Nadya Blake   57 year old male    Date of Visit: 9/14/2022    Chief Complaint   Patient presents with     Recheck Medication     Follow Up     Bypass follow up and is fasting for labs     Physical     Imm/Inj     Influenza vacc        Assessment and Plan   1. Coronary artery disease, CABG 6/2020  2. S/P CABG x 4  Continue secondary prevention, heart catheterization has been scheduled for later this month    3. Simple chronic bronchitis (H)  No longer smoking, consider pulmonary evaluation pending heart cath    4. PTSD (post-traumatic stress disorder)  Per psychiatry, stable    5. Recurrent major depressive disorder, in partial remission (H)    7. Gastroesophageal reflux disease with esophagitis without hemorrhage  - omeprazole (PRILOSEC) 20 MG DR capsule; Take 1 capsule (20 mg) by mouth 2 times daily (before meals)  Dispense: 180 capsule; Refill: 4    8. Neuropathic pain  - gabapentin (NEURONTIN) 300 MG capsule; Take 1 capsule (300 mg) by mouth daily  Dispense: 90 capsule; Refill: 4    9. Essential hypertension  - metoprolol succinate ER (TOPROL XL) 100 MG 24 hr tablet; Take 1.5 tablets (150 mg) by mouth At Bedtime  Dispense: 135 tablet; Refill: 4    Return in about 4 weeks (around 10/12/2022) for Follow up.     History of Present Illness   This 57 year old man comes in for follow-up.  He recently saw cardiology and a coronary angiography has been ordered to evaluate his symptoms of dyspnea.  I believe we will be doing both a right and left heart cath.  Otherwise things are generally stable    Review of Systems: A comprehensive review of systems was negative except as noted.     Medications, Allergies and Problem List   Reviewed, reconciled and updated  Post Discharge Medication Reconciliation Status:      Physical Exam   General Appearance:   No acute distress    /83 (BP Location: Left arm, Patient Position: Sitting, Cuff Size: Adult Large)   Pulse 58   Resp 17   Wt 91.4  kg (201 lb 8 oz)   SpO2 96%   BMI 33.53 kg/m      Cardiovascular regular rate and rhythm no murmur gallop or rub  Pulmonary lungs are clear to auscultation bilaterally  Gastrointestinal abdomen soft nontender nondistended no organomegaly  Neurologic exam is non focal  Psychiatric pleasant, no confusion or agitation        Additional Information   Current Outpatient Medications   Medication Sig Dispense Refill     ACETAMINOPHEN EXTRA STRENGTH 500 MG tablet TAKE 2 TABLETS(1000 MG) BY MOUTH EVERY 6 HOURS AS NEEDED FOR PAIN 360 tablet 3     albuterol (PROAIR HFA/PROVENTIL HFA/VENTOLIN HFA) 108 (90 Base) MCG/ACT inhaler Inhale 1-2 puffs into the lungs every 4 hours as needed for shortness of breath / dyspnea 18 g 11     aspirin (ASA) 81 MG chewable tablet Take 1 tablet (81 mg) by mouth daily 90 tablet 4     budesonide-formoterol (SYMBICORT) 160-4.5 MCG/ACT Inhaler Inhale 2 puffs into the lungs 2 times daily 10.2 g 11     Cholecalciferol (VITAMIN D3) 50 MCG (2000 UT) CAPS Take 1 capsule by mouth daily 90 capsule 3     diclofenac (VOLTAREN) 1 % topical gel Apply 4 g topically 4 times daily 500 g 2     escitalopram (LEXAPRO) 20 MG tablet Take 1 tablet (20 mg) by mouth daily 30 tablet 0     gabapentin (NEURONTIN) 300 MG capsule Take 1 capsule (300 mg) by mouth daily 90 capsule 4     hydrOXYzine (VISTARIL) 25 MG capsule Take 1-2 capsules (25-50 mg) by mouth 3 times daily as needed for anxiety 90 capsule 11     isosorbide mononitrate (IMDUR) 30 MG 24 hr tablet Take 1 tablet (30 mg) by mouth daily 90 tablet 3     Lidocaine 0.5 % GEL        metoprolol succinate ER (TOPROL XL) 100 MG 24 hr tablet Take 1.5 tablets (150 mg) by mouth At Bedtime 135 tablet 4     nifedipine 0.2% in white petrolatum 0.2 % OINT ointment Apply topically 4 times daily as needed (anal fissure) 100 g 1     omeprazole (PRILOSEC) 20 MG DR capsule Take 1 capsule (20 mg) by mouth 2 times daily (before meals) 180 capsule 4     polyethylene glycol (MIRALAX) 17  GM/Dose powder Take 17 g (1 capful) by mouth daily 850 g 3     QUEtiapine (SEROQUEL) 100 MG tablet Take 1.5 tablets (150 mg) by mouth At Bedtime 45 tablet 10     QUEtiapine (SEROQUEL) 50 MG tablet TAKE 1 TABLET(50 MG) BY MOUTH TWICE DAILY AS NEEDED 60 tablet 3     rosuvastatin (CRESTOR) 40 MG tablet Take 1 tablet (40 mg) by mouth daily 90 tablet 3     spironolactone (ALDACTONE) 25 MG tablet Take 1 tablet (25 mg) by mouth daily 90 tablet 3     tamsulosin (FLOMAX) 0.4 MG capsule Take 2 capsules (0.8 mg) by mouth every evening 180 capsule 3     Allergies   Allergen Reactions     Atorvastatin Diarrhea     Cortisone Other (See Comments)     pain     Lisinopril Cough     started after CABG for unclear reason     Methylprednisolone Other (See Comments)     ?     Social History     Tobacco Use     Smoking status: Former Smoker     Packs/day: 1.00     Years: 30.00     Pack years: 30.00     Types: Cigarettes, Cigarettes     Quit date: 3/23/2020     Years since quittin.4     Smokeless tobacco: Never Used     Tobacco comment: stopped 3/24/2020   Vaping Use     Vaping Use: Never used   Substance Use Topics     Alcohol use: No     Drug use: Never       Time:      Az Briseno MD  Answers for HPI/ROS submitted by the patient on 2022  Frequency of exercise:: 1 day/week  Getting at least 3 servings of Calcium per day:: Yes  Diet:: Regular (no restrictions)  Taking medications regularly:: Yes  Medication side effects:: None  Bi-annual eye exam:: Yes  Dental care twice a year:: NO  Sleep apnea or symptoms of sleep apnea:: Excessive snoring  chest pain: Yes  nervous/anxious: Yes  frequency: Yes  headaches: Yes  myalgias: Yes  shortness of breath: Yes  Additional concerns today:: No  Duration of exercise:: 15-30 minutes

## 2022-09-26 NOTE — PROGRESS NOTES
Msg rec'd 9-26-22 @ 1453:  Jose Herrera Maureen, RN    This patient called in wanting to reschedule their procedure     Case type: LHC, RHC, CA POSS PCI     Procedure Physician(s): PTK     Procedure Date and Patient Arrival Time: 10/6 7AM ARRIVAL     H&P: 9/12 BEW     Alerts: PY HAS HAD BY PASS, Latvian  NEEDED     COVID Testing: AT HOME (10-4-22 or 10-5-22)    AN UPDATED IN PERSON  REQUEST HAS BEEN SUBMITTED     THANK YOU,   MARIS

## 2022-09-30 DIAGNOSIS — F43.10 PTSD (POST-TRAUMATIC STRESS DISORDER): ICD-10-CM

## 2022-09-30 DIAGNOSIS — E78.5 DYSLIPIDEMIA, GOAL LDL BELOW 70: ICD-10-CM

## 2022-09-30 RX ORDER — ROSUVASTATIN CALCIUM 40 MG/1
TABLET, COATED ORAL
Qty: 90 TABLET | Refills: 3 | Status: SHIPPED | OUTPATIENT
Start: 2022-09-30 | End: 2023-09-25

## 2022-09-30 RX ORDER — ESCITALOPRAM OXALATE 20 MG/1
TABLET ORAL
Qty: 30 TABLET | Refills: 0 | Status: SHIPPED | OUTPATIENT
Start: 2022-09-30 | End: 2022-11-01

## 2022-09-30 NOTE — TELEPHONE ENCOUNTER
"Last Written Prescription Date:  10/8/21  Last Fill Quantity: 90,  # refills: 3   Last office visit provider:  9/14/22     Requested Prescriptions   Pending Prescriptions Disp Refills     rosuvastatin (CRESTOR) 40 MG tablet [Pharmacy Med Name: ROSUVASTATIN 40MG TABLETS] 90 tablet 3     Sig: TAKE 1 TABLET(40 MG) BY MOUTH DAILY       Statins Protocol Passed - 9/30/2022  8:30 AM        Passed - LDL on file in past 12 months     Recent Labs   Lab Test 08/05/22  0920   LDL 42             Passed - No abnormal creatine kinase in past 12 months     No lab results found.             Passed - Recent (12 mo) or future (30 days) visit within the authorizing provider's specialty     Patient has had an office visit with the authorizing provider or a provider within the authorizing providers department within the previous 12 mos or has a future within next 30 days. See \"Patient Info\" tab in inbasket, or \"Choose Columns\" in Meds & Orders section of the refill encounter.              Passed - Medication is active on med list        Passed - Patient is age 18 or older             Cecilia Becker RN 09/30/22 4:31 PM  "

## 2022-09-30 NOTE — TELEPHONE ENCOUNTER
Date of Last Office Visit: 5/4/22  Date of Next Office Visit: 10/5/22  No shows since last visit: none   Cancellations since last visit: none    Medication requested: Escitlopram 20 mg Date last ordered: 9/2/22 Qty: 30 Refills: 0     Review of MN ?: NA    Lapse in medication adherence greater than 5 days?: no  If yes, call patient and gather details: NA  Medication refill request verified as identical to current order?: yes  Result of Last DAM, VPA, Li+ Level, CBC, or Carbamazepine Level (at or since last visit): N/A    Last visit treatment plan:   Plan:  I will increase the patient's Lexapro to 20 mg a day.  I have also increased his as needed Vistaril to 25 to 50 mg 3 times a day.     The patient will return to clinic in 3 months. He agrees to call before then with any questions or concerns.    [x]Medication refilled per  Medication Refill in Ambulatory Care  policy.  []Medication unable to be refilled by RN due to criteria not met as indicated below:    []Eligibility - not seen in the last year   []Supervision - no future appointment   []Compliance - no shows, cancellations or lapse in therapy   []Verification - order discrepancy   []Controlled medication   []Medication not included in policy   []90-day supply request   []Other

## 2022-10-05 ENCOUNTER — VIRTUAL VISIT (OUTPATIENT)
Dept: PSYCHIATRY | Facility: CLINIC | Age: 57
End: 2022-10-05
Payer: COMMERCIAL

## 2022-10-05 DIAGNOSIS — F43.10 PTSD (POST-TRAUMATIC STRESS DISORDER): ICD-10-CM

## 2022-10-05 PROCEDURE — 99214 OFFICE O/P EST MOD 30 MIN: CPT | Mod: 95 | Performed by: PSYCHIATRY & NEUROLOGY

## 2022-10-05 RX ORDER — QUETIAPINE FUMARATE 50 MG/1
TABLET, FILM COATED ORAL
Qty: 60 TABLET | Refills: 3 | Status: SHIPPED | OUTPATIENT
Start: 2022-10-05 | End: 2022-10-18

## 2022-10-05 RX ORDER — QUETIAPINE FUMARATE 50 MG/1
50-200 TABLET, FILM COATED ORAL
Qty: 120 TABLET | Refills: 3 | Status: SHIPPED | OUTPATIENT
Start: 2022-10-05 | End: 2023-03-08

## 2022-10-05 NOTE — PATIENT INSTRUCTIONS
The patient will continue to utilize his as needed Vistaril and as needed Seroquel as ordered.  I have increased the availability of both of those medications.  The patient will continue to follow-up with his medical team as a direct.  He should return to this clinic in 3 to 4 months for medication check and agrees to call before then with any questions or concerns.
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
## 2022-10-05 NOTE — PROGRESS NOTES
Nadya is a 57 year old who is being evaluated via a billable video visit.      How would you like to obtain your AVS? MyChart  If the video visit is dropped, the invitation should be resent by: Text to cell phone:   Will anyone else be joining your video visit? No

## 2022-10-06 ENCOUNTER — HOSPITAL ENCOUNTER (OUTPATIENT)
Facility: HOSPITAL | Age: 57
Discharge: HOME | End: 2022-10-06
Attending: INTERNAL MEDICINE | Admitting: INTERNAL MEDICINE
Payer: COMMERCIAL

## 2022-10-06 VITALS
OXYGEN SATURATION: 97 % | HEART RATE: 66 BPM | SYSTOLIC BLOOD PRESSURE: 122 MMHG | RESPIRATION RATE: 13 BRPM | TEMPERATURE: 98.3 F | DIASTOLIC BLOOD PRESSURE: 86 MMHG

## 2022-10-06 DIAGNOSIS — I25.709 CORONARY ARTERY DISEASE INVOLVING CORONARY BYPASS GRAFT OF NATIVE HEART WITH ANGINA PECTORIS (H): ICD-10-CM

## 2022-10-06 DIAGNOSIS — I50.32 CHRONIC HEART FAILURE WITH PRESERVED EJECTION FRACTION (H): ICD-10-CM

## 2022-10-06 LAB
ABO/RH(D): NORMAL
ANION GAP SERPL CALCULATED.3IONS-SCNC: 7 MMOL/L (ref 7–15)
ANTIBODY SCREEN: NEGATIVE
ATRIAL RATE - MUSE: 57 BPM
BUN SERPL-MCNC: 16.8 MG/DL (ref 6–20)
CALCIUM SERPL-MCNC: 9.3 MG/DL (ref 8.6–10)
CHLORIDE SERPL-SCNC: 98 MMOL/L (ref 98–107)
CREAT SERPL-MCNC: 1.2 MG/DL (ref 0.67–1.17)
DEPRECATED HCO3 PLAS-SCNC: 27 MMOL/L (ref 22–29)
DIASTOLIC BLOOD PRESSURE - MUSE: NORMAL MMHG
ERYTHROCYTE [DISTWIDTH] IN BLOOD BY AUTOMATED COUNT: 11.9 % (ref 10–15)
GFR SERPL CREATININE-BSD FRML MDRD: 71 ML/MIN/1.73M2
GLUCOSE SERPL-MCNC: 129 MG/DL (ref 70–99)
HCT VFR BLD AUTO: 41.1 % (ref 40–53)
HGB BLD-MCNC: 13.9 G/DL (ref 13.3–17.7)
INTERPRETATION ECG - MUSE: NORMAL
MCH RBC QN AUTO: 29.4 PG (ref 26.5–33)
MCHC RBC AUTO-ENTMCNC: 33.8 G/DL (ref 31.5–36.5)
MCV RBC AUTO: 87 FL (ref 78–100)
P AXIS - MUSE: 30 DEGREES
PLATELET # BLD AUTO: 217 10E3/UL (ref 150–450)
POTASSIUM SERPL-SCNC: 4.2 MMOL/L (ref 3.4–5.3)
PR INTERVAL - MUSE: 216 MS
QRS DURATION - MUSE: 86 MS
QT - MUSE: 446 MS
QTC - MUSE: 434 MS
R AXIS - MUSE: -17 DEGREES
RBC # BLD AUTO: 4.73 10E6/UL (ref 4.4–5.9)
SODIUM SERPL-SCNC: 132 MMOL/L (ref 136–145)
SPECIMEN EXPIRATION DATE: NORMAL
SYSTOLIC BLOOD PRESSURE - MUSE: NORMAL MMHG
T AXIS - MUSE: 69 DEGREES
VENTRICULAR RATE- MUSE: 57 BPM
WBC # BLD AUTO: 7.2 10E3/UL (ref 4–11)

## 2022-10-06 PROCEDURE — 85027 COMPLETE CBC AUTOMATED: CPT | Performed by: GENERAL ACUTE CARE HOSPITAL

## 2022-10-06 PROCEDURE — 36415 COLL VENOUS BLD VENIPUNCTURE: CPT | Performed by: GENERAL ACUTE CARE HOSPITAL

## 2022-10-06 PROCEDURE — 93461 R&L HRT ART/VENTRICLE ANGIO: CPT | Performed by: INTERNAL MEDICINE

## 2022-10-06 PROCEDURE — 250N000011 HC RX IP 250 OP 636: Performed by: INTERNAL MEDICINE

## 2022-10-06 PROCEDURE — 80048 BASIC METABOLIC PNL TOTAL CA: CPT | Performed by: GENERAL ACUTE CARE HOSPITAL

## 2022-10-06 PROCEDURE — 99152 MOD SED SAME PHYS/QHP 5/>YRS: CPT | Performed by: INTERNAL MEDICINE

## 2022-10-06 PROCEDURE — 258N000003 HC RX IP 258 OP 636: Performed by: GENERAL ACUTE CARE HOSPITAL

## 2022-10-06 PROCEDURE — 250N000013 HC RX MED GY IP 250 OP 250 PS 637: Performed by: INTERNAL MEDICINE

## 2022-10-06 PROCEDURE — C1894 INTRO/SHEATH, NON-LASER: HCPCS | Performed by: INTERNAL MEDICINE

## 2022-10-06 PROCEDURE — 86901 BLOOD TYPING SEROLOGIC RH(D): CPT | Performed by: GENERAL ACUTE CARE HOSPITAL

## 2022-10-06 PROCEDURE — 93005 ELECTROCARDIOGRAM TRACING: CPT

## 2022-10-06 PROCEDURE — 93459 L HRT ART/GRFT ANGIO: CPT | Mod: 26 | Performed by: INTERNAL MEDICINE

## 2022-10-06 PROCEDURE — 999N000054 HC STATISTIC EKG NON-CHARGEABLE

## 2022-10-06 PROCEDURE — C1751 CATH, INF, PER/CENT/MIDLINE: HCPCS | Performed by: INTERNAL MEDICINE

## 2022-10-06 PROCEDURE — 255N000002 HC RX 255 OP 636: Performed by: INTERNAL MEDICINE

## 2022-10-06 PROCEDURE — 250N000009 HC RX 250: Performed by: INTERNAL MEDICINE

## 2022-10-06 PROCEDURE — 93010 ELECTROCARDIOGRAM REPORT: CPT | Performed by: INTERNAL MEDICINE

## 2022-10-06 PROCEDURE — 250N000013 HC RX MED GY IP 250 OP 250 PS 637: Performed by: GENERAL ACUTE CARE HOSPITAL

## 2022-10-06 PROCEDURE — C1760 CLOSURE DEV, VASC: HCPCS | Performed by: INTERNAL MEDICINE

## 2022-10-06 PROCEDURE — 93459 L HRT ART/GRFT ANGIO: CPT | Performed by: INTERNAL MEDICINE

## 2022-10-06 PROCEDURE — 86850 RBC ANTIBODY SCREEN: CPT | Performed by: GENERAL ACUTE CARE HOSPITAL

## 2022-10-06 PROCEDURE — 272N000001 HC OR GENERAL SUPPLY STERILE: Performed by: INTERNAL MEDICINE

## 2022-10-06 RX ORDER — NALOXONE HYDROCHLORIDE 0.4 MG/ML
0.4 INJECTION, SOLUTION INTRAMUSCULAR; INTRAVENOUS; SUBCUTANEOUS
Status: DISCONTINUED | OUTPATIENT
Start: 2022-10-06 | End: 2022-10-06 | Stop reason: HOSPADM

## 2022-10-06 RX ORDER — LIDOCAINE 40 MG/G
CREAM TOPICAL
Status: DISCONTINUED | OUTPATIENT
Start: 2022-10-06 | End: 2022-10-06 | Stop reason: HOSPADM

## 2022-10-06 RX ORDER — NALOXONE HYDROCHLORIDE 0.4 MG/ML
0.2 INJECTION, SOLUTION INTRAMUSCULAR; INTRAVENOUS; SUBCUTANEOUS
Status: DISCONTINUED | OUTPATIENT
Start: 2022-10-06 | End: 2022-10-06 | Stop reason: HOSPADM

## 2022-10-06 RX ORDER — SODIUM CHLORIDE 9 MG/ML
INJECTION, SOLUTION INTRAVENOUS CONTINUOUS
Status: DISCONTINUED | OUTPATIENT
Start: 2022-10-06 | End: 2022-10-06 | Stop reason: HOSPADM

## 2022-10-06 RX ORDER — GABAPENTIN 100 MG/1
300 CAPSULE ORAL ONCE
Status: COMPLETED | OUTPATIENT
Start: 2022-10-06 | End: 2022-10-06

## 2022-10-06 RX ORDER — OXYCODONE HYDROCHLORIDE 5 MG/1
5 TABLET ORAL EVERY 4 HOURS PRN
Status: DISCONTINUED | OUTPATIENT
Start: 2022-10-06 | End: 2022-10-06 | Stop reason: HOSPADM

## 2022-10-06 RX ORDER — DIAZEPAM 10 MG
10 TABLET ORAL
Status: COMPLETED | OUTPATIENT
Start: 2022-10-06 | End: 2022-10-06

## 2022-10-06 RX ORDER — IODIXANOL 320 MG/ML
INJECTION, SOLUTION INTRAVASCULAR
Status: DISCONTINUED | OUTPATIENT
Start: 2022-10-06 | End: 2022-10-06 | Stop reason: HOSPADM

## 2022-10-06 RX ORDER — ASPIRIN 81 MG/1
243 TABLET, CHEWABLE ORAL ONCE
Status: DISCONTINUED | OUTPATIENT
Start: 2022-10-06 | End: 2022-10-06 | Stop reason: HOSPADM

## 2022-10-06 RX ORDER — ACETAMINOPHEN 325 MG/1
650 TABLET ORAL EVERY 4 HOURS PRN
Status: DISCONTINUED | OUTPATIENT
Start: 2022-10-06 | End: 2022-10-06 | Stop reason: HOSPADM

## 2022-10-06 RX ORDER — FENTANYL CITRATE 50 UG/ML
25 INJECTION, SOLUTION INTRAMUSCULAR; INTRAVENOUS
Status: DISCONTINUED | OUTPATIENT
Start: 2022-10-06 | End: 2022-10-06 | Stop reason: HOSPADM

## 2022-10-06 RX ORDER — ASPIRIN 325 MG
325 TABLET ORAL ONCE
Status: DISCONTINUED | OUTPATIENT
Start: 2022-10-06 | End: 2022-10-06 | Stop reason: HOSPADM

## 2022-10-06 RX ORDER — FENTANYL CITRATE 50 UG/ML
INJECTION, SOLUTION INTRAMUSCULAR; INTRAVENOUS
Status: DISCONTINUED | OUTPATIENT
Start: 2022-10-06 | End: 2022-10-06 | Stop reason: HOSPADM

## 2022-10-06 RX ORDER — ATROPINE SULFATE 0.1 MG/ML
0.5 INJECTION INTRAVENOUS
Status: DISCONTINUED | OUTPATIENT
Start: 2022-10-06 | End: 2022-10-06 | Stop reason: HOSPADM

## 2022-10-06 RX ORDER — FLUMAZENIL 0.1 MG/ML
0.2 INJECTION, SOLUTION INTRAVENOUS
Status: DISCONTINUED | OUTPATIENT
Start: 2022-10-06 | End: 2022-10-06 | Stop reason: HOSPADM

## 2022-10-06 RX ORDER — OXYCODONE HYDROCHLORIDE 5 MG/1
10 TABLET ORAL EVERY 4 HOURS PRN
Status: DISCONTINUED | OUTPATIENT
Start: 2022-10-06 | End: 2022-10-06 | Stop reason: HOSPADM

## 2022-10-06 RX ADMIN — DIAZEPAM 10 MG: 10 TABLET ORAL at 09:56

## 2022-10-06 RX ADMIN — SODIUM CHLORIDE: 9 INJECTION, SOLUTION INTRAVENOUS at 09:56

## 2022-10-06 RX ADMIN — GABAPENTIN 300 MG: 100 CAPSULE ORAL at 17:38

## 2022-10-06 ASSESSMENT — ACTIVITIES OF DAILY LIVING (ADL)
ADLS_ACUITY_SCORE: 35

## 2022-10-06 ASSESSMENT — EJECTION FRACTION: EF_VALUE: .14

## 2022-10-06 NOTE — Clinical Note
In Person used to assist with interpretion.Intepreter name Jacques QUILES phone number [phone].Agency from [facility].Language Romanian.

## 2022-10-06 NOTE — PROGRESS NOTES
Discharge Summary - [facility]    Primary Care Physician:  Az Briseno    Discharge Provider: Javon John MD     Admission Date: 10/6/2022.   Admission Diagnoses:   Past Medical History:   Diagnosis Date    Acute non-ST elevation myocardial infarction (NSTEMI) (H) 6/22/2020    Adenomatous polyp of colon     Ascending aorta dilatation (H)     Carpal tunnel syndrome     Chronic superficial gastritis     Coronary artery disease due to lipid rich plaque 6/23/2020    Depression with anxiety     Dyslipidemia, goal LDL below 70 6/23/2020    Essential hypertension 9/2/2012    Fatty liver disease, nonalcoholic     GERD (gastroesophageal reflux disease)     IBS (irritable bowel syndrome)     Left lumbar radiculopathy     Multinodular goiter     Old torn meniscus of knee     Paroxysmal atrial fibrillation (H)     Perianal abscess     Post herpetic neuralgia     Post-traumatic osteoarthritis of both knees     Primary osteoarthritis of left hip     Primary osteoarthritis of right hip     Pulmonary nodule     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   Disposition: Home  Condition at Discharge: Stable  Code Status: No Order      Discharge Diagnoses: Coronary atherosclerosis coronary artery bypass surgery    Consult/s: None  Significant Diagnostic Studies: Angiogram: see report.  Surgery: None  Treatments: See medication discharge sheet.    Discharge Medications: See discharge med list    Follow up appointment with Primary Care Physician: Az Briseno  Follow up appointment with Specialist: Grant    Diet: cardiac  Activity: Restricted as per instructions on sheath site care policy.  Restrictions: As per post heart cath protocol.  Wound / drain care: Routine wound care instructions.  Hospital  Summary:   Outpatient diagnostic coronary angiogram.  Patient had coronary bypass surgery 2 years ago.  Patient undergone four-vessel bypass surgery with LIMA placed to the mid distal LAD vein graft placed to the second diagonal artery vein graft placed to the OM 3 and a vein graft placed to the right PDA.    Study today finds left ventricular function to be normal without obvious wall motion abnormality.  Left heart hemodynamics were normal.  No gradient across the valve on pullback    Coronary anatomy reveals severe diffuse multivessel coronary disease as has been identified previously.  Of note the primary finding is the saphenous vein graft to the right PDA is closed.  The right coronary has moderate severe lesions of the proximal and mid segments.  The PDA itself is a rather small vessel.    The LUZ MARIA graft to the LAD, vein graft to second diagonal, and vein graft to OM 3 are all patent.  The second diagonal and distal LAD are small caliber vessels there is a long 70% lesion of the apical portion of the LAD distal to the graft insertion site.    After review of study would appear that the right coronary could be a candidate for revascularization with stenting of the proximal and mid segments.  There is diffuse disease of the right coronary.  Over these 2 lesions.  It to be the most flow obstructive.  Options to be discussed with the patient.  Be discharged home later today.  Difficulty with access of the right femoral artery with sheath placed in right femoral artery 2 locations and pulled with manual pressure.  Currently under observation without evident complication.    Vital Signs in last 24 hours:    Temp:  [98.3  F (36.8  C)] 98.3  F (36.8  C)  Pulse:  [54-60] 54  Resp:  [11-20] 11  BP: ()/(55-76) 95/71  SpO2:  [92 %-97 %] 92 %    Physical Exam:    Pertinent exam findings on day of discharge include sheath site stable at discharge.       Nadya Blake,  1965, MRN [id number]          Current  Facility-Administered Medications:     0.9% sodium chloride BOLUS, 250 mL, Intravenous, Once PRN, Javon John MD    atropine injection 0.5 mg, 0.5 mg, Intravenous, Once PRN, Javon John MD    fentaNYL (PF) (SUBLIMAZE) injection 25 mcg, 25 mcg, Intravenous, Q15 Min PRN, Javon John MD    flumazenil (ROMAZICON) injection 0.2 mg, 0.2 mg, Intravenous, q1 min prn, Javon John MD    midazolam (VERSED) injection 0.5 mg, 0.5 mg, Intravenous, Q5 Min PRN, Javon John MD    naloxone (NARCAN) injection 0.2 mg, 0.2 mg, Intravenous, Q2 Min PRN **OR** naloxone (NARCAN) injection 0.4 mg, 0.4 mg, Intravenous, Q2 Min PRN **OR** naloxone (NARCAN) injection 0.2 mg, 0.2 mg, Intramuscular, Q2 Min PRN **OR** naloxone (NARCAN) injection 0.4 mg, 0.4 mg, Intramuscular, Q2 Min PRN, Javon John MD

## 2022-10-06 NOTE — PROGRESS NOTES
CV progress note:    Patient's SBP upper 80s while on bedrest. Patient is asymptomatic. Groin site is unremarkable. Distal PVS is intact. Will give modest fluid bolus as I suspect low BP is likely due to dehydration. Will continue to monitor response.     Nu Thornton, CNP

## 2022-10-06 NOTE — Clinical Note
Potential access sites were evaluated for patency using ultrasound.   The right femoral artery and  vein was selected. Access was obtained under with Sonosite guidance using a standard 18 guage needle with direct visualization of needle entry.

## 2022-10-06 NOTE — PRE-PROCEDURE
GENERAL PRE-PROCEDURE:   Procedure:  Coronary angiogram with possible PCI, right and left heart catheterization  Date/Time:  10/6/2022 8:29 AM    Written consent obtained?: Yes    Risks and benefits: Risks, benefits and alternatives were discussed (The patient is Polish speaking and his consent was obtained with the assistance of a live Polish )    Consent given by:  Patient  Patient states understanding of procedure being performed: Yes    Patient's understanding of procedure matches consent: Yes    Procedure consent matches procedure scheduled: Yes    Expected level of sedation:  Moderate  Appropriately NPO:  Yes  ASA Class:  3 (HFpEF, CAD; s/p CABG (LUZ MARIA-LAD, radial artery-RPDA, reverse sapphenous- OM1 and Diag), Hx of COVID-19 5/2022, former smoker, possible HANNAH, Class I obesity; BMI 33.71 kg/m2)  Mallampati  :  Grade 3- soft palate visible, posterior pharyngeal wall not visible  Lungs:  Lungs clear with good breath sounds bilaterally  Heart:  Normal heart sounds and rate  History & Physical reviewed:  History and physical reviewed and no updates needed  Statement of review:  I have reviewed the lab findings, diagnostic data, medications, and the plan for sedation

## 2022-10-06 NOTE — Clinical Note
saphenous vein graft Cine(s)  injected utilizing power injector system. Graft occulded, radial art graft

## 2022-10-06 NOTE — PROGRESS NOTES
Cardiac cath lab    No cath report placed as PACS system down today    Angiogram findings complex multivessel coronary disease.  This was known from previous study.  Coronary anatomy appears similar to the past studies.  LUZ MARIA graft to the mid distal LAD is patent with a moderate severe stenosis distally and small apical LAD segment.  Vein graft to OM 3 is widely patent.  Vein graft to small second diagonal is patent.  Vein graft of the right coronary PDA is occluded.  The right coronary itself remains patent with a proximal 75% lesion and a mid 75% lesion additional mild-moderate lesions are noted but only these 2 specific stenoses appear to be of high-grade.    Left ventricular function was normal without wall motion abnormality.    Inadvertent sheath access of the right femoral artery attempting to obtain RF V access.    Manual pull on sheath without complication at this time.    Case reviewed discussed with patient and family.  Significant change appears to be loss of saphenous vein graft to the PDA.  Recent MRI last December showed no ischemia so likely occlusion occurred since that time.  Patient be a candidate for PCI of the proximal mid RCA.  Patient return in 2 weeks for PCI at that time.

## 2022-10-06 NOTE — PROGRESS NOTES
Patient prepped and consented for cath lab procedure. Ready for pickup from cath lab.  Cheryl Nuñez RN

## 2022-10-06 NOTE — DISCHARGE INSTRUCTIONS
Interventional Cardiology  Coronary Angiogram/Angioplasty/Stent/Atherectomy Discharge Instructions -   Femoral Approach    The instructions below are to help you understand how to take care of yourself. There is also information about when to call the doctor or emergency services    **Do not stop your aspirin or platelet inhibitor unless directed by your Cardiologist.  These medications help to prevent platelets in your blood from sticking together and forming a clot.  Examples of these medications are:  Ticagrelor (Brilinta), Clopidigrel (Plavix), Prasugrel (Effient)          For the first 72 hours after your procedure:  No driving for 24 hours  Do not lift more than 15 pounds.  If you usually lift 50 pounds or more daily, please contact your cardiologist  Avoid any hard work or tiring activities, this includes, yard work, jogging, biking, sexual activity  During the day get up and walk around every 2 hours  You may return to work after 72 hours if you are feeling well and your job does not involve heavy lifting          Groin Site / Wound Care / Bleeding    You may take off the dressing on your groin the day after your procedure  Keep the area dry and clean  It is ok to shower with regular soap.  Pat dry, do not rub  You do not need to use a bandage  No tub/pool or hot tub for 1 week  If your groin starts to bleed or begins to swell suddenly after leaving the hospital, lie flat and apply firm pressure above the site for 15 minutes.  If bleeding continues, call 9-1-1  Bruising around the groin area is normal.  It may take 2-3 weeks for this to go away.  It is normal for the bruised area to turn green and/or yellow as it is healing.  A small lump may also be present and may last 2-3 months.  Your leg may be sore or stiff for a few days.  You may take Tylenol or a pain medicine recommended by your doctor  If you have a fever over 100.4, that lasts more than one day - call your cardiologist.      Your Procedural  Physician was: Dr. Javon John  the phone number is: [phone]  [facility]:  [phone]  If you are calling after hours, please listen to the entire voicemail, a live  will answer at the end of the message

## 2022-10-06 NOTE — INTERVAL H&P NOTE
"I have reviewed the surgical (or preoperative) H&P that is linked to this encounter, and examined the patient. There are no significant changes    Clinical Conditions Present on Arrival:  Clinically Significant Risk Factors Present on Admission           # Hyponatremia: Na = 132 mmol/L (Ref range: 136 - 145 mmol/L) on admission, will monitor as appropriate         # Obesity: Estimated body mass index is 33.53 kg/m  as calculated from the following:    Height as of 9/12/22: 1.651 m (5' 5\").    Weight as of 9/14/22: 91.4 kg (201 lb 8 oz).       "

## 2022-10-07 NOTE — PROGRESS NOTES
Pt return to baseline cardiac and neuro status, hemostasis right groin access with small bruising, some tenderness. Pt struggled with back pain, aided by repositioning and dose of gabapentin for spasms. Ambulatory without dizziness. Voiding, only taking liquids, wants to wait to eat at home. Wife present. Both pt and wife understanding of Instructions/restrictions/F/U  per AVS. Discharged with all belongings, via w/c to care of wife.

## 2022-10-11 ENCOUNTER — OFFICE VISIT (OUTPATIENT)
Dept: CARDIOLOGY | Facility: CLINIC | Age: 57
End: 2022-10-11
Payer: COMMERCIAL

## 2022-10-11 VITALS
SYSTOLIC BLOOD PRESSURE: 112 MMHG | BODY MASS INDEX: 34.28 KG/M2 | DIASTOLIC BLOOD PRESSURE: 82 MMHG | HEART RATE: 60 BPM | WEIGHT: 206 LBS | RESPIRATION RATE: 14 BRPM

## 2022-10-11 DIAGNOSIS — I25.10 CORONARY ARTERY DISEASE DUE TO LIPID RICH PLAQUE: Primary | Chronic | ICD-10-CM

## 2022-10-11 DIAGNOSIS — I25.83 CORONARY ARTERY DISEASE DUE TO LIPID RICH PLAQUE: Primary | Chronic | ICD-10-CM

## 2022-10-11 PROCEDURE — 99214 OFFICE O/P EST MOD 30 MIN: CPT | Performed by: SURGERY

## 2022-10-11 NOTE — PROGRESS NOTES
Cardiac Surgery Consultation      Nadya Blake MRN# [id number]   YOB: 1965 Age: 57 year old        Reason for consult: I was asked by Dr. John to evaluate this patient for discussion of treatment of his coronary artery disease.           Assessment and Plan:   Mr. Blake is a 57-year-old man with severe multivessel coronary artery disease and a history of NSTEMI and coronary artery bypass grafting in June 2020. I discussed the fact that his radial artery graft to the right coronary artery is now occluded. He has severe serial lesions in the right coronary artery and percutaneous coronary intervention with drug eluting stent placement is recommended. I agree with this recommendation. I would definitely not recommend redo coronary artery bypass grafting.     I did also talk to the patient about significant, even drastic, dietary changes. Clearly, he has a genetic disposition to severe atherosclerosis. I discussed the option of becoming vegetarian or even vegan with him and his wife. He is going to consider this and they will do some research into it. I think that at his relatively young age, this is worth considering. His LAD and left circumflex are likely beyond the limits of what can be treated with PCI and his RCA will now has extensive stenting. Thinking long term, it is definitely worth considering significant dietary interventions to eliminated cholesterol from his diet.    History is obtained from the patient         History of Present Illness:   This patient is a 57 year old male who presents with chest pain. The patient has a history of coronary artery disease and previous NSTEMI. I performed a CABG x 4 (Radial-RPDA, VG-OM, VG-VENICE, and LIMA-LAD) in June 2020. He initially did well but he has had a rough year with COVID-19 infection and a perirectal abscess/anotrectal fistula. The led to him being a lot more sedentary and he has gained a little weight. He is noticing more dyspnea and he  has also had some left sided chest pain. He underwent repeat coronary angiography and he has an occluded radial artery graft to the RPDA with severe serial lesions in the RCA. He also has very severe diffuse coronary artery disease, and although the vein grafts and left internal mammary artery are patent, the targets are severely diffusely diseased.                Past Medical History:     Past Medical History:   Diagnosis Date     Acute non-ST elevation myocardial infarction (NSTEMI) (H) 6/22/2020     Adenomatous polyp of colon      Ascending aorta dilatation (H)      Carpal tunnel syndrome      Chronic superficial gastritis      Coronary artery disease due to lipid rich plaque 6/23/2020     Depression with anxiety      Dyslipidemia, goal LDL below 70 6/23/2020     Essential hypertension 9/2/2012     Fatty liver disease, nonalcoholic      GERD (gastroesophageal reflux disease)      IBS (irritable bowel syndrome)      Left lumbar radiculopathy      Multinodular goiter      Old torn meniscus of knee      Paroxysmal atrial fibrillation (H)      Perianal abscess      Post herpetic neuralgia      Post-traumatic osteoarthritis of both knees      Primary osteoarthritis of left hip      Primary osteoarthritis of right hip      Pulmonary nodule      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 Ragsdale     CV CORONARY ANGIOGRAM N/A 6/23/2020    Procedure: Coronary Angiogram;  Surgeon: Ariane Lester MD;  Location: [facility] Cath Lab;  Service: Cardiology     CV CORONARY ANGIOGRAM N/A 10/6/2022    Procedure: Coronary Angiogram;  Surgeon: Javon John MD;  Location: [facility] CATH LAB CV     CV IABP INSERT N/A 6/24/2020     Procedure: Intra Aortic Balloon Pump Insertion;  Surgeon: Ariane Lester MD;  Location: [facility] Cath Lab;  Service: Cardiology     CV LEFT HEART CATH N/A 10/6/2022    Procedure: Left Heart Catheterization;  Surgeon: Javon John MD;  Location: [facility] LAB CV     CV LEFT HEART CATHETERIZATION WITHOUT LEFT VENTRICULOGRAM Left 2020    Procedure: Left Heart Catheterization Without Left Ventriculogram;  Surgeon: Ariane Lester MD;  Location: [facility] Cath Lab;  Service: Cardiology     CV LEFT VENTRICULOGRAM N/A 10/6/2022    Procedure: Left Ventriculogram;  Surgeon: Javon John MD;  Location: [facility] CATH LAB CV               Social History:     Social History     Tobacco Use     Smoking status: Former     Packs/day: 1.00     Years: 30.00     Pack years: 30.00     Types: Cigarettes     Quit date: 3/23/2020     Years since quittin.5     Smokeless tobacco: Never     Tobacco comments:     stopped 3/24/2020   Substance Use Topics     Alcohol use: No             Family History:     Family History   Problem Relation Age of Onset     No Known Problems Mother      Lung Cancer Father 56        fatal     No Known Problems Daughter      Other - See Comments Son         congenital deformity of right leg; he uses a leg prosthesis             Immunizations:     Immunization History   Administered Date(s) Administered     COVID-19,PF,Pfizer (12+ Yrs) 2021, 04/15/2021, 10/14/2021     COVID-19,PF,Pfizer 12+ Yrs ( and After) 2022     Influenza Quad, Recombinant, pf(RIV4) (Flublok) 2020, 2021, 2022     Influenza Vaccine IM > 6 months Valent IIV4 (Alfuria,Fluzone) 2014, 2020     Mantoux Tuberculin Skin Test 2013     Pneumococcal 23 valent 12/15/2021     TDAP Vaccine (Boostrix) 2012     Tdap (Adacel,Boostrix) 2012     Typhoid IM 2019             Allergies:     Allergies   Allergen Reactions     Atorvastatin Diarrhea     Cortisone  Other (See Comments)     pain     Lisinopril Cough     started after CABG for unclear reason     Methylprednisolone Other (See Comments)     ?             Medications:     Current Outpatient Medications Ordered in Epic   Medication     ACETAMINOPHEN EXTRA STRENGTH 500 MG tablet     albuterol (PROAIR HFA/PROVENTIL HFA/VENTOLIN HFA) 108 (90 Base) MCG/ACT inhaler     aspirin (ASA) 81 MG chewable tablet     budesonide-formoterol (SYMBICORT) 160-4.5 MCG/ACT Inhaler     Cholecalciferol (VITAMIN D3) 50 MCG (2000 UT) CAPS     diclofenac (VOLTAREN) 1 % topical gel     escitalopram (LEXAPRO) 20 MG tablet     gabapentin (NEURONTIN) 300 MG capsule     hydrOXYzine (VISTARIL) 25 MG capsule     isosorbide mononitrate (IMDUR) 30 MG 24 hr tablet     Lidocaine 0.5 % GEL     metoprolol succinate ER (TOPROL XL) 100 MG 24 hr tablet     nifedipine 0.2% in white petrolatum 0.2 % OINT ointment     omeprazole (PRILOSEC) 20 MG DR capsule     polyethylene glycol (MIRALAX) 17 GM/Dose powder     QUEtiapine (SEROQUEL) 50 MG tablet     QUEtiapine (SEROQUEL) 50 MG tablet     rosuvastatin (CRESTOR) 40 MG tablet     spironolactone (ALDACTONE) 25 MG tablet     tamsulosin (FLOMAX) 0.4 MG capsule     No current Epic-ordered facility-administered medications on file.             Review of Systems:     The 10 point Review of Systems is negative other than noted in the HPI            Physical Exam:   Vitals were reviewed      BP: 112/82 Pulse: 60   Resp: 14        Constitutional:   awake, alert, cooperative, no apparent distress, and appears stated age     Eyes:   Lids and lashes normal, pupils equal, round and reactive to light, extra ocular muscles intact, sclera clear, conjunctiva normal     ENT:   normocepalic, without obvious abnormality     Neck:   supple, symmetrical, trachea midline     Lungs:   no increased work of breathing, good air exchange and no retractions     Cardiovascular:   regular rate and rhythm     Abdomen:   non-distended      Genitounirinary:   Right groin hematoma. Very sensitive but not firm     Musculoskeletal:   no lower extremity pitting edema present  there is no redness, warmth, or swelling of the joints  full range of motion noted  motor strength is 5 out of 5 all extremities bilaterally     Neurologic:   Mental Status Exam:  Level of Alertness:   awake  Orientation:   person, place, time  Cranial Nerves:  cranial nerves II-XII are grossly intact  Motor Exam:  moves all extremities well and symmetrically     Neuropsychiatric:   General: normal, calm and normal eye contact  Level of consciousness: alert / normal  Affect: normal     Skin:   no bruising or bleeding, normal skin color, texture, turgor and no redness, warmth, or swelling          Data:   All laboratory data reviewed  All cardiac studies reviewed by me.  All imaging studies reviewed by me.    CARDIAC CATHETERIZATION:    Left ventricular filling pressures are normal.    3rd Mrg lesion is 90% stenosed.    Prox RCA lesion is 75% stenosed.    Prox RCA to Mid RCA lesion is 40% stenosed.    Dist RCA lesion is 75% stenosed.    Mid RCA lesion is 30% stenosed.    RPDA lesion is 50% stenosed.    RPAV lesion is 40% stenosed.    Prox LAD lesion is 70% stenosed.    1st Diag-1 lesion is 95% stenosed.    1st Diag-2 lesion is 95% stenosed.    2nd Diag lesion is 99% stenosed.    Mid LAD-1 lesion is 75% stenosed.    Mid LAD-2 lesion is 75% stenosed.    Dist LAD lesion is 70% stenosed.    Vein graft to right PDA is totally occluded    Vein graft to second diagonal is widely patent    Vein graft to third OM is widely patent    LUZ MARIA graft to mid distal LAD is patent      EKG:  Sinus bradycardia with 1st degree A-V block   Nonspecific T-wave changes   Abnormal ECG   When compared with ECG of 23-DEC-2021 14:03,   No significant change was found

## 2022-10-11 NOTE — LETTER
10/11/2022    Az Briseno MD  [address] [facility] [address]    RE: Nadya Blake       Dear Colleague,     I had the pleasure of seeing Nadya Blake in the [facility].  Cardiac Surgery Consultation      Nadya Blake MRN# [id number]   YOB: 1965 Age: 57 year old        Reason for consult: I was asked by Dr. John to evaluate this patient for discussion of treatment of his coronary artery disease.           Assessment and Plan:   Mr. Blake is a 57-year-old man with severe multivessel coronary artery disease and a history of NSTEMI and coronary artery bypass grafting in June 2020. I discussed the fact that his radial artery graft to the right coronary artery is now occluded. He has severe serial lesions in the right coronary artery and percutaneous coronary intervention with drug eluting stent placement is recommended. I agree with this recommendation. I would definitely not recommend redo coronary artery bypass grafting.     I did also talk to the patient about significant, even drastic, dietary changes. Clearly, he has a genetic disposition to severe atherosclerosis. I discussed the option of becoming vegetarian or even vegan with him and his wife. He is going to consider this and they will do some research into it. I think that at his relatively young age, this is worth considering. His LAD and left circumflex are likely beyond the limits of what can be treated with PCI and his RCA will now has extensive stenting. Thinking long term, it is definitely worth considering significant dietary interventions to eliminated cholesterol from his diet.    History is obtained from the patient         History of Present Illness:   This patient is a 57 year old male who presents with chest pain. The patient has a history of coronary artery disease and previous NSTEMI. I performed a CABG x 4 (Radial-RPDA, VG-OM, VG-VENICE, and LIMA-LAD) in June 2020. He initially did  well but he has had a rough year with COVID-19 infection and a perirectal abscess/anotrectal fistula. The led to him being a lot more sedentary and he has gained a little weight. He is noticing more dyspnea and he has also had some left sided chest pain. He underwent repeat coronary angiography and he has an occluded radial artery graft to the RPDA with severe serial lesions in the RCA. He also has very severe diffuse coronary artery disease, and although the vein grafts and left internal mammary artery are patent, the targets are severely diffusely diseased.                Past Medical History:     Past Medical History:   Diagnosis Date     Acute non-ST elevation myocardial infarction (NSTEMI) (H) 6/22/2020     Adenomatous polyp of colon      Ascending aorta dilatation (H)      Carpal tunnel syndrome      Chronic superficial gastritis      Coronary artery disease due to lipid rich plaque 6/23/2020     Depression with anxiety      Dyslipidemia, goal LDL below 70 6/23/2020     Essential hypertension 9/2/2012     Fatty liver disease, nonalcoholic      GERD (gastroesophageal reflux disease)      IBS (irritable bowel syndrome)      Left lumbar radiculopathy      Multinodular goiter      Old torn meniscus of knee      Paroxysmal atrial fibrillation (H)      Perianal abscess      Post herpetic neuralgia      Post-traumatic osteoarthritis of both knees      Primary osteoarthritis of left hip      Primary osteoarthritis of right hip      Pulmonary nodule      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 Ragsdale     CV CORONARY ANGIOGRAM N/A 6/23/2020    Procedure: Coronary Angiogram;  Surgeon: Ariane Lester MD;  Location:   [facility];  Service: Cardiology     CV CORONARY ANGIOGRAM N/A 10/6/2022    Procedure: Coronary Angiogram;  Surgeon: Javon John MD;  Location: [facility] CV     CV IABP INSERT N/A 2020    Procedure: Intra Aortic Balloon Pump Insertion;  Surgeon: Ariane Lester MD;  Location: [facility] Lab;  Service: Cardiology     CV LEFT HEART CATH N/A 10/6/2022    Procedure: Left Heart Catheterization;  Surgeon: Javon John MD;  Location: [facility] CV     CV LEFT HEART CATHETERIZATION WITHOUT LEFT VENTRICULOGRAM Left 2020    Procedure: Left Heart Catheterization Without Left Ventriculogram;  Surgeon: Ariane Lester MD;  Location: [facility] Cath Lab;  Service: Cardiology     CV LEFT VENTRICULOGRAM N/A 10/6/2022    Procedure: Left Ventriculogram;  Surgeon: Javon John MD;  Location: [facility] CV               Social History:     Social History     Tobacco Use     Smoking status: Former     Packs/day: 1.00     Years: 30.00     Pack years: 30.00     Types: Cigarettes     Quit date: 3/23/2020     Years since quittin.5     Smokeless tobacco: Never     Tobacco comments:     stopped 3/24/2020   Substance Use Topics     Alcohol use: No             Family History:     Family History   Problem Relation Age of Onset     No Known Problems Mother      Lung Cancer Father 56        fatal     No Known Problems Daughter      Other - See Comments Son         congenital deformity of right leg; he uses a leg prosthesis             Immunizations:     Immunization History   Administered Date(s) Administered     COVID-19,PF,Pfizer (12+ Yrs) 2021, 04/15/2021, 10/14/2021     COVID-19,PF,Pfizer 12+ Yrs ( and After) 2022     Influenza Quad, Recombinant, pf(RIV4) (Flublok) 2020, 2021, 2022     Influenza Vaccine IM > 6 months Valent IIV4 (Alfuria,Fluzone) 2014, 2020     Mantoux Tuberculin Skin Test 2013     Pneumococcal 23 valent  12/15/2021     TDAP Vaccine (Boostrix) 04/30/2012     Tdap (Adacel,Boostrix) 04/30/2012     Typhoid IM 08/02/2019             Allergies:     Allergies   Allergen Reactions     Atorvastatin Diarrhea     Cortisone Other (See Comments)     pain     Lisinopril Cough     started after CABG for unclear reason     Methylprednisolone Other (See Comments)     ?             Medications:     Current Outpatient Medications Ordered in Epic   Medication     ACETAMINOPHEN EXTRA STRENGTH 500 MG tablet     albuterol (PROAIR HFA/PROVENTIL HFA/VENTOLIN HFA) 108 (90 Base) MCG/ACT inhaler     aspirin (ASA) 81 MG chewable tablet     budesonide-formoterol (SYMBICORT) 160-4.5 MCG/ACT Inhaler     Cholecalciferol (VITAMIN D3) 50 MCG (2000 UT) CAPS     diclofenac (VOLTAREN) 1 % topical gel     escitalopram (LEXAPRO) 20 MG tablet     gabapentin (NEURONTIN) 300 MG capsule     hydrOXYzine (VISTARIL) 25 MG capsule     isosorbide mononitrate (IMDUR) 30 MG 24 hr tablet     Lidocaine 0.5 % GEL     metoprolol succinate ER (TOPROL XL) 100 MG 24 hr tablet     nifedipine 0.2% in white petrolatum 0.2 % OINT ointment     omeprazole (PRILOSEC) 20 MG DR capsule     polyethylene glycol (MIRALAX) 17 GM/Dose powder     QUEtiapine (SEROQUEL) 50 MG tablet     QUEtiapine (SEROQUEL) 50 MG tablet     rosuvastatin (CRESTOR) 40 MG tablet     spironolactone (ALDACTONE) 25 MG tablet     tamsulosin (FLOMAX) 0.4 MG capsule     No current Epic-ordered facility-administered medications on file.             Review of Systems:     The 10 point Review of Systems is negative other than noted in the HPI            Physical Exam:   Vitals were reviewed      BP: 112/82 Pulse: 60   Resp: 14        Constitutional:   awake, alert, cooperative, no apparent distress, and appears stated age     Eyes:   Lids and lashes normal, pupils equal, round and reactive to light, extra ocular muscles intact, sclera clear, conjunctiva normal     ENT:   normocepalic, without obvious abnormality      Neck:   supple, symmetrical, trachea midline     Lungs:   no increased work of breathing, good air exchange and no retractions     Cardiovascular:   regular rate and rhythm     Abdomen:   non-distended     Genitounirinary:   Right groin hematoma. Very sensitive but not firm     Musculoskeletal:   no lower extremity pitting edema present  there is no redness, warmth, or swelling of the joints  full range of motion noted  motor strength is 5 out of 5 all extremities bilaterally     Neurologic:   Mental Status Exam:  Level of Alertness:   awake  Orientation:   person, place, time  Cranial Nerves:  cranial nerves II-XII are grossly intact  Motor Exam:  moves all extremities well and symmetrically     Neuropsychiatric:   General: normal, calm and normal eye contact  Level of consciousness: alert / normal  Affect: normal     Skin:   no bruising or bleeding, normal skin color, texture, turgor and no redness, warmth, or swelling          Data:   All laboratory data reviewed  All cardiac studies reviewed by me.  All imaging studies reviewed by me.    CARDIAC CATHETERIZATION:    Left ventricular filling pressures are normal.    3rd Mrg lesion is 90% stenosed.    Prox RCA lesion is 75% stenosed.    Prox RCA to Mid RCA lesion is 40% stenosed.    Dist RCA lesion is 75% stenosed.    Mid RCA lesion is 30% stenosed.    RPDA lesion is 50% stenosed.    RPAV lesion is 40% stenosed.    Prox LAD lesion is 70% stenosed.    1st Diag-1 lesion is 95% stenosed.    1st Diag-2 lesion is 95% stenosed.    2nd Diag lesion is 99% stenosed.    Mid LAD-1 lesion is 75% stenosed.    Mid LAD-2 lesion is 75% stenosed.    Dist LAD lesion is 70% stenosed.    Vein graft to right PDA is totally occluded    Vein graft to second diagonal is widely patent    Vein graft to third OM is widely patent    LUZ MARIA graft to mid distal LAD is patent      EKG:  Sinus bradycardia with 1st degree A-V block   Nonspecific T-wave changes   Abnormal ECG   When compared with  ECG of 23-DEC-2021 14:03,   No significant change was found      Thank you for allowing me to participate in the care of your patient.      Sincerely,     Mart Ragsdale MD     [facility]  cc:   No referring provider defined for this encounter.

## 2022-10-12 ENCOUNTER — PATIENT OUTREACH (OUTPATIENT)
Dept: CARE COORDINATION | Facility: CLINIC | Age: 57
End: 2022-10-12

## 2022-10-12 NOTE — PROGRESS NOTES
Clinic Care Coordination Contact  [facility]/Voicemail       Clinical Data: Care Coordinator Outreach  Outreach attempted x 1.  Left message on patient's voicemail with call back information and requested return call.  Plan: Care Coordinator will try to reach patient again in 3-5 business days.    David Myhre, RN  CCC RN

## 2022-10-17 NOTE — PROGRESS NOTES
Assessment/Recommendations   Assessment:    1.  Coronary artery disease status post CABG x4 with LIMA to LAD, right radial artery to the right posterior descending artery, reverse SVG to OM and diagonal artery on 6/24/2020: Patient saw Dr. Burns on 9/12/2022 some exertional angina.    Coronary angiogram on 10/6/2022 showed complex multivessel coronary artery disease similar from past study.  LUZ MARIA graft to the mid distal LAD was found patent with moderate severe stenosis distally and small apical LAD segment.  Vein graft to OM 3 was found patent and vein graft to small diagonal 2 was found patent.  Vein graft of right coronary PDA is found occluded with 75% lesion in proximal RCA and 75% lesion in mid RCA.    Case was discussed with patient and family and discussed treatment option including medical therapy versus PCI to RCA.  Patient chose to return for staged PCI to RCA.    He recently had a consultation with Dr. Ragsdale.  He was recommended against redo bypass.    Patient is on aspirin 81 mg daily and isosorbide mononitrate 30 mg daily.    Patient reports that he continues to have fatigue, shortness of breath and chest discomfort on exertion especially walking up incline or takes stairs.  He would like to proceed with staged PCI to RCA.    2.  Dyslipidemia with LDL goal <70/Obesity with a BMI of: Nadya Blake is on high intensity statin therapy with rosuvastatin 40 Mg daily.  Lipid profile drawn at PCP office.  Pending.    3.  Hypertension: His blood pressure is stable.    4.  Heart failure with preserved ejection fraction, NYHA class III: He is euvolemic on exam.  His shortness of breath is likely secondary to coronary artery disease and physical deconditioning.    Plan:  -  We discussed about utilization of as needed nitroglycerin.  Nitro prescription provided.    -Patient was encouraged to avoid high intense exercise or lifting heavy objects to reduce stress on his heart until further  direction.    -Encourage patient to seek medical attention if persistent worsening chest pain, shortness of breath or syncope    -Reinforced heart healthy diet.    - Continue current hypertension regimen    -We will request MICHELLE Chavez to arrange his staged PCI to RCA with Dr. John as recommended.  Follow up with Dr. Burns as scheduled in November.     History of Present Illness/Subjective    Mr. Nadya Blake is a 57 year old male with a past medical history of Coronary artery disease status post CABG x4 with LIMA to LAD, right radial artery to the right posterior descending artery, reverse SVG to OM and diagonal artery on 6/24/2020, hypertension, dyslipidemia, ascending aorta dilation, obesity, and exertional dyspnea who is seen in [facility] for post coronary angiogram follow-up.    Coronary angiogram on 10/6/2022 showed complex multivessel coronary artery disease similar from past study.  LUZ MARIA graft to the mid distal LAD was found patent with moderate severe stenosis distally and small apical LAD segment.  Vein graft to OM 3 was found patent and vein graft to small diagonal 2 was found patent.  Vein graft of right coronary PDA is found occluded with 75% lesion in proximal RCA and 75% lesion in mid RCA.who is seen at [facility] for post coronary intervention follow up.     Today, Nadya is here accompanied by his wife.  He reports that he continues to have fatigue, shortness of breath and chest discomfort on exertion specially walking up incline or takes stairs.  He denies fatigue, lightheadedness, shortness of breath, orthopnea, PND, palpitations, abdominal fullness/bloating and lower extremity edema.  He denies any bleeding complications.  He does not report any fever, chills, nausea, vomiting or diarrhea.  He saw his PCP this morning and had labs drawn for cholesterol and kidney function which is pending. Patient would like to proceed with staged PCI to  "RCA as recommended by Dr. Guzmán.    Coronary Angiogram on 10/6/2022: reviewed:  Conclusion      Left ventricular filling pressures are normal.    3rd Mrg lesion is 90% stenosed.    Prox RCA lesion is 75% stenosed.    Prox RCA to Mid RCA lesion is 40% stenosed.    Dist RCA lesion is 75% stenosed.    Mid RCA lesion is 30% stenosed.    RPDA lesion is 50% stenosed.    RPAV lesion is 40% stenosed.    Prox LAD lesion is 70% stenosed.    1st Diag-1 lesion is 95% stenosed.    1st Diag-2 lesion is 95% stenosed.    2nd Diag lesion is 99% stenosed.    Mid LAD-1 lesion is 75% stenosed.    Mid LAD-2 lesion is 75% stenosed.    Dist LAD lesion is 70% stenosed.    Vein graft to right PDA is totally occluded    Vein graft to second diagonal is widely patent    Vein graft to third OM is widely patent    LUZ MARIA graft to mid distal LAD is patent         Plan      Follow bedrest per protocol    Continued medical management and lifestyle modifications for cardiovascular risk factor optimizations.  Discussed options including continuation of medical therapy versus PCI stenting.  The patient is opted to consider stent placement.  We will arrange for stent placements to the RCA lesions in 2 weeks.  Patient continue on current medical therapy.     Recommendations    General Recommendations:  - Patient given specific instructions regarding care of arteriotomy site, activity restrictions, signs and symptoms of cardiac or vascular complications and to seek immediate medical evaluation should they occur.   - Femoral angiogram identifies arterial sheath placement is suitable for closure device.     Percutaneous Therapy:   - Recommend PCI treatment of stenosis in the RCA.          Physical Examination Review of Systems   /82 (BP Location: Right arm, Patient Position: Sitting, Cuff Size: Adult Regular)   Pulse 61   Resp 16   Ht 1.626 m (5' 4\")   Wt 91.3 kg (201 lb 3.2 oz)   BMI 34.54 kg/m    Body mass index is 34.54 kg/m .  Wt Readings " from Last 3 Encounters:   10/18/22 91.3 kg (201 lb 3.2 oz)   10/18/22 90.9 kg (200 lb 6.4 oz)   10/11/22 93.4 kg (206 lb)     General Appearance:   no distress, normal body habitus   ENT/Mouth: membranes moist, no oral lesions or bleeding gums.      EYES:  no scleral icterus, normal conjunctivae   Neck: no carotid bruits or thyromegaly   Chest/Lungs:   lungs are clear to auscultation, no rales or wheezing, equal chest wall expansion    Cardiovascular:   Heart rate regular. Normal first and second heart sounds with no murmurs, rubs, or gallops; the carotid, radial and posterior tibial pulses are intact, Jugular venous pressure flat with no edema bilaterally    Abdomen:  Large but soft no organomegaly, masses, bruits, or tenderness; bowel sounds are present   Extremities  Puncture Site: no cyanosis or clubbing  Right femoral site is soft with mild tenderness but no hematoma or infection noted.  Radial pulses and Pedal pulses intact and symmetrical.  CMS intact.   Skin: no xanthelasma, warm.    Neurologic: normal  bilateral, no tremors     Psychiatric: alert and oriented x3, calm                Negative unless noted in HPI     Medical History  Surgical History Family History Social History   Past Medical History:   Diagnosis Date     Acute non-ST elevation myocardial infarction (NSTEMI) (H) 6/22/2020     Adenomatous polyp of colon      Ascending aorta dilatation (H)      Carpal tunnel syndrome      Chronic superficial gastritis      Coronary artery disease due to lipid rich plaque 6/23/2020     Depression with anxiety      Dyslipidemia, goal LDL below 70 6/23/2020     Essential hypertension 9/2/2012     Fatty liver disease, nonalcoholic      GERD (gastroesophageal reflux disease)      IBS (irritable bowel syndrome)      Left lumbar radiculopathy      Multinodular goiter      Old torn meniscus of knee      Paroxysmal atrial fibrillation (H)      Perianal abscess      Post herpetic neuralgia      Post-traumatic  osteoarthritis of both knees      Primary osteoarthritis of left hip      Primary osteoarthritis of right hip      Pulmonary nodule      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)      Thyroid nodule      TMJ arthritis      Tobacco use disorder 11/1/2013     Vitamin D deficiency 9/30/2020    Past Surgical History:   Procedure Laterality Date     APPENDECTOMY  1995     BYPASS GRAFT ARTERY CORONARY  06/24/2020    Dr. Ragsdale     CV CORONARY ANGIOGRAM N/A 6/23/2020    Procedure: Coronary Angiogram;  Surgeon: Ariane Lester MD;  Location: [facility];  Service: Cardiology     CV CORONARY ANGIOGRAM N/A 10/6/2022    Procedure: Coronary Angiogram;  Surgeon: Javon John MD;  Location: [facility] CV     CV IABP INSERT N/A 6/24/2020    Procedure: Intra Aortic Balloon Pump Insertion;  Surgeon: Ariane Lester MD;  Location: [facility] Lab;  Service: Cardiology     CV LEFT HEART CATH N/A 10/6/2022    Procedure: Left Heart Catheterization;  Surgeon: Javon John MD;  Location: [facility] CV     CV LEFT HEART CATHETERIZATION WITHOUT LEFT VENTRICULOGRAM Left 6/23/2020    Procedure: Left Heart Catheterization Without Left Ventriculogram;  Surgeon: Ariane Letser MD;  Location: [facility] Lab;  Service: Cardiology     CV LEFT VENTRICULOGRAM N/A 10/6/2022    Procedure: Left Ventriculogram;  Surgeon: Javon John MD;  Location: [facility] LAB CV    Family History   Problem Relation Age of Onset     No Known Problems Mother      Lung Cancer Father 56        fatal     No Known Problems Daughter      Other - See Comments Son         congenital deformity of right leg; he uses a leg prosthesis    Social History     Socioeconomic History     Marital status:      Spouse name: Carlee     Number of children: 2     Years of education: Not on file     Highest education level: Not on file   Occupational History     Not on file   Tobacco Use     Smoking  status: Former     Packs/day: 1.00     Years: 30.00     Pack years: 30.00     Types: Cigarettes     Quit date: 3/23/2020     Years since quittin.5     Smokeless tobacco: Never     Tobacco comments:     stopped 3/24/2020   Vaping Use     Vaping Use: Never used   Substance and Sexual Activity     Alcohol use: No     Drug use: Never     Sexual activity: Yes     Partners: Female   Other Topics Concern     Not on file   Social History Narrative    , Carlee, BA chemistry and taking AA courses at [employer].  From [place]; bachelors in geology and Troppin science. Son, Grace (2005) and Sherrie (2008).  On SSDI, PTSD.       Social Determinants of Health     Financial Resource Strain: Not on file   Food Insecurity: No Food Insecurity     Worried About Running Out of Food in the Last Year: Never true     Ran Out of Food in the Last Year: Never true   Transportation Needs: No Transportation Needs     Lack of Transportation (Medical): No     Lack of Transportation (Non-Medical): No   Physical Activity: Not on file   Stress: Not on file   Social Connections: Not on file   Intimate Partner Violence: Not on file   Housing Stability: Not on file          Medications  Allergies   Current Outpatient Medications   Medication Sig Dispense Refill     ACETAMINOPHEN EXTRA STRENGTH 500 MG tablet TAKE 2 TABLETS(1000 MG) BY MOUTH EVERY 6 HOURS AS NEEDED FOR PAIN 360 tablet 3     albuterol (PROAIR HFA/PROVENTIL HFA/VENTOLIN HFA) 108 (90 Base) MCG/ACT inhaler Inhale 1-2 puffs into the lungs every 4 hours as needed for shortness of breath / dyspnea 18 g 11     aspirin (ASA) 81 MG chewable tablet Take 1 tablet (81 mg) by mouth daily 90 tablet 4     budesonide-formoterol (SYMBICORT) 160-4.5 MCG/ACT Inhaler Inhale 2 puffs into the lungs 2 times daily 10.2 g 11     Cholecalciferol (VITAMIN D3) 50 MCG (2000 UT) CAPS Take 1 capsule by mouth daily 90 capsule 3     diclofenac (VOLTAREN) 1 % topical gel Apply 4 g topically 4 times daily 500 g 2      empagliflozin (JARDIANCE) 10 MG TABS tablet Take 1 tablet (10 mg) by mouth daily 90 tablet 4     escitalopram (LEXAPRO) 20 MG tablet TAKE 1 TABLET(20 MG) BY MOUTH DAILY 30 tablet 0     isosorbide mononitrate (IMDUR) 30 MG 24 hr tablet Take 1 tablet (30 mg) by mouth daily 90 tablet 3     Lidocaine 0.5 % GEL        metoprolol succinate ER (TOPROL XL) 100 MG 24 hr tablet Take 1.5 tablets (150 mg) by mouth At Bedtime 135 tablet 4     nifedipine 0.2% in white petrolatum 0.2 % OINT ointment Apply topically 4 times daily as needed (anal fissure) 100 g 1     nitroGLYcerin (NITROSTAT) 0.4 MG sublingual tablet For chest pain place 1 tablet under the tongue every 5 minutes for 3 doses. If symptoms persist 5 minutes after 1st dose call 911. 30 tablet 1     omeprazole (PRILOSEC) 20 MG DR capsule Take 1 capsule (20 mg) by mouth 2 times daily (before meals) 180 capsule 4     polyethylene glycol (MIRALAX) 17 GM/Dose powder Take 17 g (1 capful) by mouth daily 850 g 3     QUEtiapine (SEROQUEL) 50 MG tablet Take 1-4 tablets ( mg) by mouth nightly as needed (Anxiety, nightmares or sleep difficulties) 120 tablet 3     rosuvastatin (CRESTOR) 40 MG tablet TAKE 1 TABLET(40 MG) BY MOUTH DAILY 90 tablet 3     spironolactone (ALDACTONE) 25 MG tablet Take 1 tablet (25 mg) by mouth daily 90 tablet 3     tamsulosin (FLOMAX) 0.4 MG capsule Take 2 capsules (0.8 mg) by mouth every evening 180 capsule 3    Allergies   Allergen Reactions     Atorvastatin Diarrhea     Cortisone Other (See Comments)     pain     Lisinopril Cough     started after CABG for unclear reason     Methylprednisolone Other (See Comments)     ?         Lab Results    Chemistry/lipid CBC Cardiac Enzymes/BNP/TSH/INR   Lab Results   Component Value Date    CHOL 104 08/05/2022    HDL 28 (L) 08/05/2022    TRIG 172 (H) 08/05/2022    BUN 16.8 10/06/2022     (L) 10/06/2022    CO2 27 10/06/2022    Lab Results   Component Value Date    WBC 7.2 10/06/2022    HGB 13.9  10/06/2022    HCT 41.1 10/06/2022    MCV 87 10/06/2022     10/06/2022    Lab Results   Component Value Date    TROPONINI <0.01 11/21/2020    BNP 79 (H) 07/27/2020    TSH 3.40 08/05/2022    INR 1.10 07/27/2020        40 minutes spent on the date of encounter doing chart review, review of test results, interpretation with above tests, patient visit, documentation, discussion with other provider and discussion with family.        This note has been dictated using voice recognition software. Any grammatical, typographical, or context distortions are unintentional and inherent to the software

## 2022-10-18 ENCOUNTER — OFFICE VISIT (OUTPATIENT)
Dept: CARDIOLOGY | Facility: CLINIC | Age: 57
End: 2022-10-18
Payer: COMMERCIAL

## 2022-10-18 ENCOUNTER — OFFICE VISIT (OUTPATIENT)
Dept: INTERNAL MEDICINE | Facility: CLINIC | Age: 57
End: 2022-10-18
Payer: COMMERCIAL

## 2022-10-18 VITALS
SYSTOLIC BLOOD PRESSURE: 110 MMHG | RESPIRATION RATE: 14 BRPM | DIASTOLIC BLOOD PRESSURE: 75 MMHG | WEIGHT: 200.4 LBS | BODY MASS INDEX: 33.35 KG/M2 | OXYGEN SATURATION: 96 % | HEART RATE: 62 BPM

## 2022-10-18 VITALS
WEIGHT: 201.2 LBS | SYSTOLIC BLOOD PRESSURE: 110 MMHG | RESPIRATION RATE: 16 BRPM | HEIGHT: 64 IN | HEART RATE: 61 BPM | DIASTOLIC BLOOD PRESSURE: 82 MMHG | BODY MASS INDEX: 34.35 KG/M2

## 2022-10-18 DIAGNOSIS — I25.10 CORONARY ARTERY DISEASE DUE TO LIPID RICH PLAQUE: Primary | Chronic | ICD-10-CM

## 2022-10-18 DIAGNOSIS — F43.10 PTSD (POST-TRAUMATIC STRESS DISORDER): Chronic | ICD-10-CM

## 2022-10-18 DIAGNOSIS — I10 ESSENTIAL HYPERTENSION: Chronic | ICD-10-CM

## 2022-10-18 DIAGNOSIS — I25.83 CORONARY ARTERY DISEASE DUE TO LIPID RICH PLAQUE: Primary | Chronic | ICD-10-CM

## 2022-10-18 DIAGNOSIS — Z95.1 S/P CABG X 4: ICD-10-CM

## 2022-10-18 DIAGNOSIS — R53.83 OTHER FATIGUE: ICD-10-CM

## 2022-10-18 DIAGNOSIS — E78.5 DYSLIPIDEMIA, GOAL LDL BELOW 70: Chronic | ICD-10-CM

## 2022-10-18 DIAGNOSIS — R07.89 CHEST DISCOMFORT: ICD-10-CM

## 2022-10-18 DIAGNOSIS — R06.09 DOE (DYSPNEA ON EXERTION): ICD-10-CM

## 2022-10-18 DIAGNOSIS — E11.9 TYPE 2 DIABETES MELLITUS WITHOUT COMPLICATION, WITHOUT LONG-TERM CURRENT USE OF INSULIN (H): ICD-10-CM

## 2022-10-18 DIAGNOSIS — F33.41 RECURRENT MAJOR DEPRESSIVE DISORDER, IN PARTIAL REMISSION (H): Chronic | ICD-10-CM

## 2022-10-18 PROBLEM — I21.4 NSTEMI (NON-ST ELEVATION MYOCARDIAL INFARCTION) (H): Status: RESOLVED | Noted: 2020-06-24 | Resolved: 2022-10-18

## 2022-10-18 LAB
ANION GAP SERPL CALCULATED.3IONS-SCNC: 11 MMOL/L (ref 7–15)
BUN SERPL-MCNC: 18.9 MG/DL (ref 6–20)
CALCIUM SERPL-MCNC: 9.4 MG/DL (ref 8.6–10)
CHLORIDE SERPL-SCNC: 97 MMOL/L (ref 98–107)
CHOLEST SERPL-MCNC: 89 MG/DL
CREAT SERPL-MCNC: 1.45 MG/DL (ref 0.67–1.17)
DEPRECATED HCO3 PLAS-SCNC: 26 MMOL/L (ref 22–29)
GFR SERPL CREATININE-BSD FRML MDRD: 57 ML/MIN/1.73M2
GLUCOSE SERPL-MCNC: 115 MG/DL (ref 70–99)
HDLC SERPL-MCNC: 28 MG/DL
LDLC SERPL CALC-MCNC: 39 MG/DL
NONHDLC SERPL-MCNC: 61 MG/DL
POTASSIUM SERPL-SCNC: 4.7 MMOL/L (ref 3.4–5.3)
SODIUM SERPL-SCNC: 134 MMOL/L (ref 136–145)
TRIGL SERPL-MCNC: 108 MG/DL

## 2022-10-18 PROCEDURE — 99215 OFFICE O/P EST HI 40 MIN: CPT | Performed by: NURSE PRACTITIONER

## 2022-10-18 PROCEDURE — 99215 OFFICE O/P EST HI 40 MIN: CPT | Performed by: INTERNAL MEDICINE

## 2022-10-18 PROCEDURE — 80048 BASIC METABOLIC PNL TOTAL CA: CPT | Performed by: INTERNAL MEDICINE

## 2022-10-18 PROCEDURE — 36415 COLL VENOUS BLD VENIPUNCTURE: CPT | Performed by: INTERNAL MEDICINE

## 2022-10-18 PROCEDURE — 80061 LIPID PANEL: CPT | Performed by: INTERNAL MEDICINE

## 2022-10-18 RX ORDER — NITROGLYCERIN 0.4 MG/1
TABLET SUBLINGUAL
Qty: 30 TABLET | Refills: 1 | Status: SHIPPED | OUTPATIENT
Start: 2022-10-18 | End: 2023-08-28

## 2022-10-18 ASSESSMENT — PAIN SCALES - GENERAL: PAINLEVEL: NO PAIN (0)

## 2022-10-18 NOTE — PROGRESS NOTES
Office Visit - Follow Up   Nadya Blake   57 year old male    Date of Visit: 10/18/2022    Chief Complaint   Patient presents with     Recheck Medication     follow up     1 month follow up- 10-6 [facility]        Assessment and Plan   1. Coronary artery disease, CABG 6/2020  We had a long discussion about secondary prevention of coronary heart disease.  He has taken Dr. Mart Ragsdale's advice and is now eating a vegan diet.  He is on high-dose statin and takes aspirin.  His LDL cholesterol is in the 40s.  He has had blood sugars fasting as high as 150 although his A1c has not been in the diabetic range.  I think this is another risk factor for him for progression of coronary artery disease and I would recommend initiation of Jardiance which is sent to his pharmacy.  We discussed side effects, risk of infection and we will follow-up in 1 month.  I have encouraged him to follow-up with cardiology.  Because Dr. Javon Culver will be retiring he is wondering about who would do his angiogram.  He is going to meet with cardiology this afternoon and he will also be meeting with Dr. Sloan Burns.  They certainly can offer excellent guidance and have also discussed with him that Dr. David Edwards would be an excellent person to do the procedure if he is available.  - empagliflozin (JARDIANCE) 10 MG TABS tablet; Take 1 tablet (10 mg) by mouth daily  Dispense: 90 tablet; Refill: 4  - Lipid panel reflex to direct LDL Fasting; Future  - Basic metabolic panel  (Ca, Cl, CO2, Creat, Gluc, K, Na, BUN); Future  - Lipid panel reflex to direct LDL Fasting  - Basic metabolic panel  (Ca, Cl, CO2, Creat, Gluc, K, Na, BUN)    2. Type 2 diabetes mellitus without complication, without long-term current use of insulin (H)  - empagliflozin (JARDIANCE) 10 MG TABS tablet; Take 1 tablet (10 mg) by mouth daily  Dispense: 90 tablet; Refill: 4    3. Essential hypertension  Blood pressure okay continue same    4. PTSD  (post-traumatic stress disorder)  5. Recurrent major depressive disorder, in partial remission (H)  Certainly has had some worsening of his symptoms with recent news about his coronary graft    Return in about 4 weeks (around 11/15/2022) for Follow up.     History of Present Illness   This 57 year old man who comes in for follow-up after recent cardiovascular evaluation.  We reviewed his angiogram extensively.  He has total occlusion of his vein graft to the right coronary artery.  He has a couple of blockages in the right coronary artery which cardiology and cardiothoracic surgery both feel are amenable to stenting.  His other 3 grafts are patent.  Stenting procedure is recommended.  He has had a lot of concerns since the angiogram.  His groin has been tender.  He has had more chest discomfort.  He continues to have breathing difficulty especially walking upstairs.  He is very nervous about having another angiogram.       Physical Exam   General Appearance:   No acute distress    /75 (BP Location: Left arm, Patient Position: Sitting, Cuff Size: Adult Large)   Pulse 62   Resp 14   Wt 90.9 kg (200 lb 6.4 oz)   SpO2 96%   BMI 33.35 kg/m      No significant bruising or swelling in the right groin.  Puncture site has healed nicely.  There is some tenderness with palpation and induration ongoing.  Cardiovascular regular rate and rhythm no murmur gallop or rub lungs good auscultation bilaterally     Additional Information   Current Outpatient Medications   Medication Sig Dispense Refill     ACETAMINOPHEN EXTRA STRENGTH 500 MG tablet TAKE 2 TABLETS(1000 MG) BY MOUTH EVERY 6 HOURS AS NEEDED FOR PAIN 360 tablet 3     aspirin (ASA) 81 MG chewable tablet Take 1 tablet (81 mg) by mouth daily 90 tablet 4     budesonide-formoterol (SYMBICORT) 160-4.5 MCG/ACT Inhaler Inhale 2 puffs into the lungs 2 times daily 10.2 g 11     Cholecalciferol (VITAMIN D3) 50 MCG (2000 UT) CAPS Take 1 capsule by mouth daily 90 capsule 3      diclofenac (VOLTAREN) 1 % topical gel Apply 4 g topically 4 times daily 500 g 2     empagliflozin (JARDIANCE) 10 MG TABS tablet Take 1 tablet (10 mg) by mouth daily 90 tablet 4     escitalopram (LEXAPRO) 20 MG tablet TAKE 1 TABLET(20 MG) BY MOUTH DAILY 30 tablet 0     isosorbide mononitrate (IMDUR) 30 MG 24 hr tablet Take 1 tablet (30 mg) by mouth daily 90 tablet 3     Lidocaine 0.5 % GEL        metoprolol succinate ER (TOPROL XL) 100 MG 24 hr tablet Take 1.5 tablets (150 mg) by mouth At Bedtime 135 tablet 4     nifedipine 0.2% in white petrolatum 0.2 % OINT ointment Apply topically 4 times daily as needed (anal fissure) 100 g 1     omeprazole (PRILOSEC) 20 MG DR capsule Take 1 capsule (20 mg) by mouth 2 times daily (before meals) 180 capsule 4     polyethylene glycol (MIRALAX) 17 GM/Dose powder Take 17 g (1 capful) by mouth daily 850 g 3     QUEtiapine (SEROQUEL) 50 MG tablet Take 1-4 tablets ( mg) by mouth nightly as needed (Anxiety, nightmares or sleep difficulties) 120 tablet 3     rosuvastatin (CRESTOR) 40 MG tablet TAKE 1 TABLET(40 MG) BY MOUTH DAILY 90 tablet 3     spironolactone (ALDACTONE) 25 MG tablet Take 1 tablet (25 mg) by mouth daily 90 tablet 3     tamsulosin (FLOMAX) 0.4 MG capsule Take 2 capsules (0.8 mg) by mouth every evening 180 capsule 3     albuterol (PROAIR HFA/PROVENTIL HFA/VENTOLIN HFA) 108 (90 Base) MCG/ACT inhaler Inhale 1-2 puffs into the lungs every 4 hours as needed for shortness of breath / dyspnea (Patient not taking: Reported on 10/18/2022) 18 g 11     Allergies   Allergen Reactions     Atorvastatin Diarrhea     Cortisone Other (See Comments)     pain     Lisinopril Cough     started after CABG for unclear reason     Methylprednisolone Other (See Comments)     ?       Time: Total time today's with extensive chart review, review of patient's angiogram and images with the patient, review of his cardiology as well as cardiothoracic surgery consultative notes, discussion with him  and his wife, examination, counseling and documentation was over 40 minutes.     Az Briseno MD    Answers for HPI/ROS submitted by the patient on 10/18/2022  What is the reason for your visit today? : follow up cardiac  How many servings of fruits and vegetables do you eat daily?: 4 or more  On average, how many sweetened beverages do you drink each day (Examples: soda, juice, sweet tea, etc.  Do NOT count diet or artificially sweetened beverages)?: 0  How many minutes a day do you exercise enough to make your heart beat faster?: 20 to 29  How many days a week do you exercise enough to make your heart beat faster?: 7  How many days per week do you miss taking your medication?: 0

## 2022-10-18 NOTE — LETTER
10/18/2022    Az Briseno MD  [address] [facility] [address]    RE: Nadya Blake       Dear Colleague,     I had the pleasure of seeing Nadya Blake in the [facility].          Assessment/Recommendations   Assessment:    1.  Coronary artery disease status post CABG x4 with LIMA to LAD, right radial artery to the right posterior descending artery, reverse SVG to OM and diagonal artery on 6/24/2020: Patient saw Dr. Burns on 9/12/2022 some exertional angina.    Coronary angiogram on 10/6/2022 showed complex multivessel coronary artery disease similar from past study.  LUZ MARIA graft to the mid distal LAD was found patent with moderate severe stenosis distally and small apical LAD segment.  Vein graft to OM 3 was found patent and vein graft to small diagonal 2 was found patent.  Vein graft of right coronary PDA is found occluded with 75% lesion in proximal RCA and 75% lesion in mid RCA.    Case was discussed with patient and family and discussed treatment option including medical therapy versus PCI to RCA.  Patient chose to return for staged PCI to RCA.    He recently had a consultation with Dr. Ragsdale.  He was recommended against redo bypass.    Patient is on aspirin 81 mg daily and isosorbide mononitrate 30 mg daily.    Patient reports that he continues to have fatigue, shortness of breath and chest discomfort on exertion especially walking up incline or takes stairs.  He would like to proceed with staged PCI to RCA.    2.  Dyslipidemia with LDL goal <70/Obesity with a BMI of: Nadya Blake is on high intensity statin therapy with rosuvastatin 40 Mg daily.  Lipid profile drawn at PCP office.  Pending.    3.  Hypertension: His blood pressure is stable.    4.  Heart failure with preserved ejection fraction, NYHA class III: He is euvolemic on exam.  His shortness of breath is likely secondary to coronary artery disease and physical deconditioning.    Plan:  -  We discussed  about utilization of as needed nitroglycerin.  Nitro prescription provided.    -Patient was encouraged to avoid high intense exercise or lifting heavy objects to reduce stress on his heart until further direction.    -Encourage patient to seek medical attention if persistent worsening chest pain, shortness of breath or syncope    -Reinforced heart healthy diet.    - Continue current hypertension regimen    -We will request MICHELLE Chavez to arrange his staged PCI to RCA with Dr. John as recommended.  Follow up with Dr. Burns as scheduled in November.     History of Present Illness/Subjective    Mr. Nadya Blake is a 57 year old male with a past medical history of Coronary artery disease status post CABG x4 with LIMA to LAD, right radial artery to the right posterior descending artery, reverse SVG to OM and diagonal artery on 6/24/2020, hypertension, dyslipidemia, ascending aorta dilation, obesity, and exertional dyspnea who is seen in [facility] heart clinic for post coronary angiogram follow-up.    Coronary angiogram on 10/6/2022 showed complex multivessel coronary artery disease similar from past study.  LUZ MARIA graft to the mid distal LAD was found patent with moderate severe stenosis distally and small apical LAD segment.  Vein graft to OM 3 was found patent and vein graft to small diagonal 2 was found patent.  Vein graft of right coronary PDA is found occluded with 75% lesion in proximal RCA and 75% lesion in mid RCA.who is seen at [facility] for post coronary intervention follow up.     Today, Nadya is here accompanied by his wife.  He reports that he continues to have fatigue, shortness of breath and chest discomfort on exertion specially walking up incline or takes stairs.  He denies fatigue, lightheadedness, shortness of breath, orthopnea, PND, palpitations, abdominal fullness/bloating and lower extremity edema.  He denies any bleeding complications.  He does not report  any fever, chills, nausea, vomiting or diarrhea.  He saw his PCP this morning and had labs drawn for cholesterol and kidney function which is pending. Patient would like to proceed with staged PCI to RCA as recommended by Dr. Guzmán.    Coronary Angiogram on 10/6/2022: reviewed:  Conclusion      Left ventricular filling pressures are normal.    3rd Mrg lesion is 90% stenosed.    Prox RCA lesion is 75% stenosed.    Prox RCA to Mid RCA lesion is 40% stenosed.    Dist RCA lesion is 75% stenosed.    Mid RCA lesion is 30% stenosed.    RPDA lesion is 50% stenosed.    RPAV lesion is 40% stenosed.    Prox LAD lesion is 70% stenosed.    1st Diag-1 lesion is 95% stenosed.    1st Diag-2 lesion is 95% stenosed.    2nd Diag lesion is 99% stenosed.    Mid LAD-1 lesion is 75% stenosed.    Mid LAD-2 lesion is 75% stenosed.    Dist LAD lesion is 70% stenosed.    Vein graft to right PDA is totally occluded    Vein graft to second diagonal is widely patent    Vein graft to third OM is widely patent    LUZ MARIA graft to mid distal LAD is patent         Plan      Follow bedrest per protocol    Continued medical management and lifestyle modifications for cardiovascular risk factor optimizations.  Discussed options including continuation of medical therapy versus PCI stenting.  The patient is opted to consider stent placement.  We will arrange for stent placements to the RCA lesions in 2 weeks.  Patient continue on current medical therapy.     Recommendations    General Recommendations:  - Patient given specific instructions regarding care of arteriotomy site, activity restrictions, signs and symptoms of cardiac or vascular complications and to seek immediate medical evaluation should they occur.   - Femoral angiogram identifies arterial sheath placement is suitable for closure device.     Percutaneous Therapy:   - Recommend PCI treatment of stenosis in the RCA.          Physical Examination Review of Systems   /82 (BP Location: Right  "arm, Patient Position: Sitting, Cuff Size: Adult Regular)   Pulse 61   Resp 16   Ht 1.626 m (5' 4\")   Wt 91.3 kg (201 lb 3.2 oz)   BMI 34.54 kg/m    Body mass index is 34.54 kg/m .  Wt Readings from Last 3 Encounters:   10/18/22 91.3 kg (201 lb 3.2 oz)   10/18/22 90.9 kg (200 lb 6.4 oz)   10/11/22 93.4 kg (206 lb)     General Appearance:   no distress, normal body habitus   ENT/Mouth: membranes moist, no oral lesions or bleeding gums.      EYES:  no scleral icterus, normal conjunctivae   Neck: no carotid bruits or thyromegaly   Chest/Lungs:   lungs are clear to auscultation, no rales or wheezing, equal chest wall expansion    Cardiovascular:   Heart rate regular. Normal first and second heart sounds with no murmurs, rubs, or gallops; the carotid, radial and posterior tibial pulses are intact, Jugular venous pressure flat with no edema bilaterally    Abdomen:  Large but soft no organomegaly, masses, bruits, or tenderness; bowel sounds are present   Extremities  Puncture Site: no cyanosis or clubbing  Right femoral site is soft with mild tenderness but no hematoma or infection noted.  Radial pulses and Pedal pulses intact and symmetrical.  CMS intact.   Skin: no xanthelasma, warm.    Neurologic: normal  bilateral, no tremors     Psychiatric: alert and oriented x3, calm                Negative unless noted in HPI     Medical History  Surgical History Family History Social History   Past Medical History:   Diagnosis Date     Acute non-ST elevation myocardial infarction (NSTEMI) (H) 6/22/2020     Adenomatous polyp of colon      Ascending aorta dilatation (H)      Carpal tunnel syndrome      Chronic superficial gastritis      Coronary artery disease due to lipid rich plaque 6/23/2020     Depression with anxiety      Dyslipidemia, goal LDL below 70 6/23/2020     Essential hypertension 9/2/2012     Fatty liver disease, nonalcoholic      GERD (gastroesophageal reflux disease)      IBS (irritable bowel syndrome)      " Left lumbar radiculopathy      Multinodular goiter      Old torn meniscus of knee      Paroxysmal atrial fibrillation (H)      Perianal abscess      Post herpetic neuralgia      Post-traumatic osteoarthritis of both knees      Primary osteoarthritis of left hip      Primary osteoarthritis of right hip      Pulmonary nodule      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)      Thyroid nodule      TMJ arthritis      Tobacco use disorder 11/1/2013     Vitamin D deficiency 9/30/2020    Past Surgical History:   Procedure Laterality Date     APPENDECTOMY  1995     BYPASS GRAFT ARTERY CORONARY  06/24/2020    Dr. Ragsdale     CV CORONARY ANGIOGRAM N/A 6/23/2020    Procedure: Coronary Angiogram;  Surgeon: Ariane Lester MD;  Location: [facility];  Service: Cardiology     CV CORONARY ANGIOGRAM N/A 10/6/2022    Procedure: Coronary Angiogram;  Surgeon: Javon John MD;  Location: [facility] CV     CV IABP INSERT N/A 6/24/2020    Procedure: Intra Aortic Balloon Pump Insertion;  Surgeon: Ariane Lester MD;  Location: [facility];  Service: Cardiology     CV LEFT HEART CATH N/A 10/6/2022    Procedure: Left Heart Catheterization;  Surgeon: Javon John MD;  Location: [facility] CV     CV LEFT HEART CATHETERIZATION WITHOUT LEFT VENTRICULOGRAM Left 6/23/2020    Procedure: Left Heart Catheterization Without Left Ventriculogram;  Surgeon: Ariane Lester MD;  Location: [facility];  Service: Cardiology     CV LEFT VENTRICULOGRAM N/A 10/6/2022    Procedure: Left Ventriculogram;  Surgeon: Javon John MD;  Location: [facility] CV    Family History   Problem Relation Age of Onset     No Known Problems Mother      Lung Cancer Father 56        fatal     No Known Problems Daughter      Other - See Comments Son         congenital deformity of right leg; he uses a leg prosthesis    Social History     Socioeconomic History     Marital status:       Spouse name: Carlee     Number of children: 2     Years of education: Not on file     Highest education level: Not on file   Occupational History     Not on file   Tobacco Use     Smoking status: Former     Packs/day: 1.00     Years: 30.00     Pack years: 30.00     Types: Cigarettes     Quit date: 3/23/2020     Years since quittin.5     Smokeless tobacco: Never     Tobacco comments:     stopped 3/24/2020   Vaping Use     Vaping Use: Never used   Substance and Sexual Activity     Alcohol use: No     Drug use: Never     Sexual activity: Yes     Partners: Female   Other Topics Concern     Not on file   Social History Narrative    , Carlee, BA chemistry and taking AA courses at [employer].  From [place]; bachelors in geology and computer science. Son, Grace (2005) and Sherrie (2008).  On SSDI, PTSD.       Social Determinants of Health     Financial Resource Strain: Not on file   Food Insecurity: No Food Insecurity     Worried About Running Out of Food in the Last Year: Never true     Ran Out of Food in the Last Year: Never true   Transportation Needs: No Transportation Needs     Lack of Transportation (Medical): No     Lack of Transportation (Non-Medical): No   Physical Activity: Not on file   Stress: Not on file   Social Connections: Not on file   Intimate Partner Violence: Not on file   Housing Stability: Not on file          Medications  Allergies   Current Outpatient Medications   Medication Sig Dispense Refill     ACETAMINOPHEN EXTRA STRENGTH 500 MG tablet TAKE 2 TABLETS(1000 MG) BY MOUTH EVERY 6 HOURS AS NEEDED FOR PAIN 360 tablet 3     albuterol (PROAIR HFA/PROVENTIL HFA/VENTOLIN HFA) 108 (90 Base) MCG/ACT inhaler Inhale 1-2 puffs into the lungs every 4 hours as needed for shortness of breath / dyspnea 18 g 11     aspirin (ASA) 81 MG chewable tablet Take 1 tablet (81 mg) by mouth daily 90 tablet 4     budesonide-formoterol (SYMBICORT) 160-4.5 MCG/ACT Inhaler Inhale 2 puffs into the lungs 2 times  daily 10.2 g 11     Cholecalciferol (VITAMIN D3) 50 MCG (2000 UT) CAPS Take 1 capsule by mouth daily 90 capsule 3     diclofenac (VOLTAREN) 1 % topical gel Apply 4 g topically 4 times daily 500 g 2     empagliflozin (JARDIANCE) 10 MG TABS tablet Take 1 tablet (10 mg) by mouth daily 90 tablet 4     escitalopram (LEXAPRO) 20 MG tablet TAKE 1 TABLET(20 MG) BY MOUTH DAILY 30 tablet 0     isosorbide mononitrate (IMDUR) 30 MG 24 hr tablet Take 1 tablet (30 mg) by mouth daily 90 tablet 3     Lidocaine 0.5 % GEL        metoprolol succinate ER (TOPROL XL) 100 MG 24 hr tablet Take 1.5 tablets (150 mg) by mouth At Bedtime 135 tablet 4     nifedipine 0.2% in white petrolatum 0.2 % OINT ointment Apply topically 4 times daily as needed (anal fissure) 100 g 1     nitroGLYcerin (NITROSTAT) 0.4 MG sublingual tablet For chest pain place 1 tablet under the tongue every 5 minutes for 3 doses. If symptoms persist 5 minutes after 1st dose call 911. 30 tablet 1     omeprazole (PRILOSEC) 20 MG DR capsule Take 1 capsule (20 mg) by mouth 2 times daily (before meals) 180 capsule 4     polyethylene glycol (MIRALAX) 17 GM/Dose powder Take 17 g (1 capful) by mouth daily 850 g 3     QUEtiapine (SEROQUEL) 50 MG tablet Take 1-4 tablets ( mg) by mouth nightly as needed (Anxiety, nightmares or sleep difficulties) 120 tablet 3     rosuvastatin (CRESTOR) 40 MG tablet TAKE 1 TABLET(40 MG) BY MOUTH DAILY 90 tablet 3     spironolactone (ALDACTONE) 25 MG tablet Take 1 tablet (25 mg) by mouth daily 90 tablet 3     tamsulosin (FLOMAX) 0.4 MG capsule Take 2 capsules (0.8 mg) by mouth every evening 180 capsule 3    Allergies   Allergen Reactions     Atorvastatin Diarrhea     Cortisone Other (See Comments)     pain     Lisinopril Cough     started after CABG for unclear reason     Methylprednisolone Other (See Comments)     ?         Lab Results    Chemistry/lipid CBC Cardiac Enzymes/BNP/TSH/INR   Lab Results   Component Value Date    CHOL 104 08/05/2022     HDL 28 (L) 08/05/2022    TRIG 172 (H) 08/05/2022    BUN 16.8 10/06/2022     (L) 10/06/2022    CO2 27 10/06/2022    Lab Results   Component Value Date    WBC 7.2 10/06/2022    HGB 13.9 10/06/2022    HCT 41.1 10/06/2022    MCV 87 10/06/2022     10/06/2022    Lab Results   Component Value Date    TROPONINI <0.01 11/21/2020    BNP 79 (H) 07/27/2020    TSH 3.40 08/05/2022    INR 1.10 07/27/2020        40 minutes spent on the date of encounter doing chart review, review of test results, interpretation with above tests, patient visit, documentation, discussion with other provider and discussion with family.        This note has been dictated using voice recognition software. Any grammatical, typographical, or context distortions are unintentional and inherent to the software          Thank you for allowing me to participate in the care of your patient.      Sincerely,     GENET Fields [facility]  cc:   Javon John MD  [address] [facility] [address], [address]  [place] [address]

## 2022-10-20 ENCOUNTER — TELEPHONE (OUTPATIENT)
Dept: CARDIOLOGY | Facility: CLINIC | Age: 57
End: 2022-10-20

## 2022-10-20 DIAGNOSIS — R06.09 DOE (DYSPNEA ON EXERTION): ICD-10-CM

## 2022-10-20 DIAGNOSIS — I25.10 CORONARY ARTERY DISEASE DUE TO LIPID RICH PLAQUE: Primary | ICD-10-CM

## 2022-10-20 DIAGNOSIS — I25.83 CORONARY ARTERY DISEASE DUE TO LIPID RICH PLAQUE: Primary | ICD-10-CM

## 2022-10-20 DIAGNOSIS — I77.810 ASCENDING AORTA DILATATION (H): ICD-10-CM

## 2022-10-20 DIAGNOSIS — R07.89 CHEST DISCOMFORT: ICD-10-CM

## 2022-10-20 NOTE — TELEPHONE ENCOUNTER
===View-only below this line===  ----- Message -----  From: Arie Dias, GENET CNP  Sent: 10/18/2022   5:06 PM CDT  To: Lion Lopez RN, Javon John MD    [facility],  Patient had coronary angiogram earlier this month with Dr. John.  He was recommended to have staged PCI to RCA.  Patient would like to proceed.  Could you please help arrange?  Thank you!  CY    Dr. John, may I place Case request for Percutaneous Coronary Intervention of the RCA? Thank you Cmm<Rn

## 2022-10-25 NOTE — TELEPHONE ENCOUNTER
===View-only below this line===  ----- Message -----  From: SharonJose M  Sent: 10/24/2022   2:55 PM CDT  To: Lion Lopez RN, *  Subject: RE: staged PCI of RCA                            Hi Dhara,    I spoke with the patient and his wife and they want to wait until after they meet with Dr. Burns on 11/8 to see what he thinks before scheduling this procedure. If they do not get scheduled before then, I can get them scheduled that day at exit when they are leaving.    Thank you,  Yessica JAFFE,RN

## 2022-10-28 DIAGNOSIS — F43.10 PTSD (POST-TRAUMATIC STRESS DISORDER): ICD-10-CM

## 2022-10-31 NOTE — TELEPHONE ENCOUNTER
Routing to [place] RN pool for review of refill request     Jeanmarie Tafoya, RN, BSN  [facility]     negative Affect and characteristics of appearance, verbalizations, behaviors are appropriate

## 2022-10-31 NOTE — CONFIDENTIAL NOTE
Date of Last Office Visit: 10/5/22  Date of Next Office Visit: none - routing to intake  No shows since last visit: none  Cancellations since last visit: none    Medication requested: escitalopram 20mg Date last ordered: 9/30/22 Qty: 30 Refills: 0     Lapse in medication adherence greater than 5 days?: no  If yes, call patient and gather details:    Medication refill request verified as identical to current order?: yes  Result of Last DAM, VPA, Li+ Level, CBC, or Carbamazepine Level (at or since last visit): N/A    Last visit treatment plan:   The patient will continue to utilize his as needed Vistaril and as needed Seroquel as ordered.  I have increased the availability of both of those medications.  The patient will continue to follow-up with his medical team as a direct.  He should return to this clinic in 3 to 4 months for medication check and agrees to call before then with any questions or concerns.       []Medication refilled per  Medication Refill in Ambulatory Care  policy.  [x]Medication unable to be refilled by RN due to criteria not met as indicated below:    []Eligibility - not seen in the last year   [x]Supervision - no future appointment   []Compliance - no shows, cancellations or lapse in therapy   []Verification - order discrepancy   []Controlled medication   []Medication not included in policy   []90-day supply request   []Other

## 2022-11-01 RX ORDER — ESCITALOPRAM OXALATE 20 MG/1
TABLET ORAL
Qty: 30 TABLET | Refills: 0 | Status: SHIPPED | OUTPATIENT
Start: 2022-11-01 | End: 2022-12-05

## 2022-11-03 ENCOUNTER — PATIENT OUTREACH (OUTPATIENT)
Dept: CARE COORDINATION | Facility: CLINIC | Age: 57
End: 2022-11-03

## 2022-11-03 NOTE — LETTER
[facility]  Patient Centered Plan of Care  About Me:        Patient Name:  Nadya Covington and Juanita,     It was so nice to talk with the both of the other day. Here is a copy of your Care Plan with the goals were are supporting you with at this time. Please let me know if there is any other way we can support you.     Thanks,     David Myhre, RN  CCC RN  [phone]    YOB: 1965  Age:         57 year old   Tracy MRN:    [id number] Telephone Information:  Home Phone [phone]   Mobile Not on file.       Address:  [facility] [address]  [facility] [place] [facility] [address] Email address:  [email or web address]      Emergency Contact(s)    Name Relationship Lgl Grd Work Phone Home Phone Mobile Phone   1. JUANITA BEASLEY Spouse   [phone]            Primary language:  Danish     needed? No   [facility] [employer] [facility]:  [phone] op. 1  Other communication barriers:None    Preferred Method of Communication:     Current living arrangement: I live in a private home with family    Mobility Status/ Medical Equipment: Independent        Health Maintenance  Health Maintenance Reviewed: Due/Overdue     Health Maintenance Due   Topic Date Due     DIABETIC FOOT EXAM  Never done     ADVANCE CARE PLANNING  Never done     EYE EXAM  Never done     ZOSTER IMMUNIZATION (1 of 2) Never done     DTAP/TDAP/TD IMMUNIZATION (2 - Td or Tdap) 04/30/2022     COVID-19 Vaccine (5 - Booster for Pfizer series) 06/20/2022     A1C  11/05/2022       My Access Plan  Medical Emergency 911   Primary Clinic Line [facility] - [phone]   24 Hour Appointment Line [phone] or  [phone]) (toll-free)   24 Hour Nurse Line [phone] (toll-free)   Preferred Urgent Care [facility] [place], [phone]     [facility]  [phone]     Preferred Pharmacy [id number] [address] AT [facility] [address]     Behavioral Health Crisis Line The National Suicide Prevention Lifeline at 1-882.274.9857 or Text/Call 988             My Care Team Members  Patient Care Team       Relationship Specialty Notifications Start End    Az Briseno MD PCP - General   9/22/20     Phone: [phone] Fax: [phone]         [address] [facility] [address]    Myhre, David J, RN Lead Care Coordinator Primary Care - CC Admissions 11/19/20      Phone: [phone]    Az Briseno MD Assigned PCP   6/16/21     Phone: [phone] Fax: [phone]         [address] [facility] [address]    Franco Quevedo MD Assigned Behavioral Health Provider   7/16/21     Phone: [phone] Fax: [phone]         [address] [facility] [place] [address] [facility] [address]    Inessa Madrigalmary [facility] worker   11/15/21     Phone: [phone]         Sloan Burns MD Assigned Heart and Vascular Provider   12/12/21     Phone: [phone] Fax: [phone]         [address] [facility] [address] [facility] [address]            My Care Plans  Self Management and Treatment Plan  Care Plan  Care Plan: General     Problem: HP GENERAL PROBLEM     Goal: 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     Start Date: 1/4/2022 Expected End Date: 11/4/2022    Recent Progress: 80%    Priority: Medium    Note:     Barriers: None  Strengths: Motivated. Strong family support.   Patient expressed understanding of goal: Yes  Action steps to achieve this goal:  1. I will look into the options of what type of painting interests me.   2. I will also look into community offered classes that may help get me started towards his goal.   3. I will report progress towards this goal at schedule outreach telephone calls from my CCC team.      Discussed on 11/3/22                    Care Plan: General     Problem: HP GENERAL PROBLEM     Long-Range Goal: I would like to be more supportive of my children.     Start  Date: 1/4/2022 Expected End Date: 11/4/2022    Recent Progress: 70%    Priority: High    Note:     Barriers: Fatigue related to health concerns.   Strengths: Motivated. Strong support from family. Strong advocate for himself.   Patient expressed understanding of goal: Yes  Action steps to achieve this goal:  1. I will support my son with the activities her participates in, like basket ball.   2. I will continue to find time during the day to spend time with my son that may allow us to build a closer relationship.   3. I will report progress towards this goal at scheduled outreach telephone calls from my CCC team.      Discussed on 11/3/22                           Action Plans on File:                       Advance Care Plans/Directives Type:   No data recorded    My Medical and Care Information  Problem List   Patient Active Problem List   Diagnosis     Recurrent major depressive disorder, in partial remission (H)     Gastroesophageal reflux disease with esophagitis without hemorrhage     Essential hypertension     Non-toxic multinodular goiter     Post-traumatic osteoarthritis of both knees     Primary osteoarthritis of left hip     Primary osteoarthritis of right hip     Coronary artery disease, CABG 6/2020     Dyslipidemia, goal LDL below 70     Benign prostatic hyperplasia with nocturia     PTSD (post-traumatic stress disorder)     Ascending aorta dilatation (H)     Anal fistula     Simple chronic bronchitis (H)     Polyp of colon     Pulmonary nodule     S/P CABG x 4     Thyroid nodule     Nonalcoholic fatty liver disease      Current Medications and Allergies:  See printed Medication Report.    Care Coordination Start Date: 11/19/2020   Frequency of Care Coordination: monthly     Form Last Updated: 11/06/2022

## 2022-11-06 NOTE — PROGRESS NOTES
Follow Up Progress Note      Assessment: [facility] RN spoke with patient and is wife  today to follow up on goals and to discuss any needs or concerns. Patient said he was doing well. He stated he was not experiencing shortness of breath as frequently and has been able to return to going on walks  with his wife. Patient reported he has lost 8 lbs and feels this has been a positive for his health. He has been working closely with his Cardiologist and said he has an appointment with him next week  Patient said he continues to paint from time to time, but said he has had quite a few appointments lately that have kept him from doing this more often.    Patient reported he continues to be active in the lives of his children. He stated [facility]   No other needs or concerns at this time. Patient and his wife stated they were grateful for the call today.     Care Gaps:    Health Maintenance Due   Topic Date Due     DIABETIC FOOT EXAM  Never done     ADVANCE CARE PLANNING  Never done     EYE EXAM  Never done     ZOSTER IMMUNIZATION (1 of 2) Never done     DTAP/TDAP/TD IMMUNIZATION (2 - Td or Tdap) 04/30/2022     COVID-19 Vaccine (5 - Booster for Pfizer series) 06/20/2022     A1C  11/05/2022       Scheduled with PCP on 11/21/22      Care Plans  Care Plan: General     Problem: HP GENERAL PROBLEM     Goal: 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     Start Date: 1/4/2022 Expected End Date: 11/4/2022    Recent Progress: 80%    Priority: Medium    Note:     Barriers: None  Strengths: Motivated. Strong family support.   Patient expressed understanding of goal: Yes  Action steps to achieve this goal:  1. I will look into the options of what type of painting interests me.   2. I will also look into community offered classes that may help get me started towards his goal.   3. I will report progress towards this goal at schedule outreach telephone calls from my CCC team.       Discussed on 11/3/22                    Care Plan: General     Problem: HP GENERAL PROBLEM     Long-Range Goal: I would like to be more supportive of my children.     Start Date: 1/4/2022 Expected End Date: 11/4/2022    Recent Progress: 70%    Priority: High    Note:     Barriers: Fatigue related to health concerns.   Strengths: Motivated. Strong support from family. Strong advocate for himself.   Patient expressed understanding of goal: Yes  Action steps to achieve this goal:  1. I will support my son with the activities her participates in, like basket ball.   2. I will continue to find time during the day to spend time with my son that may allow us to build a closer relationship.   3. I will report progress towards this goal at scheduled outreach telephone calls from my CCC team.      Discussed on 11/3/22                          Intervention/Education provided during outreach: Discussed the importance of taking his medication as directed. Encouraged him to attend all upcoming appointments. Encouraged him to contact CCC for any additional needs or concerns.      Outreach Frequency: monthly      Plan: CCC RN will continue to monitor, support patient with current goals and will be available to assist as nursing needs arise.     Care Coordinator will follow up in one month.

## 2022-11-08 ENCOUNTER — OFFICE VISIT (OUTPATIENT)
Dept: CARDIOLOGY | Facility: CLINIC | Age: 57
End: 2022-11-08
Payer: COMMERCIAL

## 2022-11-08 VITALS
HEIGHT: 64 IN | WEIGHT: 198 LBS | HEART RATE: 56 BPM | BODY MASS INDEX: 33.8 KG/M2 | DIASTOLIC BLOOD PRESSURE: 70 MMHG | RESPIRATION RATE: 14 BRPM | SYSTOLIC BLOOD PRESSURE: 90 MMHG

## 2022-11-08 DIAGNOSIS — I10 ESSENTIAL HYPERTENSION: Primary | ICD-10-CM

## 2022-11-08 DIAGNOSIS — E78.5 DYSLIPIDEMIA, GOAL LDL BELOW 70: ICD-10-CM

## 2022-11-08 DIAGNOSIS — Z87.891 FORMER SMOKER: ICD-10-CM

## 2022-11-08 DIAGNOSIS — R29.818 SUSPECTED SLEEP APNEA: ICD-10-CM

## 2022-11-08 DIAGNOSIS — I50.32 CHRONIC HEART FAILURE WITH PRESERVED EJECTION FRACTION (H): ICD-10-CM

## 2022-11-08 DIAGNOSIS — I25.709 CORONARY ARTERY DISEASE INVOLVING CORONARY BYPASS GRAFT OF NATIVE HEART WITH ANGINA PECTORIS (H): ICD-10-CM

## 2022-11-08 PROCEDURE — 99214 OFFICE O/P EST MOD 30 MIN: CPT | Performed by: GENERAL ACUTE CARE HOSPITAL

## 2022-11-08 NOTE — PATIENT INSTRUCTIONS
We can continue your current medications.  If you have more chest pain or shortness of breath, we can fix your blocked artery.  See me back in 2 months.

## 2022-11-08 NOTE — LETTER
11/8/2022    Az Briseno MD  [address] [address]    RE: Nadya Blake       Dear Colleague,     I had the pleasure of seeing Nadya Blake in the [facility].  HEART CARE ENCOUNTER NOTE        Assessment/Recommendations   Assessment:    1. Coronary artery disease status post four-vessel coronary artery bypass grafting (left internal mammary artery to the left anterior descending artery, right radial artery to the right posterior descending artery, reversed saphenous venous grafts to obtuse marginal and diagonal artery branches) on 6/24/2020. No ischemia seen on stress cardiac MRI on 12/23/2021 but he has been having exertional dyspnea with his saphenous venous graft to the right posterior descending artery noted to be occluded on coronary angiography 10/6/2022. He now notes only very mild symptoms.  2. Chronic congestive heart failure with preserved left ventricular ejection fraction. He seems euvolemic and normal left-sided filling pressure was noted on cardiac catheterization 10/6/2022.  3. Benign essential hypertension. Low today without symptoms.  4. Dyslipidemia. Last LDL 39 mg/dL.  5. Former smoker.  6. COVID-19 infection on 5/18/2022.  7. Possible obstructive sleep apnea.  8. BMI 33.99.    Plan:  1. Continue with medical management of his coronary artery disease but if his symptoms worsen, we will refer him for percutaneous coronary intervention of the right coronary artery.  2. Continue spironolactone 25 mg daily.  3. Continue isosorbide mononitrate 30 mg daily.  4. Continue to hold furosemide as I do not think he is fluid-overloaded at this time.  5. Continue other cardiac medications.  6. Sleep medicine referral previously placed.  7. Follow-up with me in 2 months per patient request.         History of Present Illness   Mr. Nadya Blake is a 57 year old male with a significant past history of CAD with history of NSTEMI s/p 4V CABG (LIMA to LAD, right  radial artery to RPDA, reversed SVGs to an OM and diagonal artery branch) on 6/24/2020, HFpEF, HTN, dyslipidemia, and former smoker presenting for follow-up.     He was having complaints of worsening dyspnea on exertion despite medical therapy, prompting cardiac cath on 10/6/2022. This showed his SVG to the RPDA to be occluded. Intervention on his native RCA was recommended. He saw Dr. Ragsdale for consideration of repeat CABG, with a recommendation against repeat CABG and consideration of PCI to the RCA. He was also told to exercise more and eat a vegan diet.    He actually feels much better, exercising more and having lost 8 pounds. Only very occasional shortness of breath and lightheadedness. He denies any chest pain/pressure/tightness, shortness of breath at rest, or with exertion, pre-syncope, syncope, lower extremity swelling, palpitations, paroxysmal nocturnal dyspnea (PND), or orthopnea.     Cardiac Problems and Cardiac Diagnostics     Most Recent Cardiac testing:  ECG 10/6/2022 (personally reviewed and interpreted): sinus bradycardia HR 57 bpm with 1st degree AV block  ms, nonspecific T wave changes    ECHO 6/27/2020 (report reviewed):     Normal left ventricular size and systolic function. The left ventricular wall motion is normal. The calculated left ventricular ejection fraction is 71%.    Normal right ventricular size and systolic function.    No hemodynamically significant valvular heart abnormalities.    The ascending aorta is mildly dilated.    When compared to the previous study dated 6/23/2020, no significant change.     Stress cardiac MRI 12/23/2021 (report reviewed):  1.  Pharmacological Regadenoson stress cardiac MRI is negative for inducible myocardial ischemia.   2.  Pharmacological stress ECG is negative for inducible myocardial ischemia.   3. Normal left ventricular size, wall thickness and systolic function. The quantified left ventricular  ejection fraction is 59 %.  Very small  area of non-transmural myocardial scar in the mid inferior wall is  identified.    4.  Normal right ventricular size and systolic function.    5.  No significant valvular abnormalities.  6. Ascending aorta measures 38 mm.      Stress test 5/21/2021 (report reviewed):     The nuclear stress test is negative for inducible myocardial ischemia or infarction.     The left ventricular ejection fraction at stress is 65%.     There is no prior study for comparison.     Cardiac cath 10/6/2022 (report reviewed):     Left ventricular filling pressures are normal.    3rd Mrg lesion is 90% stenosed.    Prox RCA lesion is 75% stenosed.    Prox RCA to Mid RCA lesion is 40% stenosed.    Dist RCA lesion is 75% stenosed.    Mid RCA lesion is 30% stenosed.    RPDA lesion is 50% stenosed.    RPAV lesion is 40% stenosed.    Prox LAD lesion is 70% stenosed.    1st Diag-1 lesion is 95% stenosed.    1st Diag-2 lesion is 95% stenosed.    2nd Diag lesion is 99% stenosed.    Mid LAD-1 lesion is 75% stenosed.    Mid LAD-2 lesion is 75% stenosed.    Dist LAD lesion is 70% stenosed.    Vein graft to right PDA is totally occluded    Vein graft to second diagonal is widely patent    Vein graft to third OM is widely patent    LUZ MARIA graft to mid distal LAD is patent       Medications  Allergies   Current Outpatient Medications   Medication Sig Dispense Refill     ACETAMINOPHEN EXTRA STRENGTH 500 MG tablet TAKE 2 TABLETS(1000 MG) BY MOUTH EVERY 6 HOURS AS NEEDED FOR PAIN 360 tablet 3     albuterol (PROAIR HFA/PROVENTIL HFA/VENTOLIN HFA) 108 (90 Base) MCG/ACT inhaler Inhale 1-2 puffs into the lungs every 4 hours as needed for shortness of breath / dyspnea 18 g 11     aspirin (ASA) 81 MG chewable tablet Take 1 tablet (81 mg) by mouth daily 90 tablet 4     Cholecalciferol (VITAMIN D3) 50 MCG (2000 UT) CAPS Take 1 capsule by mouth daily 90 capsule 3     diclofenac (VOLTAREN) 1 % topical gel Apply 4 g topically 4 times daily 500 g 2     empagliflozin  (JARDIANCE) 10 MG TABS tablet Take 1 tablet (10 mg) by mouth daily 90 tablet 4     escitalopram (LEXAPRO) 20 MG tablet TAKE 1 TABLET(20 MG) BY MOUTH DAILY 30 tablet 0     isosorbide mononitrate (IMDUR) 30 MG 24 hr tablet Take 1 tablet (30 mg) by mouth daily 90 tablet 3     Lidocaine 0.5 % GEL        metoprolol succinate ER (TOPROL XL) 100 MG 24 hr tablet Take 1.5 tablets (150 mg) by mouth At Bedtime 135 tablet 4     nifedipine 0.2% in white petrolatum 0.2 % OINT ointment Apply topically 4 times daily as needed (anal fissure) 100 g 1     nitroGLYcerin (NITROSTAT) 0.4 MG sublingual tablet For chest pain place 1 tablet under the tongue every 5 minutes for 3 doses. If symptoms persist 5 minutes after 1st dose call 911. 30 tablet 1     omeprazole (PRILOSEC) 20 MG DR capsule Take 1 capsule (20 mg) by mouth 2 times daily (before meals) 180 capsule 4     polyethylene glycol (MIRALAX) 17 GM/Dose powder Take 17 g (1 capful) by mouth daily 850 g 3     QUEtiapine (SEROQUEL) 50 MG tablet Take 1-4 tablets ( mg) by mouth nightly as needed (Anxiety, nightmares or sleep difficulties) 120 tablet 3     rosuvastatin (CRESTOR) 40 MG tablet TAKE 1 TABLET(40 MG) BY MOUTH DAILY 90 tablet 3     spironolactone (ALDACTONE) 25 MG tablet Take 1 tablet (25 mg) by mouth daily 90 tablet 3     tamsulosin (FLOMAX) 0.4 MG capsule Take 2 capsules (0.8 mg) by mouth every evening (Patient taking differently: Take 0.4 mg by mouth every evening) 180 capsule 3     budesonide-formoterol (SYMBICORT) 160-4.5 MCG/ACT Inhaler Inhale 2 puffs into the lungs 2 times daily (Patient not taking: Reported on 11/8/2022) 10.2 g 11      Allergies   Allergen Reactions     Atorvastatin Diarrhea     Cortisone Other (See Comments)     pain     Lisinopril Cough     started after CABG for unclear reason     Methylprednisolone Other (See Comments)     ?        Physical Examination Review of Systems   BP 90/70 (BP Location: Right arm, Patient Position: Sitting, Cuff Size:  "Adult Large)   Pulse 56   Resp 14   Ht 1.626 m (5' 4\")   Wt 89.8 kg (198 lb)   BMI 33.99 kg/m    Body mass index is 33.99 kg/m .  Wt Readings from Last 3 Encounters:   11/08/22 89.8 kg (198 lb)   10/18/22 91.3 kg (201 lb 3.2 oz)   10/18/22 90.9 kg (200 lb 6.4 oz)       General Appearance:   Pleasant  male, appears  stated age. no acute distress, normal body habitus   ENT/Mouth: Facemask.     EYES:  no scleral icterus, normal conjunctivae   Neck: no carotid bruits. No anterior cervical lymphadenopaty   Respiratory:   lungs are clear to auscultation, no rales or wheezing, equal chest wall expansion    Cardiovascular:   Regular rhythm, normal rate. Normal first and second heart sounds with no murmurs, rubs, or gallops; the carotid, radial and posterior tibial pulses are intact, Jugular venous pressure normal, no edema bilaterally    Abdomen/GI:  no organomegaly, masses, bruits, or tenderness; bowel sounds are present   Extremities: no cyanosis or clubbing   Skin: no xanthelasma, warm.    Heme/lymph/ Immunology No apparent bleeding noted.   Neurologic: Alert and oriented. normal gait, no tremors     Psychiatric: Pleasant, calm, appropriate affect.    A complete 10 system review of systems was performed and is negative except as mentioned in the HPI/subjective.         Past History   Past Medical History:   Past Medical History:   Diagnosis Date     Acute non-ST elevation myocardial infarction (NSTEMI) (H) 6/22/2020     Adenomatous polyp of colon      Ascending aorta dilatation (H)      Carpal tunnel syndrome      Chronic superficial gastritis      Coronary artery disease due to lipid rich plaque 6/23/2020     Depression with anxiety      Dyslipidemia, goal LDL below 70 6/23/2020     Essential hypertension 9/2/2012     Fatty liver disease, nonalcoholic      GERD (gastroesophageal reflux disease)      IBS (irritable bowel syndrome)      Left lumbar radiculopathy      Multinodular goiter      Old torn meniscus of knee "      Paroxysmal atrial fibrillation (H)      Perianal abscess      Post herpetic neuralgia      Post-traumatic osteoarthritis of both knees      Primary osteoarthritis of left hip      Primary osteoarthritis of right hip      Pulmonary nodule      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 Ragsdale     CV CORONARY ANGIOGRAM N/A 6/23/2020    Procedure: Coronary Angiogram;  Surgeon: Ariane Lester MD;  Location: [facility];  Service: Cardiology     CV CORONARY ANGIOGRAM N/A 10/6/2022    Procedure: Coronary Angiogram;  Surgeon: Javon John MD;  Location: [facility] CV     CV IABP INSERT N/A 6/24/2020    Procedure: Intra Aortic Balloon Pump Insertion;  Surgeon: Ariane Lester MD;  Location: [facility];  Service: Cardiology     CV LEFT HEART CATH N/A 10/6/2022    Procedure: Left Heart Catheterization;  Surgeon: Javon John MD;  Location: [facility] CV     CV LEFT HEART CATHETERIZATION WITHOUT LEFT VENTRICULOGRAM Left 6/23/2020    Procedure: Left Heart Catheterization Without Left Ventriculogram;  Surgeon: Ariane Lester MD;  Location: [facility];  Service: Cardiology     CV LEFT VENTRICULOGRAM N/A 10/6/2022    Procedure: Left Ventriculogram;  Surgeon: Javon John MD;  Location: [facility] CV       Family History:   Family History   Problem Relation Age of Onset     No Known Problems Mother      Lung Cancer Father 56        fatal     No Known Problems Daughter      Other - See Comments Son         congenital deformity of right leg; he uses a leg prosthesis        Social History:   Social History     Socioeconomic History     Marital status:       Spouse name: Carlee     Number of children: 2     Years of education: Not on file     Highest education level: Not on file   Occupational History     Not on file   Tobacco Use     Smoking status: Former     Packs/day: 1.00     Years: 30.00     Pack years: 30.00     Types: Cigarettes     Quit date: 3/23/2020     Years since quittin.6     Smokeless tobacco: Never     Tobacco comments:     stopped 3/24/2020   Vaping Use     Vaping Use: Never used   Substance and Sexual Activity     Alcohol use: No     Drug use: Never     Sexual activity: Yes     Partners: Female   Other Topics Concern     Not on file   Social History Narrative    , Carlee, BA chemistry and taking AA courses at [employer].  From [place]; bachelors in geology and computer science. Son, Grace (2005) and Sherrie (2008).  On SSDI, PTSD.       Social Determinants of Health     Financial Resource Strain: Not on file   Food Insecurity: No Food Insecurity     Worried About Running Out of Food in the Last Year: Never true     Ran Out of Food in the Last Year: Never true   Transportation Needs: No Transportation Needs     Lack of Transportation (Medical): No     Lack of Transportation (Non-Medical): No   Physical Activity: Not on file   Stress: Not on file   Social Connections: Not on file   Intimate Partner Violence: Not on file   Housing Stability: Not on file              Lab Results    Chemistry/lipid CBC Cardiac Enzymes/BNP/TSH/INR   Lab Results   Component Value Date    CHOL 89 10/18/2022    HDL 28 (L) 10/18/2022    LDL 39 10/18/2022    TRIG 108 10/18/2022    CR 1.45 (H) 10/18/2022    BUN 18.9 10/18/2022    POTASSIUM 4.7 10/18/2022     (L) 10/18/2022    CO2 26 10/18/2022      Lab Results   Component Value Date    WBC 7.2 10/06/2022    HGB 13.9 10/06/2022    HCT 41.1 10/06/2022    MCV 87 10/06/2022     10/06/2022    A1C 6.0 (H) 2022     Lab Results   Component Value Date    A1C 6.0 (H) 2022    Lab Results   Component Value Date     TROPONINI <0.01 11/21/2020    BNP 79 (H) 07/27/2020    TSH 3.40 08/05/2022    INR 1.10 07/27/2020          Sloan Burns MD [facility]  Non-Invasive Cardiologist  [facility]  Pager [phone]        Thank you for allowing me to participate in the care of your patient.      Sincerely,     Sloan Burns MD     [facility]  cc:   Sloan Burns MD  [address] [facility]  [place] [address]

## 2022-11-08 NOTE — PROGRESS NOTES
HEART CARE ENCOUNTER NOTE        Assessment/Recommendations   Assessment:    1. Coronary artery disease status post four-vessel coronary artery bypass grafting (left internal mammary artery to the left anterior descending artery, right radial artery to the right posterior descending artery, reversed saphenous venous grafts to obtuse marginal and diagonal artery branches) on 6/24/2020. No ischemia seen on stress cardiac MRI on 12/23/2021 but he has been having exertional dyspnea with his saphenous venous graft to the right posterior descending artery noted to be occluded on coronary angiography 10/6/2022. He now notes only very mild symptoms.  2. Chronic congestive heart failure with preserved left ventricular ejection fraction. He seems euvolemic and normal left-sided filling pressure was noted on cardiac catheterization 10/6/2022.  3. Benign essential hypertension. Low today without symptoms.  4. Dyslipidemia. Last LDL 39 mg/dL.  5. Former smoker.  6. COVID-19 infection on 5/18/2022.  7. Possible obstructive sleep apnea.  8. BMI 33.99.    Plan:  1. Continue with medical management of his coronary artery disease but if his symptoms worsen, we will refer him for percutaneous coronary intervention of the right coronary artery.  2. Continue spironolactone 25 mg daily.  3. Continue isosorbide mononitrate 30 mg daily.  4. Continue to hold furosemide as I do not think he is fluid-overloaded at this time.  5. Continue other cardiac medications.  6. Sleep medicine referral previously placed.  7. Follow-up with me in 2 months per patient request.         History of Present Illness   Mr. Nadya Blake is a 57 year old male with a significant past history of CAD with history of NSTEMI s/p 4V CABG (LIMA to LAD, right radial artery to RPDA, reversed SVGs to an OM and diagonal artery branch) on 6/24/2020, HFpEF, HTN, dyslipidemia, and former smoker presenting for follow-up.     He was having complaints of worsening dyspnea on  exertion despite medical therapy, prompting cardiac cath on 10/6/2022. This showed his SVG to the RPDA to be occluded. Intervention on his native RCA was recommended. He saw Dr. Ragsdale for consideration of repeat CABG, with a recommendation against repeat CABG and consideration of PCI to the RCA. He was also told to exercise more and eat a vegan diet.    He actually feels much better, exercising more and having lost 8 pounds. Only very occasional shortness of breath and lightheadedness. He denies any chest pain/pressure/tightness, shortness of breath at rest, or with exertion, pre-syncope, syncope, lower extremity swelling, palpitations, paroxysmal nocturnal dyspnea (PND), or orthopnea.     Cardiac Problems and Cardiac Diagnostics     Most Recent Cardiac testing:  ECG 10/6/2022 (personally reviewed and interpreted): sinus bradycardia HR 57 bpm with 1st degree AV block  ms, nonspecific T wave changes    ECHO 6/27/2020 (report reviewed):     Normal left ventricular size and systolic function. The left ventricular wall motion is normal. The calculated left ventricular ejection fraction is 71%.    Normal right ventricular size and systolic function.    No hemodynamically significant valvular heart abnormalities.    The ascending aorta is mildly dilated.    When compared to the previous study dated 6/23/2020, no significant change.     Stress cardiac MRI 12/23/2021 (report reviewed):  1.  Pharmacological Regadenoson stress cardiac MRI is negative for inducible myocardial ischemia.   2.  Pharmacological stress ECG is negative for inducible myocardial ischemia.   3. Normal left ventricular size, wall thickness and systolic function. The quantified left ventricular  ejection fraction is 59 %.  Very small area of non-transmural myocardial scar in the mid inferior wall is  identified.    4.  Normal right ventricular size and systolic function.    5.  No significant valvular abnormalities.  6. Ascending aorta  measures 38 mm.      Stress test 5/21/2021 (report reviewed):     The nuclear stress test is negative for inducible myocardial ischemia or infarction.     The left ventricular ejection fraction at stress is 65%.     There is no prior study for comparison.     Cardiac cath 10/6/2022 (report reviewed):     Left ventricular filling pressures are normal.    3rd Mrg lesion is 90% stenosed.    Prox RCA lesion is 75% stenosed.    Prox RCA to Mid RCA lesion is 40% stenosed.    Dist RCA lesion is 75% stenosed.    Mid RCA lesion is 30% stenosed.    RPDA lesion is 50% stenosed.    RPAV lesion is 40% stenosed.    Prox LAD lesion is 70% stenosed.    1st Diag-1 lesion is 95% stenosed.    1st Diag-2 lesion is 95% stenosed.    2nd Diag lesion is 99% stenosed.    Mid LAD-1 lesion is 75% stenosed.    Mid LAD-2 lesion is 75% stenosed.    Dist LAD lesion is 70% stenosed.    Vein graft to right PDA is totally occluded    Vein graft to second diagonal is widely patent    Vein graft to third OM is widely patent    LUZ MARIA graft to mid distal LAD is patent       Medications  Allergies   Current Outpatient Medications   Medication Sig Dispense Refill     ACETAMINOPHEN EXTRA STRENGTH 500 MG tablet TAKE 2 TABLETS(1000 MG) BY MOUTH EVERY 6 HOURS AS NEEDED FOR PAIN 360 tablet 3     albuterol (PROAIR HFA/PROVENTIL HFA/VENTOLIN HFA) 108 (90 Base) MCG/ACT inhaler Inhale 1-2 puffs into the lungs every 4 hours as needed for shortness of breath / dyspnea 18 g 11     aspirin (ASA) 81 MG chewable tablet Take 1 tablet (81 mg) by mouth daily 90 tablet 4     Cholecalciferol (VITAMIN D3) 50 MCG (2000 UT) CAPS Take 1 capsule by mouth daily 90 capsule 3     diclofenac (VOLTAREN) 1 % topical gel Apply 4 g topically 4 times daily 500 g 2     empagliflozin (JARDIANCE) 10 MG TABS tablet Take 1 tablet (10 mg) by mouth daily 90 tablet 4     escitalopram (LEXAPRO) 20 MG tablet TAKE 1 TABLET(20 MG) BY MOUTH DAILY 30 tablet 0     isosorbide mononitrate (IMDUR) 30 MG 24  "hr tablet Take 1 tablet (30 mg) by mouth daily 90 tablet 3     Lidocaine 0.5 % GEL        metoprolol succinate ER (TOPROL XL) 100 MG 24 hr tablet Take 1.5 tablets (150 mg) by mouth At Bedtime 135 tablet 4     nifedipine 0.2% in white petrolatum 0.2 % OINT ointment Apply topically 4 times daily as needed (anal fissure) 100 g 1     nitroGLYcerin (NITROSTAT) 0.4 MG sublingual tablet For chest pain place 1 tablet under the tongue every 5 minutes for 3 doses. If symptoms persist 5 minutes after 1st dose call 911. 30 tablet 1     omeprazole (PRILOSEC) 20 MG DR capsule Take 1 capsule (20 mg) by mouth 2 times daily (before meals) 180 capsule 4     polyethylene glycol (MIRALAX) 17 GM/Dose powder Take 17 g (1 capful) by mouth daily 850 g 3     QUEtiapine (SEROQUEL) 50 MG tablet Take 1-4 tablets ( mg) by mouth nightly as needed (Anxiety, nightmares or sleep difficulties) 120 tablet 3     rosuvastatin (CRESTOR) 40 MG tablet TAKE 1 TABLET(40 MG) BY MOUTH DAILY 90 tablet 3     spironolactone (ALDACTONE) 25 MG tablet Take 1 tablet (25 mg) by mouth daily 90 tablet 3     tamsulosin (FLOMAX) 0.4 MG capsule Take 2 capsules (0.8 mg) by mouth every evening (Patient taking differently: Take 0.4 mg by mouth every evening) 180 capsule 3     budesonide-formoterol (SYMBICORT) 160-4.5 MCG/ACT Inhaler Inhale 2 puffs into the lungs 2 times daily (Patient not taking: Reported on 11/8/2022) 10.2 g 11      Allergies   Allergen Reactions     Atorvastatin Diarrhea     Cortisone Other (See Comments)     pain     Lisinopril Cough     started after CABG for unclear reason     Methylprednisolone Other (See Comments)     ?        Physical Examination Review of Systems   BP 90/70 (BP Location: Right arm, Patient Position: Sitting, Cuff Size: Adult Large)   Pulse 56   Resp 14   Ht 1.626 m (5' 4\")   Wt 89.8 kg (198 lb)   BMI 33.99 kg/m    Body mass index is 33.99 kg/m .  Wt Readings from Last 3 Encounters:   11/08/22 89.8 kg (198 lb)   10/18/22 " 91.3 kg (201 lb 3.2 oz)   10/18/22 90.9 kg (200 lb 6.4 oz)       General Appearance:   Pleasant  male, appears  stated age. no acute distress, normal body habitus   ENT/Mouth: Facemask.     EYES:  no scleral icterus, normal conjunctivae   Neck: no carotid bruits. No anterior cervical lymphadenopaty   Respiratory:   lungs are clear to auscultation, no rales or wheezing, equal chest wall expansion    Cardiovascular:   Regular rhythm, normal rate. Normal first and second heart sounds with no murmurs, rubs, or gallops; the carotid, radial and posterior tibial pulses are intact, Jugular venous pressure normal, no edema bilaterally    Abdomen/GI:  no organomegaly, masses, bruits, or tenderness; bowel sounds are present   Extremities: no cyanosis or clubbing   Skin: no xanthelasma, warm.    Heme/lymph/ Immunology No apparent bleeding noted.   Neurologic: Alert and oriented. normal gait, no tremors     Psychiatric: Pleasant, calm, appropriate affect.    A complete 10 system review of systems was performed and is negative except as mentioned in the HPI/subjective.         Past History   Past Medical History:   Past Medical History:   Diagnosis Date     Acute non-ST elevation myocardial infarction (NSTEMI) (H) 6/22/2020     Adenomatous polyp of colon      Ascending aorta dilatation (H)      Carpal tunnel syndrome      Chronic superficial gastritis      Coronary artery disease due to lipid rich plaque 6/23/2020     Depression with anxiety      Dyslipidemia, goal LDL below 70 6/23/2020     Essential hypertension 9/2/2012     Fatty liver disease, nonalcoholic      GERD (gastroesophageal reflux disease)      IBS (irritable bowel syndrome)      Left lumbar radiculopathy      Multinodular goiter      Old torn meniscus of knee      Paroxysmal atrial fibrillation (H)      Perianal abscess      Post herpetic neuralgia      Post-traumatic osteoarthritis of both knees      Primary osteoarthritis of left hip      Primary osteoarthritis  of right hip      Pulmonary nodule      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 Ragsdale     CV CORONARY ANGIOGRAM N/A 6/23/2020    Procedure: Coronary Angiogram;  Surgeon: Ariane Lester MD;  Location: [facility] Cath Lab;  Service: Cardiology     CV CORONARY ANGIOGRAM N/A 10/6/2022    Procedure: Coronary Angiogram;  Surgeon: Javon John MD;  Location: [facility] CV     CV IABP INSERT N/A 6/24/2020    Procedure: Intra Aortic Balloon Pump Insertion;  Surgeon: Ariane Lester MD;  Location: [facility] Cath Lab;  Service: Cardiology     CV LEFT HEART CATH N/A 10/6/2022    Procedure: Left Heart Catheterization;  Surgeon: Javon John MD;  Location: [facility] CV     CV LEFT HEART CATHETERIZATION WITHOUT LEFT VENTRICULOGRAM Left 6/23/2020    Procedure: Left Heart Catheterization Without Left Ventriculogram;  Surgeon: Ariane Lester MD;  Location: [facility] Cath Lab;  Service: Cardiology     CV LEFT VENTRICULOGRAM N/A 10/6/2022    Procedure: Left Ventriculogram;  Surgeon: Javon John MD;  Location: [facility] LAB CV       Family History:   Family History   Problem Relation Age of Onset     No Known Problems Mother      Lung Cancer Father 56        fatal     No Known Problems Daughter      Other - See Comments Son         congenital deformity of right leg; he uses a leg prosthesis        Social History:   Social History     Socioeconomic History     Marital status:      Spouse name: Carlee     Number of children: 2     Years of education: Not on file     Highest education level: Not on file   Occupational History     Not on file   Tobacco Use     Smoking status: Former      Packs/day: 1.00     Years: 30.00     Pack years: 30.00     Types: Cigarettes     Quit date: 3/23/2020     Years since quittin.6     Smokeless tobacco: Never     Tobacco comments:     stopped 3/24/2020   Vaping Use     Vaping Use: Never used   Substance and Sexual Activity     Alcohol use: No     Drug use: Never     Sexual activity: Yes     Partners: Female   Other Topics Concern     Not on file   Social History Narrative    , Carlee, BA chemistry and taking AA courses at [employer].  From [place]; bachelors in geology and computer science. Son, Grace (2005) and Sherrie (2008).  On SSDI, PTSD.       Social Determinants of Health     Financial Resource Strain: Not on file   Food Insecurity: No Food Insecurity     Worried About Running Out of Food in the Last Year: Never true     Ran Out of Food in the Last Year: Never true   Transportation Needs: No Transportation Needs     Lack of Transportation (Medical): No     Lack of Transportation (Non-Medical): No   Physical Activity: Not on file   Stress: Not on file   Social Connections: Not on file   Intimate Partner Violence: Not on file   Housing Stability: Not on file              Lab Results    Chemistry/lipid CBC Cardiac Enzymes/BNP/TSH/INR   Lab Results   Component Value Date    CHOL 89 10/18/2022    HDL 28 (L) 10/18/2022    LDL 39 10/18/2022    TRIG 108 10/18/2022    CR 1.45 (H) 10/18/2022    BUN 18.9 10/18/2022    POTASSIUM 4.7 10/18/2022     (L) 10/18/2022    CO2 26 10/18/2022      Lab Results   Component Value Date    WBC 7.2 10/06/2022    HGB 13.9 10/06/2022    HCT 41.1 10/06/2022    MCV 87 10/06/2022     10/06/2022    A1C 6.0 (H) 2022     Lab Results   Component Value Date    A1C 6.0 (H) 2022    Lab Results   Component Value Date    TROPONINI <0.01 2020    BNP 79 (H) 2020    TSH 3.40 2022    INR 1.10 2020          Sloan Burns MD [facility]  Non-Invasive Cardiologist  [facility]  Pager  [phone]

## 2022-11-21 ENCOUNTER — OFFICE VISIT (OUTPATIENT)
Dept: INTERNAL MEDICINE | Facility: CLINIC | Age: 57
End: 2022-11-21
Payer: COMMERCIAL

## 2022-11-21 VITALS
DIASTOLIC BLOOD PRESSURE: 72 MMHG | SYSTOLIC BLOOD PRESSURE: 100 MMHG | OXYGEN SATURATION: 97 % | HEIGHT: 66 IN | WEIGHT: 195.3 LBS | BODY MASS INDEX: 31.39 KG/M2 | HEART RATE: 54 BPM

## 2022-11-21 DIAGNOSIS — I25.83 CORONARY ARTERY DISEASE DUE TO LIPID RICH PLAQUE: ICD-10-CM

## 2022-11-21 DIAGNOSIS — I25.10 CORONARY ARTERY DISEASE DUE TO LIPID RICH PLAQUE: ICD-10-CM

## 2022-11-21 DIAGNOSIS — I10 ESSENTIAL HYPERTENSION: ICD-10-CM

## 2022-11-21 DIAGNOSIS — E11.9 TYPE 2 DIABETES MELLITUS WITHOUT COMPLICATION, WITHOUT LONG-TERM CURRENT USE OF INSULIN (H): Primary | ICD-10-CM

## 2022-11-21 DIAGNOSIS — N18.31 CHRONIC KIDNEY DISEASE, STAGE 3A (H): ICD-10-CM

## 2022-11-21 LAB
ANION GAP SERPL CALCULATED.3IONS-SCNC: 14 MMOL/L (ref 7–15)
BUN SERPL-MCNC: 11.8 MG/DL (ref 6–20)
CALCIUM SERPL-MCNC: 9.2 MG/DL (ref 8.6–10)
CHLORIDE SERPL-SCNC: 103 MMOL/L (ref 98–107)
CHOLEST SERPL-MCNC: 90 MG/DL
CREAT SERPL-MCNC: 1.61 MG/DL (ref 0.67–1.17)
DEPRECATED HCO3 PLAS-SCNC: 22 MMOL/L (ref 22–29)
GFR SERPL CREATININE-BSD FRML MDRD: 50 ML/MIN/1.73M2
GLUCOSE SERPL-MCNC: 95 MG/DL (ref 70–99)
HBA1C MFR BLD: 5.6 % (ref 0–5.6)
HDLC SERPL-MCNC: 33 MG/DL
LDLC SERPL CALC-MCNC: 27 MG/DL
NONHDLC SERPL-MCNC: 57 MG/DL
POTASSIUM SERPL-SCNC: 4.9 MMOL/L (ref 3.4–5.3)
SODIUM SERPL-SCNC: 139 MMOL/L (ref 136–145)
TRIGL SERPL-MCNC: 149 MG/DL

## 2022-11-21 PROCEDURE — 36415 COLL VENOUS BLD VENIPUNCTURE: CPT | Performed by: INTERNAL MEDICINE

## 2022-11-21 PROCEDURE — 80061 LIPID PANEL: CPT | Performed by: INTERNAL MEDICINE

## 2022-11-21 PROCEDURE — 83036 HEMOGLOBIN GLYCOSYLATED A1C: CPT | Performed by: INTERNAL MEDICINE

## 2022-11-21 PROCEDURE — 99214 OFFICE O/P EST MOD 30 MIN: CPT | Performed by: INTERNAL MEDICINE

## 2022-11-21 PROCEDURE — 80048 BASIC METABOLIC PNL TOTAL CA: CPT | Performed by: INTERNAL MEDICINE

## 2022-11-21 NOTE — PROGRESS NOTES
"  Office Visit - Follow Up   Nadya Blake   57 year old male    Date of Visit: 11/21/2022    Chief Complaint   Patient presents with     Follow Up     Bypass follow up.        Assessment and Plan   1. Type 2 diabetes mellitus without complication, without long-term current use of insulin (H)  Has been doing well on Jardiance, continue  - HEMOGLOBIN A1C; Future  - HEMOGLOBIN A1C    2. Coronary artery disease due to lipid rich plaque  Doing well continue secondary prevention, agree with vegan diet, continue to monitor for symptoms and follow-up closely with cardiology  - Lipid panel reflex to direct LDL Fasting; Future  - Lipid panel reflex to direct LDL Fasting    3. Essential hypertension  Blood pressure is little bit soft today.  We will check labs.  Consider cutting back on spironolactone or Imdur, 1 month follow-up  - Basic metabolic panel  (Ca, Cl, CO2, Creat, Gluc, K, Na, BUN); Future  - Basic metabolic panel  (Ca, Cl, CO2, Creat, Gluc, K, Na, BUN)    4. Chronic kidney disease, stage 3a (H)  As above    Return in about 4 weeks (around 12/19/2022) for Follow up.     History of Present Illness   This 57 year old man comes in for follow-up.  Since his last visit he has been eating a vegan diet.  He has lost weight and his energy has improved.  Last breathing difficulty with exertion and very rare chest pain.  He does have known obstruction of one of his grafts and discussed revascularization with cardiology and selected medical management.       Physical Exam   General Appearance:   No acute distress    /72 (BP Location: Left arm, Patient Position: Sitting, Cuff Size: Adult Regular)   Pulse 54   Ht 1.676 m (5' 6\")   Wt 88.6 kg (195 lb 4.8 oz)   SpO2 97%   BMI 31.52 kg/m      Heart rate controlled rhythm regular lungs clear to auscultation bilaterally no edema     Additional Information   Current Outpatient Medications   Medication Sig Dispense Refill     ACETAMINOPHEN EXTRA STRENGTH 500 MG tablet " TAKE 2 TABLETS(1000 MG) BY MOUTH EVERY 6 HOURS AS NEEDED FOR PAIN 360 tablet 3     albuterol (PROAIR HFA/PROVENTIL HFA/VENTOLIN HFA) 108 (90 Base) MCG/ACT inhaler Inhale 1-2 puffs into the lungs every 4 hours as needed for shortness of breath / dyspnea 18 g 11     aspirin (ASA) 81 MG chewable tablet Take 1 tablet (81 mg) by mouth daily 90 tablet 4     budesonide-formoterol (SYMBICORT) 160-4.5 MCG/ACT Inhaler Inhale 2 puffs into the lungs 2 times daily 10.2 g 11     Cholecalciferol (VITAMIN D3) 50 MCG (2000 UT) CAPS Take 1 capsule by mouth daily 90 capsule 3     diclofenac (VOLTAREN) 1 % topical gel Apply 4 g topically 4 times daily 500 g 2     empagliflozin (JARDIANCE) 10 MG TABS tablet Take 1 tablet (10 mg) by mouth daily 90 tablet 4     escitalopram (LEXAPRO) 20 MG tablet TAKE 1 TABLET(20 MG) BY MOUTH DAILY 30 tablet 0     isosorbide mononitrate (IMDUR) 30 MG 24 hr tablet Take 1 tablet (30 mg) by mouth daily 90 tablet 3     Lidocaine 0.5 % GEL        metoprolol succinate ER (TOPROL XL) 100 MG 24 hr tablet Take 1.5 tablets (150 mg) by mouth At Bedtime 135 tablet 4     nifedipine 0.2% in white petrolatum 0.2 % OINT ointment Apply topically 4 times daily as needed (anal fissure) 100 g 1     nitroGLYcerin (NITROSTAT) 0.4 MG sublingual tablet For chest pain place 1 tablet under the tongue every 5 minutes for 3 doses. If symptoms persist 5 minutes after 1st dose call 911. 30 tablet 1     omeprazole (PRILOSEC) 20 MG DR capsule Take 1 capsule (20 mg) by mouth 2 times daily (before meals) 180 capsule 4     polyethylene glycol (MIRALAX) 17 GM/Dose powder Take 17 g (1 capful) by mouth daily 850 g 3     QUEtiapine (SEROQUEL) 50 MG tablet Take 1-4 tablets ( mg) by mouth nightly as needed (Anxiety, nightmares or sleep difficulties) 120 tablet 3     rosuvastatin (CRESTOR) 40 MG tablet TAKE 1 TABLET(40 MG) BY MOUTH DAILY 90 tablet 3     spironolactone (ALDACTONE) 25 MG tablet Take 1 tablet (25 mg) by mouth daily 90 tablet 3      tamsulosin (FLOMAX) 0.4 MG capsule Take 2 capsules (0.8 mg) by mouth every evening (Patient taking differently: Take 0.4 mg by mouth every evening) 180 capsule 3     Allergies   Allergen Reactions     Atorvastatin Diarrhea     Cortisone Other (See Comments)     pain     Lisinopril Cough     started after CABG for unclear reason     Methylprednisolone Other (See Comments)     ?       Time:      Az Briseno MD

## 2022-11-28 ENCOUNTER — TRANSFERRED RECORDS (OUTPATIENT)
Dept: MULTI SPECIALTY CLINIC | Facility: CLINIC | Age: 57
End: 2022-11-28

## 2022-11-28 LAB — RETINOPATHY: NORMAL

## 2022-12-01 DIAGNOSIS — F43.10 PTSD (POST-TRAUMATIC STRESS DISORDER): ICD-10-CM

## 2022-12-05 RX ORDER — ESCITALOPRAM OXALATE 20 MG/1
TABLET ORAL
Qty: 30 TABLET | Refills: 0 | Status: SHIPPED | OUTPATIENT
Start: 2022-12-05 | End: 2023-01-05

## 2022-12-05 NOTE — TELEPHONE ENCOUNTER
Date of Last Office Visit: 10/5/2022  Date of Next Office Visit: 12/7/2022  No shows since last visit: 0  Cancellations since last visit: 0    Medication requested: escitalopram (LEXAPRO) 20 MG tablet Date last ordered: 11/1/22 Qty: 30 Refills: 0     Review of [place] ?: NA    Lapse in medication adherence greater than 5 days?: No    Medication refill request verified as identical to current order?: Yes  Result of Last DAM, VPA, Li+ Level, CBC, or Carbamazepine Level (at or since last visit): N/A    Last visit treatment plan:     Both admitted the patient was doing much better with anxiety and mood with the prior increase in Lexapro and he was tolerating that medication.      Plan:  I did tell the patient he could take an extra 25 mg of Vistaril at night as well as utilized the as needed Seroquel up to 200 mg at night.     The patient will return to clinic in 3 months. He agrees to call before then with any questions or concerns.  Instructions    The patient will continue to utilize his as needed Vistaril and as needed Seroquel as ordered.  I have increased the availability of both of those medications.  The patient will continue to follow-up with his medical team as a direct.  He should return to this clinic in 3 to 4 months for medication check and agrees to call before then with any questions or concerns.               []Medication refilled per  Medication Refill in Ambulatory Care  policy.  []Medication unable to be refilled by RN due to criteria not met as indicated below:    []Eligibility - not seen in the last year   []Supervision - no future appointment   []Compliance - no shows, cancellations or lapse in therapy   []Verification - order discrepancy   []Controlled medication   []Medication not included in policy   []90-day supply request   []Other

## 2022-12-07 ENCOUNTER — VIRTUAL VISIT (OUTPATIENT)
Dept: PSYCHIATRY | Facility: CLINIC | Age: 57
End: 2022-12-07
Payer: COMMERCIAL

## 2022-12-07 DIAGNOSIS — F43.10 PTSD (POST-TRAUMATIC STRESS DISORDER): Primary | ICD-10-CM

## 2022-12-07 PROCEDURE — 99214 OFFICE O/P EST MOD 30 MIN: CPT | Mod: 95 | Performed by: PSYCHIATRY & NEUROLOGY

## 2022-12-07 ASSESSMENT — PATIENT HEALTH QUESTIONNAIRE - PHQ9
SUM OF ALL RESPONSES TO PHQ QUESTIONS 1-9: 6
10. IF YOU CHECKED OFF ANY PROBLEMS, HOW DIFFICULT HAVE THESE PROBLEMS MADE IT FOR YOU TO DO YOUR WORK, TAKE CARE OF THINGS AT HOME, OR GET ALONG WITH OTHER PEOPLE: SOMEWHAT DIFFICULT
SUM OF ALL RESPONSES TO PHQ QUESTIONS 1-9: 6

## 2022-12-07 NOTE — PATIENT INSTRUCTIONS
The patient is doing relatively well from a mood and behavior standpoint.  Both he and his wife feel that things are manageable.  They do not believe that the Seroquel has necessarily been helpful and the medical team is trying to clean up medications which may be contributing to his cardiovascular disease.  We did talk about treatment options and have elected to give them the latitude to try and taper off the Seroquel over the next few weeks or months.  He has been taken off the hydroxyzine by the medical team.  I will make no other changes at this time and he will continue with his psychologist and return to this clinic in 3 months or so for a medication check.  Both agree to call before then with any questions or concerns.
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
## 2022-12-07 NOTE — PROGRESS NOTES
Nadya Blake is a 57 year old who is being evaluated via a billable video visit.      Pt will join video visit via: [employer]  If there are problems joining the visit, send backup video invite via: Text to preferred phone: [phone]    Reason for telehealth visit: Patient has requested telehealth visit    Originating location (patient location): Patient's home    Will anyone else be joining the visit? No Wife Carlee will be assisting patient with visit

## 2022-12-13 NOTE — ADDENDUM NOTE
Encounter addended by: Parmer, Scott, PT on: 12/13/2022 11:28 AM   Actions taken: Episode resolved, Clinical Note Signed

## 2022-12-13 NOTE — PROGRESS NOTES
[facility]    Outpatient Physical Therapy Discharge Note  Patient: Nadya Blake  : 1965    Beginning/End Dates of Reporting Period:  22 to 22    Referring Provider: Shara grubbs PA-C    Therapy Diagnosis: low back and shoulder pain     Client Self Report: Patient is in a bit more pain today. He fell out of bed last week onto the floor due to a nightmare he was having. Since then, he has had increased low back and neck soreness.        Goals:  Goal Identifier SERAFIN   Goal Description Patient will report improvement in SERAFIN by at least 10% to show improvement in function.   Target Date 22   Date Met      Progress (detail required for progress note):       Goal Identifier Standing   Goal Description Patient will improve standing tolerance to at least 30 minutes with no pain to improve ability to complete upright ADLs.   Target Date 22   Date Met      Progress (detail required for progress note):       Goal Identifier Bending   Goal Description Patient will tolerate bending to pick an object off floor to improve ability for ADLs.   Target Date 22   Date Met      Progress (detail required for progress note):       Goal Identifier     Goal Description     Target Date     Date Met      Progress (detail required for progress note):       Goal Identifier     Goal Description     Target Date     Date Met      Progress (detail required for progress note):       Goal Identifier     Goal Description     Target Date     Date Met      Progress (detail required for progress note):       Goal Identifier     Goal Description     Target Date     Date Met      Progress (detail required for progress note):       Goal Identifier     Goal Description     Target Date     Date Met      Progress (detail required for progress note):             Plan:  Discharge from therapy.    Discharge:    Reason for  Discharge: No further expectation of progress.  Patient chooses to discontinue therapy.    Equipment Issued: none    Discharge Plan: Patient to continue home program.    Scott Parmer, PT

## 2022-12-15 ENCOUNTER — MYC MEDICAL ADVICE (OUTPATIENT)
Dept: PSYCHIATRY | Facility: CLINIC | Age: 57
End: 2022-12-15

## 2022-12-15 DIAGNOSIS — F43.10 PTSD (POST-TRAUMATIC STRESS DISORDER): ICD-10-CM

## 2022-12-16 ENCOUNTER — TELEPHONE (OUTPATIENT)
Dept: NEUROLOGY | Facility: CLINIC | Age: 57
End: 2022-12-16

## 2022-12-16 DIAGNOSIS — F43.10 PTSD (POST-TRAUMATIC STRESS DISORDER): ICD-10-CM

## 2022-12-16 RX ORDER — QUETIAPINE FUMARATE 50 MG/1
50-200 TABLET, FILM COATED ORAL
Qty: 120 TABLET | Refills: 3 | Status: CANCELLED | OUTPATIENT
Start: 2022-12-16

## 2022-12-16 NOTE — TELEPHONE ENCOUNTER
1. RN received PA request via fax from [employer].     2. Form was faxed PA dept at [phone].     Kelly Contreras RN on 12/16/2022 at 3:34 PM

## 2022-12-16 NOTE — TELEPHONE ENCOUNTER
1. RN received [facility] Medication Advice message requesting refill for:     Disp Refills Start End RENE   QUEtiapine (SEROQUEL) 100 MG tablet (Discontinued) 45 tablet 10 11/10/2021 10/5/2022 No   Sig - Route: Take 1.5 tablets (150 mg) by mouth At Bedtime - Oral   Sent to pharmacy as: QUEtiapine Fumarate 100 MG Oral Tablet (SEROquel)   Class: E-Prescribe     2. RN called [id number] to inform them that this prescription/dose was discontinued on 10/05/2022 and that the current prescription (see below) should have refills remaining:      Disp Refills Start End RENE   QUEtiapine (SEROQUEL) 50 MG tablet 120 tablet 3 10/5/2022  --   Sig - Route: Take 1-4 tablets ( mg) by mouth nightly as needed (Anxiety, nightmares or sleep difficulties) - Oral   Sent to pharmacy as: QUEtiapine Fumarate 50 MG Oral Tablet (SEROquel)   Class: E-Prescribe     3. Pharmacy states that pt last picked up the 50 mg dose prescription on 10/25/2022.       4. RN requested that pharmacy prepare a refill for pt to  today. Pharmacy attempted to run the prescription through insurance and informed RN that insurance is requesting a Prior Authorization for that quantity of tablets.     5. RN sent messages to pt in [facility] relaying the above information, including a reminder of how pt can use the 50 mg tablets to wean off the medication as discussed in their last visit with the provider, and the need for a Prior Authorization before refill will be available.     6. RN informed pt that RN would update them as new information comes in.     7. RN encouraged pt to reach out with any further questions or requests through [facility] or by calling [phone].    Kelly Contreras RN on 12/16/2022 at 10:03 AM

## 2022-12-18 NOTE — TELEPHONE ENCOUNTER
"Routing refill request to provider for review/approval because:  Drug not on the [facility] refill protocol     Last Written Prescription Date:  10/5/22  Last Fill Quantity: 120,  # refills: 3   Last office visit provider:  11/21/22    Requested Prescriptions   Pending Prescriptions Disp Refills     QUEtiapine (SEROQUEL) 100 MG tablet 45 tablet 10     Sig: Take 1.5 tablets (150 mg) by mouth At Bedtime       Antipsychotic Medications Passed - 12/16/2022  2:59 PM        Passed - Blood pressure under 140/90 in past 12 months     BP Readings from Last 3 Encounters:   11/21/22 100/72   11/08/22 90/70   10/18/22 110/82                 Passed - Patient is 12 years of age or older        Passed - Lipid panel on file within the past 12 months     Recent Labs   Lab Test 11/21/22  0947   CHOL 90   TRIG 149   HDL 33*   LDL 27   NHDL 57               Passed - CBC on file in past 12 months     Recent Labs   Lab Test 10/06/22  0814   WBC 7.2   RBC 4.73   HGB 13.9   HCT 41.1                    Passed - Heart Rate on file within past 12 months     Pulse Readings from Last 3 Encounters:   11/21/22 54   11/08/22 56   10/18/22 61               Passed - A1c or Glucose on file in past 12 months     Recent Labs   Lab Test 11/21/22  0947   GLC 95   A1C 5.6       Please review patients last 3 weights. If a weight gain of >10 lbs exists, you may refill the prescription once after instructing the patient to schedule an appointment within the next 30 days.    Wt Readings from Last 3 Encounters:   11/21/22 88.6 kg (195 lb 4.8 oz)   11/08/22 89.8 kg (198 lb)   10/18/22 91.3 kg (201 lb 3.2 oz)             Passed - Medication is active on med list        Passed - Recent (6 mo) or future (30 days) visit within the authorizing provider's specialty     Patient had office visit in the last 6 months or has a visit in the next 30 days with authorizing provider or within the authorizing provider's specialty.  See \"Patient Info\" tab in inbasket, or " "\"Choose Columns\" in Meds & Orders section of the refill encounter.                 Khalida Suresh 12/18/22 8:30 AM  "

## 2022-12-19 RX ORDER — QUETIAPINE FUMARATE 100 MG/1
150 TABLET, FILM COATED ORAL AT BEDTIME
Qty: 45 TABLET | Refills: 10 | Status: SHIPPED | OUTPATIENT
Start: 2022-12-19 | End: 2023-03-08

## 2022-12-20 NOTE — TELEPHONE ENCOUNTER
Central Prior Authorization Team  Phone: [phone]    PRIOR AUTHORIZATION DENIED    Medication: QUEtiapine (SEROQUEL) 50 MG tablet    Denial Date: 12/19/2022    Denial Rational:         Appeal Information:

## 2022-12-21 ENCOUNTER — OFFICE VISIT (OUTPATIENT)
Dept: INTERNAL MEDICINE | Facility: CLINIC | Age: 57
End: 2022-12-21
Payer: COMMERCIAL

## 2022-12-21 VITALS
OXYGEN SATURATION: 97 % | SYSTOLIC BLOOD PRESSURE: 110 MMHG | WEIGHT: 196 LBS | RESPIRATION RATE: 14 BRPM | BODY MASS INDEX: 31.5 KG/M2 | HEIGHT: 66 IN | HEART RATE: 54 BPM | TEMPERATURE: 97.9 F | DIASTOLIC BLOOD PRESSURE: 68 MMHG

## 2022-12-21 DIAGNOSIS — R35.1 NOCTURIA: ICD-10-CM

## 2022-12-21 DIAGNOSIS — I25.10 CORONARY ARTERY DISEASE DUE TO LIPID RICH PLAQUE: Primary | Chronic | ICD-10-CM

## 2022-12-21 DIAGNOSIS — I25.83 CORONARY ARTERY DISEASE DUE TO LIPID RICH PLAQUE: Primary | Chronic | ICD-10-CM

## 2022-12-21 DIAGNOSIS — N18.31 CHRONIC KIDNEY DISEASE, STAGE 3A (H): ICD-10-CM

## 2022-12-21 DIAGNOSIS — I50.32 CHRONIC HEART FAILURE WITH PRESERVED EJECTION FRACTION (H): ICD-10-CM

## 2022-12-21 DIAGNOSIS — E55.9 VITAMIN D DEFICIENCY: ICD-10-CM

## 2022-12-21 DIAGNOSIS — E53.8 VITAMIN B12 DEFICIENCY (NON ANEMIC): ICD-10-CM

## 2022-12-21 DIAGNOSIS — E11.9 TYPE 2 DIABETES MELLITUS WITHOUT COMPLICATION, WITHOUT LONG-TERM CURRENT USE OF INSULIN (H): ICD-10-CM

## 2022-12-21 DIAGNOSIS — I10 ESSENTIAL HYPERTENSION: Chronic | ICD-10-CM

## 2022-12-21 PROCEDURE — 82607 VITAMIN B-12: CPT | Performed by: INTERNAL MEDICINE

## 2022-12-21 PROCEDURE — 99214 OFFICE O/P EST MOD 30 MIN: CPT | Performed by: INTERNAL MEDICINE

## 2022-12-21 PROCEDURE — 80048 BASIC METABOLIC PNL TOTAL CA: CPT | Performed by: INTERNAL MEDICINE

## 2022-12-21 PROCEDURE — 36415 COLL VENOUS BLD VENIPUNCTURE: CPT | Performed by: INTERNAL MEDICINE

## 2022-12-21 RX ORDER — TAMSULOSIN HYDROCHLORIDE 0.4 MG/1
0.8 CAPSULE ORAL EVERY EVENING
Start: 2022-12-21 | End: 2023-02-21

## 2022-12-21 RX ORDER — ACETAMINOPHEN 160 MG
1 TABLET,DISINTEGRATING ORAL DAILY
Qty: 90 CAPSULE | Refills: 3 | Status: SHIPPED | OUTPATIENT
Start: 2022-12-21 | End: 2023-12-27

## 2022-12-21 NOTE — PROGRESS NOTES
Office Visit - Follow Up   Nadya Blake   57 year old male    Date of Visit: 12/21/2022    Chief Complaint   Patient presents with     RECHECK        Assessment and Plan   1. Coronary artery disease, CABG 6/2020  Would like him to increase Jardiance, continue secondary prevention  - empagliflozin (JARDIANCE) 25 MG TABS tablet; Take 1 tablet (25 mg) by mouth daily  Dispense: 90 tablet; Refill: 4    2. Essential hypertension  Blood pressure is okay but some symptoms of lightheadedness.  He is off of spironolactone placement elevated creatinine last visit below blood pressure.  Almost like this him.  Imdur.  Discussed that if there is chest pain worsens he should restart    3. Chronic heart failure with preserved ejection fraction (H)  Stable euvolemic    4. Type 2 diabetes mellitus without complication, without long-term current use of insulin (H)  As above  - Vitamin B12; Future  - empagliflozin (JARDIANCE) 25 MG TABS tablet; Take 1 tablet (25 mg) by mouth daily  Dispense: 90 tablet; Refill: 4  - Basic metabolic panel  (Ca, Cl, CO2, Creat, Gluc, K, Na, BUN); Future  - Vitamin B12  - Basic metabolic panel  (Ca, Cl, CO2, Creat, Gluc, K, Na, BUN)    5. Chronic kidney disease, stage 3a (H)  Recheck lab    6. Vitamin D deficiency  - Cholecalciferol (VITAMIN D3) 50 MCG (2000 UT) CAPS; Take 1 capsule by mouth daily  Dispense: 90 capsule; Refill: 3    7. Nocturia  - tamsulosin (FLOMAX) 0.4 MG capsule; Take 2 capsules (0.8 mg) by mouth every evening    Return in about 4 weeks (around 1/18/2023) for Follow up.     History of Present Illness   This 57 year old man comes in with his wife.  Overall he is doing okay.  He has been following a vegan diet and continues to lose weight.  His wife is very concerned that he is not eating enough.  He continues to have intermittent chest tightness and uses nitroglycerin for this.  He is also Imdur.  His main complaint today is that he has been feeling lightheaded.  His blood  "pressure has been low.  He has stopped spironolactone.  He is now on Jardiance.       Physical Exam   General Appearance:   No acute distress    /68 (BP Location: Left arm, Patient Position: Sitting, Cuff Size: Adult Large)   Pulse 54   Temp 97.9  F (36.6  C) (Tympanic)   Resp 14   Ht 1.683 m (5' 6.25\")   Wt 88.9 kg (196 lb)   SpO2 97%   BMI 31.40 kg/m      Heart rate controlled rhythm regular lungs clear to auscultation bilaterally     Additional Information   Current Outpatient Medications   Medication Sig Dispense Refill     ACETAMINOPHEN EXTRA STRENGTH 500 MG tablet TAKE 2 TABLETS(1000 MG) BY MOUTH EVERY 6 HOURS AS NEEDED FOR PAIN 360 tablet 3     albuterol (PROAIR HFA/PROVENTIL HFA/VENTOLIN HFA) 108 (90 Base) MCG/ACT inhaler Inhale 1-2 puffs into the lungs every 4 hours as needed for shortness of breath / dyspnea 18 g 11     aspirin (ASA) 81 MG chewable tablet Take 1 tablet (81 mg) by mouth daily 90 tablet 4     budesonide-formoterol (SYMBICORT) 160-4.5 MCG/ACT Inhaler Inhale 2 puffs into the lungs 2 times daily 10.2 g 11     Cholecalciferol (VITAMIN D3) 50 MCG (2000 UT) CAPS Take 1 capsule by mouth daily 90 capsule 3     diclofenac (VOLTAREN) 1 % topical gel Apply 4 g topically 4 times daily 500 g 2     empagliflozin (JARDIANCE) 25 MG TABS tablet Take 1 tablet (25 mg) by mouth daily 90 tablet 4     escitalopram (LEXAPRO) 20 MG tablet TAKE 1 TABLET(20 MG) BY MOUTH DAILY 30 tablet 0     Lidocaine 0.5 % GEL        metoprolol succinate ER (TOPROL XL) 100 MG 24 hr tablet Take 1.5 tablets (150 mg) by mouth At Bedtime 135 tablet 4     nifedipine 0.2% in white petrolatum 0.2 % OINT ointment Apply topically 4 times daily as needed (anal fissure) 100 g 1     nitroGLYcerin (NITROSTAT) 0.4 MG sublingual tablet For chest pain place 1 tablet under the tongue every 5 minutes for 3 doses. If symptoms persist 5 minutes after 1st dose call 911. 30 tablet 1     omeprazole (PRILOSEC) 20 MG DR capsule Take 1 capsule " (20 mg) by mouth 2 times daily (before meals) 180 capsule 4     polyethylene glycol (MIRALAX) 17 GM/Dose powder Take 17 g (1 capful) by mouth daily 850 g 3     QUEtiapine (SEROQUEL) 100 MG tablet Take 1.5 tablets (150 mg) by mouth At Bedtime 45 tablet 10     QUEtiapine (SEROQUEL) 50 MG tablet Take 1-4 tablets ( mg) by mouth nightly as needed (Anxiety, nightmares or sleep difficulties) 120 tablet 3     rosuvastatin (CRESTOR) 40 MG tablet TAKE 1 TABLET(40 MG) BY MOUTH DAILY 90 tablet 3     tamsulosin (FLOMAX) 0.4 MG capsule Take 2 capsules (0.8 mg) by mouth every evening       Allergies   Allergen Reactions     Atorvastatin Diarrhea     Cortisone Other (See Comments)     pain     Lisinopril Cough     started after CABG for unclear reason     Methylprednisolone Other (See Comments)     ?       Time:      Az Briseno MD    Answers for HPI/ROS submitted by the patient on 12/21/2022  Do you take an aspirin every day?: Yes  How many servings of fruits and vegetables do you eat daily?: 2-3  On average, how many sweetened beverages do you drink each day (Examples: soda, juice, sweet tea, etc.  Do NOT count diet or artificially sweetened beverages)?: 2  How many minutes a day do you exercise enough to make your heart beat faster?: 20 to 29  How many days a week do you exercise enough to make your heart beat faster?: 7  How many days per week do you miss taking your medication?: 0

## 2022-12-22 LAB
ANION GAP SERPL CALCULATED.3IONS-SCNC: 13 MMOL/L (ref 7–15)
BUN SERPL-MCNC: 10.4 MG/DL (ref 6–20)
CALCIUM SERPL-MCNC: 9.5 MG/DL (ref 8.6–10)
CHLORIDE SERPL-SCNC: 103 MMOL/L (ref 98–107)
CREAT SERPL-MCNC: 1.35 MG/DL (ref 0.67–1.17)
DEPRECATED HCO3 PLAS-SCNC: 25 MMOL/L (ref 22–29)
GFR SERPL CREATININE-BSD FRML MDRD: 62 ML/MIN/1.73M2
GLUCOSE SERPL-MCNC: 105 MG/DL (ref 70–99)
POTASSIUM SERPL-SCNC: 4.8 MMOL/L (ref 3.4–5.3)
SODIUM SERPL-SCNC: 141 MMOL/L (ref 136–145)
VIT B12 SERPL-MCNC: 205 PG/ML (ref 232–1245)

## 2022-12-22 RX ORDER — LANOLIN ALCOHOL/MO/W.PET/CERES
1000 CREAM (GRAM) TOPICAL DAILY
Qty: 90 TABLET | Refills: 4 | Status: SHIPPED | OUTPATIENT
Start: 2022-12-22 | End: 2023-01-18

## 2022-12-29 ENCOUNTER — PATIENT OUTREACH (OUTPATIENT)
Dept: CARE COORDINATION | Facility: CLINIC | Age: 57
End: 2022-12-29

## 2022-12-29 NOTE — PROGRESS NOTES
Clinic Care Coordination Contact  [facility]/Voicemail       Clinical Data: Care Coordinator Outreach  Outreach attempted x 1.  Left message on patient's voicemail with  and left call back information and requested return call.  Plan: Care Coordinator will try to reach patient again in 10 business days.    Yasmeen Angel RN, BSN, PHN    Casual RN Care Coordinator  [facility]  (Covering for Lead RN Care Coordinator)

## 2023-01-01 NOTE — TELEPHONE ENCOUNTER
RECEIVED MEDICAL RECORD 1-25-21 - PLACED IN PROVIDER'S CLINIC MAILBOX   Term [place] Vaginal Delivery (>/= 37 weeks)

## 2023-01-04 DIAGNOSIS — F43.10 PTSD (POST-TRAUMATIC STRESS DISORDER): ICD-10-CM

## 2023-01-05 ENCOUNTER — MYC MEDICAL ADVICE (OUTPATIENT)
Dept: PSYCHIATRY | Facility: CLINIC | Age: 58
End: 2023-01-05

## 2023-01-05 RX ORDER — ESCITALOPRAM OXALATE 20 MG/1
TABLET ORAL
Qty: 30 TABLET | Refills: 0 | OUTPATIENT
Start: 2023-01-05

## 2023-01-05 RX ORDER — ESCITALOPRAM OXALATE 20 MG/1
TABLET ORAL
Qty: 30 TABLET | Refills: 0 | Status: SHIPPED | OUTPATIENT
Start: 2023-01-05 | End: 2023-02-06

## 2023-01-05 NOTE — TELEPHONE ENCOUNTER
See duplicate encounter from today. Awaiting provider approval.    Lolis Downing RN on 1/5/2023 at 12:35 PM

## 2023-01-06 NOTE — TELEPHONE ENCOUNTER
Patient stating that the pharmacy hasn't received the prescription for the escitalopram. The provider sent it in to the [facility] in [place] yesterday 1/5/23. Sent My chart message to patient to call pharmacy again and to let us know if they didn't receive it.     Luann Halmin RN on 1/6/2023 at 8:27 AM

## 2023-01-10 ENCOUNTER — VIRTUAL VISIT (OUTPATIENT)
Dept: SLEEP MEDICINE | Facility: CLINIC | Age: 58
End: 2023-01-10
Attending: GENERAL ACUTE CARE HOSPITAL
Payer: COMMERCIAL

## 2023-01-10 VITALS — WEIGHT: 192 LBS | BODY MASS INDEX: 30.86 KG/M2 | HEIGHT: 66 IN

## 2023-01-10 DIAGNOSIS — E66.811 OBESITY (BMI 30.0-34.9): ICD-10-CM

## 2023-01-10 DIAGNOSIS — F43.10 PTSD (POST-TRAUMATIC STRESS DISORDER): Chronic | ICD-10-CM

## 2023-01-10 DIAGNOSIS — R06.83 SNORING: ICD-10-CM

## 2023-01-10 DIAGNOSIS — G47.00 INSOMNIA, UNSPECIFIED TYPE: ICD-10-CM

## 2023-01-10 DIAGNOSIS — I50.32 CHRONIC HEART FAILURE WITH PRESERVED EJECTION FRACTION (H): ICD-10-CM

## 2023-01-10 DIAGNOSIS — G47.9 SLEEP DISTURBANCE: Primary | ICD-10-CM

## 2023-01-10 PROCEDURE — 99204 OFFICE O/P NEW MOD 45 MIN: CPT | Mod: 95 | Performed by: NURSE PRACTITIONER

## 2023-01-10 ASSESSMENT — PAIN SCALES - GENERAL: PAINLEVEL: WORST PAIN (10)

## 2023-01-10 NOTE — PATIENT INSTRUCTIONS
"      MY TREATMENT INFORMATION FOR SLEEP APNEA-  Nadya Blake    DOCTOR : GENET Ybarra CNP    Am I having a sleep study at a sleep center?  --->Due to normal delays, you will be contacted within 2-4 weeks to schedule    Am I having a home sleep study?  --->Watch the video for the device you are using:    -/drop off device-   https://www.[employer] System[employer].com/watch?v=yGGFBdELGhk    -Disposable device sent out require phone/computer application-   https://www.PerfectSearch.com/watch?v=BCce_vbiwxE      Frequently asked questions:  1. What is Obstructive Sleep Apnea (HANNAH)? HANNAH is the most common type of sleep apnea. Apnea means, \"without breath.\"  Apnea is most often caused by narrowing or collapse of the upper airway as muscles relax during sleep.   Almost everyone has occasional apneas. Most people with sleep apnea have had brief interruptions at night frequently for many years.  The severity of sleep apnea is related to how frequent and severe the events are.   2. What are the consequences of HANNAH? Symptoms include: feeling sleepy during the day, snoring loudly, gasping or stopping of breathing, trouble sleeping, and occasionally morning headaches or heartburn at night.  Sleepiness can be serious and even increase the risk of falling asleep while driving. Other health consequences may include development of high blood pressure and other cardiovascular disease in persons who are susceptible. Untreated HANNAH  can contribute to heart disease, stroke and diabetes.   3. What are the treatment options? In most situations, sleep apnea is a lifelong disease that must be managed with daily therapy. Medications are not effective for sleep apnea and surgery is generally not considered until other therapies have been tried. Your treatment is your choice . Continuous Positive Airway (CPAP) works right away and is the therapy that is effective in nearly everyone. An oral device to hold your jaw forward is usually the next most " reliable option. Other options include postioning devices (to keep you off your back), weight loss, and surgery including a tongue pacing device. There is more detail about some of these options below.  4. Are my sleep studies covered by insurance? Although we will request verification of coverage, we advise you also check in advance of the study to ensure there is coverage.    Important tips for those choosing CPAP and similar devices   Know your equipment:  CPAP is continuous positive airway pressure that prevents obstructive sleep apnea by keeping the throat from collapsing while you are sleeping. In most cases, the device is  smart  and can slowly self-adjusts if your throat collapses and keeps a record every day of how well you are treated-this information is available to you and your care team.  BPAP is bilevel positive airway pressure that keeps your throat open and also assists each breath with a pressure boost to maintain adequate breathing.  Special kinds of BPAP are used in patients who have inadequate breathing from lung or heart disease. In most cases, the device is  smart  and can slowly self-adjusts to assist breathing. Like CPAP, the device keeps a record of how well you are treated.  Your mask is your connection to the device. You get to choose what feels most comfortable and the staff will help to make sure if fits. Here: are some examples of the different masks that are available:       Key points to remember on your journey with sleep apnea:  Sleep study.  PAP devices often need to be adjusted during a sleep study to show that they are effective and adjusted right.  Good tips to remember: Try wearing just the mask during a quiet time during the day so your body adapts to wearing it. A humidifier is recommended for comfort in most cases to prevent drying of your nose and throat. Allergy medication from your provider may help you if you are having nasal congestion.  Getting settled-in. It takes  more than one night for most of us to get used to wearing a mask. Try wearing just the mask during a quiet time during the day so your body adapts to wearing it. A humidifier is recommended for comfort in most cases. Our team will work with you carefully on the first day and will be in contact within 4 days and again at 2 and 4 weeks for advice and remote device adjustments. Your therapy is evaluated by the device each day.   Use it every night. The more you are able to sleep naturally for 7-8 hours, the more likely you will have good sleep and to prevent health risks or symptoms from sleep apnea. Even if you use it 4 hours it helps. Occasionally all of us are unable to use a medical therapy, in sleep apnea, it is not dangerous to miss one night.   Communicate. Call our skilled team on the number provided on the first day if your visit for problems that make it difficult to wear the device. Over 2 out of 3 patients can learn to wear the device long-term with help from our team. Remember to call our team or your sleep providers if you are unable to wear the device as we may have other solutions for those who cannot adapt to mask CPAP therapy. It is recommended that you sleep your sleep provider within the first 3 months and yearly after that if you are not having problems.   Use it for your health. We encourage use of CPAP masks during daytime quiet periods to allow your face and brain to adapt to the sensation of CPAP so that it will be a more natural sensation to awaken to at night or during naps. This can be very useful during the first few weeks or months of adapting to CPAP though it does not help medically to wear CPAP during wakefulness and  should not be used as a strategy just to meet guidelines.  Take care of your equipment. Make sure you clean your mask and tubing using directions every day and that your filter and mask are replaced as recommended or if they are not working.     BESIDES CPAP, WHAT OTHER  THERAPIES ARE THERE?    Positioning Device  Positioning devices are generally used when sleep apnea is mild and only occurs on your back.This example shows a pillow that straps around the waist. It may be appropriate for those whose sleep study shows milder sleep apnea that occurs primarily when lying flat on one's back. Preliminary studies have shown benefit but effectiveness at home may need to be verified by a home sleep test. These devices are generally not covered by medical insurance.  Examples of devices that maintain sleeping on the back to prevent snoring and mild sleep apnea.    Belt type body positioner  http://SellAnyCar.ru/    Electronic reminder  http://nightshifttherapy.com/            Oral Appliance  What is oral appliance therapy?  An oral appliance device fits on your teeth at night like a retainer used after having braces. The device is made by a specialized dentist and requires several visits over 1-2 months before a manufactured device is made to fit your teeth and is adjusted to prevent your sleep apnea. Once an oral device is working properly, snoring should be improved. A home sleep test may be recommended at that time if to determine whether the sleep apnea is adequately treated.       Some things to remember:  -Oral devices are often, but not always, covered by your medical insurance. Be sure to check with your insurance provider.   -If you are referred for oral therapy, you will be given a list of specialized dentists to consider or you may choose to visit the Web site of the American Academy of Dental Sleep Medicine  -Oral devices are less likely to work if you have severe sleep apnea or are extremely overweight.     More detailed information  An oral appliance is a small acrylic device that fits over the upper and lower teeth  (similar to a retainer or a mouth guard). This device slightly moves jaw forward, which moves the base of the tongue forward, opens the airway, improves breathing  for effective treat snoring and obstructive sleep apnea in perhaps 7 out of 10 people .  The best working devices are custom-made by a dental device  after a mold is made of the teeth 1, 2, 3.  When is an oral appliance indicated?  Oral appliance therapy is recommended as a first-line treatment for patients with primary snoring, mild sleep apnea, and for patients with moderate sleep apnea who prefer appliance therapy to use of CPAP4, 5. Severity of sleep apnea is determined by sleep testing and is based on the number of respiratory events per hour of sleep.   How successful is oral appliance therapy?  The success rate of oral appliance therapy in patients with mild sleep apnea is 75-80% while in patients with moderate sleep apnea it is 50-70%. The chance of success in patients with severe sleep apnea is 40-50%. The research also shows that oral appliances have a beneficial effect on the cardiovascular health of HANNAH patients at the same magnitude as CPAP therapy7.  Oral appliances should be a second-line treatment in cases of severe sleep apnea, but if not completely successful then a combination therapy utilizing CPAP plus oral appliance therapy may be effective. Oral appliances tend to be effective in a broad range of patients although studies show that the patients who have the highest success are females, younger patients, those with milder disease, and less severe obesity. 3, 6.   Finding a dentist that practices dental sleep medicine  Specific training is available through the American Academy of Dental Sleep Medicine for dentists interested in working in the field of sleep. To find a dentist who is educated in the field of sleep and the use of oral appliances, near you, visit the Web site of the American Academy of Dental Sleep Medicine.    References  1. Lisa et al. Objectively measured vs self-reported compliance during oral appliance therapy for sleep-disordered breathing. Chest 2013;  144(5): 8550-5246.  2. Christianne et al. Objective measurement of compliance during oral appliance therapy for sleep-disordered breathing. Thorax 2013; 68(1): 91-96.  3. Santana et al. Mandibular advancement devices in 620 men and women with HANNAH and snoring: tolerability and predictors of treatment success. Chest 2004; 125: 2392-1094.  4. Barbara, et al. Oral appliances for snoring and HANNAH: a review. Sleep 2006; 29: 244-262.  5. Luciana et al. Oral appliance treatment for HANNAH: an update. J Clin Sleep Med 2014; 10(2): 215-227.  6. Donita et al. Predictors of OSAH treatment outcome. J Dent Res 2007; 86: 5228-9138.      Weight Loss:    Weight loss is a long-term strategy that may improve sleep apnea in some patients.    Weight management is a personal decision and the decision should be based on your interest and the potential benefits.  If you are interested in exploring weight loss strategies, the following discussion covers the impact on weight loss on sleep apnea and the approaches that may be successful.    Being overweight does not necessarily mean you will have health consequences.  Those who have BMI over 35 or over 27 with existing medical conditions carries greater risk.   Weight loss decreases severity of sleep apnea in most people with obesity. For those with mild obesity who have developed snoring with weight gain, even 15-30 pound weight loss can improve and occasionally eliminate sleep apnea.  Structured and life-long dietary and health habits are necessary to lose weight and keep healthier weight levels.     Though there may be significant health benefits from weight loss, long-term weight loss is very difficult to achieve- studies show success with dietary management in less than 10% of people. In addition, substantial weight loss may require years of dietary control and may be difficult if patients have severe obesity. In these cases, surgical management may be considered.  Finally, older  individuals who have tolerated obesity without health complications may be less likely to benefit from weight loss strategies.      Your BMI is Body mass index is 30.99 kg/m .    What is BMI?  Body mass index (BMI) is one way to tell whether you are at a healthy weight, overweight, or obese. It measures your weight in relation to your height.  A BMI of 18.5 to 24.9 is in the healthy range. A person with a BMI of 25 to 29.9 is considered overweight, and someone with a BMI of 30 or greater is considered obese.  Another way to find out if you are at risk for health problems caused by overweight and obesity is to measure your waist. If you are a woman and your waist is more than 35 inches, or if you are a man and your waist is more than 40 inches, your risk of disease may be higher.  More than two-thirds of American adults are considered overweight or obese. Being overweight or obese increases the risk for further weight gain.  Excess weight may lead to heart disease and diabetes. Creating and following plans for healthy eating and physical activity may help you improve your health.    Methods for maintaining or losing weight.  Weight control is part of healthy lifestyle and includes exercise, emotional health, and healthy eating habits.  Careful eating habits lifelong is the mainstay of weight control.  Though there are significant health benefits from weight loss, long-term weight loss with diet alone may be very difficult to achieve- studies show long-term success with dietary management in less than 10% of people. Attaining a healthy weight may be especially difficult to achieve in those with severe obesity. In some cases, medications, devices and surgical management might be considered.    What can you do?  If you are overweight or obese and are interested in methods for weight loss, you should discuss this with your provider. In addition, we recommend that you review healthy life styles and methods for weight loss  available through the National Institutes of Health patient information sites:   http://win.niddk.nih.gov/publications/index.htm    Surgery:    Surgery for obstructive sleep apnea is considered generally only when other therapies fail to work. Surgery may be discussed with you if you are having a difficult time tolerating CPAP and or when there is an abnormal structure that requires surgical correction.  Nose and throat surgeries often enlarge the airway to prevent collapse.  Most of these surgeries create pain for 1-2 weeks and up to half of the most common surgeries are not effective throughout life.  You should carefully discuss the benefits and drawbacks to surgery with your sleep provider and surgeon to determine if it is the best solution for you.   More information  Surgery for HANNAH is directed at areas that are responsible for narrowing or complete obstruction of the airway during sleep.  There are a wide range of procedures available to enlarge and/or stabilize the airway to prevent blockage of breathing in the three major areas where it can occur: the palate, tongue, and nasal regions.  Successful surgical treatment depends on the accurate identification of the factors responsible for obstructive sleep apnea in each person.  A personalized approach is required because there is no single treatment that works well for everyone.  Because of anatomic variation, consultation with an examination by a sleep surgeon is a critical first step in determining what surgical options are best for each patient.  In some cases, examination during sedation may be recommended in order to guide the selection of procedures.  Patients will be counseled about risks and benefits as well as the typical recovery course after surgery. Surgery is typically not a cure for a person s HANNAH.  However, surgery will often significantly improve one s HANNAH severity (termed  success rate ).  Even in the absence of a cure, surgery will decrease  the cardiovascular risk associated with OSA7; improve overall quality of life8 (sleepiness, functionality, sleep quality, etc).      Palate Procedures:  Patients with HANNAH often have narrowing of their airway in the region of their tonsils and uvula.  The goals of palate procedures are to widen the airway in this region as well as to help the tissues resist collapse.  Modern palate procedure techniques focus on tissue conservation and soft tissue rearrangement, rather than tissue removal.  Often the uvula is preserved in this procedure. Residual sleep apnea is common in patient after pharyngoplasty with an average reduction in sleep apnea events of 33%2.      Tongue Procedures:  ExamWhile patients are awake, the muscles that surround the throat are active and keep this region open for breathing. These muscles relax during sleep, allowing the tongue and other structures to collapse and block breathing.  There are several different tongue procedures available.  Selection of a tongue base procedure depends on characteristics seen on physical exam.  Generally, procedures are aimed at removing bulky tissues in this area or preventing the back of the tongue from falling back during sleep.  Success rates for tongue surgery range from 50-62%3.    Hypoglossal Nerve Stimulation:  Hypoglossal nerve stimulation has recently received approval from the [place] Food and Drug Administration for the treatment of obstructive sleep apnea.  This is based on research showing that the system was safe and effective in treating sleep apnea6.  Results showed that the median AHI score decreased 68%, from 29.3 to 9.0. This therapy uses an implant system that senses breathing patterns and delivers mild stimulation to airway muscles, which keeps the airway open during sleep.  The system consists of three fully implanted components: a small generator (similar in size to a pacemaker), a breathing sensor, and a stimulation lead.  Using a  small handheld remote, a patient turns the therapy on before bed and off upon awakening.    Candidates for this device must be greater than 18 years of age, have moderate to severe HANNAH (AHI between 15-65), BMI less than 35, have tried CPAP/oral appliance for at least 8 weeks without success, and have appropriate upper airway anatomy (determined by a sleep endoscopy performed by Dr. Ace Almanza).    Hypoglossal Nerve Stimulation Pathway:    The sleep surgeon s office will work with the patient through the insurance prior-authorization process (including communications and appeals).    Nasal Procedures:  Nasal obstruction can interfere with nasal breathing during the day and night.  Studies have shown that relief of nasal obstruction can improve the ability of some patients to tolerate positive airway pressure therapy for obstructive sleep apnea1.  Treatment options include medications such as nasal saline, topical corticosteroid and antihistamine sprays, and oral medications such as antihistamines or decongestants. Non-surgical treatments can include external nasal dilators for selected patients. If these are not successful by themselves, surgery can improve the nasal airway either alone or in combination with these other options.      Combination Procedures:  Combination of surgical procedures and other treatments may be recommended, particularly if patients have more than one area of narrowing or persistent positional disease.  The success rate of combination surgery ranges from 66-80%2,3.    References  Pawan ALAS. The Role of the Nose in Snoring and Obstructive Sleep Apnoea: An Update.  Eur Arch Otorhinolaryngol. 2011; 268: 1365-73.   Lulu SM; Renny JA; Laila JR; Pallanch JF; Anushka MB; Camryn SG; Anselmo CHRISTIANSEN. Surgical modifications of the upper airway for obstructive sleep apnea in adults: a systematic review and meta-analysis. SLEEP 2010;33(10):8486-1053. Albino BOOTHE. Hypopharyngeal surgery in obstructive  sleep apnea: an evidence-based medicine review.  Arch Otolaryngol Head Neck Surg. 2006 Feb;132(2):206-13.  Russ YH1, Judi Y, Campos SCOTTY. The efficacy of anatomically based multilevel surgery for obstructive sleep apnea. Otolaryngol Head Neck Surg. 2003 Oct;129(4):327-35.  Albino BOOTHE, Goldberg A. Hypopharyngeal Surgery in Obstructive Sleep Apnea: An Evidence-Based Medicine Review. Arch Otolaryngol Head Neck Surg. 2006 Feb;132(2):206-13.  Edilma COFFEY et al. Upper-Airway Stimulation for Obstructive Sleep Apnea.  N Engl J Med. 2014 Jan 9;370(2):139-49.  Addie Y et al. Increased Incidence of Cardiovascular Disease in Middle-aged Men with Obstructive Sleep Apnea. Am J Respir Crit Care Med; 2002 166: 159-165  Weathersroger BABIN et al. Studying Life Effects and Effectiveness of Palatopharyngoplasty (SLEEP) study: Subjective Outcomes of Isolated Uvulopalatopharyngoplasty. Otolaryngol Head Neck Surg. 2011; 144: 623-631.        WHAT IF I ONLY HAVE SNORING?    Mandibular advancement devices, lateral sleep positioning, long-term weight loss and treatment of nasal allergies have been shown to improve snoring.  Exercising tongue muscles with a game (Fuelzeettps://apps.[employer]/edu/soundly-reduce-snoring/gu9978415982) or stimulating the tongue during the day with a device (https://doi.org/10.3390/toa20831057) have improved snoring in some individuals.    Remember to Drive Safe... Drive Alive     Sleep health profoundly affects your health, mood, and your safety.  Thirty three percent of the population (one in three of us) is not getting enough sleep and many have a sleep disorder. Not getting enough sleep or having an untreated / undertreated sleep condition may make us sleepy without even knowing it. In fact, our driving could be dramatically impaired due to our sleep health. As your provider, here are some things I would like you to know about driving:     Here are some warning signs for impairment and dangerous drowsy driving:               -Having been awake more than 16 hours               -Looking tired               -Eyelid drooping              -Head nodding (it could be too late at this point)              -Driving for more than 30 minutes     Some things you could do to make the driving safer if you are experiencing some drowsiness:              -Stop driving and rest              -Call for transportation              -Make sure your sleep disorder is adequately treated     Some things that have been shown NOT to work when experiencing drowsiness while driving:              -Turning on the radio              -Opening windows              -Eating any  distracting  /  entertaining  foods (e.g., sunflower seeds, candy, or any other)              -Talking on the phone      Your decision may not only impact your life, but also the life of others. Please, remember to drive safe for yourself and all of us.

## 2023-01-10 NOTE — PROGRESS NOTES
Nadya is a 57 year old who is being evaluated via a billable video visit.      How would you like to obtain your AVS? MyChart  If the video visit is dropped, the invitation should be resent by: Text to cell phone: [phone]  Will anyone else be joining your video visit? Dotty    Alejandro Barrera      Video-Visit Details    Type of service:  Video Visit   Video Start Time: 10:43 AM  Video End Time:11:23 AM    Originating Location (pt. Location): Home    Distant Location (provider location):  Off-site  Platform used for Video Visit: [facility]       Outpatient Sleep Medicine Consultation:      Name: Nadya Blake MRN# [id number]   Age: 57 year old YOB: 1965     Date of Consultation: January 10, 2023  Consultation is requested by: Sloan Burns MD  [address]  [place] [address] Sloan Burns  Primary care provider: Az Briseno       Reason for Sleep Consult:     Nadya Blake is sent by Sloan Burns for a sleep consultation regarding snoring, possible HANNAH in the setting of heart failure with preserved ejection fraction.    Patient s Reason for visit  Nadya Blake main reason for visit:  Evaluate for HANNAH  Patient states problem(s) started:    Nadya Blake's goals for this visit:             Assessment and Plan:     Summary Sleep Diagnoses:  1. Sleep disturbance  2. Snoring  3. Chronic heart failure with preserved ejection fraction (H)  4. PTSD (post-traumatic stress disorder)  5. Insomnia, unspecified type  6. Obesity (BMI 30.0-34.9)  - Adult Sleep Eval & Management Referral  - Comprehensive Sleep Study; Future      Comorbid Diagnoses:  1.  HTN  2.  CAD/NSTEMI - s/p CABG x 4  3.  HFpEF (59%)  4.  DM2  5.  CKD stage IIIa  6.  PTSD  7.  BPH  8.  Obesity      Summary Recommendations:  1.  Recommend further evaluation with in lab polysomnography (PSG) for possible sleep disordered breathing in the setting of heart failure with preserved ejection fraction, hypertension, and  obesity.  The patient and his wife note that his symptoms of snoring are largely resolved now after 10 pound weight loss since starting his vegetarian diet.  The patient and his wife would like to discuss pursuing the sleep study with his psychiatrist, PCP, and cardiologist first.  2.  The patient also has a history of PTSD and nightmare disorder.  He is following with Dr. Franco Quevedo, psychiatry, and they are adjusting his Seroquel dosing.  3.  Follow-up in approximately 2 weeks after the sleep study, should he elect to pursue this, to  review the results and determine next steps.    Orders Placed This Encounter   Procedures     Comprehensive Sleep Study         Summary Counseling:    Sleep Testing Reviewed  Obstructive Sleep Apnea Reviewed  Complications of Untreated Sleep Apnea Reviewed  Previous recent chart notes, lab, imaging, cardiology, and psychiatry notes reviewed    Medical Decision-making:   Educational materials provided in instructions    Total time spent reviewing medical records, history and physical examination, review of previous testing and interpretation as well as documentation on this date: 55 minutes    CC: Sloan Burns          History of Present Illness:     **The patient has declined use of an Irish  for this visit.    Nadya Blake is a 57-year-old male with a PMH pertinent for HTN, CAD/NSTEMI - s/p CABG x 4, HFpEF (59%), DM2, CKD stage IIIa, PTSD, BPH, former smoker, and obesity who presents today, accompanied by his wife who is aiding in translation, with symptoms of snoring and daytime somnolence - now largely resolved, per wife.  He changed his diet to vegan diet in 10/11/22 in an effort to reduce his risk for stroke/lipids.  He is down in weight about 10 lbs currently and his wife reports no further snoring. He was referred by cardiology for further evaluation of possible sleep disordered breathing. He is overall improved with regard to his dyspnea symptoms and  snoring at night.    Past Sleep Evaluations: No    SLEEP-WAKE SCHEDULE:     Work/School Days: Patient goes to school/work:  No   Usually gets into bed at  11 pm  Takes patient about  60-90 minutes to fall asleep  Has trouble falling asleep  7 nights per week  Wakes up in the middle of the night  2-3 times.  Wakes up due to  up to bathroom, nightmares  He has trouble falling back asleep  7 times a week.   It usually takes  15 minutes or more to get back to sleep  Patient is usually up at  6:30-7 AM  Uses alarm:  No    Weekends/Non-work Days/All Other Days: Same as above  Usually gets into bed at     Takes patient about   to fall asleep  Patient is usually up at    Uses alarm:      Sleep Need  Patient gets   4-5 hours sleep on average   Patient thinks he needs about  8 hours sleep    Nadya Blake prefers to sleep in this position(s):  Back, sides, stomach  Patient states they do the following activities in bed:      Naps  Patient takes a purposeful nap  0 times a week and naps are usually  0 in duration  He feels better after a nap:    He dozes off unintentionally  0 days per week  Patient has had a driving accident or near-miss due to sleepiness/drowsiness:  No      SLEEP DISRUPTIONS:    Breathing/Snoring  Patient snores: Yes, previously but not now  Other people complain about his snoring:    Patient has been told he stops breathing in his sleep: No  He has issues with the following:  Denies difficulty breathing through nose    Movement:  Patient gets pain, discomfort, with an urge to move:   No  It happens when he is resting:     It happens more at night:     Patient has been told he kicks his legs at night:   No     Behaviors in Sleep:  Nadya Blake has experienced the following behaviors while sleeping:  Denies walking in sleep, occasional screaming with nightmares, rare dream enactment  He has experienced sudden muscle weakness during the day:  No      Is there anything else you would like your sleep  provider to know:  No      CAFFEINE AND OTHER SUBSTANCES:    Patient consumes caffeinated beverages per day:   3  Last caffeine use is usually:  2 pm  List of any prescribed or over the counter stimulants that patient takes:    List of any prescribed or over the counter sleep medication patient takes:  Seroquel 150mg  List of previous sleep medications that patient has tried:    Patient drinks alcohol to help them sleep:  No  Patient drinks alcohol near bedtime:  No    Alcohol use: Never  Nicotine/tobacco use: quit 3/2020  Recreational drug use: None      Family History:  Patient has a family member been diagnosed with a sleep disorder:  None            SCALES:    EPWORTH SLEEPINESS SCALE      Palmer Sleepiness Scale ( JANUARY Du  1990-1997[facility] [place]/English - Final version - 21 Nov 07 - [facility] [place].) 1/10/2023   Palmer Score (Sleep) 3         INSOMNIA SEVERITY INDEX (SHANITA)      Insomnia Severity Index (SHANITA) 1/10/2023   Difficulty falling asleep 2   Difficulty staying asleep 0   Problems waking up too early 0   How SATISFIED/DISSATISFIED are you with your CURRENT sleep pattern? 3   How NOTICEABLE to others do you think your sleep problem is in terms of impairing the quality of your life? 4   How WORRIED/DISTRESSED are you about your current sleep problem? 4   To what extent do you consider your sleep problem to INTERFERE with your daily functioning (e.g. daytime fatigue, mood, ability to function at work/daily chores, concentration, memory, mood, etc.) CURRENTLY? 4   SHANITA Total Score 17       Guidelines for Scoring/Interpretation:  Total score categories:  0-7 = No clinically significant insomnia   8-14 = Subthreshold insomnia   15-21 = Clinical insomnia (moderate severity)  22-28 = Clinical insomnia (severe)  Used via courtesy of www.myhealth.va.gov with permission from Edwardo Torres PhD., [facility]      STOP BANG     STOP BANG Questionnaire (  2008, the American Society of Anesthesiologists,  "Inc. [employer] 1/10/2023   B/P Clinic: -   BMI Clinic: 30.99         GAD7    No flowsheet data found.      CAGE-AID    No flowsheet data found.    CAGE-AID reprinted with permission from the [place] Medical Journal, VLAD Gomez. and TIM Reyes, \"Conjoint screening questionnaires for alcohol and drug abuse\" [place] Medical Journal 94: 135-140, 1995.      PATIENT HEALTH QUESTIONNAIRE-9 (PHQ - 9)    PHQ-9 ([employer]) 12/7/2022   1.  Little interest or pleasure in doing things 1   2.  Feeling down, depressed, or hopeless 2   3.  Trouble falling or staying asleep, or sleeping too much 1   4.  Feeling tired or having little energy 1   5.  Poor appetite or overeating 1   6.  Feeling bad about yourself 0   7.  Trouble concentrating 0   8.  Moving slowly or restless 0   9.  Suicidal or self-harm thoughts 0   PHQ-9 Total Score 6   Difficulty at work, home, or with people -   1.  Little interest or pleasure in doing things Several days   2.  Feeling down, depressed, or hopeless More than half the days   3.  Trouble falling or staying asleep, or sleeping too much Several days   4.  Feeling tired or having little energy Several days   5.  Poor appetite or overeating Several days   6.  Feeling bad about yourself Not at all   7.  Trouble concentrating Not at all   8.  Moving slowly or restless Not at all   9.  Suicidal or self-harm thoughts Not at all   PHQ-9 via [place] TOTAL SCORE-----> 6 (Mild depression)   Difficulty at work, home, or with people Somewhat difficult       Developed by Quincy Will, Jennie Damico, John Mccauley and colleagues, with an educational todd from [employer]. No permission required to reproduce, translate, display or distribute.        Allergies:    Allergies   Allergen Reactions     Atorvastatin Diarrhea     Cortisone Other (See Comments)     pain     Lisinopril Cough     started after CABG for unclear reason     Methylprednisolone Other (See Comments)     ? "       Medications:    Current Outpatient Medications   Medication Sig Dispense Refill     ACETAMINOPHEN EXTRA STRENGTH 500 MG tablet TAKE 2 TABLETS(1000 MG) BY MOUTH EVERY 6 HOURS AS NEEDED FOR PAIN 360 tablet 3     albuterol (PROAIR HFA/PROVENTIL HFA/VENTOLIN HFA) 108 (90 Base) MCG/ACT inhaler Inhale 1-2 puffs into the lungs every 4 hours as needed for shortness of breath / dyspnea 18 g 11     aspirin (ASA) 81 MG chewable tablet Take 1 tablet (81 mg) by mouth daily 90 tablet 4     budesonide-formoterol (SYMBICORT) 160-4.5 MCG/ACT Inhaler Inhale 2 puffs into the lungs 2 times daily 10.2 g 11     Cholecalciferol (VITAMIN D3) 50 MCG (2000 UT) CAPS Take 1 capsule by mouth daily 90 capsule 3     cyanocobalamin (VITAMIN B-12) 1000 MCG tablet Take 1 tablet (1,000 mcg) by mouth daily 90 tablet 4     diclofenac (VOLTAREN) 1 % topical gel Apply 4 g topically 4 times daily 500 g 2     empagliflozin (JARDIANCE) 25 MG TABS tablet Take 1 tablet (25 mg) by mouth daily 90 tablet 4     escitalopram (LEXAPRO) 20 MG tablet TAKE 1 TABLET(20 MG) BY MOUTH DAILY 30 tablet 0     Lidocaine 0.5 % GEL        metoprolol succinate ER (TOPROL XL) 100 MG 24 hr tablet Take 1.5 tablets (150 mg) by mouth At Bedtime 135 tablet 4     nifedipine 0.2% in white petrolatum 0.2 % OINT ointment Apply topically 4 times daily as needed (anal fissure) 100 g 1     nitroGLYcerin (NITROSTAT) 0.4 MG sublingual tablet For chest pain place 1 tablet under the tongue every 5 minutes for 3 doses. If symptoms persist 5 minutes after 1st dose call 911. 30 tablet 1     omeprazole (PRILOSEC) 20 MG DR capsule Take 1 capsule (20 mg) by mouth 2 times daily (before meals) 180 capsule 4     polyethylene glycol (MIRALAX) 17 GM/Dose powder Take 17 g (1 capful) by mouth daily 850 g 3     QUEtiapine (SEROQUEL) 100 MG tablet Take 1.5 tablets (150 mg) by mouth At Bedtime 45 tablet 10     QUEtiapine (SEROQUEL) 50 MG tablet Take 1-4 tablets ( mg) by mouth nightly as needed  (Anxiety, nightmares or sleep difficulties) 120 tablet 3     rosuvastatin (CRESTOR) 40 MG tablet TAKE 1 TABLET(40 MG) BY MOUTH DAILY 90 tablet 3     tamsulosin (FLOMAX) 0.4 MG capsule Take 2 capsules (0.8 mg) by mouth every evening         Problem List:  Patient Active Problem List    Diagnosis Date Noted     Chronic kidney disease, stage 3a (H) 11/21/2022     Priority: Medium     Simple chronic bronchitis (H) 07/05/2022     Priority: Medium     Anal fistula 08/30/2021     Priority: Medium     Benign prostatic hyperplasia with nocturia 09/30/2020     Priority: Medium     PTSD (post-traumatic stress disorder) 09/30/2020     Priority: Medium     S/P CABG x 4 07/02/2020     Priority: Medium     Coronary artery disease, CABG 6/2020 06/23/2020     Priority: Medium     Dyslipidemia, goal LDL below 70 06/23/2020     Priority: Medium     Ascending aorta dilatation (H) 02/16/2020     Priority: Medium     Formatting of this note might be different from the original.  4.3 cm since 2014.       Polyp of colon 09/14/2017     Priority: Medium     Nonalcoholic fatty liver disease 07/12/2017     Priority: Medium     Primary osteoarthritis of left hip 04/28/2017     Priority: Medium     Primary osteoarthritis of right hip 04/28/2017     Priority: Medium     Non-toxic multinodular goiter 05/12/2016     Priority: Medium     Overview:   Multinodular Goiter ( right 1.6, 1.3, 1.2 left multiple nodules 1.3 cm or   smaller):  Not otherwise specified, stable ('16 c/w '12).   Differential diagnosis includes: benign (95%) vs malignant (5%)   Last FNA:  None.   Last US (3-14-16):  right 1.6, 1.3, 1.2 left multiple nodules 1.3 cm or   smaller, no change c/w (6-27-12).   (11-3-14): TSH 1.28   Options disucssed: monitoring vs fna vs surgery.  Of note: it is not   technically possible to biopsy all of the nodules, and surgery does not   appear indicated given the nodular stability over the past 4 years.   Plan: periodic ( every 3-5 year) imaging,  sooner as needed for   compressive symptoms.   A) [email or web address]   B) next US: 2020.         Thyroid nodule 03/07/2016     Priority: Medium     Post-traumatic osteoarthritis of both knees 06/08/2015     Priority: Medium     Pulmonary nodule 11/11/2014     Priority: Medium     Formatting of this note might be different from the original.  CT scan 11-11-14. 2 mm each. Repeat scan in one year. Patient is a smoker. Positive family history of lung cancer.  3-14-16 2 stable 2 mm nodules, unchanged on repeat ct scan.  5-26-17 stable nodules. No pulmonary abnormality.       Gastroesophageal reflux disease with esophagitis without hemorrhage 10/30/2014     Priority: Medium     2-8-13 EGD nonspecific gastritis. Treated for H pylori in the past.  3-20-19 non specific gastritis. Path neg.         Recurrent major depressive disorder, in partial remission (H) 09/02/2012     Priority: Medium     Essential hypertension 09/02/2012     Priority: Medium        Past Medical/Surgical History:  Past Medical History:   Diagnosis Date     Acute non-ST elevation myocardial infarction (NSTEMI) (H) 6/22/2020     Adenomatous polyp of colon      Ascending aorta dilatation (H)      Carpal tunnel syndrome      Chronic superficial gastritis      Coronary artery disease due to lipid rich plaque 6/23/2020     Depression with anxiety      Dyslipidemia, goal LDL below 70 6/23/2020     Essential hypertension 9/2/2012     Fatty liver disease, nonalcoholic      GERD (gastroesophageal reflux disease)      IBS (irritable bowel syndrome)      Left lumbar radiculopathy      Multinodular goiter      Old torn meniscus of knee      Paroxysmal atrial fibrillation (H)      Perianal abscess      Post herpetic neuralgia      Post-traumatic osteoarthritis of both knees      Primary osteoarthritis of left hip      Primary osteoarthritis of right hip      Pulmonary nodule      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)      Thyroid nodule       TMJ arthritis      Tobacco use disorder 2013     Vitamin D deficiency 2020     Past Surgical History:   Procedure Laterality Date     APPENDECTOMY  1995     BYPASS GRAFT ARTERY CORONARY  2020    Dr. Ragsdale     CV CORONARY ANGIOGRAM N/A 2020    Procedure: Coronary Angiogram;  Surgeon: Ariane Lester MD;  Location: [facility] Cath Lab;  Service: Cardiology     CV CORONARY ANGIOGRAM N/A 10/6/2022    Procedure: Coronary Angiogram;  Surgeon: Javon John MD;  Location: [facility] CV     CV IABP INSERT N/A 2020    Procedure: Intra Aortic Balloon Pump Insertion;  Surgeon: Ariane Lester MD;  Location: [facility] Cath Lab;  Service: Cardiology     CV LEFT HEART CATH N/A 10/6/2022    Procedure: Left Heart Catheterization;  Surgeon: Javon John MD;  Location: [facility] LAB CV     CV LEFT HEART CATHETERIZATION WITHOUT LEFT VENTRICULOGRAM Left 2020    Procedure: Left Heart Catheterization Without Left Ventriculogram;  Surgeon: Ariane Lester MD;  Location: [facility] Cath Lab;  Service: Cardiology     CV LEFT VENTRICULOGRAM N/A 10/6/2022    Procedure: Left Ventriculogram;  Surgeon: Javon John MD;  Location: [facility] CATH LAB CV       Social History:  Social History     Socioeconomic History     Marital status:      Spouse name: Carlee     Number of children: 2     Years of education: Not on file     Highest education level: Not on file   Occupational History     Not on file   Tobacco Use     Smoking status: Former     Packs/day: 1.00     Years: 30.00     Pack years: 30.00     Types: Cigarettes     Quit date: 3/23/2020     Years since quittin.8     Smokeless tobacco: Never     Tobacco comments:     stopped 3/24/2020   Vaping Use     Vaping Use: Never used   Substance and Sexual Activity     Alcohol use: No     Drug use: Never     Sexual activity: Yes     Partners: Female   Other Topics Concern     Not on file   Social History Narrative     , Carlee, BA chemistry and taking AA courses at [employer].  From [place]; bachelors in geology and computer science. Son, Grace (2005) and Sherrie (2008).  On SSDI, PTSD.       Social Determinants of Health     Financial Resource Strain: Not on file   Food Insecurity: No Food Insecurity     Worried About Running Out of Food in the Last Year: Never true     Ran Out of Food in the Last Year: Never true   Transportation Needs: No Transportation Needs     Lack of Transportation (Medical): No     Lack of Transportation (Non-Medical): No   Physical Activity: Not on file   Stress: Not on file   Social Connections: Not on file   Intimate Partner Violence: Not on file   Housing Stability: Not on file       Family History:  Family History   Problem Relation Age of Onset     No Known Problems Mother      Lung Cancer Father 56        fatal     No Known Problems Daughter      Other - See Comments Son         congenital deformity of right leg; he uses a leg prosthesis       Review of Systems:  A complete review of systems reviewed by me is negative with the exeption of what has been mentioned in the history of present illness in the last 2 weeks.  CONSTITUTIONAL: NEGATIVE for weight gain/loss, fever, chills, sweats or night sweats, drug allergies.  EYES: NEGATIVE for changes in vision, blind spots, double vision.  ENT: NEGATIVE for ear pain, sore throat, sinus pain, post-nasal drip, runny nose, bloody nose  CARDIAC: NEGATIVE for fast heartbeats or fluttering in chest, breathlessness when lying flat, swollen legs or swollen feet.  CARDIAC:  POSITIVE for  chest pain and chest pressure  NEUROLOGIC: NEGATIVE headaches, weakness or numbness in the arms or legs.  DERMATOLOGIC: NEGATIVE for rashes, new moles or change in mole(s)  PULMONARY: NEGATIVE SOB at rest, dry cough, productive cough, coughing up blood, wheezing or whistling when breathing.    PULMONARY:  POSITIVE for  SOB with activity  GASTROINTESTINAL: NEGATIVE for nausea or  "vomitting, loose or watery stools, fat or grease in stools, constipation, abdominal pain, bowel movements black in color or blood noted.  GENITOURINARY: NEGATIVE for pain during urination, blood in urine, urinating more frequently than usual, irregular menstrual periods.  MUSCULOSKELETAL: NEGATIVE for bone or joint pain, swollen joints.  MUSCULOSKELETAL:  POSITIVE for  muscle pain  ENDOCRINE: NEGATIVE for increased thirst or urination, diabetes.  LYMPHATIC: NEGATIVE for swollen lymph nodes, lumps or bumps in the breasts or nipple discharge.      Physical Examination:  Vitals: Ht 1.676 m (5' 6\")   Wt 87.1 kg (192 lb)   BMI 30.99 kg/m    BMI= Body mass index is 30.99 kg/m .           GENERAL APPEARANCE: healthy, alert, no distress and cooperative  EYES: Eyes grossly normal to inspection  RESP: Unlabored, normal conversational speech  CV: color normal  NEURO: A&O x 3, mentation intact and speech normal  PSYCH: mentation appears normal and affect normal/bright  Mallampati Class: Unable to examine  Tonsillar Stage: Unable to examine         Data: All pertinent previous laboratory data reviewed     Recent Labs   Lab Test 12/21/22  1006 11/21/22  0947    139   POTASSIUM 4.8 4.9   CHLORIDE 103 103   CO2 25 22   ANIONGAP 13 14   * 95   BUN 10.4 11.8   CR 1.35* 1.61*   TERESE 9.5 9.2       Recent Labs   Lab Test 10/06/22  0814   WBC 7.2   RBC 4.73   HGB 13.9   HCT 41.1   MCV 87   MCH 29.4   MCHC 33.8   RDW 11.9          Recent Labs   Lab Test 08/05/22  0920   PROTTOTAL 7.0   ALBUMIN 4.3   BILITOTAL 0.4   ALKPHOS 79   AST 38   ALT 61*       TSH (uIU/mL)   Date Value   08/05/2022 3.40   09/22/2020 0.17 (L)       No results found for: UAMP, UBARB, BENZODIAZEUR, UCANN, UCOC, OPIT, UPCP    No results found for: IRONSAT, [id number], DALE    pH Arterial (no units)   Date Value   06/26/2020 7.54 (H)   06/25/2020 7.48 (H)     pO2 Arterial (mm Hg)   Date Value   06/26/2020 67 (L)   06/25/2020 82     pCO2 Arterial (mm Hg) "   Date Value   06/26/2020 26 (L)   06/25/2020 30 (L)     Bicarbonate Arterial POCT (mmol/L)   Date Value   06/24/2020 23.3   06/24/2020 26.7     Base Excess/Deficit (+/-) (mmol/L)   Date Value   06/26/2020 0.3   06/25/2020 -0.7       @LABRCNTIPR(phv:4,pco2v:4,po2v:4,hco3v:4,adrian:4,o2per:4)@    Echocardiology: No results found for this or any previous visit (from the past 4320 hour(s)).    Chest x-ray: No results found for this or any previous visit from the past 365 days.      Chest CT: CT Chest Lung Cancer Scrn Low Dose wo 08/22/2022    Narrative  EXAM: LOW DOSE LUNG CANCER SCREENING CT CHEST  LOCATION: [facility]  DATE/TIME: 8/22/2022 9:58 AM    INDICATION: Lung cancer screening. History of smoking. High risk patient.  COMPARISON: 10/13/2020.    TECHNIQUE: Low-dose lung cancer screening non-contrast CT chest. Dose reduction techniques were used.    FINDINGS:  NODULES: Few stable pulmonary nodules, to include 3 mm right lower lobe subpleural (series 3, image 173).    LUNGS AND PLEURA: Regions of minimal atelectasis / scarring. Mild airway thickening. No pleural effusion.    MEDIASTINUM: No adenopathy. Nonaneurysmal aorta.    CORONARY ARTERY CALCIFICATION: Prior sternotomy and CABG.    LIMITED UPPER ABDOMEN: Mild-moderate hepatic steatosis.    MUSCULOSKELETAL: Nothing acute.    Impression  IMPRESSION:  1.  Negative for lung cancer screening purposes.  2.  Please see report.    LungRADS CATEGORY: 2: Benign.  (<1% risk of malignancy)  -Perifissural nodule(s): <10 mm  -Solid nodule(s): <6 mm on baseline screening; or new nodule <4 mm  -Subsolid nodule(s): <6 mm on baseline screening  -Ground glass nodule(s): <30 mm; or greater than or equal to 30 mm and unchanged or slowly growing  -Category 3 or 4 nodules that are unchanged for greater than or equal to 3 months    RADIOLOGIST RECOMMENDATION: Continue annual screening with low-dose CT chest in 12 months.      PFT: Most Recent [facility] Pulmonary  Function Testing    No results found for: 20001  No results found for: 20002  No results found for: 20003  No results found for: 20015  No results found for: 20016  No results found for: 20027  No results found for: 20028  No results found for: 20029  No results found for: 20079  No results found for: 20080  No results found for: 20081  No results found for: 20335  No results found for: 20105  No results found for: 20053  No results found for: 20054  No results found for: 20055      GENET Ybarra CNP 1/10/2023   Sleep Medicine      This note was written with the assistance of the Dragon voice-dictation technology software. The final document, although reviewed, may contain errors. For corrections, please contact the office.

## 2023-01-18 ENCOUNTER — OFFICE VISIT (OUTPATIENT)
Dept: INTERNAL MEDICINE | Facility: CLINIC | Age: 58
End: 2023-01-18
Payer: COMMERCIAL

## 2023-01-18 VITALS
TEMPERATURE: 97.7 F | BODY MASS INDEX: 31.27 KG/M2 | OXYGEN SATURATION: 98 % | HEIGHT: 66 IN | HEART RATE: 53 BPM | WEIGHT: 194.6 LBS | DIASTOLIC BLOOD PRESSURE: 62 MMHG | SYSTOLIC BLOOD PRESSURE: 92 MMHG

## 2023-01-18 DIAGNOSIS — E53.8 VITAMIN B12 DEFICIENCY (NON ANEMIC): ICD-10-CM

## 2023-01-18 DIAGNOSIS — I25.83 CORONARY ARTERY DISEASE DUE TO LIPID RICH PLAQUE: Primary | ICD-10-CM

## 2023-01-18 DIAGNOSIS — E11.9 TYPE 2 DIABETES MELLITUS WITHOUT COMPLICATION, WITHOUT LONG-TERM CURRENT USE OF INSULIN (H): ICD-10-CM

## 2023-01-18 DIAGNOSIS — K60.30 ANAL FISTULA: ICD-10-CM

## 2023-01-18 DIAGNOSIS — I25.10 CORONARY ARTERY DISEASE DUE TO LIPID RICH PLAQUE: Primary | ICD-10-CM

## 2023-01-18 DIAGNOSIS — N18.31 CHRONIC KIDNEY DISEASE, STAGE 3A (H): ICD-10-CM

## 2023-01-18 DIAGNOSIS — I10 ESSENTIAL HYPERTENSION: ICD-10-CM

## 2023-01-18 DIAGNOSIS — I50.32 CHRONIC HEART FAILURE WITH PRESERVED EJECTION FRACTION (H): ICD-10-CM

## 2023-01-18 LAB
ALBUMIN SERPL BCG-MCNC: 4.4 G/DL (ref 3.5–5.2)
ALP SERPL-CCNC: 72 U/L (ref 40–129)
ALT SERPL W P-5'-P-CCNC: 70 U/L (ref 10–50)
ANION GAP SERPL CALCULATED.3IONS-SCNC: 13 MMOL/L (ref 7–15)
AST SERPL W P-5'-P-CCNC: 44 U/L (ref 10–50)
BILIRUB SERPL-MCNC: 0.4 MG/DL
BUN SERPL-MCNC: 10.3 MG/DL (ref 6–20)
CALCIUM SERPL-MCNC: 9.3 MG/DL (ref 8.6–10)
CHLORIDE SERPL-SCNC: 105 MMOL/L (ref 98–107)
CHOLEST SERPL-MCNC: 87 MG/DL
CREAT SERPL-MCNC: 1.25 MG/DL (ref 0.67–1.17)
DEPRECATED HCO3 PLAS-SCNC: 24 MMOL/L (ref 22–29)
ERYTHROCYTE [DISTWIDTH] IN BLOOD BY AUTOMATED COUNT: 12.7 % (ref 10–15)
FERRITIN SERPL-MCNC: 93 NG/ML (ref 31–409)
GFR SERPL CREATININE-BSD FRML MDRD: 68 ML/MIN/1.73M2
GLUCOSE SERPL-MCNC: 100 MG/DL (ref 70–99)
HCT VFR BLD AUTO: 44.1 % (ref 40–53)
HDLC SERPL-MCNC: 30 MG/DL
HGB BLD-MCNC: 14.5 G/DL (ref 13.3–17.7)
LDLC SERPL CALC-MCNC: 30 MG/DL
MCH RBC QN AUTO: 29.2 PG (ref 26.5–33)
MCHC RBC AUTO-ENTMCNC: 32.9 G/DL (ref 31.5–36.5)
MCV RBC AUTO: 89 FL (ref 78–100)
NONHDLC SERPL-MCNC: 57 MG/DL
PLATELET # BLD AUTO: 181 10E3/UL (ref 150–450)
POTASSIUM SERPL-SCNC: 4.8 MMOL/L (ref 3.4–5.3)
PROT SERPL-MCNC: 7.1 G/DL (ref 6.4–8.3)
RBC # BLD AUTO: 4.96 10E6/UL (ref 4.4–5.9)
SODIUM SERPL-SCNC: 142 MMOL/L (ref 136–145)
TRIGL SERPL-MCNC: 134 MG/DL
VIT B12 SERPL-MCNC: 562 PG/ML (ref 232–1245)
WBC # BLD AUTO: 7.2 10E3/UL (ref 4–11)

## 2023-01-18 PROCEDURE — 82607 VITAMIN B-12: CPT | Performed by: INTERNAL MEDICINE

## 2023-01-18 PROCEDURE — 82728 ASSAY OF FERRITIN: CPT | Performed by: INTERNAL MEDICINE

## 2023-01-18 PROCEDURE — 99214 OFFICE O/P EST MOD 30 MIN: CPT | Mod: 25 | Performed by: INTERNAL MEDICINE

## 2023-01-18 PROCEDURE — 90471 IMMUNIZATION ADMIN: CPT | Performed by: INTERNAL MEDICINE

## 2023-01-18 PROCEDURE — 36415 COLL VENOUS BLD VENIPUNCTURE: CPT | Performed by: INTERNAL MEDICINE

## 2023-01-18 PROCEDURE — 85027 COMPLETE CBC AUTOMATED: CPT | Performed by: INTERNAL MEDICINE

## 2023-01-18 PROCEDURE — 80061 LIPID PANEL: CPT | Performed by: INTERNAL MEDICINE

## 2023-01-18 PROCEDURE — 80053 COMPREHEN METABOLIC PANEL: CPT | Performed by: INTERNAL MEDICINE

## 2023-01-18 PROCEDURE — 90677 PCV20 VACCINE IM: CPT | Performed by: INTERNAL MEDICINE

## 2023-01-18 RX ORDER — EMPAGLIFLOZIN 10 MG/1
TABLET, FILM COATED ORAL
COMMUNITY
Start: 2023-01-11 | End: 2023-01-18

## 2023-01-18 RX ORDER — ESCITALOPRAM OXALATE 10 MG/1
TABLET ORAL
COMMUNITY
Start: 2022-07-22 | End: 2023-01-18

## 2023-01-18 RX ORDER — LANOLIN ALCOHOL/MO/W.PET/CERES
1000 CREAM (GRAM) TOPICAL DAILY
Qty: 90 TABLET | Refills: 4 | Status: SHIPPED | OUTPATIENT
Start: 2023-01-18 | End: 2024-02-07

## 2023-01-18 NOTE — PROGRESS NOTES
Office Visit - Follow Up   Nadya Blake   57 year old male    Date of Visit: 1/18/2023    Chief Complaint   Patient presents with     Follow Up     Pt has multiple concerns and question and has a list.         Assessment and Plan   1. Coronary artery disease due to lipid rich plaque  Continue secondary prevention, continue with vegan diet  - Lipid panel reflex to direct LDL Fasting; Future  - Lipid panel reflex to direct LDL Fasting    2. Essential hypertension  Blood pressure is a little bit low, consider backing off on metoprolol 200 mg daily and he has close follow-up with Dr. Sloan Burns    3. Chronic kidney disease, stage 3a (H)  Continue with current management    4. Chronic heart failure with preserved ejection fraction (H)  Ejection fraction is actually normalized, appears euvolemic    5. Type 2 diabetes mellitus without complication, without long-term current use of insulin (H)  Doing well, he is on Jardiance for both heart failure and diabetes  - Comprehensive metabolic panel; Future  - Comprehensive metabolic panel    6. Vitamin B12 deficiency (non anemic)  - cyanocobalamin (VITAMIN B-12) 1000 MCG tablet; Take 1 tablet (1,000 mcg) by mouth daily  Dispense: 90 tablet; Refill: 4  - Vitamin B12; Future  - CBC with platelets; Future  - Ferritin; Future  - Vitamin B12  - CBC with platelets  - Ferritin    7. Anal fistula  I have strongly encouraged him to see his colorectal surgeon    Return in about 1 month (around 2/18/2023) for Follow up.     History of Present Illness   This 57 year old man comes in for follow-up.  Overall he is doing okay.  He continues to lose weight following a vegan diet.  His blood pressures actually been quite low.  I did stop Imdur and spironolactone last visit.  Still with some lightheadedness especially when he gets up from sleeping.  This tends to resolve as his day progresses.  He is currently on metoprolol 150 mg a day.  He has an anal fistula and has seen colorectal  "surgery in the past.  He has had some pus draining out.  He recently increased Jardiance and is tolerating this.  Occasional chest pain for which she will take nitroglycerin which is helpful.       Physical Exam   General Appearance:   No acute distress    BP 92/62 (BP Location: Left arm, Patient Position: Sitting, Cuff Size: Adult Regular)   Pulse 53   Temp 97.7  F (36.5  C) (Tympanic)   Ht 1.665 m (5' 5.55\")   Wt 88.3 kg (194 lb 9.6 oz)   SpO2 98%   BMI 31.84 kg/m      HEENT exam is unremarkable  Neck supple no thyromegaly or nodule palpable  Lymphatic no cervical lymphadenopathy  Cardiovascular regular rate and rhythm no murmur gallop or rub  Pulmonary lungs are clear to auscultation bilaterally  Gastrointestinal abdomen soft nontender nondistended no organomegaly  Neurologic exam is non focal  Psychiatric pleasant, no confusion or agitation        Additional Information   Current Outpatient Medications   Medication Sig Dispense Refill     ACETAMINOPHEN EXTRA STRENGTH 500 MG tablet TAKE 2 TABLETS(1000 MG) BY MOUTH EVERY 6 HOURS AS NEEDED FOR PAIN 360 tablet 3     albuterol (PROAIR HFA/PROVENTIL HFA/VENTOLIN HFA) 108 (90 Base) MCG/ACT inhaler Inhale 1-2 puffs into the lungs every 4 hours as needed for shortness of breath / dyspnea 18 g 11     aspirin (ASA) 81 MG chewable tablet Take 1 tablet (81 mg) by mouth daily 90 tablet 4     budesonide-formoterol (SYMBICORT) 160-4.5 MCG/ACT Inhaler Inhale 2 puffs into the lungs 2 times daily 10.2 g 11     Cholecalciferol (VITAMIN D3) 50 MCG (2000 UT) CAPS Take 1 capsule by mouth daily 90 capsule 3     cyanocobalamin (VITAMIN B-12) 1000 MCG tablet Take 1 tablet (1,000 mcg) by mouth daily 90 tablet 4     diclofenac (VOLTAREN) 1 % topical gel Apply 4 g topically 4 times daily 500 g 2     empagliflozin (JARDIANCE) 25 MG TABS tablet Take 1 tablet (25 mg) by mouth daily 90 tablet 4     escitalopram (LEXAPRO) 20 MG tablet TAKE 1 TABLET(20 MG) BY MOUTH DAILY 30 tablet 0     " Lidocaine 0.5 % GEL        metoprolol succinate ER (TOPROL XL) 100 MG 24 hr tablet Take 1.5 tablets (150 mg) by mouth At Bedtime 135 tablet 4     nifedipine 0.2% in white petrolatum 0.2 % OINT ointment Apply topically 4 times daily as needed (anal fissure) 100 g 1     nitroGLYcerin (NITROSTAT) 0.4 MG sublingual tablet For chest pain place 1 tablet under the tongue every 5 minutes for 3 doses. If symptoms persist 5 minutes after 1st dose call 911. 30 tablet 1     polyethylene glycol (MIRALAX) 17 GM/Dose powder Take 17 g (1 capful) by mouth daily 850 g 3     QUEtiapine (SEROQUEL) 100 MG tablet Take 1.5 tablets (150 mg) by mouth At Bedtime 45 tablet 10     rosuvastatin (CRESTOR) 40 MG tablet TAKE 1 TABLET(40 MG) BY MOUTH DAILY 90 tablet 3     tamsulosin (FLOMAX) 0.4 MG capsule Take 2 capsules (0.8 mg) by mouth every evening       QUEtiapine (SEROQUEL) 50 MG tablet Take 1-4 tablets ( mg) by mouth nightly as needed (Anxiety, nightmares or sleep difficulties) 120 tablet 3     Allergies   Allergen Reactions     Atorvastatin Diarrhea     Cortisone Other (See Comments)     pain     Lisinopril Cough     started after CABG for unclear reason     Methylprednisolone Other (See Comments)     ?       Time:      Az Briseno MD    Answers for HPI/ROS submitted by the patient on 1/18/2023  Do you take an aspirin every day?: Yes  How many servings of fruits and vegetables do you eat daily?: 2-3  On average, how many sweetened beverages do you drink each day (Examples: soda, juice, sweet tea, etc.  Do NOT count diet or artificially sweetened beverages)?: 2  How many minutes a day do you exercise enough to make your heart beat faster?: 20 to 29  How many days a week do you exercise enough to make your heart beat faster?: 7  How many days per week do you miss taking your medication?: 0

## 2023-01-20 ENCOUNTER — OFFICE VISIT (OUTPATIENT)
Dept: CARDIOLOGY | Facility: CLINIC | Age: 58
End: 2023-01-20
Payer: COMMERCIAL

## 2023-01-20 VITALS
DIASTOLIC BLOOD PRESSURE: 72 MMHG | WEIGHT: 194 LBS | RESPIRATION RATE: 16 BRPM | BODY MASS INDEX: 31.18 KG/M2 | HEART RATE: 50 BPM | SYSTOLIC BLOOD PRESSURE: 90 MMHG | HEIGHT: 66 IN

## 2023-01-20 DIAGNOSIS — I50.32 CHRONIC HEART FAILURE WITH PRESERVED EJECTION FRACTION (H): ICD-10-CM

## 2023-01-20 DIAGNOSIS — I25.709 CORONARY ARTERY DISEASE INVOLVING CORONARY BYPASS GRAFT OF NATIVE HEART WITH ANGINA PECTORIS (H): ICD-10-CM

## 2023-01-20 DIAGNOSIS — I10 ESSENTIAL HYPERTENSION: Primary | ICD-10-CM

## 2023-01-20 DIAGNOSIS — E78.5 DYSLIPIDEMIA, GOAL LDL BELOW 70: ICD-10-CM

## 2023-01-20 DIAGNOSIS — R29.818 SUSPECTED SLEEP APNEA: ICD-10-CM

## 2023-01-20 PROCEDURE — 99214 OFFICE O/P EST MOD 30 MIN: CPT | Performed by: GENERAL ACUTE CARE HOSPITAL

## 2023-01-20 NOTE — LETTER
1/20/2023    Az Briseno MD  [address] [facility] [address]    RE: Nadya Blake       Dear Colleague,     I had the pleasure of seeing Nadya Blake in the [facility].  HEART CARE ENCOUNTER NOTE        Assessment/Recommendations   Assessment:    1. Coronary artery disease status post four-vessel coronary artery bypass grafting (left internal mammary artery to the left anterior descending artery, right radial artery to the right posterior descending artery, reversed saphenous venous grafts to obtuse marginal and diagonal artery branches) on 6/24/2020. No ischemia seen on stress cardiac MRI on 12/23/2021 but he has been having exertional dyspnea with his saphenous venous graft to the right posterior descending artery noted to be occluded on coronary angiography 10/6/2022. He now notes only very mild symptoms but may be having side effects from metoprolol.  2. Chronic congestive heart failure with preserved left ventricular ejection fraction. He seems euvolemic and normal left-sided filling pressure was noted on cardiac catheterization 10/6/2022.  3. Benign essential hypertension. Low today with mild symptoms.  4. Dyslipidemia. Last LDL 30 mg/dL.  5. Former smoker.  6. COVID-19 infection on 5/18/2022.  7. Possible obstructive sleep apnea.  8. BMI 31.74    Plan:  1. Reduce metoprolol succinate to 100 mg daily.  2. Isosorbide mononitrate and spironolactone were previously discontinued.  3. Continue with medical management of his coronary artery disease but if his symptoms worsen, we will refer him for percutaneous coronary intervention of the right coronary artery.  4. Encouraged him to follow-up with scheduling his sleep study.  5. He does not appear to require loop diuretics at this time.  6. Rosuvastatin 40 mg and aspirin 81 mg daily.  7. Follow-up with me in 2 months per patient request.         History of Present Illness   Mr. Nadya Blake is a 57 year old male with a  significant past history of CAD with history of NSTEMI s/p 4V CABG (LIMA to LAD, right radial artery to RPDA, reversed SVGs to an OM and diagonal artery branch) on 6/24/2020, HFpEF, HTN, dyslipidemia, and former smoker presenting for follow-up.     He is doing more exercise on the treadmill with no significant chest pain or shortness of breath. He has noticed lightheadedness, especially when first waking up in the morning. Since he last saw me, his PCP stopped both spironolactone and isosorbide mononitrate due to low BP and worsening renal function. No pre-syncope, syncope, lower extremity swelling, palpitations, paroxysmal nocturnal dyspnea (PND), or orthopnea. He recently met with sleep medicine, who recommended a sleep study. He is eating a vegan diet.     Cardiac Problems and Cardiac Diagnostics     Most Recent Cardiac testing:  ECG 10/6/2022 (personally reviewed and interpreted): sinus bradycardia HR 57 bpm with 1st degree AV block  ms, nonspecific T wave changes     ECHO 6/27/2020 (report reviewed):     Normal left ventricular size and systolic function. The left ventricular wall motion is normal. The calculated left ventricular ejection fraction is 71%.    Normal right ventricular size and systolic function.    No hemodynamically significant valvular heart abnormalities.    The ascending aorta is mildly dilated.    When compared to the previous study dated 6/23/2020, no significant change.     Stress cardiac MRI 12/23/2021 (report reviewed):  1.  Pharmacological Regadenoson stress cardiac MRI is negative for inducible myocardial ischemia.   2.  Pharmacological stress ECG is negative for inducible myocardial ischemia.   3. Normal left ventricular size, wall thickness and systolic function. The quantified left ventricular  ejection fraction is 59 %.  Very small area of non-transmural myocardial scar in the mid inferior wall is  identified.    4.  Normal right ventricular size and systolic function.    5.   No significant valvular abnormalities.  6. Ascending aorta measures 38 mm.      Stress test 5/21/2021 (report reviewed):     The nuclear stress test is negative for inducible myocardial ischemia or infarction.     The left ventricular ejection fraction at stress is 65%.     There is no prior study for comparison.     Cardiac cath 10/6/2022 (report reviewed):     Left ventricular filling pressures are normal.    3rd Mrg lesion is 90% stenosed.    Prox RCA lesion is 75% stenosed.    Prox RCA to Mid RCA lesion is 40% stenosed.    Dist RCA lesion is 75% stenosed.    Mid RCA lesion is 30% stenosed.    RPDA lesion is 50% stenosed.    RPAV lesion is 40% stenosed.    Prox LAD lesion is 70% stenosed.    1st Diag-1 lesion is 95% stenosed.    1st Diag-2 lesion is 95% stenosed.    2nd Diag lesion is 99% stenosed.    Mid LAD-1 lesion is 75% stenosed.    Mid LAD-2 lesion is 75% stenosed.    Dist LAD lesion is 70% stenosed.    Vein graft to right PDA is totally occluded    Vein graft to second diagonal is widely patent    Vein graft to third OM is widely patent    LUZ MARIA graft to mid distal LAD is patent     Medications  Allergies   Current Outpatient Medications   Medication Sig Dispense Refill     ACETAMINOPHEN EXTRA STRENGTH 500 MG tablet TAKE 2 TABLETS(1000 MG) BY MOUTH EVERY 6 HOURS AS NEEDED FOR PAIN 360 tablet 3     albuterol (PROAIR HFA/PROVENTIL HFA/VENTOLIN HFA) 108 (90 Base) MCG/ACT inhaler Inhale 1-2 puffs into the lungs every 4 hours as needed for shortness of breath / dyspnea 18 g 11     aspirin (ASA) 81 MG chewable tablet Take 1 tablet (81 mg) by mouth daily 90 tablet 4     budesonide-formoterol (SYMBICORT) 160-4.5 MCG/ACT Inhaler Inhale 2 puffs into the lungs 2 times daily (Patient taking differently: Inhale 2 puffs into the lungs 2 times daily as needed) 10.2 g 11     Cholecalciferol (VITAMIN D3) 50 MCG (2000 UT) CAPS Take 1 capsule by mouth daily 90 capsule 3     cyanocobalamin (VITAMIN B-12) 1000 MCG tablet Take 1  "tablet (1,000 mcg) by mouth daily 90 tablet 4     diclofenac (VOLTAREN) 1 % topical gel Apply 4 g topically 4 times daily 500 g 2     empagliflozin (JARDIANCE) 25 MG TABS tablet Take 1 tablet (25 mg) by mouth daily 90 tablet 4     escitalopram (LEXAPRO) 20 MG tablet TAKE 1 TABLET(20 MG) BY MOUTH DAILY 30 tablet 0     Lidocaine 0.5 % GEL        metoprolol succinate ER (TOPROL XL) 100 MG 24 hr tablet Take 1.5 tablets (150 mg) by mouth At Bedtime 135 tablet 4     nifedipine 0.2% in white petrolatum 0.2 % OINT ointment Apply topically 4 times daily as needed (anal fissure) 100 g 1     nitroGLYcerin (NITROSTAT) 0.4 MG sublingual tablet For chest pain place 1 tablet under the tongue every 5 minutes for 3 doses. If symptoms persist 5 minutes after 1st dose call 911. 30 tablet 1     polyethylene glycol (MIRALAX) 17 GM/Dose powder Take 17 g (1 capful) by mouth daily 850 g 3     QUEtiapine (SEROQUEL) 100 MG tablet Take 1.5 tablets (150 mg) by mouth At Bedtime 45 tablet 10     QUEtiapine (SEROQUEL) 50 MG tablet Take 1-4 tablets ( mg) by mouth nightly as needed (Anxiety, nightmares or sleep difficulties) 120 tablet 3     rosuvastatin (CRESTOR) 40 MG tablet TAKE 1 TABLET(40 MG) BY MOUTH DAILY 90 tablet 3     tamsulosin (FLOMAX) 0.4 MG capsule Take 2 capsules (0.8 mg) by mouth every evening        Allergies   Allergen Reactions     Atorvastatin Diarrhea     Cortisone Other (See Comments)     pain     Lisinopril Cough     started after CABG for unclear reason     Methylprednisolone Other (See Comments)     ?        Physical Examination Review of Systems   BP 90/72 (BP Location: Left arm, Patient Position: Sitting, Cuff Size: Adult Regular)   Pulse 50   Resp 16   Ht 1.665 m (5' 5.55\")   Wt 88 kg (194 lb)   BMI 31.74 kg/m    Body mass index is 31.74 kg/m .  Wt Readings from Last 3 Encounters:   01/20/23 88 kg (194 lb)   01/18/23 88.3 kg (194 lb 9.6 oz)   01/10/23 87.1 kg (192 lb)       General Appearance:   Pleasant male, " appears stated age. no acute distress, obese body habitus   ENT/Mouth: Facemask.     EYES:  no scleral icterus, normal conjunctivae   Neck: no carotid bruits. No anterior cervical lymphadenopaty   Respiratory:   lungs are clear to auscultation, no rales or wheezing, well-healed sternal scar, equal chest wall expansion    Cardiovascular:   Regular rhythm, bradycardic rate. Normal first and second heart sounds with no murmurs, rubs, or gallops; the carotid, radial and posterior tibial pulses are intact, Jugular venous pressure normal, no edema bilaterally    Abdomen/GI:  no organomegaly, masses, bruits, or tenderness; bowel sounds are present   Extremities: no cyanosis or clubbing   Skin: no xanthelasma, warm.    Heme/lymph/ Immunology No apparent bleeding noted.   Neurologic: Alert and oriented. normal gait, no tremors     Psychiatric: Pleasant, calm, appropriate affect.    A complete 10 system review of systems was performed and is negative except as mentioned in the HPI/subjective.         Past History   Past Medical History:   Past Medical History:   Diagnosis Date     Acute non-ST elevation myocardial infarction (NSTEMI) (H) 6/22/2020     Adenomatous polyp of colon      Ascending aorta dilatation (H)      Carpal tunnel syndrome      Chronic superficial gastritis      Coronary artery disease due to lipid rich plaque 6/23/2020     Depression with anxiety      Dyslipidemia, goal LDL below 70 6/23/2020     Essential hypertension 9/2/2012     Fatty liver disease, nonalcoholic      GERD (gastroesophageal reflux disease)      IBS (irritable bowel syndrome)      Left lumbar radiculopathy      Multinodular goiter      Old torn meniscus of knee      Paroxysmal atrial fibrillation (H)      Perianal abscess      Post herpetic neuralgia      Post-traumatic osteoarthritis of both knees      Primary osteoarthritis of left hip      Primary osteoarthritis of right hip      Pulmonary nodule      Respiratory bronchiolitis  associated interstitial lung disease (H)      Thyroid nodule      TMJ arthritis      Tobacco use disorder 11/1/2013     Vitamin D deficiency 9/30/2020       Past Surgical History:   Past Surgical History:   Procedure Laterality Date     APPENDECTOMY  1995     BYPASS GRAFT ARTERY CORONARY  06/24/2020    Dr. Ragsdale     CV CORONARY ANGIOGRAM N/A 6/23/2020    Procedure: Coronary Angiogram;  Surgeon: Ariane Lester MD;  Location: [facility] Cath Lab;  Service: Cardiology     CV CORONARY ANGIOGRAM N/A 10/6/2022    Procedure: Coronary Angiogram;  Surgeon: Javon John MD;  Location: [facility] CV     CV IABP INSERT N/A 6/24/2020    Procedure: Intra Aortic Balloon Pump Insertion;  Surgeon: Ariane Lestre MD;  Location: [facility] Cath Lab;  Service: Cardiology     CV LEFT HEART CATH N/A 10/6/2022    Procedure: Left Heart Catheterization;  Surgeon: Javon John MD;  Location: [facility] CV     CV LEFT HEART CATHETERIZATION WITHOUT LEFT VENTRICULOGRAM Left 6/23/2020    Procedure: Left Heart Catheterization Without Left Ventriculogram;  Surgeon: Ariane Lester MD;  Location: [facility] Cath Lab;  Service: Cardiology     CV LEFT VENTRICULOGRAM N/A 10/6/2022    Procedure: Left Ventriculogram;  Surgeon: Javon John MD;  Location: [facility] LAB CV       Family History:   Family History   Problem Relation Age of Onset     No Known Problems Mother      Lung Cancer Father 56        fatal     No Known Problems Daughter      Other - See Comments Son         congenital deformity of right leg; he uses a leg prosthesis        Social History:   Social History     Socioeconomic History     Marital status:      Spouse name: Carlee     Number of children: 2     Years of education: Not on file     Highest education level: Not on file   Occupational History     Not on file   Tobacco Use     Smoking status: Former     Packs/day: 1.00     Years: 30.00     Pack years: 30.00     Types:  Cigarettes     Quit date: 3/23/2020     Years since quittin.8     Smokeless tobacco: Never     Tobacco comments:     stopped 3/24/2020   Vaping Use     Vaping Use: Never used   Substance and Sexual Activity     Alcohol use: No     Drug use: Never     Sexual activity: Yes     Partners: Female   Other Topics Concern     Not on file   Social History Narrative    , Carlee, BA chemistry and taking AA courses at [employer].  From [place]; bachelors in geology and computer science. Son, Grace (2005) and Sherrie (2008).  On SSDI, PTSD.       Social Determinants of Health     Financial Resource Strain: Not on file   Food Insecurity: No Food Insecurity     Worried About Running Out of Food in the Last Year: Never true     Ran Out of Food in the Last Year: Never true   Transportation Needs: No Transportation Needs     Lack of Transportation (Medical): No     Lack of Transportation (Non-Medical): No   Physical Activity: Not on file   Stress: Not on file   Social Connections: Not on file   Intimate Partner Violence: Not on file   Housing Stability: Not on file              Lab Results    Chemistry/lipid CBC Cardiac Enzymes/BNP/TSH/INR   Lab Results   Component Value Date    CHOL 87 2023    HDL 30 (L) 2023    LDL 30 2023    TRIG 134 2023    CR 1.25 (H) 2023    BUN 10.3 2023    POTASSIUM 4.8 2023     2023    CO2 24 2023      Lab Results   Component Value Date    WBC 7.2 2023    HGB 14.5 2023    HCT 44.1 2023    MCV 89 2023     2023    A1C 5.6 2022     Lab Results   Component Value Date    A1C 5.6 2022    Lab Results   Component Value Date    TROPONINI <0.01 2020    BNP 79 (H) 2020    TSH 3.40 2022    INR 1.10 2020          Sloan Burns MD [facility]  Non-Invasive Cardiologist  [facility]  Pager [phone]      Thank you for allowing me to participate in the care of your  patient.      Sincerely,     Sloan Burns MD     [facility]  cc:   Sloan Burns MD  [address] [facility] [address]  [place] [address]

## 2023-01-20 NOTE — PATIENT INSTRUCTIONS
Try taking just 100 mg of metoprolol (1 tablet) a day to see if you feel better with this.  Continue to follow-up with the sleep doctor.  Let Dr. Briseno know if you still have problems urinating a lot.  See me back in 2 months.

## 2023-01-20 NOTE — PROGRESS NOTES
HEART CARE ENCOUNTER NOTE        Assessment/Recommendations   Assessment:    1. Coronary artery disease status post four-vessel coronary artery bypass grafting (left internal mammary artery to the left anterior descending artery, right radial artery to the right posterior descending artery, reversed saphenous venous grafts to obtuse marginal and diagonal artery branches) on 6/24/2020. No ischemia seen on stress cardiac MRI on 12/23/2021 but he has been having exertional dyspnea with his saphenous venous graft to the right posterior descending artery noted to be occluded on coronary angiography 10/6/2022. He now notes only very mild symptoms but may be having side effects from metoprolol.  2. Chronic congestive heart failure with preserved left ventricular ejection fraction. He seems euvolemic and normal left-sided filling pressure was noted on cardiac catheterization 10/6/2022.  3. Benign essential hypertension. Low today with mild symptoms.  4. Dyslipidemia. Last LDL 30 mg/dL.  5. Former smoker.  6. COVID-19 infection on 5/18/2022.  7. Possible obstructive sleep apnea.  8. BMI 31.74    Plan:  1. Reduce metoprolol succinate to 100 mg daily.  2. Isosorbide mononitrate and spironolactone were previously discontinued.  3. Continue with medical management of his coronary artery disease but if his symptoms worsen, we will refer him for percutaneous coronary intervention of the right coronary artery.  4. Encouraged him to follow-up with scheduling his sleep study.  5. He does not appear to require loop diuretics at this time.  6. Rosuvastatin 40 mg and aspirin 81 mg daily.  7. Follow-up with me in 2 months per patient request.         History of Present Illness   Mr. Nadya Blake is a 57 year old male with a significant past history of CAD with history of NSTEMI s/p 4V CABG (LIMA to LAD, right radial artery to RPDA, reversed SVGs to an OM and diagonal artery branch) on 6/24/2020, HFpEF, HTN, dyslipidemia, and former  smoker presenting for follow-up.     He is doing more exercise on the treadmill with no significant chest pain or shortness of breath. He has noticed lightheadedness, especially when first waking up in the morning. Since he last saw me, his PCP stopped both spironolactone and isosorbide mononitrate due to low BP and worsening renal function. No pre-syncope, syncope, lower extremity swelling, palpitations, paroxysmal nocturnal dyspnea (PND), or orthopnea. He recently met with sleep medicine, who recommended a sleep study. He is eating a vegan diet.     Cardiac Problems and Cardiac Diagnostics     Most Recent Cardiac testing:  ECG 10/6/2022 (personally reviewed and interpreted): sinus bradycardia HR 57 bpm with 1st degree AV block  ms, nonspecific T wave changes     ECHO 6/27/2020 (report reviewed):     Normal left ventricular size and systolic function. The left ventricular wall motion is normal. The calculated left ventricular ejection fraction is 71%.    Normal right ventricular size and systolic function.    No hemodynamically significant valvular heart abnormalities.    The ascending aorta is mildly dilated.    When compared to the previous study dated 6/23/2020, no significant change.     Stress cardiac MRI 12/23/2021 (report reviewed):  1.  Pharmacological Regadenoson stress cardiac MRI is negative for inducible myocardial ischemia.   2.  Pharmacological stress ECG is negative for inducible myocardial ischemia.   3. Normal left ventricular size, wall thickness and systolic function. The quantified left ventricular  ejection fraction is 59 %.  Very small area of non-transmural myocardial scar in the mid inferior wall is  identified.    4.  Normal right ventricular size and systolic function.    5.  No significant valvular abnormalities.  6. Ascending aorta measures 38 mm.      Stress test 5/21/2021 (report reviewed):     The nuclear stress test is negative for inducible myocardial ischemia or  infarction.     The left ventricular ejection fraction at stress is 65%.     There is no prior study for comparison.     Cardiac cath 10/6/2022 (report reviewed):     Left ventricular filling pressures are normal.    3rd Mrg lesion is 90% stenosed.    Prox RCA lesion is 75% stenosed.    Prox RCA to Mid RCA lesion is 40% stenosed.    Dist RCA lesion is 75% stenosed.    Mid RCA lesion is 30% stenosed.    RPDA lesion is 50% stenosed.    RPAV lesion is 40% stenosed.    Prox LAD lesion is 70% stenosed.    1st Diag-1 lesion is 95% stenosed.    1st Diag-2 lesion is 95% stenosed.    2nd Diag lesion is 99% stenosed.    Mid LAD-1 lesion is 75% stenosed.    Mid LAD-2 lesion is 75% stenosed.    Dist LAD lesion is 70% stenosed.    Vein graft to right PDA is totally occluded    Vein graft to second diagonal is widely patent    Vein graft to third OM is widely patent    LUZ MARIA graft to mid distal LAD is patent     Medications  Allergies   Current Outpatient Medications   Medication Sig Dispense Refill     ACETAMINOPHEN EXTRA STRENGTH 500 MG tablet TAKE 2 TABLETS(1000 MG) BY MOUTH EVERY 6 HOURS AS NEEDED FOR PAIN 360 tablet 3     albuterol (PROAIR HFA/PROVENTIL HFA/VENTOLIN HFA) 108 (90 Base) MCG/ACT inhaler Inhale 1-2 puffs into the lungs every 4 hours as needed for shortness of breath / dyspnea 18 g 11     aspirin (ASA) 81 MG chewable tablet Take 1 tablet (81 mg) by mouth daily 90 tablet 4     budesonide-formoterol (SYMBICORT) 160-4.5 MCG/ACT Inhaler Inhale 2 puffs into the lungs 2 times daily (Patient taking differently: Inhale 2 puffs into the lungs 2 times daily as needed) 10.2 g 11     Cholecalciferol (VITAMIN D3) 50 MCG (2000 UT) CAPS Take 1 capsule by mouth daily 90 capsule 3     cyanocobalamin (VITAMIN B-12) 1000 MCG tablet Take 1 tablet (1,000 mcg) by mouth daily 90 tablet 4     diclofenac (VOLTAREN) 1 % topical gel Apply 4 g topically 4 times daily 500 g 2     empagliflozin (JARDIANCE) 25 MG TABS tablet Take 1 tablet (25  "mg) by mouth daily 90 tablet 4     escitalopram (LEXAPRO) 20 MG tablet TAKE 1 TABLET(20 MG) BY MOUTH DAILY 30 tablet 0     Lidocaine 0.5 % GEL        metoprolol succinate ER (TOPROL XL) 100 MG 24 hr tablet Take 1.5 tablets (150 mg) by mouth At Bedtime 135 tablet 4     nifedipine 0.2% in white petrolatum 0.2 % OINT ointment Apply topically 4 times daily as needed (anal fissure) 100 g 1     nitroGLYcerin (NITROSTAT) 0.4 MG sublingual tablet For chest pain place 1 tablet under the tongue every 5 minutes for 3 doses. If symptoms persist 5 minutes after 1st dose call 911. 30 tablet 1     polyethylene glycol (MIRALAX) 17 GM/Dose powder Take 17 g (1 capful) by mouth daily 850 g 3     QUEtiapine (SEROQUEL) 100 MG tablet Take 1.5 tablets (150 mg) by mouth At Bedtime 45 tablet 10     QUEtiapine (SEROQUEL) 50 MG tablet Take 1-4 tablets ( mg) by mouth nightly as needed (Anxiety, nightmares or sleep difficulties) 120 tablet 3     rosuvastatin (CRESTOR) 40 MG tablet TAKE 1 TABLET(40 MG) BY MOUTH DAILY 90 tablet 3     tamsulosin (FLOMAX) 0.4 MG capsule Take 2 capsules (0.8 mg) by mouth every evening        Allergies   Allergen Reactions     Atorvastatin Diarrhea     Cortisone Other (See Comments)     pain     Lisinopril Cough     started after CABG for unclear reason     Methylprednisolone Other (See Comments)     ?        Physical Examination Review of Systems   BP 90/72 (BP Location: Left arm, Patient Position: Sitting, Cuff Size: Adult Regular)   Pulse 50   Resp 16   Ht 1.665 m (5' 5.55\")   Wt 88 kg (194 lb)   BMI 31.74 kg/m    Body mass index is 31.74 kg/m .  Wt Readings from Last 3 Encounters:   01/20/23 88 kg (194 lb)   01/18/23 88.3 kg (194 lb 9.6 oz)   01/10/23 87.1 kg (192 lb)       General Appearance:   Pleasant male, appears stated age. no acute distress, obese body habitus   ENT/Mouth: Facemask.     EYES:  no scleral icterus, normal conjunctivae   Neck: no carotid bruits. No anterior cervical lymphadenopaty "   Respiratory:   lungs are clear to auscultation, no rales or wheezing, well-healed sternal scar, equal chest wall expansion    Cardiovascular:   Regular rhythm, bradycardic rate. Normal first and second heart sounds with no murmurs, rubs, or gallops; the carotid, radial and posterior tibial pulses are intact, Jugular venous pressure normal, no edema bilaterally    Abdomen/GI:  no organomegaly, masses, bruits, or tenderness; bowel sounds are present   Extremities: no cyanosis or clubbing   Skin: no xanthelasma, warm.    Heme/lymph/ Immunology No apparent bleeding noted.   Neurologic: Alert and oriented. normal gait, no tremors     Psychiatric: Pleasant, calm, appropriate affect.    A complete 10 system review of systems was performed and is negative except as mentioned in the HPI/subjective.         Past History   Past Medical History:   Past Medical History:   Diagnosis Date     Acute non-ST elevation myocardial infarction (NSTEMI) (H) 6/22/2020     Adenomatous polyp of colon      Ascending aorta dilatation (H)      Carpal tunnel syndrome      Chronic superficial gastritis      Coronary artery disease due to lipid rich plaque 6/23/2020     Depression with anxiety      Dyslipidemia, goal LDL below 70 6/23/2020     Essential hypertension 9/2/2012     Fatty liver disease, nonalcoholic      GERD (gastroesophageal reflux disease)      IBS (irritable bowel syndrome)      Left lumbar radiculopathy      Multinodular goiter      Old torn meniscus of knee      Paroxysmal atrial fibrillation (H)      Perianal abscess      Post herpetic neuralgia      Post-traumatic osteoarthritis of both knees      Primary osteoarthritis of left hip      Primary osteoarthritis of right hip      Pulmonary nodule      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)      Thyroid nodule      TMJ arthritis      Tobacco use disorder 11/1/2013     Vitamin D deficiency 9/30/2020       Past Surgical History:   Past Surgical History:    Procedure Laterality Date     APPENDECTOMY  1995     BYPASS GRAFT ARTERY CORONARY  2020    Dr. Ragsdale     CV CORONARY ANGIOGRAM N/A 2020    Procedure: Coronary Angiogram;  Surgeon: Ariane Lester MD;  Location: [facility] Lab;  Service: Cardiology     CV CORONARY ANGIOGRAM N/A 10/6/2022    Procedure: Coronary Angiogram;  Surgeon: Javon John MD;  Location: [facility] CV     CV IABP INSERT N/A 2020    Procedure: Intra Aortic Balloon Pump Insertion;  Surgeon: Ariane Lester MD;  Location: [facility] Cath Lab;  Service: Cardiology     CV LEFT HEART CATH N/A 10/6/2022    Procedure: Left Heart Catheterization;  Surgeon: Javon John MD;  Location: [facility] CV     CV LEFT HEART CATHETERIZATION WITHOUT LEFT VENTRICULOGRAM Left 2020    Procedure: Left Heart Catheterization Without Left Ventriculogram;  Surgeon: Ariane Letser MD;  Location: [facility] Cath Lab;  Service: Cardiology     CV LEFT VENTRICULOGRAM N/A 10/6/2022    Procedure: Left Ventriculogram;  Surgeon: Javon John MD;  Location: [facility] LAB CV       Family History:   Family History   Problem Relation Age of Onset     No Known Problems Mother      Lung Cancer Father 56        fatal     No Known Problems Daughter      Other - See Comments Son         congenital deformity of right leg; he uses a leg prosthesis        Social History:   Social History     Socioeconomic History     Marital status:      Spouse name: Carlee     Number of children: 2     Years of education: Not on file     Highest education level: Not on file   Occupational History     Not on file   Tobacco Use     Smoking status: Former     Packs/day: 1.00     Years: 30.00     Pack years: 30.00     Types: Cigarettes     Quit date: 3/23/2020     Years since quittin.8     Smokeless tobacco: Never     Tobacco comments:     stopped 3/24/2020   Vaping Use     Vaping Use: Never used   Substance and Sexual Activity      Alcohol use: No     Drug use: Never     Sexual activity: Yes     Partners: Female   Other Topics Concern     Not on file   Social History Narrative    , Carlee, BA chemistry and taking AA courses at [employer].  From [place]; bachelors in geology and computer science. Son, Grace (2005) and Sherrie (2008).  On SSDI, PTSD.       Social Determinants of Health     Financial Resource Strain: Not on file   Food Insecurity: No Food Insecurity     Worried About Running Out of Food in the Last Year: Never true     Ran Out of Food in the Last Year: Never true   Transportation Needs: No Transportation Needs     Lack of Transportation (Medical): No     Lack of Transportation (Non-Medical): No   Physical Activity: Not on file   Stress: Not on file   Social Connections: Not on file   Intimate Partner Violence: Not on file   Housing Stability: Not on file              Lab Results    Chemistry/lipid CBC Cardiac Enzymes/BNP/TSH/INR   Lab Results   Component Value Date    CHOL 87 01/18/2023    HDL 30 (L) 01/18/2023    LDL 30 01/18/2023    TRIG 134 01/18/2023    CR 1.25 (H) 01/18/2023    BUN 10.3 01/18/2023    POTASSIUM 4.8 01/18/2023     01/18/2023    CO2 24 01/18/2023      Lab Results   Component Value Date    WBC 7.2 01/18/2023    HGB 14.5 01/18/2023    HCT 44.1 01/18/2023    MCV 89 01/18/2023     01/18/2023    A1C 5.6 11/21/2022     Lab Results   Component Value Date    A1C 5.6 11/21/2022    Lab Results   Component Value Date    TROPONINI <0.01 11/21/2020    BNP 79 (H) 07/27/2020    TSH 3.40 08/05/2022    INR 1.10 07/27/2020          Sloan Burns MD [facility]  Non-Invasive Cardiologist  [facility]  Pager [phone]

## 2023-01-27 ENCOUNTER — PATIENT OUTREACH (OUTPATIENT)
Dept: CARE COORDINATION | Facility: CLINIC | Age: 58
End: 2023-01-27
Payer: COMMERCIAL

## 2023-01-28 NOTE — PROGRESS NOTES
Follow Up Progress Note      Assessment: [facility] RN spoke with patient and his wife today. Patient stated overall he was doing well. His wife stated his BP has been running low over the last couple of weeks. She stated his Cardiologist did lower his metoprolol succinate to 100 mg per day. She stated since then his blood today was 117/70. Patient said she often gets very different reading from his home BP machine than the ones used in the Clinic. She said she would bring into the Clinic at his next appointment with his PCP to have it calibrated.   Patient continues to remain active in the life of his children and continues to paint. Patient was scheduled for a new RN assessment on 2/8/23. No other needs or concerns at this time.     Care Gaps:    Health Maintenance Due   Topic Date Due     ADVANCE CARE PLANNING  Never done     ZOSTER IMMUNIZATION (1 of 2) Never done     DTAP/TDAP/TD IMMUNIZATION (3 - Td or Tdap) 04/30/2022     COVID-19 Vaccine (5 - Booster for Pfizer series) 06/20/2022       Scheduled with PCP on 2/21/23      Care Plans  Care Plan: General     Problem: HP GENERAL PROBLEM     Goal: 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     Start Date: 1/4/2022 Expected End Date: 11/4/2022    Recent Progress: 80%    Priority: Medium    Note:     Barriers: None  Strengths: Motivated. Strong family support.   Patient expressed understanding of goal: Yes  Action steps to achieve this goal:  1. I will look into the options of what type of painting interests me.   2. I will also look into community offered classes that may help get me started towards his goal.   3. I will report progress towards this goal at schedule outreach telephone calls from my CCC team.      Discussed on 1/27/23                    Care Plan: General     Problem: HP GENERAL PROBLEM     Long-Range Goal: I would like to be more supportive of my children.     Start Date: 1/4/2022 Expected End Date: 11/4/2022    Recent Progress: 70%     Priority: High    Note:     Barriers: Fatigue related to health concerns.   Strengths: Motivated. Strong support from family. Strong advocate for himself.   Patient expressed understanding of goal: Yes  Action steps to achieve this goal:  1. I will support my son with the activities her participates in, like basket ball.   2. I will continue to find time during the day to spend time with my son that may allow us to build a closer relationship.   3. I will report progress towards this goal at scheduled outreach telephone calls from my CCC team.      Discussed on 1/27/23                          Intervention/Education provided during outreach: Discussed the importance of taking his medications daily as directed. Encouraged him to attend all scheduled follow up appointments as scheduled. Encouraged him to contact CCC for any additional needs or concerns.      Outreach Frequency: monthly        Plan: CCC RN will continue to monitor, support patient with current goals and will be available to assist as nursing needs arise.     Care Coordinator will follow up on 2/8/23

## 2023-02-03 DIAGNOSIS — F43.10 PTSD (POST-TRAUMATIC STRESS DISORDER): ICD-10-CM

## 2023-02-06 RX ORDER — ESCITALOPRAM OXALATE 20 MG/1
TABLET ORAL
Qty: 30 TABLET | Refills: 0 | Status: SHIPPED | OUTPATIENT
Start: 2023-02-06 | End: 2023-03-08

## 2023-02-06 NOTE — TELEPHONE ENCOUNTER
Date of Last Office Visit: 12/7/2023  Date of Next Office Visit: 3/8/2023  No shows since last visit: 0  Cancellations since last visit: 0    Medication requested: escitalopram (LEXAPRO) 20 MG tablet Date last ordered: 1/5/2023 Qty: 30 Refills: 0     Review of MN ?: NA    Lapse in medication adherence greater than 5 days?: No    Medication refill request verified as identical to current order?: Yes  Result of Last DAM, VPA, Li+ Level, CBC, or Carbamazepine Level (at or since last visit): N/A    Last visit treatment plan:   Plan:  The patient's Vistaril was eliminated by his medical team.  I did talk with the patient about trying to wean off the Seroquel over the next few weeks at the patient and wife are going to try and do this.  We will continue to follow up with his medical team.     The patient will return to clinic in 3 months. He agrees to call before then with any questions or concerns.    []Medication refilled per  Medication Refill in Ambulatory Care  policy.  [x]Medication unable to be refilled by RN due to criteria not met as indicated below:    []Eligibility - not seen in the last year   []Supervision - no future appointment   []Compliance - no shows, cancellations or lapse in therapy   []Verification - order discrepancy   []Controlled medication   [x]Medication not included in policy   []90-day supply request   []Other

## 2023-02-08 ENCOUNTER — PATIENT OUTREACH (OUTPATIENT)
Dept: NURSING | Facility: CLINIC | Age: 58
End: 2023-02-08
Payer: COMMERCIAL

## 2023-02-08 ASSESSMENT — ACTIVITIES OF DAILY LIVING (ADL)
DEPENDENT_IADLS:: CLEANING;COOKING;LAUNDRY;SHOPPING;MEAL PREPARATION;MEDICATION MANAGEMENT;MONEY MANAGEMENT;TRANSPORTATION

## 2023-02-08 NOTE — LETTER
[facility]  Patient Centered Plan of Care  About Me:        Patient Name:  Nadya Blake    YOB: 1965  Age:         57 year old   Tracy MRN:    [id number] Telephone Information:  Home Phone [phone]   Mobile [phone]       Address:  [address]  [facility] [address] Email address:  [email or web address]      Emergency Contact(s)    Name Relationship Lgl Grd Work Phone Home Phone Mobile Phone   1. JUANITA BEASLEY Spouse   [phone]            Primary language:  Mohawk     needed? No   [place] [employer] [facility]:  [phone] op. 1  Other communication barriers:None    Preferred Method of Communication:     Current living arrangement: I live in a private home with spouse    Mobility Status/ Medical Equipment: Independent        Health Maintenance  Health Maintenance Reviewed: Due/Overdue   Health Maintenance Due   Topic Date Due     ADVANCE CARE PLANNING  Never done     ZOSTER IMMUNIZATION (1 of 2) Never done     DTAP/TDAP/TD IMMUNIZATION (3 - Td or Tdap) 04/30/2022     COVID-19 Vaccine (5 - Booster for Pfizer series) 06/20/2022     A1C  02/21/2023     MICROALBUMIN  03/21/2023         My Access Plan  Medical Emergency 911   Primary Clinic Line [facility] - [phone]   24 Hour Appointment Line [phone] or  [phone]) (toll-free)   24 Hour Nurse Line [phone] (toll-free)   Preferred Urgent Care [facility] - [place], [phone]     [facility]  [phone]     Preferred Pharmacy [id number] [address] AT [facility] [address]     Behavioral Health Crisis Line The National Suicide Prevention Lifeline at 1-512.538.3560 or Text/Call 698             My Care Team Members  Patient Care Team       Relationship Specialty Notifications Start End    Az Briseno MD PCP - General   9/22/20     Phone:  [phone] Fax: [phone]         [address] [facility] [address]    Myhre, David J, RN Lead Care Coordinator Primary Care - [facility] Admissions 11/19/20      Phone: [phone]    Az Briseno MD Assigned PCP   6/16/21     Phone: [phone] Fax: [phone]         [address] [facility] [address]    Franco Quevedo MD Assigned Behavioral Health Provider   7/16/21     Phone: [phone] Fax: [phone]         [address] [email or web address] [place] [address] [facility] [address]    Inessa Cade [facility] worker   11/15/21     Phone: [phone]         Sloan Burns MD Assigned Heart and Vascular Provider   12/12/21     Phone: [phone] Fax: [phone]         [address] [facility] [address] [facility] [address]    Az Craft, APRN CNP Assigned Sleep Provider   1/21/23     Phone: [phone] Fax: [phone]         [address] [address] [facility] [address]            My Care Plans  Self Management and Treatment Plan  Care Plan  Care Plan: General     Problem: HP GENERAL PROBLEM     Goal: 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     Start Date: 2/8/2023 Expected End Date: 8/8/2023    Recent Progress: 80%    Priority: Medium    Note:     Barriers: Health concerns  Strengths: Motivated. Strong family support.   Patient expressed understanding of goal: Yes  Action steps to achieve this goal:  1. I will look into the options of what type of painting interests me.   2. I will also look into community offered classes that may help get me started towards his goal.   3. I will report progress towards this goal at schedule outreach telephone calls from my CCC team.      Discussed on 1/27/23                    Care Plan: General     Problem: HP GENERAL PROBLEM                  Action Plans on File:                       Advance Care Plans/Directives Type:   No data recorded    My Medical and Care Information  Problem List   Patient Active Problem List   Diagnosis     Recurrent major  depressive disorder, in partial remission (H)     Gastroesophageal reflux disease with esophagitis without hemorrhage     Essential hypertension     Non-toxic multinodular goiter     Post-traumatic osteoarthritis of both knees     Primary osteoarthritis of left hip     Primary osteoarthritis of right hip     Coronary artery disease, CABG 6/2020     Dyslipidemia, goal LDL below 70     Benign prostatic hyperplasia with nocturia     PTSD (post-traumatic stress disorder)     Ascending aorta dilatation (H)     Anal fistula     Simple chronic bronchitis (H)     Polyp of colon     Pulmonary nodule     S/P CABG x 4     Thyroid nodule     Nonalcoholic fatty liver disease     Chronic kidney disease, stage 3a (H)      Current Medications and Allergies:  See printed Medication Report.    Care Coordination Start Date: 11/19/2020   Frequency of Care Coordination: 2 weeks     Form Last Updated: 02/27/2023

## 2023-02-08 NOTE — LETTER
[facility] CARE COORDINATION  [facility]    February 27, 2023    Nadya Blake  [address]  [facility] [address]      Dear Nadya,    I am a clinic care coordinator who works with Az Briseno MD with the [facility]. I wanted to thank you for spending the time to talk with me.  Below is a description of clinic care coordination and how I can further assist you.       The clinic care coordination team is made up of a registered nurse, , financial resource worker and community health worker who understand the health care system. The goal of clinic care coordination is to help you manage your health and improve access to the health care system. Our team works alongside your provider to assist you in determining your health and social needs. We can help you obtain health care and community resources, providing you with necessary information and education. We can work with you through any barriers and develop a care plan that helps coordinate and strengthen the communication between you and your care team.    Please feel free to contact me with any questions or concerns regarding care coordination and what we can offer.      We are focused on providing you with the highest-quality healthcare experience possible.    Sincerely,     David Myhre, RN  [facility] RN  [phone]    Enclosed: I have enclosed a copy of the Patient Centered Plan of Care. This has helpful information and goals that we have talked about. Please keep this in an easy to access place to use as needed.

## 2023-02-21 ENCOUNTER — OFFICE VISIT (OUTPATIENT)
Dept: INTERNAL MEDICINE | Facility: CLINIC | Age: 58
End: 2023-02-21
Payer: COMMERCIAL

## 2023-02-21 VITALS
WEIGHT: 191.7 LBS | HEART RATE: 55 BPM | RESPIRATION RATE: 12 BRPM | HEIGHT: 66 IN | DIASTOLIC BLOOD PRESSURE: 62 MMHG | BODY MASS INDEX: 30.81 KG/M2 | OXYGEN SATURATION: 97 % | SYSTOLIC BLOOD PRESSURE: 98 MMHG | TEMPERATURE: 97.5 F

## 2023-02-21 DIAGNOSIS — E11.9 TYPE 2 DIABETES MELLITUS WITHOUT COMPLICATION, WITHOUT LONG-TERM CURRENT USE OF INSULIN (H): Primary | ICD-10-CM

## 2023-02-21 DIAGNOSIS — I25.83 CORONARY ARTERY DISEASE DUE TO LIPID RICH PLAQUE: Chronic | ICD-10-CM

## 2023-02-21 DIAGNOSIS — I25.10 CORONARY ARTERY DISEASE DUE TO LIPID RICH PLAQUE: Chronic | ICD-10-CM

## 2023-02-21 DIAGNOSIS — I10 ESSENTIAL HYPERTENSION: ICD-10-CM

## 2023-02-21 DIAGNOSIS — F43.10 PTSD (POST-TRAUMATIC STRESS DISORDER): ICD-10-CM

## 2023-02-21 DIAGNOSIS — R35.1 NOCTURIA: ICD-10-CM

## 2023-02-21 DIAGNOSIS — N18.31 CHRONIC KIDNEY DISEASE, STAGE 3A (H): ICD-10-CM

## 2023-02-21 PROCEDURE — 99214 OFFICE O/P EST MOD 30 MIN: CPT | Performed by: INTERNAL MEDICINE

## 2023-02-21 RX ORDER — TAMSULOSIN HYDROCHLORIDE 0.4 MG/1
0.4 CAPSULE ORAL EVERY EVENING
Start: 2023-02-21 | End: 2023-04-03

## 2023-02-21 ASSESSMENT — ANXIETY QUESTIONNAIRES
7. FEELING AFRAID AS IF SOMETHING AWFUL MIGHT HAPPEN: SEVERAL DAYS
4. TROUBLE RELAXING: SEVERAL DAYS
GAD7 TOTAL SCORE: 6
GAD7 TOTAL SCORE: 6
5. BEING SO RESTLESS THAT IT IS HARD TO SIT STILL: SEVERAL DAYS
2. NOT BEING ABLE TO STOP OR CONTROL WORRYING: SEVERAL DAYS
3. WORRYING TOO MUCH ABOUT DIFFERENT THINGS: SEVERAL DAYS
IF YOU CHECKED OFF ANY PROBLEMS ON THIS QUESTIONNAIRE, HOW DIFFICULT HAVE THESE PROBLEMS MADE IT FOR YOU TO DO YOUR WORK, TAKE CARE OF THINGS AT HOME, OR GET ALONG WITH OTHER PEOPLE: SOMEWHAT DIFFICULT
GAD7 TOTAL SCORE: 6
8. IF YOU CHECKED OFF ANY PROBLEMS, HOW DIFFICULT HAVE THESE MADE IT FOR YOU TO DO YOUR WORK, TAKE CARE OF THINGS AT HOME, OR GET ALONG WITH OTHER PEOPLE?: SOMEWHAT DIFFICULT
7. FEELING AFRAID AS IF SOMETHING AWFUL MIGHT HAPPEN: SEVERAL DAYS
1. FEELING NERVOUS, ANXIOUS, OR ON EDGE: SEVERAL DAYS
6. BECOMING EASILY ANNOYED OR IRRITABLE: NOT AT ALL

## 2023-02-21 ASSESSMENT — PATIENT HEALTH QUESTIONNAIRE - PHQ9
SUM OF ALL RESPONSES TO PHQ QUESTIONS 1-9: 7
10. IF YOU CHECKED OFF ANY PROBLEMS, HOW DIFFICULT HAVE THESE PROBLEMS MADE IT FOR YOU TO DO YOUR WORK, TAKE CARE OF THINGS AT HOME, OR GET ALONG WITH OTHER PEOPLE: SOMEWHAT DIFFICULT
SUM OF ALL RESPONSES TO PHQ QUESTIONS 1-9: 7

## 2023-02-21 ASSESSMENT — PAIN SCALES - GENERAL: PAINLEVEL: MODERATE PAIN (5)

## 2023-02-21 NOTE — PROGRESS NOTES
Office Visit - Follow Up   Nadya Blake   57 year old male    Date of Visit: 2/21/2023    Chief Complaint   Patient presents with     Recheck Medication     Follow Up     Cabg followup     FU after CABG     Numbness     Right fingers     Dizziness        Assessment and Plan   1. Type 2 diabetes mellitus without complication, without long-term current use of insulin (H)  Has been well controlled continue same, we will have him meet with our diabetes educators he is looking for some nutritional recommendation  - empagliflozin (JARDIANCE) 25 MG TABS tablet; Take 1 tablet (25 mg) by mouth daily  Dispense: 90 tablet; Refill: 4  - AMB Adult Diabetes Educator Referral; Future    2. Essential hypertension  Pressure is low normal.  Decrease Flomax to 1 capsule daily    3. Nocturia  - tamsulosin (FLOMAX) 0.4 MG capsule; Take 1 capsule (0.4 mg) by mouth every evening    4. Coronary artery disease, CABG 6/2020  Stable continue same, may need to back off of metoprolol if he continues to feel lightheaded and his blood pressure remains low  - empagliflozin (JARDIANCE) 25 MG TABS tablet; Take 1 tablet (25 mg) by mouth daily  Dispense: 90 tablet; Refill: 4    5. Chronic kidney disease, stage 3a (H)  Stable    6. PTSD (post-traumatic stress disorder)  Continue with therapy, paperwork filled out for his wife    The following high BMI interventions were performed this visit: encouragement to exercise and lifestyle education regarding diet    Return in about 4 weeks (around 3/21/2023) for Follow up.     History of Present Illness   This 57 year old comes in for follow-up.  Overall things have been okay.  A little bit lightheaded and we have been removing some blood pressure medications as he continues to focus on healthy diet reduction in calories regular exercise and modest weight loss.       Physical Exam   General Appearance:   No acute distress    BP 98/62 (BP Location: Left arm, Patient Position: Sitting, Cuff Size: Adult  "Large)   Pulse 55   Temp 97.5  F (36.4  C)   Resp 12   Ht 1.664 m (5' 5.5\")   Wt 87 kg (191 lb 11.2 oz)   SpO2 97%   BMI 31.42 kg/m      Cardiovascular regular rate and rhythm no murmur gallop or rub  Pulmonary lungs are clear to auscultation bilaterally  Gastrointestinal abdomen soft nontender nondistended no organomegaly  Neurologic exam is non focal  Psychiatric pleasant, no confusion or agitation        Additional Information   Current Outpatient Medications   Medication Sig Dispense Refill     ACETAMINOPHEN EXTRA STRENGTH 500 MG tablet TAKE 2 TABLETS(1000 MG) BY MOUTH EVERY 6 HOURS AS NEEDED FOR PAIN 360 tablet 3     aspirin (ASA) 81 MG chewable tablet Take 1 tablet (81 mg) by mouth daily 90 tablet 4     Cholecalciferol (VITAMIN D3) 50 MCG (2000 UT) CAPS Take 1 capsule by mouth daily 90 capsule 3     cyanocobalamin (VITAMIN B-12) 1000 MCG tablet Take 1 tablet (1,000 mcg) by mouth daily 90 tablet 4     diclofenac (VOLTAREN) 1 % topical gel Apply 4 g topically 4 times daily 500 g 2     empagliflozin (JARDIANCE) 25 MG TABS tablet Take 1 tablet (25 mg) by mouth daily 90 tablet 4     escitalopram (LEXAPRO) 20 MG tablet TAKE 1 TABLET(20 MG) BY MOUTH DAILY 30 tablet 0     Lidocaine 0.5 % GEL        metoprolol succinate ER (TOPROL XL) 100 MG 24 hr tablet Take 1.5 tablets (150 mg) by mouth At Bedtime 135 tablet 4     nitroGLYcerin (NITROSTAT) 0.4 MG sublingual tablet For chest pain place 1 tablet under the tongue every 5 minutes for 3 doses. If symptoms persist 5 minutes after 1st dose call 911. 30 tablet 1     polyethylene glycol (MIRALAX) 17 GM/Dose powder Take 17 g (1 capful) by mouth daily 850 g 3     QUEtiapine (SEROQUEL) 100 MG tablet Take 1.5 tablets (150 mg) by mouth At Bedtime 45 tablet 10     QUEtiapine (SEROQUEL) 50 MG tablet Take 1-4 tablets ( mg) by mouth nightly as needed (Anxiety, nightmares or sleep difficulties) 120 tablet 3     rosuvastatin (CRESTOR) 40 MG tablet TAKE 1 TABLET(40 MG) BY " MOUTH DAILY 90 tablet 3     tamsulosin (FLOMAX) 0.4 MG capsule Take 1 capsule (0.4 mg) by mouth every evening       albuterol (PROAIR HFA/PROVENTIL HFA/VENTOLIN HFA) 108 (90 Base) MCG/ACT inhaler Inhale 1-2 puffs into the lungs every 4 hours as needed for shortness of breath / dyspnea (Patient not taking: Reported on 2/21/2023) 18 g 11     budesonide-formoterol (SYMBICORT) 160-4.5 MCG/ACT Inhaler Inhale 2 puffs into the lungs 2 times daily (Patient not taking: Reported on 2/21/2023) 10.2 g 11     nifedipine 0.2% in white petrolatum 0.2 % OINT ointment Apply topically 4 times daily as needed (anal fissure) 100 g 1     Allergies   Allergen Reactions     Atorvastatin Diarrhea     Cortisone Other (See Comments)     pain     Lisinopril Cough     started after CABG for unclear reason     Methylprednisolone Other (See Comments)     ?       Time:      Az Briseno MD    Answers for HPI/ROS submitted by the patient on 2/21/2023  If you checked off any problems, how difficult have these problems made it for you to do your work, take care of things at home, or get along with other people?: Somewhat difficult  PHQ9 TOTAL SCORE: 7  ISI 7 TOTAL SCORE: 6  Frequency of checking blood sugars:: not at all  Diabetic concerns:: none  Paraesthesia present:: none of these symptoms  Depression/Anxiety: Depression & Anxiety  Dyspnea:: with activity only  Edema:: No  Are you using more pillows than usual at night?: No  Do you cough at night?: No  Checking weight daily:: Yes  Weight change recently:: None  Heart Medication Side Effects:: dizziness  Frequency:: None  Do you take any over the counter pain medicine?  : No  Do you take an aspirin every day?: Yes  Status since last visit:: medium  Anxiety since last: : medium  Other associated symptoms of depression:: No  Other associated symotome: : No  Significant life event: : health concerns  Anxious:: Yes  Current substance use:: No  How many servings of fruits and vegetables do you eat  daily?: 4 or more  On average, how many sweetened beverages do you drink each day (Examples: soda, juice, sweet tea, etc.  Do NOT count diet or artificially sweetened beverages)?: 2  How many minutes a day do you exercise enough to make your heart beat faster?: 20 to 29  How many days a week do you exercise enough to make your heart beat faster?: 7  How many days per week do you miss taking your medication?: 0

## 2023-02-28 NOTE — PROGRESS NOTES
Clinic Care Coordination Contact    Clinic Care Coordination Contact  OUTREACH    Referral Information:  Referral Source: PCP    Primary Diagnosis: Psychosocial    Chief Complaint   Patient presents with     Clinic Care Coordination - Initial        Universal Utilization:   Clinic Utilization  Difficulty keeping appointments:: No  Compliance Concerns: No  No-Show Concerns: No  No PCP office visit in Past Year: No  Utilization    Hospital Admissions  1             ED Visits  0             No Show Count (past year)  4                Current as of: 2/26/2023  8:40 AM              Clinical Concerns:  Current Medical Concerns: patient is a 57 year old man with a history of fatty liver, GERD, pre-diabetes, Coronary artery disease, CABG 6/2020, 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   Patient denied any medical concerns during today's assessment. He said takes his medication daily as directed and is good about attending his medical appointments.   Current Behavioral Concerns: Patient reported a history of depression, anxiety and depression. He stated he feels his mental health is stable currently. Patient to work  with a therapist, psychiatrist and [facility] worker. He stated he feels he has enough mental health support at this time. Patient reported he is taking his psychiatric medications daily as directed. He denied any suicidal ideation during today's assessment. Patient centered goal was established today around continuing to paint as a positive form of  self-expression.   Education Provided to patient: Discussed the importance of taking his medications as directed. Encouraged him to to attend all upcoming appointments. Encouraged him to work on his goal. and to contact writer for any additional needs or concerns.      Pain  Pain (GOAL):: No  Health Maintenance Reviewed: Due/Overdue   Health Maintenance Due   Topic  Date Due     ADVANCE CARE PLANNING  Never done     ZOSTER IMMUNIZATION (1 of 2) Never done     DTAP/TDAP/TD IMMUNIZATION (3 - Td or Tdap) 04/30/2022     COVID-19 Vaccine (5 - Booster for Pfizer series) 06/20/2022     A1C  02/21/2023     MICROALBUMIN  03/21/2023       Medication Management:  Medication review status: Medications reviewed and no changes reported per patient.        Patient's wife Carlee continues to assist him his medications. He stated he is compliant with his medications.     Functional Status:  Dependent ADLs:: Bathing, Toileting, Dressing  Dependent IADLs:: Cleaning, Cooking, Laundry, Shopping, Meal Preparation, Medication Management, Money Management, Transportation  Bed or wheelchair confined:: No  Mobility Status: Independent  Fallen 2 or more times in the past year?: No  Any fall with injury in the past year?: No    Living Situation:  Current living arrangement:: I live in a private home with spouse  Type of residence:: [facility]    Lifestyle & Psychosocial Needs:    Social Determinants of Health     Tobacco Use: Medium Risk     Smoking Tobacco Use: Former     Smokeless Tobacco Use: Never     Passive Exposure: Not on file   Alcohol Use: Not on file   Financial Resource Strain: Not on file   Food Insecurity: No Food Insecurity     Worried About Running Out of Food in the Last Year: Never true     Ran Out of Food in the Last Year: Never true   Transportation Needs: No Transportation Needs     Lack of Transportation (Medical): No     Lack of Transportation (Non-Medical): No   Physical Activity: Not on file   Stress: Not on file   Social Connections: Not on file   Intimate Partner Violence: Not on file   Depression: Not at risk     PHQ-2 Score: 2   Housing Stability: Not on file     Diet::  (Vegan Diet)  Inadequate nutrition (GOAL):: No  Tube Feeding: No  Inadequate activity/exercise (GOAL):: No  Significant changes in sleep pattern (GOAL): No  Transportation means:: Regular car, Friend      Jehovah's witness or spiritual beliefs that impact treatment:: No  Mental health DX:: Yes  Mental health DX how managed:: Medication, Outpatient Counseling, Psychiatrist  Mental health management concern (GOAL):: No  Chemical Dependency Status: No Current Concerns  Informal Support system:: Children, Spouse, Family        Financial Concerns: Patient denied any financial concerns at this time.      Resources and Interventions:  Current Resources:      Community Resources: PCA, County Worker, OP [facility]  Supplies Currently Used at Home: None  Equipment Currently Used at Home: grab bar, tub/shower, grab bar, toilet  Employment Status: disabled         Advance Care Plan/Directive  Advanced Care Plans/Directives on file:: No  Advanced Care Plan/Directive Status: Declined Further Information    Referrals Placed: None       Care Plan:  Care Plan: General     Problem: HP GENERAL PROBLEM     Goal: 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     Start Date: 2/8/2023 Expected End Date: 8/8/2023    Recent Progress: 80%    Priority: Medium    Note:     Barriers: Health concerns  Strengths: Motivated. Strong family support.   Patient expressed understanding of goal: Yes  Action steps to achieve this goal:  1. I will look into the options of what type of painting interests me.   2. I will also look into community offered classes that may help get me started towards his goal.   3. I will report progress towards this goal at schedule outreach telephone calls from my CCC team.      Discussed on 1/27/23                    Care Plan: General     Problem: HP GENERAL PROBLEM                 Patient/Caregiver understanding: Verbalized understanding of goal and other information discussed at today's assessment.     Outreach Frequency: 2 weeks  Future Appointments              In 1 week Franco Quevedo MD [facility] [place], [facility]    In 3 weeks Az Briseno MD [facility]  [facility], [facility]    In 3 weeks Jojo Soria RN [facility], [facility]    In 1 month Sloan Burns MD [facility], [facility]          Plan: CCC RN will continue to monitor, support patient with current goals and will be available to assist as nursing needs arise.

## 2023-03-08 ENCOUNTER — VIRTUAL VISIT (OUTPATIENT)
Dept: PSYCHIATRY | Facility: CLINIC | Age: 58
End: 2023-03-08
Payer: COMMERCIAL

## 2023-03-08 DIAGNOSIS — F43.10 PTSD (POST-TRAUMATIC STRESS DISORDER): ICD-10-CM

## 2023-03-08 PROCEDURE — 99214 OFFICE O/P EST MOD 30 MIN: CPT | Mod: VID | Performed by: PSYCHIATRY & NEUROLOGY

## 2023-03-08 RX ORDER — ESCITALOPRAM OXALATE 20 MG/1
20 TABLET ORAL DAILY
Qty: 30 TABLET | Refills: 4 | Status: SHIPPED | OUTPATIENT
Start: 2023-03-08 | End: 2023-06-02

## 2023-03-08 RX ORDER — QUETIAPINE FUMARATE 100 MG/1
150 TABLET, FILM COATED ORAL AT BEDTIME
Qty: 45 TABLET | Refills: 4 | Status: SHIPPED | OUTPATIENT
Start: 2023-03-08 | End: 2023-03-15

## 2023-03-08 RX ORDER — QUETIAPINE FUMARATE 50 MG/1
50-200 TABLET, FILM COATED ORAL
Qty: 120 TABLET | Refills: 3 | Status: SHIPPED | OUTPATIENT
Start: 2023-03-08 | End: 2023-03-15

## 2023-03-08 NOTE — NURSING NOTE
Is the patient currently in the state of [place]? YES    Visit mode:VIDEO    If the visit is dropped, the patient can be reconnected by: VIDEO VISIT: Text to cell phone: [phone]    Will anyone else be joining the visit? YES, wife present in background: How would they like to receive their invitation? Text to cell phone: [phone]      How would you like to obtain your AVS? MyChart    Are changes needed to the allergy or medication list? NO    Reason for visit: Video return visit - follow up.      Unable to review medications individually due to time. Pt logged on [employer] close to appt time. Pt stated medications are still the same and that he needed medication refills for: quetiapine 100 mg and escitalopram 20 mg.      Unable to complete qnrs due to time.      GIANNI Hernández March 8, 2023 1:34 PM
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
## 2023-03-08 NOTE — PATIENT INSTRUCTIONS
I will make no changes at this time.  The patient is doing relatively well and continues to follow-up with his therapist.  I did inform him that I would be leaving the clinic and he should follow up here in about 3 months with a new provider.  He agreed to contact this clinic before then with any questions or concerns.

## 2023-03-09 ENCOUNTER — TELEPHONE (OUTPATIENT)
Dept: NEUROLOGY | Facility: CLINIC | Age: 58
End: 2023-03-09
Payer: COMMERCIAL

## 2023-03-09 NOTE — TELEPHONE ENCOUNTER
Prior Authorization Retail Medication Request    Medication/Dose: Quetiapine 50 mg  ICD code (if different than what is on RX):    Previously Tried and Failed:    Rationale:      Insurance Name:  [phone]  Insurance ID:  [id number]      Pharmacy Information (if different than what is on RX)  Name:    Phone:

## 2023-03-14 NOTE — TELEPHONE ENCOUNTER
Central Prior Authorization Team  Phone: [phone]    PRIOR AUTHORIZATION DENIED    Medication: Quetiapine 50mg tab    Denial Date:  12/19/22    Denial Rational:         Appeal Information: PLEASE REFER TO ENCOUNTER FROM 12/19 IF AN APPEAL IS TO BE COMPLETED.

## 2023-03-15 ENCOUNTER — VIRTUAL VISIT (OUTPATIENT)
Dept: INTERNAL MEDICINE | Facility: CLINIC | Age: 58
End: 2023-03-15
Payer: COMMERCIAL

## 2023-03-15 DIAGNOSIS — F43.10 PTSD (POST-TRAUMATIC STRESS DISORDER): ICD-10-CM

## 2023-03-15 DIAGNOSIS — I10 ESSENTIAL HYPERTENSION: ICD-10-CM

## 2023-03-15 DIAGNOSIS — N40.1 BENIGN PROSTATIC HYPERPLASIA WITH NOCTURIA: Chronic | ICD-10-CM

## 2023-03-15 DIAGNOSIS — U07.1 COVID-19 VIRUS INFECTION: Primary | ICD-10-CM

## 2023-03-15 DIAGNOSIS — R35.1 NOCTURIA: ICD-10-CM

## 2023-03-15 DIAGNOSIS — R35.1 BENIGN PROSTATIC HYPERPLASIA WITH NOCTURIA: Chronic | ICD-10-CM

## 2023-03-15 PROCEDURE — 99443 PR PHYSICIAN TELEPHONE EVALUATION 21-30 MIN: CPT | Mod: 93 | Performed by: INTERNAL MEDICINE

## 2023-03-15 RX ORDER — QUETIAPINE FUMARATE 100 MG/1
100 TABLET, FILM COATED ORAL AT BEDTIME
Qty: 45 TABLET | Refills: 4 | COMMUNITY
Start: 2023-03-15 | End: 2023-03-21

## 2023-03-15 RX ORDER — NIRMATRELVIR AND RITONAVIR 300-100 MG
KIT ORAL
COMMUNITY
Start: 2023-03-12 | End: 2023-03-17

## 2023-03-15 NOTE — PROGRESS NOTES
Nadya is a 57 year old male being evaluated via a billable phone visit, and would like to be contacted via the following  Home number [phone] (home)    ASSESSMENT and PLAN:  1. COVID-19 virus infection  Now on Paxlovid now today day 3.  Discussed medication interactions with tamsulosin, quetiapine, and rosuvastatin.  Relative risks and potencies discussed.    2. Nocturia  Approximately 50% improved on tamsulosin suggest prostate but residual symptoms could be fluid retention.  Decrease tamsulosin while on Paxlovid.  Twice daily weights advised    3. Essential hypertension  If fluid retention consider diuretic to replace metoprolol    4. Benign prostatic hyperplasia with nocturia  Decrease tamsulosin to once daily while on Paxlovid    5. PTSD (post-traumatic stress disorder)  Current dose clarified 100 mg.  Previously tolerated 150 mg.  Absolutely no sleep without quetiapine.  Up-to-date suggests 1 6 of dose but will try one half to start knowing he has tolerated more  - QUEtiapine (SEROQUEL) 100 MG tablet; Take 1 tablet (100 mg) by mouth At Bedtime  Dispense: 45 tablet; Refill: 4       Patient Instructions   Resume quetiapine at 50 mg at bed.  Up-to-date recommendations to take just 1 6 noted    If too much sedation contact by Mary for 25 mg dose    Has tolerated 150 mg dose before    Hold rosuvastatin    Decrease tamsulosin to 1 capsule at bed    Twice daily bathroom weights to determine whether fluid retention contributing to nocturia    Mary communication initiated            Return in about 6 months (around 9/15/2023) for using a video visit.       CHIEF COMPLAINT:  Chief Complaint   Patient presents with     Follow Up     Discuss recent Covid treatment/review if any medication changes needed to be made         HISTORY OF PRESENT ILLNESS:  Nadya is a 57 year old male contacting the clinic today via phone for clarification of medication.  He went to the emergency room on March 12 where he was  "diagnosed with COVID and placed on Paxlovid.  Due to drug interactions he was advised to hold his tamsulosin, rosuvastatin, and quetiapine.  Unfortunately with cessation of quetiapine he has been unable to sleep.  He receives this through psychiatry and currently takes 100 mg.  Previously he had taken 150 mg but this has left him somewhat sedated, but without toxicity    He takes tamsulosin 0.8 mg at night.  This has decreased his nocturia from 4 times to twice.  We discussed that this could also be contributed by fluid retention or snoring.  Wife reports no snoring or sleep apnea    He has changed to a vegan diet and wife is comfortable holding the rosuvastatin    History of Present Illness       Reason for visit:  Covid Concerns    He eats 2-3 servings of fruits and vegetables daily.He consumes 0 sweetened beverage(s) daily.He exercises with enough effort to increase his heart rate 20 to 29 minutes per day.  He exercises with enough effort to increase his heart rate 3 or less days per week.   He is taking medications regularly.      REVIEW OF SYSTEMS:  PTSD and poor sleep    PFSH:  Social History     Social History Narrative    , Carlee, BA chemistry and taking AA courses at [employer].  From [place]; bachelors in geology and computer science. Son, Grace (2005) and Sherrie (2008).  On SSDI, PTSD.         TOBACCO USE:  History   Smoking Status     Former     Packs/day: 1.00     Years: 30.00     Types: Cigarettes     Quit date: 3/23/2020   Smokeless Tobacco     Never       VITALS:  There were no vitals filed for this visit.  There were no vitals taken for this visit. Estimated body mass index is 31.42 kg/m  as calculated from the following:    Height as of 2/21/23: 1.664 m (5' 5.5\").    Weight as of 2/21/23: 87 kg (191 lb 11.2 oz).    PHYSICAL EXAM:  (observations via Phone)  Alert and oriented.  No cough or shortness of breath.  Wife provides most of the history    MEDICATIONS  Current Outpatient Medications "   Medication Sig Dispense Refill     ACETAMINOPHEN EXTRA STRENGTH 500 MG tablet TAKE 2 TABLETS(1000 MG) BY MOUTH EVERY 6 HOURS AS NEEDED FOR PAIN 360 tablet 3     aspirin (ASA) 81 MG chewable tablet Take 1 tablet (81 mg) by mouth daily 90 tablet 4     Cholecalciferol (VITAMIN D3) 50 MCG (2000 UT) CAPS Take 1 capsule by mouth daily 90 capsule 3     cyanocobalamin (VITAMIN B-12) 1000 MCG tablet Take 1 tablet (1,000 mcg) by mouth daily 90 tablet 4     diclofenac (VOLTAREN) 1 % topical gel Apply 4 g topically 4 times daily 500 g 2     empagliflozin (JARDIANCE) 25 MG TABS tablet Take 1 tablet (25 mg) by mouth daily 90 tablet 4     escitalopram (LEXAPRO) 20 MG tablet Take 1 tablet (20 mg) by mouth daily 30 tablet 4     Lidocaine 0.5 % GEL        metoprolol succinate ER (TOPROL XL) 100 MG 24 hr tablet Take 1.5 tablets (150 mg) by mouth At Bedtime 135 tablet 4     nifedipine 0.2% in white petrolatum 0.2 % OINT ointment Apply topically 4 times daily as needed (anal fissure) 100 g 1     nirmatrelvir and ritonavir (PAXLOVID, 300/100,) 300 mg/100 mg therapy pack Take 2 nirmatrelvir 150 mg pink-oval tablets and 1 ritonavir 100 mg white-oval tablet together twice daily for 5 days. Date of Symptom Onset: 3/11/2023; 3/12/2023: CREATININE 1.30 mg/dL       nitroGLYcerin (NITROSTAT) 0.4 MG sublingual tablet For chest pain place 1 tablet under the tongue every 5 minutes for 3 doses. If symptoms persist 5 minutes after 1st dose call 911. 30 tablet 1     polyethylene glycol (MIRALAX) 17 GM/Dose powder Take 17 g (1 capful) by mouth daily 850 g 3     QUEtiapine (SEROQUEL) 100 MG tablet Take 1 tablet (100 mg) by mouth At Bedtime 45 tablet 4     rosuvastatin (CRESTOR) 40 MG tablet TAKE 1 TABLET(40 MG) BY MOUTH DAILY (Patient not taking: Reported on 3/15/2023) 90 tablet 3     tamsulosin (FLOMAX) 0.4 MG capsule Take 1 capsule (0.4 mg) by mouth every evening (Patient not taking: Reported on 3/15/2023)         Notes summarized: ER note and phone  note  Labs, x-rays, cardiology, GI tests reviewed: Slight elevation of renal function  Recent Labs   Lab Test 01/18/23  1037 12/21/22  1006 11/21/22  0947 10/18/22  0953 10/06/22  0814 08/05/22  0920 05/13/22  1144 05/13/22  1143 01/06/22  1032 10/14/21  0903   HGB 14.5  --   --   --  13.9 13.7  --   --   --  15.1   WBC 7.2  --   --   --  7.2 7.3  --   --   --  5.9    141 139   < > 132* 137   < >  --    < > 140   POTASSIUM 4.8 4.8 4.9   < > 4.2 4.5   < >  --    < > 4.6   CR 1.25* 1.35* 1.61*   < > 1.20* 1.32*   < >  --    < > 1.29   A1C  --   --  5.6  --   --  6.0*  --   --    < >  --    PSA  --   --   --   --   --  0.85  --   --   --  1.03   URIC  --   --   --   --   --  5.6  --   --   --   --    B12 562 205*  --   --   --   --   --   --   --   --    TSH  --   --   --   --   --  3.40  --   --   --   --    VITDT  --   --   --   --   --  38  --   --   --   --    SED  --   --   --   --   --   --   --  2  --   --    CRP  --   --   --   --   --   --   --  0.5  --   --     < > = values in this interval not displayed.     Lab Results   Component Value Date    KHQDO18MIN Negative 11/19/2021     Lab Results   Component Value Date    CHOL 87 01/18/2023     New orders: No orders of the defined types were placed in this encounter.      Independent review of:    Patient would like to receive their AVS by Mary Dunlap MD  [facility]    Phone-Visit Details  Type of service:  Phone Visit  Patient has given verbal consent to a Phone visit?  Yes  How would you like to obtain your AVS?  MyChart  Will anyone else be joining your phone visit, giving supplemental history?  Yes, wife  Originating location (pt location): Home    Distant Location (provider location):  Off-site    Phone Start Time: 12:42 PM  Phone End time:  1:01 PM  Conversation plus orders: 19 minutes  Dictation time:  3 minutes    The visit lasted a total of 22 minutes

## 2023-03-15 NOTE — PROGRESS NOTES
"Nadya is a 57 year old who is being evaluated via a billable telephone visit.      What phone number would you like to be contacted at? [phone]  How would you like to obtain your AVS? [place]  {PROVIDER LOCATION On-site should be selected for visits conducted from your clinic location or adjoining [facility] hospital, academic office, or other nearby [facility] building. Off-site should be selected for all other provider locations, including home:500520}  Distant Location (provider location):  Off-site    {PROVIDER CHARTING PREFERENCE:124657}    Subjective   Nadya is a 57 year old accompanied by his spouse, presenting for the following health issues:  Follow Up (Discuss recent Covid treatment/review if any medication changes needed to be made  )      History of Present Illness       Reason for visit:  Covid Concerns    He eats 2-3 servings of fruits and vegetables daily.He consumes 0 sweetened beverage(s) daily.He exercises with enough effort to increase his heart rate 20 to 29 minutes per day.  He exercises with enough effort to increase his heart rate 3 or less days per week.   He is taking medications regularly.       {SUPERLIST (Optional):674791}  {additonal problems for provider to add (Optional):650842}    Review of Systems   {ROS COMP (Optional):236656}      Objective           Vitals:  No vitals were obtained today due to virtual visit.    Physical Exam   {GENERAL APPEARANCE:50::\"healthy\",\"alert\",\"no distress\"}  PSYCH: Alert and oriented times 3; coherent speech, normal   rate and volume, able to articulate logical thoughts, able   to abstract reason, no tangential thoughts, no hallucinations   or delusions  His affect is { :5251568::\"normal\"}  RESP: No cough, no audible wheezing, able to talk in full sentences  Remainder of exam unable to be completed due to telephone visits    {Diagnostic Test Results (Optional):982789}    {AMBULATORY ATTESTATION (Optional):695350}        Phone call duration: *** " minutes

## 2023-03-15 NOTE — PATIENT INSTRUCTIONS
Resume quetiapine at 50 mg at bed.  Up-to-date recommendations to take just 1 6 noted    If too much sedation contact by Mary for 25 mg dose    Has tolerated 150 mg dose before    Hold rosuvastatin    Decrease tamsulosin to 1 capsule at bed    Twice daily bathroom weights to determine whether fluid retention contributing to nocturia    [facility] communication initiated

## 2023-03-21 ENCOUNTER — OFFICE VISIT (OUTPATIENT)
Dept: INTERNAL MEDICINE | Facility: CLINIC | Age: 58
End: 2023-03-21
Payer: COMMERCIAL

## 2023-03-21 ENCOUNTER — ALLIED HEALTH/NURSE VISIT (OUTPATIENT)
Dept: EDUCATION SERVICES | Facility: CLINIC | Age: 58
End: 2023-03-21
Payer: COMMERCIAL

## 2023-03-21 VITALS
OXYGEN SATURATION: 97 % | TEMPERATURE: 97.9 F | DIASTOLIC BLOOD PRESSURE: 80 MMHG | RESPIRATION RATE: 20 BRPM | HEART RATE: 73 BPM | WEIGHT: 189.5 LBS | HEIGHT: 65 IN | SYSTOLIC BLOOD PRESSURE: 116 MMHG | BODY MASS INDEX: 31.57 KG/M2

## 2023-03-21 DIAGNOSIS — R31.0 GROSS HEMATURIA: Primary | ICD-10-CM

## 2023-03-21 DIAGNOSIS — R22.32 AXILLARY LUMP, LEFT: ICD-10-CM

## 2023-03-21 DIAGNOSIS — N18.31 CHRONIC KIDNEY DISEASE, STAGE 3A (H): ICD-10-CM

## 2023-03-21 DIAGNOSIS — I25.10 CORONARY ARTERY DISEASE DUE TO LIPID RICH PLAQUE: Chronic | ICD-10-CM

## 2023-03-21 DIAGNOSIS — F43.10 PTSD (POST-TRAUMATIC STRESS DISORDER): ICD-10-CM

## 2023-03-21 DIAGNOSIS — E11.9 TYPE 2 DIABETES MELLITUS WITHOUT COMPLICATION, WITHOUT LONG-TERM CURRENT USE OF INSULIN (H): ICD-10-CM

## 2023-03-21 DIAGNOSIS — I25.83 CORONARY ARTERY DISEASE DUE TO LIPID RICH PLAQUE: Chronic | ICD-10-CM

## 2023-03-21 DIAGNOSIS — R31.9 HEMATURIA: Primary | ICD-10-CM

## 2023-03-21 LAB
ALBUMIN UR-MCNC: NEGATIVE MG/DL
ANION GAP SERPL CALCULATED.3IONS-SCNC: 12 MMOL/L (ref 7–15)
APPEARANCE UR: CLEAR
BACTERIA #/AREA URNS HPF: ABNORMAL /HPF
BILIRUB UR QL STRIP: NEGATIVE
BUN SERPL-MCNC: 15.5 MG/DL (ref 6–20)
CALCIUM SERPL-MCNC: 9.4 MG/DL (ref 8.6–10)
CHLORIDE SERPL-SCNC: 102 MMOL/L (ref 98–107)
COLOR UR AUTO: YELLOW
CREAT SERPL-MCNC: 1.13 MG/DL (ref 0.67–1.17)
CREAT UR-MCNC: 127 MG/DL
DEPRECATED HCO3 PLAS-SCNC: 25 MMOL/L (ref 22–29)
GFR SERPL CREATININE-BSD FRML MDRD: 77 ML/MIN/1.73M2
GLUCOSE SERPL-MCNC: 93 MG/DL (ref 70–99)
GLUCOSE UR STRIP-MCNC: 500 MG/DL
HBA1C MFR BLD: 5.8 % (ref 0–5.6)
HGB UR QL STRIP: ABNORMAL
KETONES UR STRIP-MCNC: NEGATIVE MG/DL
LEUKOCYTE ESTERASE UR QL STRIP: NEGATIVE
MICROALBUMIN UR-MCNC: <12 MG/L
MICROALBUMIN/CREAT UR: NORMAL MG/G{CREAT}
NITRATE UR QL: NEGATIVE
PH UR STRIP: 5.5 [PH] (ref 5–8)
POTASSIUM SERPL-SCNC: 4.5 MMOL/L (ref 3.4–5.3)
RBC #/AREA URNS AUTO: ABNORMAL /HPF
SODIUM SERPL-SCNC: 139 MMOL/L (ref 136–145)
SP GR UR STRIP: 1.02 (ref 1–1.03)
SQUAMOUS #/AREA URNS AUTO: ABNORMAL /LPF
UROBILINOGEN UR STRIP-ACNC: 0.2 E.U./DL
WBC #/AREA URNS AUTO: ABNORMAL /HPF

## 2023-03-21 PROCEDURE — 80048 BASIC METABOLIC PNL TOTAL CA: CPT | Performed by: INTERNAL MEDICINE

## 2023-03-21 PROCEDURE — 81001 URINALYSIS AUTO W/SCOPE: CPT | Performed by: INTERNAL MEDICINE

## 2023-03-21 PROCEDURE — G0108 DIAB MANAGE TRN  PER INDIV: HCPCS | Mod: AE

## 2023-03-21 PROCEDURE — 83036 HEMOGLOBIN GLYCOSYLATED A1C: CPT | Performed by: INTERNAL MEDICINE

## 2023-03-21 PROCEDURE — 99214 OFFICE O/P EST MOD 30 MIN: CPT | Performed by: INTERNAL MEDICINE

## 2023-03-21 PROCEDURE — 82570 ASSAY OF URINE CREATININE: CPT | Performed by: INTERNAL MEDICINE

## 2023-03-21 PROCEDURE — 82043 UR ALBUMIN QUANTITATIVE: CPT | Performed by: INTERNAL MEDICINE

## 2023-03-21 PROCEDURE — 36415 COLL VENOUS BLD VENIPUNCTURE: CPT | Performed by: INTERNAL MEDICINE

## 2023-03-21 RX ORDER — EMPAGLIFLOZIN 10 MG/1
25 TABLET, FILM COATED ORAL
COMMUNITY
Start: 2023-03-12 | End: 2023-03-21

## 2023-03-21 RX ORDER — ESCITALOPRAM OXALATE 10 MG/1
TABLET ORAL
COMMUNITY
Start: 2023-01-23 | End: 2023-06-02 | Stop reason: DRUGHIGH

## 2023-03-21 RX ORDER — QUETIAPINE FUMARATE 100 MG/1
50 TABLET, FILM COATED ORAL AT BEDTIME
Qty: 45 TABLET | Refills: 4
Start: 2023-03-21 | End: 2023-06-02

## 2023-03-21 RX ORDER — EMPAGLIFLOZIN 10 MG/1
TABLET, FILM COATED ORAL
Qty: 90 TABLET | Status: CANCELLED | OUTPATIENT
Start: 2023-03-21

## 2023-03-21 NOTE — PROGRESS NOTES
Diabetes Self-Management Education & Support    Presents for: Individual review    Type of Service: In Person Visit    Assessment Type:   ASSESSMENT:  Sania is a very pleasant 57-year-old gentleman who comes to clinic today for consult regarding his current diabetes self-management skills as well as some nutritional counseling.  He arrived today accompanied by his wife Carlee.  English is not his primary language, he declined using  services and a waiver was signed.  Review of his chart shows that Radha had a CABG procedure in 2020 and informs me today that his cardiac thoracic surgeon has instructed him to become a vegan.  He is looking for recommendations on foods and meal planning.  Current dietary intake was reviewed with him today and unfortunately Marquis is currently eating next to nothing.  I counseled him today on the importance of trying to eat well-balanced meals in a timely fashion throughout the day.  Provided him with some suggestions for sources of protein he can use to help build his meals without having any meat or dairy products.  Prior to her visit today I did have the opportunity to review some of his chart notes as well as lab results.  Noted Marquis does suffer from PTSD and is very concerned with his health.    I have provided him some printed information on portions and food choices that were in Greek.    He requested to an additional in clinic visit, in the interim I informed him that I would work on finding him some recipes that included tofu.      Patient's most recent   Lab Results   Component Value Date    A1C 5.8 03/21/2023     is meeting goal of <7.0    Diabetes knowledge and skills assessment:     Continue education with the following diabetes management concepts: Healthy Eating, Being Active, Reducing Risks and Healthy Coping    Based on learning assessment above, most appropriate setting for further diabetes education would be: Individual setting.      PLAN  Eat 3  "balanced meals each day - make sure you including a source of protein with each meal you are having     Do not wait longer than 4-5 hours to eat something    Look into tofu - this will be a good source of protein for you - with this you can make whole meals   Other ideas for meal - bean burgers with a bun, bean chili     Continue Jardiance 25 mg each day    drink water  6-8 glasses each day     Encouraged patient and his wife to find some recipes on the Internet for tofu meals.    Topics to cover at upcoming visits: Healthy Eating, Reducing Risks and Healthy Coping    Follow-up: 4/4/23    See Care Plan for co-developed, patient-state behavior change goals.  AVS provided for patient today.    Education Materials Provided:  My Plate Planner and Literature containing information on vegan diet and vegan meals      SUBJECTIVE/OBJECTIVE:  Presents for: Individual review  Accompanied by: Self, Spouse (Carlee)  Diabetes education in the past 24 mo: No  Focus of Visit: Assistance w/ making life changes, Self-care behavioral goal setting, Healthy Eating, Taking Medication, Reducing Risks  Diabetes type: Type 2  Disease course: Stable  Other concerns:: English as a second language  Cultural Influences/Ethnic Background:  Not  or       Diabetes Symptoms & Complications:  Fatigue: Sometimes  Polyuria: Sometimes  Weight trend: Decreasing  Complications assessed today?: Yes  Heart disease: Yes (CABG x 4 2020)    Patient Problem List and Family Medical History reviewed for relevant medical history, current medical status, and diabetes risk factors.    Vitals:  There were no vitals taken for this visit.  Estimated body mass index is 31.53 kg/m  as calculated from the following:    Height as of an earlier encounter on 3/21/23: 1.651 m (5' 5\").    Weight as of an earlier encounter on 3/21/23: 86 kg (189 lb 8 oz).   Last 3 BP:   BP Readings from Last 3 Encounters:   03/21/23 116/80   02/21/23 98/62   01/20/23 90/72 "       History   Smoking Status     Former     Packs/day: 1.00     Years: 30.00     Types: Cigarettes     Quit date: 3/23/2020   Smokeless Tobacco     Never       Labs:  Lab Results   Component Value Date    A1C 5.8 03/21/2023     Lab Results   Component Value Date     01/18/2023     05/13/2022     Lab Results   Component Value Date    LDL 30 01/18/2023    LDL 41 08/07/2020     Direct Measure HDL   Date Value Ref Range Status   01/18/2023 30 (L) >=40 mg/dL Final   ]  GFR Estimate   Date Value Ref Range Status   01/18/2023 68 >60 mL/min/1.73m2 Final     Comment:     Effective December 21, 2021 eGFRcr in adults is calculated using the 2021 CKD-EPI creatinine equation which includes age and gender (Tim et al., [place], DOI: 10.1056/QZVIoy6137869)   05/24/2021 >60 >60 mL/min/1.73m2 Final     GFR Estimate If Black   Date Value Ref Range Status   05/24/2021 >60 >60 mL/min/1.73m2 Final     Lab Results   Component Value Date    CR 1.25 01/18/2023     No results found for: MICROALBUMIN    Healthy Eating:  Healthy Eating Assessed Today: Yes  Meal planning/habits: Avoiding sweets, Heart healthy, Smaller portions, Other (Vegan)  How many times a week on average do you eat food made away from home (restaurant/take-out)?: 0  Meals include: Breakfast, Lunch, Dinner  Breakfast: 0600: glass of orange juice with 1 tsp honey and cinnamon  Lunch: 1-2 pm : beans ( white or black) sometimes lentils ,potatoes , tomatoes and cucumber - like a soup  Dinner: 1 apple, 1/2 banana, handful of grapes  Other: 1-2 times a week might have some rice or falafel, barley bread  Beverages: Water, Juice  Has patient met with a dietitian in the past?: No    Being Active:  Being Active Assessed Today: No    Monitoring:  Monitoring Assessed Today:  (not monitoring)        Taking Medications:  Diabetes Medication(s)     Sodium-Glucose Co-Transporter 2 (SGLT2) Inhibitors       empagliflozin (JARDIANCE) 25 MG TABS tablet    Take 1 tablet (25 mg) by  mouth daily          Taking Medication Assessed Today: Yes  Current Treatments: Diet, Oral Medication (taken by mouth)  Problems taking diabetes medications regularly?: No  Diabetes medication side effects?: No    Problem Solving:  Problem Solving Assessed Today: Yes              Reducing Risks:  Reducing Risks Assessed Today: Yes  Diabetes Risks: Age over 45 years, Sedentary Lifestyle, Hyperlipidemia, Ethnicity  CAD Risks: Diabetes Mellitus, Stress, Sedentary lifestyle, Hypertension, Male sex    Healthy Coping:  Healthy Coping Assessed Today: Yes  Emotional response to diabetes: Concern for health and well-being, Fear/Anxiety  Informal Support system:: Family, Spouse  Stage of change: ACTION (Actively working towards change)  Support resources: In-person Offerings  Patient Activation Measure Survey Score:  No flowsheet data found.      Care Plan and Education Provided:  Care Plan: Diabetes   Updates made by Jojo Soria RN since 3/21/2023 12:00 AM      Problem: HbA1C Not In Goal       Goal: Establish Regular Follow-Ups with PCP    This Visit's Progress: 100%   Note:    Future follow-up appointments are both in place for PCP as well as [facility]     Task: Discuss with PCP the recommended timing for patient's next follow up visit(s) Completed 3/21/2023   Responsible User: Jojo Soria RN      Task: Discuss schedule for PCP visits with patient Completed 3/21/2023   Responsible User: Jojo Soria RN      Goal: Get HbA1C Level in Goal    This Visit's Progress: 100%   Note:    Current A1c is 5.8%     Task: Educate patient on diabetes education self-management topics Completed 3/21/2023   Responsible User: Jojo Soria RN      Task: Educate patient on benefits of regular glucose monitoring    Responsible User: Jojo Soria RN      Task: Refer patient to appropriate extended care team member, as needed (Medication Therapy Management, Behavioral Health, Physical Therapy, etc.)    Responsible User:  Jojo Soria RN      Task: Discuss diabetes treatment plan with patient Completed 3/21/2023   Responsible User: Jojo Soria RN      Problem: Diabetes Self-Management Education Needed to Optimize Self-Care Behaviors       Goal: Understand diabetes pathophysiology and disease progression       Task: Provide education on diabetes pathophysiology and disease progression specfic to patient's diabetes type    Responsible User: Jojo Soria RN      Goal: Healthy Eating - follow a healthy eating pattern for diabetes    This Visit's Progress: 40%   Note:    Patient has been told by cardio thoracic surgeon that he must become a vegan, and working on assisting him with meal planning and food choices that will both keep him satiated as well as keep his blood glucose in check     Task: Provide education on portion control and consistency in amount, composition and timing of food intake Completed 3/21/2023   Responsible User: Jojo Soria RN      Task: Provide education on managing carbohydrate intake (carbohydrate counting, plate planning method, etc.) Completed 3/21/2023   Responsible User: Jojo Soria RN      Task: Provide education on weight management    Responsible User: Jojo Soria RN      Task: Provide education on heart healthy eating Completed 3/21/2023   Responsible User: Jojo Soria RN      Task: Provide education on eating out    Responsible User: Jojo Soria RN      Task: Develop individualized healthy eating plan with patient    Responsible User: Jojo Soria RN      Goal: Being Active - get regular physical activity, working up to at least 150 minutes per week       Task: Provide education on relationship of activity to glucose and precautions to take if at risk for low glucose    Responsible User: Jojo Soria RN      Task: Discuss barriers to physical activity with patient    Responsible User: Jojo Soria RN      Task: Develop physical activity plan  with patient    Responsible User: Jojo Soria RN      Task: Explore community resources including walking groups, assistance programs, and home videos    Responsible User: Jojo Soria RN      Goal: Monitoring - monitor glucose and ketones as directed       Task: Provide education on blood glucose monitoring (purpose, proper technique, frequency, glucose targets, interpreting results, when to use glucose control solution, sharps disposal)    Responsible User: Jojo Soria RN      Task: Provide education on continuous glucose monitoring (sensor placement, use of edu or /reader, understanding glucose trends, alerts and alarms, differences between sensor glucose and blood glucose)    Responsible User: Jojo Soria RN      Task: Provide education on ketone monitoring (when to monitor, frequency, etc.)    Responsible User: Jojo Soria RN      Goal: Taking Medication - patient is consistently taking medications as directed    This Visit's Progress: 100%      Task: Provide education on action of prescribed medication, including when to take and possible side effects Completed 3/21/2023   Responsible User: Jojo Soria RN      Task: Provide education on insulin and injectable diabetes medications, including administration, storage, site selection and rotation for injection sites    Responsible User: Jojo Soria RN      Task: Discuss barriers to medication adherence with patient and provide management technique ideas as appropriate    Responsible User: Jojo Soria RN      Task: Provide education on frequency and refill details of medications    Responsible User: Jojo Soria RN      Goal: Problem Solving - know how to prevent and manage short-term diabetes complications       Task: Provide education on high blood glucose - causes, signs/symptoms, prevention and treatment    Responsible User: Jojo Soria RN      Task: Provide education on low blood glucose -  causes, signs/symptoms, prevention, treatment, carrying a carbohydrate source at all times, and medical identification    Responsible User: Jojo Soria RN      Task: Provide education on safe travel with diabetes    Responsible User: Jojo Soria RN      Task: Provide education on how to care for diabetes on sick days    Responsible User: Jojo Soria RN      Task: Provide education on when to call a health care provider    Responsible User: Jojo Soria RN      Goal: Reducing Risks - know how to prevent and treat long-term diabetes complications       Task: Provide education on major complications of diabetes, prevention, early diagnostic measures and treatment of complications    Responsible User: Jojo Soria RN      Task: Provide education on recommended care for dental, eye and foot health    Responsible User: Jojo Soria RN      Task: Provide education on Hemoglobin A1c - goals and relationship to blood glucose levels    Responsible User: Jojo Soria RN      Task: Provide education on recommendations for heart health - lipid levels and goals, blood pressure and goals, and aspirin therapy, if indicated    Responsible User: Jojo Soria RN      Task: Provide education on tobacco cessation    Responsible User: Jojo Soria RN      Goal: Healthy Coping - use available resources to cope with the challenges of managing diabetes    This Visit's Progress: 60%      Task: Discuss recognizing feelings about having diabetes    Responsible User: Jojo Soria RN      Task: Provide education on the benefits of making appropriate lifestyle changes Completed 3/21/2023   Responsible User: Jojo Soria RN      Task: Provide education on benefits of utilizing support systems Completed 3/21/2023   Responsible User: Jojo Soria RN      Task: Discuss methods for coping with stress Completed 3/21/2023   Responsible User: Jojo Soria RN      Task: Provide education  on when to seek professional counseling    Responsible User: Jojo Soria, RN          Thank you,  Jojo Soria RN [facility]  Certified Diabetes Care and   Visit type : DSMT        Time Spent: 60 minutes  Encounter Type: Individual    Any diabetes medication dose changes were made via the CDE Protocol per the patient's referring provider and primary care provider. A copy of this encounter was shared with the provider.     Much or all of the text in this note was generated through the use of the Dragon Dictate voice-to-text software.Errors in spelling or words which seem out of context are unintentional. Sound alike errors, in particular, may have escaped editing.

## 2023-03-21 NOTE — PROGRESS NOTES
Office Visit - Follow Up   Nadya Blake   57 year old male    Date of Visit: 3/21/2023    Chief Complaint   Patient presents with     Recheck Medication     RECHECK        Assessment and Plan   1. Gross hematuria  Just started today, urinalysis without evidence of infection, obtain urogram and have him see urology  - UA Macro with Reflex to Micro and Culture - lab collect  - UA Microscopic with Reflex to Culture  - CT Urogram wo & w Contrast; Future  - Adult Urology  Referral; Future    2. Type 2 diabetes mellitus without complication, without long-term current use of insulin (H)  Has been well controlled continue same will be meeting with diabetes education  - HEMOGLOBIN A1C; Future  - empagliflozin (JARDIANCE) 25 MG TABS tablet; Take 1 tablet (25 mg) by mouth daily  Dispense: 90 tablet; Refill: 4  - HEMOGLOBIN A1C    3. Chronic kidney disease, stage 3a (H)  Stable  - Albumin Random Urine Quantitative with Creat Ratio; Future  - Basic metabolic panel  (Ca, Cl, CO2, Creat, Gluc, K, Na, BUN); Future  - Albumin Random Urine Quantitative with Creat Ratio  - Basic metabolic panel  (Ca, Cl, CO2, Creat, Gluc, K, Na, BUN)    4. PTSD (post-traumatic stress disorder)  Name given for new psychiatrist, recommend Dr. Vanessa Fuchs  - QUEtiapine (SEROQUEL) 100 MG tablet; Take 0.5 tablets (50 mg) by mouth At Bedtime  Dispense: 45 tablet; Refill: 4    5. Coronary artery disease, CABG 6/2020  - empagliflozin (JARDIANCE) 25 MG TABS tablet; Take 1 tablet (25 mg) by mouth daily  Dispense: 90 tablet; Refill: 4    6. Axillary lump, left  - US Axillary Left; Future      Return in about 4 weeks (around 4/18/2023) for Follow up.     History of Present Illness   This 57 year old man comes in for follow-up.  Overall he is doing well.  He recently had COVID but has recovered nicely.  He needs a new psychiatrist.  He occasionally has some lightheadedness but his blood pressures have been a little bit better recently.  He has a  "lump in his left armpit he would like to have me look at.  Additionally after the visit when he was leaving a urine sample he had some gross hematuria which is new.       Physical Exam   General Appearance:   No acute distress    /80 (BP Location: Left arm, Patient Position: Sitting, Cuff Size: Adult Large)   Pulse 73   Temp 97.9  F (36.6  C) (Tympanic)   Resp 20   Ht 1.651 m (5' 5\")   Wt 86 kg (189 lb 8 oz)   SpO2 97%   BMI 31.53 kg/m      He has a lipomatous mass under his left armpit     Additional Information   Current Outpatient Medications   Medication Sig Dispense Refill     ACETAMINOPHEN EXTRA STRENGTH 500 MG tablet TAKE 2 TABLETS(1000 MG) BY MOUTH EVERY 6 HOURS AS NEEDED FOR PAIN 360 tablet 3     aspirin (ASA) 81 MG chewable tablet Take 1 tablet (81 mg) by mouth daily 90 tablet 4     Cholecalciferol (VITAMIN D3) 50 MCG (2000 UT) CAPS Take 1 capsule by mouth daily 90 capsule 3     cyanocobalamin (VITAMIN B-12) 1000 MCG tablet Take 1 tablet (1,000 mcg) by mouth daily 90 tablet 4     diclofenac (VOLTAREN) 1 % topical gel Apply 4 g topically 4 times daily 500 g 2     empagliflozin (JARDIANCE) 25 MG TABS tablet Take 1 tablet (25 mg) by mouth daily 90 tablet 4     escitalopram (LEXAPRO) 10 MG tablet        escitalopram (LEXAPRO) 20 MG tablet Take 1 tablet (20 mg) by mouth daily 30 tablet 4     Lidocaine 0.5 % GEL        metoprolol succinate ER (TOPROL XL) 100 MG 24 hr tablet Take 1.5 tablets (150 mg) by mouth At Bedtime 135 tablet 4     nifedipine 0.2% in white petrolatum 0.2 % OINT ointment Apply topically 4 times daily as needed (anal fissure) 100 g 1     nitroGLYcerin (NITROSTAT) 0.4 MG sublingual tablet For chest pain place 1 tablet under the tongue every 5 minutes for 3 doses. If symptoms persist 5 minutes after 1st dose call 911. 30 tablet 1     polyethylene glycol (MIRALAX) 17 GM/Dose powder Take 17 g (1 capful) by mouth daily 850 g 3     QUEtiapine (SEROQUEL) 100 MG tablet Take 0.5 tablets " (50 mg) by mouth At Bedtime 45 tablet 4     rosuvastatin (CRESTOR) 40 MG tablet TAKE 1 TABLET(40 MG) BY MOUTH DAILY 90 tablet 3     tamsulosin (FLOMAX) 0.4 MG capsule Take 1 capsule (0.4 mg) by mouth every evening       Allergies   Allergen Reactions     Atorvastatin Diarrhea     Cortisone Other (See Comments)     pain     Lisinopril Cough     started after CABG for unclear reason     Methylprednisolone Other (See Comments)     ?       Time:      Az Briseno MD    Answers for HPI/ROS submitted by the patient on 3/15/2023  What is the reason for your visit today? : Covid Concerns  How many servings of fruits and vegetables do you eat daily?: 2-3  On average, how many sweetened beverages do you drink each day (Examples: soda, juice, sweet tea, etc.  Do NOT count diet or artificially sweetened beverages)?: 0  How many minutes a day do you exercise enough to make your heart beat faster?: 20 to 29  How many days a week do you exercise enough to make your heart beat faster?: 3 or less  How many days per week do you miss taking your medication?: 0

## 2023-03-21 NOTE — PATIENT INSTRUCTIONS
Eat 3 balanced meals each day - make sure you including a source of protein with each meal you are having     Do not wait longer than 4-5 hours to eat something    Look into tofu - this will be a good source of protein for you - with this you can make whole meals   Other ideas for meal - bean burgers with a bun, bean chili     Continue Jardiance 25 mg each day    drink water  6-8 glasses each day

## 2023-03-21 NOTE — LETTER
3/21/2023         RE: Nadya Blake  [address]  [facility] [address]        Dear Colleague,    Thank you for referring your patient, Nadya Blake, to the [facility]. Please see a copy of my visit note below.    Diabetes Self-Management Education & Support    Presents for: Individual review    Type of Service: In Person Visit    Assessment Type:   ASSESSMENT:  Sania is a very pleasant 57-year-old gentleman who comes to clinic today for consult regarding his current diabetes self-management skills as well as some nutritional counseling.  He arrived today accompanied by his wife Carlee.  English is not his primary language, he declined using  services and a waiver was signed.  Review of his chart shows that Radha had a CABG procedure in 2020 and informs me today that his cardiac thoracic surgeon has instructed him to become a vegan.  He is looking for recommendations on foods and meal planning.  Current dietary intake was reviewed with him today and unfortunately Marquis is currently eating next to nothing.  I counseled him today on the importance of trying to eat well-balanced meals in a timely fashion throughout the day.  Provided him with some suggestions for sources of protein he can use to help build his meals without having any meat or dairy products.  Prior to her visit today I did have the opportunity to review some of his chart notes as well as lab results.  Noted Marquis does suffer from PTSD and is very concerned with his health.    I have provided him some printed information on portions and food choices that were in Persian.    He requested to an additional in clinic visit, in the interim I informed him that I would work on finding him some recipes that included tofu.      Patient's most recent   Lab Results   Component Value Date    A1C 5.8 03/21/2023     is meeting goal of <7.0    Diabetes knowledge and skills assessment:     Continue  education with the following diabetes management concepts: Healthy Eating, Being Active, Reducing Risks and Healthy Coping    Based on learning assessment above, most appropriate setting for further diabetes education would be: Individual setting.      PLAN  Eat 3 balanced meals each day - make sure you including a source of protein with each meal you are having     Do not wait longer than 4-5 hours to eat something    Look into tofu - this will be a good source of protein for you - with this you can make whole meals   Other ideas for meal - bean burgers with a bun, bean chili     Continue Jardiance 25 mg each day    drink water  6-8 glasses each day     Encouraged patient and his wife to find some recipes on the Internet for tofu meals.    Topics to cover at upcoming visits: Healthy Eating, Reducing Risks and Healthy Coping    Follow-up: 4/4/23    See Care Plan for co-developed, patient-state behavior change goals.  AVS provided for patient today.    Education Materials Provided:  My Plate Planner and Literature containing information on vegan diet and vegan meals      SUBJECTIVE/OBJECTIVE:  Presents for: Individual review  Accompanied by: Self, Spouse (Carlee)  Diabetes education in the past 24 mo: No  Focus of Visit: Assistance w/ making life changes, Self-care behavioral goal setting, Healthy Eating, Taking Medication, Reducing Risks  Diabetes type: Type 2  Disease course: Stable  Other concerns:: English as a second language  Cultural Influences/Ethnic Background:  Not  or       Diabetes Symptoms & Complications:  Fatigue: Sometimes  Polyuria: Sometimes  Weight trend: Decreasing  Complications assessed today?: Yes  Heart disease: Yes (CABG x 4 2020)    Patient Problem List and Family Medical History reviewed for relevant medical history, current medical status, and diabetes risk factors.    Vitals:  There were no vitals taken for this visit.  Estimated body mass index is 31.53 kg/m  as calculated  "from the following:    Height as of an earlier encounter on 3/21/23: 1.651 m (5' 5\").    Weight as of an earlier encounter on 3/21/23: 86 kg (189 lb 8 oz).   Last 3 BP:   BP Readings from Last 3 Encounters:   03/21/23 116/80   02/21/23 98/62   01/20/23 90/72       History   Smoking Status     Former     Packs/day: 1.00     Years: 30.00     Types: Cigarettes     Quit date: 3/23/2020   Smokeless Tobacco     Never       Labs:  Lab Results   Component Value Date    A1C 5.8 03/21/2023     Lab Results   Component Value Date     01/18/2023     05/13/2022     Lab Results   Component Value Date    LDL 30 01/18/2023    LDL 41 08/07/2020     Direct Measure HDL   Date Value Ref Range Status   01/18/2023 30 (L) >=40 mg/dL Final   ]  GFR Estimate   Date Value Ref Range Status   01/18/2023 68 >60 mL/min/1.73m2 Final     Comment:     Effective December 21, 2021 eGFRcr in adults is calculated using the 2021 CKD-EPI creatinine equation which includes age and gender (Tim et al., NEJM, DOI: 10.1056/OYTWht9534460)   05/24/2021 >60 >60 mL/min/1.73m2 Final     GFR Estimate If Black   Date Value Ref Range Status   05/24/2021 >60 >60 mL/min/1.73m2 Final     Lab Results   Component Value Date    CR 1.25 01/18/2023     No results found for: MICROALBUMIN    Healthy Eating:  Healthy Eating Assessed Today: Yes  Meal planning/habits: Avoiding sweets, Heart healthy, Smaller portions, Other (Vegan)  How many times a week on average do you eat food made away from home (restaurant/take-out)?: 0  Meals include: Breakfast, Lunch, Dinner  Breakfast: 0600: glass of orange juice with 1 tsp honey and cinnamon  Lunch: 1-2 pm : beans ( white or black) sometimes lentils ,potatoes , tomatoes and cucumber - like a soup  Dinner: 1 apple, 1/2 banana, handful of grapes  Other: 1-2 times a week might have some rice or falafel, barley bread  Beverages: Water, Juice  Has patient met with a dietitian in the past?: No    Being Active:  Being Active " Assessed Today: No    Monitoring:  Monitoring Assessed Today:  (not monitoring)        Taking Medications:  Diabetes Medication(s)     Sodium-Glucose Co-Transporter 2 (SGLT2) Inhibitors       empagliflozin (JARDIANCE) 25 MG TABS tablet    Take 1 tablet (25 mg) by mouth daily          Taking Medication Assessed Today: Yes  Current Treatments: Diet, Oral Medication (taken by mouth)  Problems taking diabetes medications regularly?: No  Diabetes medication side effects?: No    Problem Solving:  Problem Solving Assessed Today: Yes              Reducing Risks:  Reducing Risks Assessed Today: Yes  Diabetes Risks: Age over 45 years, Sedentary Lifestyle, Hyperlipidemia, Ethnicity  CAD Risks: Diabetes Mellitus, Stress, Sedentary lifestyle, Hypertension, Male sex    Healthy Coping:  Healthy Coping Assessed Today: Yes  Emotional response to diabetes: Concern for health and well-being, Fear/Anxiety  Informal Support system:: Family, Spouse  Stage of change: ACTION (Actively working towards change)  Support resources: In-person Offerings  Patient Activation Measure Survey Score:  No flowsheet data found.      Care Plan and Education Provided:  Care Plan: Diabetes   Updates made by Jojo Soria RN since 3/21/2023 12:00 AM      Problem: HbA1C Not In Goal       Goal: Establish Regular Follow-Ups with PCP    This Visit's Progress: 100%   Note:    Future follow-up appointments are both in place for PCP as well as [facility]     Task: Discuss with PCP the recommended timing for patient's next follow up visit(s) Completed 3/21/2023   Responsible User: Jojo Soria RN      Task: Discuss schedule for PCP visits with patient Completed 3/21/2023   Responsible User: Jojo Soria RN      Goal: Get HbA1C Level in Goal    This Visit's Progress: 100%   Note:    Current A1c is 5.8%     Task: Educate patient on diabetes education self-management topics Completed 3/21/2023   Responsible User: Jojo Soria RN      Task: Educate  patient on benefits of regular glucose monitoring    Responsible User: Jojo Soria RN      Task: Refer patient to appropriate extended care team member, as needed (Medication Therapy Management, Behavioral Health, Physical Therapy, etc.)    Responsible User: Jojo Soria RN      Task: Discuss diabetes treatment plan with patient Completed 3/21/2023   Responsible User: Jojo Soria RN      Problem: Diabetes Self-Management Education Needed to Optimize Self-Care Behaviors       Goal: Understand diabetes pathophysiology and disease progression       Task: Provide education on diabetes pathophysiology and disease progression specfic to patient's diabetes type    Responsible User: Jojo Soria RN      Goal: Healthy Eating - follow a healthy eating pattern for diabetes    This Visit's Progress: 40%   Note:    Patient has been told by cardio thoracic surgeon that he must become a vegan, and working on assisting him with meal planning and food choices that will both keep him satiated as well as keep his blood glucose in check     Task: Provide education on portion control and consistency in amount, composition and timing of food intake Completed 3/21/2023   Responsible User: Jojo Soria RN      Task: Provide education on managing carbohydrate intake (carbohydrate counting, plate planning method, etc.) Completed 3/21/2023   Responsible User: Jojo Soria RN      Task: Provide education on weight management    Responsible User: Jojo Soria RN      Task: Provide education on heart healthy eating Completed 3/21/2023   Responsible User: Jojo Soria RN      Task: Provide education on eating out    Responsible User: Jojo Soria RN      Task: Develop individualized healthy eating plan with patient    Responsible User: Jojo Soria RN      Goal: Being Active - get regular physical activity, working up to at least 150 minutes per week       Task: Provide education on relationship  of activity to glucose and precautions to take if at risk for low glucose    Responsible User: Jojo Soria RN      Task: Discuss barriers to physical activity with patient    Responsible User: Jojo Soria RN      Task: Develop physical activity plan with patient    Responsible User: Jojo Soria RN      Task: Explore community resources including walking groups, assistance programs, and home videos    Responsible User: Jojo Soria RN      Goal: Monitoring - monitor glucose and ketones as directed       Task: Provide education on blood glucose monitoring (purpose, proper technique, frequency, glucose targets, interpreting results, when to use glucose control solution, sharps disposal)    Responsible User: Jojo Soria RN      Task: Provide education on continuous glucose monitoring (sensor placement, use of edu or /reader, understanding glucose trends, alerts and alarms, differences between sensor glucose and blood glucose)    Responsible User: Jojo Soria RN      Task: Provide education on ketone monitoring (when to monitor, frequency, etc.)    Responsible User: Jojo Soria RN      Goal: Taking Medication - patient is consistently taking medications as directed    This Visit's Progress: 100%      Task: Provide education on action of prescribed medication, including when to take and possible side effects Completed 3/21/2023   Responsible User: Jojo Soria RN      Task: Provide education on insulin and injectable diabetes medications, including administration, storage, site selection and rotation for injection sites    Responsible User: Jojo Soria RN      Task: Discuss barriers to medication adherence with patient and provide management technique ideas as appropriate    Responsible User: Jojo Soria RN      Task: Provide education on frequency and refill details of medications    Responsible User: Jojo Soria RN      Goal: Problem Solving - know  how to prevent and manage short-term diabetes complications       Task: Provide education on high blood glucose - causes, signs/symptoms, prevention and treatment    Responsible User: Jojo Soria RN      Task: Provide education on low blood glucose - causes, signs/symptoms, prevention, treatment, carrying a carbohydrate source at all times, and medical identification    Responsible User: Jojo Soria RN      Task: Provide education on safe travel with diabetes    Responsible User: Jojo Soria RN      Task: Provide education on how to care for diabetes on sick days    Responsible User: Jojo Soria RN      Task: Provide education on when to call a health care provider    Responsible User: Jojo Soria RN      Goal: Reducing Risks - know how to prevent and treat long-term diabetes complications       Task: Provide education on major complications of diabetes, prevention, early diagnostic measures and treatment of complications    Responsible User: Jojo Soria RN      Task: Provide education on recommended care for dental, eye and foot health    Responsible User: Jojo Soria RN      Task: Provide education on Hemoglobin A1c - goals and relationship to blood glucose levels    Responsible User: Jojo Soria RN      Task: Provide education on recommendations for heart health - lipid levels and goals, blood pressure and goals, and aspirin therapy, if indicated    Responsible User: Jojo Soria RN      Task: Provide education on tobacco cessation    Responsible User: Jojo Soria RN      Goal: Healthy Coping - use available resources to cope with the challenges of managing diabetes    This Visit's Progress: 60%      Task: Discuss recognizing feelings about having diabetes    Responsible User: Jojo Soria RN      Task: Provide education on the benefits of making appropriate lifestyle changes Completed 3/21/2023   Responsible User: Jojo Soria RN      Task:  Provide education on benefits of utilizing support systems Completed 3/21/2023   Responsible User: Jojo Soria RN      Task: Discuss methods for coping with stress Completed 3/21/2023   Responsible User: Jojo Soria RN      Task: Provide education on when to seek professional counseling    Responsible User: Jojo Soria, RN          Thank you,  Jojo Soria RN [facility]  Certified Diabetes Care and   Visit type : DSMT        Time Spent: 60 minutes  Encounter Type: Individual    Any diabetes medication dose changes were made via the CDE Protocol per the patient's referring provider and primary care provider. A copy of this encounter was shared with the provider.     Much or all of the text in this note was generated through the use of the Dragon Dictate voice-to-text software.Errors in spelling or words which seem out of context are unintentional. Sound alike errors, in particular, may have escaped editing.

## 2023-03-24 ENCOUNTER — TELEPHONE (OUTPATIENT)
Dept: INTERNAL MEDICINE | Facility: CLINIC | Age: 58
End: 2023-03-24
Payer: COMMERCIAL

## 2023-03-24 DIAGNOSIS — R22.32 AXILLARY LUMP, LEFT: Primary | ICD-10-CM

## 2023-03-24 NOTE — TELEPHONE ENCOUNTER
Order/Referral Request    Who is requesting: Imaging per protocal    Orders being requested: Bilateral diagnositic mammogram to go with - US AXILLARY LEFT    Reason service is needed/diagnosis:  - Axillary lump, left  When are orders needed by: asap    Has this been discussed with Provider: Yes    Does patient have a preference on a Group/Provider/Facility? [facility]    Does patient have an appointment scheduled?: No    Where to send orders: Place orders within Epic

## 2023-03-24 NOTE — TELEPHONE ENCOUNTER
I do not think he has breast pathology.  It is most consistent with a lipoma.  I do not think he needs a mammogram.

## 2023-03-28 ENCOUNTER — HOSPITAL ENCOUNTER (OUTPATIENT)
Dept: CT IMAGING | Facility: HOSPITAL | Age: 58
Discharge: HOME | End: 2023-03-28
Attending: INTERNAL MEDICINE | Admitting: INTERNAL MEDICINE
Payer: COMMERCIAL

## 2023-03-28 DIAGNOSIS — R31.0 GROSS HEMATURIA: ICD-10-CM

## 2023-03-28 PROCEDURE — 74178 CT ABD&PLV WO CNTR FLWD CNTR: CPT

## 2023-03-28 PROCEDURE — 250N000011 HC RX IP 250 OP 636: Performed by: INTERNAL MEDICINE

## 2023-03-28 RX ORDER — IOPAMIDOL 755 MG/ML
100 INJECTION, SOLUTION INTRAVASCULAR ONCE
Status: COMPLETED | OUTPATIENT
Start: 2023-03-28 | End: 2023-03-28

## 2023-03-28 RX ADMIN — IOPAMIDOL 100 ML: 755 INJECTION, SOLUTION INTRAVENOUS at 11:09

## 2023-03-28 NOTE — TELEPHONE ENCOUNTER
Spoke with Radiology scheduling department who informed the writer that the [facility] can not just do US and requires a diagnostic bilateral mammogram to be ordered in addition.

## 2023-03-29 NOTE — TELEPHONE ENCOUNTER
This does not have to be scheduled at the breast center.  He can be scheduled with general radiology

## 2023-03-29 NOTE — TELEPHONE ENCOUNTER
Contacted Radiology department who then contacted [facility] for further information regarding scheduling.    Radiology stated that they will reach out to schedule the patient at [facility].

## 2023-04-01 DIAGNOSIS — R35.1 NOCTURIA: ICD-10-CM

## 2023-04-01 NOTE — TELEPHONE ENCOUNTER
"Routing refill request to provider for review/approval because:  Need dispense amount and refills amount    Last Written Prescription Date:  2/21/2023  Last Fill Quantity: NA,  # refills: NA   Last office visit provider:  3/21/2023     Requested Prescriptions   Pending Prescriptions Disp Refills     tamsulosin (FLOMAX) 0.4 MG capsule [Pharmacy Med Name: TAMSULOSIN 0.4MG CAPSULES] 180 capsule      Sig: TAKE 2 CAPSULES(0.8 MG) BY MOUTH EVERY EVENING       Alpha Blockers Passed - 4/1/2023  8:05 AM        Passed - Blood pressure under 140/90 in past 12 months     BP Readings from Last 3 Encounters:   03/21/23 116/80   02/21/23 98/62   01/20/23 90/72                 Passed - Recent (12 mo) or future (30 days) visit within the authorizing provider's specialty     Patient has had an office visit with the authorizing provider or a provider within the authorizing providers department within the previous 12 mos or has a future within next 30 days. See \"Patient Info\" tab in inbasket, or \"Choose Columns\" in Meds & Orders section of the refill encounter.              Passed - Patient does not have Tadalafil, Vardenafil, or Sildenafil on their medication list        Passed - Medication is active on med list        Passed - Patient is 18 years of age or older             Cheryl Garcia RN 04/01/23 6:32 PM  "

## 2023-04-03 RX ORDER — TAMSULOSIN HYDROCHLORIDE 0.4 MG/1
CAPSULE ORAL
Qty: 180 CAPSULE | Refills: 4 | Status: SHIPPED | OUTPATIENT
Start: 2023-04-03 | End: 2024-05-03

## 2023-04-06 ENCOUNTER — PATIENT OUTREACH (OUTPATIENT)
Dept: CARE COORDINATION | Facility: CLINIC | Age: 58
End: 2023-04-06
Payer: COMMERCIAL

## 2023-04-17 ENCOUNTER — OFFICE VISIT (OUTPATIENT)
Dept: CARDIOLOGY | Facility: CLINIC | Age: 58
End: 2023-04-17
Attending: GENERAL ACUTE CARE HOSPITAL
Payer: COMMERCIAL

## 2023-04-17 VITALS
DIASTOLIC BLOOD PRESSURE: 70 MMHG | WEIGHT: 193 LBS | HEART RATE: 52 BPM | BODY MASS INDEX: 32.12 KG/M2 | OXYGEN SATURATION: 97 % | RESPIRATION RATE: 16 BRPM | SYSTOLIC BLOOD PRESSURE: 102 MMHG

## 2023-04-17 DIAGNOSIS — I10 ESSENTIAL HYPERTENSION: Primary | ICD-10-CM

## 2023-04-17 DIAGNOSIS — E78.5 HYPERLIPIDEMIA WITH TARGET LDL LESS THAN 70: ICD-10-CM

## 2023-04-17 DIAGNOSIS — E11.69 TYPE 2 DIABETES MELLITUS WITH OTHER SPECIFIED COMPLICATION, WITHOUT LONG-TERM CURRENT USE OF INSULIN (H): ICD-10-CM

## 2023-04-17 DIAGNOSIS — I50.32 CHRONIC HEART FAILURE WITH PRESERVED EJECTION FRACTION (H): ICD-10-CM

## 2023-04-17 DIAGNOSIS — I25.709 CORONARY ARTERY DISEASE INVOLVING CORONARY BYPASS GRAFT OF NATIVE HEART WITH ANGINA PECTORIS (H): ICD-10-CM

## 2023-04-17 PROCEDURE — 99214 OFFICE O/P EST MOD 30 MIN: CPT | Performed by: GENERAL ACUTE CARE HOSPITAL

## 2023-04-17 NOTE — PROGRESS NOTES
HEART CARE ENCOUNTER NOTE            Assessment/Recommendations   Assessment:    1. Coronary artery disease status post four-vessel coronary artery bypass grafting (left internal mammary artery to the left anterior descending artery, right radial artery to the right posterior descending artery, reversed saphenous venous grafts to obtuse marginal and diagonal artery branches) on 6/24/2020. No ischemia seen on stress cardiac MRI on 12/23/2021 but he has been having exertional dyspnea with his saphenous venous graft to the right posterior descending artery noted to be occluded on coronary angiography 10/6/2022. He now notes only very mild symptoms but may be having side effects from metoprolol.  2. Chronic congestive heart failure with preserved left ventricular ejection fraction. He seems euvolemic and normal left-sided filling pressure was noted on cardiac catheterization 10/6/2022.  3. Benign essential hypertension. Controlled.  4. Dyslipidemia. Last LDL 30 mg/dL.  5. Non insulin-dependent diabetes mellitus type 2. Last hemoglobin A1c 5.8%.  6. Former smoker.  7. Possible obstructive sleep apnea.  8. BMI 32.12.    Plan:  1. Continue metoprolol succinate 100 mg daily.  2. Isosorbide mononitrate and spironolactone were previously discontinued.  3. Continue with medical management of his coronary artery disease but if his symptoms worsen, we will refer him for percutaneous coronary intervention of the right coronary artery.  4. Encouraged him to follow-up with scheduling his sleep study.  5. He does not appear to require loop diuretics at this time.  6. Rosuvastatin 40 mg and aspirin 81 mg daily.  7. Follow-up with me in 2 months per patient request.         History of Present Illness   Mr. Nadya Blake is a 57 year old male with a significant past history of CAD with history of NSTEMI s/p 4V CABG (LIMA to LAD, right radial artery to RPDA, reversed SVGs to an OM and diagonal artery branch) on  6/24/2020, HFpEF, HTN, dyslipidemia, and former smoker presenting for follow-up.     He again contracted COVID-19 last month and is recovering this. He is currently fasting for Ramadan, which has made him feel faint at times. Otherwise, he is doing well and walking 1-2 miles per day. No chest pain/pressure/tightness, shortness of breath at rest or with exertion, syncope, lower extremity swelling, palpitations, paroxysmal nocturnal dyspnea (PND), or orthopnea. He is planning on scheduling an appointment with sleep medicine after Ramadan, although with his vegan diet and weight loss he has been sleeping better and not snoring as much.     Cardiac Problems and Cardiac Diagnostics     Most Recent Cardiac testing:  ECG 10/6/2022 (personally reviewed and interpreted): sinus bradycardia HR 57 bpm with 1st degree AV block  ms, nonspecific T wave changes     ECHO 6/27/2020 (report reviewed):     Normal left ventricular size and systolic function. The left ventricular wall motion is normal. The calculated left ventricular ejection fraction is 71%.    Normal right ventricular size and systolic function.    No hemodynamically significant valvular heart abnormalities.    The ascending aorta is mildly dilated.    When compared to the previous study dated 6/23/2020, no significant change.     Stress cardiac MRI 12/23/2021 (report reviewed):  1.  Pharmacological Regadenoson stress cardiac MRI is negative for inducible myocardial ischemia.   2.  Pharmacological stress ECG is negative for inducible myocardial ischemia.   3. Normal left ventricular size, wall thickness and systolic function. The quantified left ventricular  ejection fraction is 59 %.  Very small area of non-transmural myocardial scar in the mid inferior wall is  identified.    4.  Normal right ventricular size and systolic function.    5.  No significant valvular abnormalities.  6. Ascending aorta measures 38 mm.      Stress test 5/21/2021 (report  reviewed):     The nuclear stress test is negative for inducible myocardial ischemia or infarction.     The left ventricular ejection fraction at stress is 65%.     There is no prior study for comparison.     Cardiac cath 10/6/2022 (report reviewed):     Left ventricular filling pressures are normal.    3rd Mrg lesion is 90% stenosed.    Prox RCA lesion is 75% stenosed.    Prox RCA to Mid RCA lesion is 40% stenosed.    Dist RCA lesion is 75% stenosed.    Mid RCA lesion is 30% stenosed.    RPDA lesion is 50% stenosed.    RPAV lesion is 40% stenosed.    Prox LAD lesion is 70% stenosed.    1st Diag-1 lesion is 95% stenosed.    1st Diag-2 lesion is 95% stenosed.    2nd Diag lesion is 99% stenosed.    Mid LAD-1 lesion is 75% stenosed.    Mid LAD-2 lesion is 75% stenosed.    Dist LAD lesion is 70% stenosed.    Vein graft to right PDA is totally occluded    Vein graft to second diagonal is widely patent    Vein graft to third OM is widely patent    LUZ MARIA graft to mid distal LAD is patent     Medications  Allergies   Current Outpatient Medications   Medication Sig Dispense Refill     ACETAMINOPHEN EXTRA STRENGTH 500 MG tablet TAKE 2 TABLETS(1000 MG) BY MOUTH EVERY 6 HOURS AS NEEDED FOR PAIN 360 tablet 3     aspirin (ASA) 81 MG chewable tablet Take 1 tablet (81 mg) by mouth daily 90 tablet 4     Cholecalciferol (VITAMIN D3) 50 MCG (2000 UT) CAPS Take 1 capsule by mouth daily 90 capsule 3     cyanocobalamin (VITAMIN B-12) 1000 MCG tablet Take 1 tablet (1,000 mcg) by mouth daily 90 tablet 4     diclofenac (VOLTAREN) 1 % topical gel Apply 4 g topically 4 times daily 500 g 2     empagliflozin (JARDIANCE) 25 MG TABS tablet Take 1 tablet (25 mg) by mouth daily 90 tablet 4     escitalopram (LEXAPRO) 20 MG tablet Take 1 tablet (20 mg) by mouth daily 30 tablet 4     Lidocaine 0.5 % GEL Externally apply topically as needed       metoprolol succinate ER (TOPROL XL) 100 MG 24 hr tablet Take 1.5 tablets (150 mg) by mouth At Bedtime 135  tablet 4     nifedipine 0.2% in white petrolatum 0.2 % OINT ointment Apply topically 4 times daily as needed (anal fissure) 100 g 1     nitroGLYcerin (NITROSTAT) 0.4 MG sublingual tablet For chest pain place 1 tablet under the tongue every 5 minutes for 3 doses. If symptoms persist 5 minutes after 1st dose call 911. 30 tablet 1     polyethylene glycol (MIRALAX) 17 GM/Dose powder Take 17 g (1 capful) by mouth daily 850 g 3     QUEtiapine (SEROQUEL) 100 MG tablet Take 0.5 tablets (50 mg) by mouth At Bedtime 45 tablet 4     rosuvastatin (CRESTOR) 40 MG tablet TAKE 1 TABLET(40 MG) BY MOUTH DAILY 90 tablet 3     tamsulosin (FLOMAX) 0.4 MG capsule TAKE 2 CAPSULES(0.8 MG) BY MOUTH EVERY EVENING 180 capsule 4     escitalopram (LEXAPRO) 10 MG tablet  (Patient not taking: Reported on 4/17/2023)        Allergies   Allergen Reactions     Atorvastatin Diarrhea     Cortisone Other (See Comments)     pain     Lisinopril Cough     started after CABG for unclear reason     Methylprednisolone Other (See Comments)     ?        Physical Examination Review of Systems   /70 (BP Location: Left arm, Patient Position: Sitting, Cuff Size: Adult Regular)   Pulse 52   Resp 16   Wt 87.5 kg (193 lb)   SpO2 97%   BMI 32.12 kg/m    Body mass index is 32.12 kg/m .  Wt Readings from Last 3 Encounters:   04/17/23 87.5 kg (193 lb)   03/21/23 86 kg (189 lb 8 oz)   02/21/23 87 kg (191 lb 11.2 oz)       General Appearance:   Pleasant male, appears stated age. no acute distress, normal body habitus   ENT/Mouth: Facemask.      EYES:  no scleral icterus, normal conjunctivae   Neck: no carotid bruits. No anterior cervical lymphadenopaty   Respiratory:   lungs are clear to auscultation, no rales or wheezing, well-healed sternal scar, equal chest wall expansion    Cardiovascular:   Regular rhythm, normal rate. Normal first and second heart sounds with no murmurs, rubs, or gallops; the carotid, radial and posterior tibial pulses are intact, Jugular  venous pressure normal, no edema bilaterally    Abdomen/GI:  no organomegaly, masses, bruits, or tenderness; bowel sounds are present   Extremities: no cyanosis or clubbing   Skin: no xanthelasma, warm.    Heme/lymph/ Immunology No apparent bleeding noted.   Neurologic: Alert and oriented. normal gait, no tremors     Psychiatric: Pleasant, calm, appropriate affect.    A complete 10 system review of systems was performed and is negative except as mentioned in the HPI/subjective.         Past History   Past Medical History:   Past Medical History:   Diagnosis Date     Acute non-ST elevation myocardial infarction (NSTEMI) (H) 6/22/2020     Adenomatous polyp of colon      Ascending aorta dilatation (H)      Carpal tunnel syndrome      Chronic superficial gastritis      Coronary artery disease due to lipid rich plaque 6/23/2020     Depression with anxiety      Dyslipidemia, goal LDL below 70 6/23/2020     Essential hypertension 9/2/2012     Fatty liver disease, nonalcoholic      GERD (gastroesophageal reflux disease)      IBS (irritable bowel syndrome)      Left lumbar radiculopathy      Multinodular goiter      Old torn meniscus of knee      Paroxysmal atrial fibrillation (H)      Perianal abscess      Post herpetic neuralgia      Post-traumatic osteoarthritis of both knees      Primary osteoarthritis of left hip      Primary osteoarthritis of right hip      Pulmonary nodule      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 Ragsdale     CV CORONARY ANGIOGRAM N/A 6/23/2020    Procedure: Coronary Angiogram;  Surgeon: Ariane Lester MD;  Location: [facility] Cath Lab;  Service: Cardiology     CV CORONARY ANGIOGRAM N/A 10/6/2022     Procedure: Coronary Angiogram;  Surgeon: Javon John MD;  Location: [facility] CV     CV IABP INSERT N/A 6/24/2020    Procedure: Intra Aortic Balloon Pump Insertion;  Surgeon: Ariane Lester MD;  Location: [facility] Cath Lab;  Service: Cardiology     CV LEFT HEART CATH N/A 10/6/2022    Procedure: Left Heart Catheterization;  Surgeon: Javon John MD;  Location: [facility] CV     CV LEFT HEART CATHETERIZATION WITHOUT LEFT VENTRICULOGRAM Left 6/23/2020    Procedure: Left Heart Catheterization Without Left Ventriculogram;  Surgeon: Ariane Lester MD;  Location: [facility] Cath Lab;  Service: Cardiology     CV LEFT VENTRICULOGRAM N/A 10/6/2022    Procedure: Left Ventriculogram;  Surgeon: Javon John MD;  Location: [facility] LAB CV       Family History:   Family History   Problem Relation Age of Onset     No Known Problems Mother      Lung Cancer Father 56        fatal     No Known Problems Daughter      Other - See Comments Son         congenital deformity of right leg; he uses a leg prosthesis        Social History:   Social History     Socioeconomic History     Marital status:      Spouse name: Carlee     Number of children: 2     Years of education: Not on file     Highest education level: Not on file   Occupational History     Not on file   Tobacco Use     Smoking status: Former     Packs/day: 1.00     Years: 30.00     Pack years: 30.00     Types: Cigarettes     Quit date: 3/23/2020     Years since quitting: 3.0     Smokeless tobacco: Never     Tobacco comments:     stopped 3/24/2020   Vaping Use     Vaping status: Never Used   Substance and Sexual Activity     Alcohol use: No     Drug use: Never     Sexual activity: Yes     Partners: Female   Other Topics Concern     Not on file   Social History Narrative    , Carlee, BA chemistry and taking AA courses at [employer].  From [place]; bachelors in geology and computer science. Son, Grace (2005) and Sherrie (2008).  On  SSDI, PTSD.       Social Determinants of Health     Financial Resource Strain: Not on file   Food Insecurity: No Food Insecurity (8/31/2021)    Hunger Vital Sign      Worried About Running Out of Food in the Last Year: Never true      Ran Out of Food in the Last Year: Never true   Transportation Needs: No Transportation Needs (8/31/2021)    PRAPARE - Transportation      Lack of Transportation (Medical): No      Lack of Transportation (Non-Medical): No   Physical Activity: Not on file   Stress: Not on file   Social Connections: Not on file   Intimate Partner Violence: Not on file   Housing Stability: Low Risk  (8/31/2021)    Housing Stability Vital Sign      Unable to Pay for Housing in the Last Year: No      Number of Places Lived in the Last Year: 1      Unstable Housing in the Last Year: No              Lab Results    Chemistry/lipid CBC Cardiac Enzymes/BNP/TSH/INR   Lab Results   Component Value Date    CHOL 87 01/18/2023    HDL 30 (L) 01/18/2023    LDL 30 01/18/2023    TRIG 134 01/18/2023    CR 1.13 03/21/2023    BUN 15.5 03/21/2023    POTASSIUM 4.5 03/21/2023     03/21/2023    CO2 25 03/21/2023      Lab Results   Component Value Date    WBC 7.2 01/18/2023    HGB 14.5 01/18/2023    HCT 44.1 01/18/2023    MCV 89 01/18/2023     01/18/2023    A1C 5.8 (H) 03/21/2023     Lab Results   Component Value Date    A1C 5.8 (H) 03/21/2023    Lab Results   Component Value Date    TROPONINI <0.01 11/21/2020    BNP 79 (H) 07/27/2020    TSH 3.40 08/05/2022    INR 1.10 07/27/2020          Sloan Burns MD [facility]  Non-Invasive Cardiologist  [facility]  Pager [phone]

## 2023-04-17 NOTE — LETTER
4/17/2023    Az Briseno MD  [address] [facility] [address]    RE: Nadya Blake       Dear Colleague,     I had the pleasure of seeing Nadya Blake in the [facility].  HEART CARE ENCOUNTER NOTE            Assessment/Recommendations   Assessment:    Coronary artery disease status post four-vessel coronary artery bypass grafting (left internal mammary artery to the left anterior descending artery, right radial artery to the right posterior descending artery, reversed saphenous venous grafts to obtuse marginal and diagonal artery branches) on 6/24/2020. No ischemia seen on stress cardiac MRI on 12/23/2021 but he has been having exertional dyspnea with his saphenous venous graft to the right posterior descending artery noted to be occluded on coronary angiography 10/6/2022. He now notes only very mild symptoms but may be having side effects from metoprolol.  Chronic congestive heart failure with preserved left ventricular ejection fraction. He seems euvolemic and normal left-sided filling pressure was noted on cardiac catheterization 10/6/2022.  Benign essential hypertension. Controlled.  Dyslipidemia. Last LDL 30 mg/dL.  Non insulin-dependent diabetes mellitus type 2. Last hemoglobin A1c 5.8%.  Former smoker.  Possible obstructive sleep apnea.  BMI 32.12.    Plan:  Continue metoprolol succinate 100 mg daily.  Isosorbide mononitrate and spironolactone were previously discontinued.  Continue with medical management of his coronary artery disease but if his symptoms worsen, we will refer him for percutaneous coronary intervention of the right coronary artery.  Encouraged him to follow-up with scheduling his sleep study.  He does not appear to require loop diuretics at this time.  Rosuvastatin 40 mg and aspirin 81 mg daily.  Follow-up with me in 2 months per patient request.         History of Present Illness   Mr. Nadya Blake is a 57 year old male with a significant past  history of CAD with history of NSTEMI s/p 4V CABG (LIMA to LAD, right radial artery to RPDA, reversed SVGs to an OM and diagonal artery branch) on 6/24/2020, HFpEF, HTN, dyslipidemia, and former smoker presenting for follow-up.     He again contracted COVID-19 last month and is recovering this. He is currently fasting for Ramadan, which has made him feel faint at times. Otherwise, he is doing well and walking 1-2 miles per day. No chest pain/pressure/tightness, shortness of breath at rest or with exertion, syncope, lower extremity swelling, palpitations, paroxysmal nocturnal dyspnea (PND), or orthopnea. He is planning on scheduling an appointment with sleep medicine after Ramadan, although with his vegan diet and weight loss he has been sleeping better and not snoring as much.     Cardiac Problems and Cardiac Diagnostics     Most Recent Cardiac testing:  ECG 10/6/2022 (personally reviewed and interpreted): sinus bradycardia HR 57 bpm with 1st degree AV block  ms, nonspecific T wave changes     ECHO 6/27/2020 (report reviewed):     Normal left ventricular size and systolic function. The left ventricular wall motion is normal. The calculated left ventricular ejection fraction is 71%.    Normal right ventricular size and systolic function.    No hemodynamically significant valvular heart abnormalities.    The ascending aorta is mildly dilated.    When compared to the previous study dated 6/23/2020, no significant change.     Stress cardiac MRI 12/23/2021 (report reviewed):  1.  Pharmacological Regadenoson stress cardiac MRI is negative for inducible myocardial ischemia.   2.  Pharmacological stress ECG is negative for inducible myocardial ischemia.   3. Normal left ventricular size, wall thickness and systolic function. The quantified left ventricular  ejection fraction is 59 %.  Very small area of non-transmural myocardial scar in the mid inferior wall is  identified.    4.  Normal right ventricular size and  systolic function.    5.  No significant valvular abnormalities.  6. Ascending aorta measures 38 mm.      Stress test 5/21/2021 (report reviewed):    The nuclear stress test is negative for inducible myocardial ischemia or infarction.    The left ventricular ejection fraction at stress is 65%.    There is no prior study for comparison.     Cardiac cath 10/6/2022 (report reviewed):   Left ventricular filling pressures are normal.  3rd Mrg lesion is 90% stenosed.  Prox RCA lesion is 75% stenosed.  Prox RCA to Mid RCA lesion is 40% stenosed.  Dist RCA lesion is 75% stenosed.  Mid RCA lesion is 30% stenosed.  RPDA lesion is 50% stenosed.  RPAV lesion is 40% stenosed.  Prox LAD lesion is 70% stenosed.  1st Diag-1 lesion is 95% stenosed.  1st Diag-2 lesion is 95% stenosed.  2nd Diag lesion is 99% stenosed.  Mid LAD-1 lesion is 75% stenosed.  Mid LAD-2 lesion is 75% stenosed.  Dist LAD lesion is 70% stenosed.  Vein graft to right PDA is totally occluded  Vein graft to second diagonal is widely patent  Vein graft to third OM is widely patent  LUZ MARIA graft to mid distal LAD is patent     Medications  Allergies   Current Outpatient Medications   Medication Sig Dispense Refill    ACETAMINOPHEN EXTRA STRENGTH 500 MG tablet TAKE 2 TABLETS(1000 MG) BY MOUTH EVERY 6 HOURS AS NEEDED FOR PAIN 360 tablet 3    aspirin (ASA) 81 MG chewable tablet Take 1 tablet (81 mg) by mouth daily 90 tablet 4    Cholecalciferol (VITAMIN D3) 50 MCG (2000 UT) CAPS Take 1 capsule by mouth daily 90 capsule 3    cyanocobalamin (VITAMIN B-12) 1000 MCG tablet Take 1 tablet (1,000 mcg) by mouth daily 90 tablet 4    diclofenac (VOLTAREN) 1 % topical gel Apply 4 g topically 4 times daily 500 g 2    empagliflozin (JARDIANCE) 25 MG TABS tablet Take 1 tablet (25 mg) by mouth daily 90 tablet 4    escitalopram (LEXAPRO) 20 MG tablet Take 1 tablet (20 mg) by mouth daily 30 tablet 4    Lidocaine 0.5 % GEL Externally apply topically as needed      metoprolol succinate ER  (TOPROL XL) 100 MG 24 hr tablet Take 1.5 tablets (150 mg) by mouth At Bedtime 135 tablet 4    nifedipine 0.2% in white petrolatum 0.2 % OINT ointment Apply topically 4 times daily as needed (anal fissure) 100 g 1    nitroGLYcerin (NITROSTAT) 0.4 MG sublingual tablet For chest pain place 1 tablet under the tongue every 5 minutes for 3 doses. If symptoms persist 5 minutes after 1st dose call 911. 30 tablet 1    polyethylene glycol (MIRALAX) 17 GM/Dose powder Take 17 g (1 capful) by mouth daily 850 g 3    QUEtiapine (SEROQUEL) 100 MG tablet Take 0.5 tablets (50 mg) by mouth At Bedtime 45 tablet 4    rosuvastatin (CRESTOR) 40 MG tablet TAKE 1 TABLET(40 MG) BY MOUTH DAILY 90 tablet 3    tamsulosin (FLOMAX) 0.4 MG capsule TAKE 2 CAPSULES(0.8 MG) BY MOUTH EVERY EVENING 180 capsule 4    escitalopram (LEXAPRO) 10 MG tablet  (Patient not taking: Reported on 4/17/2023)        Allergies   Allergen Reactions    Atorvastatin Diarrhea    Cortisone Other (See Comments)     pain    Lisinopril Cough     started after CABG for unclear reason    Methylprednisolone Other (See Comments)     ?        Physical Examination Review of Systems   /70 (BP Location: Left arm, Patient Position: Sitting, Cuff Size: Adult Regular)   Pulse 52   Resp 16   Wt 87.5 kg (193 lb)   SpO2 97%   BMI 32.12 kg/m    Body mass index is 32.12 kg/m .  Wt Readings from Last 3 Encounters:   04/17/23 87.5 kg (193 lb)   03/21/23 86 kg (189 lb 8 oz)   02/21/23 87 kg (191 lb 11.2 oz)       General Appearance:   Pleasant male, appears stated age. no acute distress, normal body habitus   ENT/Mouth: Facemask.      EYES:  no scleral icterus, normal conjunctivae   Neck: no carotid bruits. No anterior cervical lymphadenopaty   Respiratory:   lungs are clear to auscultation, no rales or wheezing, well-healed sternal scar, equal chest wall expansion    Cardiovascular:   Regular rhythm, normal rate. Normal first and second heart sounds with no murmurs, rubs, or  gallops; the carotid, radial and posterior tibial pulses are intact, Jugular venous pressure normal, no edema bilaterally    Abdomen/GI:  no organomegaly, masses, bruits, or tenderness; bowel sounds are present   Extremities: no cyanosis or clubbing   Skin: no xanthelasma, warm.    Heme/lymph/ Immunology No apparent bleeding noted.   Neurologic: Alert and oriented. normal gait, no tremors     Psychiatric: Pleasant, calm, appropriate affect.    A complete 10 system review of systems was performed and is negative except as mentioned in the HPI/subjective.         Past History   Past Medical History:   Past Medical History:   Diagnosis Date    Acute non-ST elevation myocardial infarction (NSTEMI) (H) 6/22/2020    Adenomatous polyp of colon     Ascending aorta dilatation (H)     Carpal tunnel syndrome     Chronic superficial gastritis     Coronary artery disease due to lipid rich plaque 6/23/2020    Depression with anxiety     Dyslipidemia, goal LDL below 70 6/23/2020    Essential hypertension 9/2/2012    Fatty liver disease, nonalcoholic     GERD (gastroesophageal reflux disease)     IBS (irritable bowel syndrome)     Left lumbar radiculopathy     Multinodular goiter     Old torn meniscus of knee     Paroxysmal atrial fibrillation (H)     Perianal abscess     Post herpetic neuralgia     Post-traumatic osteoarthritis of both knees     Primary osteoarthritis of left hip     Primary osteoarthritis of right hip     Pulmonary nodule     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 Ragsdale    CV CORONARY ANGIOGRAM N/A 6/23/2020    Procedure: Coronary Angiogram;  Surgeon: Ariane Lester MD;  Location: [facility] Cath Lab;  Service: Cardiology     CV CORONARY ANGIOGRAM N/A 10/6/2022    Procedure: Coronary Angiogram;  Surgeon: Javon John MD;  Location: [facility] CV    CV IABP INSERT N/A 6/24/2020    Procedure: Intra Aortic Balloon Pump Insertion;  Surgeon: Ariane Lester MD;  Location: [facility] Cath Lab;  Service: Cardiology    CV LEFT HEART CATH N/A 10/6/2022    Procedure: Left Heart Catheterization;  Surgeon: Javon John MD;  Location: [facility] CV    CV LEFT HEART CATHETERIZATION WITHOUT LEFT VENTRICULOGRAM Left 6/23/2020    Procedure: Left Heart Catheterization Without Left Ventriculogram;  Surgeon: Ariane Lester MD;  Location: [facility] Cath Lab;  Service: Cardiology    CV LEFT VENTRICULOGRAM N/A 10/6/2022    Procedure: Left Ventriculogram;  Surgeon: Javon John MD;  Location: [facility] LAB CV       Family History:   Family History   Problem Relation Age of Onset    No Known Problems Mother     Lung Cancer Father 56        fatal    No Known Problems Daughter     Other - See Comments Son         congenital deformity of right leg; he uses a leg prosthesis        Social History:   Social History     Socioeconomic History    Marital status:      Spouse name: Carlee    Number of children: 2    Years of education: Not on file    Highest education level: Not on file   Occupational History    Not on file   Tobacco Use    Smoking status: Former     Packs/day: 1.00     Years: 30.00     Pack years: 30.00     Types: Cigarettes     Quit date: 3/23/2020     Years since quitting: 3.0    Smokeless tobacco: Never    Tobacco comments:     stopped 3/24/2020   Vaping Use    Vaping status: Never Used   Substance and Sexual Activity    Alcohol use: No    Drug use: Never    Sexual activity: Yes     Partners: Female   Other Topics Concern    Not on file   Social History Narrative    , Carlee, BA chemistry and taking AA courses at [employer].  From [place]; bachelors in geology and computer science. Son, Grace (2005)  and Sherrie (2008).  On SSDI, PTSD.       Social Determinants of Health     Financial Resource Strain: Not on file   Food Insecurity: No Food Insecurity (8/31/2021)    Hunger Vital Sign     Worried About Running Out of Food in the Last Year: Never true     Ran Out of Food in the Last Year: Never true   Transportation Needs: No Transportation Needs (8/31/2021)    PRAPARE - Transportation     Lack of Transportation (Medical): No     Lack of Transportation (Non-Medical): No   Physical Activity: Not on file   Stress: Not on file   Social Connections: Not on file   Intimate Partner Violence: Not on file   Housing Stability: Low Risk  (8/31/2021)    Housing Stability Vital Sign     Unable to Pay for Housing in the Last Year: No     Number of Places Lived in the Last Year: 1     Unstable Housing in the Last Year: No              Lab Results    Chemistry/lipid CBC Cardiac Enzymes/BNP/TSH/INR   Lab Results   Component Value Date    CHOL 87 01/18/2023    HDL 30 (L) 01/18/2023    LDL 30 01/18/2023    TRIG 134 01/18/2023    CR 1.13 03/21/2023    BUN 15.5 03/21/2023    POTASSIUM 4.5 03/21/2023     03/21/2023    CO2 25 03/21/2023      Lab Results   Component Value Date    WBC 7.2 01/18/2023    HGB 14.5 01/18/2023    HCT 44.1 01/18/2023    MCV 89 01/18/2023     01/18/2023    A1C 5.8 (H) 03/21/2023     Lab Results   Component Value Date    A1C 5.8 (H) 03/21/2023    Lab Results   Component Value Date    TROPONINI <0.01 11/21/2020    BNP 79 (H) 07/27/2020    TSH 3.40 08/05/2022    INR 1.10 07/27/2020          Sloan Burns MD [facility]  Non-Invasive Cardiologist  [facility]  Pager [phone]        Thank you for allowing me to participate in the care of your patient.      Sincerely,     Sloan Burns MD     [facility]  cc:   Sloan Burns MD  [address] [facility] [address]  [place] [address]

## 2023-04-25 ENCOUNTER — OFFICE VISIT (OUTPATIENT)
Dept: INTERNAL MEDICINE | Facility: CLINIC | Age: 58
End: 2023-04-25
Payer: COMMERCIAL

## 2023-04-25 VITALS
BODY MASS INDEX: 30.91 KG/M2 | RESPIRATION RATE: 15 BRPM | WEIGHT: 192.3 LBS | SYSTOLIC BLOOD PRESSURE: 92 MMHG | DIASTOLIC BLOOD PRESSURE: 70 MMHG | HEIGHT: 66 IN | HEART RATE: 58 BPM | OXYGEN SATURATION: 97 % | TEMPERATURE: 97.8 F

## 2023-04-25 DIAGNOSIS — I25.83 CORONARY ARTERY DISEASE DUE TO LIPID RICH PLAQUE: Chronic | ICD-10-CM

## 2023-04-25 DIAGNOSIS — B96.81 HELICOBACTER PYLORI GASTRITIS: Primary | ICD-10-CM

## 2023-04-25 DIAGNOSIS — N18.31 CHRONIC KIDNEY DISEASE, STAGE 3A (H): ICD-10-CM

## 2023-04-25 DIAGNOSIS — I10 ESSENTIAL HYPERTENSION: Chronic | ICD-10-CM

## 2023-04-25 DIAGNOSIS — K29.70 HELICOBACTER PYLORI GASTRITIS: Primary | ICD-10-CM

## 2023-04-25 DIAGNOSIS — F33.41 RECURRENT MAJOR DEPRESSIVE DISORDER, IN PARTIAL REMISSION (H): Chronic | ICD-10-CM

## 2023-04-25 DIAGNOSIS — Z95.1 S/P CABG X 4: ICD-10-CM

## 2023-04-25 DIAGNOSIS — F43.10 PTSD (POST-TRAUMATIC STRESS DISORDER): Chronic | ICD-10-CM

## 2023-04-25 DIAGNOSIS — I25.10 CORONARY ARTERY DISEASE DUE TO LIPID RICH PLAQUE: Chronic | ICD-10-CM

## 2023-04-25 PROCEDURE — 99214 OFFICE O/P EST MOD 30 MIN: CPT | Performed by: INTERNAL MEDICINE

## 2023-04-25 ASSESSMENT — PATIENT HEALTH QUESTIONNAIRE - PHQ9: SUM OF ALL RESPONSES TO PHQ QUESTIONS 1-9: 8

## 2023-04-25 ASSESSMENT — PAIN SCALES - GENERAL: PAINLEVEL: NO PAIN (0)

## 2023-04-25 NOTE — PROGRESS NOTES
"  Office Visit - Follow Up   Chetjennifer Blake   57 year old male    Date of Visit: 4/25/2023    Chief Complaint   Patient presents with     Follow Up     1 month follow up     Recheck Medication     RECHECK        Assessment and Plan   1. Helicobacter pylori gastritis  - Helicobacter pylori Antigen Stool; Future  - Helicobacter pylori Antigen Stool    2. Recurrent major depressive disorder, in partial remission (H)  . PTSD (post-traumatic stress disorder)  He is working to establish with a new psychiatrist    4. Coronary artery disease, CABG 6/2020  5. S/P CABG x 4  Continue current plan, secondary prevention    6. Essential hypertension  Blood pressure little bit low today, encouraged adequate fluid and sodium intake    7. Chronic kidney disease, stage 3a (H)  Stable    Return in about 4 weeks (around 5/23/2023) for Follow up.     History of Present Illness   This 57 year old comes in for follow-up.  He said a little bit of GI distress and wonders if he may have H. pylori.  Has previously had this and treated for it but his son is also had similar symptoms.  Otherwise he has been feeling okay.  A little bit of lightheadedness with fasting for Ramadan but this is now over.  Some chronic chest symptoms in the evening which are similar to previous.  He has not yet established with a new psychiatrist yet       Physical Exam   General Appearance:   No acute distress    BP 92/70 (BP Location: Left arm, Patient Position: Sitting, Cuff Size: Adult Regular)   Pulse 58   Temp 97.8  F (36.6  C) (Tympanic)   Resp 15   Ht 1.67 m (5' 5.75\")   Wt 87.2 kg (192 lb 4.8 oz)   SpO2 97%   BMI 31.27 kg/m           Additional Information   Current Outpatient Medications   Medication Sig Dispense Refill     ACETAMINOPHEN EXTRA STRENGTH 500 MG tablet TAKE 2 TABLETS(1000 MG) BY MOUTH EVERY 6 HOURS AS NEEDED FOR PAIN 360 tablet 3     aspirin (ASA) 81 MG chewable tablet Take 1 tablet (81 mg) by mouth daily 90 tablet 4     " Cholecalciferol (VITAMIN D3) 50 MCG (2000 UT) CAPS Take 1 capsule by mouth daily 90 capsule 3     cyanocobalamin (VITAMIN B-12) 1000 MCG tablet Take 1 tablet (1,000 mcg) by mouth daily 90 tablet 4     diclofenac (VOLTAREN) 1 % topical gel Apply 4 g topically 4 times daily 500 g 2     empagliflozin (JARDIANCE) 25 MG TABS tablet Take 1 tablet (25 mg) by mouth daily 90 tablet 4     escitalopram (LEXAPRO) 10 MG tablet        escitalopram (LEXAPRO) 20 MG tablet Take 1 tablet (20 mg) by mouth daily 30 tablet 4     Lidocaine 0.5 % GEL Externally apply topically as needed       metoprolol succinate ER (TOPROL XL) 100 MG 24 hr tablet Take 1.5 tablets (150 mg) by mouth At Bedtime 135 tablet 4     nifedipine 0.2% in white petrolatum 0.2 % OINT ointment Apply topically 4 times daily as needed (anal fissure) 100 g 1     nitroGLYcerin (NITROSTAT) 0.4 MG sublingual tablet For chest pain place 1 tablet under the tongue every 5 minutes for 3 doses. If symptoms persist 5 minutes after 1st dose call 911. 30 tablet 1     polyethylene glycol (MIRALAX) 17 GM/Dose powder Take 17 g (1 capful) by mouth daily 850 g 3     QUEtiapine (SEROQUEL) 100 MG tablet Take 0.5 tablets (50 mg) by mouth At Bedtime 45 tablet 4     rosuvastatin (CRESTOR) 40 MG tablet TAKE 1 TABLET(40 MG) BY MOUTH DAILY 90 tablet 3     tamsulosin (FLOMAX) 0.4 MG capsule TAKE 2 CAPSULES(0.8 MG) BY MOUTH EVERY EVENING 180 capsule 4     Allergies   Allergen Reactions     Atorvastatin Diarrhea     Cortisone Other (See Comments)     pain     Lisinopril Cough     started after CABG for unclear reason     Methylprednisolone Other (See Comments)     ?       Time:      Az Briseno MD  Answers for HPI/ROS submitted by the patient on 4/25/2023  What is the reason for your visit today? : follow up monthly visit  How many servings of fruits and vegetables do you eat daily?: 2-3  On average, how many sweetened beverages do you drink each day (Examples: soda, juice, sweet tea, etc.  Do  NOT count diet or artificially sweetened beverages)?: 2  How many minutes a day do you exercise enough to make your heart beat faster?: 20 to 29  How many days a week do you exercise enough to make your heart beat faster?: 6  How many days per week do you miss taking your medication?: 0

## 2023-04-26 ENCOUNTER — APPOINTMENT (OUTPATIENT)
Dept: LAB | Facility: CLINIC | Age: 58
End: 2023-04-26
Payer: COMMERCIAL

## 2023-04-26 PROCEDURE — 87338 HPYLORI STOOL AG IA: CPT | Performed by: INTERNAL MEDICINE

## 2023-04-27 LAB — H PYLORI AG STL QL IA: ABNORMAL

## 2023-04-30 NOTE — PROGRESS NOTES
Follow Up Progress Note      Assessment: [facility] RN spoke with patient and his wife Charlette. Charlette reported her 's psychiatrist Dr. Quevedo has left [facility] and he is need of a new psychiatric provider. Patient does have a follow up with psychiatric provider Gabbi Blackmon CNP on 6/2/23. He stated he would like to keep this appointment, but would like to find a psychiatrist who could see him on a long-term basis. He is aware Gabbi is seeing patient's only on a short-term basis. Writer will work with patient to try to find an MD psychiatric provider per his request.   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   No other needs or concerns at this time.     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     Start Date: 2/8/2023 Expected End Date: 8/8/2023    Recent Progress: 80%    Priority: Medium    Note:     Barriers: Health concerns  Strengths: Motivated. Strong family support.   Patient expressed understanding of goal: Yes  Action steps to achieve this goal:  1. I will look into the options of what type of painting interests me.   2. I will also look into community offered classes that may help get me started towards his goal.   3. I will report progress towards this goal at schedule outreach telephone calls from my CCC team.      Discussed on                         Intervention/Education provided during outreach: Discussed the importance of taking his medications as directed. Encouraged him to attend all upcoming appointments as directed. Encouraged him to contact     Care Coordination for any additional needs or  concerns. CCC RN will continue to monitor, support patient with current goal, assist patient is finding new psychiatric provider and will be available to assist as nursing needs arise.     Outreach Frequency: monthly        Plan:   Care Coordinator will follow up in one month.

## 2023-05-01 DIAGNOSIS — K59.01 CONSTIPATION BY DELAYED COLONIC TRANSIT: ICD-10-CM

## 2023-05-01 RX ORDER — POLYETHYLENE GLYCOL 3350 17 G/17G
POWDER, FOR SOLUTION ORAL
Qty: 840 G | Refills: 4 | Status: SHIPPED | OUTPATIENT
Start: 2023-05-01 | End: 2023-11-02

## 2023-05-01 NOTE — TELEPHONE ENCOUNTER
"Last Written Prescription Date:  8/22/2022  Last Fill Quantity: 850 gm,  # refills: 3   Last office visit provider:  4/25/2023     Requested Prescriptions   Pending Prescriptions Disp Refills     polyethylene glycol (MIRALAX) 17 GM/Dose powder [Pharmacy Med Name: POLYETH GLYCOL [id number] NF POWDER 510GM]       Sig: TAKE 17 GRAMS(1 CAPFUL) BY MOUTH DAILY       Laxatives Protocol Passed - 5/1/2023  3:50 AM        Passed - Patient is age 6 or older        Passed - Recent (12 mo) or future (30 days) visit within the authorizing provider's specialty     Patient has had an office visit with the authorizing provider or a provider within the authorizing providers department within the previous 12 mos or has a future within next 30 days. See \"Patient Info\" tab in inbasket, or \"Choose Columns\" in Meds & Orders section of the refill encounter.              Passed - Medication is active on med list             Alysia Menendez RN 05/01/23 11:35 AM      "

## 2023-05-04 DIAGNOSIS — B96.81 HELICOBACTER PYLORI GASTRITIS: Primary | ICD-10-CM

## 2023-05-04 DIAGNOSIS — K29.70 HELICOBACTER PYLORI GASTRITIS: Primary | ICD-10-CM

## 2023-05-05 ENCOUNTER — LAB (OUTPATIENT)
Dept: LAB | Facility: CLINIC | Age: 58
End: 2023-05-05
Payer: COMMERCIAL

## 2023-05-05 DIAGNOSIS — B96.81 HELICOBACTER PYLORI GASTRITIS: ICD-10-CM

## 2023-05-05 DIAGNOSIS — K29.70 HELICOBACTER PYLORI GASTRITIS: ICD-10-CM

## 2023-05-08 ENCOUNTER — APPOINTMENT (OUTPATIENT)
Dept: LAB | Facility: CLINIC | Age: 58
End: 2023-05-08
Payer: COMMERCIAL

## 2023-05-08 PROCEDURE — 87338 HPYLORI STOOL AG IA: CPT

## 2023-05-09 ENCOUNTER — ALLIED HEALTH/NURSE VISIT (OUTPATIENT)
Dept: EDUCATION SERVICES | Facility: CLINIC | Age: 58
End: 2023-05-09
Payer: COMMERCIAL

## 2023-05-09 VITALS — BODY MASS INDEX: 31.42 KG/M2 | WEIGHT: 193.2 LBS

## 2023-05-09 DIAGNOSIS — E11.9 TYPE 2 DIABETES MELLITUS WITHOUT COMPLICATION, WITHOUT LONG-TERM CURRENT USE OF INSULIN (H): Primary | ICD-10-CM

## 2023-05-09 LAB — H PYLORI AG STL QL IA: NEGATIVE

## 2023-05-09 PROCEDURE — G0108 DIAB MANAGE TRN  PER INDIV: HCPCS | Mod: AE

## 2023-05-09 NOTE — LETTER
5/9/2023         RE: Nadya Blake  [address] [facility] [address]        Dear Colleague,    Thank you for referring your patient, Nadya Blake, to the [facility]. Please see a copy of my visit note below.    No notes on file

## 2023-05-09 NOTE — PATIENT INSTRUCTIONS
Eat 3 balanced meals each day - Monitor carb intake and limit to 60-75 grams per meal  This would be equal to 4-5 choices ~  1 choice = 15 grams    Do not wait longer than 4-5 hours to eat something  Snacks limit to no more than 30 grams of carbohydrates or 2 choices  Make sure you include protein source with each meal and at bedtime - this has been shown to help with blood glucose elevations       Incorporate 30 minutes activity into each day - does not need to be all at one time & walking counts     Take diabetes medications as prescribed Continue Jardiance 25 mg daily    Here are some resources for you for vegan eating and meals    https://www.pcrm.org/veganstarterkit  https://www.Beyond the Box     Thank you for coming in to see me today !!!

## 2023-05-15 NOTE — PROGRESS NOTES
Diabetes Self-Management Education & Support    Presents for: Individual review    Type of Service: In Person Visit    Assessment Type:   ASSESSMENT:  Nadya is a very pleasant 57-year-old gentleman who returns to clinic today for follow-up after receiving initial education for new diagnosis of DMT2 in clinic on 3/21/2023.  He arrived today accompanied by his wife Carlee.  Medications were reviewed and Sania reports he continues to take 25 mg of Jardiance daily with no missed or skipped doses.  We again reviewed his current dietary intake and nutritional counseling was provided.  He stated he had tried tofu which was discussed at her last visit and did not care for it.  I provided him today with some sample recipes using chickpeas and also provided him with 2 website's that were specifically devoted to vegan diet.  I again tried to reassure him that being adamantly and strictly vegan was not an absolute necessity for him however he said he will continue to follow the cardiac surgeons instructions.  Sania and Carlee have 2 children who both currently live at home and are in high school.  Sania seems to worry a great deal about a multitude of things and this adds to the stressors he is already facing in his life.    Patient's most recent   Lab Results   Component Value Date    A1C 5.8 03/21/2023     is meeting goal of <7.0    Diabetes knowledge and skills assessment:   Patient is knowledgeable in diabetes management concepts related to: Reducing Risks    Continue education with the following diabetes management concepts: Healthy Eating, Being Active, Reducing Risks and Healthy Coping    Based on learning assessment above, most appropriate setting for further diabetes education would be: Individual setting.      PLAN  Eat 3 balanced meals each day - Monitor carb intake and limit to 60-75 grams per meal  This would be equal to 4-5 choices ~  1 choice = 15 grams    Do not wait longer than 4-5 hours to eat  "something  Snacks limit to no more than 30 grams of carbohydrates or 2 choices  Make sure you include protein source with each meal and at bedtime - this has been shown to help with blood glucose elevations       Incorporate 30 minutes activity into each day - does not need to be all at one time & walking counts     Take diabetes medications as prescribed Continue Jardiance 25 mg daily    Here are some resources for you for vegan eating and meals    https://[email or web address] [facility].org/veganstarterkit  https://www.[employer].VC      Topics to cover at upcoming visits: Healthy Eating, Being Active and Healthy Coping    Follow-up: 6/27/23 this appointment is per patient request    See Care Plan for co-developed, patient-state behavior change goals.  AVS provided for patient today.    Education Materials Provided:  provided him with some recipes and to websites directed toward vegan eating      SUBJECTIVE/OBJECTIVE:  Presents for: Individual review  Accompanied by: Self, Spouse (Carlee)  Diabetes education in the past 24 mo: Yes (LV 3/21/23)  Focus of Visit: Assistance w/ making life changes, Self-care behavioral goal setting, Healthy Eating, Taking Medication, Reducing Risks, Healthy Coping  Diabetes type: Type 2  Disease course: Stable  Transportation concerns: No  Other concerns:: English as a second language  Cultural Influences/Ethnic Background:  Not  or       Diabetes Symptoms & Complications:  Fatigue: Sometimes  Polyuria: Sometimes  Symptom course: Stable  Weight trend: Decreasing  Complications assessed today?: Yes  Heart disease: Yes (CABG x 4 2020)    Patient Problem List and Family Medical History reviewed for relevant medical history, current medical status, and diabetes risk factors.    Vitals:  Wt 87.6 kg (193 lb 3.2 oz)   BMI 31.42 kg/m    Estimated body mass index is 31.42 kg/m  as calculated from the following:    Height as of 4/25/23: 1.67 m (5' 5.75\").    Weight as of this encounter: 87.6 kg " (193 lb 3.2 oz).   Last 3 BP:   BP Readings from Last 3 Encounters:   04/25/23 92/70   04/17/23 102/70   03/21/23 116/80       History   Smoking Status     Former     Packs/day: 1.00     Years: 30.00     Types: Cigarettes     Quit date: 3/23/2020   Smokeless Tobacco     Never       Labs:  Lab Results   Component Value Date    A1C 5.8 03/21/2023     Lab Results   Component Value Date    GLC 93 03/21/2023     05/13/2022     Lab Results   Component Value Date    LDL 30 01/18/2023    LDL 41 08/07/2020     Direct Measure HDL   Date Value Ref Range Status   01/18/2023 30 (L) >=40 mg/dL Final   ]  GFR Estimate   Date Value Ref Range Status   03/21/2023 77 >60 mL/min/1.73m2 Final     Comment:     eGFR calculated using 2021 CKD-EPI equation.   05/24/2021 >60 >60 mL/min/1.73m2 Final     GFR Estimate If Black   Date Value Ref Range Status   05/24/2021 >60 >60 mL/min/1.73m2 Final     Lab Results   Component Value Date    CR 1.13 03/21/2023     No results found for: MICROALBUMIN    Healthy Eating:  Healthy Eating Assessed Today: Yes  Meal planning/habits: Heart healthy, Smaller portions, Other (Vegan)  How many times a week on average do you eat food made away from home (restaurant/take-out)?: 0  Meals include: Breakfast, Lunch, Dinner  Breakfast: 0600 will have fruit or juice  Lunch: 1-2 pm : beans ( white or black) sometimes lentils ,potatoes , tomatoes and cucumber - likes a soup  Dinner: 1 apple, 1/2 banana, handful of grapes  Other: 2-3 times legumes - diet continues to be very limited and he is adamant about continuing to follow cardio surgeons instructions of staying vegan  Beverages: Water, Juice  Has patient met with a dietitian in the past?: No    Being Active:  Being Active Assessed Today: No  Exercise:: Currently not exercising  Barrier to exercise: None    Monitoring:  Monitoring Assessed Today:  (not monitoring)  Times checking blood sugar at home (number): Never    Currently is not monitoring blood glucose  independently at home.  Having to worry about glucose numbers would be ultimately another added stressor for him.    Taking Medications:  Diabetes Medication(s)     Sodium-Glucose Co-Transporter 2 (SGLT2) Inhibitors       empagliflozin (JARDIANCE) 25 MG TABS tablet    Take 1 tablet (25 mg) by mouth daily          Taking Medication Assessed Today: Yes  Current Treatments: Diet, Oral Medication (taken by mouth)  Problems taking diabetes medications regularly?: No  Diabetes medication side effects?: No    Problem Solving:  Problem Solving Assessed Today: Yes  Is the patient at risk for hypoglycemia?: No  Is the patient at risk for DKA?: No  Does patient have severe weather/disaster plan for diabetes management?: Not Needed  Does patient have sick day plan for diabetes management?: Not Needed              Reducing Risks:  Reducing Risks Assessed Today: Yes  Diabetes Risks: Age over 45 years, Sedentary Lifestyle, Hyperlipidemia, Ethnicity  CAD Risks: Diabetes Mellitus, Stress, Sedentary lifestyle, Hypertension, Male sex  Has dilated eye exam at least once a year?: Yes  Feet checked by healthcare provider in the last year?: Yes    Healthy Coping:  Healthy Coping Assessed Today: Yes  Emotional response to diabetes: Concern for health and well-being, Fear/Anxiety  Informal Support system:: Family, Spouse  Stage of change: ACTION (Actively working towards change)  Support resources: In-person Offerings  Patient Activation Measure Survey Score:       View : No data to display.                  Care Plan and Education Provided:  Care Plan: Diabetes   Updates made by Jojo Soria RN since 5/15/2023 12:00 AM      Problem: HbA1C Not In Goal       Goal: Establish Regular Follow-Ups with PCP    This Visit's Progress: 100%   Recent Progress: 100%   Note:    Future follow-up appointments are both in place for PCP as well as [facility].     Goal: Get HbA1C Level in Goal    This Visit's Progress: 100%   Recent Progress: 100%   Note:     Current A1c is 5.8%.     Problem: Diabetes Self-Management Education Needed to Optimize Self-Care Behaviors       Goal: Healthy Eating - follow a healthy eating pattern for diabetes    This Visit's Progress: 30%   Recent Progress: 40%   Note:    Patient has been told by cardio thoracic surgeon that he must become a vegan, and working on assisting him with meal planning and food choices that will both keep him satiated as well as keep his blood glucose in check. Did not like Tofu     Task: Develop individualized healthy eating plan with patient Completed 5/15/2023   Responsible User: Jojo Soria RN      Goal: Taking Medication - patient is consistently taking medications as directed    This Visit's Progress: 100%   Recent Progress: 100%   Note:    No missed/skipped doses     Goal: Reducing Risks - know how to prevent and treat long-term diabetes complications    This Visit's Progress: 80%      Task: Provide education on major complications of diabetes, prevention, early diagnostic measures and treatment of complications Completed 5/15/2023   Responsible User: Jojo Soria RN      Task: Provide education on recommended care for dental, eye and foot health Completed 5/15/2023   Responsible User: Jojo Soria RN      Task: Provide education on Hemoglobin A1c - goals and relationship to blood glucose levels Completed 5/15/2023   Responsible User: Jojo Soria RN      Task: Provide education on recommendations for heart health - lipid levels and goals, blood pressure and goals, and aspirin therapy, if indicated    Responsible User: Jojo Soria RN   Note:    HDL still low - may be genetic     Goal: Healthy Coping - use available resources to cope with the challenges of managing diabetes    This Visit's Progress: 40%   Recent Progress: 60%   Note:    Stress continues to be a big challenge for [facility]     Task: Discuss recognizing feelings about having diabetes Completed 5/15/2023   Responsible  User: Jojo Soria, RN          Thank you,  Jojo Soria RN [facility]  Certified Diabetes Care and   Visit type : DSMT        Time Spent: 60 minutes  Encounter Type: Individual    Any diabetes medication dose changes were made via the CDE Protocol per the patient's referring provider and primary care provider. A copy of this encounter was shared with the provider.     Much or all of the text in this note was generated through the use of the Dragon Dictate voice-to-text software.Errors in spelling or words which seem out of context are unintentional. Sound alike errors, in particular, may have escaped editing.

## 2023-05-25 ENCOUNTER — OFFICE VISIT (OUTPATIENT)
Dept: INTERNAL MEDICINE | Facility: CLINIC | Age: 58
End: 2023-05-25
Payer: COMMERCIAL

## 2023-05-25 VITALS — WEIGHT: 193.5 LBS | HEART RATE: 59 BPM | TEMPERATURE: 96 F | HEIGHT: 66 IN | BODY MASS INDEX: 31.1 KG/M2

## 2023-05-25 DIAGNOSIS — N18.31 CHRONIC KIDNEY DISEASE, STAGE 3A (H): ICD-10-CM

## 2023-05-25 DIAGNOSIS — I25.83 CORONARY ARTERY DISEASE DUE TO LIPID RICH PLAQUE: Primary | Chronic | ICD-10-CM

## 2023-05-25 DIAGNOSIS — I25.10 CORONARY ARTERY DISEASE DUE TO LIPID RICH PLAQUE: Primary | Chronic | ICD-10-CM

## 2023-05-25 DIAGNOSIS — N32.81 OVERACTIVE BLADDER: ICD-10-CM

## 2023-05-25 DIAGNOSIS — I10 ESSENTIAL HYPERTENSION: Chronic | ICD-10-CM

## 2023-05-25 LAB
ALBUMIN UR-MCNC: NEGATIVE MG/DL
APPEARANCE UR: CLEAR
BILIRUB UR QL STRIP: NEGATIVE
COLOR UR AUTO: YELLOW
GLUCOSE UR STRIP-MCNC: >=1000 MG/DL
HGB UR QL STRIP: NEGATIVE
KETONES UR STRIP-MCNC: NEGATIVE MG/DL
LEUKOCYTE ESTERASE UR QL STRIP: NEGATIVE
NITRATE UR QL: NEGATIVE
PH UR STRIP: 5.5 [PH] (ref 5–8)
SP GR UR STRIP: 1.02 (ref 1–1.03)
UROBILINOGEN UR STRIP-ACNC: 0.2 E.U./DL

## 2023-05-25 PROCEDURE — 81003 URINALYSIS AUTO W/O SCOPE: CPT | Performed by: INTERNAL MEDICINE

## 2023-05-25 PROCEDURE — 99213 OFFICE O/P EST LOW 20 MIN: CPT | Performed by: INTERNAL MEDICINE

## 2023-05-25 RX ORDER — OXYBUTYNIN CHLORIDE 5 MG/1
5 TABLET ORAL AT BEDTIME
Qty: 30 TABLET | Refills: 0 | Status: SHIPPED | OUTPATIENT
Start: 2023-05-25 | End: 2023-06-27

## 2023-05-25 NOTE — PROGRESS NOTES
"  Office Visit - Follow Up   Nadya Blake   57 year old male    Date of Visit: 5/25/2023    Chief Complaint   Patient presents with     Recheck Medication     F/up from 4/25 visit-med check/hpylori        Assessment and Plan   1. Coronary artery disease, CABG 6/2020  Continue with secondary prevention    2. Essential hypertension  Blood pressure well controlled actually a bit on the low side which we have been monitoring management    3. Chronic kidney disease, stage 3a (H)  Has been stable we can recheck labs at next visit    4. Overactive bladder  I reviewed his CT scan which does not show any prostatic enlargement.  The symptoms only at night might suggest overactive bladder.  Jardiance certainly could be contributing.  We will try dosing short acting oxybutynin before bed.  Discussed side effects  - oxybutynin (DITROPAN) 5 MG tablet; Take 1 tablet (5 mg) by mouth At Bedtime  Dispense: 30 tablet; Refill: 0  - UA Macroscopic with reflex to Microscopic and Culture; Future  - UA Macroscopic with reflex to Microscopic and Culture    Return in about 4 weeks (around 6/22/2023) for Follow up.     History of Present Illness   This 57 year old man comes in for follow-up.  He is actually feeling quite well.  His stomach issues have resolved and he thinks maybe it had to do with his eating pattern during Ramadan.  Still with some ongoing breathing difficulty and chest tightness going up stairs which is stable.  Recently has noticed increased urinary frequency at night getting up up to 7 times a night.  No urinary frequency or burning during the day.  Had an episode of gross hematuria and does have an appointment with urology in [facility] in July.       Physical Exam   General Appearance:   No acute    Pulse 59   Temp (!) 96  F (35.6  C) (Tympanic)   Ht 1.67 m (5' 5.75\")   Wt 87.8 kg (193 lb 8 oz)   BMI 31.47 kg/m      Heart rate controlled rhythm regular lungs clear to auscultation bilaterally no edema "     Additional Information   Current Outpatient Medications   Medication Sig Dispense Refill     ACETAMINOPHEN EXTRA STRENGTH 500 MG tablet TAKE 2 TABLETS(1000 MG) BY MOUTH EVERY 6 HOURS AS NEEDED FOR PAIN 360 tablet 3     aspirin (ASA) 81 MG chewable tablet Take 1 tablet (81 mg) by mouth daily 90 tablet 4     Cholecalciferol (VITAMIN D3) 50 MCG (2000 UT) CAPS Take 1 capsule by mouth daily 90 capsule 3     cyanocobalamin (VITAMIN B-12) 1000 MCG tablet Take 1 tablet (1,000 mcg) by mouth daily 90 tablet 4     diclofenac (VOLTAREN) 1 % topical gel Apply 4 g topically 4 times daily 500 g 2     empagliflozin (JARDIANCE) 25 MG TABS tablet Take 1 tablet (25 mg) by mouth daily 90 tablet 4     escitalopram (LEXAPRO) 10 MG tablet        escitalopram (LEXAPRO) 20 MG tablet Take 1 tablet (20 mg) by mouth daily 30 tablet 4     Lidocaine 0.5 % GEL Externally apply topically as needed       nifedipine 0.2% in white petrolatum 0.2 % OINT ointment Apply topically 4 times daily as needed (anal fissure) 100 g 1     nitroGLYcerin (NITROSTAT) 0.4 MG sublingual tablet For chest pain place 1 tablet under the tongue every 5 minutes for 3 doses. If symptoms persist 5 minutes after 1st dose call 911. 30 tablet 1     oxybutynin (DITROPAN) 5 MG tablet Take 1 tablet (5 mg) by mouth At Bedtime 30 tablet 0     polyethylene glycol (MIRALAX) 17 GM/Dose powder TAKE 17 GRAMS(1 CAPFUL) BY MOUTH DAILY 840 g 4     QUEtiapine (SEROQUEL) 100 MG tablet Take 0.5 tablets (50 mg) by mouth At Bedtime 45 tablet 4     rosuvastatin (CRESTOR) 40 MG tablet TAKE 1 TABLET(40 MG) BY MOUTH DAILY 90 tablet 3     tamsulosin (FLOMAX) 0.4 MG capsule TAKE 2 CAPSULES(0.8 MG) BY MOUTH EVERY EVENING 180 capsule 4     metoprolol succinate ER (TOPROL XL) 100 MG 24 hr tablet Take 1.5 tablets (150 mg) by mouth At Bedtime 135 tablet 4       Time:      Az Briseno MD  Answers for HPI/ROS submitted by the patient on 5/25/2023  What is the reason for your visit today? : med  f/up  How many servings of fruits and vegetables do you eat daily?: 2-3  On average, how many sweetened beverages do you drink each day (Examples: soda, juice, sweet tea, etc.  Do NOT count diet or artificially sweetened beverages)?: 2  How many minutes a day do you exercise enough to make your heart beat faster?: 10 to 19  How many days a week do you exercise enough to make your heart beat faster?: 7

## 2023-06-02 ENCOUNTER — VIRTUAL VISIT (OUTPATIENT)
Dept: PSYCHIATRY | Facility: CLINIC | Age: 58
End: 2023-06-02
Payer: COMMERCIAL

## 2023-06-02 DIAGNOSIS — F43.10 PTSD (POST-TRAUMATIC STRESS DISORDER): ICD-10-CM

## 2023-06-02 DIAGNOSIS — F33.41 RECURRENT MAJOR DEPRESSIVE DISORDER, IN PARTIAL REMISSION (H): Primary | Chronic | ICD-10-CM

## 2023-06-02 PROCEDURE — 99214 OFFICE O/P EST MOD 30 MIN: CPT | Mod: VID | Performed by: NURSE PRACTITIONER

## 2023-06-02 RX ORDER — QUETIAPINE FUMARATE 50 MG/1
50 TABLET, FILM COATED ORAL AT BEDTIME
Qty: 30 TABLET | Refills: 4 | Status: SHIPPED | OUTPATIENT
Start: 2023-06-02 | End: 2023-09-22

## 2023-06-02 RX ORDER — ESCITALOPRAM OXALATE 20 MG/1
20 TABLET ORAL DAILY
Qty: 30 TABLET | Refills: 4 | Status: SHIPPED | OUTPATIENT
Start: 2023-06-02 | End: 2023-08-01

## 2023-06-02 ASSESSMENT — PATIENT HEALTH QUESTIONNAIRE - PHQ9
10. IF YOU CHECKED OFF ANY PROBLEMS, HOW DIFFICULT HAVE THESE PROBLEMS MADE IT FOR YOU TO DO YOUR WORK, TAKE CARE OF THINGS AT HOME, OR GET ALONG WITH OTHER PEOPLE: VERY DIFFICULT
SUM OF ALL RESPONSES TO PHQ QUESTIONS 1-9: 12
SUM OF ALL RESPONSES TO PHQ QUESTIONS 1-9: 12

## 2023-06-02 NOTE — NURSING NOTE
Is the patient currently in the state of [place]? YES    Visit mode:VIDEO    If the visit is dropped, the patient can be reconnected by: VIDEO VISIT: Text to cell phone: [phone]    Will anyone else be joining the visit? NO      How would you like to obtain your AVS? MyChart    Are changes needed to the allergy or medication list? NO    Pt requested quetiapine 50 mg tablets.  They have to split the 100 mg tablet and it doesn't always work.    Reason for visit: RECHECK    Slovenian : Jam #[id number]  helped with check-in of patient.  Patient declined further help with , wants his wife to assist.    Isela ZIEGLER

## 2023-06-02 NOTE — PATIENT INSTRUCTIONS
"Patient Education   The [facility]  What to Expect  Here's what to expect in the [facility] Program.   About the program  You'll be meeting with a psychiatric doctor to check your mental health. A psychiatric doctor helps you deal with troubling thoughts and feelings by giving you medicine. They'll make sure you know the plan for your care. You may see them for a long time. When you're feeling better, they may refer you back to seeing your family doctor.   If you have any questions, we'll be glad to talk to you.  About visits  Be open  At your visits, please talk openly about your problems. It may feel hard, but it's the best way for us to help you.  Cancelling visits  If you can't come to your visit, please call us right away at [phone]. If you don't cancel at least 24 hours (1 full day) before your visit, that's \"late cancellation.\"  Not showing up for your visits  Being very late is the same as not showing up. You'll be a \"no show\" if:  You're more than 15 minutes late for a 30-minute (half hour) visit.  You're more than 30 minutes late for a 60-minute (full hour) visit.  If you cancel late or don't show up 2 times within 6 months, we may end your care.  Getting help between visits  If you need help between visits, you can call us Monday to Friday from 8 a.m. to 4:30 p.m. at [phone].  Emergency care  Call 911 or go to the nearest emergency department if your life or someone else's life is in danger.  Call 988 anytime to reach the national Suicide and Crisis hotline.  Medicine refills  To refill your medicine, call your pharmacy. You can also call [facility]'s Behavioral Access at [phone], Monday to Friday, 8 a.m. to 4:30 p.m. It can take 1 to 3 business days to get a refill.   Forms, letters, and tests  You may have papers to fill out, like FMLA, short-term disability, and workability. We can help you with these forms at your visits, but you must have an " appointment. You may need more than 1 visit for this, to be in an intensive therapy program, or both.  Before we can give you medicine for ADHD, we may refer you to get tested for it or confirm it another way.  We may not be able to give you an emotional support animal letter.  We don't do mental health checks ordered by the court.   We don't do mental health testing, but we can refer you to get tested.   Thank you for choosing us for your care.  For informational purposes only. Not to replace the advice of your health care provider. Copyright   2022 [facility]. All rights reserved. [employer] 802131 - 12/22.      Treatment Plan:     1.  Continue Lexapro 20 mg daily  2.  Continue quetiapine 50 mg at bedtime  3.  Continue talk therapy to process trauma history and learn skills to manage life stressors    Continue all other medical directions per primary care provider.   Continue all other medications as reviewed per electronic medical record today.   Safety plan reviewed. To the Emergency Department as needed or call after hours crisis line at [phone] or [phone]. [place] Crisis Text Line: Text [place] to 297317  or  Suicide LifeLine Chat: suicidepreventionlifeline.org/chat/  To schedule individual or family therapy, call [facility] at [phone].   Schedule an appointment with me in 8 weeks or sooner as needed.  Call [facility] at [phone] to schedule.  Follow up with primary care provider as planned or for acute medical concerns.  Call the psychiatric nurse line with medication questions or concerns at [phone].  Busy [place] may be used to communicate with your provider, but this is not intended to be used for emergencies.    Crisis Resources:    National Suicide Prevention Lifeline: 115.291.2356 (TTY: 351.455.1613). Call anytime for help.  (www.suicidepreventionlifeline.org)  National [place] on [place]ness (www.gómez.org): 872.544.4266 or  876.572.4228.   Mental Health Association (www.mentalhealth.org): 661-504-0998 or 552-963-0441.  [place] Crisis Text Line: Text [place] to 223755  Suicide LifeLine Chat: suicidepreventionlifeline.org/chat
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
## 2023-06-12 ENCOUNTER — PATIENT OUTREACH (OUTPATIENT)
Dept: CARE COORDINATION | Facility: CLINIC | Age: 58
End: 2023-06-12
Payer: COMMERCIAL

## 2023-06-12 NOTE — PROGRESS NOTES
Follow Up Progress Note      Assessment: CCC RN spoke with patient today to follow up on goal and to discuss any needs or concerns. Patient stated he was doing well today. Patient's wife Carlee said patient continues to have issues with his perianal fissure. She agreed to contact his colorectal surgeon's office to schedule a follow up appointment. Carlee said patient continues have issues with sleep. She is aware a referral was placed for the Sleep Clinic by his Cardiologist. Ash provided her with the telephone number for the Sleep Clinic to schedule an intake appointment. Patient currently working with psychiatric provider, Gabbi Blackmon, who is a CNP. Patient said he feels comfortable working with this provider for the time being, but would like to eventually work with a psychiatric provider who is an MD. Writer will assist  patient with finding a MD psychiatric provider. 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     Care Gaps:    Health Maintenance Due   Topic Date Due     ADVANCE CARE PLANNING  Never done     ZOSTER IMMUNIZATION (1 of 2) Never done     DTAP/TDAP/TD IMMUNIZATION (3 - Td or Tdap) 04/30/2022     COVID-19 Vaccine (5 - Pfizer series) 06/20/2022     A1C  06/21/2023       Scheduled with PCP on 6/27/23      Care Plans  Care Plan: General     Problem: HP GENERAL PROBLEM     Goal: 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     Start Date: 2/8/2023 Expected End Date: 8/8/2023    Recent Progress: 80%    Priority: Medium    Note:     Barriers: Health concerns  Strengths: Motivated. Strong family support.   Patient expressed understanding of goal: Yes  Action steps to achieve this goal:  1. I will look into the options of what type of painting interests me.   2. I will also look into community offered classes that may help get me started towards his goal.   3. I will report progress towards this goal at schedule outreach telephone calls from my CCC team.      Discussed on  6/12/23                        Intervention/Education provided during outreach: Discussed the importance of taking her medications as directed. Encouraged him to attend upcoming appointments as scheduled. Encouraged them to contact Care Coordination for any needs or concerns.      Outreach Frequency: monthly    Plan: CCC RN will continue to monitor, assist patient with finding a MD psychiatric provided, support him with his goal and will be available as nursing needs arise.   Care Coordinator will follow up in on month.

## 2023-06-12 NOTE — LETTER
[facility]  Patient Centered Plan of Care  About Me:        Patient Name:  Nadya Covington and Juanita,     It was so nice talk to you today. Here is a copy of your Care Plan with the goal we are supporting you with at this time. Please let me know if I can help in any other way. I look forward to meeting you in person on the 27th.     Thanks,    Dave Myhre, RN  CCC RN      YOB: 1965  Age:         57 year old   Tracy MRN:    [id number] Telephone Information:  Home Phone [phone]   Mobile [phone]       Address:  [address]  [facility] [address] Email address:  [email or web address]      Emergency Contact(s)    Name Relationship Lgl Grd Work Phone Home Phone Mobile Phone   1. JUANITA BEASLEY Spouse   [phone]            Primary language:  Belarusian     needed? No   [employer]:  [phone] op. 1  Other communication barriers:None    Preferred Method of Communication:     Current living arrangement: I live in a private home with spouse    Mobility Status/ Medical Equipment: Independent        Health Maintenance  Health Maintenance Reviewed: Due/Overdue   Health Maintenance Due   Topic Date Due    ADVANCE CARE PLANNING  Never done    ZOSTER IMMUNIZATION (1 of 2) Never done    DTAP/TDAP/TD IMMUNIZATION (3 - Td or Tdap) 04/30/2022    COVID-19 Vaccine (5 - Pfizer series) 06/20/2022    A1C  06/21/2023          My Access Plan  Medical Emergency 911   Primary Clinic Line [facility] - [phone]   24 Hour Appointment Line [phone] or  [phone]) (toll-free)   24 Hour Nurse Line [phone] (toll-free)   Preferred Urgent Care [facility] [place] [facility], [phone]       Preferred Hospital [facility]  [phone]       Preferred Pharmacy [id number] - [place], [place] - [address] AT [facility] [address]     Behavioral Health Crisis  Line The National Suicide Prevention Lifeline at 1-812.401.9271 or Text/Call 988             My Care Team Members  Patient Care Team         Relationship Specialty Notifications Start End    Az Briseno MD PCP - General   9/22/20     Phone: [phone] Fax: [phone]         [address] [facility] [address]    Myhre, David J, RN Lead Care Coordinator Primary Care - CC Admissions 11/19/20      Phone: [phone]    Az Briseno MD Assigned PCP   6/16/21     Phone: [phone] Fax: [phone] [address] [facility] [address]    Franco Quevedo MD Assigned Behavioral Health Provider   7/16/21     Phone: [phone] Fax: [phone]         [address] [facility] [place] [address] [facility] [address]    Inessa Cade ARMHS worker   11/15/21     Phone: [phone]         Sloan Burns MD Assigned Heart and Vascular Provider   12/12/21     Phone: [phone] Fax: [phone]         [address] [facility] [address] [facility] [address]    Az Craft, APRN CNP Assigned Sleep Provider   1/21/23     Phone: [phone] Fax: [phone]         [address] [facility] [address] [facility] [address]    Jojo Soria RN Registered Nurse Diabetes Education  3/21/23               My Care Plans  Self Management and Treatment Plan  Care Plan  Care Plan: General       Problem: HP GENERAL PROBLEM       Goal: 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       Start Date: 2/8/2023 Expected End Date: 8/8/2023    Recent Progress: 80%    Priority: Medium    Note:     Barriers: Health concerns  Strengths: Motivated. Strong family support.   Patient expressed understanding of goal: Yes  Action steps to achieve this goal:  1. I will look into the options of what type of painting interests me.   2. I will also look into community offered classes that may help get me started towards his goal.   3. I will report progress towards this goal at schedule outreach telephone calls from my CCC team.       Discussed on 6/12/23                               Action Plans on File:                       Advance Care Plans/Directives Type:   No data recorded    My Medical and Care Information  Problem List   Patient Active Problem List   Diagnosis    Recurrent major depressive disorder, in partial remission (H)    Gastroesophageal reflux disease with esophagitis without hemorrhage    Essential hypertension    Non-toxic multinodular goiter    Post-traumatic osteoarthritis of both knees    Primary osteoarthritis of left hip    Primary osteoarthritis of right hip    Coronary artery disease, CABG 6/2020    Dyslipidemia, goal LDL below 70    Benign prostatic hyperplasia with nocturia    PTSD (post-traumatic stress disorder)    Ascending aorta dilatation (H)    Anal fistula    Simple chronic bronchitis (H)    Polyp of colon    Pulmonary nodule    S/P CABG x 4    Thyroid nodule    Nonalcoholic fatty liver disease    Chronic kidney disease, stage 3a (H)      Current Medications and Allergies:  See printed Medication Report.    Care Coordination Start Date: 11/19/2020   Frequency of Care Coordination: monthly       Form Last Updated: 06/12/2023

## 2023-06-27 ENCOUNTER — OFFICE VISIT (OUTPATIENT)
Dept: INTERNAL MEDICINE | Facility: CLINIC | Age: 58
End: 2023-06-27
Payer: COMMERCIAL

## 2023-06-27 ENCOUNTER — ALLIED HEALTH/NURSE VISIT (OUTPATIENT)
Dept: EDUCATION SERVICES | Facility: CLINIC | Age: 58
End: 2023-06-27
Payer: COMMERCIAL

## 2023-06-27 VITALS
DIASTOLIC BLOOD PRESSURE: 78 MMHG | HEART RATE: 50 BPM | RESPIRATION RATE: 17 BRPM | BODY MASS INDEX: 31.5 KG/M2 | OXYGEN SATURATION: 98 % | HEIGHT: 66 IN | SYSTOLIC BLOOD PRESSURE: 106 MMHG | WEIGHT: 196 LBS | TEMPERATURE: 97.8 F

## 2023-06-27 VITALS — BODY MASS INDEX: 31.13 KG/M2 | WEIGHT: 191.4 LBS

## 2023-06-27 DIAGNOSIS — I25.118 CORONARY ARTERY DISEASE OF NATIVE ARTERY OF NATIVE HEART WITH STABLE ANGINA PECTORIS (H): Primary | ICD-10-CM

## 2023-06-27 DIAGNOSIS — N32.81 OVERACTIVE BLADDER: ICD-10-CM

## 2023-06-27 DIAGNOSIS — E11.9 TYPE 2 DIABETES MELLITUS WITHOUT COMPLICATION, WITHOUT LONG-TERM CURRENT USE OF INSULIN (H): ICD-10-CM

## 2023-06-27 DIAGNOSIS — I10 ESSENTIAL HYPERTENSION: Chronic | ICD-10-CM

## 2023-06-27 DIAGNOSIS — E11.9 TYPE 2 DIABETES MELLITUS WITHOUT COMPLICATION, WITHOUT LONG-TERM CURRENT USE OF INSULIN (H): Primary | ICD-10-CM

## 2023-06-27 LAB
ALBUMIN SERPL BCG-MCNC: 4.3 G/DL (ref 3.5–5.2)
ALP SERPL-CCNC: 73 U/L (ref 40–129)
ALT SERPL W P-5'-P-CCNC: 36 U/L (ref 0–70)
ANION GAP SERPL CALCULATED.3IONS-SCNC: 10 MMOL/L (ref 7–15)
AST SERPL W P-5'-P-CCNC: 27 U/L (ref 0–45)
BILIRUB SERPL-MCNC: 0.3 MG/DL
BUN SERPL-MCNC: 10.9 MG/DL (ref 6–20)
CALCIUM SERPL-MCNC: 8.9 MG/DL (ref 8.6–10)
CHLORIDE SERPL-SCNC: 106 MMOL/L (ref 98–107)
CREAT SERPL-MCNC: 1.1 MG/DL (ref 0.67–1.17)
CREAT UR-MCNC: 142 MG/DL
DEPRECATED HCO3 PLAS-SCNC: 25 MMOL/L (ref 22–29)
GFR SERPL CREATININE-BSD FRML MDRD: 79 ML/MIN/1.73M2
GLUCOSE SERPL-MCNC: 98 MG/DL (ref 70–99)
HBA1C MFR BLD: 5.7 % (ref 0–5.6)
MICROALBUMIN UR-MCNC: <12 MG/L
MICROALBUMIN/CREAT UR: NORMAL MG/G{CREAT}
POTASSIUM SERPL-SCNC: 4.6 MMOL/L (ref 3.4–5.3)
PROT SERPL-MCNC: 6.9 G/DL (ref 6.4–8.3)
SODIUM SERPL-SCNC: 141 MMOL/L (ref 136–145)

## 2023-06-27 PROCEDURE — 36415 COLL VENOUS BLD VENIPUNCTURE: CPT | Performed by: INTERNAL MEDICINE

## 2023-06-27 PROCEDURE — 82570 ASSAY OF URINE CREATININE: CPT | Performed by: INTERNAL MEDICINE

## 2023-06-27 PROCEDURE — 82043 UR ALBUMIN QUANTITATIVE: CPT | Performed by: INTERNAL MEDICINE

## 2023-06-27 PROCEDURE — G0108 DIAB MANAGE TRN  PER INDIV: HCPCS | Mod: AE

## 2023-06-27 PROCEDURE — 80053 COMPREHEN METABOLIC PANEL: CPT | Performed by: INTERNAL MEDICINE

## 2023-06-27 PROCEDURE — 99214 OFFICE O/P EST MOD 30 MIN: CPT | Performed by: INTERNAL MEDICINE

## 2023-06-27 PROCEDURE — 83036 HEMOGLOBIN GLYCOSYLATED A1C: CPT | Performed by: INTERNAL MEDICINE

## 2023-06-27 RX ORDER — OXYBUTYNIN CHLORIDE 5 MG/1
5 TABLET ORAL AT BEDTIME
Qty: 90 TABLET | Refills: 4 | Status: SHIPPED | OUTPATIENT
Start: 2023-06-27 | End: 2024-07-29

## 2023-06-27 ASSESSMENT — PAIN SCALES - GENERAL: PAINLEVEL: NO PAIN (0)

## 2023-06-27 NOTE — PROGRESS NOTES
Office Visit - Follow Up   Nadya Blake   57 year old male    Date of Visit: 6/27/2023    Chief Complaint   Patient presents with     Follow Up     Follow up for sleep issues        Assessment and Plan   1. Coronary artery disease, CABG 6/2020  Patient with complex disease.  One of his grafts is occluded bypassing the right coronary artery and he has two 75% lesions in the right coronary artery.  His symptoms at night certainly could be anginal in nature.  He does have a follow-up with his cardiologist in August.  At this time if his symptoms remain as it is, okay to monitor.  If his chest pain lasts for more than 30 seconds to a minute he should take nitroglycerin and if the pain does not resolve he should go to the emergency room.  Otherwise continue current management.    2. Type 2 diabetes mellitus without complication, without long-term current use of insulin (H)  - HEMOGLOBIN A1C; Future  - Comprehensive metabolic panel; Future  - Albumin Random Urine Quantitative with Creat Ratio; Future  - HEMOGLOBIN A1C  - Comprehensive metabolic panel  - Albumin Random Urine Quantitative with Creat Ratio    3. Essential hypertension  Blood pressure okay    4. Overactive bladder  This has been helpful  - oxybutynin (DITROPAN) 5 MG tablet; Take 1 tablet (5 mg) by mouth At Bedtime  Dispense: 90 tablet; Refill: 4      Return in about 4 weeks (around 7/25/2023) for Follow up.     History of Present Illness   This 57 year old comes in for follow-up.  He has been having some chest pain at night.  Wheezing sensation in his chest which lasts about 30 seconds and goes away spontaneously.  He has not needed to use nitroglycerin.  No symptoms during the day time hours nonexertional.  No associated nausea or diaphoresis.  No associated shortness of breath.       Physical Exam   General Appearance:   No acute distress    /78 (BP Location: Left arm, Patient Position: Sitting, Cuff Size: Adult Regular)   Pulse 50   Temp  "97.8  F (36.6  C) (Tympanic)   Resp 17   Ht 1.67 m (5' 5.75\")   Wt 88.9 kg (196 lb)   SpO2 98%   BMI 31.88 kg/m           Additional Information   Current Outpatient Medications   Medication Sig Dispense Refill     ACETAMINOPHEN EXTRA STRENGTH 500 MG tablet TAKE 2 TABLETS(1000 MG) BY MOUTH EVERY 6 HOURS AS NEEDED FOR PAIN 360 tablet 3     aspirin (ASA) 81 MG chewable tablet Take 1 tablet (81 mg) by mouth daily 90 tablet 4     Cholecalciferol (VITAMIN D3) 50 MCG (2000 UT) CAPS Take 1 capsule by mouth daily 90 capsule 3     cyanocobalamin (VITAMIN B-12) 1000 MCG tablet Take 1 tablet (1,000 mcg) by mouth daily 90 tablet 4     diclofenac (VOLTAREN) 1 % topical gel Apply 4 g topically 4 times daily 500 g 2     empagliflozin (JARDIANCE) 25 MG TABS tablet Take 1 tablet (25 mg) by mouth daily 90 tablet 4     escitalopram (LEXAPRO) 20 MG tablet Take 1 tablet (20 mg) by mouth daily 30 tablet 4     Lidocaine 0.5 % GEL Externally apply topically as needed       metoprolol succinate ER (TOPROL XL) 100 MG 24 hr tablet Take 1.5 tablets (150 mg) by mouth At Bedtime 135 tablet 4     nifedipine 0.2% in white petrolatum 0.2 % OINT ointment Apply topically 4 times daily as needed (anal fissure) 100 g 1     nitroGLYcerin (NITROSTAT) 0.4 MG sublingual tablet For chest pain place 1 tablet under the tongue every 5 minutes for 3 doses. If symptoms persist 5 minutes after 1st dose call 911. 30 tablet 1     oxybutynin (DITROPAN) 5 MG tablet Take 1 tablet (5 mg) by mouth At Bedtime 90 tablet 4     polyethylene glycol (MIRALAX) 17 GM/Dose powder TAKE 17 GRAMS(1 CAPFUL) BY MOUTH DAILY 840 g 4     QUEtiapine (SEROQUEL) 50 MG tablet Take 1 tablet (50 mg) by mouth At Bedtime 30 tablet 4     rosuvastatin (CRESTOR) 40 MG tablet TAKE 1 TABLET(40 MG) BY MOUTH DAILY 90 tablet 3     tamsulosin (FLOMAX) 0.4 MG capsule TAKE 2 CAPSULES(0.8 MG) BY MOUTH EVERY EVENING 180 capsule 4       Time:      Az Briseno MD  Answers for HPI/ROS submitted by the " patient on 6/27/2023  Do you take any over the counter pain medicine?  : No  Do you take an aspirin every day?: Yes  How many servings of fruits and vegetables do you eat daily?: 2-3  On average, how many sweetened beverages do you drink each day (Examples: soda, juice, sweet tea, etc.  Do NOT count diet or artificially sweetened beverages)?: 2  How many minutes a day do you exercise enough to make your heart beat faster?: 20 to 29  How many days a week do you exercise enough to make your heart beat faster?: 7  How many days per week do you miss taking your medication?: 0

## 2023-06-27 NOTE — PROGRESS NOTES
Diabetes Self-Management Education & Support    Presents for: Individual review    Type of Service: In Person Visit    Assessment Type:   ASSESSMENT:  Sania is a very pleasant 57-year-old gentleman who returns to clinic today for follow-up per his request.  He arrived today accompanied by his wife Carlee.  Medications were reviewed and Sania reports he continues to take 25 mg of Jardiance daily with no missed or skipped doses.  We again reviewed his current dietary intake and nutritional counseling was provided.  He continues to follow a very strict vegan diet.Since our last visit he has discovered Vegan burgers which he says he likes very much.  Prior to our appointment today Sania met with his primary care provider Dr. Briseno.  At this visit lab work was done, including an A1c, and I was very pleased to see his A1c returned today at 5.7%.  I congratulated him on this and informed him his glycemic control continues to be optimal.  Encouraged him to keep up the good work. Sania and Carlee have 2 children who both currently live at home and are in high school.  Sania seems to worry a great deal about a multitude of things and this adds to the stressors he is already facing in his life.  Currently his son appears to have health issues and they are having problems with her vehicle.    Patient's most recent   Lab Results   Component Value Date    A1C 5.7 06/27/2023     is meeting goal of <7.0    Diabetes knowledge and skills assessment:   Patient is knowledgeable in diabetes management concepts related to: Healthy Eating, Being Active, Taking Medication and Reducing Risks    Continue education with the following diabetes management concepts: Healthy Eating, Problem Solving, Reducing Risks and Healthy Coping    Based on learning assessment above, most appropriate setting for further diabetes education would be: Individual setting.      PLAN  1. Eat 3 balanced meals each day - Monitor carb intake and limit to  60-75 grams per meal  This would be equal to 4-5 choices ~  1 choice = 15 grams    Do not wait longer than 4-5 hours to eat something  Snacks limit to no more than 30 grams of carbohydrates or 2 choices  Make sure you include protein source with each meal and at bedtime - this has been shown to help with blood glucose elevations    2.  Keep active daily Incorporate 30 minutes activity into each day - does not need to be all at one time & walking counts    3. Take diabetes medications as prescribed - continue Jardiance  25 mg daily       Topics to cover at upcoming visits: Healthy Eating, Being Active, Taking Medication and Reducing Risks    Follow-up: 8/7/23 -I informed him today that such frequent visits were not a necessity as he continues to do so well, but he was insistent on an appointment sooner than 6 months or 1 year.    See Care Plan for co-developed, patient-state behavior change goals.  AVS provided for patient today.    Education Materials Provided:  No new materials provided today      SUBJECTIVE/OBJECTIVE:  Presents for: Individual review  Accompanied by: Self, Spouse (Carlee)  Diabetes education in the past 24 mo: Yes (LV 5/9/23)  Focus of Visit: Assistance w/ making life changes, Self-care behavioral goal setting, Healthy Eating, Taking Medication, Reducing Risks, Healthy Coping, Being Active  Diabetes type: Type 2  Date of diagnosis: 2023  Disease course: Stable (A1c today 5.7%)  Transportation concerns: No  Other concerns:: English as a second language  Cultural Influences/Ethnic Background:  Not  or       Diabetes Symptoms & Complications:  Fatigue: Sometimes  Polyuria: Sometimes  Symptom course: Stable  Weight trend: Decreasing  Complications assessed today?: Yes  Heart disease: Yes (CABG x 4   2020)    Patient Problem List and Family Medical History reviewed for relevant medical history, current medical status, and diabetes risk factors.    Vitals:  Wt 86.8 kg (191 lb 6.4 oz)   BMI  "31.13 kg/m    Estimated body mass index is 31.13 kg/m  as calculated from the following:    Height as of an earlier encounter on 6/27/23: 1.67 m (5' 5.75\").    Weight as of this encounter: 86.8 kg (191 lb 6.4 oz).   Last 3 BP:   BP Readings from Last 3 Encounters:   06/27/23 106/78   04/25/23 92/70   04/17/23 102/70       History   Smoking Status     Former     Packs/day: 1.00     Years: 30.00     Types: Cigarettes     Quit date: 3/23/2020   Smokeless Tobacco     Never       Labs:  Lab Results   Component Value Date    A1C 5.7 06/27/2023     Lab Results   Component Value Date    GLC 93 03/21/2023     05/13/2022     Lab Results   Component Value Date    LDL 30 01/18/2023    LDL 41 08/07/2020     Direct Measure HDL   Date Value Ref Range Status   01/18/2023 30 (L) >=40 mg/dL Final   ]  GFR Estimate   Date Value Ref Range Status   03/21/2023 77 >60 mL/min/1.73m2 Final     Comment:     eGFR calculated using 2021 CKD-EPI equation.   05/24/2021 >60 >60 mL/min/1.73m2 Final     GFR Estimate If Black   Date Value Ref Range Status   05/24/2021 >60 >60 mL/min/1.73m2 Final     Lab Results   Component Value Date    CR 1.13 03/21/2023     No results found for: MICROALBUMIN    Healthy Eating:  Healthy Eating Assessed Today: Yes  Meal planning/habits: Heart healthy, Smaller portions, Other (Vegan)  How many times a week on average do you eat food made away from home (restaurant/take-out)?: 0  Meals include: Breakfast, Lunch, Dinner  Breakfast: 0600 juice made with cinnamon, parsley , tsp honey  Lunch: 1-2 pm : beans ( white or black) sometimes lentils, rice, okra, mushrooms - largest meal  Dinner: custard with almond milk and coconut  Snacks: juice - apple or orange  Other: has found a vegan burger he likes very much  Beverages: Water, Juice  Has patient met with a dietitian in the past?: No    Being Active:  Being Active Assessed Today: Yes  Exercise:: Yes (walks 2 mi daily - either outside or if too hot, on " treadmill)  Days per week of moderate to strenuous exercise (like a brisk walk): 7  On average, minutes per day of exercise at this level: 40  How intense was your typical exercise? : Light (like stretching or slow walking)  Exercise Minutes per Week: 280  Barrier to exercise: None    Monitoring:  Monitoring Assessed Today:  (not monitoring)  Times checking blood sugar at home (number): Never    Not currently monitoring BG at home    Taking Medications:  Diabetes Medication(s)     Sodium-Glucose Co-Transporter 2 (SGLT2) Inhibitors       empagliflozin (JARDIANCE) 25 MG TABS tablet    Take 1 tablet (25 mg) by mouth daily          Taking Medication Assessed Today: Yes  Current Treatments: Diet, Oral Medication (taken by mouth)  Problems taking diabetes medications regularly?: No  Diabetes medication side effects?: No    Problem Solving:  Problem Solving Assessed Today: Yes  Is the patient at risk for hypoglycemia?: No  Is the patient at risk for DKA?: No  Does patient have severe weather/disaster plan for diabetes management?: Not Needed  Does patient have sick day plan for diabetes management?: Not Needed              Reducing Risks:  Reducing Risks Assessed Today: Yes  Diabetes Risks: Age over 45 years, Sedentary Lifestyle, Hyperlipidemia, Ethnicity  CAD Risks: Diabetes Mellitus, Stress, Sedentary lifestyle, Hypertension, Male sex (Sania is a worrier)  Has dilated eye exam at least once a year?: Yes  Feet checked by healthcare provider in the last year?: Yes    Healthy Coping:  Healthy Coping Assessed Today: Yes  Emotional response to diabetes: Concern for health and well-being, Fear/Anxiety  Informal Support system:: Family, Spouse  Stage of change: ACTION (Actively working towards change)  Support resources: In-person Offerings  Patient Activation Measure Survey Score:       No data to display                  Care Plan and Education Provided:  Care Plan: Diabetes   Updates made by Jojo Soria RN since  6/27/2023 12:00 AM      Problem: HbA1C Not In Goal       Goal: Establish Regular Follow-Ups with PCP    This Visit's Progress: 100%   Recent Progress: 100%   Note:    Future follow-up appointments are both in place for PCP as well as [facility].     Goal: Get HbA1C Level in Goal    Recent Progress: 100%   Note:    Current A1c is 5.7%.     Problem: Diabetes Self-Management Education Needed to Optimize Self-Care Behaviors       Goal: Healthy Eating - follow a healthy eating pattern for diabetes    This Visit's Progress: 90%   Recent Progress: 30%   Note:    Pt eating 3 meals daily - would like to see more balance - protein in each meal     Goal: Being Active - get regular physical activity, working up to at least 150 minutes per week    This Visit's Progress: 100%   Note:    Walking 2 mi daily     Goal: Taking Medication - patient is consistently taking medications as directed    This Visit's Progress: 100%   Recent Progress: 100%   Note:    No missed/skipped doses.     Goal: Healthy Coping - use available resources to cope with the challenges of managing diabetes    This Visit's Progress: 60%   Recent Progress: 40%   Note:    Stress continues to be a big challenge for [facility]         Thank you,  Jojo Arce RN [facility]  Certified Diabetes Care and   Visit type : DSMT        Time Spent: 30 minutes  Encounter Type: Individual    Any diabetes medication dose changes were made via the CDE Protocol per the patient's referring provider and primary care provider. A copy of this encounter was shared with the provider.     Much or all of the text in this note was generated through the use of the Dragon Dictate voice-to-text software.Errors in spelling or words which seem out of context are unintentional. Sound alike errors, in particular, may have escaped editing.

## 2023-06-27 NOTE — LETTER
6/27/2023         RE: Nadya Blake  [address]  [place] [address]        Dear Colleague,    Thank you for referring your patient, Nadya Blake, to the [facility]. Please see a copy of my visit note below.    Diabetes Self-Management Education & Support    Presents for: Individual review    Type of Service: In Person Visit    Assessment Type:   ASSESSMENT:  Sania is a very pleasant 57-year-old gentleman who returns to clinic today for follow-up per his request.  He arrived today accompanied by his wife Carlee.  Medications were reviewed and Sania reports he continues to take 25 mg of Jardiance daily with no missed or skipped doses.  We again reviewed his current dietary intake and nutritional counseling was provided.  He continues to follow a very strict vegan diet.Since our last visit he has discovered Vegan burgers which he says he likes very much.  Prior to our appointment today Sania met with his primary care provider Dr. Briseno.  At this visit lab work was done, including an A1c, and I was very pleased to see his A1c returned today at 5.7%.  I congratulated him on this and informed him his glycemic control continues to be optimal.  Encouraged him to keep up the good work. Sania and Carlee have 2 children who both currently live at home and are in high school.  Sania seems to worry a great deal about a multitude of things and this adds to the stressors he is already facing in his life.  Currently his son appears to have health issues and they are having problems with her vehicle.    Patient's most recent   Lab Results   Component Value Date    A1C 5.7 06/27/2023     is meeting goal of <7.0    Diabetes knowledge and skills assessment:   Patient is knowledgeable in diabetes management concepts related to: Healthy Eating, Being Active, Taking Medication and Reducing Risks    Continue education with the following diabetes management concepts: Healthy  Eating, Problem Solving, Reducing Risks and Healthy Coping    Based on learning assessment above, most appropriate setting for further diabetes education would be: Individual setting.      PLAN  1. Eat 3 balanced meals each day - Monitor carb intake and limit to 60-75 grams per meal  This would be equal to 4-5 choices ~  1 choice = 15 grams    Do not wait longer than 4-5 hours to eat something  Snacks limit to no more than 30 grams of carbohydrates or 2 choices  Make sure you include protein source with each meal and at bedtime - this has been shown to help with blood glucose elevations    2.  Keep active daily Incorporate 30 minutes activity into each day - does not need to be all at one time & walking counts    3. Take diabetes medications as prescribed - continue Jardiance  25 mg daily       Topics to cover at upcoming visits: Healthy Eating, Being Active, Taking Medication and Reducing Risks    Follow-up: 8/7/23 -I informed him today that such frequent visits were not a necessity as he continues to do so well, but he was insistent on an appointment sooner than 6 months or 1 year.    See Care Plan for co-developed, patient-state behavior change goals.  AVS provided for patient today.    Education Materials Provided:  No new materials provided today      SUBJECTIVE/OBJECTIVE:  Presents for: Individual review  Accompanied by: Self, Spouse (Carlee)  Diabetes education in the past 24 mo: Yes (LV 5/9/23)  Focus of Visit: Assistance w/ making life changes, Self-care behavioral goal setting, Healthy Eating, Taking Medication, Reducing Risks, Healthy Coping, Being Active  Diabetes type: Type 2  Date of diagnosis: 2023  Disease course: Stable (A1c today 5.7%)  Transportation concerns: No  Other concerns:: English as a second language  Cultural Influences/Ethnic Background:  Not  or       Diabetes Symptoms & Complications:  Fatigue: Sometimes  Polyuria: Sometimes  Symptom course: Stable  Weight trend:  "Decreasing  Complications assessed today?: Yes  Heart disease: Yes (CABG x 4   2020)    Patient Problem List and Family Medical History reviewed for relevant medical history, current medical status, and diabetes risk factors.    Vitals:  Wt 86.8 kg (191 lb 6.4 oz)   BMI 31.13 kg/m    Estimated body mass index is 31.13 kg/m  as calculated from the following:    Height as of an earlier encounter on 6/27/23: 1.67 m (5' 5.75\").    Weight as of this encounter: 86.8 kg (191 lb 6.4 oz).   Last 3 BP:   BP Readings from Last 3 Encounters:   06/27/23 106/78   04/25/23 92/70   04/17/23 102/70       History   Smoking Status     Former     Packs/day: 1.00     Years: 30.00     Types: Cigarettes     Quit date: 3/23/2020   Smokeless Tobacco     Never       Labs:  Lab Results   Component Value Date    A1C 5.7 06/27/2023     Lab Results   Component Value Date    GLC 93 03/21/2023     05/13/2022     Lab Results   Component Value Date    LDL 30 01/18/2023    LDL 41 08/07/2020     Direct Measure HDL   Date Value Ref Range Status   01/18/2023 30 (L) >=40 mg/dL Final   ]  GFR Estimate   Date Value Ref Range Status   03/21/2023 77 >60 mL/min/1.73m2 Final     Comment:     eGFR calculated using 2021 CKD-EPI equation.   05/24/2021 >60 >60 mL/min/1.73m2 Final     GFR Estimate If Black   Date Value Ref Range Status   05/24/2021 >60 >60 mL/min/1.73m2 Final     Lab Results   Component Value Date    CR 1.13 03/21/2023     No results found for: MICROALBUMIN    Healthy Eating:  Healthy Eating Assessed Today: Yes  Meal planning/habits: Heart healthy, Smaller portions, Other (Vegan)  How many times a week on average do you eat food made away from home (restaurant/take-out)?: 0  Meals include: Breakfast, Lunch, Dinner  Breakfast: 0600 juice made with cinnamon, parsley , tsp honey  Lunch: 1-2 pm : beans ( white or black) sometimes lentils, rice, okra, mushrooms - largest meal  Dinner: custard with almond milk and coconut  Snacks: juice - apple " or orange  Other: has found a vegan burger he likes very much  Beverages: Water, Juice  Has patient met with a dietitian in the past?: No    Being Active:  Being Active Assessed Today: Yes  Exercise:: Yes (walks 2 mi daily - either outside or if too hot, on treadmill)  Days per week of moderate to strenuous exercise (like a brisk walk): 7  On average, minutes per day of exercise at this level: 40  How intense was your typical exercise? : Light (like stretching or slow walking)  Exercise Minutes per Week: 280  Barrier to exercise: None    Monitoring:  Monitoring Assessed Today:  (not monitoring)  Times checking blood sugar at home (number): Never    Not currently monitoring BG at home    Taking Medications:  Diabetes Medication(s)     Sodium-Glucose Co-Transporter 2 (SGLT2) Inhibitors       empagliflozin (JARDIANCE) 25 MG TABS tablet    Take 1 tablet (25 mg) by mouth daily          Taking Medication Assessed Today: Yes  Current Treatments: Diet, Oral Medication (taken by mouth)  Problems taking diabetes medications regularly?: No  Diabetes medication side effects?: No    Problem Solving:  Problem Solving Assessed Today: Yes  Is the patient at risk for hypoglycemia?: No  Is the patient at risk for DKA?: No  Does patient have severe weather/disaster plan for diabetes management?: Not Needed  Does patient have sick day plan for diabetes management?: Not Needed              Reducing Risks:  Reducing Risks Assessed Today: Yes  Diabetes Risks: Age over 45 years, Sedentary Lifestyle, Hyperlipidemia, Ethnicity  CAD Risks: Diabetes Mellitus, Stress, Sedentary lifestyle, Hypertension, Male sex (Sania is a worrier)  Has dilated eye exam at least once a year?: Yes  Feet checked by healthcare provider in the last year?: Yes    Healthy Coping:  Healthy Coping Assessed Today: Yes  Emotional response to diabetes: Concern for health and well-being, Fear/Anxiety  Informal Support system:: Family, Spouse  Stage of change: ACTION  (Actively working towards change)  Support resources: In-person Offerings  Patient Activation Measure Survey Score:       No data to display                  Care Plan and Education Provided:  Care Plan: Diabetes   Updates made by Jojo Soria RN since 6/27/2023 12:00 AM      Problem: HbA1C Not In Goal       Goal: Establish Regular Follow-Ups with PCP    This Visit's Progress: 100%   Recent Progress: 100%   Note:    Future follow-up appointments are both in place for PCP as well as [facility].     Goal: Get HbA1C Level in Goal    Recent Progress: 100%   Note:    Current A1c is 5.7%.     Problem: Diabetes Self-Management Education Needed to Optimize Self-Care Behaviors       Goal: Healthy Eating - follow a healthy eating pattern for diabetes    This Visit's Progress: 90%   Recent Progress: 30%   Note:    Pt eating 3 meals daily - would like to see more balance - protein in each meal     Goal: Being Active - get regular physical activity, working up to at least 150 minutes per week    This Visit's Progress: 100%   Note:    Walking 2 mi daily     Goal: Taking Medication - patient is consistently taking medications as directed    This Visit's Progress: 100%   Recent Progress: 100%   Note:    No missed/skipped doses.     Goal: Healthy Coping - use available resources to cope with the challenges of managing diabetes    This Visit's Progress: 60%   Recent Progress: 40%   Note:    Stress continues to be a big challenge for [facility]         Thank you,  Jojo Soria RN [facility]  Certified Diabetes Care and   Visit type : DSMT        Time Spent: 30 minutes  Encounter Type: Individual    Any diabetes medication dose changes were made via the CDE Protocol per the patient's referring provider and primary care provider. A copy of this encounter was shared with the provider.     Much or all of the text in this note was generated through the use of the Dragon Dictate voice-to-text software.Errors in spelling or  words which seem out of context are unintentional. Sound alike errors, in particular, may have escaped editing.

## 2023-06-27 NOTE — PATIENT INSTRUCTIONS
1. Eat 3 balanced meals each day - Monitor carb intake and limit to 60-75 grams per meal  This would be equal to 4-5 choices ~  1 choice = 15 grams    Do not wait longer than 4-5 hours to eat something  Snacks limit to no more than 30 grams of carbohydrates or 2 choices  Make sure you include protein source with each meal and at bedtime - this has been shown to help with blood glucose elevations    2.  Keep active daily Incorporate 30 minutes activity into each day - does not need to be all at one time & walking counts    3. Take diabetes medications as prescribed - continue Jardiance  25 mg daily     CONGRATULATIONS ON TODAY'S A1C !!!!  5.7%   THIS IS SOMETHIG TO BE VERY PROUD OF !!  KEEP UP THE GOOD WORK

## 2023-07-01 NOTE — TELEPHONE ENCOUNTER
Central Prior Authorization Team  Phone: [phone]    PA Initiation    Medication: QUEtiapine (SEROQUEL) 50 MG tablet  Insurance Company: [employer] - Phone [phone] Fax [phone]  Pharmacy Filling the Rx: "[id number] [place], [place] - Count includes the [facility] AT [facility] [address]  Filling Pharmacy Phone: [phone]  Filling Pharmacy Fax:    Start Date: 12/19/2022       None needed

## 2023-07-06 ENCOUNTER — PATIENT OUTREACH (OUTPATIENT)
Dept: CARE COORDINATION | Facility: CLINIC | Age: 58
End: 2023-07-06
Payer: COMMERCIAL

## 2023-07-19 ENCOUNTER — TRANSFERRED RECORDS (OUTPATIENT)
Dept: HEALTH INFORMATION MANAGEMENT | Facility: CLINIC | Age: 58
End: 2023-07-19
Payer: COMMERCIAL

## 2023-07-20 ENCOUNTER — PATIENT OUTREACH (OUTPATIENT)
Dept: CARE COORDINATION | Facility: CLINIC | Age: 58
End: 2023-07-20
Payer: COMMERCIAL

## 2023-07-27 ENCOUNTER — OFFICE VISIT (OUTPATIENT)
Dept: INTERNAL MEDICINE | Facility: CLINIC | Age: 58
End: 2023-07-27
Payer: COMMERCIAL

## 2023-07-27 VITALS
OXYGEN SATURATION: 98 % | SYSTOLIC BLOOD PRESSURE: 107 MMHG | BODY MASS INDEX: 32.32 KG/M2 | RESPIRATION RATE: 16 BRPM | HEIGHT: 65 IN | TEMPERATURE: 97.9 F | HEART RATE: 55 BPM | WEIGHT: 194 LBS | DIASTOLIC BLOOD PRESSURE: 70 MMHG

## 2023-07-27 DIAGNOSIS — R31.29 MICROSCOPIC HEMATURIA: ICD-10-CM

## 2023-07-27 DIAGNOSIS — I10 ESSENTIAL HYPERTENSION: Chronic | ICD-10-CM

## 2023-07-27 DIAGNOSIS — M25.522 LEFT ELBOW PAIN: ICD-10-CM

## 2023-07-27 DIAGNOSIS — M79.662 PAIN OF LEFT LOWER LEG: ICD-10-CM

## 2023-07-27 DIAGNOSIS — I25.118 CORONARY ARTERY DISEASE OF NATIVE ARTERY OF NATIVE HEART WITH STABLE ANGINA PECTORIS (H): ICD-10-CM

## 2023-07-27 DIAGNOSIS — K60.2 ANAL FISSURE: Primary | ICD-10-CM

## 2023-07-27 PROCEDURE — 99214 OFFICE O/P EST MOD 30 MIN: CPT | Performed by: INTERNAL MEDICINE

## 2023-07-27 NOTE — PROGRESS NOTES
Office Visit - Follow Up   Nadya Blake   57 year old male    Date of Visit: 7/27/2023    Chief Complaint   Patient presents with    OFFICE VISIT    Recheck Medication     PT REPORTS THAT HE IS HERE FOR 1 MONTH FOLLOW UP PER PAST SURGERY.        Assessment and Plan   1. Anal fissure - Dr. Campoverde  It sounds like surgery is being considered.  He would need preoperative evaluation and he will be meeting with Dr. Sloan Burns in August.    2. Microscopic hematuria  It sounds like he will need some sedation for his cystoscopy.    3. Coronary artery disease, CABG 6/2020  I do not think that his left arm pain is anginal in nature as he is quite tender over the flexor tendons of the left arm and this seems to be reproducing the pain he is experiencing.  That said it is post exercise.  I encouraged him to try nitroglycerin at onset of pain to see if the pain goes away within 5 minutes.    4. Essential hypertension  Blood pressure controlled    5. Left elbow pain  Seems to be some flexor tendinitis, discussed some stretching exercises and recommended a band which she has at home    6. Pain of left lower leg  Recommended gluteal exercises    Return in about 4 weeks (around 8/24/2023) for Follow up.     History of Present Illness   This 57 year old man comes in for follow-up.  He has been having some left arm pain.  This comes on in the evening about an hour after he exercises on the treadmill.  They last about a half an hour and then resolves.  He also has some lateral left leg pain and numbness.  He saw Dr. Campoverde with colorectal surgery ongoing anal fissure which is responsive to topical therapy but it sounds like maybe there is some more inflammation associated with this and surgery has been recommended.  He also saw Dr. Franco Tineo for microscopic hematuria.  CT looked okay and cystoscopy was recommended but patient did not want to do this without sedation.       Physical Exam   General Appearance:   No acute  "distress    /70 (BP Location: Left arm, Patient Position: Sitting, Cuff Size: Adult Large)   Pulse 55   Temp 97.9  F (36.6  C) (Tympanic)   Resp 16   Ht 1.651 m (5' 5\")   Wt 88 kg (194 lb)   SpO2 98%   BMI 32.28 kg/m      He has tenderness over the flexor tendons and the lateral bursa of the left elbow.  Some pain with palpation along the fascia of the lateral left upper leg.  Heart rate controlled rhythm regular lungs clear to auscultation bilaterally     Additional Information   Current Outpatient Medications   Medication Sig Dispense Refill    ACETAMINOPHEN EXTRA STRENGTH 500 MG tablet TAKE 2 TABLETS(1000 MG) BY MOUTH EVERY 6 HOURS AS NEEDED FOR PAIN 360 tablet 3    aspirin (ASA) 81 MG chewable tablet Take 1 tablet (81 mg) by mouth daily 90 tablet 4    Cholecalciferol (VITAMIN D3) 50 MCG (2000 UT) CAPS Take 1 capsule by mouth daily 90 capsule 3    cyanocobalamin (VITAMIN B-12) 1000 MCG tablet Take 1 tablet (1,000 mcg) by mouth daily 90 tablet 4    diclofenac (VOLTAREN) 1 % topical gel Apply 4 g topically 4 times daily 500 g 2    empagliflozin (JARDIANCE) 25 MG TABS tablet Take 1 tablet (25 mg) by mouth daily 90 tablet 4    escitalopram (LEXAPRO) 20 MG tablet Take 1 tablet (20 mg) by mouth daily 30 tablet 4    Lidocaine 0.5 % GEL Externally apply topically as needed      metoprolol succinate ER (TOPROL XL) 100 MG 24 hr tablet Take 1.5 tablets (150 mg) by mouth At Bedtime 135 tablet 4    nifedipine 0.2% in white petrolatum 0.2 % OINT ointment Apply topically 4 times daily as needed (anal fissure) 100 g 1    nitroGLYcerin (NITROSTAT) 0.4 MG sublingual tablet For chest pain place 1 tablet under the tongue every 5 minutes for 3 doses. If symptoms persist 5 minutes after 1st dose call 911. 30 tablet 1    oxybutynin (DITROPAN) 5 MG tablet Take 1 tablet (5 mg) by mouth At Bedtime 90 tablet 4    polyethylene glycol (MIRALAX) 17 GM/Dose powder TAKE 17 GRAMS(1 CAPFUL) BY MOUTH DAILY 840 g 4    QUEtiapine " (SEROQUEL) 50 MG tablet Take 1 tablet (50 mg) by mouth At Bedtime 30 tablet 4    rosuvastatin (CRESTOR) 40 MG tablet TAKE 1 TABLET(40 MG) BY MOUTH DAILY 90 tablet 3    tamsulosin (FLOMAX) 0.4 MG capsule TAKE 2 CAPSULES(0.8 MG) BY MOUTH EVERY EVENING 180 capsule 4       Time:      Oseas Perez submitted by the patient for this visit:  Vascular Disease (Submitted on 7/27/2023)  Chief Complaint: Chronic problems general questions HPI Form  Do you take an aspirin every day?: Yes  General Questionnaire (Submitted on 7/27/2023)  Chief Complaint: Chronic problems general questions HPI Form  How many servings of fruits and vegetables do you eat daily?: 2-3  On average, how many sweetened beverages do you drink each day (Examples: soda, juice, sweet tea, etc.  Do NOT count diet or artificially sweetened beverages)?: 2  How many minutes a day do you exercise enough to make your heart beat faster?: 20 to 29  How many days a week do you exercise enough to make your heart beat faster?: 7  How many days per week do you miss taking your medication?: 0

## 2023-08-01 ENCOUNTER — VIRTUAL VISIT (OUTPATIENT)
Dept: PSYCHIATRY | Facility: CLINIC | Age: 58
End: 2023-08-01
Payer: COMMERCIAL

## 2023-08-01 DIAGNOSIS — F43.10 PTSD (POST-TRAUMATIC STRESS DISORDER): ICD-10-CM

## 2023-08-01 DIAGNOSIS — F33.1 DEPRESSION, MAJOR, RECURRENT, MODERATE (H): Primary | ICD-10-CM

## 2023-08-01 DIAGNOSIS — F41.1 GAD (GENERALIZED ANXIETY DISORDER): ICD-10-CM

## 2023-08-01 PROCEDURE — 99214 OFFICE O/P EST MOD 30 MIN: CPT | Mod: VID | Performed by: NURSE PRACTITIONER

## 2023-08-01 RX ORDER — BUSPIRONE HYDROCHLORIDE 5 MG/1
5 TABLET ORAL DAILY
Qty: 60 TABLET | Refills: 1 | Status: SHIPPED | OUTPATIENT
Start: 2023-08-01 | End: 2023-09-22

## 2023-08-01 RX ORDER — ESCITALOPRAM OXALATE 20 MG/1
20 TABLET ORAL DAILY
Qty: 30 TABLET | Refills: 4 | Status: SHIPPED | OUTPATIENT
Start: 2023-08-01 | End: 2023-11-02

## 2023-08-01 ASSESSMENT — PATIENT HEALTH QUESTIONNAIRE - PHQ9
SUM OF ALL RESPONSES TO PHQ QUESTIONS 1-9: 10
SUM OF ALL RESPONSES TO PHQ QUESTIONS 1-9: 10
10. IF YOU CHECKED OFF ANY PROBLEMS, HOW DIFFICULT HAVE THESE PROBLEMS MADE IT FOR YOU TO DO YOUR WORK, TAKE CARE OF THINGS AT HOME, OR GET ALONG WITH OTHER PEOPLE: SOMEWHAT DIFFICULT

## 2023-08-01 ASSESSMENT — ANXIETY QUESTIONNAIRES
6. BECOMING EASILY ANNOYED OR IRRITABLE: NOT AT ALL
5. BEING SO RESTLESS THAT IT IS HARD TO SIT STILL: MORE THAN HALF THE DAYS
7. FEELING AFRAID AS IF SOMETHING AWFUL MIGHT HAPPEN: NEARLY EVERY DAY
2. NOT BEING ABLE TO STOP OR CONTROL WORRYING: NEARLY EVERY DAY
GAD7 TOTAL SCORE: 15
1. FEELING NERVOUS, ANXIOUS, OR ON EDGE: MORE THAN HALF THE DAYS
GAD7 TOTAL SCORE: 15
4. TROUBLE RELAXING: MORE THAN HALF THE DAYS
3. WORRYING TOO MUCH ABOUT DIFFERENT THINGS: NEARLY EVERY DAY
IF YOU CHECKED OFF ANY PROBLEMS ON THIS QUESTIONNAIRE, HOW DIFFICULT HAVE THESE PROBLEMS MADE IT FOR YOU TO DO YOUR WORK, TAKE CARE OF THINGS AT HOME, OR GET ALONG WITH OTHER PEOPLE: SOMEWHAT DIFFICULT

## 2023-08-01 ASSESSMENT — PAIN SCALES - GENERAL: PAINLEVEL: WORST PAIN (10)

## 2023-08-01 NOTE — PROGRESS NOTES
"Virtual Visit Details    Type of service:  Video Visit   Video Start Time: 1:56 PM  Video End Time: 2:20 PM    Originating Location (pt. Location): Home    Distant Location (provider location):  Off-site  Platform used for Video Visit: [facility]           Outpatient Psychiatric Progress Note    Name: Nadya Blake   : 1965                    Primary Care Provider: Az rBiseno MD   Therapist: Yes    PHQ-9 scores:      2023     8:51 AM 2023     9:20 AM 2023    12:43 PM   PHQ-9 SCORE   PHQ-9 Total Score MyChart 7 (Mild depression)  12 (Moderate depression)   PHQ-9 Total Score 7 8 12       ISI-7 scores:      2023     9:04 AM   ISI-7 SCORE   Total Score 6 (mild anxiety)   Total Score 6       Patient Identification:    Patient is a 58 year old year old,    Not  or  male  who presents for return visit with me.  Patient is currently unemployed and disabled. Patient attended the session with his wife , who they agreed to have interview with. Patient prefers to be called: \" Nadya\".    Current medications include: ACETAMINOPHEN EXTRA STRENGTH 500 MG tablet, TAKE 2 TABLETS(1000 MG) BY MOUTH EVERY 6 HOURS AS NEEDED FOR PAIN  aspirin (ASA) 81 MG chewable tablet, Take 1 tablet (81 mg) by mouth daily  Cholecalciferol (VITAMIN D3) 50 MCG (2000 UT) CAPS, Take 1 capsule by mouth daily  cyanocobalamin (VITAMIN B-12) 1000 MCG tablet, Take 1 tablet (1,000 mcg) by mouth daily  diclofenac (VOLTAREN) 1 % topical gel, Apply 4 g topically 4 times daily  empagliflozin (JARDIANCE) 25 MG TABS tablet, Take 1 tablet (25 mg) by mouth daily  escitalopram (LEXAPRO) 20 MG tablet, Take 1 tablet (20 mg) by mouth daily  Lidocaine 0.5 % GEL, Externally apply topically as needed  metoprolol succinate ER (TOPROL XL) 100 MG 24 hr tablet, Take 1.5 tablets (150 mg) by mouth At Bedtime  nifedipine 0.2% in white petrolatum 0.2 % OINT ointment, Apply topically 4 times daily as needed (anal " fissure)  nitroGLYcerin (NITROSTAT) 0.4 MG sublingual tablet, For chest pain place 1 tablet under the tongue every 5 minutes for 3 doses. If symptoms persist 5 minutes after 1st dose call 911.  oxybutynin (DITROPAN) 5 MG tablet, Take 1 tablet (5 mg) by mouth At Bedtime  polyethylene glycol (MIRALAX) 17 GM/Dose powder, TAKE 17 GRAMS(1 CAPFUL) BY MOUTH DAILY  QUEtiapine (SEROQUEL) 50 MG tablet, Take 1 tablet (50 mg) by mouth At Bedtime  rosuvastatin (CRESTOR) 40 MG tablet, TAKE 1 TABLET(40 MG) BY MOUTH DAILY  tamsulosin (FLOMAX) 0.4 MG capsule, TAKE 2 CAPSULES(0.8 MG) BY MOUTH EVERY EVENING    No current facility-administered medications on file prior to visit.       The [place] Prescription Monitoring Program has been reviewed and there are no concerns about diversionary activity for controlled substances at this time.      I was able to review most recent Primary Care Provider, specialty provider, and therapy visit notes that I have access to.     Today, patient reports that he had chest pain this morning and took nitroglycerin.  He has been worried about his cardiac condition.  He needs surgery but does not know if he is strong enough to have it done.  He has appointment with urology.  His wife cares for their physical disabled son -  he feels like he is not ok.  He has difficulty sleeping at night.  He gets up frequently at night - urinate.  Another person he knows passed away.  He feels worried that he might die and he worries about his son.  He has an appointment with Dr. Burns on August 7. Heart surgery last done October 2022 .  Sometimes he has nightmares - same nightmares for the past 12 years related to trauma experiences he had.       has a past medical history of Acute non-ST elevation myocardial infarction (NSTEMI) (H) (6/22/2020), Adenomatous polyp of colon, Ascending aorta dilatation (H), Carpal tunnel syndrome, Chronic superficial gastritis, Coronary artery disease due to lipid rich plaque  (6/23/2020), Depression with anxiety, Dyslipidemia, goal LDL below 70 (6/23/2020), Essential hypertension (9/2/2012), Fatty liver disease, nonalcoholic, GERD (gastroesophageal reflux disease), IBS (irritable bowel syndrome), Left lumbar radiculopathy, Multinodular goiter, Old torn meniscus of knee, Paroxysmal atrial fibrillation (H), Perianal abscess, Post herpetic neuralgia, Post-traumatic osteoarthritis of both knees, Primary osteoarthritis of left hip, Primary osteoarthritis of right hip, Pulmonary nodule, 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), Thyroid nodule, TMJ arthritis, Tobacco use disorder (11/1/2013), and Vitamin D deficiency (9/30/2020).    Social history updates:    Radha lives with his wife and children.  He is unable to work at this time.    Substance use updates:    No alcohol use reported  Tobacco use: No    Vital Signs:   There were no vitals taken for this visit.    Labs:    Most recent laboratory results reviewed and no new labs.     Mental Status Examination:  Appearance: awake, alert, casual dress, and mild distress  Attitude: cooperative  Eye Contact:  adequate  Gait and Station: No dizziness or falls  Psychomotor Behavior:  intact station, gait and muscle tone  Oriented to:  time, person, and place  Attention Span and Concentration:  Normal  Speech:   vtspeech: clear, coherent and Speaks: English  Mood:  anxious and depressed  Affect:  mood congruent  Associations:  no loose associations  Thought Process:  goal oriented  Thought Content:  no evidence of suicidal ideation or homicidal ideation, no auditory hallucinations present, and no visual hallucinations present  Recent and Remote Memory:  intact Not formally assessed. No amnesia.  Fund of Knowledge: appropriate  Insight:  good  Judgment: good  Impulse Control:  good    Suicide Risk Assessment:  Today Nadya Blake reports no thoughts to harm themself or others. In addition, there are notable risk factors  for self-harm, including anxiety, comorbid medical condition of cardiac condition, and withdrawing. However, risk is mitigated by commitment to family, history of seeking help when needed, future oriented, denies suicidal intent or plan, and denies homicidal ideation, intent, or plan. Therefore, based on all available evidence including the factors cited above, Nadya Blake does not appear to be at imminent risk for self-harm, does not meet criteria for a 72-hr hold, and therefore remains appropriate for ongoing outpatient level of care.  A thorough assessment of risk factors related to suicide and self-harm have been reviewed and are noted above. The patient convincingly denies suicidality on several occasions. Local community safety resources printed and reviewed for patient to use if needed. There was no deceit detected, and the patient presented in a manner that was believable.     DSM5 Diagnosis:  296.32 (F33.1) Major Depressive Disorder, Recurrent Episode, Moderate _ and With mixed features  300.02 (F41.1) Generalized Anxiety Disorder  309.81 (F43.10) Posttraumatic Stress Disorder (includes Posttraumatic Stress Disorder for Children 6 Years and Younger)  Without dissociative symptoms    Medical comorbidities include:   Patient Active Problem List    Diagnosis Date Noted    Microscopic hematuria - Dr. Tineo 07/27/2023     Priority: Medium    Chronic kidney disease, stage 3a (H) 11/21/2022     Priority: Medium    Simple chronic bronchitis (H) 07/05/2022     Priority: Medium    Anal fissure - Dr. Campoverde 08/30/2021     Priority: Medium    Benign prostatic hyperplasia with nocturia 09/30/2020     Priority: Medium    PTSD (post-traumatic stress disorder) 09/30/2020     Priority: Medium    S/P CABG x 4 07/02/2020     Priority: Medium    Coronary artery disease, CABG 6/2020 06/23/2020     Priority: Medium    Dyslipidemia, goal LDL below 70 06/23/2020     Priority: Medium    Ascending aorta dilatation (H)  02/16/2020     Priority: Medium     Formatting of this note might be different from the original.  4.3 cm since 2014.        Polyp of colon 09/14/2017     Priority: Medium    Nonalcoholic fatty liver disease 07/12/2017     Priority: Medium    Primary osteoarthritis of left hip 04/28/2017     Priority: Medium    Primary osteoarthritis of right hip 04/28/2017     Priority: Medium    Non-toxic multinodular goiter 05/12/2016     Priority: Medium     Overview:   Multinodular Goiter ( right 1.6, 1.3, 1.2 left multiple nodules 1.3 cm or   smaller):  Not otherwise specified, stable ('16 c/w '12).   Differential diagnosis includes: benign (95%) vs malignant (5%)   Last FNA:  None.   Last US (3-14-16):  right 1.6, 1.3, 1.2 left multiple nodules 1.3 cm or   smaller, no change c/w (6-27-12).   (11-3-14): TSH 1.28   Options disucssed: monitoring vs fna vs surgery.  Of note: it is not   technically possible to biopsy all of the nodules, and surgery does not   appear indicated given the nodular stability over the past 4 years.   Plan: periodic ( every 3-5 year) imaging, sooner as needed for   compressive symptoms.   A) [email or web address]   B) next US: 2020.          Thyroid nodule 03/07/2016     Priority: Medium    Post-traumatic osteoarthritis of both knees 06/08/2015     Priority: Medium    Pulmonary nodule 11/11/2014     Priority: Medium     Formatting of this note might be different from the original.  CT scan 11-11-14. 2 mm each. Repeat scan in one year. Patient is a smoker. Positive family history of lung cancer.  3-14-16 2 stable 2 mm nodules, unchanged on repeat ct scan.  5-26-17 stable nodules. No pulmonary abnormality.        Gastroesophageal reflux disease with esophagitis without hemorrhage 10/30/2014     Priority: Medium     2-8-13 EGD nonspecific gastritis. Treated for H pylori in the past.  3-20-19 non specific gastritis. Path neg.          Recurrent major depressive disorder, in partial remission (H) 09/02/2012      Priority: Medium    Essential hypertension 09/02/2012     Priority: Medium       Assessment:    Nadya Blake continues to have ongoing anxiety and depression symptoms.  His physical health deterioration contributes to this picture as well as his trauma history.  He will take buspirone 5 mg in the morning and he is encouraged to take the second dose in the afternoon if his anxiety is not manageable.  Lexapro continues at 20 mg daily for maximum dose.  He has quetiapine available at bedtime to help slow down racing thoughts.  Talk therapy will be helpful to him as he continues to process trauma history and mental health symptoms that worsen with changes in his health status..    Medication side effects and alternatives were reviewed. Health promotion activities recommended and reviewed today. All questions addressed. Education and counseling completed regarding risks and benefits of medications and psychotherapy options.    Treatment Plan:      1.  Buspirone 5 mg in the morning with an additional tablet later in the day as needed for anxiety  2.  Escitalopram 20 mg daily  3.  Quetiapine 50 mg at bedtime  4.  Talk therapy when able to for learning skills to manage life stressors     Continue all other medications as reviewed per electronic medical record today.   Safety plan reviewed. To the Emergency Department as needed or call after hours crisis line at [phone] or [phone]. [place] Crisis Text Line. Text [place] to 189132 or Suicide LifeLine Chat: suicidepreventionlifeline.org/chat/  To schedule individual or family therapy, call [facility] at [phone]  Schedule an appointment with me in 2 months or sooner as needed. Call [facility] at [phone] to schedule.  Follow up with primary care provider as planned or for acute medical concerns.  Call the psychiatric nurse line with medication questions or concerns at [phone]  [employer] may be used to communicate with  your provider, but this is not intended to be used for emergencies.    Crisis Resources:    National Suicide Prevention Lifeline: 806.887.5940 (TTY: 990.102.4212). Call anytime for help.  (www.suicidepreventionlifeline.org)  National [place] [employer] Mental Illness (www.[place].org): 836.808.4216 or 519-382-9940.   Mental Health Association (www.mentalhealth.org): 747.828.8051 or 107-920-5577.  [place] Crisis Text Line: Text [place] to 303659  Suicide LifeLine Chat: suicidepreHipcampline.org/chat    Administrative Billing:   Time spent with patient includes counseling and coordination of care regarding above diagnoses and treatment plan.    Patient Status:  This is a continuous care patient and medications will be prescribed by the psychiatric provider until further indicated.    Signed:   SINGH Mo-BC   Psychiatry

## 2023-08-01 NOTE — NURSING NOTE
Is the patient currently in the state of [place]? YES    Visit mode:VIDEO    If the visit is dropped, the patient can be reconnected by: VIDEO VISIT: Text to cell phone:   Telephone Information:   Mobile [phone]       Will anyone else be joining the visit? No  (If patient encounters technical issues they should call [phone])    How would you like to obtain your AVS? MyChart    Are changes needed to the allergy or medication list? No    Rooming Documentation: Qnrs complete, except PROMIS 10, due to time     Reason for visit: RECHECK     GIANNI Gray

## 2023-08-07 ENCOUNTER — OFFICE VISIT (OUTPATIENT)
Dept: CARDIOLOGY | Facility: CLINIC | Age: 58
End: 2023-08-07
Payer: COMMERCIAL

## 2023-08-07 VITALS
BODY MASS INDEX: 31.82 KG/M2 | HEIGHT: 65 IN | HEART RATE: 54 BPM | SYSTOLIC BLOOD PRESSURE: 98 MMHG | RESPIRATION RATE: 16 BRPM | WEIGHT: 191 LBS | DIASTOLIC BLOOD PRESSURE: 72 MMHG

## 2023-08-07 DIAGNOSIS — E11.69 TYPE 2 DIABETES MELLITUS WITH OTHER SPECIFIED COMPLICATION, WITHOUT LONG-TERM CURRENT USE OF INSULIN (H): ICD-10-CM

## 2023-08-07 DIAGNOSIS — E78.5 DYSLIPIDEMIA, GOAL LDL BELOW 70: Chronic | ICD-10-CM

## 2023-08-07 DIAGNOSIS — Z01.810 PRE-OPERATIVE CARDIOVASCULAR EXAMINATION: ICD-10-CM

## 2023-08-07 DIAGNOSIS — I10 ESSENTIAL HYPERTENSION: Chronic | ICD-10-CM

## 2023-08-07 DIAGNOSIS — I50.32 CHRONIC HEART FAILURE WITH PRESERVED EJECTION FRACTION (H): ICD-10-CM

## 2023-08-07 DIAGNOSIS — I25.709 CORONARY ARTERY DISEASE INVOLVING CORONARY BYPASS GRAFT OF NATIVE HEART WITH ANGINA PECTORIS (H): Primary | ICD-10-CM

## 2023-08-07 PROCEDURE — 99215 OFFICE O/P EST HI 40 MIN: CPT | Performed by: GENERAL ACUTE CARE HOSPITAL

## 2023-08-07 NOTE — PATIENT INSTRUCTIONS
We can keep on your heart medications for now.  I would hold off on doing any surgery for now unless we do a heart catheterization and put a stent in your heart.  See me back in 2 months

## 2023-08-07 NOTE — LETTER
8/7/2023    Az Briseno MD  [address] [facility] [address]    RE: Nadya Blake       Dear Colleague,     I had the pleasure of seeing Nadya Blake in the [facility].  HEART CARE ENCOUNTER NOTE        Assessment/Recommendations   Assessment:    Preoperative cardiovascular examination prior to possible elective cystoscopy with an unknown level of sedation as well as anal fissure surgery which would likely require general anesthesia. He is at increased risk of perioperative major adverse cardiac events with worsening anginal symptoms.  Coronary artery disease status post four-vessel coronary artery bypass grafting (left internal mammary artery to the left anterior descending artery, right radial artery to the right posterior descending artery, reversed saphenous venous grafts to obtuse marginal and diagonal artery branches) on 6/24/2020. No ischemia seen on stress cardiac MRI on 12/23/2021 but he has been having exertional dyspnea with his saphenous venous graft to the right posterior descending artery noted to be occluded on coronary angiography 10/6/2022. He reports increasing stable anginal symptoms.  Chronic congestive heart failure with preserved left ventricular ejection fraction. He seems euvolemic and normal left-sided filling pressure was noted on cardiac catheterization 10/6/2022.  Benign essential hypertension. Blood pressure is low today.  Dyslipidemia. Last LDL 30 mg/dL.  Non insulin-dependent diabetes mellitus type 2. Last hemoglobin A1c 5.8%.  Former smoker.  Possible obstructive sleep apnea.  Body mass index is 31.78 kg/m .    Plan:  From a cardiovascular standpoint, he would still be at low risk for perioperative major adverse events if undergoing cystoscopy with moderate/deep sedation but with worsening angina symptoms, any surgical procedure with general anesthesia would have increased risk.   I suggested revisiting coronary angiography with percutaneous  coronary intervention of his native right coronary artery, but he is very hesitant to undergo coronary angiography and wants to continue with medical therapy and exercise. I think his current blood pressure and heart rate will not allow for further titration of medical therapy.  Continue metoprolol succinate 150 mg daily.  Sublingual nitroglycerin as needed.  Isosorbide mononitrate and spironolactone were previously discontinued.  Previously encouraged him to follow-up with scheduling his sleep study.  He does not appear to require loop diuretics at this time.  Rosuvastatin 40 mg and aspirin 81 mg daily.  Follow-up with me in 2 months per patient request.         History of Present Illness   Mr. Nadya Blake is a 58 year old male with a significant past history of CAD with history of NSTEMI s/p 4V CABG (LIMA to LAD, right radial artery to RPDA, reversed SVGs to an OM and diagonal artery branch) on 6/24/2020, HFpEF, HTN, dyslipidemia, and former smoker presenting for follow-up.     He notes increasing episodes of chest pain, which seem nonexertional but do respond well to nitroglycerin. He feels lightheaded after taking nitroglycerin. He is still walking 30 minutes per day on the treadmill although he sometimes feels short of breath with this. He is looking at having cystoscopy as well as surgery for a persistent anal fissure.    No shortness of breath at rest or with exertion, syncope, lower extremity swelling, palpitations, paroxysmal nocturnal dyspnea (PND), or orthopnea.     Cardiac Problems and Cardiac Diagnostics     Most Recent Cardiac testing:  ECG 10/6/2022 (personally reviewed and interpreted): sinus bradycardia HR 57 bpm with 1st degree AV block  ms, nonspecific T wave changes     ECHO 6/27/2020 (report reviewed):     Normal left ventricular size and systolic function. The left ventricular wall motion is normal. The calculated left ventricular ejection fraction is 71%.    Normal right  ventricular size and systolic function.    No hemodynamically significant valvular heart abnormalities.    The ascending aorta is mildly dilated.    When compared to the previous study dated 6/23/2020, no significant change.     Stress cardiac MRI 12/23/2021 (report reviewed):  1.  Pharmacological Regadenoson stress cardiac MRI is negative for inducible myocardial ischemia.   2.  Pharmacological stress ECG is negative for inducible myocardial ischemia.   3. Normal left ventricular size, wall thickness and systolic function. The quantified left ventricular  ejection fraction is 59 %.  Very small area of non-transmural myocardial scar in the mid inferior wall is  identified.    4.  Normal right ventricular size and systolic function.    5.  No significant valvular abnormalities.  6. Ascending aorta measures 38 mm.      Stress test 5/21/2021 (report reviewed):    The nuclear stress test is negative for inducible myocardial ischemia or infarction.    The left ventricular ejection fraction at stress is 65%.    There is no prior study for comparison.     Cardiac cath 10/6/2022 (report reviewed):   Left ventricular filling pressures are normal.  3rd Mrg lesion is 90% stenosed.  Prox RCA lesion is 75% stenosed.  Prox RCA to Mid RCA lesion is 40% stenosed.  Dist RCA lesion is 75% stenosed.  Mid RCA lesion is 30% stenosed.  RPDA lesion is 50% stenosed.  RPAV lesion is 40% stenosed.  Prox LAD lesion is 70% stenosed.  1st Diag-1 lesion is 95% stenosed.  1st Diag-2 lesion is 95% stenosed.  2nd Diag lesion is 99% stenosed.  Mid LAD-1 lesion is 75% stenosed.  Mid LAD-2 lesion is 75% stenosed.  Dist LAD lesion is 70% stenosed.  Vein graft to right PDA is totally occluded  Vein graft to second diagonal is widely patent  Vein graft to third OM is widely patent  LUZ MARIA graft to mid distal LAD is patent     Medications  Allergies   Current Outpatient Medications   Medication Sig Dispense Refill    ACETAMINOPHEN EXTRA STRENGTH 500 MG  tablet TAKE 2 TABLETS(1000 MG) BY MOUTH EVERY 6 HOURS AS NEEDED FOR PAIN 360 tablet 3    aspirin (ASA) 81 MG chewable tablet Take 1 tablet (81 mg) by mouth daily 90 tablet 4    busPIRone (BUSPAR) 5 MG tablet Take 1 tablet (5 mg) by mouth daily In the morning and an additional tablet later in the day as needed for anxiety 60 tablet 1    Cholecalciferol (VITAMIN D3) 50 MCG (2000 UT) CAPS Take 1 capsule by mouth daily 90 capsule 3    cyanocobalamin (VITAMIN B-12) 1000 MCG tablet Take 1 tablet (1,000 mcg) by mouth daily 90 tablet 4    diclofenac (VOLTAREN) 1 % topical gel Apply 4 g topically 4 times daily 500 g 2    empagliflozin (JARDIANCE) 25 MG TABS tablet Take 1 tablet (25 mg) by mouth daily 90 tablet 4    escitalopram (LEXAPRO) 20 MG tablet Take 1 tablet (20 mg) by mouth daily 30 tablet 4    Lidocaine 0.5 % GEL Externally apply topically as needed      metoprolol succinate ER (TOPROL XL) 100 MG 24 hr tablet Take 1.5 tablets (150 mg) by mouth At Bedtime 135 tablet 4    nifedipine 0.2% in white petrolatum 0.2 % OINT ointment Apply topically 4 times daily as needed (anal fissure) 100 g 1    nitroGLYcerin (NITROSTAT) 0.4 MG sublingual tablet For chest pain place 1 tablet under the tongue every 5 minutes for 3 doses. If symptoms persist 5 minutes after 1st dose call 911. 30 tablet 1    oxybutynin (DITROPAN) 5 MG tablet Take 1 tablet (5 mg) by mouth At Bedtime 90 tablet 4    polyethylene glycol (MIRALAX) 17 GM/Dose powder TAKE 17 GRAMS(1 CAPFUL) BY MOUTH DAILY 840 g 4    QUEtiapine (SEROQUEL) 50 MG tablet Take 1 tablet (50 mg) by mouth At Bedtime 30 tablet 4    rosuvastatin (CRESTOR) 40 MG tablet TAKE 1 TABLET(40 MG) BY MOUTH DAILY 90 tablet 3    tamsulosin (FLOMAX) 0.4 MG capsule TAKE 2 CAPSULES(0.8 MG) BY MOUTH EVERY EVENING 180 capsule 4      Allergies   Allergen Reactions    Atorvastatin Diarrhea    Cortisone Other (See Comments)     pain    Lisinopril Cough     started after CABG for unclear reason     "Methylprednisolone Other (See Comments)     ?        Physical Examination Review of Systems   BP 98/72 (BP Location: Left arm, Patient Position: Sitting, Cuff Size: Adult Regular)   Pulse 54   Resp 16   Ht 1.651 m (5' 5\")   Wt 86.6 kg (191 lb)   BMI 31.78 kg/m    Body mass index is 31.78 kg/m .  Wt Readings from Last 3 Encounters:   08/07/23 86.6 kg (191 lb)   07/27/23 88 kg (194 lb)   06/27/23 86.8 kg (191 lb 6.4 oz)       General Appearance:   Pleasant male, appears stated age. no acute distress, normal body habitus   ENT/Mouth: membranes moist, no apparent gingival bleeding.      EYES:  no scleral icterus, normal conjunctivae   Neck: no carotid bruits. No anterior cervical lymphadenopaty   Respiratory:   lungs are clear to auscultation, no rales or wheezing, well-healed sternal scar, equal chest wall expansion    Cardiovascular:   Regular rhythm, normal rate. Normal first and second heart sounds with no murmurs, rubs, or gallops; the carotid, radial and posterior tibial pulses are intact, Jugular venous pressure normal, no edema bilaterally    Abdomen/GI:  no organomegaly, masses, bruits, or tenderness; bowel sounds are present   Extremities: no cyanosis or clubbing   Skin: no xanthelasma, warm.    Heme/lymph/ Immunology No apparent bleeding noted.   Neurologic: Alert and oriented. normal gait, no tremors     Psychiatric: Pleasant, calm, appropriate affect.    A complete 10 system review of systems was performed and is negative except as mentioned in the HPI/subjective.         Past History   Past Medical History:   Past Medical History:   Diagnosis Date    Acute non-ST elevation myocardial infarction (NSTEMI) (H) 6/22/2020    Adenomatous polyp of colon     Ascending aorta dilatation (H)     Carpal tunnel syndrome     Chronic superficial gastritis     Coronary artery disease due to lipid rich plaque 6/23/2020    Depression with anxiety     Dyslipidemia, goal LDL below 70 6/23/2020    Essential hypertension " 9/2/2012    Fatty liver disease, nonalcoholic     GERD (gastroesophageal reflux disease)     IBS (irritable bowel syndrome)     Left lumbar radiculopathy     Multinodular goiter     Old torn meniscus of knee     Paroxysmal atrial fibrillation (H)     Perianal abscess     Post herpetic neuralgia     Post-traumatic osteoarthritis of both knees     Primary osteoarthritis of left hip     Primary osteoarthritis of right hip     Pulmonary nodule     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 Ragsdale    CV CORONARY ANGIOGRAM N/A 6/23/2020    Procedure: Coronary Angiogram;  Surgeon: Ariane Lester MD;  Location: [facility] Cath Lab;  Service: Cardiology    CV CORONARY ANGIOGRAM N/A 10/6/2022    Procedure: Coronary Angiogram;  Surgeon: Javon John MD;  Location: [facility] CV    CV IABP INSERT N/A 6/24/2020    Procedure: Intra Aortic Balloon Pump Insertion;  Surgeon: Ariane Lester MD;  Location: [facility] Cath Lab;  Service: Cardiology    CV LEFT HEART CATH N/A 10/6/2022    Procedure: Left Heart Catheterization;  Surgeon: Javon John MD;  Location: [facility] CV    CV LEFT HEART CATHETERIZATION WITHOUT LEFT VENTRICULOGRAM Left 6/23/2020    Procedure: Left Heart Catheterization Without Left Ventriculogram;  Surgeon: Ariane Lester MD;  Location: [facility] Cath Lab;  Service: Cardiology    CV LEFT VENTRICULOGRAM N/A 10/6/2022    Procedure: Left Ventriculogram;  Surgeon: Javon John MD;  Location: [facility] CV       Family History:   Family History   Problem Relation Age of Onset    No Known Problems Mother     Lung Cancer Father 56        fatal    No Known Problems Daughter     Other - See  Comments Son         congenital deformity of right leg; he uses a leg prosthesis        Social History:   Social History     Socioeconomic History    Marital status:      Spouse name: Carlee    Number of children: 2    Years of education: Not on file    Highest education level: Not on file   Occupational History    Not on file   Tobacco Use    Smoking status: Former     Packs/day: 1.00     Years: 30.00     Pack years: 30.00     Types: Cigarettes     Quit date: 3/23/2020     Years since quitting: 3.3    Smokeless tobacco: Never    Tobacco comments:     stopped 3/24/2020   Vaping Use    Vaping Use: Never used   Substance and Sexual Activity    Alcohol use: No    Drug use: Never    Sexual activity: Yes     Partners: Female   Other Topics Concern    Not on file   Social History Narrative    , Carlee, BA chemistry and taking AA courses at [employer].  From [place]; bachelors in geology and computer science. Son, Grace (2005) and Sherrie (2008).  On SSDI, PTSD.       Social Determinants of Health     Financial Resource Strain: Not on file   Food Insecurity: No Food Insecurity (8/31/2021)    Hunger Vital Sign     Worried About Running Out of Food in the Last Year: Never true     Ran Out of Food in the Last Year: Never true   Transportation Needs: No Transportation Needs (8/31/2021)    PRAPARE - Transportation     Lack of Transportation (Medical): No     Lack of Transportation (Non-Medical): No   Physical Activity: Not on file   Stress: Not on file   Social Connections: Not on file   Intimate Partner Violence: Not on file   Housing Stability: Low Risk  (8/31/2021)    Housing Stability Vital Sign     Unable to Pay for Housing in the Last Year: No     Number of Places Lived in the Last Year: 1     Unstable Housing in the Last Year: No              Lab Results    Chemistry/lipid CBC Cardiac Enzymes/BNP/TSH/INR   Lab Results   Component Value Date    CHOL 87 01/18/2023    HDL 30 (L) 01/18/2023    LDL 30 01/18/2023    TRIG  134 01/18/2023    CR 1.10 06/27/2023    BUN 10.9 06/27/2023    POTASSIUM 4.6 06/27/2023     06/27/2023    CO2 25 06/27/2023      Lab Results   Component Value Date    WBC 7.2 01/18/2023    HGB 14.5 01/18/2023    HCT 44.1 01/18/2023    MCV 89 01/18/2023     01/18/2023    A1C 5.7 (H) 06/27/2023     Lab Results   Component Value Date    A1C 5.7 (H) 06/27/2023    Lab Results   Component Value Date    TROPONINI <0.01 11/21/2020    BNP 79 (H) 07/27/2020    TSH 3.40 08/05/2022    INR 1.10 07/27/2020          Sloan Burns MD [facility]  Non-Invasive Cardiologist  [facility]  Pager [phone]      Thank you for allowing me to participate in the care of your patient.      Sincerely,     Sloan Burns MD     [facility]  cc:   Sloan Burns MD  [address] [facility] [address]  [place] [address]

## 2023-08-07 NOTE — PROGRESS NOTES
HEART CARE ENCOUNTER NOTE        Assessment/Recommendations   Assessment:    Preoperative cardiovascular examination prior to possible elective cystoscopy with an unknown level of sedation as well as anal fissure surgery which would likely require general anesthesia. He is at increased risk of perioperative major adverse cardiac events with worsening anginal symptoms.  Coronary artery disease status post four-vessel coronary artery bypass grafting (left internal mammary artery to the left anterior descending artery, right radial artery to the right posterior descending artery, reversed saphenous venous grafts to obtuse marginal and diagonal artery branches) on 6/24/2020. No ischemia seen on stress cardiac MRI on 12/23/2021 but he has been having exertional dyspnea with his saphenous venous graft to the right posterior descending artery noted to be occluded on coronary angiography 10/6/2022. He reports increasing stable anginal symptoms.  Chronic congestive heart failure with preserved left ventricular ejection fraction. He seems euvolemic and normal left-sided filling pressure was noted on cardiac catheterization 10/6/2022.  Benign essential hypertension. Blood pressure is low today.  Dyslipidemia. Last LDL 30 mg/dL.  Non insulin-dependent diabetes mellitus type 2. Last hemoglobin A1c 5.8%.  Former smoker.  Possible obstructive sleep apnea.  Body mass index is 31.78 kg/m .    Plan:  From a cardiovascular standpoint, he would still be at low risk for perioperative major adverse events if undergoing cystoscopy with moderate/deep sedation but with worsening angina symptoms, any surgical procedure with general anesthesia would have increased risk.   I suggested revisiting coronary angiography with percutaneous coronary intervention of his native right coronary artery, but he is very hesitant to undergo coronary angiography and wants to continue with medical therapy and exercise. I think his current blood pressure and  heart rate will not allow for further titration of medical therapy.  Continue metoprolol succinate 150 mg daily.  Sublingual nitroglycerin as needed.  Isosorbide mononitrate and spironolactone were previously discontinued.  Previously encouraged him to follow-up with scheduling his sleep study.  He does not appear to require loop diuretics at this time.  Rosuvastatin 40 mg and aspirin 81 mg daily.  Follow-up with me in 2 months per patient request.         History of Present Illness   Mr. Nadya Blake is a 58 year old male with a significant past history of CAD with history of NSTEMI s/p 4V CABG (LIMA to LAD, right radial artery to RPDA, reversed SVGs to an OM and diagonal artery branch) on 6/24/2020, HFpEF, HTN, dyslipidemia, and former smoker presenting for follow-up.     He notes increasing episodes of chest pain, which seem nonexertional but do respond well to nitroglycerin. He feels lightheaded after taking nitroglycerin. He is still walking 30 minutes per day on the treadmill although he sometimes feels short of breath with this. He is looking at having cystoscopy as well as surgery for a persistent anal fissure.    No shortness of breath at rest or with exertion, syncope, lower extremity swelling, palpitations, paroxysmal nocturnal dyspnea (PND), or orthopnea.     Cardiac Problems and Cardiac Diagnostics     Most Recent Cardiac testing:  ECG 10/6/2022 (personally reviewed and interpreted): sinus bradycardia HR 57 bpm with 1st degree AV block  ms, nonspecific T wave changes     ECHO 6/27/2020 (report reviewed):     Normal left ventricular size and systolic function. The left ventricular wall motion is normal. The calculated left ventricular ejection fraction is 71%.    Normal right ventricular size and systolic function.    No hemodynamically significant valvular heart abnormalities.    The ascending aorta is mildly dilated.    When compared to the previous study dated 6/23/2020, no significant  change.     Stress cardiac MRI 12/23/2021 (report reviewed):  1.  Pharmacological Regadenoson stress cardiac MRI is negative for inducible myocardial ischemia.   2.  Pharmacological stress ECG is negative for inducible myocardial ischemia.   3. Normal left ventricular size, wall thickness and systolic function. The quantified left ventricular  ejection fraction is 59 %.  Very small area of non-transmural myocardial scar in the mid inferior wall is  identified.    4.  Normal right ventricular size and systolic function.    5.  No significant valvular abnormalities.  6. Ascending aorta measures 38 mm.      Stress test 5/21/2021 (report reviewed):    The nuclear stress test is negative for inducible myocardial ischemia or infarction.    The left ventricular ejection fraction at stress is 65%.    There is no prior study for comparison.     Cardiac cath 10/6/2022 (report reviewed):   Left ventricular filling pressures are normal.  3rd Mrg lesion is 90% stenosed.  Prox RCA lesion is 75% stenosed.  Prox RCA to Mid RCA lesion is 40% stenosed.  Dist RCA lesion is 75% stenosed.  Mid RCA lesion is 30% stenosed.  RPDA lesion is 50% stenosed.  RPAV lesion is 40% stenosed.  Prox LAD lesion is 70% stenosed.  1st Diag-1 lesion is 95% stenosed.  1st Diag-2 lesion is 95% stenosed.  2nd Diag lesion is 99% stenosed.  Mid LAD-1 lesion is 75% stenosed.  Mid LAD-2 lesion is 75% stenosed.  Dist LAD lesion is 70% stenosed.  Vein graft to right PDA is totally occluded  Vein graft to second diagonal is widely patent  Vein graft to third OM is widely patent  LUZ MARIA graft to mid distal LAD is patent     Medications  Allergies   Current Outpatient Medications   Medication Sig Dispense Refill    ACETAMINOPHEN EXTRA STRENGTH 500 MG tablet TAKE 2 TABLETS(1000 MG) BY MOUTH EVERY 6 HOURS AS NEEDED FOR PAIN 360 tablet 3    aspirin (ASA) 81 MG chewable tablet Take 1 tablet (81 mg) by mouth daily 90 tablet 4    busPIRone (BUSPAR) 5 MG tablet Take 1 tablet  "(5 mg) by mouth daily In the morning and an additional tablet later in the day as needed for anxiety 60 tablet 1    Cholecalciferol (VITAMIN D3) 50 MCG (2000 UT) CAPS Take 1 capsule by mouth daily 90 capsule 3    cyanocobalamin (VITAMIN B-12) 1000 MCG tablet Take 1 tablet (1,000 mcg) by mouth daily 90 tablet 4    diclofenac (VOLTAREN) 1 % topical gel Apply 4 g topically 4 times daily 500 g 2    empagliflozin (JARDIANCE) 25 MG TABS tablet Take 1 tablet (25 mg) by mouth daily 90 tablet 4    escitalopram (LEXAPRO) 20 MG tablet Take 1 tablet (20 mg) by mouth daily 30 tablet 4    Lidocaine 0.5 % GEL Externally apply topically as needed      metoprolol succinate ER (TOPROL XL) 100 MG 24 hr tablet Take 1.5 tablets (150 mg) by mouth At Bedtime 135 tablet 4    nifedipine 0.2% in white petrolatum 0.2 % OINT ointment Apply topically 4 times daily as needed (anal fissure) 100 g 1    nitroGLYcerin (NITROSTAT) 0.4 MG sublingual tablet For chest pain place 1 tablet under the tongue every 5 minutes for 3 doses. If symptoms persist 5 minutes after 1st dose call 911. 30 tablet 1    oxybutynin (DITROPAN) 5 MG tablet Take 1 tablet (5 mg) by mouth At Bedtime 90 tablet 4    polyethylene glycol (MIRALAX) 17 GM/Dose powder TAKE 17 GRAMS(1 CAPFUL) BY MOUTH DAILY 840 g 4    QUEtiapine (SEROQUEL) 50 MG tablet Take 1 tablet (50 mg) by mouth At Bedtime 30 tablet 4    rosuvastatin (CRESTOR) 40 MG tablet TAKE 1 TABLET(40 MG) BY MOUTH DAILY 90 tablet 3    tamsulosin (FLOMAX) 0.4 MG capsule TAKE 2 CAPSULES(0.8 MG) BY MOUTH EVERY EVENING 180 capsule 4      Allergies   Allergen Reactions    Atorvastatin Diarrhea    Cortisone Other (See Comments)     pain    Lisinopril Cough     started after CABG for unclear reason    Methylprednisolone Other (See Comments)     ?        Physical Examination Review of Systems   BP 98/72 (BP Location: Left arm, Patient Position: Sitting, Cuff Size: Adult Regular)   Pulse 54   Resp 16   Ht 1.651 m (5' 5\")   Wt 86.6 " kg (191 lb)   BMI 31.78 kg/m    Body mass index is 31.78 kg/m .  Wt Readings from Last 3 Encounters:   08/07/23 86.6 kg (191 lb)   07/27/23 88 kg (194 lb)   06/27/23 86.8 kg (191 lb 6.4 oz)       General Appearance:   Pleasant male, appears stated age. no acute distress, normal body habitus   ENT/Mouth: membranes moist, no apparent gingival bleeding.      EYES:  no scleral icterus, normal conjunctivae   Neck: no carotid bruits. No anterior cervical lymphadenopaty   Respiratory:   lungs are clear to auscultation, no rales or wheezing, well-healed sternal scar, equal chest wall expansion    Cardiovascular:   Regular rhythm, normal rate. Normal first and second heart sounds with no murmurs, rubs, or gallops; the carotid, radial and posterior tibial pulses are intact, Jugular venous pressure normal, no edema bilaterally    Abdomen/GI:  no organomegaly, masses, bruits, or tenderness; bowel sounds are present   Extremities: no cyanosis or clubbing   Skin: no xanthelasma, warm.    Heme/lymph/ Immunology No apparent bleeding noted.   Neurologic: Alert and oriented. normal gait, no tremors     Psychiatric: Pleasant, calm, appropriate affect.    A complete 10 system review of systems was performed and is negative except as mentioned in the HPI/subjective.         Past History   Past Medical History:   Past Medical History:   Diagnosis Date    Acute non-ST elevation myocardial infarction (NSTEMI) (H) 6/22/2020    Adenomatous polyp of colon     Ascending aorta dilatation (H)     Carpal tunnel syndrome     Chronic superficial gastritis     Coronary artery disease due to lipid rich plaque 6/23/2020    Depression with anxiety     Dyslipidemia, goal LDL below 70 6/23/2020    Essential hypertension 9/2/2012    Fatty liver disease, nonalcoholic     GERD (gastroesophageal reflux disease)     IBS (irritable bowel syndrome)     Left lumbar radiculopathy     Multinodular goiter     Old torn meniscus of knee     Paroxysmal atrial  fibrillation (H)     Perianal abscess     Post herpetic neuralgia     Post-traumatic osteoarthritis of both knees     Primary osteoarthritis of left hip     Primary osteoarthritis of right hip     Pulmonary nodule     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 Ragsdale    CV CORONARY ANGIOGRAM N/A 6/23/2020    Procedure: Coronary Angiogram;  Surgeon: Ariane Lester MD;  Location: [facility] Cath Lab;  Service: Cardiology    CV CORONARY ANGIOGRAM N/A 10/6/2022    Procedure: Coronary Angiogram;  Surgeon: Javon John MD;  Location: [facility] CV    CV IABP INSERT N/A 6/24/2020    Procedure: Intra Aortic Balloon Pump Insertion;  Surgeon: Ariane Lester MD;  Location: [facility] Cath Lab;  Service: Cardiology    CV LEFT HEART CATH N/A 10/6/2022    Procedure: Left Heart Catheterization;  Surgeon: Javon John MD;  Location: [facility] CV    CV LEFT HEART CATHETERIZATION WITHOUT LEFT VENTRICULOGRAM Left 6/23/2020    Procedure: Left Heart Catheterization Without Left Ventriculogram;  Surgeon: Ariane Lester MD;  Location: [facility] Cath Lab;  Service: Cardiology    CV LEFT VENTRICULOGRAM N/A 10/6/2022    Procedure: Left Ventriculogram;  Surgeon: Javon John MD;  Location: [facility] CV       Family History:   Family History   Problem Relation Age of Onset    No Known Problems Mother     Lung Cancer Father 56        fatal    No Known Problems Daughter     Other - See Comments Son         congenital deformity of right leg; he uses a leg prosthesis        Social History:   Social History     Socioeconomic History    Marital status:      Spouse name: Carlee    Number of children: 2    Years  of education: Not on file    Highest education level: Not on file   Occupational History    Not on file   Tobacco Use    Smoking status: Former     Packs/day: 1.00     Years: 30.00     Pack years: 30.00     Types: Cigarettes     Quit date: 3/23/2020     Years since quitting: 3.3    Smokeless tobacco: Never    Tobacco comments:     stopped 3/24/2020   Vaping Use    Vaping Use: Never used   Substance and Sexual Activity    Alcohol use: No    Drug use: Never    Sexual activity: Yes     Partners: Female   Other Topics Concern    Not on file   Social History Narrative    [place], [place], BA chemistry and taking AA courses at [employer].  From [place]; bachelors in geology and computer science. Son, Grace (2005) and Sherrie (2008).  On SSDI, PTSD.       Social Determinants of Health     Financial Resource Strain: Not on file   Food Insecurity: No Food Insecurity (8/31/2021)    Hunger Vital Sign     Worried About Running Out of Food in the Last Year: Never true     Ran Out of Food in the Last Year: Never true   Transportation Needs: No Transportation Needs (8/31/2021)    PRAPARE - Transportation     Lack of Transportation (Medical): No     Lack of Transportation (Non-Medical): No   Physical Activity: Not on file   Stress: Not on file   Social Connections: Not on file   Intimate Partner Violence: Not on file   Housing Stability: Low Risk  (8/31/2021)    Housing Stability Vital Sign     Unable to Pay for Housing in the Last Year: No     Number of Places Lived in the Last Year: 1     Unstable Housing in the Last Year: No              Lab Results    Chemistry/lipid CBC Cardiac Enzymes/BNP/TSH/INR   Lab Results   Component Value Date    CHOL 87 01/18/2023    HDL 30 (L) 01/18/2023    LDL 30 01/18/2023    TRIG 134 01/18/2023    CR 1.10 06/27/2023    BUN 10.9 06/27/2023    POTASSIUM 4.6 06/27/2023     06/27/2023    CO2 25 06/27/2023      Lab Results   Component Value Date    WBC 7.2 01/18/2023    HGB 14.5 01/18/2023    HCT 44.1  01/18/2023    MCV 89 01/18/2023     01/18/2023    A1C 5.7 (H) 06/27/2023     Lab Results   Component Value Date    A1C 5.7 (H) 06/27/2023    Lab Results   Component Value Date    TROPONINI <0.01 11/21/2020    BNP 79 (H) 07/27/2020    TSH 3.40 08/05/2022    INR 1.10 07/27/2020          Sloan Burns MD [facility]  Non-Invasive Cardiologist  [facility]  Pager [phone]

## 2023-08-11 NOTE — PATIENT INSTRUCTIONS
"Patient Education   The Panel Psychiatry Program  What to Expect  Here's what to expect in the Panel Psychiatry Program.   About the program  You'll be meeting with a psychiatric doctor to check your mental health. A psychiatric doctor helps you deal with troubling thoughts and feelings by giving you medicine. They'll make sure you know the plan for your care. You may see them for a long time. When you're feeling better, they may refer you back to seeing your family doctor.   If you have any questions, we'll be glad to talk to you.  About visits  Be open  At your visits, please talk openly about your problems. It may feel hard, but it's the best way for us to help you.  Cancelling visits  If you can't come to your visit, please call us right away at [phone]. If you don't cancel at least 24 hours (1 full day) before your visit, that's \"late cancellation.\"  Not showing up for your visits  Being very late is the same as not showing up. You'll be a \"no show\" if:  You're more than 15 minutes late for a 30-minute (half hour) visit.  You're more than 30 minutes late for a 60-minute (full hour) visit.  If you cancel late or don't show up 2 times within 6 months, we may end your care.  Getting help between visits  If you need help between visits, you can call us Monday to Friday from 8 a.m. to 4:30 p.m. at [phone].  Emergency care  Call 911 or go to the nearest emergency department if your life or someone else's life is in danger.  Call 988 anytime to reach the national Suicide and Crisis hotline.  Medicine refills  To refill your medicine, call your pharmacy. You can also call [facility]'s [facility] at [phone], Monday to Friday, 8 a.m. to 4:30 p.m. It can take 1 to 3 business days to get a refill.   Forms, letters, and tests  You may have papers to fill out, like FMLA, short-term disability, and workability. We can help you with these forms at your visits, but you must have an " appointment. You may need more than 1 visit for this, to be in an intensive therapy program, or both.  Before we can give you medicine for ADHD, we may refer you to get tested for it or confirm it another way.  We may not be able to give you an emotional support animal letter.  We don't do mental health checks ordered by the court.   We don't do mental health testing, but we can refer you to get tested.   Thank you for choosing us for your care.  For informational purposes only. Not to replace the advice of your health care provider. Copyright   2022 [facility]. All rights reserved. [employer] 428255 - 12/22.

## 2023-08-28 ENCOUNTER — OFFICE VISIT (OUTPATIENT)
Dept: INTERNAL MEDICINE | Facility: CLINIC | Age: 58
End: 2023-08-28
Payer: COMMERCIAL

## 2023-08-28 VITALS
OXYGEN SATURATION: 96 % | RESPIRATION RATE: 17 BRPM | HEIGHT: 65 IN | TEMPERATURE: 97.3 F | WEIGHT: 191.2 LBS | SYSTOLIC BLOOD PRESSURE: 98 MMHG | BODY MASS INDEX: 31.86 KG/M2 | DIASTOLIC BLOOD PRESSURE: 70 MMHG | HEART RATE: 56 BPM

## 2023-08-28 DIAGNOSIS — R35.1 BENIGN PROSTATIC HYPERPLASIA WITH NOCTURIA: Chronic | ICD-10-CM

## 2023-08-28 DIAGNOSIS — J41.0 SIMPLE CHRONIC BRONCHITIS (H): ICD-10-CM

## 2023-08-28 DIAGNOSIS — N40.1 BENIGN PROSTATIC HYPERPLASIA WITH NOCTURIA: Chronic | ICD-10-CM

## 2023-08-28 DIAGNOSIS — I77.810 ASCENDING AORTA DILATATION (H): ICD-10-CM

## 2023-08-28 DIAGNOSIS — Z86.0100 HISTORY OF COLONIC POLYPS: ICD-10-CM

## 2023-08-28 DIAGNOSIS — E04.1 THYROID NODULE: ICD-10-CM

## 2023-08-28 DIAGNOSIS — I25.118 CORONARY ARTERY DISEASE OF NATIVE ARTERY OF NATIVE HEART WITH STABLE ANGINA PECTORIS (H): ICD-10-CM

## 2023-08-28 DIAGNOSIS — K60.2 ANAL FISSURE: ICD-10-CM

## 2023-08-28 DIAGNOSIS — M17.2 POST-TRAUMATIC OSTEOARTHRITIS OF BOTH KNEES: Chronic | ICD-10-CM

## 2023-08-28 DIAGNOSIS — N18.31 CHRONIC KIDNEY DISEASE, STAGE 3A (H): ICD-10-CM

## 2023-08-28 DIAGNOSIS — Z00.00 ANNUAL PHYSICAL EXAM: Primary | ICD-10-CM

## 2023-08-28 DIAGNOSIS — M16.11 PRIMARY OSTEOARTHRITIS OF RIGHT HIP: Chronic | ICD-10-CM

## 2023-08-28 DIAGNOSIS — F33.41 RECURRENT MAJOR DEPRESSIVE DISORDER, IN PARTIAL REMISSION (H): Chronic | ICD-10-CM

## 2023-08-28 DIAGNOSIS — R91.1 PULMONARY NODULE: ICD-10-CM

## 2023-08-28 DIAGNOSIS — Z95.1 S/P CABG X 4: ICD-10-CM

## 2023-08-28 DIAGNOSIS — E78.5 DYSLIPIDEMIA, GOAL LDL BELOW 70: Chronic | ICD-10-CM

## 2023-08-28 DIAGNOSIS — I10 ESSENTIAL HYPERTENSION: Chronic | ICD-10-CM

## 2023-08-28 DIAGNOSIS — K21.00 GASTROESOPHAGEAL REFLUX DISEASE WITH ESOPHAGITIS WITHOUT HEMORRHAGE: ICD-10-CM

## 2023-08-28 DIAGNOSIS — Z87.891 PERSONAL HISTORY OF TOBACCO USE: ICD-10-CM

## 2023-08-28 DIAGNOSIS — R31.29 MICROSCOPIC HEMATURIA: ICD-10-CM

## 2023-08-28 DIAGNOSIS — E11.9 TYPE 2 DIABETES MELLITUS WITHOUT COMPLICATION, WITHOUT LONG-TERM CURRENT USE OF INSULIN (H): ICD-10-CM

## 2023-08-28 DIAGNOSIS — F43.10 PTSD (POST-TRAUMATIC STRESS DISORDER): Chronic | ICD-10-CM

## 2023-08-28 LAB
ALBUMIN SERPL BCG-MCNC: 4.4 G/DL (ref 3.5–5.2)
ALBUMIN UR-MCNC: NEGATIVE MG/DL
ALP SERPL-CCNC: 79 U/L (ref 40–129)
ALT SERPL W P-5'-P-CCNC: 63 U/L (ref 0–70)
ANION GAP SERPL CALCULATED.3IONS-SCNC: 10 MMOL/L (ref 7–15)
APPEARANCE UR: CLEAR
AST SERPL W P-5'-P-CCNC: 41 U/L (ref 0–45)
BILIRUB SERPL-MCNC: 0.2 MG/DL
BILIRUB UR QL STRIP: NEGATIVE
BUN SERPL-MCNC: 12.5 MG/DL (ref 6–20)
CALCIUM SERPL-MCNC: 9.1 MG/DL (ref 8.6–10)
CHLORIDE SERPL-SCNC: 105 MMOL/L (ref 98–107)
CHOLEST SERPL-MCNC: 97 MG/DL
COLOR UR AUTO: YELLOW
CREAT SERPL-MCNC: 1.21 MG/DL (ref 0.67–1.17)
CREAT UR-MCNC: 166 MG/DL
DEPRECATED HCO3 PLAS-SCNC: 24 MMOL/L (ref 22–29)
ERYTHROCYTE [DISTWIDTH] IN BLOOD BY AUTOMATED COUNT: 12.2 % (ref 10–15)
GFR SERPL CREATININE-BSD FRML MDRD: 70 ML/MIN/1.73M2
GLUCOSE SERPL-MCNC: 103 MG/DL (ref 70–99)
GLUCOSE UR STRIP-MCNC: >=1000 MG/DL
HCT VFR BLD AUTO: 46.1 % (ref 40–53)
HDLC SERPL-MCNC: 33 MG/DL
HGB BLD-MCNC: 15.2 G/DL (ref 13.3–17.7)
HGB UR QL STRIP: NEGATIVE
KETONES UR STRIP-MCNC: NEGATIVE MG/DL
LDLC SERPL CALC-MCNC: 32 MG/DL
LEUKOCYTE ESTERASE UR QL STRIP: NEGATIVE
MCH RBC QN AUTO: 29.7 PG (ref 26.5–33)
MCHC RBC AUTO-ENTMCNC: 33 G/DL (ref 31.5–36.5)
MCV RBC AUTO: 90 FL (ref 78–100)
MICROALBUMIN UR-MCNC: <12 MG/L
MICROALBUMIN/CREAT UR: NORMAL MG/G{CREAT}
NITRATE UR QL: NEGATIVE
NONHDLC SERPL-MCNC: 64 MG/DL
PH UR STRIP: 5.5 [PH] (ref 5–8)
PLATELET # BLD AUTO: 186 10E3/UL (ref 150–450)
POTASSIUM SERPL-SCNC: 4.6 MMOL/L (ref 3.4–5.3)
PROT SERPL-MCNC: 7.2 G/DL (ref 6.4–8.3)
RBC # BLD AUTO: 5.12 10E6/UL (ref 4.4–5.9)
SODIUM SERPL-SCNC: 139 MMOL/L (ref 136–145)
SP GR UR STRIP: 1.02 (ref 1–1.03)
TRIGL SERPL-MCNC: 159 MG/DL
TSH SERPL DL<=0.005 MIU/L-ACNC: 1.24 UIU/ML (ref 0.3–4.2)
UROBILINOGEN UR STRIP-ACNC: 0.2 E.U./DL
WBC # BLD AUTO: 6.5 10E3/UL (ref 4–11)

## 2023-08-28 PROCEDURE — 36415 COLL VENOUS BLD VENIPUNCTURE: CPT | Performed by: INTERNAL MEDICINE

## 2023-08-28 PROCEDURE — 81003 URINALYSIS AUTO W/O SCOPE: CPT | Performed by: INTERNAL MEDICINE

## 2023-08-28 PROCEDURE — 99396 PREV VISIT EST AGE 40-64: CPT | Performed by: INTERNAL MEDICINE

## 2023-08-28 PROCEDURE — 80061 LIPID PANEL: CPT | Performed by: INTERNAL MEDICINE

## 2023-08-28 PROCEDURE — 80053 COMPREHEN METABOLIC PANEL: CPT | Performed by: INTERNAL MEDICINE

## 2023-08-28 PROCEDURE — 82043 UR ALBUMIN QUANTITATIVE: CPT | Performed by: INTERNAL MEDICINE

## 2023-08-28 PROCEDURE — 85027 COMPLETE CBC AUTOMATED: CPT | Performed by: INTERNAL MEDICINE

## 2023-08-28 PROCEDURE — 82570 ASSAY OF URINE CREATININE: CPT | Performed by: INTERNAL MEDICINE

## 2023-08-28 PROCEDURE — 99214 OFFICE O/P EST MOD 30 MIN: CPT | Mod: 25 | Performed by: INTERNAL MEDICINE

## 2023-08-28 PROCEDURE — 84443 ASSAY THYROID STIM HORMONE: CPT | Performed by: INTERNAL MEDICINE

## 2023-08-28 RX ORDER — METOPROLOL SUCCINATE 100 MG/1
150 TABLET, EXTENDED RELEASE ORAL AT BEDTIME
Qty: 135 TABLET | Refills: 4 | Status: SHIPPED | OUTPATIENT
Start: 2023-08-28 | End: 2023-11-02

## 2023-08-28 RX ORDER — NITROGLYCERIN 0.4 MG/1
TABLET SUBLINGUAL
Qty: 30 TABLET | Refills: 1 | Status: SHIPPED | OUTPATIENT
Start: 2023-08-28 | End: 2024-01-10

## 2023-08-28 ASSESSMENT — ENCOUNTER SYMPTOMS
MYALGIAS: 1
NERVOUS/ANXIOUS: 1
HEADACHES: 1
WEAKNESS: 1
FREQUENCY: 1
ABDOMINAL PAIN: 1
NAUSEA: 1

## 2023-08-28 NOTE — PATIENT INSTRUCTIONS
Lung Cancer Screening   Frequently Asked Questions  If you are at high-risk for lung cancer, getting screened with low-dose computed tomography (LDCT) every year can help save your life. This handout offers answers to some of the most common questions about lung cancer screening. If you have other questions, please call [phone] ([phone]).     What is it?  Lung cancer screening uses special X-ray technology to create an image of your lung tissue. The exam is quick and easy and takes less than 10 seconds. We don t give you any medicine or use any needles. You can eat before and after the exam. You don t need to change your clothes as long as the clothing on your chest doesn t contain metal. But, you do need to be able to hold your breath for at least 6 seconds during the exam.    What is the goal of lung cancer screening?  The goal of lung cancer screening is to save lives. Many times, lung cancer is not found until a person starts having physical symptoms. Lung cancer screening can help detect lung cancer in the earliest stages when it may be easier to treat.    Who should be screened for lung cancer?  We suggest lung cancer screening for anyone who is at high-risk for lung cancer. You are in the high-risk group if you:      are between the ages of 55 and 79, and    have smoked at least 1 pack of cigarettes a day for 20 or more years, and    still smoke or have quit within the past 15 years.    However, if you have a new cough or shortness of breath, you should talk to your doctor before being screened.    Why does it matter if I have symptoms?  Certain symptoms can be a sign that you have a condition in your lungs that should be checked and treated by your doctor. These symptoms include fever, chest pain, a new or changing cough, shortness of breath that you have never felt before, coughing up blood or unexplained weight loss. Having any of these symptoms can greatly affect the results of lung  cancer screening.       Should all smokers get an LDCT lung cancer screening exam?  It depends. Lung cancer screening is for a very specific group of men and women who have a history of heavy smoking over a long period of time (see  Who should be screened for lung cancer  above).  I am in the high-risk group, but have been diagnosed with cancer in the past. Is LDCT lung cancer screening right for me?  In some cases, you should not have LDCT lung screening, such as when your doctor is already following your cancer with CT scan studies. Your doctor will help you decide if LDCT lung screening is right for you.  Do I need to have a screening exam every year?  Yes. If you are in the high-risk group described earlier, you should get an LDCT lung cancer screening exam every year until you are 79, or are no longer willing or able to undergo screening and possible procedures to diagnose and treat lung cancer.  How effective is LDCT at preventing death from lung cancer?  Studies have shown that LDCT lung cancer screening can lower the risk of death from lung cancer by 20 percent in people who are at high-risk.  What are the risks?  There are some risks and limitations of LDCT lung cancer screening. We want to make sure you understand the risks and benefits, so please let us know if you have any questions. Your doctor may want to talk with you more about these risks.    Radiation exposure: As with any exam that uses radiation, there is a very small increased risk of cancer. The amount of radiation in LDCT is small--about the same amount a person would get from a mammogram. Your doctor orders the exam when he or she feels the potential benefits outweigh the risks.    False negatives: No test is perfect, including LDCT. It is possible that you may have a medical condition, including lung cancer, that is not found during your exam. This is called a false negative result.    False positives and more testing: LDCT very often finds  something in the lung that could be cancer, but in fact is not. This is called a false positive result. False positive tests often cause anxiety. To make sure these findings are not cancer, you may need to have more tests. These tests will be done only if you give us permission. Sometimes patients need a treatment that can have side effects, such as a biopsy. For more information on false positives, see  What can I expect from the results?     Findings not related to lung cancer: Your LDCT exam also takes pictures of areas of your body next to your lungs. In a very small number of cases, the CT scan will show an abnormal finding in one of these areas, such as your kidneys, adrenal glands, liver or thyroid. This finding may not be serious, but you may need more tests. Your doctor can help you decide what other tests you may need, if any.  What can I expect from the results?  About 1 out of 4 LDCT exams will find something that may need more tests. Most of the time, these findings are lung nodules. Lung nodules are very small collections of tissue in the lung. These nodules are very common, and the vast majority--more than 97 percent--are not cancer (benign). Most are normal lymph nodes or small areas of scarring from past infections.  But, if a small lung nodule is found to be cancer, the cancer can be cured more than 90 percent of the time. To know if the nodule is cancer, we may need to get more images before your next yearly screening exam. If the nodule has suspicious features (for example, it is large, has an odd shape or grows over time), we will refer you to a specialist for further testing.  Will my doctor also get the results?  Yes. Your doctor will get a copy of your results.  Is it okay to keep smoking now that there s a cancer screening exam?  No. Tobacco is one of the strongest cancer-causing agents. It causes not only lung cancer, but other cancers and cardiovascular (heart) diseases as well. The damage  caused by smoking builds over time. This means that the longer you smoke, the higher your risk of disease. While it is never too late to quit, the sooner you quit, the better.  Where can I find help to quit smoking?  The best way to prevent lung cancer is to stop smoking. If you have already quit smoking, congratulations and keep it up! For help on quitting smoking, please call [employer] at 0-212-QUITNOW (1-586.775.9045) or the American Cancer Society at 1-643.194.2558 to find local resources near you.  One-on-one health coaching:  If you d prefer to work individually with a health care provider on tobacco cessation, we offer:      Medication Therapy Management:  Our specially trained pharmacists work closely with you and your doctor to help you quit smoking.  Call [phone] or [phone] (toll free).

## 2023-08-28 NOTE — PROGRESS NOTES
SUBJECTIVE:   Nadya is a 58 year old who presents for Preventive Visit.      8/28/2023     1:41 PM   Additional Questions   Roomed by Dilcia   Accompanied by Carlee Wife         8/28/2023     1:41 PM   Patient Reported Additional Medications   Patient reports taking the following new medications N/A       Are you in the first 12 months of your Medicare coverage?  No    HPI      Have you ever done Advance Care Planning? (For example, a Health Directive, POLST, or a discussion with a medical provider or your loved ones about your wishes): No, advance care planning information given to patient to review.  Patient plans to discuss their wishes with loved ones or provider.    Healthy Habits  Ability to successfully perform ADLs: Yes  Hearing impairment: No  Home Safety: Safe      2/21/2023     9:04 AM   PHQ-2 ( 1999 Pfizer)   PHQ-2 Score Incomplete         8/28/2023     1:27 PM   PHQ   PHQ-9 Total Score 6   Q9: Thoughts of better off dead/self-harm past 2 weeks Not at all     Fall Risk: No falls       Fall risk    Cognitive Screening   1) Repeat 3 items (Leader, Season, Table)    2) Clock draw: NORMAL  3) 3 item recall: Recalls 3 objects  Results: 3 items recalled: COGNITIVE IMPAIRMENT LESS LIKELY    Mini-CogTM Copyright S Mikel. Licensed by the author for use in [facility]; reprinted with permission ([email or web address]). All rights reserved.      Reviewed and updated as needed this visit by clinical staff   Tobacco  Allergies  Meds              Reviewed and updated as needed this visit by Provider                 Social History     Tobacco Use    Smoking status: Former     Packs/day: 1.00     Years: 30.00     Pack years: 30.00     Types: Cigarettes     Quit date: 3/23/2020     Years since quitting: 3.4    Smokeless tobacco: Never    Tobacco comments:     stopped 3/24/2020   Substance Use Topics    Alcohol use: No             8/28/2023     1:31 PM   Alcohol Use   Prescreen: >3 drinks/day or >7 drinks/week? Not  Applicable   Do you have a current opioid prescription? No  Do you use any other controlled substances or medications that are not prescribed by a provider? None    Current providers sharing in care for this patient include:   Patient Care Team:  Az Briseno MD as PCP - General  Myhre, David J, RN as Lead Care Coordinator (Primary Care - CC)  Az Briseno MD as Assigned PCP  Franco Quevedo MD as Assigned Behavioral Health Provider  [facility]ade [facility]  Sloan Burns MD as Assigned Heart and Vascular Provider  Az Craft APRN CNP as Assigned Sleep Provider  Jojo Soria RN as Registered Nurse (Diabetes Education)    The following health maintenance items are reviewed in Epic and correct as of today:  Health Maintenance   Topic Date Due    ADVANCE CARE PLANNING  Never done    ZOSTER IMMUNIZATION (1 of 2) Never done    DTAP/TDAP/TD IMMUNIZATION (3 - Td or Tdap) 04/30/2022    COVID-19 Vaccine (5 - Pfizer series) 06/20/2022    LUNG CANCER SCREENING  08/22/2023    INFLUENZA VACCINE (1) 09/01/2023    A1C  09/27/2023    ANNUAL REVIEW OF HM ORDERS  11/21/2023    EYE EXAM  11/28/2023    DIABETIC FOOT EXAM  12/21/2023    BMP  12/27/2023    LIPID  01/18/2024    PHQ-9  02/28/2024    ALT  06/27/2024    MICROALBUMIN  06/27/2024    YEARLY PREVENTIVE VISIT  08/28/2024    CBC  08/28/2024    HEMOGLOBIN  08/28/2024    HF ACTION PLAN  08/28/2026    COLORECTAL CANCER SCREENING  10/25/2031    TSH W/FREE T4 REFLEX  Completed    SPIROMETRY  Completed    HEPATITIS C SCREENING  Completed    HIV SCREENING  Completed    COPD ACTION PLAN  Completed    Pneumococcal Vaccine: Pediatrics (0 to 5 Years) and At-Risk Patients (6 to 64 Years)  Completed    URINALYSIS  Completed    DEPRESSION ACTION PLAN  Addressed    IPV IMMUNIZATION  Aged Out    MENINGITIS IMMUNIZATION  Aged Out    HEPATITIS B IMMUNIZATION  Discontinued     Review of Systems  Constitutional, HEENT, cardiovascular, pulmonary, GI, , musculoskeletal,  "neuro, skin, endocrine and psych systems are negative, except as otherwise noted.    OBJECTIVE:   BP 98/70 (BP Location: Left arm, Patient Position: Sitting, Cuff Size: Adult Regular)   Pulse 56   Temp 97.3  F (36.3  C) (Tympanic)   Resp 17   Ht 1.651 m (5' 5\")   Wt 86.7 kg (191 lb 3.2 oz)   SpO2 96%   BMI 31.82 kg/m   Estimated body mass index is 31.82 kg/m  as calculated from the following:    Height as of this encounter: 1.651 m (5' 5\").    Weight as of this encounter: 86.7 kg (191 lb 3.2 oz).  Physical Exam  EYES: Eyelids, conjunctiva, and sclera were normal. Pupils were normal. Cornea, iris, and lens were normal bilaterally.  HEAD, EARS, NOSE, MOUTH, AND THROAT: Head and face were normal. Hearing was normal to voice and the ears were normal to external exam. Nose appearance was normal and there was no discharge. Oropharynx was normal.  NECK: Neck appearance was normal. There were no neck masses and the thyroid was not enlarged.  RESPIRATORY: Breathing pattern was normal and the chest moved symmetrically.  Percussion/auscultatory percussion was normal.  Lung sounds were normal and there were no abnormal sounds.  CARDIOVASCULAR: Heart rate and rhythm were normal.  S1 and S2 were normal and there were no extra sounds or murmurs. Peripheral pulses in arms and legs were normal.  Jugular venous pressure was normal.  There was no peripheral edema.  GASTROINTESTINAL: The abdomen was normal in contour.  Bowel sounds were present.  Percussion detected no organ enlargement or tenderness.  Palpation detected no tenderness, mass, or enlarged organs.   MUSCULOSKELETAL: Skeletal configuration was normal and muscle mass was normal for age. Joint appearance was overall normal.  LYMPHATIC: There were no enlarged nodes.  SKIN/HAIR/NAILS: Skin color was normal.  There were no skin lesions.  Hair and nails were normal.  NEUROLOGIC: The patient was alert and oriented to person, place, time, and circumstance. Speech was " normal. Cranial nerves were normal. Motor strength was normal for age. The patient was normally coordinated.  PSYCHIATRIC:  Mood and affect were normal and the patient had normal recent and remote memory. The patient's judgment and insight were normal.  CHEST WALL/BREASTS: Tenderness with palpation throughout the left chest wall and shoulder region    ASSESSMENT / PLAN:   1. Annual physical exam  This 58-year-old man comes in with issues as discussed below    2. Coronary artery disease, CABG 6/2020  Continue with medical management, his left-sided chest pain is clearly reproducible on examination and is unlikely cardiac at this point.  Discussed physical therapy and he declines/defers for now  - CBC with platelets; Future  - Comprehensive metabolic panel; Future  - Lipid panel reflex to direct LDL Fasting; Future  - CBC with platelets  - Comprehensive metabolic panel  - Lipid panel reflex to direct LDL Fasting    3. S/P CABG x 4    4. Essential hypertension  Blood pressure okay continue beta-blocker  - metoprolol succinate ER (TOPROL XL) 100 MG 24 hr tablet; Take 1.5 tablets (150 mg) by mouth At Bedtime  Dispense: 135 tablet; Refill: 4    5. Dyslipidemia, goal LDL below 70  Continue antilipid medication    6. Chronic kidney disease, stage 3a (H)  Stable  - UA Macroscopic with reflex to Microscopic and Culture; Future  - UA Macroscopic with reflex to Microscopic and Culture    7. Ascending aorta dilatation (H)  Follow-up per cardiology    8. Benign prostatic hyperplasia with nocturia  Stable, considering cystoscopy with urology    9. Anal fissure - Dr. Campoverde  Symptoms are generally stable, surgery has been recommended by colorectal surgery which she is considering    10. Microscopic hematuria - Dr. Tineo  As above    11. Thyroid nodule  Has been stable over 6 years, deferring repeat ultrasound for now  - TSH with free T4 reflex; Future  - TSH with free T4 reflex    12. Simple chronic bronchitis  "(H)  Stable    13. Recurrent major depressive disorder, in partial remission (H)  14. PTSD (post-traumatic stress disorder)  Continue follow-up with counseling and psychiatry    15. History of colonic polyps  Due for routine    16. Gastroesophageal reflux disease with esophagitis without hemorrhage  Stable/resolved with vegan diet    17. Primary osteoarthritis of right hip  18. Post-traumatic osteoarthritis of both knees  Stable    19. Pulmonary nodule    20. Personal history of tobacco use  - Prof fee: Shared Decision Making for Lung Cancer Screening  - CT Chest Lung Cancer Scrn Low Dose wo; Future    21. Type 2 diabetes mellitus without complication, without long-term current use of insulin (H)  Well-controlled, continue same  - Albumin Random Urine Quantitative with Creat Ratio; Future  - Albumin Random Urine Quantitative with Creat Ratio    COUNSELING:  Reviewed preventive health counseling, as reflected in patient instructions    BMI:   Estimated body mass index is 31.82 kg/m  as calculated from the following:    Height as of this encounter: 1.651 m (5' 5\").    Weight as of this encounter: 86.7 kg (191 lb 3.2 oz).     He reports that he quit smoking about 3 years ago. His smoking use included cigarettes. He has a 30.00 pack-year smoking history. He has never used smokeless tobacco.    Appropriate preventive services were discussed with this patient, including applicable screening as appropriate for cardiovascular disease, diabetes, osteopenia/osteoporosis, and glaucoma.  As appropriate for age/gender, discussed screening for colorectal cancer, prostate cancer, breast cancer, and cervical cancer. Checklist reviewing preventive services available has been given to the patient.    Reviewed patients plan of care and provided an AVS. The Basic Care Plan (routine screening as documented in Health Maintenance) for Nadya meets the Care Plan requirement. This Care Plan has been established and reviewed with the " Patient.          Az Briseno MD  [facility]    Identified Health Risks:  I have reviewed Opioid Use Disorder and Substance Use Disorder risk factors and made any needed referrals.

## 2023-08-28 NOTE — PROGRESS NOTES
Lung Cancer Screening Shared Decision Making Visit     Nadya Blake, a 58 year old male, is eligible for lung cancer screening    History   Smoking Status    Former    Packs/day: 1.00    Years: 30.00    Types: Cigarettes    Quit date: 3/23/2020   Smokeless Tobacco    Never       I have discussed with patient the risks and benefits of screening for lung cancer with low-dose CT.     The risks include:    radiation exposure: one low dose chest CT has as much ionizing radiation as about 15 chest x-rays, or 6 months of background radiation living in [place]      false positives: most findings/nodules are NOT cancer, but some might still require additional diagnostic evaluation, including biopsy    over-diagnosis: some slow growing cancers that might never have been clinically significant will be detected and treated unnecessarily     The benefit of early detection of lung cancer is contingent upon adherence to annual screening or more frequent follow up if indicated.     Furthermore, to benefit from screening, Nadya must be willing and able to undergo diagnostic procedures, if indicated. Although no specific guide is available for determining severity of comorbidities, it is reasonable to withhold screening in patients who have greater mortality risk from other diseases.     We did discuss that the best way to prevent lung cancer is to not smoke.    Some patients may value a numeric estimation of lung cancer risk when evaluating if lung cancer screening is right for them, here is one calculator:    ShouldIScreenAnswers submitted by the patient for this visit:  Patient Health Questionnaire (Submitted on 8/28/2023)  If you checked off any problems, how difficult have these problems made it for you to do your work, take care of things at home, or get along with other people?: Somewhat difficult  PHQ9 TOTAL SCORE: 6  Annual Preventive Visit (Submitted on 8/28/2023)  Chief Complaint: Annual Exam:  Frequency of  exercise:: 6-7 days/week  Getting at least 3 servings of Calcium per day:: Yes  Diet:: Vegetarian/vegan  Taking medications regularly:: Yes  Medication side effects:: None  Bi-annual eye exam:: Yes  Dental care twice a year:: NO  Sleep apnea or symptoms of sleep apnea:: None  abdominal pain: Yes  chest pain: Yes  nervous/anxious: Yes  frequency: Yes  headaches: Yes  mood changes: Yes  myalgias: Yes  nausea: Yes  weakness: Yes  Additional concerns today:: No  Exercise outside of work (Submitted on 8/28/2023)  Chief Complaint: Annual Exam:  Duration of exercise:: 30-45 minutes

## 2023-09-05 ENCOUNTER — HOSPITAL ENCOUNTER (OUTPATIENT)
Dept: CT IMAGING | Facility: HOSPITAL | Age: 58
Discharge: HOME | End: 2023-09-05
Attending: INTERNAL MEDICINE | Admitting: INTERNAL MEDICINE
Payer: COMMERCIAL

## 2023-09-05 DIAGNOSIS — Z87.891 PERSONAL HISTORY OF TOBACCO USE: ICD-10-CM

## 2023-09-05 PROCEDURE — 71271 CT THORAX LUNG CANCER SCR C-: CPT

## 2023-09-07 ENCOUNTER — PATIENT OUTREACH (OUTPATIENT)
Dept: CARE COORDINATION | Facility: CLINIC | Age: 58
End: 2023-09-07
Payer: COMMERCIAL

## 2023-09-07 NOTE — LETTER
[facility]  Patient Centered Plan of Care  About Me:        Patient Name:  Nadya Covington and Juanita,     It was so nice to talk with you today Juanita. Here is the resource for a possible Psychiatrist for Nadya: [facility]. Their intake number is [phone]. They have several locations throughout the [facility]. If you are unable to find a Psychiatrist there, please let me know and we will keep looking.   Here is a copy of your Care Plan. Please let me know if I can help in any other way.     Thanks,     Dave Myhre, RN   CCC RN  [phone]    YOB: 1965  Age:         58 year old   Tracy MRN:    [id number] Telephone Information:  Home Phone [phone]   Mobile [phone]       Address:  [address] [place] [id number] Email address:  [email or web address]      Emergency Contact(s)    Name Relationship Lgl Grd Work Phone Home Phone Mobile Phone   1. JUANITA BEASLEY Spouse   [phone]            Primary language:  French     needed? No   [employer] [facility]:  [phone] op. 1  Other communication barriers:None    Preferred Method of Communication:     Current living arrangement: I live in a private home with spouse    Mobility Status/ Medical Equipment: Independent        Health Maintenance  Health Maintenance Reviewed: Due/Overdue   Health Maintenance Due   Topic Date Due    ZOSTER IMMUNIZATION (1 of 2) Never done    DTAP/TDAP/TD IMMUNIZATION (3 - Td or Tdap) 04/30/2022    COVID-19 Vaccine (5 - Pfizer series) 06/20/2022    INFLUENZA VACCINE (1) 09/01/2023          My Access Plan  Medical Emergency 911   Primary Clinic Line [facility] - [phone]   24 Hour Appointment Line [phone] or  [phone]) (toll-free)   24 Hour Nurse Line [phone] (toll-free)   Preferred Urgent Care [facility] [place], [phone]     [facility]  [facility]  [phone]     Preferred Pharmacy [id number] - [place], [place] - [address]     Behavioral Health Crisis Line The National Suicide Prevention Lifeline at 1-111.433.1473 or Text/Call 988             My Care Team Members  Patient Care Team         Relationship Specialty Notifications Start End    Az Briseno MD PCP - General   9/22/20     Phone: [phone] Fax: [phone]         [address] [facility] [address]    Myhre, David J, RN Lead Care Coordinator Primary Care - CC Admissions 11/19/20      Phone: [phone]    Az Briseno MD Assigned PCP   6/16/21     Phone: [phone] Fax: [phone]         [address] [facility] [address]    Franco Quevedo MD Assigned Behavioral Health Provider   7/16/21     Phone: [phone] Fax: [phone]         [address] [facility] [place] [address] [facility] [address]    Inessa Cade ARM worker   11/15/21     Phone: [phone]         Sloan Burns MD Assigned Heart and Vascular Provider   12/12/21     Phone: [phone] Fax: [phone]         [address] [facility] [address] [place] [address] [facility] [address]    Az Craft, GENET CNP Assigned Sleep Provider   1/21/23     Phone: [phone] Fax: [phone]         [address] [address] [facility] [address]    Jojo Soria RN Registered Nurse Diabetes Education  3/21/23               My Care Plans  Self Management and Treatment Plan  Care Plan  Care Plan: General       Problem: HP GENERAL PROBLEM       Goal: 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       Start Date: 2/8/2023 Expected End Date: 2/7/2024    Recent Progress: 80%    Priority: Medium    Note:     Barriers: Health concerns  Strengths: Motivated. Strong family support.   Patient expressed understanding of goal: Yes  Action steps to achieve this goal:  1. I will look into the options of what type of painting interests me.   2. I will also look into  community offered classes that may help get me started towards his goal.   3. I will report progress towards this goal at schedule outreach telephone calls from my CCC team.      Discussed on 6/12/23                               Action Plans on File:                       Advance Care Plans/Directives Type:   No data recorded    My Medical and Care Information  Problem List   Patient Active Problem List   Diagnosis    Recurrent major depressive disorder, in partial remission (H)    Gastroesophageal reflux disease with esophagitis without hemorrhage    Essential hypertension    Post-traumatic osteoarthritis of both knees    Primary osteoarthritis of left hip    Primary osteoarthritis of right hip    Coronary artery disease, CABG 6/2020    Dyslipidemia, goal LDL below 70    Benign prostatic hyperplasia with nocturia    PTSD (post-traumatic stress disorder)    Ascending aorta dilatation (H)    Anal fissure - Dr. Campoverde    Simple chronic bronchitis (H)    History of colonic polyps    Pulmonary nodule    S/P CABG x 4    Thyroid nodule    Nonalcoholic fatty liver disease    Chronic kidney disease, stage 3a (H)    Microscopic hematuria - Dr. Tineo    Type 2 diabetes mellitus without complication, without long-term current use of insulin (H)      Current Medications and Allergies:  See printed Medication Report.    Care Coordination Start Date: 11/19/2020   Frequency of Care Coordination: monthly     Form Last Updated: 09/07/2023

## 2023-09-07 NOTE — PROGRESS NOTES
Clinic Care Coordination Contact  Follow Up Progress Note      Assessment: [facility] RN spoke with patient today to follow up on goal and to discuss any needs or concerns. Provided resources of [facility] for a Psychiatrist. Patient and his wife feel he needs to be seen by a Psychiatrist for his complex mental health issues. He is currently working with a psychiatric NP and feels comfortable staying with her until a psychiatrist can be found. She stated she his mental health has been stable. She stated patient continues to pain on occasion as a form of self expression.  Carlee said he has also been spending a lot of time with his children, which she said brings him simon.   Carlee said patient has been taking all of his medications as directed and has been attending his medical appointments as scheduled. She denied any other needs or concerns.     Care Gaps:    Health Maintenance Due   Topic Date Due    ZOSTER IMMUNIZATION (1 of 2) Never done    DTAP/TDAP/TD IMMUNIZATION (3 - Td or Tdap) 04/30/2022    COVID-19 Vaccine (5 - Pfizer series) 06/20/2022    INFLUENZA VACCINE (1) 09/01/2023       Scheduled with PCP on 9/28/23      Care Plans  Care Plan: General       Problem: HP GENERAL PROBLEM       Goal: 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       Start Date: 2/8/2023 Expected End Date: 2/7/2024    Recent Progress: 80%    Priority: Medium    Note:     Barriers: Health concerns  Strengths: Motivated. Strong family support.   Patient expressed understanding of goal: Yes  Action steps to achieve this goal:  1. I will look into the options of what type of painting interests me.   2. I will also look into community offered classes that may help get me started towards his goal.   3. I will report progress towards this goal at schedule outreach telephone calls from my CCC team.      Discussed on 6/12/23                              Intervention/Education provided during outreach: Discussed the  importance of taking her medications as directed. Encouraged attendance at all scheduled follow up appointments. Encouraged them to contact Care Coordination for any additional needs or concerns.      Outreach Frequency: monthly      Plan: CCC RN will continue to monitor, support patient with current goal and will be available to assist as nursing needs arise.     Care Coordinator will follow up in one month.

## 2023-09-22 ENCOUNTER — VIRTUAL VISIT (OUTPATIENT)
Dept: PSYCHIATRY | Facility: CLINIC | Age: 58
End: 2023-09-22
Payer: COMMERCIAL

## 2023-09-22 DIAGNOSIS — F43.10 PTSD (POST-TRAUMATIC STRESS DISORDER): ICD-10-CM

## 2023-09-22 DIAGNOSIS — F41.1 GAD (GENERALIZED ANXIETY DISORDER): ICD-10-CM

## 2023-09-22 DIAGNOSIS — F33.1 DEPRESSION, MAJOR, RECURRENT, MODERATE (H): Primary | ICD-10-CM

## 2023-09-22 PROCEDURE — 99214 OFFICE O/P EST MOD 30 MIN: CPT | Mod: VID | Performed by: NURSE PRACTITIONER

## 2023-09-22 RX ORDER — BUSPIRONE HYDROCHLORIDE 5 MG/1
5 TABLET ORAL DAILY
Qty: 180 TABLET | Refills: 0 | Status: SHIPPED | OUTPATIENT
Start: 2023-09-22 | End: 2023-11-02

## 2023-09-22 RX ORDER — QUETIAPINE FUMARATE 50 MG/1
75 TABLET, FILM COATED ORAL AT BEDTIME
Qty: 45 TABLET | Refills: 4 | Status: SHIPPED | OUTPATIENT
Start: 2023-09-22 | End: 2024-01-04

## 2023-09-22 NOTE — PATIENT INSTRUCTIONS
"Patient Education   The [facility]  What to Expect  Here's what to expect in the Panel Psych[facility]ry Program.   About the program  You'll be meeting with a psychiatric doctor to check your mental health. A psychiatric doctor helps you deal with troubling thoughts and feelings by giving you medicine. They'll make sure you know the plan for your care. You may see them for a long time. When you're feeling better, they may refer you back to seeing your family doctor.   If you have any questions, we'll be glad to talk to you.  About visits  Be open  At your visits, please talk openly about your problems. It may feel hard, but it's the best way for us to help you.  Cancelling visits  If you can't come to your visit, please call us right away at [phone]. If you don't cancel at least 24 hours (1 full day) before your visit, that's \"late cancellation.\"  Not showing up for your visits  Being very late is the same as not showing up. You'll be a \"no show\" if:  You're more than 15 minutes late for a 30-minute (half hour) visit.  You're more than 30 minutes late for a 60-minute (full hour) visit.  If you cancel late or don't show up 2 times within 6 months, we may end your care.  Getting help between visits  If you need help between visits, you can call us Monday to Friday from 8 a.m. to 4:30 p.m. at [phone].  Emergency care  Call 911 or go to the nearest emergency department if your life or someone else's life is in danger.  Call 988 anytime to reach the national Suicide and Crisis hotline.  Medicine refills  To refill your medicine, call your pharmacy. You can also call [facility]'s [facility] at [phone], Monday to Friday, 8 a.m. to 4:30 p.m. It can take 1 to 3 business days to get a refill.   Forms, letters, and tests  You may have papers to fill out, like FMLA, short-term disability, and workability. We can help you with these forms at your visits, but you must have an " appointment. You may need more than 1 visit for this, to be in an intensive therapy program, or both.  Before we can give you medicine for ADHD, we may refer you to get tested for it or confirm it another way.  We may not be able to give you an emotional support animal letter.  We don't do mental health checks ordered by the court.   We don't do mental health testing, but we can refer you to get tested.   Thank you for choosing us for your care.  For informational purposes only. Not to replace the advice of your health care provider. Copyright   2022 [facility]. All rights reserved. [employer] [id number] - 12/22.      Treatment Plan:      1.  Increase quetiapine to 75 mg at bedtime  2.  Continue buspirone 2 tabs daily  3.  Continue talk therapy to process trauma history and learn skills to manage her nightmares    Continue all other medications as reviewed per electronic medical record today.   Safety plan reviewed. To the Emergency Department as needed or call after hours crisis line at [phone] or [phone]. [place] Crisis Text Line. Text [place] to 411695 or Suicide LifeLine Chat: suicidepreventionlifeline.org/chat/  To schedule individual or family therapy, call [facility] at [phone]  Schedule an appointment with me in November or sooner as needed. Call [facility] at [phone] to schedule.  Follow up with primary care provider as planned or for acute medical concerns.  Call the psychiatric nurse line with medication questions or concerns at [phone]  MyChart may be used to communicate with your provider, but this is not intended to be used for emergencies.    Crisis Resources:    National Suicide Prevention Lifeline: 417.596.8836 (TTY: 848.749.5596). Call anytime for help.  (www.suicidepreventionlifeline.org)  National [place] on Mental Illness (www.góme[place].org): 532.105.2879 or 469-401-7201.   Mental Health Association (www.mentalhealth.org):  794-356-3910 or 730-105-3038.  [place] Crisis Text Line: Text [place] to 621295  Suicide LifeLine Chat: suicidepreventionlifeline.org/chat

## 2023-09-22 NOTE — NURSING NOTE
Is the patient currently in the state of [place]? YES    Visit mode:VIDEO    If the visit is dropped, the patient can be reconnected by: VIDEO VISIT: Text to cell phone:   Telephone Information:   Mobile [phone]       Will anyone else be joining the visit? NO  (If patient encounters technical issues they should call [phone] :[phone])    How would you like to obtain your AVS? MyChart    Are changes needed to the allergy or medication list? No    Reason for visit: RECHECK    Byron ARCHER

## 2023-09-22 NOTE — PROGRESS NOTES
Virtual Visit Details    Type of service:  Video Visit   Video Start Time: 1:48 PM  Video End Time: 2:15 PM    Originating Location (pt. Location): Home    Distant Location (provider location):  Off-site  Platform used for Video Visit: Leo

## 2023-09-22 NOTE — PROGRESS NOTES
"         Outpatient Psychiatric Progress Note    Name: Nadya Blake   : 1965                    Primary Care Provider: Az Briseno MD   Therapist: Yes    PHQ-9 scores:      2023    12:43 PM 2023     1:39 PM 2023     1:27 PM   PHQ-9 SCORE   PHQ-9 Total Score MyChart 12 (Moderate depression) 10 (Moderate depression) 6 (Mild depression)   PHQ-9 Total Score 12 10 6       ISI-7 scores:      2023     9:04 AM 2023     1:42 PM   ISI-7 SCORE   Total Score 6 (mild anxiety) 15 (severe anxiety)   Total Score 6 15       Patient Identification:    Patient is a 58 year old year old,    Not  or  male  who presents for return visit with me.  Patient is currently disabled. Patient attended the session with his wife , who they agreed to have interview with. Patient prefers to be called: \" Nadya\".    Current medications include: ACETAMINOPHEN EXTRA STRENGTH 500 MG tablet, TAKE 2 TABLETS(1000 MG) BY MOUTH EVERY 6 HOURS AS NEEDED FOR PAIN  aspirin (ASA) 81 MG chewable tablet, Take 1 tablet (81 mg) by mouth daily  busPIRone (BUSPAR) 5 MG tablet, Take 1 tablet (5 mg) by mouth daily In the morning and an additional tablet later in the day as needed for anxiety  Cholecalciferol (VITAMIN D3) 50 MCG (2000 UT) CAPS, Take 1 capsule by mouth daily  cyanocobalamin (VITAMIN B-12) 1000 MCG tablet, Take 1 tablet (1,000 mcg) by mouth daily  diclofenac (VOLTAREN) 1 % topical gel, Apply 4 g topically 4 times daily  empagliflozin (JARDIANCE) 25 MG TABS tablet, Take 1 tablet (25 mg) by mouth daily  escitalopram (LEXAPRO) 20 MG tablet, Take 1 tablet (20 mg) by mouth daily  Lidocaine 0.5 % GEL, Externally apply topically as needed  metoprolol succinate ER (TOPROL XL) 100 MG 24 hr tablet, Take 1.5 tablets (150 mg) by mouth At Bedtime  nifedipine 0.2% in white petrolatum 0.2 % OINT ointment, Apply topically 4 times daily as needed (anal fissure)  nitroGLYcerin (NITROSTAT) 0.4 MG " sublingual tablet, For chest pain place 1 tablet under the tongue every 5 minutes for 3 doses. If symptoms persist 5 minutes after 1st dose call 911.  oxybutynin (DITROPAN) 5 MG tablet, Take 1 tablet (5 mg) by mouth At Bedtime  polyethylene glycol (MIRALAX) 17 GM/Dose powder, TAKE 17 GRAMS(1 CAPFUL) BY MOUTH DAILY  QUEtiapine (SEROQUEL) 50 MG tablet, Take 1 tablet (50 mg) by mouth At Bedtime  rosuvastatin (CRESTOR) 40 MG tablet, TAKE 1 TABLET(40 MG) BY MOUTH DAILY  tamsulosin (FLOMAX) 0.4 MG capsule, TAKE 2 CAPSULES(0.8 MG) BY MOUTH EVERY EVENING    No current facility-administered medications on file prior to visit.       The [place] Prescription Monitoring Program has been reviewed and there are no concerns about diversionary activity for controlled substances at this time.      I was able to review most recent Primary Care Provider, specialty provider, and therapy visit notes that I have access to.     Today, patient reports that his creatinine is elevated with kidney pain.  He is walking everyday.  Tylenol for pain.   Depression is worse.  Yesterday he met with psychologist - when he goes to the bedroom he is having more nightmares - he worries about good things.  He worries that he may not live another day.  Sometimes he talks to his wife for support.   Family call him everyday.  Therapist twice a week.  EMDR -has started     has a past medical history of Acute non-ST elevation myocardial infarction (NSTEMI) (H) (6/22/2020), Adenomatous polyp of colon, Ascending aorta dilatation (H), Carpal tunnel syndrome, Chronic superficial gastritis, Coronary artery disease due to lipid rich plaque (6/23/2020), Depression with anxiety, Dyslipidemia, goal LDL below 70 (6/23/2020), Essential hypertension (9/2/2012), Fatty liver disease, nonalcoholic, GERD (gastroesophageal reflux disease), IBS (irritable bowel syndrome), Left lumbar radiculopathy, Multinodular goiter, Old torn meniscus of knee, Paroxysmal atrial  fibrillation (H), Perianal abscess, Post herpetic neuralgia, Post-traumatic osteoarthritis of both knees, Primary osteoarthritis of left hip, Primary osteoarthritis of right hip, Pulmonary nodule, 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), Thyroid nodule, TMJ arthritis, Tobacco use disorder (11/1/2013), and Vitamin D deficiency (9/30/2020).    Social history updates:    Nadya lives with his wife and children.  He communicates regularly with relatives who live in [place].    Substance use updates:    No alcohol use reported  Tobacco use: No    Vital Signs:   There were no vitals taken for this visit.    Labs:    Most recent laboratory results reviewed and no new labs.     Mental Status Examination:  Appearance: awake, alert, casual dress, and mild distress  Attitude: cooperative  Eye Contact:  adequate  Gait and Station: No dizziness or falls  Psychomotor Behavior:  intact station, gait and muscle tone  Oriented to:  time, person, and place  Attention Span and Concentration:  Normal  Speech:   vtspeech: clear, coherent and Speaks: English  Mood:  anxious and depressed  Affect:  mood congruent  Associations:  no loose associations  Thought Process:  goal oriented  Thought Content:  no evidence of suicidal ideation or homicidal ideation, no auditory hallucinations present, and no visual hallucinations present  Recent and Remote Memory:  intact Not formally assessed. No amnesia.  Fund of Knowledge: appropriate  Insight:  good  Judgment: good  Impulse Control:  good    Suicide Risk Assessment:  Today Nadya Blake reports no thoughts to harm themself or others. In addition, there are notable risk factors for self-harm, including anxiety, comorbid medical condition of cardiac condition, and withdrawing. However, risk is mitigated by commitment to family, history of seeking help when needed, future oriented, denies suicidal intent or plan, and denies homicidal ideation, intent, or plan.  Therefore, based on all available evidence including the factors cited above, Nadya Blake does not appear to be at imminent risk for self-harm, does not meet criteria for a 72-hr hold, and therefore remains appropriate for ongoing outpatient level of care.  A thorough assessment of risk factors related to suicide and self-harm have been reviewed and are noted above. The patient convincingly denies suicidality on several occasions. Local community safety resources printed and reviewed for patient to use if needed. There was no deceit detected, and the patient presented in a manner that was believable.     DSM5 Diagnosis:  296.32 (F33.1) Major Depressive Disorder, Recurrent Episode, Moderate _ and With mixed features  300.02 (F41.1) Generalized Anxiety Disorder  309.81 (F43.10) Posttraumatic Stress Disorder (includes Posttraumatic Stress Disorder for Children 6 Years and Younger)  Without dissociative symptoms    Medical comorbidities include:   Patient Active Problem List    Diagnosis Date Noted    Type 2 diabetes mellitus without complication, without long-term current use of insulin (H) 08/28/2023     Priority: Medium    Microscopic hematuria - Dr. Tineo 07/27/2023     Priority: Medium    Chronic kidney disease, stage 3a (H) 11/21/2022     Priority: Medium    Simple chronic bronchitis (H) 07/05/2022     Priority: Medium    Anal fissure - Dr. Campoverde 08/30/2021     Priority: Medium    Benign prostatic hyperplasia with nocturia 09/30/2020     Priority: Medium    PTSD (post-traumatic stress disorder) 09/30/2020     Priority: Medium    S/P CABG x 4 07/02/2020     Priority: Medium    Coronary artery disease, CABG 6/2020 06/23/2020     Priority: Medium    Dyslipidemia, goal LDL below 70 06/23/2020     Priority: Medium    Ascending aorta dilatation (H) 02/16/2020     Priority: Medium     Formatting of this note might be different from the original.  4.3 cm since 2014.        History of colonic polyps 09/14/2017      Priority: Medium    Nonalcoholic fatty liver disease 07/12/2017     Priority: Medium    Primary osteoarthritis of left hip 04/28/2017     Priority: Medium    Primary osteoarthritis of right hip 04/28/2017     Priority: Medium    Thyroid nodule 03/07/2016     Priority: Medium    Post-traumatic osteoarthritis of both knees 06/08/2015     Priority: Medium    Pulmonary nodule 11/11/2014     Priority: Medium     Formatting of this note might be different from the original.  CT scan 11-11-14. 2 mm each. Repeat scan in one year. Patient is a smoker. Positive family history of lung cancer.  3-14-16 2 stable 2 mm nodules, unchanged on repeat ct scan.  5-26-17 stable nodules. No pulmonary abnormality.        Gastroesophageal reflux disease with esophagitis without hemorrhage 10/30/2014     Priority: Medium     2-8-13 EGD nonspecific gastritis. Treated for H pylori in the past.  3-20-19 non specific gastritis. Path neg.          Recurrent major depressive disorder, in partial remission (H) 09/02/2012     Priority: Medium    Essential hypertension 09/02/2012     Priority: Medium       Assessment:    Nadya Blake continues to experience nightmares when he goes to sleep.  These consist of past trauma memories when he was living in [place].  Talk therapy continues including EMDR.  He agreed to increase his quetiapine at bedtime to 75 mg to help with continued depression, anxiety, and sleeping difficulties.  Buspirone is effective at its current dose to keep his anxiety manageable.    Medication side effects and alternatives were reviewed. Health promotion activities recommended and reviewed today. All questions addressed. Education and counseling completed regarding risks and benefits of medications and psychotherapy options.    Treatment Plan:      1.  Increase quetiapine to 75 mg at bedtime  2.  Continue buspirone 2 tabs daily  3.  Continue talk therapy to process trauma history and learn skills to manage her  nightmares    Continue all other medications as reviewed per electronic medical record today.   Safety plan reviewed. To the Emergency Department as needed or call after hours crisis line at [phone] or [phone]. [place] Crisis Text Line. Text [place] to 556884 or Suicide LifeLine Chat: suicide[place]oby[place].org/chat/  To schedule individual or family therapy, call [facility] at [phone]  Schedule an appointment with me in November or sooner as needed. Call [facility] at [phone] to schedule.  Follow up with primary care provider as planned or for acute medical concerns.  Call the psychiatric nurse line with medication questions or concerns at [phone]  MyChart may be used to communicate with your provider, but this is not intended to be used for emergencies.    Crisis Resources:    National Suicide Prevention Lifeline: 985.353.6105 (TTY: 615.239.1808). Call anytime for help.  (www.suicidepreventionlifeline.org)  National [place] on Mental Illness (www.[place].org): 274.675.9547 or 176-198-5493.   Mental Health Association (www.mentalhealth.org): 828.741.5601 or 818-295-2438.  [place] Crisis Text Line: Text [place] to 142311  Suicide LifeLine Chat: suicide[place]oby[place].org/chat    Administrative Billing:   Time spent with patient includes counseling and coordination of care regarding above diagnoses and treatment plan.    Patient Status:  This is a continuous care patient and medications will be prescribed by the psychiatric provider until further indicated.    Signed:   SINGH Mo-BC   Psychiatry

## 2023-09-25 DIAGNOSIS — E78.5 DYSLIPIDEMIA, GOAL LDL BELOW 70: ICD-10-CM

## 2023-09-26 RX ORDER — ROSUVASTATIN CALCIUM 40 MG/1
40 TABLET, COATED ORAL DAILY
Qty: 90 TABLET | Refills: 4 | Status: SHIPPED | OUTPATIENT
Start: 2023-09-26

## 2023-09-28 ENCOUNTER — OFFICE VISIT (OUTPATIENT)
Dept: INTERNAL MEDICINE | Facility: CLINIC | Age: 58
End: 2023-09-28
Payer: COMMERCIAL

## 2023-09-28 VITALS
DIASTOLIC BLOOD PRESSURE: 79 MMHG | HEIGHT: 65 IN | BODY MASS INDEX: 32.42 KG/M2 | TEMPERATURE: 97 F | WEIGHT: 194.6 LBS | RESPIRATION RATE: 16 BRPM | OXYGEN SATURATION: 99 % | SYSTOLIC BLOOD PRESSURE: 118 MMHG | HEART RATE: 50 BPM

## 2023-09-28 DIAGNOSIS — I25.118 CORONARY ARTERY DISEASE OF NATIVE ARTERY OF NATIVE HEART WITH STABLE ANGINA PECTORIS (H): ICD-10-CM

## 2023-09-28 DIAGNOSIS — E11.9 TYPE 2 DIABETES MELLITUS WITHOUT COMPLICATION, WITHOUT LONG-TERM CURRENT USE OF INSULIN (H): Primary | ICD-10-CM

## 2023-09-28 DIAGNOSIS — N40.1 BENIGN PROSTATIC HYPERPLASIA WITH NOCTURIA: ICD-10-CM

## 2023-09-28 DIAGNOSIS — R35.1 BENIGN PROSTATIC HYPERPLASIA WITH NOCTURIA: ICD-10-CM

## 2023-09-28 DIAGNOSIS — N18.31 CHRONIC KIDNEY DISEASE, STAGE 3A (H): ICD-10-CM

## 2023-09-28 DIAGNOSIS — I10 ESSENTIAL HYPERTENSION: ICD-10-CM

## 2023-09-28 PROCEDURE — 99214 OFFICE O/P EST MOD 30 MIN: CPT | Mod: 25 | Performed by: INTERNAL MEDICINE

## 2023-09-28 PROCEDURE — 90471 IMMUNIZATION ADMIN: CPT | Performed by: INTERNAL MEDICINE

## 2023-09-28 PROCEDURE — 90682 RIV4 VACC RECOMBINANT DNA IM: CPT | Performed by: INTERNAL MEDICINE

## 2023-09-28 ASSESSMENT — PATIENT HEALTH QUESTIONNAIRE - PHQ9
10. IF YOU CHECKED OFF ANY PROBLEMS, HOW DIFFICULT HAVE THESE PROBLEMS MADE IT FOR YOU TO DO YOUR WORK, TAKE CARE OF THINGS AT HOME, OR GET ALONG WITH OTHER PEOPLE: SOMEWHAT DIFFICULT
SUM OF ALL RESPONSES TO PHQ QUESTIONS 1-9: 6
SUM OF ALL RESPONSES TO PHQ QUESTIONS 1-9: 6

## 2023-09-28 NOTE — PROGRESS NOTES
"  Office Visit - Follow Up   Nadya DAE Blake   58 year old male    Date of Visit: 9/28/2023    Chief Complaint   Patient presents with    office visit     Recheck Medication     Pt is here for follow up        Assessment and Plan   1. Type 2 diabetes mellitus without complication, without long-term current use of insulin (H)  Continue with current plan    2. Coronary artery disease, CABG 6/2020  Continue secondary prevention    3. Essential hypertension  Blood pressure okay continue same    4. Chronic kidney disease, stage 3a (H)  Stable    5. Benign prostatic hyperplasia with nocturia  Increase Flomax to 0.8 mg in the evening    Return in about 4 weeks (around 10/26/2023) for Follow up.     History of Present Illness   This 58 year old is in for follow-up.  Overall he is doing okay.  He would like to review labs which we did.  We discussed his triglyceride level which is a little bit high and since he went to a vegan diet he has had a bit more carbohydrates in his diet.  Continues to have symptoms in the evening when he is trying to sleep.  Significant PTSD likely contributing.  Daytime symptoms are better.  He has had more nocturia.       Physical Exam   General Appearance:   No acute distress    /79 (BP Location: Left arm, Patient Position: Sitting, Cuff Size: Adult Regular)   Pulse 50   Temp 97  F (36.1  C) (Tympanic)   Resp 16   Ht 1.66 m (5' 5.35\")   Wt 88.3 kg (194 lb 9.6 oz)   SpO2 99%   BMI 32.03 kg/m           Additional Information   Current Outpatient Medications   Medication Sig Dispense Refill    ACETAMINOPHEN EXTRA STRENGTH 500 MG tablet TAKE 2 TABLETS(1000 MG) BY MOUTH EVERY 6 HOURS AS NEEDED FOR PAIN 360 tablet 3    aspirin (ASA) 81 MG chewable tablet Take 1 tablet (81 mg) by mouth daily 90 tablet 4    busPIRone (BUSPAR) 5 MG tablet Take 1 tablet (5 mg) by mouth daily In the morning and an additional tablet later in the day as needed for anxiety 180 tablet 0    Cholecalciferol " (VITAMIN D3) 50 MCG (2000 UT) CAPS Take 1 capsule by mouth daily 90 capsule 3    cyanocobalamin (VITAMIN B-12) 1000 MCG tablet Take 1 tablet (1,000 mcg) by mouth daily 90 tablet 4    diclofenac (VOLTAREN) 1 % topical gel Apply 4 g topically 4 times daily 500 g 2    empagliflozin (JARDIANCE) 25 MG TABS tablet Take 1 tablet (25 mg) by mouth daily 90 tablet 4    escitalopram (LEXAPRO) 20 MG tablet Take 1 tablet (20 mg) by mouth daily 30 tablet 4    Lidocaine 0.5 % GEL Externally apply topically as needed      metoprolol succinate ER (TOPROL XL) 100 MG 24 hr tablet Take 1.5 tablets (150 mg) by mouth At Bedtime 135 tablet 4    nifedipine 0.2% in white petrolatum 0.2 % OINT ointment Apply topically 4 times daily as needed (anal fissure) 100 g 1    nitroGLYcerin (NITROSTAT) 0.4 MG sublingual tablet For chest pain place 1 tablet under the tongue every 5 minutes for 3 doses. If symptoms persist 5 minutes after 1st dose call 911. 30 tablet 1    oxybutynin (DITROPAN) 5 MG tablet Take 1 tablet (5 mg) by mouth At Bedtime 90 tablet 4    polyethylene glycol (MIRALAX) 17 GM/Dose powder TAKE 17 GRAMS(1 CAPFUL) BY MOUTH DAILY 840 g 4    QUEtiapine (SEROQUEL) 50 MG tablet Take 1.5 tablets (75 mg) by mouth At Bedtime 45 tablet 4    rosuvastatin (CRESTOR) 40 MG tablet Take 1 tablet (40 mg) by mouth daily 90 tablet 4    tamsulosin (FLOMAX) 0.4 MG capsule TAKE 2 CAPSULES(0.8 MG) BY MOUTH EVERY EVENING 180 capsule 4       Time:      Oseas Perez submitted by the patient for this visit:  Patient Health Questionnaire (Submitted on 9/28/2023)  If you checked off any problems, how difficult have these problems made it for you to do your work, take care of things at home, or get along with other people?: Somewhat difficult  PHQ9 TOTAL SCORE: 6  Diabetes Visit (Submitted on 9/28/2023)  Chief Complaint: Chronic problems general questions HPI Form  Frequency of checking blood sugars:: not at all  Diabetic concerns::  other  Paraesthesia present:: none of these symptoms  Heart Failure Visit (Submitted on 9/28/2023)  Chief Complaint: Chronic problems general questions HPI Form  Dyspnea:: with activity only  Status:: same as usual  Edema:: No  Are you using more pillows than usual at night?: No  Do you cough at night?: No  Checking weight daily:: Yes  Weight change recently:: None  Heart Medication Side Effects:: None  Frequency:: None  CKD Visit (Submitted on 9/28/2023)  Chief Complaint: Chronic problems general questions HPI Form  Do you take any over the counter pain medicine?  : No  Vascular Disease (Submitted on 9/28/2023)  Chief Complaint: Chronic problems general questions HPI Form  Do you take an aspirin every day?: Yes  General Questionnaire (Submitted on 9/28/2023)  Chief Complaint: Chronic problems general questions HPI Form  How many servings of fruits and vegetables do you eat daily?: 2-3  On average, how many sweetened beverages do you drink each day (Examples: soda, juice, sweet tea, etc.  Do NOT count diet or artificially sweetened beverages)?: 2  How many minutes a day do you exercise enough to make your heart beat faster?: 20 to 29  How many days a week do you exercise enough to make your heart beat faster?: 6  How many days per week do you miss taking your medication?: 0

## 2023-09-28 NOTE — Clinical Note
Pt was supposed to schedule a comprehensive sleep study - please help arrange.  He saw Az Craft this past year and he ordered the test

## 2023-10-03 ENCOUNTER — LAB (OUTPATIENT)
Dept: LAB | Facility: CLINIC | Age: 58
End: 2023-10-03
Payer: COMMERCIAL

## 2023-10-03 ENCOUNTER — ALLIED HEALTH/NURSE VISIT (OUTPATIENT)
Dept: EDUCATION SERVICES | Facility: CLINIC | Age: 58
End: 2023-10-03
Payer: COMMERCIAL

## 2023-10-03 VITALS — BODY MASS INDEX: 31.61 KG/M2 | WEIGHT: 192 LBS

## 2023-10-03 DIAGNOSIS — E11.9 TYPE 2 DIABETES MELLITUS WITHOUT COMPLICATION, WITHOUT LONG-TERM CURRENT USE OF INSULIN (H): ICD-10-CM

## 2023-10-03 DIAGNOSIS — E11.9 TYPE 2 DIABETES MELLITUS WITHOUT COMPLICATION, WITHOUT LONG-TERM CURRENT USE OF INSULIN (H): Primary | ICD-10-CM

## 2023-10-03 LAB — HBA1C MFR BLD: 5.6 % (ref 0–5.6)

## 2023-10-03 PROCEDURE — G0108 DIAB MANAGE TRN  PER INDIV: HCPCS | Mod: AE

## 2023-10-03 PROCEDURE — 83036 HEMOGLOBIN GLYCOSYLATED A1C: CPT

## 2023-10-03 PROCEDURE — 36415 COLL VENOUS BLD VENIPUNCTURE: CPT

## 2023-10-03 NOTE — LETTER
10/3/2023         RE: Nadya Blake  [address]  [facility] [id number]        Dear Colleague,    Thank you for referring your patient, Nadya Blake, to the [facility]. Please see a copy of my visit note below.    Diabetes Self-Management Education & Support    Presents for: Individual review    Type of Service: In Person Visit    Assessment Type:   ASSESSMENT:  Nadya is a very pleasant 57-year-old gentleman who returns to clinic today for follow-up per his request.  He arrived today accompanied by his wife Carlee.  Medications were reviewed and Nadya reports he continues to take 25 mg of Jardiance daily with no missed or skipped doses.  We again reviewed his current dietary intake and nutritional counseling was provided.  He continues to follow a very strict vegan diet. He continues to eat Vegan burgers which he says he likes very much and  a lot of fruit.   At this visit lab work was done, including an A1c, and I was very pleased to see his A1c returned today even lower at 5.6%.  I congratulated him on this and reassured him his glycemic control continues to be optimal.  Encouraged him to keep up the good work. Sania and Carlee have 2 children who both currently live at home and are in high school.  It is his son's birthday today and a so they will be celebrating later-he will be 18.  Sania seems to worry a great deal about a multitude of things and this adds to the stressors he is already facing in his life. He continues to attend therapy with a psychiatrist.  He is keeping active daily.  Let him know today that would be perfectly acceptable to follow-up every 6 months at this point but he said he would prefer to keep his follow-up visits every 2 to 3 months..    Patient's most recent   Lab Results   Component Value Date    A1C 5.6 10/03/2023     is meeting goal of <7.0    Diabetes knowledge and skills assessment:   Patient is knowledgeable in diabetes  management concepts related to: Healthy Eating, Being Active, Taking Medication, and Reducing Risks    Continue education with the following diabetes management concepts: Healthy Eating and Healthy Coping    Based on learning assessment above, most appropriate setting for further diabetes education would be: Individual setting.      PLAN  1. Eat 3 balanced meals each day - Monitor carb intake and limit to 60-75 grams per meal  This would be equal to 4-5 choices ~  1 choice = 15 grams    Do not wait longer than 4-5 hours to eat something  Snacks limit to no more than 30 grams of carbohydrates or 2 choices  Make sure you include protein source with each meal and at bedtime - this has been shown to help with blood glucose elevations      3. Incorporate 30 minutes activity into each day - does not need to be all at one time & walking counts    4. Take diabetes medications as prescribed Continue Jardiance as prescribed    Topics to cover at upcoming visits: Healthy Eating, Reducing Risks, and Healthy Coping    Follow-up: 1/9/24    See Care Plan for co-developed, patient-state behavior change goals.  AVS provided for patient today.    Education Materials Provided:  No new materials provided today      SUBJECTIVE/OBJECTIVE:  Presents for: Individual review  Accompanied by: Self, Spouse (Carlee)  Diabetes education in the past 24 mo: Yes (LV 6/27/23)  Focus of Visit: Assistance w/ making life changes, Self-care behavioral goal setting, Healthy Eating, Taking Medication, Reducing Risks, Healthy Coping, Being Active  Diabetes type: Type 2  Date of diagnosis: 2023  Disease course: Stable (A1c today 5.6%)  Transportation concerns: No  Other concerns:: English as a second language  Cultural Influences/Ethnic Background:  Not  or       Diabetes Symptoms & Complications:  Fatigue: Sometimes  Polyuria: Sometimes  Symptom course: Stable  Weight trend: Stable  Complications assessed today?: Yes  Heart disease: Yes (CABG  "x 4 2020)    Patient Problem List and Family Medical History reviewed for relevant medical history, current medical status, and diabetes risk factors.    Vitals:  Wt 87.1 kg (192 lb)   BMI 31.61 kg/m    Estimated body mass index is 31.61 kg/m  as calculated from the following:    Height as of 9/28/23: 1.66 m (5' 5.35\").    Weight as of this encounter: 87.1 kg (192 lb).   Last 3 BP:   BP Readings from Last 3 Encounters:   09/28/23 118/79   08/28/23 98/70   08/07/23 98/72       History   Smoking Status     Former     Packs/day: 1.00     Years: 30.00     Types: Cigarettes     Quit date: 3/23/2020   Smokeless Tobacco     Never       Labs:  Lab Results   Component Value Date    A1C 5.6 10/03/2023     Lab Results   Component Value Date     08/28/2023     05/13/2022     Lab Results   Component Value Date    LDL 32 08/28/2023    LDL 41 08/07/2020     Direct Measure HDL   Date Value Ref Range Status   08/28/2023 33 (L) >=40 mg/dL Final   ]  GFR Estimate   Date Value Ref Range Status   08/28/2023 70 >60 mL/min/1.73m2 Final   05/24/2021 >60 >60 mL/min/1.73m2 Final     GFR Estimate If Black   Date Value Ref Range Status   05/24/2021 >60 >60 mL/min/1.73m2 Final     Lab Results   Component Value Date    CR 1.21 08/28/2023     No results found for: MICROALBUMIN    Healthy Eating:  Healthy Eating Assessed Today: Yes  Cultural/Islam diet restrictions?: No  Meal planning/habits: Heart healthy, Smaller portions, Other (Vegan)  How many times a week on average do you eat food made away from home (restaurant/take-out)?: 0  Meals include: Breakfast, Lunch, Dinner  Breakfast: 0600 juice made with cinnamon, parsley , tsp honey  glass of almond milk and 3 dates  Lunch: 1-2 pm : beans ( white or black) sometimes lentils or rice - vegan burgers - largest meal  Dinner: fruit - ex: grapes, watermelon, banana  Snacks: juice - apple or orange has 1 cup in morning and afternoon  Other: has found a vegan burger he likes very " much  Beverages: Water, Juice, Tea (tea with 2 T brown sugar with lunch and dinner)  Has patient met with a dietitian in the past?: No    Being Active:  Being Active Assessed Today: Yes  Exercise:: Yes (walks 2 mi daily - either outside or if too hot, on treadmill)  Days per week of moderate to strenuous exercise (like a brisk walk): 7  On average, minutes per day of exercise at this level: 50  How intense was your typical exercise? : Light (like stretching or slow walking) (walks 2 mi)  Exercise Minutes per Week: 350  Barrier to exercise: None    Monitoring:  Monitoring Assessed Today: Yes (not monitoring)  Did patient bring glucose meter to appointment? :  (currently not monitoring)  Times checking blood sugar at home (number): Never        Taking Medications:  Diabetes Medication(s)       Sodium-Glucose Co-Transporter 2 (SGLT2) Inhibitors       empagliflozin (JARDIANCE) 25 MG TABS tablet    Take 1 tablet (25 mg) by mouth daily            Taking Medication Assessed Today: Yes  Current Treatments: Diet, Oral Medication (taken by mouth)  Problems taking diabetes medications regularly?: No  Diabetes medication side effects?: No    Problem Solving:  Problem Solving Assessed Today: Yes  Is the patient at risk for hypoglycemia?: No  Is the patient at risk for DKA?: No  Does patient have severe weather/disaster plan for diabetes management?: Not Needed  Does patient have sick day plan for diabetes management?: Not Needed              Reducing Risks:  Reducing Risks Assessed Today: Yes  Diabetes Risks: Age over 45 years, Sedentary Lifestyle, Hyperlipidemia, Ethnicity  CAD Risks: Diabetes Mellitus, Stress, Sedentary lifestyle, Hypertension, Male sex (Sania is a worrier)  Has dilated eye exam at least once a year?: Yes  Feet checked by healthcare provider in the last year?: Yes    Healthy Coping:  Healthy Coping Assessed Today: Yes  Emotional response to diabetes: Concern for health and well-being, Fear/Anxiety  Informal  Support system:: Family, Spouse  Stage of change: ACTION (Actively working towards change)  Support resources: In-person Offerings  Patient Activation Measure Survey Score:       No data to display                  Care Plan and Education Provided:  Care Plan: Diabetes   Updates made by Jojo Soria RN since 10/5/2023 12:00 AM        Problem: HbA1C Not In Goal         Goal: Establish Regular Follow-Ups with PCP    This Visit's Progress: 100%   Recent Progress: 100%   Note:    Future follow-up appointments are both in place for PCP as well as [facility] ES.       Problem: Diabetes Self-Management Education Needed to Optimize Self-Care Behaviors         Goal: Healthy Eating - follow a healthy eating pattern for diabetes    Recent Progress: 90%   Note:    Pt eating 3 meals daily - would like to see more balance - protein in each meal.       Goal: Being Active - get regular physical activity, working up to at least 150 minutes per week    This Visit's Progress: 100%   Recent Progress: 100%   Note:    Walking 2 mi daily       Task: Develop physical activity plan with patient Completed 10/5/2023   Responsible User: Jojo Soria RN        Goal: Taking Medication - patient is consistently taking medications as directed    This Visit's Progress: 100%   Recent Progress: 100%   Note:    No missed/skipped doses.       Goal: Reducing Risks - know how to prevent and treat long-term diabetes complications    This Visit's Progress: 100%   Recent Progress: 80%        Task: Provide education on recommendations for heart health - lipid levels and goals, blood pressure and goals, and aspirin therapy, if indicated Completed 10/5/2023   Responsible User: Jojo Soria RN   Note:    HDL still low - may be genetic       Goal: Healthy Coping - use available resources to cope with the challenges of managing diabetes    This Visit's Progress: 60%   Recent Progress: 60%   Note:    Stress continues to be a big challenge for [facility]          Thank you,  Jojo Arce RN [facility]  Certified Diabetes Care and   Visit type : DSMT      Time Spent: 60 minutes  Encounter Type: Individual    Any diabetes medication dose changes were made via the CDE Protocol per the patient's referring provider and primary care provider. A copy of this encounter was shared with the provider.     Much or all of the text in this note was generated through the use of the Dragon Dictate voice-to-text software.Errors in spelling or words which seem out of context are unintentional. Sound alike errors, in particular, may have escaped editing.

## 2023-10-03 NOTE — PATIENT INSTRUCTIONS
1. Eat 3 balanced meals each day - Monitor carb intake and limit to 60-75 grams per meal  This would be equal to 4-5 choices ~  1 choice = 15 grams    Do not wait longer than 4-5 hours to eat something  Snacks limit to no more than 30 grams of carbohydrates or 2 choices  Make sure you include protein source with each meal and at bedtime - this has been shown to help with blood glucose elevations      3. Incorporate 30 minutes activity into each day - does not need to be all at one time & walking counts    4. Take diabetes medications as prescribed Continue Jardiance as prescribed      A1c today was 5.6%   this is AMAZING !!!!!   Keep up the good work        Thank you for coming in to see me today !      I value your experience and would be very thankful for your time in providing feedback in our clinic survey.    You may receive an e-mail, text message or even something in the mail from [employer].  This is a survey to let us know how we are doing - the survey will be related to your diabetes education and me.

## 2023-10-05 NOTE — PROGRESS NOTES
Diabetes Self-Management Education & Support    Presents for: Individual review    Type of Service: In Person Visit    Assessment Type:   ASSESSMENT:  Nadya is a very pleasant 57-year-old gentleman who returns to clinic today for follow-up per his request.  He arrived today accompanied by his wife Carlee.  Medications were reviewed and Nadya reports he continues to take 25 mg of Jardiance daily with no missed or skipped doses.  We again reviewed his current dietary intake and nutritional counseling was provided.  He continues to follow a very strict vegan diet. He continues to eat Vegan burgers which he says he likes very much and  a lot of fruit.   At this visit lab work was done, including an A1c, and I was very pleased to see his A1c returned today even lower at 5.6%.  I congratulated him on this and reassured him his glycemic control continues to be optimal.  Encouraged him to keep up the good work. Sania and Carlee have 2 children who both currently live at home and are in high school.  It is his son's birthday today and a so they will be celebrating later-he will be 18.  Sania seems to worry a great deal about a multitude of things and this adds to the stressors he is already facing in his life. He continues to attend therapy with a psychiatrist.  He is keeping active daily.  Let him know today that would be perfectly acceptable to follow-up every 6 months at this point but he said he would prefer to keep his follow-up visits every 2 to 3 months..    Patient's most recent   Lab Results   Component Value Date    A1C 5.6 10/03/2023     is meeting goal of <7.0    Diabetes knowledge and skills assessment:   Patient is knowledgeable in diabetes management concepts related to: Healthy Eating, Being Active, Taking Medication, and Reducing Risks    Continue education with the following diabetes management concepts: Healthy Eating and Healthy Coping    Based on learning assessment above, most appropriate  setting for further diabetes education would be: Individual setting.      PLAN  1. Eat 3 balanced meals each day - Monitor carb intake and limit to 60-75 grams per meal  This would be equal to 4-5 choices ~  1 choice = 15 grams    Do not wait longer than 4-5 hours to eat something  Snacks limit to no more than 30 grams of carbohydrates or 2 choices  Make sure you include protein source with each meal and at bedtime - this has been shown to help with blood glucose elevations      3. Incorporate 30 minutes activity into each day - does not need to be all at one time & walking counts    4. Take diabetes medications as prescribed Continue Jardiance as prescribed    Topics to cover at upcoming visits: Healthy Eating, Reducing Risks, and Healthy Coping    Follow-up: 1/9/24    See Care Plan for co-developed, patient-state behavior change goals.  AVS provided for patient today.    Education Materials Provided:  No new materials provided today      SUBJECTIVE/OBJECTIVE:  Presents for: Individual review  Accompanied by: Self, Spouse (Carlee)  Diabetes education in the past 24 mo: Yes (LV 6/27/23)  Focus of Visit: Assistance w/ making life changes, Self-care behavioral goal setting, Healthy Eating, Taking Medication, Reducing Risks, Healthy Coping, Being Active  Diabetes type: Type 2  Date of diagnosis: 2023  Disease course: Stable (A1c today 5.6%)  Transportation concerns: No  Other concerns:: English as a second language  Cultural Influences/Ethnic Background:  Not  or       Diabetes Symptoms & Complications:  Fatigue: Sometimes  Polyuria: Sometimes  Symptom course: Stable  Weight trend: Stable  Complications assessed today?: Yes  Heart disease: Yes (CABG x 4 2020)    Patient Problem List and Family Medical History reviewed for relevant medical history, current medical status, and diabetes risk factors.    Vitals:  Wt 87.1 kg (192 lb)   BMI 31.61 kg/m    Estimated body mass index is 31.61 kg/m  as calculated  "from the following:    Height as of 9/28/23: 1.66 m (5' 5.35\").    Weight as of this encounter: 87.1 kg (192 lb).   Last 3 BP:   BP Readings from Last 3 Encounters:   09/28/23 118/79   08/28/23 98/70   08/07/23 98/72       History   Smoking Status    Former    Packs/day: 1.00    Years: 30.00    Types: Cigarettes    Quit date: 3/23/2020   Smokeless Tobacco    Never       Labs:  Lab Results   Component Value Date    A1C 5.6 10/03/2023     Lab Results   Component Value Date     08/28/2023     05/13/2022     Lab Results   Component Value Date    LDL 32 08/28/2023    LDL 41 08/07/2020     Direct Measure HDL   Date Value Ref Range Status   08/28/2023 33 (L) >=40 mg/dL Final   ]  GFR Estimate   Date Value Ref Range Status   08/28/2023 70 >60 mL/min/1.73m2 Final   05/24/2021 >60 >60 mL/min/1.73m2 Final     GFR Estimate If Black   Date Value Ref Range Status   05/24/2021 >60 >60 mL/min/1.73m2 Final     Lab Results   Component Value Date    CR 1.21 08/28/2023     No results found for: MICROALBUMIN    Healthy Eating:  Healthy Eating Assessed Today: Yes  Cultural/Bahai diet restrictions?: No  Meal planning/habits: Heart healthy, Smaller portions, Other (Vegan)  How many times a week on average do you eat food made away from home (restaurant/take-out)?: 0  Meals include: Breakfast, Lunch, Dinner  Breakfast: 0600 juice made with cinnamon, parsley , tsp honey  glass of almond milk and 3 dates  Lunch: 1-2 pm : beans ( white or black) sometimes lentils or rice - vegan burgers - largest meal  Dinner: fruit - ex: grapes, watermelon, banana  Snacks: juice - apple or orange has 1 cup in morning and afternoon  Other: has found a vegan burger he likes very much  Beverages: Water, Juice, Tea (tea with 2 T brown sugar with lunch and dinner)  Has patient met with a dietitian in the past?: No    Being Active:  Being Active Assessed Today: Yes  Exercise:: Yes (walks 2 mi daily - either outside or if too hot, on " treadmill)  Days per week of moderate to strenuous exercise (like a brisk walk): 7  On average, minutes per day of exercise at this level: 50  How intense was your typical exercise? : Light (like stretching or slow walking) (walks 2 mi)  Exercise Minutes per Week: 350  Barrier to exercise: None    Monitoring:  Monitoring Assessed Today: Yes (not monitoring)  Did patient bring glucose meter to appointment? :  (currently not monitoring)  Times checking blood sugar at home (number): Never        Taking Medications:  Diabetes Medication(s)       Sodium-Glucose Co-Transporter 2 (SGLT2) Inhibitors       empagliflozin (JARDIANCE) 25 MG TABS tablet    Take 1 tablet (25 mg) by mouth daily            Taking Medication Assessed Today: Yes  Current Treatments: Diet, Oral Medication (taken by mouth)  Problems taking diabetes medications regularly?: No  Diabetes medication side effects?: No    Problem Solving:  Problem Solving Assessed Today: Yes  Is the patient at risk for hypoglycemia?: No  Is the patient at risk for DKA?: No  Does patient have severe weather/disaster plan for diabetes management?: Not Needed  Does patient have sick day plan for diabetes management?: Not Needed              Reducing Risks:  Reducing Risks Assessed Today: Yes  Diabetes Risks: Age over 45 years, Sedentary Lifestyle, Hyperlipidemia, Ethnicity  CAD Risks: Diabetes Mellitus, Stress, Sedentary lifestyle, Hypertension, Male sex (Sania is a worrier)  Has dilated eye exam at least once a year?: Yes  Feet checked by healthcare provider in the last year?: Yes    Healthy Coping:  Healthy Coping Assessed Today: Yes  Emotional response to diabetes: Concern for health and well-being, Fear/Anxiety  Informal Support system:: Family, Spouse  Stage of change: ACTION (Actively working towards change)  Support resources: In-person Offerings  Patient Activation Measure Survey Score:       No data to display                  Care Plan and Education Provided:  Care  Plan: Diabetes   Updates made by Jojo Soria RN since 10/5/2023 12:00 AM        Problem: HbA1C Not In Goal         Goal: Establish Regular Follow-Ups with PCP    This Visit's Progress: 100%   Recent Progress: 100%   Note:    Future follow-up appointments are both in place for PCP as well as [facility]note 3[facility].       Problem: Diabetes Self-Management Education Needed to Optimize Self-Care Behaviors         Goal: Healthy Eating - follow a healthy eating pattern for diabetes    Recent Progress: 90%   Note:    Pt eating 3 meals daily - would like to see more balance - protein in each meal.       Goal: Being Active - get regular physical activity, working up to at least 150 minutes per week    This Visit's Progress: 100%   Recent Progress: 100%   Note:    Walking 2 mi daily       Task: Develop physical activity plan with patient Completed 10/5/2023   Responsible User: Jojo Soria RN        Goal: Taking Medication - patient is consistently taking medications as directed    This Visit's Progress: 100%   Recent Progress: 100%   Note:    No missed/skipped doses.       Goal: Reducing Risks - know how to prevent and treat long-term diabetes complications    This Visit's Progress: 100%   Recent Progress: 80%        Task: Provide education on recommendations for heart health - lipid levels and goals, blood pressure and goals, and aspirin therapy, if indicated Completed 10/5/2023   Responsible User: Jojo Soria RN   Note:    HDL still low - may be genetic       Goal: Healthy Coping - use available resources to cope with the challenges of managing diabetes    This Visit's Progress: 60%   Recent Progress: 60%   Note:    Stress continues to be a big challenge for [facility]         Thank you,  Jojo Soria RN [facility]  Certified Diabetes Care and   Visit type : DSMT      Time Spent: 60 minutes  Encounter Type: Individual    Any diabetes medication dose changes were made via the CDE Protocol per the patient's  referring provider and primary care provider. A copy of this encounter was shared with the provider.     Much or all of the text in this note was generated through the use of the Dragon Dictate voice-to-text software.Errors in spelling or words which seem out of context are unintentional. Sound alike errors, in particular, may have escaped editing.

## 2023-10-23 ENCOUNTER — PATIENT OUTREACH (OUTPATIENT)
Dept: GASTROENTEROLOGY | Facility: CLINIC | Age: 58
End: 2023-10-23
Payer: COMMERCIAL

## 2023-11-02 ENCOUNTER — OFFICE VISIT (OUTPATIENT)
Dept: INTERNAL MEDICINE | Facility: CLINIC | Age: 58
End: 2023-11-02
Payer: COMMERCIAL

## 2023-11-02 VITALS
BODY MASS INDEX: 32.24 KG/M2 | RESPIRATION RATE: 14 BRPM | HEIGHT: 65 IN | SYSTOLIC BLOOD PRESSURE: 102 MMHG | OXYGEN SATURATION: 97 % | TEMPERATURE: 97.2 F | HEART RATE: 55 BPM | DIASTOLIC BLOOD PRESSURE: 70 MMHG | WEIGHT: 193.5 LBS

## 2023-11-02 DIAGNOSIS — I25.118 CORONARY ARTERY DISEASE OF NATIVE ARTERY OF NATIVE HEART WITH STABLE ANGINA PECTORIS (H): Primary | ICD-10-CM

## 2023-11-02 DIAGNOSIS — K59.01 CONSTIPATION BY DELAYED COLONIC TRANSIT: ICD-10-CM

## 2023-11-02 DIAGNOSIS — I10 ESSENTIAL HYPERTENSION: Chronic | ICD-10-CM

## 2023-11-02 DIAGNOSIS — F41.1 GAD (GENERALIZED ANXIETY DISORDER): ICD-10-CM

## 2023-11-02 DIAGNOSIS — F43.10 PTSD (POST-TRAUMATIC STRESS DISORDER): ICD-10-CM

## 2023-11-02 DIAGNOSIS — E11.9 TYPE 2 DIABETES MELLITUS WITHOUT COMPLICATION, WITHOUT LONG-TERM CURRENT USE OF INSULIN (H): ICD-10-CM

## 2023-11-02 PROCEDURE — 99214 OFFICE O/P EST MOD 30 MIN: CPT | Performed by: INTERNAL MEDICINE

## 2023-11-02 RX ORDER — BUSPIRONE HYDROCHLORIDE 5 MG/1
5 TABLET ORAL DAILY
Qty: 180 TABLET | Refills: 0 | Status: SHIPPED | OUTPATIENT
Start: 2023-11-02 | End: 2024-02-01

## 2023-11-02 RX ORDER — POLYETHYLENE GLYCOL 3350 17 G/17G
POWDER, FOR SOLUTION ORAL
Qty: 840 G | Refills: 4 | Status: SHIPPED | OUTPATIENT
Start: 2023-11-02

## 2023-11-02 RX ORDER — METOPROLOL SUCCINATE 100 MG/1
100 TABLET, EXTENDED RELEASE ORAL AT BEDTIME
COMMUNITY
Start: 2023-11-02 | End: 2024-01-17

## 2023-11-02 RX ORDER — ISOSORBIDE MONONITRATE 30 MG/1
30 TABLET, EXTENDED RELEASE ORAL DAILY
Qty: 33 TABLET | Refills: 0 | Status: SHIPPED | OUTPATIENT
Start: 2023-11-02 | End: 2023-12-05

## 2023-11-02 RX ORDER — ESCITALOPRAM OXALATE 20 MG/1
20 TABLET ORAL DAILY
Qty: 30 TABLET | Refills: 4 | Status: SHIPPED | OUTPATIENT
Start: 2023-11-02 | End: 2023-12-04

## 2023-11-02 ASSESSMENT — ANXIETY QUESTIONNAIRES
3. WORRYING TOO MUCH ABOUT DIFFERENT THINGS: NEARLY EVERY DAY
1. FEELING NERVOUS, ANXIOUS, OR ON EDGE: MORE THAN HALF THE DAYS
6. BECOMING EASILY ANNOYED OR IRRITABLE: SEVERAL DAYS
5. BEING SO RESTLESS THAT IT IS HARD TO SIT STILL: SEVERAL DAYS
GAD7 TOTAL SCORE: 14
7. FEELING AFRAID AS IF SOMETHING AWFUL MIGHT HAPPEN: MORE THAN HALF THE DAYS
GAD7 TOTAL SCORE: 14
4. TROUBLE RELAXING: MORE THAN HALF THE DAYS
2. NOT BEING ABLE TO STOP OR CONTROL WORRYING: NEARLY EVERY DAY
IF YOU CHECKED OFF ANY PROBLEMS ON THIS QUESTIONNAIRE, HOW DIFFICULT HAVE THESE PROBLEMS MADE IT FOR YOU TO DO YOUR WORK, TAKE CARE OF THINGS AT HOME, OR GET ALONG WITH OTHER PEOPLE: VERY DIFFICULT

## 2023-11-02 NOTE — PROGRESS NOTES
Office Visit - Follow Up   Chetjennifer Blake   58 year old male    Date of Visit: 11/2/2023    Chief Complaint   Patient presents with    RECHECK     1 month.  Chest pain at night, nightmares, takes nitroglycerin as needed. Has used only one tablets most times, one episode he took 2 tablets and sx got better.         Assessment and Plan   1. Coronary artery disease of native artery of native heart with stable angina pectoris (H24)  We again discussed PCI to RCA and he would like to continue with medical management.  We will cautiously try Imdur as he has been using nitroglycerin fairly regularly  - isosorbide mononitrate (IMDUR) 30 MG 24 hr tablet; Take 1 tablet (30 mg) by mouth daily  Dispense: 33 tablet; Refill: 0    2. PTSD (post-traumatic stress disorder)  Discussed  - escitalopram (LEXAPRO) 20 MG tablet; Take 1 tablet (20 mg) by mouth daily  Dispense: 30 tablet; Refill: 4    3. ISI (generalized anxiety disorder)  - busPIRone (BUSPAR) 5 MG tablet; Take 1 tablet (5 mg) by mouth daily In the morning and an additional tablet later in the day as needed for anxiety  Dispense: 180 tablet; Refill: 0    4. Type 2 diabetes mellitus without complication, without long-term current use of insulin (H)  Well-controlled    5. Constipation by delayed colonic transit  - polyethylene glycol (MIRALAX) 17 GM/Dose powder; TAKE 17 GRAMS(1 CAPFUL) BY MOUTH DAILY  Dispense: 840 g; Refill: 4    6. Essential hypertension  - metoprolol succinate ER (TOPROL XL) 100 MG 24 hr tablet; Take 1 tablet (100 mg) by mouth at bedtime      Return in about 4 weeks (around 11/30/2023) for Follow up.     History of Present Illness   This 58 year old comes in for follow-up.  He continues to have chest pain on the left side and has had some reduced exercise tolerance.  He has complicated coronary artery disease and an RCA lesion for which PCI has been recommended.  He has instead opted for medical management.  He has done a nice job with a vegan diet.   "We have had to back off on many medications because of low blood pressure.  Nitroglycerin is helpful with his chest pain.    He has had worsening PTSD with world events       Physical Exam   General Appearance:   No acute distress    /70 (BP Location: Right arm, Patient Position: Sitting, Cuff Size: Adult Regular)   Pulse 55   Temp 97.2  F (36.2  C) (Tympanic)   Resp 14   Ht 1.651 m (5' 5\")   Wt 87.8 kg (193 lb 8 oz)   SpO2 97%   BMI 32.20 kg/m      Heart rate controlled rhythm regular lungs clear to auscultation bilaterally.  Tenderness with chest wall palpation.     Additional Information   Current Outpatient Medications   Medication Sig Dispense Refill    ACETAMINOPHEN EXTRA STRENGTH 500 MG tablet TAKE 2 TABLETS(1000 MG) BY MOUTH EVERY 6 HOURS AS NEEDED FOR PAIN 360 tablet 3    aspirin (ASA) 81 MG chewable tablet CHEW AND SWALLOW 1 TABLET(81 MG) BY MOUTH DAILY 90 tablet 2    busPIRone (BUSPAR) 5 MG tablet Take 1 tablet (5 mg) by mouth daily In the morning and an additional tablet later in the day as needed for anxiety 180 tablet 0    Cholecalciferol (VITAMIN D3) 50 MCG (2000 UT) CAPS Take 1 capsule by mouth daily 90 capsule 3    cyanocobalamin (VITAMIN B-12) 1000 MCG tablet Take 1 tablet (1,000 mcg) by mouth daily 90 tablet 4    diclofenac (VOLTAREN) 1 % topical gel Apply 4 g topically 4 times daily 500 g 2    empagliflozin (JARDIANCE) 25 MG TABS tablet Take 1 tablet (25 mg) by mouth daily 90 tablet 4    escitalopram (LEXAPRO) 20 MG tablet Take 1 tablet (20 mg) by mouth daily 30 tablet 4    isosorbide mononitrate (IMDUR) 30 MG 24 hr tablet Take 1 tablet (30 mg) by mouth daily 33 tablet 0    Lidocaine 0.5 % GEL Externally apply topically as needed      metoprolol succinate ER (TOPROL XL) 100 MG 24 hr tablet Take 1 tablet (100 mg) by mouth at bedtime      nifedipine 0.2% in white petrolatum 0.2 % OINT ointment Apply topically 4 times daily as needed (anal fissure) 100 g 1    nitroGLYcerin (NITROSTAT) " 0.4 MG sublingual tablet For chest pain place 1 tablet under the tongue every 5 minutes for 3 doses. If symptoms persist 5 minutes after 1st dose call 911. 30 tablet 1    oxybutynin (DITROPAN) 5 MG tablet Take 1 tablet (5 mg) by mouth At Bedtime 90 tablet 4    polyethylene glycol (MIRALAX) 17 GM/Dose powder TAKE 17 GRAMS(1 CAPFUL) BY MOUTH DAILY 840 g 4    QUEtiapine (SEROQUEL) 50 MG tablet Take 1.5 tablets (75 mg) by mouth At Bedtime 45 tablet 4    rosuvastatin (CRESTOR) 40 MG tablet Take 1 tablet (40 mg) by mouth daily 90 tablet 4    tamsulosin (FLOMAX) 0.4 MG capsule TAKE 2 CAPSULES(0.8 MG) BY MOUTH EVERY EVENING 180 capsule 4       Time:      Az Briseno MD  Answers submitted by the patient for this visit:  ISI-7 (Submitted on 11/2/2023)  ISI 7 TOTAL SCORE: 14  Depression / Anxiety Questionnaire (Submitted on 11/2/2023)  Chief Complaint: Chronic problems general questions HPI Form  Depression/Anxiety: Depression & Anxiety  Vascular Disease (Submitted on 11/2/2023)  Chief Complaint: Chronic problems general questions HPI Form  Do you take an aspirin every day?: Yes  Depression & Anxiety (Submitted on 11/2/2023)  Chief Complaint: Chronic problems general questions HPI Form  Status since last visit:: no change  Anxiety since last: : no change  Other associated symptoms of depression:: Yes  Other associated symotome: : Yes  Significant life event: : health concerns  Anxious:: No  Current substance use:: No  General Questionnaire (Submitted on 11/2/2023)  Chief Complaint: Chronic problems general questions HPI Form  How many servings of fruits and vegetables do you eat daily?: 2-3  On average, how many sweetened beverages do you drink each day (Examples: soda, juice, sweet tea, etc.  Do NOT count diet or artificially sweetened beverages)?: 3  How many minutes a day do you exercise enough to make your heart beat faster?: 20 to 29  How many days a week do you exercise enough to make your heart beat faster?: 6  How  many days per week do you miss taking your medication?: 0

## 2023-11-03 DIAGNOSIS — I25.118 CORONARY ARTERY DISEASE OF NATIVE ARTERY OF NATIVE HEART WITH STABLE ANGINA PECTORIS (H): ICD-10-CM

## 2023-11-03 RX ORDER — ISOSORBIDE MONONITRATE 30 MG/1
30 TABLET, EXTENDED RELEASE ORAL DAILY
Qty: 90 TABLET | Refills: 0 | OUTPATIENT
Start: 2023-11-03

## 2023-11-10 ENCOUNTER — OFFICE VISIT (OUTPATIENT)
Dept: CARDIOLOGY | Facility: CLINIC | Age: 58
End: 2023-11-10
Payer: COMMERCIAL

## 2023-11-10 VITALS
DIASTOLIC BLOOD PRESSURE: 71 MMHG | SYSTOLIC BLOOD PRESSURE: 109 MMHG | BODY MASS INDEX: 32.12 KG/M2 | HEART RATE: 46 BPM | WEIGHT: 193 LBS | RESPIRATION RATE: 14 BRPM

## 2023-11-10 DIAGNOSIS — I10 ESSENTIAL HYPERTENSION: Chronic | ICD-10-CM

## 2023-11-10 DIAGNOSIS — I25.709 CORONARY ARTERY DISEASE INVOLVING CORONARY BYPASS GRAFT OF NATIVE HEART WITH ANGINA PECTORIS (H): ICD-10-CM

## 2023-11-10 DIAGNOSIS — E78.5 DYSLIPIDEMIA, GOAL LDL BELOW 70: Chronic | ICD-10-CM

## 2023-11-10 DIAGNOSIS — I50.32 CHRONIC HEART FAILURE WITH PRESERVED EJECTION FRACTION (H): ICD-10-CM

## 2023-11-10 DIAGNOSIS — E11.69 TYPE 2 DIABETES MELLITUS WITH OTHER SPECIFIED COMPLICATION, WITHOUT LONG-TERM CURRENT USE OF INSULIN (H): ICD-10-CM

## 2023-11-10 PROCEDURE — 99214 OFFICE O/P EST MOD 30 MIN: CPT | Performed by: GENERAL ACUTE CARE HOSPITAL

## 2023-11-10 NOTE — LETTER
11/10/2023    Az Briseno MD  [address] [facility] [address]    RE: Nadya Blake       Dear Colleague,     I had the pleasure of seeing Nadya Blake in the [facility].  HEART CARE ENCOUNTER NOTE        Assessment/Recommendations   Assessment:    Coronary artery disease status post four-vessel coronary artery bypass grafting (left internal mammary artery to the left anterior descending artery, right radial artery to the right posterior descending artery, reversed saphenous venous grafts to obtuse marginal and diagonal artery branches) on 6/24/2020. No ischemia seen on stress cardiac MRI on 12/23/2021 but he has been having exertional dyspnea with his saphenous venous graft to the right posterior descending artery noted to be occluded on coronary angiography 10/6/2022. He reports increasing stable anginal symptoms.  Chronic congestive heart failure with preserved left ventricular ejection fraction. He seems euvolemic and normal left-sided filling pressure was noted on cardiac catheterization 10/6/2022.  Essential hypertension. Blood pressure is low normal today.  Dyslipidemia. Last LDL 32 mg/dL.  Non insulin-dependent diabetes mellitus type 2. Last hemoglobin A1c 5.6%.  Former smoker.  Possible obstructive sleep apnea.  Body mass index is 32.12 kg/m .    Plan:  We again discussed percutaneous coronary intervention of his native right coronary artery but is fearful of doing so and wants to continue medical therapy.  He can continue isosorbide mononitrate 30 mg daily if he feels the improvement in his chest pain outweighs the lightheadedness it causes.  If his pulse is consistently dropping into the 40s, we will need to reduce metoprolol succinate to 25-50 mg daily. I would like him to continue on even a low dose of metoprolol if possible as it is a first-line antianginal agent.  Rosuvastatin 40 mg and aspirin 81 mg daily.  Follow-up with me in 2 months per patient request          History of Present Illness   Mr. Nadya Blake is a 58 year old male with a significant past history of CAD with history of NSTEMI s/p 4V CABG (LIMA to LAD, right radial artery to RPDA, reversed SVGs to an OM and diagonal artery branch) on 6/24/2020, HFpEF, HTN, dyslipidemia, and former smoker presenting for follow-up.      He continues to experience chest pain, which is not necessarily exertional but is worse with emotional stress and does improve with sublingual nitroglycerin. As he has been using sublingual nitroglycerin frequently, he was recently started in isosorbide mononitrate 30 mg daily. This has helped his chest pain but it is making him feel lightheaded. This also happened when we tried using this medication in the past.     We previously decreased his metoprolol succinate from 150 mg to 100 mg daily due to lightheadedness and bradycardia. His pulse seems a bit lower now, high 40 to low 50s at home. No syncope, lower extremity swelling, palpitations, paroxysmal nocturnal dyspnea (PND), or orthopnea.     Cardiac Problems and Cardiac Diagnostics     Most Recent Cardiac testing:  ECG 10/6/2022 (personally reviewed and interpreted): sinus bradycardia HR 57 bpm with 1st degree AV block  ms, nonspecific T wave changes     ECHO 6/27/2020 (report reviewed):     Normal left ventricular size and systolic function. The left ventricular wall motion is normal. The calculated left ventricular ejection fraction is 71%.    Normal right ventricular size and systolic function.    No hemodynamically significant valvular heart abnormalities.    The ascending aorta is mildly dilated.    When compared to the previous study dated 6/23/2020, no significant change.     Stress cardiac MRI 12/23/2021 (report reviewed):  1.  Pharmacological Regadenoson stress cardiac MRI is negative for inducible myocardial ischemia.   2.  Pharmacological stress ECG is negative for inducible myocardial ischemia.   3. Normal left  ventricular size, wall thickness and systolic function. The quantified left ventricular  ejection fraction is 59 %.  Very small area of non-transmural myocardial scar in the mid inferior wall is identified.    4.  Normal right ventricular size and systolic function.    5.  No significant valvular abnormalities.  6. Ascending aorta measures 38 mm.      Stress test 5/21/2021 (report reviewed):    The nuclear stress test is negative for inducible myocardial ischemia or infarction.    The left ventricular ejection fraction at stress is 65%.    There is no prior study for comparison.     Cardiac cath 10/6/2022 (report reviewed):   Left ventricular filling pressures are normal.  3rd Mrg lesion is 90% stenosed.  Prox RCA lesion is 75% stenosed.  Prox RCA to Mid RCA lesion is 40% stenosed.  Dist RCA lesion is 75% stenosed.  Mid RCA lesion is 30% stenosed.  RPDA lesion is 50% stenosed.  RPAV lesion is 40% stenosed.  Prox LAD lesion is 70% stenosed.  1st Diag-1 lesion is 95% stenosed.  1st Diag-2 lesion is 95% stenosed.  2nd Diag lesion is 99% stenosed.  Mid LAD-1 lesion is 75% stenosed.  Mid LAD-2 lesion is 75% stenosed.  Dist LAD lesion is 70% stenosed.  Vein graft to right PDA is totally occluded  Vein graft to second diagonal is widely patent  Vein graft to third OM is widely patent  LUZ MARIA graft to mid distal LAD is patent     Medications  Allergies   Current Outpatient Medications   Medication Sig Dispense Refill    ACETAMINOPHEN EXTRA STRENGTH 500 MG tablet TAKE 2 TABLETS(1000 MG) BY MOUTH EVERY 6 HOURS AS NEEDED FOR PAIN 360 tablet 3    aspirin (ASA) 81 MG chewable tablet CHEW AND SWALLOW 1 TABLET(81 MG) BY MOUTH DAILY 90 tablet 2    busPIRone (BUSPAR) 5 MG tablet Take 1 tablet (5 mg) by mouth daily In the morning and an additional tablet later in the day as needed for anxiety 180 tablet 0    Cholecalciferol (VITAMIN D3) 50 MCG (2000 UT) CAPS Take 1 capsule by mouth daily 90 capsule 3    cyanocobalamin (VITAMIN B-12)  1000 MCG tablet Take 1 tablet (1,000 mcg) by mouth daily 90 tablet 4    diclofenac (VOLTAREN) 1 % topical gel Apply 4 g topically 4 times daily 500 g 2    empagliflozin (JARDIANCE) 25 MG TABS tablet Take 1 tablet (25 mg) by mouth daily 90 tablet 4    escitalopram (LEXAPRO) 20 MG tablet Take 1 tablet (20 mg) by mouth daily 30 tablet 4    isosorbide mononitrate (IMDUR) 30 MG 24 hr tablet Take 1 tablet (30 mg) by mouth daily 33 tablet 0    Lidocaine 0.5 % GEL Externally apply topically as needed      metoprolol succinate ER (TOPROL XL) 100 MG 24 hr tablet Take 1 tablet (100 mg) by mouth at bedtime      nifedipine 0.2% in white petrolatum 0.2 % OINT ointment Apply topically 4 times daily as needed (anal fissure) 100 g 1    nitroGLYcerin (NITROSTAT) 0.4 MG sublingual tablet For chest pain place 1 tablet under the tongue every 5 minutes for 3 doses. If symptoms persist 5 minutes after 1st dose call 911. 30 tablet 1    oxybutynin (DITROPAN) 5 MG tablet Take 1 tablet (5 mg) by mouth At Bedtime 90 tablet 4    polyethylene glycol (MIRALAX) 17 GM/Dose powder TAKE 17 GRAMS(1 CAPFUL) BY MOUTH DAILY 840 g 4    QUEtiapine (SEROQUEL) 50 MG tablet Take 1.5 tablets (75 mg) by mouth At Bedtime 45 tablet 4    rosuvastatin (CRESTOR) 40 MG tablet Take 1 tablet (40 mg) by mouth daily 90 tablet 4    tamsulosin (FLOMAX) 0.4 MG capsule TAKE 2 CAPSULES(0.8 MG) BY MOUTH EVERY EVENING 180 capsule 4      Allergies   Allergen Reactions    Atorvastatin Diarrhea    Cortisone Other (See Comments)     pain    Lisinopril Cough     started after CABG for unclear reason    Methylprednisolone Other (See Comments)     ?        Physical Examination Review of Systems   /71 (BP Location: Left arm, Patient Position: Sitting, Cuff Size: Adult Regular)   Pulse (!) 46   Resp 14   Wt 87.5 kg (193 lb)   BMI 32.12 kg/m    Body mass index is 32.12 kg/m .  Wt Readings from Last 3 Encounters:   11/10/23 87.5 kg (193 lb)   11/02/23 87.8 kg (193 lb 8 oz)    10/03/23 87.1 kg (192 lb)       General Appearance:   Pleasant  male, appears  stated age. no acute distress, obese body habitus   ENT/Mouth: membranes moist, no apparent gingival bleeding.      EYES:  no scleral icterus, normal conjunctivae   Neck: no carotid bruits. No anterior cervical lymphadenopaty   Respiratory:   lungs are clear to auscultation, no rales or wheezing, well-healed sternal scar, equal chest wall expansion    Cardiovascular:   Regular rhythm, slow rate. Normal first and second heart sounds with no murmurs, rubs, or gallops; the carotid, radial and posterior tibial pulses are intact, Jugular venous pressure normal, no edema bilaterally    Abdomen/GI:  no organomegaly, masses, bruits, or tenderness; bowel sounds are present   Extremities: no cyanosis or clubbing   Skin: no xanthelasma, warm.    Heme/lymph/ Immunology No apparent bleeding noted.   Neurologic: Alert and oriented. normal gait, no tremors     Psychiatric: Pleasant, calm, appropriate affect.    A complete 10 system review of systems was performed and is negative except as mentioned in the HPI/subjective.         Past History   Past Medical History:   Past Medical History:   Diagnosis Date    Acute non-ST elevation myocardial infarction (NSTEMI) (H) 6/22/2020    Adenomatous polyp of colon     Ascending aorta dilatation (H24)     Carpal tunnel syndrome     Chronic superficial gastritis     Coronary artery disease due to lipid rich plaque 6/23/2020    Depression with anxiety     Dyslipidemia, goal LDL below 70 6/23/2020    Essential hypertension 9/2/2012    Fatty liver disease, nonalcoholic     GERD (gastroesophageal reflux disease)     IBS (irritable bowel syndrome)     Left lumbar radiculopathy     Multinodular goiter     Old torn meniscus of knee     Paroxysmal atrial fibrillation (H)     Perianal abscess     Post herpetic neuralgia     Post-traumatic osteoarthritis of both knees     Primary osteoarthritis of left hip     Primary  osteoarthritis of right hip     Pulmonary nodule     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 Ragsdale    CV CORONARY ANGIOGRAM N/A 6/23/2020    Procedure: Coronary Angiogram;  Surgeon: Ariane Lester MD;  Location: [facility];  Service: Cardiology    CV CORONARY ANGIOGRAM N/A 10/6/2022    Procedure: Coronary Angiogram;  Surgeon: Javon John MD;  Location: [facility] CV    CV IABP INSERT N/A 6/24/2020    Procedure: Intra Aortic Balloon Pump Insertion;  Surgeon: Ariane Lester MD;  Location: [facility];  Service: Cardiology    CV LEFT HEART CATH N/A 10/6/2022    Procedure: Left Heart Catheterization;  Surgeon: Javon John MD;  Location: [facility] CV    CV LEFT HEART CATHETERIZATION WITHOUT LEFT VENTRICULOGRAM Left 6/23/2020    Procedure: Left Heart Catheterization Without Left Ventriculogram;  Surgeon: Ariane Lester MD;  Location: [facility] Lab;  Service: Cardiology    CV LEFT VENTRICULOGRAM N/A 10/6/2022    Procedure: Left Ventriculogram;  Surgeon: aJvon John MD;  Location: [facility] LAB CV       Family History:   Family History   Problem Relation Age of Onset    No Known Problems Mother     Lung Cancer Father 56        fatal    No Known Problems Daughter     Other - See Comments Son         congenital deformity of right leg; he uses a leg prosthesis        Social History:   Social History     Socioeconomic History    Marital status:      Spouse name: Carlee    Number of children: 2    Years of education: Not on file    Highest education level: Not on file   Occupational History    Not on file   Tobacco Use    Smoking status: Former     Packs/day: 1.00      Years: 30.00     Additional pack years: 0.00     Total pack years: 30.00     Types: Cigarettes     Quit date: 3/23/2020     Years since quitting: 3.6    Smokeless tobacco: Never    Tobacco comments:     stopped 3/24/2020   Vaping Use    Vaping Use: Never used   Substance and Sexual Activity    Alcohol use: No    Drug use: Never    Sexual activity: Yes     Partners: Female   Other Topics Concern    Not on file   Social History Narrative    , Carlee, BA chemistry and taking AA courses at [employer].  From [place]; bachelors in geology and computer science. Son, Grace (2005) and Sherrie (2008).  On SSDI, PTSD.       Social Determinants of Health     Financial Resource Strain: Low Risk  (9/28/2023)    Financial Resource Strain     Within the past 12 months, have you or your family members you live with been unable to get utilities (heat, electricity) when it was really needed?: No   Food Insecurity: Low Risk  (9/28/2023)    Food Insecurity     Within the past 12 months, did you worry that your food would run out before you got money to buy more?: No     Within the past 12 months, did the food you bought just not last and you didn t have money to get more?: No   Transportation Needs: Low Risk  (9/28/2023)    Transportation Needs     Within the past 12 months, has lack of transportation kept you from medical appointments, getting your medicines, non-medical meetings or appointments, work, or from getting things that you need?: No   Physical Activity: Not on file   Stress: Not on file   Social Connections: Not on file   Interpersonal Safety: Not on file   Housing Stability: Low Risk  (9/28/2023)    Housing Stability     Do you have housing? : Yes     Are you worried about losing your housing?: No              Lab Results    Chemistry/lipid CBC Cardiac Enzymes/BNP/TSH/INR   Lab Results   Component Value Date    CHOL 97 08/28/2023    HDL 33 (L) 08/28/2023    LDL 32 08/28/2023    TRIG 159 (H) 08/28/2023    CR 1.21 (H)  08/28/2023    BUN 12.5 08/28/2023    POTASSIUM 4.6 08/28/2023     08/28/2023    CO2 24 08/28/2023      Lab Results   Component Value Date    WBC 6.5 08/28/2023    HGB 15.2 08/28/2023    HCT 46.1 08/28/2023    MCV 90 08/28/2023     08/28/2023    A1C 5.6 10/03/2023     Lab Results   Component Value Date    A1C 5.6 10/03/2023    Lab Results   Component Value Date    TROPONINI <0.01 11/21/2020    BNP 79 (H) 07/27/2020    TSH 1.24 08/28/2023    INR 1.10 07/27/2020          Sloan Burns MD [facility]  Non-Invasive Cardiologist  [facility]  Pager [phone]      Thank you for allowing me to participate in the care of your patient.      Sincerely,     Sloan Burns MD     [facility]  cc:   Sloan Burns MD  [address] [facility] [address]  [place] [address]

## 2023-11-10 NOTE — PROGRESS NOTES
HEART CARE ENCOUNTER NOTE        Assessment/Recommendations   Assessment:    Coronary artery disease status post four-vessel coronary artery bypass grafting (left internal mammary artery to the left anterior descending artery, right radial artery to the right posterior descending artery, reversed saphenous venous grafts to obtuse marginal and diagonal artery branches) on 6/24/2020. No ischemia seen on stress cardiac MRI on 12/23/2021 but he has been having exertional dyspnea with his saphenous venous graft to the right posterior descending artery noted to be occluded on coronary angiography 10/6/2022. He reports increasing stable anginal symptoms.  Chronic congestive heart failure with preserved left ventricular ejection fraction. He seems euvolemic and normal left-sided filling pressure was noted on cardiac catheterization 10/6/2022.  Essential hypertension. Blood pressure is low normal today.  Dyslipidemia. Last LDL 32 mg/dL.  Non insulin-dependent diabetes mellitus type 2. Last hemoglobin A1c 5.6%.  Former smoker.  Possible obstructive sleep apnea.  Body mass index is 32.12 kg/m .    Plan:  We again discussed percutaneous coronary intervention of his native right coronary artery but is fearful of doing so and wants to continue medical therapy.  He can continue isosorbide mononitrate 30 mg daily if he feels the improvement in his chest pain outweighs the lightheadedness it causes.  If his pulse is consistently dropping into the 40s, we will need to reduce metoprolol succinate to 25-50 mg daily. I would like him to continue on even a low dose of metoprolol if possible as it is a first-line antianginal agent.  Rosuvastatin 40 mg and aspirin 81 mg daily.  Follow-up with me in 2 months per patient request         History of Present Illness   Mr. Nadya Blake is a 58 year old male with a significant past history of CAD with history of NSTEMI s/p 4V CABG (LIMA to LAD, right radial artery to RPDA, reversed SVGs to  an OM and diagonal artery branch) on 6/24/2020, HFpEF, HTN, dyslipidemia, and former smoker presenting for follow-up.      He continues to experience chest pain, which is not necessarily exertional but is worse with emotional stress and does improve with sublingual nitroglycerin. As he has been using sublingual nitroglycerin frequently, he was recently started in isosorbide mononitrate 30 mg daily. This has helped his chest pain but it is making him feel lightheaded. This also happened when we tried using this medication in the past.     We previously decreased his metoprolol succinate from 150 mg to 100 mg daily due to lightheadedness and bradycardia. His pulse seems a bit lower now, high 40 to low 50s at home. No syncope, lower extremity swelling, palpitations, paroxysmal nocturnal dyspnea (PND), or orthopnea.     Cardiac Problems and Cardiac Diagnostics     Most Recent Cardiac testing:  ECG 10/6/2022 (personally reviewed and interpreted): sinus bradycardia HR 57 bpm with 1st degree AV block  ms, nonspecific T wave changes     ECHO 6/27/2020 (report reviewed):     Normal left ventricular size and systolic function. The left ventricular wall motion is normal. The calculated left ventricular ejection fraction is 71%.    Normal right ventricular size and systolic function.    No hemodynamically significant valvular heart abnormalities.    The ascending aorta is mildly dilated.    When compared to the previous study dated 6/23/2020, no significant change.     Stress cardiac MRI 12/23/2021 (report reviewed):  1.  Pharmacological Regadenoson stress cardiac MRI is negative for inducible myocardial ischemia.   2.  Pharmacological stress ECG is negative for inducible myocardial ischemia.   3. Normal left ventricular size, wall thickness and systolic function. The quantified left ventricular  ejection fraction is 59 %.  Very small area of non-transmural myocardial scar in the mid inferior wall is identified.    4.   Normal right ventricular size and systolic function.    5.  No significant valvular abnormalities.  6. Ascending aorta measures 38 mm.      Stress test 5/21/2021 (report reviewed):    The nuclear stress test is negative for inducible myocardial ischemia or infarction.    The left ventricular ejection fraction at stress is 65%.    There is no prior study for comparison.     Cardiac cath 10/6/2022 (report reviewed):   Left ventricular filling pressures are normal.  3rd Mrg lesion is 90% stenosed.  Prox RCA lesion is 75% stenosed.  Prox RCA to Mid RCA lesion is 40% stenosed.  Dist RCA lesion is 75% stenosed.  Mid RCA lesion is 30% stenosed.  RPDA lesion is 50% stenosed.  RPAV lesion is 40% stenosed.  Prox LAD lesion is 70% stenosed.  1st Diag-1 lesion is 95% stenosed.  1st Diag-2 lesion is 95% stenosed.  2nd Diag lesion is 99% stenosed.  Mid LAD-1 lesion is 75% stenosed.  Mid LAD-2 lesion is 75% stenosed.  Dist LAD lesion is 70% stenosed.  Vein graft to right PDA is totally occluded  Vein graft to second diagonal is widely patent  Vein graft to third OM is widely patent  LUZ MARIA graft to mid distal LAD is patent     Medications  Allergies   Current Outpatient Medications   Medication Sig Dispense Refill    ACETAMINOPHEN EXTRA STRENGTH 500 MG tablet TAKE 2 TABLETS(1000 MG) BY MOUTH EVERY 6 HOURS AS NEEDED FOR PAIN 360 tablet 3    aspirin (ASA) 81 MG chewable tablet CHEW AND SWALLOW 1 TABLET(81 MG) BY MOUTH DAILY 90 tablet 2    busPIRone (BUSPAR) 5 MG tablet Take 1 tablet (5 mg) by mouth daily In the morning and an additional tablet later in the day as needed for anxiety 180 tablet 0    Cholecalciferol (VITAMIN D3) 50 MCG (2000 UT) CAPS Take 1 capsule by mouth daily 90 capsule 3    cyanocobalamin (VITAMIN B-12) 1000 MCG tablet Take 1 tablet (1,000 mcg) by mouth daily 90 tablet 4    diclofenac (VOLTAREN) 1 % topical gel Apply 4 g topically 4 times daily 500 g 2    empagliflozin (JARDIANCE) 25 MG TABS tablet Take 1 tablet (25  mg) by mouth daily 90 tablet 4    escitalopram (LEXAPRO) 20 MG tablet Take 1 tablet (20 mg) by mouth daily 30 tablet 4    isosorbide mononitrate (IMDUR) 30 MG 24 hr tablet Take 1 tablet (30 mg) by mouth daily 33 tablet 0    Lidocaine 0.5 % GEL Externally apply topically as needed      metoprolol succinate ER (TOPROL XL) 100 MG 24 hr tablet Take 1 tablet (100 mg) by mouth at bedtime      nifedipine 0.2% in white petrolatum 0.2 % OINT ointment Apply topically 4 times daily as needed (anal fissure) 100 g 1    nitroGLYcerin (NITROSTAT) 0.4 MG sublingual tablet For chest pain place 1 tablet under the tongue every 5 minutes for 3 doses. If symptoms persist 5 minutes after 1st dose call 911. 30 tablet 1    oxybutynin (DITROPAN) 5 MG tablet Take 1 tablet (5 mg) by mouth At Bedtime 90 tablet 4    polyethylene glycol (MIRALAX) 17 GM/Dose powder TAKE 17 GRAMS(1 CAPFUL) BY MOUTH DAILY 840 g 4    QUEtiapine (SEROQUEL) 50 MG tablet Take 1.5 tablets (75 mg) by mouth At Bedtime 45 tablet 4    rosuvastatin (CRESTOR) 40 MG tablet Take 1 tablet (40 mg) by mouth daily 90 tablet 4    tamsulosin (FLOMAX) 0.4 MG capsule TAKE 2 CAPSULES(0.8 MG) BY MOUTH EVERY EVENING 180 capsule 4      Allergies   Allergen Reactions    Atorvastatin Diarrhea    Cortisone Other (See Comments)     pain    Lisinopril Cough     started after CABG for unclear reason    Methylprednisolone Other (See Comments)     ?        Physical Examination Review of Systems   /71 (BP Location: Left arm, Patient Position: Sitting, Cuff Size: Adult Regular)   Pulse (!) 46   Resp 14   Wt 87.5 kg (193 lb)   BMI 32.12 kg/m    Body mass index is 32.12 kg/m .  Wt Readings from Last 3 Encounters:   11/10/23 87.5 kg (193 lb)   11/02/23 87.8 kg (193 lb 8 oz)   10/03/23 87.1 kg (192 lb)       General Appearance:   Pleasant  male, appears  stated age. no acute distress, obese body habitus   ENT/Mouth: membranes moist, no apparent gingival bleeding.      EYES:  no scleral  icterus, normal conjunctivae   Neck: no carotid bruits. No anterior cervical lymphadenopaty   Respiratory:   lungs are clear to auscultation, no rales or wheezing, well-healed sternal scar, equal chest wall expansion    Cardiovascular:   Regular rhythm, slow rate. Normal first and second heart sounds with no murmurs, rubs, or gallops; the carotid, radial and posterior tibial pulses are intact, Jugular venous pressure normal, no edema bilaterally    Abdomen/GI:  no organomegaly, masses, bruits, or tenderness; bowel sounds are present   Extremities: no cyanosis or clubbing   Skin: no xanthelasma, warm.    Heme/lymph/ Immunology No apparent bleeding noted.   Neurologic: Alert and oriented. normal gait, no tremors     Psychiatric: Pleasant, calm, appropriate affect.    A complete 10 system review of systems was performed and is negative except as mentioned in the HPI/subjective.         Past History   Past Medical History:   Past Medical History:   Diagnosis Date    Acute non-ST elevation myocardial infarction (NSTEMI) (H) 6/22/2020    Adenomatous polyp of colon     Ascending aorta dilatation (H24)     Carpal tunnel syndrome     Chronic superficial gastritis     Coronary artery disease due to lipid rich plaque 6/23/2020    Depression with anxiety     Dyslipidemia, goal LDL below 70 6/23/2020    Essential hypertension 9/2/2012    Fatty liver disease, nonalcoholic     GERD (gastroesophageal reflux disease)     IBS (irritable bowel syndrome)     Left lumbar radiculopathy     Multinodular goiter     Old torn meniscus of knee     Paroxysmal atrial fibrillation (H)     Perianal abscess     Post herpetic neuralgia     Post-traumatic osteoarthritis of both knees     Primary osteoarthritis of left hip     Primary osteoarthritis of right hip     Pulmonary nodule     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 Ragsdale    CV CORONARY ANGIOGRAM N/A 6/23/2020    Procedure: Coronary Angiogram;  Surgeon: Ariane Lester MD;  Location: [facility] Cath Lab;  Service: Cardiology    CV CORONARY ANGIOGRAM N/A 10/6/2022    Procedure: Coronary Angiogram;  Surgeon: Javon John MD;  Location: [facility] CV    CV IABP INSERT N/A 6/24/2020    Procedure: Intra Aortic Balloon Pump Insertion;  Surgeon: Ariane Lester MD;  Location: [facility] Cath Lab;  Service: Cardiology    CV LEFT HEART CATH N/A 10/6/2022    Procedure: Left Heart Catheterization;  Surgeon: Javon John MD;  Location: [facility] CV    CV LEFT HEART CATHETERIZATION WITHOUT LEFT VENTRICULOGRAM Left 6/23/2020    Procedure: Left Heart Catheterization Without Left Ventriculogram;  Surgeon: Ariane Lester MD;  Location: [facility] Cath Lab;  Service: Cardiology    CV LEFT VENTRICULOGRAM N/A 10/6/2022    Procedure: Left Ventriculogram;  Surgeon: Javon John MD;  Location: [facility] LAB CV       Family History:   Family History   Problem Relation Age of Onset    No Known Problems Mother     Lung Cancer Father 56        fatal    No Known Problems Daughter     Other - See Comments Son         congenital deformity of right leg; he uses a leg prosthesis        Social History:   Social History     Socioeconomic History    Marital status:      Spouse name: Carlee    Number of children: 2    Years of education: Not on file    Highest education level: Not on file   Occupational History    Not on file   Tobacco Use    Smoking status: Former     Packs/day: 1.00     Years: 30.00     Additional pack years: 0.00     Total pack years: 30.00     Types: Cigarettes     Quit date: 3/23/2020     Years since quitting: 3.6    Smokeless tobacco: Never    Tobacco comments:     stopped  3/24/2020   Vaping Use    Vaping Use: Never used   Substance and Sexual Activity    Alcohol use: No    Drug use: Never    Sexual activity: Yes     Partners: Female   Other Topics Concern    Not on file   Social History Narrative    , Carlee, BA chemistry and taking AA courses at [employer].  From [place]; bachelors in geology and computer science. Son, Grace (2005) and Sherrie (2008).  On SSDI, PTSD.       Social Determinants of Health     Financial Resource Strain: Low Risk  (9/28/2023)    Financial Resource Strain     Within the past 12 months, have you or your family members you live with been unable to get utilities (heat, electricity) when it was really needed?: No   Food Insecurity: Low Risk  (9/28/2023)    Food Insecurity     Within the past 12 months, did you worry that your food would run out before you got money to buy more?: No     Within the past 12 months, did the food you bought just not last and you didn t have money to get more?: No   Transportation Needs: Low Risk  (9/28/2023)    Transportation Needs     Within the past 12 months, has lack of transportation kept you from medical appointments, getting your medicines, non-medical meetings or appointments, work, or from getting things that you need?: No   Physical Activity: Not on file   Stress: Not on file   Social Connections: Not on file   Interpersonal Safety: Not on file   Housing Stability: Low Risk  (9/28/2023)    Housing Stability     Do you have housing? : Yes     Are you worried about losing your housing?: No              Lab Results    Chemistry/lipid CBC Cardiac Enzymes/BNP/TSH/INR   Lab Results   Component Value Date    CHOL 97 08/28/2023    HDL 33 (L) 08/28/2023    LDL 32 08/28/2023    TRIG 159 (H) 08/28/2023    CR 1.21 (H) 08/28/2023    BUN 12.5 08/28/2023    POTASSIUM 4.6 08/28/2023     08/28/2023    CO2 24 08/28/2023      Lab Results   Component Value Date    WBC 6.5 08/28/2023    HGB 15.2 08/28/2023    HCT 46.1 08/28/2023    MCV  90 08/28/2023     08/28/2023    A1C 5.6 10/03/2023     Lab Results   Component Value Date    A1C 5.6 10/03/2023    Lab Results   Component Value Date    TROPONINI <0.01 11/21/2020    BNP 79 (H) 07/27/2020    TSH 1.24 08/28/2023    INR 1.10 07/27/2020          Sloan Burns MD [facility]  Non-Invasive Cardiologist  [facility]  Pager [phone]

## 2023-11-10 NOTE — PATIENT INSTRUCTIONS
Continue on your current medications.  If you have more dizziness, you can stop isosorbide mononitrate.  See me back in 2 months.

## 2023-11-27 ENCOUNTER — MYC MEDICAL ADVICE (OUTPATIENT)
Dept: INTERNAL MEDICINE | Facility: CLINIC | Age: 58
End: 2023-11-27
Payer: COMMERCIAL

## 2023-11-28 ENCOUNTER — NURSE TRIAGE (OUTPATIENT)
Dept: NURSING | Facility: CLINIC | Age: 58
End: 2023-11-28
Payer: COMMERCIAL

## 2023-11-28 ENCOUNTER — VIRTUAL VISIT (OUTPATIENT)
Dept: PSYCHIATRY | Facility: CLINIC | Age: 58
End: 2023-11-28
Payer: COMMERCIAL

## 2023-11-28 DIAGNOSIS — F33.1 DEPRESSION, MAJOR, RECURRENT, MODERATE (H): Primary | ICD-10-CM

## 2023-11-28 DIAGNOSIS — F41.1 GAD (GENERALIZED ANXIETY DISORDER): ICD-10-CM

## 2023-11-28 DIAGNOSIS — F43.10 PTSD (POST-TRAUMATIC STRESS DISORDER): ICD-10-CM

## 2023-11-28 PROCEDURE — 99212 OFFICE O/P EST SF 10 MIN: CPT | Mod: VID | Performed by: NURSE PRACTITIONER

## 2023-11-28 ASSESSMENT — ANXIETY QUESTIONNAIRES
2. NOT BEING ABLE TO STOP OR CONTROL WORRYING: MORE THAN HALF THE DAYS
1. FEELING NERVOUS, ANXIOUS, OR ON EDGE: MORE THAN HALF THE DAYS
5. BEING SO RESTLESS THAT IT IS HARD TO SIT STILL: MORE THAN HALF THE DAYS
3. WORRYING TOO MUCH ABOUT DIFFERENT THINGS: MORE THAN HALF THE DAYS
8. IF YOU CHECKED OFF ANY PROBLEMS, HOW DIFFICULT HAVE THESE MADE IT FOR YOU TO DO YOUR WORK, TAKE CARE OF THINGS AT HOME, OR GET ALONG WITH OTHER PEOPLE?: EXTREMELY DIFFICULT
4. TROUBLE RELAXING: MORE THAN HALF THE DAYS
GAD7 TOTAL SCORE: 15
GAD7 TOTAL SCORE: 15
7. FEELING AFRAID AS IF SOMETHING AWFUL MIGHT HAPPEN: NEARLY EVERY DAY
6. BECOMING EASILY ANNOYED OR IRRITABLE: MORE THAN HALF THE DAYS
GAD7 TOTAL SCORE: 15
IF YOU CHECKED OFF ANY PROBLEMS ON THIS QUESTIONNAIRE, HOW DIFFICULT HAVE THESE PROBLEMS MADE IT FOR YOU TO DO YOUR WORK, TAKE CARE OF THINGS AT HOME, OR GET ALONG WITH OTHER PEOPLE: EXTREMELY DIFFICULT
7. FEELING AFRAID AS IF SOMETHING AWFUL MIGHT HAPPEN: NEARLY EVERY DAY

## 2023-11-28 ASSESSMENT — PATIENT HEALTH QUESTIONNAIRE - PHQ9
10. IF YOU CHECKED OFF ANY PROBLEMS, HOW DIFFICULT HAVE THESE PROBLEMS MADE IT FOR YOU TO DO YOUR WORK, TAKE CARE OF THINGS AT HOME, OR GET ALONG WITH OTHER PEOPLE: EXTREMELY DIFFICULT
SUM OF ALL RESPONSES TO PHQ QUESTIONS 1-9: 18
SUM OF ALL RESPONSES TO PHQ QUESTIONS 1-9: 18

## 2023-11-28 ASSESSMENT — PAIN SCALES - GENERAL: PAINLEVEL: WORST PAIN (10)

## 2023-11-28 NOTE — TELEPHONE ENCOUNTER
[employer] message received in 11/28/23 [employer] encounter:     Good morning Nadya Harmon is sick, he was in the [facility] Room at [place] on Saturday night.   He tested negative for Covid-19, flu, and strep. However,  he is now worse than before, coughing the most symptom bothering him. It is hurting his heart. His sputum is greenish yellow.  Please he needs to see you as soon as possible. Maybe he needs antibiotics. He is sick for nine days. I attached this message with a picture of his urgent summary.   Thank you!  Nadya Callaway's wife   Thank you

## 2023-11-28 NOTE — TELEPHONE ENCOUNTER
Nurse Triage SBAR    Is this a 2nd Level Triage? YES, LICENSED PRACTITIONER REVIEW IS REQUIRED    Situation: Significant Other calling with med refill request.  Consent: not needed    Background: Pt's wife states that pt's O2 sats this AM was at 89%. Pt has had inhalers in the past but was not using these recently as he has not been ill with need for these and he has many other medications. Pt now has had cough and cold Sxs going on. Pt seen in ED 11/25 and DX with viral illness. Pt has f/u appt with PCP 12/1.    Assessment: Pt was doing VV with MH provider while wife on call. She checked his O2 sats again and states it was bouncing between 92 and 95% with dips to 89% and his pulse 62. Pt denied SOB or difficulty breathing.     Requesting Symbicort and Albuterol Inhalers, preferred pharm t'd up.    Protocol Recommended Disposition:   Discuss With PCP And Callback By Nurse Within 1 Hour    Recommendation: Advised wife/patient to  wait for call back from care team after discussing with PCP . Reviewed concerning symptoms and when to call back.     Routed to provider - please call pt/wife when addressed and to advise if f/u appt time frame appropriate @[phone].       Does the patient meet one of the following criteria for ADS visit consideration? 16+ years old, with an [facility] PCP     TIP  Providers, please consider if this condition is appropriate for management at one of our Acute and Diagnostic Services sites.     If patient is a good candidate, please use dotphrase <dot>triageresponse and select Refer to ADS to document.         LORIN LLANES RN [facility] 11/28/2023 11:10 AM    Reason for Disposition   Prescription refill request for ESSENTIAL medicine (i.e., likelihood of harm to patient if not taken) and triager unable to refill per department policy    Additional Information   Negative: New-onset or worsening symptoms, see that protocol (e.g., diarrhea, runny nose, sore throat)   Negative:  Medicine question not related to refill or renewal   Negative: Caller (e.g., patient or pharmacist) requesting information about a new medicine   Negative: Caller requesting information unrelated to medicine    Protocols used: Medication Refill and Renewal Call-A-[place]

## 2023-11-28 NOTE — PROGRESS NOTES
"Virtual Visit Details    Type of service:  Video Visit   Video Start Time: 10:49 AM  Video End Time: 11:05 AM    Originating Location (pt. Location): Home    Distant Location (provider location):  Off-site  Platform used for Video Visit: [facility]         Outpatient Psychiatric Progress Note    Name: Nadya Blake   : 1965                    Primary Care Provider: Az Briseno MD   Therapist: None    PHQ-9 scores:      2023     1:39 PM 2023     1:27 PM 2023     9:03 AM   PHQ-9 SCORE   PHQ-9 Total Score MyChart 10 (Moderate depression) 6 (Mild depression) 6 (Mild depression)   PHQ-9 Total Score 10 6 6       ISI-7 scores:      2023     9:04 AM 2023     1:42 PM 2023    11:06 AM   ISI-7 SCORE   Total Score 6 (mild anxiety) 15 (severe anxiety) 14 (moderate anxiety)   Total Score 6 15 14       Patient Identification:    Patient is a 58 year old year old,    Not  or  male  who presents for return visit with me.  Patient is currently disabled. Patient attended the session with wife on the telephone in the background , who they agreed to have interview with. Patient prefers to be called: \" Nadya\".    Current medications include: ACETAMINOPHEN EXTRA STRENGTH 500 MG tablet, TAKE 2 TABLETS(1000 MG) BY MOUTH EVERY 6 HOURS AS NEEDED FOR PAIN  aspirin (ASA) 81 MG chewable tablet, CHEW AND SWALLOW 1 TABLET(81 MG) BY MOUTH DAILY  busPIRone (BUSPAR) 5 MG tablet, Take 1 tablet (5 mg) by mouth daily In the morning and an additional tablet later in the day as needed for anxiety  Cholecalciferol (VITAMIN D3) 50 MCG (2000 UT) CAPS, Take 1 capsule by mouth daily  cyanocobalamin (VITAMIN B-12) 1000 MCG tablet, Take 1 tablet (1,000 mcg) by mouth daily  diclofenac (VOLTAREN) 1 % topical gel, Apply 4 g topically 4 times daily  empagliflozin (JARDIANCE) 25 MG TABS tablet, Take 1 tablet (25 mg) by mouth daily  escitalopram (LEXAPRO) 20 MG tablet, Take 1 tablet (20 mg) " by mouth daily  isosorbide mononitrate (IMDUR) 30 MG 24 hr tablet, Take 1 tablet (30 mg) by mouth daily  Lidocaine 0.5 % GEL, Externally apply topically as needed  metoprolol succinate ER (TOPROL XL) 100 MG 24 hr tablet, Take 1 tablet (100 mg) by mouth at bedtime  nifedipine 0.2% in white petrolatum 0.2 % OINT ointment, Apply topically 4 times daily as needed (anal fissure)  nitroGLYcerin (NITROSTAT) 0.4 MG sublingual tablet, For chest pain place 1 tablet under the tongue every 5 minutes for 3 doses. If symptoms persist 5 minutes after 1st dose call 911.  oxybutynin (DITROPAN) 5 MG tablet, Take 1 tablet (5 mg) by mouth At Bedtime  polyethylene glycol (MIRALAX) 17 GM/Dose powder, TAKE 17 GRAMS(1 CAPFUL) BY MOUTH DAILY  QUEtiapine (SEROQUEL) 50 MG tablet, Take 1.5 tablets (75 mg) by mouth At Bedtime  rosuvastatin (CRESTOR) 40 MG tablet, Take 1 tablet (40 mg) by mouth daily  tamsulosin (FLOMAX) 0.4 MG capsule, TAKE 2 CAPSULES(0.8 MG) BY MOUTH EVERY EVENING    No current facility-administered medications on file prior to visit.       The [place] Prescription Monitoring Program has been reviewed and there are no concerns about diversionary activity for controlled substances at this time.      I was able to review most recent Primary Care Provider, specialty provider, and therapy visit notes that I have access to.     Today, patient reports He is having chest pressure and pain.  He is requesting to reschedule this visit as he is working with his wife to have his chest pain evaluated today.  He appears highly anxious.  He denies suicide thinking.        has a past medical history of Acute non-ST elevation myocardial infarction (NSTEMI) (H) (6/22/2020), Adenomatous polyp of colon, Ascending aorta dilatation (H24), Carpal tunnel syndrome, Chronic superficial gastritis, Coronary artery disease due to lipid rich plaque (6/23/2020), Depression with anxiety, Dyslipidemia, goal LDL below 70 (6/23/2020), Essential hypertension  (9/2/2012), Fatty liver disease, nonalcoholic, GERD (gastroesophageal reflux disease), IBS (irritable bowel syndrome), Left lumbar radiculopathy, Multinodular goiter, Old torn meniscus of knee, Paroxysmal atrial fibrillation (H), Perianal abscess, Post herpetic neuralgia, Post-traumatic osteoarthritis of both knees, Primary osteoarthritis of left hip, Primary osteoarthritis of right hip, Pulmonary nodule, 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), Thyroid nodule, TMJ arthritis, Tobacco use disorder (11/1/2013), and Vitamin D deficiency (9/30/2020).    Vital Signs:   There were no vitals taken for this visit.    Labs:    Most recent laboratory results reviewed: Negative for COVID, influenza a and B, and strep a    Mental Status Examination:  Appearance: awake, alert, casual dress, and moderate distress  Attitude: Preoccupied  Eye Contact:  adequate  Gait and Station: No dizziness or falls  Psychomotor Behavior:  fidgeting  Oriented to:  time, person, and place  Attention Span and Concentration:  Poor  Speech:   vtspeech: clear, coherent, pressured speech, and Speaks: Some English  Mood:  anxious and depressed  Affect:  mood congruent  Associations:  no loose associations  Thought Process:  tangental  Thought Content:  no evidence of suicidal ideation or homicidal ideation, no auditory hallucinations present, and no visual hallucinations present  Recent and Remote Memory:  intact Not formally assessed. No amnesia.  Fund of Knowledge: appropriate  Insight:  good  Judgment: good  Impulse Control:  fair    Suicide Risk Assessment:  Today Nadya Blake reports no thoughts to harm themself or others. In addition, there are notable risk factors for self-harm, including anxiety. However, risk is mitigated by commitment to family, history of seeking help when needed, future oriented, denies suicidal intent or plan, and denies homicidal ideation, intent, or plan. Therefore, based on all available  evidence including the factors cited above, Nadya Blake does not appear to be at imminent risk for self-harm, does not meet criteria for a 72-hr hold, and therefore remains appropriate for ongoing outpatient level of care.  A thorough assessment of risk factors related to suicide and self-harm have been reviewed and are noted above. The patient convincingly denies suicidality on several occasions. Local community safety resources printed and reviewed for patient to use if needed. There was no deceit detected, and the patient presented in a manner that was believable.     DSM5 Diagnosis:  296.32 (F33.1) Major Depressive Disorder, Recurrent Episode, Moderate _ and With mixed features  300.02 (F41.1) Generalized Anxiety Disorder  309.81 (F43.10) Posttraumatic Stress Disorder (includes Posttraumatic Stress Disorder for Children 6 Years and Younger)  Without dissociative symptoms    Medical comorbidities include:   Patient Active Problem List    Diagnosis Date Noted    Type 2 diabetes mellitus without complication, without long-term current use of insulin (H) 08/28/2023     Priority: Medium    Microscopic hematuria - Dr. Tineo 07/27/2023     Priority: Medium    Chronic kidney disease, stage 3a (H) 11/21/2022     Priority: Medium    Simple chronic bronchitis (H) 07/05/2022     Priority: Medium    Anal fissure - Dr. Campoverde 08/30/2021     Priority: Medium    Benign prostatic hyperplasia with nocturia 09/30/2020     Priority: Medium    PTSD (post-traumatic stress disorder) 09/30/2020     Priority: Medium    S/P CABG x 4 07/02/2020     Priority: Medium    Coronary artery disease, CABG 6/2020 06/23/2020     Priority: Medium    Dyslipidemia, goal LDL below 70 06/23/2020     Priority: Medium    Ascending aorta dilatation (H24) 02/16/2020     Priority: Medium     Formatting of this note might be different from the original.  4.3 cm since 2014.      History of colonic polyps 09/14/2017     Priority: Medium     Nonalcoholic fatty liver disease 07/12/2017     Priority: Medium    Primary osteoarthritis of left hip 04/28/2017     Priority: Medium    Primary osteoarthritis of right hip 04/28/2017     Priority: Medium    Thyroid nodule 03/07/2016     Priority: Medium    Post-traumatic osteoarthritis of both knees 06/08/2015     Priority: Medium    Pulmonary nodule 11/11/2014     Priority: Medium     Formatting of this note might be different from the original.  CT scan 11-11-14. 2 mm each. Repeat scan in one year. Patient is a smoker. Positive family history of lung cancer.  3-14-16 2 stable 2 mm nodules, unchanged on repeat ct scan.  5-26-17 stable nodules. No pulmonary abnormality.      Gastroesophageal reflux disease with esophagitis without hemorrhage 10/30/2014     Priority: Medium     2-8-13 EGD nonspecific gastritis. Treated for H pylori in the past.  3-20-19 non specific gastritis. Path neg.        Recurrent major depressive disorder, in partial remission (H24) 09/02/2012     Priority: Medium    Essential hypertension 09/02/2012     Priority: Medium       Assessment:    Nadya Blake was unable to meet with me due to experiencing chest pain.  His wife was on the telephone coordinating care for him and 4I rescheduled this appointment for December .    Medication side effects and alternatives were reviewed. Health promotion activities recommended and reviewed today. All questions addressed. Education and counseling completed regarding risks and benefits of medications and psychotherapy options.    Treatment Plan:      1.  Buspirone 5 mg twice daily  2.  Lexapro 20 mg daily  3.  Quetiapine 75 mg at bedtime    Continue all other medications as reviewed per electronic medical record today.   Safety plan reviewed. To the Emergency Department as needed or call after hours crisis line at [phone] or [phone]. [place] Crisis Text Line. Text [place] to 276166 or Suicide LifeLine Chat:  suicide[employer].org/chat/  To schedule individual or family therapy, call [facility] at [phone]  Schedule an appointment with me in December 4 or sooner as needed. Call [facility] at [phone] to schedule.  Follow up with primary care provider as planned or for acute medical concerns.  Call the psychiatric nurse line with medication questions or concerns at [phone]  MyChart may be used to communicate with your provider, but this is not intended to be used for emergencies.    Crisis Resources:    National Suicide Prevention Lifeline: 551.470.4100 (TTY: 187.604.6972). Call anytime for help.  (www.suicidepreventionlifeline.org)  National [place] on Mental Illness (www.gómez.org): 224.321.3142 or 585-306-4879.   Mental Health Association (www.mentalhealth.org): 346.455.6653 or 731-375-7857.  [place] Crisis Text [place]: Text [place] to 441361  Suicide LifeLine Chat: [employer].org/chat    Administrative Billing:   Time spent with patient includes counseling and coordination of care regarding above diagnoses and treatment plan.    Patient Status:  This is a continuous care patient and medications will be prescribed by the psychiatric provider until further indicated.    Signed:   SINGH Mo-BC   Psychiatry       Answers submitted by the patient for this visit:  Patient Health Questionnaire (Submitted on 11/28/2023)  If you checked off any problems, how difficult have these problems made it for you to do your work, take care of things at home, or get along with other people?: Extremely difficult  PHQ9 TOTAL SCORE: 18  ISI-7 (Submitted on 11/28/2023)  ISI 7 TOTAL SCORE: 15

## 2023-11-28 NOTE — TELEPHONE ENCOUNTER
Spoke with patient wife regarding triage and provider response-    Wife reports that patient has the following vitals today: BP: 108/80, Pulse: 59, O2: 93-94.     No active prescriptions for any inhalers.     Per  visit: The patient's wife endorses productive cough with green mucous.     Denies chest pain but reports that coughing hurts ribs/chest.    Advised that patient should be seen in [place] or the ER for evaluation if patient is having trouble breathing or chest pain. Advised that if oxygen saturations are below 93% patient should be evaluated urgently. Advised that if patient is weak or declines overnight to be seen in the ER. Advised to encourage fluids and nutrition for patient.     Requesting only to see Dr. Briseno because PCP knows patient. Discussed [facility] locations close to home ( and ER locations) that patient can be seen at before tomorrow and reassured patient wife that the providers will have access to see medical records and history.     Visit scheduled for tomorrow and advisement restated to patient wife.       Patient wife verbalized understanding and had no further questions at time of call.

## 2023-11-28 NOTE — TELEPHONE ENCOUNTER
Pt was triaged this morning, see 11/28 triage encounter.     Mychart message added to triage encounter and routed to provider.

## 2023-11-28 NOTE — NURSING NOTE
Is the patient currently in the state of [place]? YES    Visit mode:VIDEO    If the visit is dropped, the patient can be reconnected by: VIDEO VISIT: Text to cell phone:   Telephone Information:   Mobile [phone]       Will anyone else be joining the visit? No  (If patient encounters technical issues they should call [phone])    How would you like to obtain your AVS? MyChart    Are changes needed to the allergy or medication list? No    Rooming Documentation: Unable to complete qnrs due to time.    Reason for visit: RECHECK     Rani Whitlock F      Care team has reviewed attendance agreement with patient. Patient advised that two failed appointments within 6 months may lead to termination of current episode of care.

## 2023-11-29 ENCOUNTER — OFFICE VISIT (OUTPATIENT)
Dept: INTERNAL MEDICINE | Facility: CLINIC | Age: 58
End: 2023-11-29
Payer: COMMERCIAL

## 2023-11-29 VITALS
BODY MASS INDEX: 31.46 KG/M2 | HEIGHT: 65 IN | HEART RATE: 63 BPM | RESPIRATION RATE: 20 BRPM | TEMPERATURE: 98.9 F | WEIGHT: 188.8 LBS | OXYGEN SATURATION: 98 % | SYSTOLIC BLOOD PRESSURE: 100 MMHG | DIASTOLIC BLOOD PRESSURE: 78 MMHG

## 2023-11-29 DIAGNOSIS — E11.9 TYPE 2 DIABETES MELLITUS WITHOUT COMPLICATION, WITHOUT LONG-TERM CURRENT USE OF INSULIN (H): ICD-10-CM

## 2023-11-29 DIAGNOSIS — J01.90 ACUTE SINUSITIS WITH SYMPTOMS > 10 DAYS: Primary | ICD-10-CM

## 2023-11-29 DIAGNOSIS — R07.89 CHEST WALL PAIN: ICD-10-CM

## 2023-11-29 PROCEDURE — 99214 OFFICE O/P EST MOD 30 MIN: CPT | Performed by: INTERNAL MEDICINE

## 2023-11-29 RX ORDER — PSEUDOEPHED/ACETAMINOPH/DIPHEN 30MG-500MG
500-1000 TABLET ORAL EVERY 6 HOURS PRN
Qty: 360 TABLET | Refills: 3 | Status: SHIPPED | OUTPATIENT
Start: 2023-11-29

## 2023-11-29 ASSESSMENT — ENCOUNTER SYMPTOMS
SHORTNESS OF BREATH: 1
SORE THROAT: 1
COUGH: 1

## 2023-11-29 NOTE — LETTER
November 29, 2023      Nadya Blake  [address]  [facility] [place] [facility] [address]        To Whom It May Concern:    Nadya Blake was seen on 11/29/2023 which disrupted the family's transportation and physical therapy plan.  Please excuse Grace from school this morning.        Sincerely,        Az Briseno MD

## 2023-11-29 NOTE — PROGRESS NOTES
"  1. Acute sinusitis with symptoms > 10 days  - amoxicillin-clavulanate (AUGMENTIN) 875-125 MG tablet; Take 1 tablet by mouth 2 times daily for 7 days  Dispense: 14 tablet; Refill: 0    2. Type 2 diabetes mellitus without complication, without long-term current use of insulin (H)  Well-controlled    3. Chest wall pain  - acetaminophen (TYLENOL) 500 MG tablet; Take 1-2 tablets (500-1,000 mg) by mouth every 6 hours as needed for mild pain  Dispense: 360 tablet; Refill: 3      Meryl Covington is a 58 year old, presenting for the following health issues:  Pharyngitis, Chest Pain, and Cough (He has been coughing up green phlegm )      11/29/2023     8:09 AM   Additional Questions   Roomed by Talia PEARL CMA   Accompanied by Wife       Pharyngitis   Associated symptoms include shortness of breath and cough.   Cough  The current episode started more than 1 week ago. Associated symptoms include sore throat and shortness of breath.   History of Present Illness       Reason for visit:  URI  Symptom onset:  1-2 weeks ago  Symptoms include:  Chest pain, sore throat and cough  Symptom intensity:  Severe  Symptom progression:  Worsening  Had these symptoms before:  No    He eats 2-3 servings of fruits and vegetables daily.He consumes 2 sweetened beverage(s) daily.He exercises with enough effort to increase his heart rate 30 to 60 minutes per day.  He exercises with enough effort to increase his heart rate 6 days per week.   He is taking medications regularly.       Review of Systems   HENT:  Positive for sore throat.    Respiratory:  Positive for cough and shortness of breath.             Objective    /78 (BP Location: Right arm, Patient Position: Sitting, Cuff Size: Adult Large)   Pulse 63   Temp 98.9  F (37.2  C) (Oral)   Resp 20   Ht 1.651 m (5' 5\")   Wt 85.6 kg (188 lb 12.8 oz)   SpO2 98%   BMI 31.42 kg/m    Body mass index is 31.42 kg/m .  Physical Exam   Heart rate controlled rhythm regular lungs clear to " auscultation bilaterally, some erythema of the tympanic membranes and lymphoid reticulation posterior oropharynx

## 2023-12-01 NOTE — PATIENT INSTRUCTIONS
"Patient Education   The Panel Psychiatry Program  What to Expect  Here's what to expect in the Panel Psychiatry Program.   About the program  You'll be meeting with a psychiatric doctor to check your mental health. A psychiatric doctor helps you deal with troubling thoughts and feelings by giving you medicine. They'll make sure you know the plan for your care. You may see them for a long time. When you're feeling better, they may refer you back to seeing your family doctor.   If you have any questions, we'll be glad to talk to you.  About visits  Be open  At your visits, please talk openly about your problems. It may feel hard, but it's the best way for us to help you.  Cancelling visits  If you can't come to your visit, please call us right away at [phone]. If you don't cancel at least 24 hours (1 full day) before your visit, that's \"late cancellation.\"  Not showing up for your visits  Being very late is the same as not showing up. You'll be a \"no show\" if:  You're more than 15 minutes late for a 30-minute (half hour) visit.  You're more than 30 minutes late for a 60-minute (full hour) visit.  If you cancel late or don't show up 2 times within 6 months, we may end your care.  Getting help between visits  If you need help between visits, you can call us Monday to Friday from 8 a.m. to 4:30 p.m. at [phone].  Emergency care  Call 911 or go to the nearest emergency department if your life or someone else's life is in danger.  Call 988 anytime to reach the national Suicide and Crisis hotline.  Medicine refills  To refill your medicine, call your pharmacy. You can also call [facility]'s [facility] at [phone], Monday to Friday, 8 a.m. to 4:30 p.m. It can take 1 to 3 business days to get a refill.   Forms, letters, and tests  You may have papers to fill out, like FMLA, short-term disability, and workability. We can help you with these forms at your visits, but you must have an " appointment. You may need more than 1 visit for this, to be in an intensive therapy program, or both.  Before we can give you medicine for ADHD, we may refer you to get tested for it or confirm it another way.  We may not be able to give you an emotional support animal letter.  We don't do mental health checks ordered by the court.   We don't do mental health testing, but we can refer you to get tested.   Thank you for choosing us for your care.  For informational purposes only. Not to replace the advice of your health care provider. Copyright   2022 [facility]. All rights reserved. Nextnav 338064 - 12/22.       Treatment Plan:      1.  Buspirone 5 mg twice daily  2.  Lexapro 20 mg daily  3.  Quetiapine 75 mg at bedtime    Continue all other medications as reviewed per electronic medical record today.   Safety plan reviewed. To the Emergency Department as needed or call after hours crisis line at [phone] or [phone]. [place] Crisis Text Line. Text [place] to 242578 or [employer] LifeLine Chat: suicidepreAutonomous Marine Systemsline.org/chat/  To schedule individual or family therapy, call [facility] at [phone]  Schedule an appointment with me in December 4 or sooner as needed. Call [facility] at [phone] to schedule.  Follow up with primary care provider as planned or for acute medical concerns.  Call the psychiatric nurse line with medication questions or concerns at [phone]  MyChart may be used to communicate with your provider, but this is not intended to be used for emergencies.    Crisis Resources:    National Suicide Prevention Lifeline: 377.216.3023 (TTY: 712.201.3352). Call anytime for help.  (www.suicidepreventionlifeline.org)  National [place] on Mental Illness (www.gó[place].org): 641.678.7949 or 911-227-7315.   Mental Health Association (www.mentalhealth.org): 803.885.2696 or 446-161-1441.  [place] Crisis Text Line: Text [place] to 490135  [employer] LifeLine  Chat: suicidepreventionlifeline.org/chat

## 2023-12-04 ENCOUNTER — VIRTUAL VISIT (OUTPATIENT)
Dept: PSYCHIATRY | Facility: CLINIC | Age: 58
End: 2023-12-04
Payer: COMMERCIAL

## 2023-12-04 DIAGNOSIS — F33.1 DEPRESSION, MAJOR, RECURRENT, MODERATE (H): Primary | ICD-10-CM

## 2023-12-04 DIAGNOSIS — F43.10 PTSD (POST-TRAUMATIC STRESS DISORDER): ICD-10-CM

## 2023-12-04 DIAGNOSIS — F41.1 GAD (GENERALIZED ANXIETY DISORDER): ICD-10-CM

## 2023-12-04 DIAGNOSIS — I25.118 CORONARY ARTERY DISEASE OF NATIVE ARTERY OF NATIVE HEART WITH STABLE ANGINA PECTORIS (H): ICD-10-CM

## 2023-12-04 PROCEDURE — 99214 OFFICE O/P EST MOD 30 MIN: CPT | Mod: VID | Performed by: NURSE PRACTITIONER

## 2023-12-04 RX ORDER — ESCITALOPRAM OXALATE 20 MG/1
10 TABLET ORAL DAILY
Qty: 30 TABLET | Refills: 4 | Status: SHIPPED | OUTPATIENT
Start: 2023-12-04 | End: 2024-01-04

## 2023-12-04 RX ORDER — PAROXETINE 20 MG/1
20 TABLET, FILM COATED ORAL EVERY MORNING
Qty: 30 TABLET | Refills: 3 | Status: SHIPPED | OUTPATIENT
Start: 2023-12-04 | End: 2024-03-04

## 2023-12-04 NOTE — PATIENT INSTRUCTIONS
"Patient Education   The [facility]  What to Expect  Here's what to expect in the Panel Psychiatry Program.   About the program  You'll be meeting with a psychiatric doctor to check your mental health. A psychiatric doctor helps you deal with troubling thoughts and feelings by giving you medicine. They'll make sure you know the plan for your care. You may see them for a long time. When you're feeling better, they may refer you back to seeing your family doctor.   If you have any questions, we'll be glad to talk to you.  About visits  Be open  At your visits, please talk openly about your problems. It may feel hard, but it's the best way for us to help you.  Cancelling visits  If you can't come to your visit, please call us right away at [phone]. If you don't cancel at least 24 hours (1 full day) before your visit, that's \"late cancellation.\"  Not showing up for your visits  Being very late is the same as not showing up. You'll be a \"no show\" if:  You're more than 15 minutes late for a 30-minute (half hour) visit.  You're more than 30 minutes late for a 60-minute (full hour) visit.  If you cancel late or don't show up 2 times within 6 months, we may end your care.  Getting help between visits  If you need help between visits, you can call us Monday to Friday from 8 a.m. to 4:30 p.m. at [phone].  Emergency care  Call 911 or go to the nearest emergency department if your life or someone else's life is in danger.  Call 988 anytime to reach the national Suicide and Crisis hotline.  Medicine refills  To refill your medicine, call your pharmacy. You can also call [facility]'s [facility] at [phone], Monday to Friday, 8 a.m. to 4:30 p.m. It can take 1 to 3 business days to get a refill.   Forms, letters, and tests  You may have papers to fill out, like FMLA, short-term disability, and workability. We can help you with these forms at your visits, but you must have an " appointment. You may need more than 1 visit for this, to be in an intensive therapy program, or both.  Before we can give you medicine for ADHD, we may refer you to get tested for it or confirm it another way.  We may not be able to give you an emotional support animal letter.  We don't do mental health checks ordered by the court.   We don't do mental health testing, but we can refer you to get tested.   Thank you for choosing us for your care.  For informational purposes only. Not to replace the advice of your health care provider. Copyright   2022 [facility]. All rights reserved. [employer] [id number] - 12/22.       Treatment Plan:      1.  S-Citalopram 1/2 tablet daily for 7 days then stop  2.  At the same time start paroxetine 20 mg daily  3.  Continue buspirone 5 mg 2 times daily  4.  Quetiapine 75 mg at bedtime  5.  Continue talk therapy to learn skills to manage life stressors and trauma history    Continue all other medications as reviewed per electronic medical record today.   Safety plan reviewed. To the Emergency Department as needed or call after hours crisis line at [phone] or [phone]. [place] Crisis Text Line. Text [place] to 312061 or Suicide LifeLine Chat: suicidepreventionGridcoline.org/chat/  To schedule individual or family therapy, call [facility] at [phone]  Schedule an appointment with me in January 4 or sooner as needed. Call [facility] at [phone] to schedule.  Follow up with primary care provider as planned or for acute medical concerns.  Call the psychiatric nurse line with medication questions or concerns at [phone]  MyChart may be used to communicate with your provider, but this is not intended to be used for emergencies.    Crisis Resources:    National Suicide Prevention Lifeline: 553.323.1388 (TTY: 660.692.3793). Call anytime for help.  (www.suicidepreventionlifeline.org)  National [place] on Mental Illness  (www.gómez.org): 302-681-2277 or 955-473-6102.   Mental Health Association (www.mentalhealth.org): 421.484.1773 or 284-878-2440.  [place] Crisis Text Line: Text [place] to 704650  Suicide LifeLine Chat: suicidepreventionlifeline.org/chat

## 2023-12-04 NOTE — NURSING NOTE
Is the patient currently in the state of [place]? YES    Visit mode:VIDEO    If the visit is dropped, the patient can be reconnected by: VIDEO VISIT: Text to cell phone:   Telephone Information:   Mobile [phone]       Will anyone else be joining the visit? NO  (If patient encounters technical issues they should call [phone]    How would you like to obtain your AVS? MyChart    Are changes needed to the allergy or medication list? No    Reason for visit: RECHECK    Byron ARCHER

## 2023-12-04 NOTE — PROGRESS NOTES
"         Outpatient Psychiatric Progress Note    Name: Nadya Blake   : 1965                    Primary Care Provider: Az Briseno MD   Therapist: Yes    PHQ-9 scores:      2023     1:27 PM 2023     9:03 AM 2023    10:39 AM   PHQ-9 SCORE   PHQ-9 Total Score MyChart 6 (Mild depression) 6 (Mild depression) 18 (Moderately severe depression)   PHQ-9 Total Score 6 6 18       ISI-7 scores:      2023     1:42 PM 2023    11:06 AM 2023    10:41 AM   ISI-7 SCORE   Total Score 15 (severe anxiety) 14 (moderate anxiety) 15 (severe anxiety)   Total Score 15 14 15       Patient Identification:    Patient is a 58 year old year old,    Not  or  male  who presents for return visit with me.  Patient is currently disabled. Patient attended the session with his wife , who they agreed to have interview with. Patient prefers to be called: \" Nadya\".    Current medications include: acetaminophen (TYLENOL) 500 MG tablet, Take 1-2 tablets (500-1,000 mg) by mouth every 6 hours as needed for mild pain  amoxicillin-clavulanate (AUGMENTIN) 875-125 MG tablet, Take 1 tablet by mouth 2 times daily for 7 days  aspirin (ASA) 81 MG chewable tablet, CHEW AND SWALLOW 1 TABLET(81 MG) BY MOUTH DAILY  busPIRone (BUSPAR) 5 MG tablet, Take 1 tablet (5 mg) by mouth daily In the morning and an additional tablet later in the day as needed for anxiety  Cholecalciferol (VITAMIN D3) 50 MCG (2000 UT) CAPS, Take 1 capsule by mouth daily  cyanocobalamin (VITAMIN B-12) 1000 MCG tablet, Take 1 tablet (1,000 mcg) by mouth daily  diclofenac (VOLTAREN) 1 % topical gel, Apply 4 g topically 4 times daily  empagliflozin (JARDIANCE) 25 MG TABS tablet, Take 1 tablet (25 mg) by mouth daily  escitalopram (LEXAPRO) 20 MG tablet, Take 1 tablet (20 mg) by mouth daily  isosorbide mononitrate (IMDUR) 30 MG 24 hr tablet, Take 1 tablet (30 mg) by mouth daily  Lidocaine 0.5 % GEL, Externally apply " "topically as needed  metoprolol succinate ER (TOPROL XL) 100 MG 24 hr tablet, Take 1 tablet (100 mg) by mouth at bedtime  nifedipine 0.2% in white petrolatum 0.2 % OINT ointment, Apply topically 4 times daily as needed (anal fissure)  nitroGLYcerin (NITROSTAT) 0.4 MG sublingual tablet, For chest pain place 1 tablet under the tongue every 5 minutes for 3 doses. If symptoms persist 5 minutes after 1st dose call 911.  oxybutynin (DITROPAN) 5 MG tablet, Take 1 tablet (5 mg) by mouth At Bedtime  polyethylene glycol (MIRALAX) 17 GM/Dose powder, TAKE 17 GRAMS(1 CAPFUL) BY MOUTH DAILY  QUEtiapine (SEROQUEL) 50 MG tablet, Take 1.5 tablets (75 mg) by mouth At Bedtime  rosuvastatin (CRESTOR) 40 MG tablet, Take 1 tablet (40 mg) by mouth daily  tamsulosin (FLOMAX) 0.4 MG capsule, TAKE 2 CAPSULES(0.8 MG) BY MOUTH EVERY EVENING    No current facility-administered medications on file prior to visit.       The [place] Prescription Monitoring Program has been reviewed and there are no concerns about diversionary activity for controlled substances at this time.      I was able to review most recent Primary Care Provider, specialty provider, and therapy visit notes that I have access to.     Today, patient reports he has been coughing and not vomiting.  He  is feeling depressed.   Anxiety is very high.  Feeling like he is in danger.  Shaking and chest pressure occur.   Chest is clear - amoxicillin for 7 days.  Tests are negative.   He feels weak most days.  Because of how he is feeling and his PTSD symptoms he feels this is \"the last moment\" of his life.  He receives counseling weekly.  He racquel with his symptoms by being quiet and sleeping a lot.  His wife concurs.      has a past medical history of Acute non-ST elevation myocardial infarction (NSTEMI) (H) (6/22/2020), Adenomatous polyp of colon, Ascending aorta dilatation (H24), Carpal tunnel syndrome, Chronic superficial gastritis, Coronary artery disease due to lipid rich plaque " (6/23/2020), Depression with anxiety, Dyslipidemia, goal LDL below 70 (6/23/2020), Essential hypertension (9/2/2012), Fatty liver disease, nonalcoholic, GERD (gastroesophageal reflux disease), IBS (irritable bowel syndrome), Left lumbar radiculopathy, Multinodular goiter, Old torn meniscus of knee, Paroxysmal atrial fibrillation (H), Perianal abscess, Post herpetic neuralgia, Post-traumatic osteoarthritis of both knees, Primary osteoarthritis of left hip, Primary osteoarthritis of right hip, Pulmonary nodule, 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), Thyroid nodule, TMJ arthritis, Tobacco use disorder (11/1/2013), and Vitamin D deficiency (9/30/2020).    Social history updates:    No changes in his social history to report    Substance use updates:    No alcohol use  Tobacco use: No    Vital Signs:   There were no vitals taken for this visit.    Labs:    Most recent laboratory results reviewed and no new labs.     Mental Status Examination:  Appearance: awake, alert, casual dress, and mild distress  Attitude: cooperative  Eye Contact:  adequate  Gait and Station: No dizziness or falls  Psychomotor Behavior:  intact station, gait and muscle tone  Oriented to:  time, person, and place  Attention Span and Concentration:  Normal  Speech:   vtspeech: clear, coherent and Speaks: English  Mood:  anxious and depressed  Affect:  mood congruent  Associations:  no loose associations  Thought Process:  tangental  Thought Content:  no evidence of suicidal ideation or homicidal ideation, no auditory hallucinations present, and no visual hallucinations present  Recent and Remote Memory:  intact Not formally assessed. No amnesia.  Fund of Knowledge: appropriate  Insight:  good  Judgment: good  Impulse Control:  good    Suicide Risk Assessment:  Today Nadya PEARL Hayden reports no thoughts to harm themself or others. In addition, there are notable risk factors for self-harm, including anxiety, comorbid  medical condition of cardiac condition, hopelessness, and withdrawing. However, risk is mitigated by commitment to family, history of seeking help when needed, future oriented, denies suicidal intent or plan, and denies homicidal ideation, intent, or plan. Therefore, based on all available evidence including the factors cited above, Nadya Blake does not appear to be at imminent risk for self-harm, does not meet criteria for a 72-hr hold, and therefore remains appropriate for ongoing outpatient level of care.  A thorough assessment of risk factors related to suicide and self-harm have been reviewed and are noted above. The patient convincingly denies suicidality on several occasions. Local community safety resources printed and reviewed for patient to use if needed. There was no deceit detected, and the patient presented in a manner that was believable.     DSM5 Diagnosis:  296.32 (F33.1) Major Depressive Disorder, Recurrent Episode, Moderate _ and With anxious distress  300.02 (F41.1) Generalized Anxiety Disorder  309.81 (F43.10) Posttraumatic Stress Disorder (includes Posttraumatic Stress Disorder for Children 6 Years and Younger)  Without dissociative symptoms    Medical comorbidities include:   Patient Active Problem List    Diagnosis Date Noted    Type 2 diabetes mellitus without complication, without long-term current use of insulin (H) 08/28/2023     Priority: Medium    Microscopic hematuria - Dr. Tineo 07/27/2023     Priority: Medium    Chronic kidney disease, stage 3a (H) 11/21/2022     Priority: Medium    Simple chronic bronchitis (H) 07/05/2022     Priority: Medium    Anal fissure - Dr. Campoverde 08/30/2021     Priority: Medium    Benign prostatic hyperplasia with nocturia 09/30/2020     Priority: Medium    PTSD (post-traumatic stress disorder) 09/30/2020     Priority: Medium    S/P CABG x 4 07/02/2020     Priority: Medium    Coronary artery disease, CABG 6/2020 06/23/2020     Priority: Medium     Dyslipidemia, goal LDL below 70 06/23/2020     Priority: Medium    Ascending aorta dilatation (H24) 02/16/2020     Priority: Medium     Formatting of this note might be different from the original.  4.3 cm since 2014.      History of colonic polyps 09/14/2017     Priority: Medium    Nonalcoholic fatty liver disease 07/12/2017     Priority: Medium    Primary osteoarthritis of left hip 04/28/2017     Priority: Medium    Primary osteoarthritis of right hip 04/28/2017     Priority: Medium    Thyroid nodule 03/07/2016     Priority: Medium    Post-traumatic osteoarthritis of both knees 06/08/2015     Priority: Medium    Pulmonary nodule 11/11/2014     Priority: Medium     Formatting of this note might be different from the original.  CT scan 11-11-14. 2 mm each. Repeat scan in one year. Patient is a smoker. Positive family history of lung cancer.  3-14-16 2 stable 2 mm nodules, unchanged on repeat ct scan.  5-26-17 stable nodules. No pulmonary abnormality.      Gastroesophageal reflux disease with esophagitis without hemorrhage 10/30/2014     Priority: Medium     2-8-13 EGD nonspecific gastritis. Treated for H pylori in the past.  3-20-19 non specific gastritis. Path neg.        Recurrent major depressive disorder, in partial remission (H24) 09/02/2012     Priority: Medium    Essential hypertension 09/02/2012     Priority: Medium       Assessment:    Nadya Blake continues to feel extremely depressed and anxious.  His physical symptoms of chest pain and discomfort escalate during those times.  Today we are changing him from Lexapro to paroxetine to help with the anxiety and depression as well as PTSD symptoms.  Buspirone and quetiapine will continue.  Talk therapy is ongoing to help him learn skills to manage life stressors and adjustments as well as learn skills to manage trauma history memories.  His wife was present during this visit to help with translation..    Medication side effects and alternatives were  reviewed. Health promotion activities recommended and reviewed today. All questions addressed. Education and counseling completed regarding risks and benefits of medications and psychotherapy options.    Treatment Plan:      1.  S-Citalopram 1/2 tablet daily for 7 days then stop  2.  At the same time start paroxetine 20 mg daily  3.  Continue buspirone 5 mg 2 times daily  4.  Quetiapine 75 mg at bedtime  5.  Continue talk therapy to learn skills to manage life stressors and trauma history    Continue all other medications as reviewed per electronic medical record today.   Safety plan reviewed. To the Emergency Department as needed or call after hours crisis line at [phone] or [phone]. [place] Crisis Text Line. Text [place] to 674895 or Suicide LifeLine Chat: [facility].org/chat/  To schedule individual or family therapy, call [facility] at [phone]  Schedule an appointment with me in January 4 or sooner as needed. Call [facility] at [phone] to schedule.  Follow up with primary care provider as planned or for acute medical concerns.  Call the psychiatric nurse line with medication questions or concerns at [phone]  MyChart may be used to communicate with your provider, but this is not intended to be used for emergencies.    Crisis Resources:    National Suicide Prevention Lifeline: 131.870.4865 (TTY: 156.517.5814). Call anytime for help.  (www.suicidepreventionlifeline.org)  National [place] on Mental Illness (www.gómez.org): 381.367.1643 or 953-837-9299.   Mental Health Association (www.mentalhealth.org): 554.628.3611 or 578-180-0612.  [place] Crisis Text Line: Text [place] to 243239  Suicide LifeLine Chat: suicideVasoNova.org/chat    Administrative Billing:   Time spent with patient includes counseling and coordination of care regarding above diagnoses and treatment plan.    Patient Status:  This is a continuous care patient and medications  will be prescribed by the psychiatric provider until further indicated.    Signed:   HENRI MoP-BC   Psychiatry

## 2023-12-04 NOTE — PROGRESS NOTES
Virtual Visit Details    Type of service:  Video Visit   Video Start Time: 4:12 PM  Video End Time: 4:35 PM    Originating Location (pt. Location): Home    Distant Location (provider location):  On-site  Platform used for Video Visit: Leo

## 2023-12-05 RX ORDER — ISOSORBIDE MONONITRATE 30 MG/1
30 TABLET, EXTENDED RELEASE ORAL DAILY
Qty: 90 TABLET | Refills: 2 | Status: SHIPPED | OUTPATIENT
Start: 2023-12-05 | End: 2024-03-08

## 2023-12-15 ENCOUNTER — OFFICE VISIT (OUTPATIENT)
Dept: FAMILY MEDICINE | Facility: CLINIC | Age: 58
End: 2023-12-15
Payer: COMMERCIAL

## 2023-12-15 ENCOUNTER — ANCILLARY PROCEDURE (OUTPATIENT)
Dept: GENERAL RADIOLOGY | Facility: CLINIC | Age: 58
End: 2023-12-15
Payer: COMMERCIAL

## 2023-12-15 VITALS
WEIGHT: 187.1 LBS | HEIGHT: 65 IN | BODY MASS INDEX: 31.17 KG/M2 | DIASTOLIC BLOOD PRESSURE: 74 MMHG | RESPIRATION RATE: 19 BRPM | SYSTOLIC BLOOD PRESSURE: 108 MMHG | TEMPERATURE: 97.2 F | HEART RATE: 58 BPM | OXYGEN SATURATION: 93 %

## 2023-12-15 DIAGNOSIS — J20.9 ACUTE BRONCHITIS, UNSPECIFIED ORGANISM: ICD-10-CM

## 2023-12-15 DIAGNOSIS — E11.9 TYPE 2 DIABETES MELLITUS WITHOUT COMPLICATION, WITHOUT LONG-TERM CURRENT USE OF INSULIN (H): Primary | ICD-10-CM

## 2023-12-15 PROCEDURE — 99214 OFFICE O/P EST MOD 30 MIN: CPT | Performed by: FAMILY MEDICINE

## 2023-12-15 PROCEDURE — 71046 X-RAY EXAM CHEST 2 VIEWS: CPT | Mod: TC | Performed by: RADIOLOGY

## 2023-12-15 RX ORDER — BENZONATATE 200 MG/1
200 CAPSULE ORAL 3 TIMES DAILY PRN
Qty: 30 CAPSULE | Refills: 0 | Status: SHIPPED | OUTPATIENT
Start: 2023-12-15 | End: 2024-03-04

## 2023-12-15 RX ORDER — PREDNISONE 20 MG/1
TABLET ORAL
Qty: 7 TABLET | Refills: 0 | Status: SHIPPED | OUTPATIENT
Start: 2023-12-15 | End: 2024-03-04

## 2023-12-15 RX ORDER — ALBUTEROL SULFATE 90 UG/1
2 AEROSOL, METERED RESPIRATORY (INHALATION) ONCE
Status: ACTIVE | OUTPATIENT
Start: 2023-12-15

## 2023-12-15 RX ORDER — AZITHROMYCIN 250 MG/1
TABLET, FILM COATED ORAL
Qty: 6 TABLET | Refills: 0 | Status: SHIPPED | OUTPATIENT
Start: 2023-12-15 | End: 2023-12-20

## 2023-12-15 RX ORDER — METFORMIN HCL 500 MG
500 TABLET, EXTENDED RELEASE 24 HR ORAL
Qty: 15 TABLET | Refills: 0 | Status: SHIPPED | OUTPATIENT
Start: 2023-12-15 | End: 2024-01-17

## 2023-12-15 ASSESSMENT — PATIENT HEALTH QUESTIONNAIRE - PHQ9
SUM OF ALL RESPONSES TO PHQ QUESTIONS 1-9: 18
10. IF YOU CHECKED OFF ANY PROBLEMS, HOW DIFFICULT HAVE THESE PROBLEMS MADE IT FOR YOU TO DO YOUR WORK, TAKE CARE OF THINGS AT HOME, OR GET ALONG WITH OTHER PEOPLE: VERY DIFFICULT
SUM OF ALL RESPONSES TO PHQ QUESTIONS 1-9: 18

## 2023-12-15 NOTE — PROGRESS NOTES
"Assessment & Plan   Type 2 diabetes mellitus without complication, without long-term current use of insulin (H)  Under control with diet and one agent, not at risk of low glucoses, self monitoring is not necessary.  - Adult Eye  Referral  - metFORMIN (GLUCOPHAGE XR) 500 MG 24 hr tablet  Dispense: 15 tablet; Refill: 0    Acute bronchitis, unspecified organism  - azithromycin (ZITHROMAX) 250 MG tablet  Dispense: 6 tablet; Refill: 0  - predniSONE (DELTASONE) 20 MG tablet  Dispense: 7 tablet; Refill: 0  Will double the metformin while on prednisone, then drop back again to one a day.  - albuterol (PROVENTIL HFA/VENTOLIN HFA) inhaler  - benzonatate (TESSALON) 200 MG capsule  Dispense: 30 capsule; Refill: 0  - XR Chest 2 Views  MI:   Estimated body mass index is 30.85 kg/m  as calculated from the following:    Height as of this encounter: 1.659 m (5' 5.3\").    Weight as of this encounter: 84.9 kg (187 lb 1.6 oz).           JUANI SEPULVEDA MD  [facility]    Meryl Covington is a 58 year old, presenting for the following health issues:  RECHECK (ER follow up (11/25/23) for productive cough. Pt states cough has worsened since ER visit. Pt states chamomille has helped the cough occasionally. Pt denies other symptoms aside from cough. Cough is productive.)      12/15/2023     1:24 PM   Additional Questions   Roomed by Beck CHACKO RN   Accompanied by Carlee (Wife) (CTC on file)       He has diabetes and has had CABG. Treated with Augmentin, but the cough has continued to worsen. No fever. No fever or dyspnea now.  He does not check his blood sugar.    History of Present Illness       Reason for visit:  Cough  Symptom onset:  More than a month  Symptoms include:  Productive cough  Symptom intensity:  Severe  Symptom progression:  Worsening  Had these symptoms before:  No  What makes it worse:  Nothing  What makes it better:  Gifty    He eats 2-3 servings of fruits and vegetables " "daily.He consumes 2 sweetened beverage(s) daily.He exercises with enough effort to increase his heart rate 30 to 60 minutes per day.  He exercises with enough effort to increase his heart rate 7 days per week.   He is taking medications regularly.       Objective    /74 (BP Location: Left arm, Patient Position: Sitting, Cuff Size: Adult Regular)   Pulse 58   Temp 97.2  F (36.2  C) (Tympanic)   Resp 19   Ht 1.659 m (5' 5.3\")   Wt 84.9 kg (187 lb 1.6 oz)   SpO2 93%   BMI 30.85 kg/m    Body mass index is 30.85 kg/m .  Physical Exam   GENERAL: healthy, alert and no distress. He clears his throat very few minutes.  EYES: Eyes grossly normal to inspection, PERRL and conjunctivae and sclerae normal  HENT: ear canals and TM's normal, nose and mouth without ulcers or lesions  NECK: no adenopathy, no asymmetry, masses, or scars and thyroid normal to palpation  RESP: lungs clear to auscultation - no rales, rhonchi or wheezes  CV: regular rate and rhythm, normal S1 S2, no S3 or S4, no murmur, click or rub, no peripheral edema and peripheral pulses strong  ABDOMEN: soft, nontender, no hepatosplenomegaly, no masses and bowel sounds normal  MS: no gross musculoskeletal defects noted, no edema  SKIN: no suspicious lesions or rashes  NEURO: Normal strength and tone, mentation intact and speech normal  PSYCH: mentation appears normal, affect normal/bright    "

## 2023-12-20 ENCOUNTER — APPOINTMENT (OUTPATIENT)
Dept: CT IMAGING | Facility: HOSPITAL | Age: 58
End: 2023-12-20
Attending: EMERGENCY MEDICINE
Payer: COMMERCIAL

## 2023-12-20 ENCOUNTER — HOSPITAL ENCOUNTER (EMERGENCY)
Facility: HOSPITAL | Age: 58
Discharge: HOME | End: 2023-12-20
Attending: EMERGENCY MEDICINE | Admitting: EMERGENCY MEDICINE
Payer: COMMERCIAL

## 2023-12-20 VITALS
TEMPERATURE: 98.6 F | OXYGEN SATURATION: 93 % | DIASTOLIC BLOOD PRESSURE: 78 MMHG | RESPIRATION RATE: 20 BRPM | HEART RATE: 68 BPM | SYSTOLIC BLOOD PRESSURE: 130 MMHG

## 2023-12-20 DIAGNOSIS — K80.20 GALLSTONES: ICD-10-CM

## 2023-12-20 DIAGNOSIS — K31.89 GASTRIC DISTENTION: ICD-10-CM

## 2023-12-20 DIAGNOSIS — R05.2 SUBACUTE COUGH: ICD-10-CM

## 2023-12-20 LAB
ANION GAP SERPL CALCULATED.3IONS-SCNC: 9 MMOL/L (ref 7–15)
BASOPHILS # BLD AUTO: 0.1 10E3/UL (ref 0–0.2)
BASOPHILS NFR BLD AUTO: 1 %
BUN SERPL-MCNC: 16.2 MG/DL (ref 6–20)
CALCIUM SERPL-MCNC: 9 MG/DL (ref 8.6–10)
CHLORIDE SERPL-SCNC: 105 MMOL/L (ref 98–107)
CREAT SERPL-MCNC: 1.22 MG/DL (ref 0.67–1.17)
DEPRECATED HCO3 PLAS-SCNC: 26 MMOL/L (ref 22–29)
EGFRCR SERPLBLD CKD-EPI 2021: 70 ML/MIN/1.73M2
EOSINOPHIL # BLD AUTO: 0.3 10E3/UL (ref 0–0.7)
EOSINOPHIL NFR BLD AUTO: 4 %
ERYTHROCYTE [DISTWIDTH] IN BLOOD BY AUTOMATED COUNT: 12.1 % (ref 10–15)
FLUAV RNA SPEC QL NAA+PROBE: NEGATIVE
FLUBV RNA RESP QL NAA+PROBE: NEGATIVE
GLUCOSE SERPL-MCNC: 116 MG/DL (ref 70–99)
HCT VFR BLD AUTO: 42.9 % (ref 40–53)
HGB BLD-MCNC: 14.4 G/DL (ref 13.3–17.7)
IMM GRANULOCYTES # BLD: 0 10E3/UL
IMM GRANULOCYTES NFR BLD: 0 %
LYMPHOCYTES # BLD AUTO: 2.6 10E3/UL (ref 0.8–5.3)
LYMPHOCYTES NFR BLD AUTO: 34 %
MCH RBC QN AUTO: 29.9 PG (ref 26.5–33)
MCHC RBC AUTO-ENTMCNC: 33.6 G/DL (ref 31.5–36.5)
MCV RBC AUTO: 89 FL (ref 78–100)
MONOCYTES # BLD AUTO: 0.6 10E3/UL (ref 0–1.3)
MONOCYTES NFR BLD AUTO: 8 %
NEUTROPHILS # BLD AUTO: 4.1 10E3/UL (ref 1.6–8.3)
NEUTROPHILS NFR BLD AUTO: 53 %
NRBC # BLD AUTO: 0 10E3/UL
NRBC BLD AUTO-RTO: 0 /100
NT-PROBNP SERPL-MCNC: <36 PG/ML (ref 0–900)
PLATELET # BLD AUTO: 207 10E3/UL (ref 150–450)
POTASSIUM SERPL-SCNC: 4.3 MMOL/L (ref 3.4–5.3)
RBC # BLD AUTO: 4.81 10E6/UL (ref 4.4–5.9)
RSV RNA SPEC NAA+PROBE: NEGATIVE
SARS-COV-2 RNA RESP QL NAA+PROBE: NEGATIVE
SODIUM SERPL-SCNC: 140 MMOL/L (ref 135–145)
TROPONIN T SERPL HS-MCNC: 6 NG/L
WBC # BLD AUTO: 7.7 10E3/UL (ref 4–11)

## 2023-12-20 PROCEDURE — 87637 SARSCOV2&INF A&B&RSV AMP PRB: CPT | Performed by: EMERGENCY MEDICINE

## 2023-12-20 PROCEDURE — 85025 COMPLETE CBC W/AUTO DIFF WBC: CPT | Performed by: EMERGENCY MEDICINE

## 2023-12-20 PROCEDURE — 36415 COLL VENOUS BLD VENIPUNCTURE: CPT | Performed by: EMERGENCY MEDICINE

## 2023-12-20 PROCEDURE — 83880 ASSAY OF NATRIURETIC PEPTIDE: CPT | Performed by: EMERGENCY MEDICINE

## 2023-12-20 PROCEDURE — 250N000011 HC RX IP 250 OP 636: Mod: JZ | Performed by: EMERGENCY MEDICINE

## 2023-12-20 PROCEDURE — 84484 ASSAY OF TROPONIN QUANT: CPT | Performed by: EMERGENCY MEDICINE

## 2023-12-20 PROCEDURE — 80048 BASIC METABOLIC PNL TOTAL CA: CPT | Performed by: EMERGENCY MEDICINE

## 2023-12-20 PROCEDURE — 99285 EMERGENCY DEPT VISIT HI MDM: CPT | Mod: 25

## 2023-12-20 PROCEDURE — 71275 CT ANGIOGRAPHY CHEST: CPT

## 2023-12-20 RX ORDER — IOPAMIDOL 755 MG/ML
90 INJECTION, SOLUTION INTRAVASCULAR ONCE
Status: COMPLETED | OUTPATIENT
Start: 2023-12-20 | End: 2023-12-20

## 2023-12-20 RX ORDER — FAMOTIDINE 20 MG/1
20 TABLET, FILM COATED ORAL 2 TIMES DAILY
Qty: 60 TABLET | Refills: 0 | Status: SHIPPED | OUTPATIENT
Start: 2023-12-20 | End: 2023-12-27

## 2023-12-20 RX ADMIN — IOPAMIDOL 90 ML: 755 INJECTION, SOLUTION INTRAVENOUS at 21:46

## 2023-12-20 ASSESSMENT — ACTIVITIES OF DAILY LIVING (ADL): ADLS_ACUITY_SCORE: 35

## 2023-12-21 ENCOUNTER — TELEPHONE (OUTPATIENT)
Dept: INTERNAL MEDICINE | Facility: CLINIC | Age: 58
End: 2023-12-21

## 2023-12-21 ENCOUNTER — PATIENT OUTREACH (OUTPATIENT)
Dept: CARE COORDINATION | Facility: CLINIC | Age: 58
End: 2023-12-21
Payer: COMMERCIAL

## 2023-12-21 NOTE — PROGRESS NOTES
Clinic Care Coordination Contact  Follow Up Progress Note      Assessment: CCC RN spoke with patient today to follow up on recent ED visit, goal and to discuss any needs or concerns. Rizwanamorena spoke with patient's wife Carlee. Carlee reported patient continues to have a cough which she said he has had for several weeks. She stated his chest pain has subsided. She said he has a follow up with an associate of his primary care provider's on 12/27/23. She stated he is taking his medications as directed. She stated patient has not been painting lately related to his cough. She asked for letter from her PCP stating patient is not capable of using the bus system related to his mental health concerns. Writer will forward this request to his PCP.     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       Start Date: 2/8/2023 Expected End Date: 2/7/2024    Recent Progress: 80%    Priority: Medium    Note:     Barriers: Health concerns  Strengths: Motivated. Strong family support.   Patient expressed understanding of goal: Yes  Action steps to achieve this goal:  1. I will look into the options of what type of painting interests me.   2. I will also look into community offered classes that may help get me started towards his goal.   3. I will report progress towards this goal at schedule outreach telephone calls from my CCC team.      Discussed on 12/21/23                              Intervention/Education provided during outreach: Discussed the importance of patient taking his medications as directed. Encouraged him to  attend all upcoming appointments. Encouraged him to contact Care Coordination for any needs or concerns.               Plan: CCC RN will continue to monitor, support patient with current goal and will be available to assist nursing needs arise.     Care Coordinator will follow up in one month.

## 2023-12-21 NOTE — ED TRIAGE NOTES
The patient reports a cough for 1.5 months. He has been seen for it. He just finished a round of antibiotics for the cough. He states that he has been having chest pain for 2 days. The pain is located in the center of his sternum.

## 2023-12-21 NOTE — ED PROVIDER NOTES
EMERGENCY DEPARTMENT ENCOUNTER      NAME: Nadya Blake  AGE: 58 year old male  YOB: 1965  MRN: [id number]  EVALUATION DATE & TIME: 12/20/2023  8:01 PM    PCP: Az Briseno    ED PROVIDER: Crow Willis MD        Chief Complaint   Patient presents with    Cough    Chest Pain         FINAL IMPRESSION:  1. Gallstones    2. Gastric distention    3. Subacute cough          ED COURSE & MEDICAL DECISION MAKING:    Pertinent Labs & Imaging studies reviewed. (See chart for details)  58 year old male presents to the Emergency Department for evaluation of cough present x 45 days.  Reports it did a little better after steroids and some antibiotics.  Former smoker.  He has had a couple days of substernal chest pain.    Previous CABG and has a ascending aortic aneurysm    Differential includes ACS, pulmonary emboli, pneumonia, aortic dissection, esophageal rupture, pneumothorax, pneumonia, malignancy, pleurisy, shingles, and GERD.  Based on history and examination pulmonary emboli and aortic dissection unlikely.     Will obtain CT imaging based on reports of 45 days of cough as well as chest pain with known aortic aneurysm    Aortic Dissection unlikely however based on history and exam    Differential includes ACS, aneurysm, PE, pneumonia, bronchitis, cough from postnasal drainage, cough from GERD, cough from asthma, fibrosis, medication side effect    Plan for EKG, troponin, CBC, BMP, proBNP, COVID influenza    He is on metoprolol which sometimes can cause some cough and wheezing does a history of GERD but is not currently on any type of antacid    On exam well-appearing    EKG with ST depression anterior lateral leads similar to previous EKGs    Normal troponin ACS highly unlikely.  Chest pain is atypical for coronary artery disease                ED Course as of 12/20/23 2245   Wed Dec 20, 2023   2238 Reports he used to be on omeprazole but it was stopped after surgery.  He is not sure why it was stopped.    2238 Patient reports he ate just prior to arrival in the ER   2244 Influenza A: Negative   2244 Resp Syncytial Virus: Negative   2244 SARS CoV2 PCR: Negative   2244 N-Terminal Pro Bnp: <36   2244 Troponin T, High Sensitivity: 6   2244 Sodium: 140   2244 Potassium: 4.3   2244 Urea Nitrogen: 16.2   2244 Creatinine(!): 1.22   2245 GFR Estimate: 70   2245 Glucose(!): 116   2245 WBC: 7.7   2245 Hemoglobin: 14.4   2245 Platelet Count: 207   2245 CT shows gallstones as well as distended stomach concerning for possible gastric outlet or patient's got no vomiting.  He did eat a meal prior to arrival.   2245 No tenderness suggest cholecystitis.  He has no pain with eating.  Biliary colic unlikely   2245 Discussed starting Pepcid for possible GERD as a cause of his cough and follow-up primary care doctor for exenteration pulmonary function test for asthma which can also cause subacute cough.   2245 CT without evidence of pneumonia or aneurysm or PE or dissection   2245 Patient served in the ER for 3 hours without any worsening symptoms.       8:39 PM I met with the patient to obtain patient history and performed a physical exam. Discussed plan for ED work up including potential diagnostic studies and interventions.  10:33 PM Reevaluated and updated the patient with findings. We discussed the plan for discharge and the patient is agreeable. Reviewed supportive cares, symptomatic treatment, outpatient follow up, and reasons to return to the Emergency Department. Patient to be discharged by ED RN.     Medical Decision Making    History:  Supplemental history from: Documented in chart, if applicable  External Record(s) reviewed: Inpatient Record: [facility] 12/15/2023    Work Up:  Chart documentation includes differential considered and any EKGs or imaging independently interpreted by provider, where specified.  In additional to work up documented, I considered the following work up: Documented in chart, if  "applicable.    External consultation:  Discussion of management with another provider: Documented in chart, if applicable    Complicating factors:  Care impacted by chronic illness: Chronic Kidney Disease, Diabetes, Heart Disease, Hypertension, and Other: GERD, Paroxysmal Afib  Care affected by social determinants of health: Access to Medical Care    Disposition considerations: Discharge. I prescribed additional prescription strength medication(s) as charted. N/A.          Voice recognition software was used in the creation of this note. Any grammatical or nonsensical errors are due to inherent errors with the software and are not the intention of the writer.         At the conclusion of the encounter I discussed the results of all of the tests and the disposition. The questions were answered. The patient or family acknowledged understanding and was agreeable with the care plan.           MEDICATIONS GIVEN IN THE EMERGENCY:  Medications   iopamidol (ISOVUE-370) solution 90 mL (90 mLs Intravenous $Given 12/20/23 2145)       NEW PRESCRIPTIONS STARTED AT TODAY'S ER VISIT  New Prescriptions    FAMOTIDINE (PEPCID) 20 MG TABLET    Take 1 tablet (20 mg) by mouth 2 times daily          =================================================================    HPI    Triage note  \"The patient reports a cough for 1.5 months. He has been seen for it. He just finished a round of antibiotics for the cough. He states that he has been having chest pain for 2 days. The pain is located in the center of his sternum.         \"      Patient information was obtained from: patient    Use of : N/A        Nadya Blake is a 58 year old male with a pertinent history of CAD, GERD, ascending aorta dilation, chest aneurysm, s/p CABG x4, coronary angiogram (10/2022), Paroxysmal Afib, pulmonary nodule, HTN, DM2, CKD, who presents to this ED via walk-in with family for evaluation of chest pain.    Per chart review, patient was seen at " [facility] on 12/15/2023 for follow up visit of a persistent cough since ED visit on 11/25/2023. Patient stated the cough was still productive and has worsened since last ED visit. Chest X-ray was negative. Patient was sent home with albuterol inhaler, azithromycin 250 mg 2 tablets 1x daily for 4 days, benzonatate 200 mg PO TID PRN, and prednisone 20 mg 1 tablet PO every day.    Patient reports he's had a persistent productive cough (worse in the morning) with associating dizziness, nausea, and sore throat for the past 45 days. He was seen in  on 11/25/23 for this cough and was advised to follow up with his PCP. He did follow up with his PCP and was given amoxicillin with no relief. He later returned to visit his PCP on 12/15/23 and was given azithromycin. Patient states since finishing azithromycin (last dose was yesterday), his symptoms have not gotten any better. Today, patient developed some sharp mid sternal chest pain with coughing and was hypoxic (O2 sats were lower than 93%). Due to this, he decided to come into the ED for evaluation.    He otherwise denies associating lower extremity edema, rhinorrhea, and congestion. Patient is taking lasix daily and has lost about 10 lbs in the last 2 weeks. He does endorse history of asthma and GERD. He is not taking any medications for GERD. There were no other concerns/complaints at this time.    Shx: He is a former smoker and a vegan.      REVIEW OF SYSTEMS   Review of Systems     PAST MEDICAL HISTORY:  Past Medical History:   Diagnosis Date    Acute non-ST elevation myocardial infarction (NSTEMI) (H) 6/22/2020    Adenomatous polyp of colon     Ascending aorta dilatation (H24)     Carpal tunnel syndrome     Chronic superficial gastritis     Coronary artery disease due to lipid rich plaque 6/23/2020    Depression with anxiety     Dyslipidemia, goal LDL below 70 6/23/2020    Essential hypertension 9/2/2012    Fatty liver disease, nonalcoholic      GERD (gastroesophageal reflux disease)     IBS (irritable bowel syndrome)     Left lumbar radiculopathy     Multinodular goiter     Old torn meniscus of knee     Paroxysmal atrial fibrillation (H)     Perianal abscess     Post herpetic neuralgia     Post-traumatic osteoarthritis of both knees     Primary osteoarthritis of left hip     Primary osteoarthritis of right hip     Pulmonary nodule     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 Ragsdale    CV CORONARY ANGIOGRAM N/A 6/23/2020    Procedure: Coronary Angiogram;  Surgeon: Ariane Lester MD;  Location: [facility] Cath Lab;  Service: Cardiology    CV CORONARY ANGIOGRAM N/A 10/6/2022    Procedure: Coronary Angiogram;  Surgeon: Javon John MD;  Location: [facility] CV    CV IABP INSERT N/A 6/24/2020    Procedure: Intra Aortic Balloon Pump Insertion;  Surgeon: Ariane Lester MD;  Location: [facility] Cath Lab;  Service: Cardiology    CV LEFT HEART CATH N/A 10/6/2022    Procedure: Left Heart Catheterization;  Surgeon: Javon John MD;  Location: [facility] CV    CV LEFT HEART CATHETERIZATION WITHOUT LEFT VENTRICULOGRAM Left 6/23/2020    Procedure: Left Heart Catheterization Without Left Ventriculogram;  Surgeon: Ariane Lester MD;  Location: [facility] Cath Lab;  Service: Cardiology    CV LEFT VENTRICULOGRAM N/A 10/6/2022    Procedure: Left Ventriculogram;  Surgeon: Javon John MD;  Location: [facility] CV           CURRENT MEDICATIONS:    famotidine (PEPCID) 20 MG tablet  acetaminophen (TYLENOL) 500 MG tablet  aspirin (ASA) 81 MG chewable tablet  azithromycin (ZITHROMAX) 250 MG tablet  benzonatate (TESSALON) 200 MG capsule  busPIRone  (BUSPAR) 5 MG tablet  Cholecalciferol (VITAMIN D3) 50 MCG (2000 UT) CAPS  cyanocobalamin (VITAMIN B-12) 1000 MCG tablet  diclofenac (VOLTAREN) 1 % topical gel  empagliflozin (JARDIANCE) 25 MG TABS tablet  escitalopram (LEXAPRO) 20 MG tablet  isosorbide mononitrate (IMDUR) 30 MG 24 hr tablet  Lidocaine 0.5 % GEL  metFORMIN (GLUCOPHAGE XR) 500 MG 24 hr tablet  metoprolol succinate ER (TOPROL XL) 100 MG 24 hr tablet  nifedipine 0.2% in white petrolatum 0.2 % OINT ointment  nitroGLYcerin (NITROSTAT) 0.4 MG sublingual tablet  oxybutynin (DITROPAN) 5 MG tablet  PARoxetine (PAXIL) 20 MG tablet  polyethylene glycol (MIRALAX) 17 GM/Dose powder  predniSONE (DELTASONE) 20 MG tablet  QUEtiapine (SEROQUEL) 50 MG tablet  rosuvastatin (CRESTOR) 40 MG tablet  tamsulosin (FLOMAX) 0.4 MG capsule        ALLERGIES:  Allergies   Allergen Reactions    Atorvastatin Diarrhea    Cortisone Other (See Comments)     pain    Lisinopril Cough     started after CABG for unclear reason    Methylprednisolone Other (See Comments)     ?       FAMILY HISTORY:  Family History   Problem Relation Age of Onset    No Known Problems Mother     Lung Cancer Father 56        fatal    No Known Problems Daughter     Other - See Comments Son         congenital deformity of right leg; he uses a leg prosthesis       SOCIAL HISTORY:   Social History     Socioeconomic History    Marital status:      Spouse name: Carlee    Number of children: 2   Tobacco Use    Smoking status: Former     Packs/day: 1.00     Years: 30.00     Additional pack years: 0.00     Total pack years: 30.00     Types: Cigarettes     Quit date: 3/23/2020     Years since quitting: 3.7    Smokeless tobacco: Never    Tobacco comments:     stopped 3/24/2020   Vaping Use    Vaping Use: Never used   Substance and Sexual Activity    Alcohol use: No    Drug use: Never    Sexual activity: Yes     Partners: Female   Social History Narrative    , Carlee, [employer] chemistry and taking AA courses at  [employer].  From [place]; bachelors in geology and [place] science. Son, Grace (2005) and Sherrie (2008).  On SSDI, PTSD.       Social Determinants of Health     Financial Resource Strain: Low Risk  (11/29/2023)    Financial Resource Strain     Within the past 12 months, have you or your family members you live with been unable to get utilities (heat, electricity) when it was really needed?: No   Food Insecurity: Low Risk  (11/29/2023)    Food Insecurity     Within the past 12 months, did you worry that your food would run out before you got money to buy more?: No     Within the past 12 months, did the food you bought just not last and you didn t have money to get more?: No   Transportation Needs: Low Risk  (11/29/2023)    Transportation Needs     Within the past 12 months, has lack of transportation kept you from medical appointments, getting your medicines, non-medical meetings or appointments, work, or from getting things that you need?: No   Housing Stability: Low Risk  (11/29/2023)    Housing Stability     Do you have housing? : Yes     Are you worried about losing your housing?: No       VITALS:  /82   Pulse 66   Temp 98.6  F (37  C) (Oral)   Resp 20   SpO2 94%     PHYSICAL EXAM      Vitals: /82   Pulse 66   Temp 98.6  F (37  C) (Oral)   Resp 20   SpO2 94%   General: Appears in no acute distress, awake, alert, interactive.  Eyes: Conjunctivae non-injected. Sclera anicteric.  HENT: Atraumatic.  Neck: Supple.  Respiratory/Chest: Respiration unlabored. Coarse breath sounds. Productive cough.  Heart: Rate and rhythm  Abdomen: non distended.  No abdominal tenderness  Musculoskeletal: Normal extremities. No edema or erythema.  Skin: Normal color. No rash or diaphoresis.  Neurologic: Face symmetric, moves all extremities spontaneously. Speech clear.  Psychiatric: Oriented to person, place, and time. Affect appropriate.       LAB:  All pertinent labs reviewed and interpreted.  Results for orders placed  or performed during the hospital encounter of 12/20/23   CT Chest Pulmonary Embolism w Contrast    Impression    IMPRESSION:  1.  No pulmonary embolism. No evidence for right ventricular heart strain.    2.  Linear atelectasis or scarring in the left upper lobe stable. Stable incidental 2 mm right lower lobe subpleural nodule (series 5, image 58).  No follow-up necessary. Otherwise, Lungs are clear. No pleural effusion.    3.  Stomach is distended with contents. Clinical correlation with patient history. Correlation for symptoms of gastric outlet obstruction or gastroparesis recommended.     4.  Calcified gallstones within the gallbladder.   Troponin T, High Sensitivity (now)   Result Value Ref Range    Troponin T, High Sensitivity 6 <=22 ng/L   N terminal pro BNP outpatient   Result Value Ref Range    N Terminal Pro BNP Outpatient <36 0 - 900 pg/mL   Basic metabolic panel   Result Value Ref Range    Sodium 140 135 - 145 mmol/L    Potassium 4.3 3.4 - 5.3 mmol/L    Chloride 105 98 - 107 mmol/L    Carbon Dioxide (CO2) 26 22 - 29 mmol/L    Anion Gap 9 7 - 15 mmol/L    Urea Nitrogen 16.2 6.0 - 20.0 mg/dL    Creatinine 1.22 (H) 0.67 - 1.17 mg/dL    GFR Estimate 70 >60 mL/min/1.73m2    Calcium 9.0 8.6 - 10.0 mg/dL    Glucose 116 (H) 70 - 99 mg/dL   Symptomatic Influenza A/B, RSV, & SARS-CoV2 PCR (COVID-19) Nasopharyngeal    Specimen: Nasopharyngeal; Swab   Result Value Ref Range    Influenza A PCR Negative Negative    Influenza B PCR Negative Negative    RSV PCR Negative Negative    SARS CoV2 PCR Negative Negative   CBC with platelets and differential   Result Value Ref Range    WBC Count 7.7 4.0 - 11.0 10e3/uL    RBC Count 4.81 4.40 - 5.90 10e6/uL    Hemoglobin 14.4 13.3 - 17.7 g/dL    Hematocrit 42.9 40.0 - 53.0 %    MCV 89 78 - 100 fL    MCH 29.9 26.5 - 33.0 pg    MCHC 33.6 31.5 - 36.5 g/dL    RDW 12.1 10.0 - 15.0 %    Platelet Count 207 150 - 450 10e3/uL    % Neutrophils 53 %    % Lymphocytes 34 %    % Monocytes 8 %     % Eosinophils 4 %    % Basophils 1 %    % Immature Granulocytes 0 %    NRBCs per 100 WBC 0 <1 /100    Absolute Neutrophils 4.1 1.6 - 8.3 10e3/uL    Absolute Lymphocytes 2.6 0.8 - 5.3 10e3/uL    Absolute Monocytes 0.6 0.0 - 1.3 10e3/uL    Absolute Eosinophils 0.3 0.0 - 0.7 10e3/uL    Absolute Basophils 0.1 0.0 - 0.2 10e3/uL    Absolute Immature Granulocytes 0.0 <=0.4 10e3/uL    Absolute NRBCs 0.0 10e3/uL       RADIOLOGY:  Reviewed all pertinent imaging. Please see official radiology report.  CT Chest Pulmonary Embolism w Contrast   Final Result   IMPRESSION:   1.  No pulmonary embolism. No evidence for right ventricular heart strain.      2.  Linear atelectasis or scarring in the left upper lobe stable. Stable incidental 2 mm right lower lobe subpleural nodule (series 5, image 58).  No follow-up necessary. Otherwise, Lungs are clear. No pleural effusion.      3.  Stomach is distended with contents. Clinical correlation with patient history. Correlation for symptoms of gastric outlet obstruction or gastroparesis recommended.       4.  Calcified gallstones within the gallbladder.          EKG:    Performed at: 12/20/2023 19:45:40    Impression: Sinus rhythm, Left axis deviation, Abnormal ECG    Rate: 68  Rhythm: Sinus rhythm  Axis: -40  DE Interval: 202  QRS Interval: 86  QTc Interval: 418  ST Changes: None  Comparison: No significant changes found when compared to ECG of 10/6/2022 10:34    I have independently reviewed and interpreted the EKG(s) documented above.    PROCEDURES:   None      I, Chiquis Hernandez, am serving as a scribe to document services personally performed by Brandon Willis MD based on my observation and the provider's statements to me. I, Dr. Brandon Willis, attest that Chiquis Hernandez is acting in a scribe capacity, has observed my performance of the services and has documented them in accordance with my direction.    Brandon Willis MD  Emergency Medicine  [facility] EMERGENCY  DEPARTMENT  [address] [place] [id number]  [phone]       Brandon Willis MD  12/20/23 5662

## 2023-12-21 NOTE — DISCHARGE INSTRUCTIONS
As we discussed your imaging does not show an explanation for your subacute cough.  You do have a distended stomach that could suggest you have delayed gastric emptying in the GERD but you are high risk for heartburn therefore recommend Pepcid every day.  If your cough does not improve in 1 week recommend follow-up primary care doctor for consideration of pulmonary function test for asthma testing which is also a common cause of cough as well.  You have gallstones therefore if you develop upper abdominal pain with eating recommend seeing a surgeon for your gallbladder to be removed, however if you do not have any pain you do not initially have to do anything right now

## 2023-12-21 NOTE — TELEPHONE ENCOUNTER
General Call    Contacts         Type Contact Phone/Fax    12/21/2023 10:49 AM CST Phone (Incoming) [place] [phone]     [facility]          Reason for Call:  Letter     What are your questions or concerns:  Pt needs letter to help with finances for car repairs. Explaining pts diagnoses stating why they cannot use public transportation. Needs today    Date of last appointment with provider: 12/15/23    Could we send this information to you in [place] or would you prefer to receive a phone call?:   Patient would prefer a phone call   Okay to leave a detailed message?: No at Cell number on file:    Telephone Information:   Mobile [phone]

## 2023-12-23 DIAGNOSIS — E55.9 VITAMIN D DEFICIENCY: ICD-10-CM

## 2023-12-27 ENCOUNTER — OFFICE VISIT (OUTPATIENT)
Dept: INTERNAL MEDICINE | Facility: CLINIC | Age: 58
End: 2023-12-27
Payer: COMMERCIAL

## 2023-12-27 VITALS
RESPIRATION RATE: 20 BRPM | TEMPERATURE: 99 F | HEIGHT: 65 IN | HEART RATE: 64 BPM | SYSTOLIC BLOOD PRESSURE: 106 MMHG | OXYGEN SATURATION: 96 % | WEIGHT: 188.5 LBS | DIASTOLIC BLOOD PRESSURE: 80 MMHG | BODY MASS INDEX: 31.4 KG/M2

## 2023-12-27 DIAGNOSIS — K80.20 GALLSTONES: ICD-10-CM

## 2023-12-27 DIAGNOSIS — R05.2 SUBACUTE COUGH: Primary | ICD-10-CM

## 2023-12-27 DIAGNOSIS — I25.118 CORONARY ARTERY DISEASE OF NATIVE ARTERY OF NATIVE HEART WITH STABLE ANGINA PECTORIS (H): ICD-10-CM

## 2023-12-27 PROCEDURE — 99215 OFFICE O/P EST HI 40 MIN: CPT | Performed by: INTERNAL MEDICINE

## 2023-12-27 RX ORDER — ACETAMINOPHEN 160 MG
1 TABLET,DISINTEGRATING ORAL DAILY
Qty: 90 CAPSULE | Refills: 1 | Status: SHIPPED | OUTPATIENT
Start: 2023-12-27 | End: 2024-07-01

## 2023-12-27 RX ORDER — ALBUTEROL SULFATE 90 UG/1
2 AEROSOL, METERED RESPIRATORY (INHALATION) EVERY 6 HOURS PRN
Qty: 18 G | Refills: 0 | Status: SHIPPED | OUTPATIENT
Start: 2023-12-27 | End: 2024-01-18

## 2023-12-27 RX ORDER — IPRATROPIUM BROMIDE 42 UG/1
2 SPRAY, METERED NASAL 4 TIMES DAILY
Qty: 15 ML | Refills: 1 | Status: SHIPPED | OUTPATIENT
Start: 2023-12-27

## 2023-12-27 NOTE — PATIENT INSTRUCTIONS
Stop pepcid.     Start omeprazole 20 mg daily.     Start Atrovent nasal spray nightly.     Try albuterol inhaler for cough.     You will get a call to schedule cardiology appointment and surgery appointment.

## 2023-12-27 NOTE — PROGRESS NOTES
Assessment & Plan   Problem List Items Addressed This Visit          Nervous and Auditory    Coronary artery disease, CABG 6/2020     As per subacute cough.  Will place rapid access cardiology referral to see if they feel repeat angiography or other workup is warranted for this cough given his known coronary disease and blockages that he has declined PCI for over last year.         Relevant Orders    R[facility] Clinic  Referral       Respiratory    Subacute cough - Primary     Patient presents with now almost 6 weeks of cough that has not improved with 2 separate courses of antibiotics.  His lungs are clear on exam today aside from some mucus and phlegm I am here in his upper airway and throat.  His nares are edematous on exam.  There may be component of postnasal drip and GERD, however given that his cough is worse with exertion and he has known severe coronary disease that he has declined intervention on, there is possibility this could be anginal equivalent (do note stable EKG and negative troponin in ED 12/20).  - Will have him start atrovent nasal spray and omeprazole   - Rapid access referral placed for cardiology to discuss if they feel repeat angiogram is warranted since his symptoms are worse with lying flat and with exertion  - F/up 4 weeks         Relevant Medications    omeprazole (PRILOSEC) 20 MG DR capsule    ipratropium (ATROVENT) 0.06 % nasal spray    albuterol (PROAIR HFA/PROVENTIL HFA/VENTOLIN HFA) 108 (90 Base) MCG/ACT inhaler       Digestive    Gallstones     Calcified gallstones are seen on CT abdomen pelvis done in the emergency room 12/20.  Patient does have some lateral right upper quadrant pain on exam.  No guarding and Lopez sign is negative.    - Given the pain, will refer him to general surgery to discuss.    - I did mention to the patient that with his known coronary disease, surgery may be hesitant to pursue any elective procedures.         Relevant Orders    Adult General  "Surg Referral        Prescription drug management  I spent a total of 40 minutes on the day of the visit.   Time spent by me doing chart review, history and exam, documentation and further activities per the note     MED REC REQUIRED  Post Medication Reconciliation Status:  Discharge medications reconciled and changed, see notes/orders  BMI:   Estimated body mass index is 31.08 kg/m  as calculated from the following:    Height as of this encounter: 1.659 m (5' 5.3\").    Weight as of this encounter: 85.5 kg (188 lb 8 oz).   Weight management plan: Discussed healthy diet and exercise guidelines    FUTURE APPOINTMENTS:       - Follow-up visit in 4 weeks     Casi Raman MD  [facility] is a 58 year old, presenting for the following health issues:  ER F/U (Wife states that family has had surgery for gallstones before wondering if he will need surgery too. He has had bypass surgery in 2022. PCP out of office)        12/27/2023     4:25 PM   Additional Questions   Roomed by Gial ZARCO CMA   Accompanied by Wife       HPI       ED/UC Followup:    Facility:  [facility]  Date of visit: 12/20/2023  Reason for visit: cough  Current Status: Wife states that family has had surgery for gallstones before wondering if he will need surgery too. Patient states that I have been having the cough for two months. It is not a productive cough. When coughing I fall down/pass out for a few seconds around four to five seconds    Cough started 11/8/23. Did not improve on it's own, so patient presented to ED 11/25 for cough.  Presumed to be viral illness.  He then saw his primary care doctor on 11/29 for continued symptoms. Thought to be sinusitis at that time, was treated with augmentin.  However, symptoms did not improve and he was seen again on 12/15.  Chest x-ray was clear.  Treated with Z-Santosh, prednisone, and benzonatate.  Again, cough did not improve so he went to the emergency " room on 12/20.  Was having continued cough, some sharp midsternal chest pain and O2 sats less than 93% (wife tells me today in the upper 80s when he was laying down and sleeping).  Workup largely unremarkable.  Labs showed stable CKD, negative troponin and BNP, normal CBC, negative flu/COVID/RSV.  EKG per the note showed ST depression in anterior leads similar to prior EKGs . CT PE was negative for pulmonary embolism, some linear atelectasis or scarring in PARVEEN, calcified gallstones noted in the gallbladder.  They felt with negative troponin, ACS highly unlikely and chest pain was atypical for coronary disease.  He was discharged home with Pepcid 20 mg twice daily.  Recommended to follow-up with PCP for consideration of GERD or asthma causing his symptoms.    Today, he comes in with continued cough.  He says at this point it has been going on for almost 2 months.  Has been getting worse.  When he is coughing, sometimes coughs so much that he feels lightheaded and passes out for a few seconds.  This is happening about once a day, although today has happened twice.  This is very worrisome for him.    The last 3 days, he has been walking on his treadmill.  When he is doing this, he says he is coughing almost the entire time.  Other times the cough is worse is when he is lying down at night.  It is a little bit better when he is sitting up.  When he is having the coughing, he does feel some pain in the center of his chest.    He does notice a sour taste in the back of his mouth.  Wife tells me that he was previously on omeprazole but this was stopped after his CABG. additionally, he feels like there is mucus that is coming down from his nose that is getting stuck in the back of his throat and the top of his lungs and that is what he is trying to cough up.  When he is able to cough up some of this phlegm, he does get relief.  The phlegm is usually gray/white in color.    Of note, patient does have significant CAD.  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 [place] last saw cardiology, Dr. Burns, 11/10/2023.  At that visit, PCI was again strongly recommended for occluded vessels, patient wished to continue medical therapy and vegan diet.      Review of Systems   Constitutional, HEENT, cardiovascular, pulmonary, gi and gu systems are negative, except as otherwise noted.      Objective    /80 (BP Location: Right arm, Patient Position: Sitting, Cuff Size: Adult Large)   Pulse 64   Temp 99  F (37.2  C) (Oral)   Resp 20   Ht 1.659 m (5' 5.3\")   Wt 85.5 kg (188 lb 8 oz)   SpO2 96%   BMI 31.08 kg/m    Body mass index is 31.08 kg/m .  Physical Exam   GENERAL: healthy, alert and no distress  EYES: Eyes grossly normal to inspection, PERRL and conjunctivae and sclerae normal  HENT: ear canals and TM's normal, b/l nares erythematous with inflamed and edematous inferior turbinates, clear nasal discharge and mouth without ulcers or lesions  NECK: no adenopathy, no asymmetry, masses, or scars and thyroid normal to palpation  RESP: lungs clear to auscultation - no rales, rhonchi or wheezes. Coughing throughout, can hear phelgm present in upper airways   CV: regular rate and rhythm, normal S1 S2, no S3 or S4, no murmur, click or rub, no peripheral edema and peripheral pulses strong  ABDOMEN: soft, mild TTP in RUQ, no hepatosplenomegaly, no masses and bowel sounds normal  MS: no gross musculoskeletal defects noted, no edema  SKIN: no suspicious lesions or rashes  NEURO: Normal strength and tone, mentation intact and speech normal  PSYCH: mentation appears normal, affect normal/bright    Admission on 12/20/2023, Discharged on 12/20/2023   Component Date Value Ref Range Status    Troponin T, High Sensitivity 12/20/2023 6  <=22 ng/L Final    Either a High Sensitivity Troponin T baseline (0 hours) value = 100 ng/L, or an increase in High Sensitivity Troponin T = 7 ng/L " at 2 hours compared to 0 hours (2-0 hours), suggests myocardial injury, and urgent clinical attention is required.    If the 2-0 hours increase is <7 ng/L, a High Sensitivity Troponin T result above gender-specific reference ranges warrants further evaluation.   Recommendations for further evaluation include correlation with clinical decision-making tool (e.g., HEART), a 3rd High Sensitivity Troponin T test 2 hours after the 2nd (a 20% change from baseline would represent concern), admission for observation, close PCC/cardiology follow-up, or urgent outpatient provocative testing.    N Terminal Pro BNP Outpatient 12/20/2023 <36  0 - 900 pg/mL Final    Reference range shown and results flagged as abnormal are for the outpatient, non acute settings. Establishing a baseline value for each individual patient is useful for follow-up.    Suggested inpatient cut points for confirming diagnosis of CHF in an acute setting are:  >450 pg/mL (age 18 to less than 50)  >900 pg/mL (age 50 to less than 75)  >1800 pg/mL (75 yrs and older)    An inpatient or emergency department NT-proPBNP <300 pg/mL effectively rules out acute CHF, with 99% negative predictive value.        Sodium 12/20/2023 140  135 - 145 mmol/L Final    Reference intervals for this test were updated on 09/26/2023 to more accurately reflect our healthy population. There may be differences in the flagging of prior results with similar values performed with this method. Interpretation of those prior results can be made in the context of the updated reference intervals.     Potassium 12/20/2023 4.3  3.4 - 5.3 mmol/L Final    Chloride 12/20/2023 105  98 - 107 mmol/L Final    Carbon Dioxide (CO2) 12/20/2023 26  22 - 29 mmol/L Final    Anion Gap 12/20/2023 9  7 - 15 mmol/L Final    Urea Nitrogen 12/20/2023 16.2  6.0 - 20.0 mg/dL Final    Creatinine 12/20/2023 1.22 (H)  0.67 - 1.17 mg/dL Final    GFR Estimate 12/20/2023 70  >60 mL/min/1.73m2 Final    Calcium 12/20/2023  9.0  8.6 - 10.0 mg/dL Final    Glucose 12/20/2023 116 (H)  70 - 99 mg/dL Final    Influenza A PCR 12/20/2023 Negative  Negative Final    Influenza B PCR 12/20/2023 Negative  Negative Final    RSV PCR 12/20/2023 Negative  Negative Final    SARS CoV2 PCR 12/20/2023 Negative  Negative Final    NEGATIVE: SARS-CoV-2 (COVID-19) RNA not detected, presumed negative.    WBC Count 12/20/2023 7.7  4.0 - 11.0 10e3/uL Final    RBC Count 12/20/2023 4.81  4.40 - 5.90 10e6/uL Final    Hemoglobin 12/20/2023 14.4  13.3 - 17.7 g/dL Final    Hematocrit 12/20/2023 42.9  40.0 - 53.0 % Final    MCV 12/20/2023 89  78 - 100 fL Final    MCH 12/20/2023 29.9  26.5 - 33.0 pg Final    MCHC 12/20/2023 33.6  31.5 - 36.5 g/dL Final    RDW 12/20/2023 12.1  10.0 - 15.0 % Final    Platelet Count 12/20/2023 207  150 - 450 10e3/uL Final    % Neutrophils 12/20/2023 53  % Final    % Lymphocytes 12/20/2023 34  % Final    % Monocytes 12/20/2023 8  % Final    % Eosinophils 12/20/2023 4  % Final    % Basophils 12/20/2023 1  % Final    % Immature Granulocytes 12/20/2023 0  % Final    NRBCs per 100 WBC 12/20/2023 0  <1 /100 Final    Absolute Neutrophils 12/20/2023 4.1  1.6 - 8.3 10e3/uL Final    Absolute Lymphocytes 12/20/2023 2.6  0.8 - 5.3 10e3/uL Final    Absolute Monocytes 12/20/2023 0.6  0.0 - 1.3 10e3/uL Final    Absolute Eosinophils 12/20/2023 0.3  0.0 - 0.7 10e3/uL Final    Absolute Basophils 12/20/2023 0.1  0.0 - 0.2 10e3/uL Final    Absolute Immature Granulocytes 12/20/2023 0.0  <=0.4 10e3/uL Final    Absolute NRBCs 12/20/2023 0.0  10e3/uL Final

## 2023-12-28 ENCOUNTER — TRANSFERRED RECORDS (OUTPATIENT)
Dept: MULTI SPECIALTY CLINIC | Facility: CLINIC | Age: 58
End: 2023-12-28
Payer: COMMERCIAL

## 2023-12-28 PROBLEM — K80.20 GALLSTONES: Status: ACTIVE | Noted: 2023-12-28

## 2023-12-28 PROBLEM — R05.2 SUBACUTE COUGH: Status: ACTIVE | Noted: 2023-12-28

## 2023-12-28 LAB — RETINOPATHY: NORMAL

## 2023-12-28 NOTE — ASSESSMENT & PLAN NOTE
Calcified gallstones are seen on CT abdomen pelvis done in the emergency room 12/20.  Patient does have some lateral right upper quadrant pain on exam.  No guarding and Lopez sign is negative.    - Given the pain, will refer him to general surgery to discuss.    - I did mention to the patient that with his known coronary disease, surgery may be hesitant to pursue any elective procedures.

## 2023-12-28 NOTE — ASSESSMENT & PLAN NOTE
Patient presents with now almost 6 weeks of cough that has not improved with 2 separate courses of antibiotics.  His lungs are clear on exam today aside from some mucus and phlegm I am here in his upper airway and throat.  His nares are edematous on exam.  There may be component of postnasal drip and GERD, however given that his cough is worse with exertion and he has known severe coronary disease that he has declined intervention on, there is possibility this could be anginal equivalent (do note stable EKG and negative troponin in ED 12/20).  - Will have him start atrovent nasal spray and omeprazole   - Rapid access referral placed for cardiology to discuss if they feel repeat angiogram is warranted since his symptoms are worse with lying flat and with exertion  - F/up 4 weeks

## 2023-12-28 NOTE — ASSESSMENT & PLAN NOTE
As per subacute cough.  Will place rapid access cardiology referral to see if they feel repeat angiography or other workup is warranted for this cough given his known coronary disease and blockages that he has declined PCI for over last year.

## 2024-01-04 ENCOUNTER — VIRTUAL VISIT (OUTPATIENT)
Dept: PSYCHIATRY | Facility: CLINIC | Age: 59
End: 2024-01-04
Payer: COMMERCIAL

## 2024-01-04 DIAGNOSIS — F43.10 PTSD (POST-TRAUMATIC STRESS DISORDER): ICD-10-CM

## 2024-01-04 DIAGNOSIS — F41.1 GAD (GENERALIZED ANXIETY DISORDER): ICD-10-CM

## 2024-01-04 DIAGNOSIS — F33.1 DEPRESSION, MAJOR, RECURRENT, MODERATE (H): Primary | ICD-10-CM

## 2024-01-04 PROCEDURE — 99214 OFFICE O/P EST MOD 30 MIN: CPT | Mod: 95 | Performed by: NURSE PRACTITIONER

## 2024-01-04 RX ORDER — ESCITALOPRAM OXALATE 20 MG/1
10 TABLET ORAL DAILY
COMMUNITY
Start: 2024-01-04 | End: 2024-04-18 | Stop reason: ALTCHOICE

## 2024-01-04 RX ORDER — QUETIAPINE FUMARATE 50 MG/1
75 TABLET, FILM COATED ORAL AT BEDTIME
Qty: 45 TABLET | Refills: 4 | Status: SHIPPED | OUTPATIENT
Start: 2024-01-04 | End: 2024-07-25

## 2024-01-04 ASSESSMENT — ANXIETY QUESTIONNAIRES
GAD7 TOTAL SCORE: 14
7. FEELING AFRAID AS IF SOMETHING AWFUL MIGHT HAPPEN: MORE THAN HALF THE DAYS
4. TROUBLE RELAXING: MORE THAN HALF THE DAYS
8. IF YOU CHECKED OFF ANY PROBLEMS, HOW DIFFICULT HAVE THESE MADE IT FOR YOU TO DO YOUR WORK, TAKE CARE OF THINGS AT HOME, OR GET ALONG WITH OTHER PEOPLE?: VERY DIFFICULT
6. BECOMING EASILY ANNOYED OR IRRITABLE: MORE THAN HALF THE DAYS
GAD7 TOTAL SCORE: 14
5. BEING SO RESTLESS THAT IT IS HARD TO SIT STILL: SEVERAL DAYS
7. FEELING AFRAID AS IF SOMETHING AWFUL MIGHT HAPPEN: MORE THAN HALF THE DAYS
1. FEELING NERVOUS, ANXIOUS, OR ON EDGE: MORE THAN HALF THE DAYS
IF YOU CHECKED OFF ANY PROBLEMS ON THIS QUESTIONNAIRE, HOW DIFFICULT HAVE THESE PROBLEMS MADE IT FOR YOU TO DO YOUR WORK, TAKE CARE OF THINGS AT HOME, OR GET ALONG WITH OTHER PEOPLE: VERY DIFFICULT
3. WORRYING TOO MUCH ABOUT DIFFERENT THINGS: NEARLY EVERY DAY
GAD7 TOTAL SCORE: 14
2. NOT BEING ABLE TO STOP OR CONTROL WORRYING: MORE THAN HALF THE DAYS

## 2024-01-04 ASSESSMENT — PAIN SCALES - GENERAL: PAINLEVEL: NO PAIN (0)

## 2024-01-04 ASSESSMENT — PATIENT HEALTH QUESTIONNAIRE - PHQ9
10. IF YOU CHECKED OFF ANY PROBLEMS, HOW DIFFICULT HAVE THESE PROBLEMS MADE IT FOR YOU TO DO YOUR WORK, TAKE CARE OF THINGS AT HOME, OR GET ALONG WITH OTHER PEOPLE: VERY DIFFICULT
SUM OF ALL RESPONSES TO PHQ QUESTIONS 1-9: 15
SUM OF ALL RESPONSES TO PHQ QUESTIONS 1-9: 15

## 2024-01-04 NOTE — PROGRESS NOTES
"Virtual Visit Details    Type of service:  Video Visit   Video Start Time: 9:45 AM  Video End Time:10:01 AM    Originating Location (pt. Location): Home    Distant Location (provider location):  On-site  Platform used for Video Visit: [facility]         Outpatient Psychiatric Progress Note    Name: Nadya Blake   : 1965                    Primary Care Provider: Az Briseno MD   Therapist: Yes    PHQ-9 scores:      2023     9:03 AM 2023    10:39 AM 12/15/2023     1:27 PM   PHQ-9 SCORE   PHQ-9 Total Score MyChart 6 (Mild depression) 18 (Moderately severe depression) 18 (Moderately severe depression)   PHQ-9 Total Score 6 18 18       ISI-7 scores:      2023     1:42 PM 2023    11:06 AM 2023    10:41 AM   ISI-7 SCORE   Total Score 15 (severe anxiety) 14 (moderate anxiety) 15 (severe anxiety)   Total Score 15 14 15       Patient Identification:    Patient is a 58 year old year old,    Not  or  male  who presents for return visit with me.  Patient is currently unemployed. Patient attended the session with his wife , who they agreed to have interview with. Patient prefers to be called: \" Nadya\".    Current medications include: acetaminophen (TYLENOL) 500 MG tablet, Take 1-2 tablets (500-1,000 mg) by mouth every 6 hours as needed for mild pain  albuterol (PROAIR HFA/PROVENTIL HFA/VENTOLIN HFA) 108 (90 Base) MCG/ACT inhaler, Inhale 2 puffs into the lungs every 6 hours as needed for shortness of breath, wheezing or cough  aspirin (ASA) 81 MG chewable tablet, CHEW AND SWALLOW 1 TABLET(81 MG) BY MOUTH DAILY  benzonatate (TESSALON) 200 MG capsule, Take 1 capsule (200 mg) by mouth 3 times daily as needed for cough  busPIRone (BUSPAR) 5 MG tablet, Take 1 tablet (5 mg) by mouth daily In the morning and an additional tablet later in the day as needed for anxiety  Cholecalciferol (VITAMIN D3) 50 MCG (2000 UT) CAPS, TAKE 1 CAPSULE BY MOUTH " DAILY  cyanocobalamin (VITAMIN B-12) 1000 MCG tablet, Take 1 tablet (1,000 mcg) by mouth daily  diclofenac (VOLTAREN) 1 % topical gel, Apply 4 g topically 4 times daily  empagliflozin (JARDIANCE) 25 MG TABS tablet, Take 1 tablet (25 mg) by mouth daily  escitalopram (LEXAPRO) 20 MG tablet, Take 0.5 tablets (10 mg) by mouth daily For 7 days then stop  ipratropium (ATROVENT) 0.06 % nasal spray, Spray 2 sprays into both nostrils 4 times daily  isosorbide mononitrate (IMDUR) 30 MG 24 hr tablet, TAKE 1 TABLET(30 MG) BY MOUTH DAILY  Lidocaine 0.5 % GEL, Externally apply topically as needed  metFORMIN (GLUCOPHAGE XR) 500 MG 24 hr tablet, Take 1 tablet (500 mg) by mouth daily (with dinner) Use while being treated for his lungs.  metoprolol succinate ER (TOPROL XL) 100 MG 24 hr tablet, Take 1 tablet (100 mg) by mouth at bedtime  nifedipine 0.2% in white petrolatum 0.2 % OINT ointment, Apply topically 4 times daily as needed (anal fissure)  nitroGLYcerin (NITROSTAT) 0.4 MG sublingual tablet, For chest pain place 1 tablet under the tongue every 5 minutes for 3 doses. If symptoms persist 5 minutes after 1st dose call 911.  omeprazole (PRILOSEC) 20 MG DR capsule, Take 1 capsule (20 mg) by mouth daily  oxybutynin (DITROPAN) 5 MG tablet, Take 1 tablet (5 mg) by mouth At Bedtime  PARoxetine (PAXIL) 20 MG tablet, Take 1 tablet (20 mg) by mouth every morning  polyethylene glycol (MIRALAX) 17 GM/Dose powder, TAKE 17 GRAMS(1 CAPFUL) BY MOUTH DAILY  predniSONE (DELTASONE) 20 MG tablet, 2 tablets today and tomorrow, then 1 po qd  QUEtiapine (SEROQUEL) 50 MG tablet, Take 1.5 tablets (75 mg) by mouth At Bedtime  rosuvastatin (CRESTOR) 40 MG tablet, Take 1 tablet (40 mg) by mouth daily  tamsulosin (FLOMAX) 0.4 MG capsule, TAKE 2 CAPSULES(0.8 MG) BY MOUTH EVERY EVENING    albuterol (PROVENTIL HFA/VENTOLIN HFA) inhaler         The [place] Prescription Monitoring Program has been reviewed and there are no concerns about diversionary activity  for controlled substances at this time.      I was able to review most recent Primary Care Provider, specialty provider, and therapy visit notes that I have access to.     Today, patient reports that today his cough is better.   He has had some weight loss of 11 pounds.  Appetite is poor.  Sleep is better than before.  He has a lot of worry but reports anxiety is less intense. Started paroxetine 3 days ago had not decreased the dose of citalopram as directed at our last visit.  Twice a week he sees a therapist.     Everyday he has trauma memories.  His wife voiced no other concerns today.     has a past medical history of Acute non-ST elevation myocardial infarction (NSTEMI) (H) (6/22/2020), Adenomatous polyp of colon, Ascending aorta dilatation (H24), Carpal tunnel syndrome, Chronic superficial gastritis, Coronary artery disease due to lipid rich plaque (6/23/2020), Depression with anxiety, Dyslipidemia, goal LDL below 70 (6/23/2020), Essential hypertension (9/2/2012), Fatty liver disease, nonalcoholic, GERD (gastroesophageal reflux disease), IBS (irritable bowel syndrome), Left lumbar radiculopathy, Multinodular goiter, Old torn meniscus of knee, Paroxysmal atrial fibrillation (H), Perianal abscess, Post herpetic neuralgia, Post-traumatic osteoarthritis of both knees, Primary osteoarthritis of left hip, Primary osteoarthritis of right hip, Pulmonary nodule, 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), Thyroid nodule, TMJ arthritis, Tobacco use disorder (11/1/2013), and Vitamin D deficiency (9/30/2020).    Social history updates:    No changes in his social history to report    Substance use updates:    No alcohol use reported  Tobacco use: No    Vital Signs:   There were no vitals taken for this visit.    Labs:    Most recent laboratory results reviewed and no new labs.  Creatinine 1.22    Mental Status Examination:  Appearance: awake, alert and casual dress  Attitude: cooperative  Eye  Contact:  adequate  Gait and Station: No dizziness or falls  Psychomotor Behavior:  intact station, gait and muscle tone  Oriented to:  time, person, and place  Attention Span and Concentration:  Normal  Speech:   vtspeech: clear, coherent and Speaks: English  Mood:  anxious, depressed, and better  Affect:  mood congruent  Associations:  no loose associations  Thought Process:  goal oriented  Thought Content:  no evidence of suicidal ideation or homicidal ideation, no auditory hallucinations present, and no visual hallucinations present  Recent and Remote Memory:  intact Not formally assessed. No amnesia.  Fund of Knowledge: appropriate  Insight:  good  Judgment: good  Impulse Control:  good    Suicide Risk Assessment:  Today Nadya Blake reports no thoughts to harm themself or others. In addition, there are notable risk factors for self-harm, including anxiety. However, risk is mitigated by commitment to family, history of seeking help when needed, future oriented, denies suicidal intent or plan, and denies homicidal ideation, intent, or plan. Therefore, based on all available evidence including the factors cited above, Nadya Blake does not appear to be at imminent risk for self-harm, does not meet criteria for a 72-hr hold, and therefore remains appropriate for ongoing outpatient level of care.  A thorough assessment of risk factors related to suicide and self-harm have been reviewed and are noted above. The patient convincingly denies suicidality on several occasions. Local community safety resources printed and reviewed for patient to use if needed. There was no deceit detected, and the patient presented in a manner that was believable.     DSM5 Diagnosis:  296.32 (F33.1) Major Depressive Disorder, Recurrent Episode, Moderate _ and With mixed features  300.02 (F41.1) Generalized Anxiety Disorder  309.81 (F43.10) Posttraumatic Stress Disorder (includes Posttraumatic Stress Disorder for Children 6 Years  and Younger)  Without dissociative symptoms    Medical comorbidities include:   Patient Active Problem List    Diagnosis Date Noted    Subacute cough 12/28/2023     Priority: Medium    Gallstones 12/28/2023     Priority: Medium    Type 2 diabetes mellitus without complication, without long-term current use of insulin (H) 08/28/2023     Priority: Medium    Microscopic hematuria - Dr. Tineo 07/27/2023     Priority: Medium    Chronic kidney disease, stage 3a (H) 11/21/2022     Priority: Medium    Simple chronic bronchitis (H) 07/05/2022     Priority: Medium    Anal fissure - Dr. Campoverde 08/30/2021     Priority: Medium    Benign prostatic hyperplasia with nocturia 09/30/2020     Priority: Medium    PTSD (post-traumatic stress disorder) 09/30/2020     Priority: Medium    S/P CABG x 4 07/02/2020     Priority: Medium    Coronary artery disease, CABG 6/2020 06/23/2020     Priority: Medium    Dyslipidemia, goal LDL below 70 06/23/2020     Priority: Medium    Ascending aorta dilatation (H24) 02/16/2020     Priority: Medium     Formatting of this note might be different from the original.  4.3 cm since 2014.      History of colonic polyps 09/14/2017     Priority: Medium    Nonalcoholic fatty liver disease 07/12/2017     Priority: Medium    Primary osteoarthritis of left hip 04/28/2017     Priority: Medium    Primary osteoarthritis of right hip 04/28/2017     Priority: Medium    Thyroid nodule 03/07/2016     Priority: Medium    Post-traumatic osteoarthritis of both knees 06/08/2015     Priority: Medium    Pulmonary nodule 11/11/2014     Priority: Medium     Formatting of this note might be different from the original.  CT scan 11-11-14. 2 mm each. Repeat scan in one year. Patient is a smoker. Positive family history of lung cancer.  3-14-16 2 stable 2 mm nodules, unchanged on repeat ct scan.  5-26-17 stable nodules. No pulmonary abnormality.      Gastroesophageal reflux disease with esophagitis without hemorrhage 10/30/2014      Priority: Medium     2-8-13 EGD nonspecific gastritis. Treated for H pylori in the past.  3-20-19 non specific gastritis. Path neg.        Recurrent major depressive disorder, in partial remission (H24) 09/02/2012     Priority: Medium    Essential hypertension 09/02/2012     Priority: Medium       Assessment:    Nadya Blake appeared calmer and more focused today.  Now that he is feeling better physically, his anxiety and depression symptoms are more manageable.  He requires frequent counseling sessions to process trauma memories that keep intruding into his thoughts on a daily basis.  He started the paroxetine 3 days ago but had not begun the taper of Lexapro.  I instructed him to start taking 1/2 tablet of his S-Citalopram for 4 days then stop while continuing with the paroxetine 20 mg daily.  Quetiapine is helpful in slowing racing thoughts at night.  Buspirone continues at bedtime also for anxiety.  He is tolerating these medications well.    Medication side effects and alternatives were reviewed. Health promotion activities recommended and reviewed today. All questions addressed. Education and counseling completed regarding risks and benefits of medications and psychotherapy options.    Treatment Plan:      1.  1/2 tablet of S-Citalopram for 4 days then stop  2.  Continue paroxetine 20 mg daily  3.  Continue quetiapine 1-1/2 tablets at bedtime  4.  Continue buspirone 5 mg twice daily  5.  Continue counseling therapy to learn skills to manage depression and anxiety symptoms    Continue all other medications as reviewed per electronic medical record today.   Safety plan reviewed. To the Emergency Department as needed or call after hours crisis line at [phone] or [phone]. [place] Crisis Text Line. Text [place] to 476357 or Suicide LifeLine Chat: suicidepreventionlifeline.org/chat/  To schedule individual or family therapy, call [facility] at [phone]  Schedule an appointment  with me in February 5 or sooner as needed. Call [facility] at [phone] to schedule.  Follow up with primary care provider as planned or for acute medical concerns.  Call the psychiatric nurse line with medication questions or concerns at [phone]  MyChart may be used to communicate with your provider, but this is not intended to be used for emergencies.    Crisis Resources:    National Suicide Prevention Lifeline: 179.547.3966 (TTY: 611.212.5918). Call anytime for help.  (www.suicidepreventionlifeline.org)  National [place] on Mental Illness (www.[place].org): 365.273.3636 or 192-431-4299.   Mental Health Association (www.mentalhealth.org): 307.308.7075 or 092-600-0339.  [place] Crisis Text Line: Text [place] to 391543  Suicide LifeLine Chat: suicideSyncing.Netline.org/chat    Administrative Billing:   Time spent with patient includes counseling and coordination of care regarding above diagnoses and treatment plan.    Patient Status:  This is a continuous care patient and medications will be prescribed by the psychiatric provider until further indicated.    Signed:   SINGH Mo-BC   Psychiatry

## 2024-01-04 NOTE — NURSING NOTE
Is the patient currently in the state of [place]? YES    Visit mode:VIDEO    If the visit is dropped, the patient can be reconnected by: VIDEO VISIT: Text to cell phone:   Telephone Information:   Mobile [phone]       Will anyone else be joining the visit? No  (If patient encounters technical issues they should call [phone])    How would you like to obtain your AVS? MyChart    Are changes needed to the allergy or medication list? No    Rooming Documentation: Assigned questionnaire(s) completed .    Reason for visit: RECHECK     GIANNI Mcclain

## 2024-01-04 NOTE — PATIENT INSTRUCTIONS
"Patient Education   The Panel Psychiatry Program  What to Expect  Here's what to expect in the Panel Psychiatry Program.   About the program  You'll be meeting with a psychiatric doctor to check your mental health. A psychiatric doctor helps you deal with troubling thoughts and feelings by giving you medicine. They'll make sure you know the plan for your care. You may see them for a long time. When you're feeling better, they may refer you back to seeing your family doctor.   If you have any questions, we'll be glad to talk to you.  About visits  Be open  At your visits, please talk openly about your problems. It may feel hard, but it's the best way for us to help you.  Cancelling visits  If you can't come to your visit, please call us right away at [phone]. If you don't cancel at least 24 hours (1 full day) before your visit, that's \"late cancellation.\"  Not showing up for your visits  Being very late is the same as not showing up. You'll be a \"no show\" if:  You're more than 15 minutes late for a 30-minute (half hour) visit.  You're more than 30 minutes late for a 60-minute (full hour) visit.  If you cancel late or don't show up 2 times within 6 months, we may end your care.  Getting help between visits  If you need help between visits, you can call us Monday to Friday from 8 a.m. to 4:30 p.m. at [phone].  Emergency care  Call 911 or go to the nearest emergency department if your life or someone else's life is in danger.  Call 988 anytime to reach the national Suicide and Crisis hotline.  Medicine refills  To refill your medicine, call your pharmacy. You can also call [facility]'s [facility] at [phone], Monday to Friday, 8 a.m. to 4:30 p.m. It can take 1 to 3 business days to get a refill.   Forms, letters, and tests  You may have papers to fill out, like FMLA, short-term disability, and workability. We can help you with these forms at your visits, but you must have an " appointment. You may need more than 1 visit for this, to be in an intensive therapy program, or both.  Before we can give you medicine for ADHD, we may refer you to get tested for it or confirm it another way.  We may not be able to give you an emotional support animal letter.  We don't do mental health checks ordered by the court.   We don't do mental health testing, but we can refer you to get tested.   Thank you for choosing us for your care.  For informational purposes only. Not to replace the advice of your health care provider. Copyright   2022 [facility]. All rights reserved. [place] 307251 - 12/22.    1.  1/2 tablet of S-Citalopram for 4 days then stop  2.  Continue paroxetine 20 mg daily  3.  Continue quetiapine 1-1/2 tablets at bedtime  4.  Continue buspirone 5 mg twice daily  5.  Continue counseling therapy to learn skills to manage depression and anxiety symptoms    Continue all other medications as reviewed per electronic medical record today.   Safety plan reviewed. To the Emergency Department as needed or call after hours crisis line at [phone] or [phone]. [place] Crisis Text Line. Text [place] to 191492 or Suicide LifeLine Chat: suicidepreventionlifeline.org/chat/  To schedule individual or family therapy, call [facility] at [phone]  Schedule an appointment with me in February 5 or sooner as needed. Call [facility] at [phone] to schedule.  Follow up with primary care provider as planned or for acute medical concerns.  Call the psychiatric nurse line with medication questions or concerns at [phone]  MyChart may be used to communicate with your provider, but this is not intended to be used for emergencies.    Crisis Resources:    National Suicide Prevention Lifeline: 365.277.5513 (TTY: 417.373.8139). Call anytime for help.  (www.suicidepreventionlifeline.org)  National [place] on Mental Illness (www.[place].org): 646.790.6670 or  871.230.8452.   Mental Health Association (www.mentalhealth.org): 357-334-1507 or 090-395-4249.  [place] Crisis Text Line: Text [place] to 673491  Suicide LifeLine Chat: suicidepreventionlifeline.org/chat

## 2024-01-08 ENCOUNTER — OFFICE VISIT (OUTPATIENT)
Dept: SURGERY | Facility: CLINIC | Age: 59
End: 2024-01-08
Attending: INTERNAL MEDICINE
Payer: COMMERCIAL

## 2024-01-08 VITALS — BODY MASS INDEX: 31.92 KG/M2 | WEIGHT: 191.6 LBS | HEIGHT: 65 IN

## 2024-01-08 DIAGNOSIS — K80.20 GALLSTONES: ICD-10-CM

## 2024-01-08 PROCEDURE — 99203 OFFICE O/P NEW LOW 30 MIN: CPT | Performed by: SURGERY

## 2024-01-08 NOTE — LETTER
1/8/2024         RE: Nadya Blake  [address]  [place] [address]        Dear Colleague,    Thank you for referring your patient, Nadya Blake, to the [facility]. Please see a copy of my visit note below.        HPI: Nadya Blake is a 58 year old male referred to see me by Az Briseno for incidental gallstones.  He recently had a CT scan of his chest which showed very small gallstones.  He did have one episode of sharp pain in the RUQ not associated with food lasting only a few seconds.  He denies any biliary colic symptoms, , nausea, vomiting, fevers, chills, juandice or any other symptoms at present.       Allergies:Atorvastatin, Cortisone, Lisinopril, and Methylprednisolone    Past Medical History:   Diagnosis Date     Acute non-ST elevation myocardial infarction (NSTEMI) (H) 6/22/2020     Adenomatous polyp of colon      Ascending aorta dilatation (H24)      Carpal tunnel syndrome      Chronic superficial gastritis      Coronary artery disease due to lipid rich plaque 6/23/2020     Depression with anxiety      Dyslipidemia, goal LDL below 70 6/23/2020     Essential hypertension 9/2/2012     Fatty liver disease, nonalcoholic      GERD (gastroesophageal reflux disease)      IBS (irritable bowel syndrome)      Left lumbar radiculopathy      Multinodular goiter      Old torn meniscus of knee      Paroxysmal atrial fibrillation (H)      Perianal abscess      Post herpetic neuralgia      Post-traumatic osteoarthritis of both knees      Primary osteoarthritis of left hip      Primary osteoarthritis of right hip      Pulmonary nodule      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 Ragsdale     CV CORONARY ANGIOGRAM N/A 6/23/2020    Procedure: Coronary Angiogram;  Surgeon: Ariane Lester MD;  Location: [facility] Cath Lab;  Service: Cardiology     CV CORONARY ANGIOGRAM N/A 10/6/2022    Procedure: Coronary Angiogram;  Surgeon: Javon John MD;  Location: [facility] CV     CV IABP INSERT N/A 6/24/2020    Procedure: Intra Aortic Balloon Pump Insertion;  Surgeon: Ariane Lester MD;  Location: [facility] Cath Lab;  Service: Cardiology     CV LEFT HEART CATH N/A 10/6/2022    Procedure: Left Heart Catheterization;  Surgeon: Javon John MD;  Location: [facility] CV     CV LEFT HEART CATHETERIZATION WITHOUT LEFT VENTRICULOGRAM Left 6/23/2020    Procedure: Left Heart Catheterization Without Left Ventriculogram;  Surgeon: Ariane Lester MD;  Location: [facility] Cath Lab;  Service: Cardiology     CV LEFT VENTRICULOGRAM N/A 10/6/2022    Procedure: Left Ventriculogram;  Surgeon: Javon John MD;  Location: [facility] CATH LAB CV       CURRENT MEDS:    Current Outpatient Medications:      acetaminophen (TYLENOL) 500 MG tablet, Take 1-2 tablets (500-1,000 mg) by mouth every 6 hours as needed for mild pain, Disp: 360 tablet, Rfl: 3     albuterol (PROAIR HFA/PROVENTIL HFA/VENTOLIN HFA) 108 (90 Base) MCG/ACT inhaler, Inhale 2 puffs into the lungs every 6 hours as needed for shortness of breath, wheezing or cough, Disp: 18 g, Rfl: 0     aspirin (ASA) 81 MG chewable tablet, CHEW AND SWALLOW 1 TABLET(81 MG) BY MOUTH DAILY, Disp: 90 tablet, Rfl: 2     benzonatate (TESSALON) 200 MG capsule, Take 1 capsule (200 mg) by mouth 3 times daily as needed for cough, Disp: 30 capsule, Rfl: 0     busPIRone (BUSPAR) 5 MG tablet, Take 1 tablet (5 mg) by mouth daily In the morning and an additional tablet later in the day as needed for anxiety, Disp: 180 tablet, Rfl: 0     Cholecalciferol (VITAMIN D3) 50 MCG (2000 UT) CAPS, TAKE 1 CAPSULE BY MOUTH DAILY, Disp: 90 capsule, Rfl: 1      cyanocobalamin (VITAMIN B-12) 1000 MCG tablet, Take 1 tablet (1,000 mcg) by mouth daily, Disp: 90 tablet, Rfl: 4     diclofenac (VOLTAREN) 1 % topical gel, Apply 4 g topically 4 times daily, Disp: 500 g, Rfl: 2     empagliflozin (JARDIANCE) 25 MG TABS tablet, Take 1 tablet (25 mg) by mouth daily, Disp: 90 tablet, Rfl: 4     escitalopram (LEXAPRO) 20 MG tablet, Take 0.5 tablets (10 mg) by mouth daily For 4 days then stop, Disp: , Rfl:      ipratropium (ATROVENT) 0.06 % nasal spray, Spray 2 sprays into both nostrils 4 times daily, Disp: 15 mL, Rfl: 1     isosorbide mononitrate (IMDUR) 30 MG 24 hr tablet, TAKE 1 TABLET(30 MG) BY MOUTH DAILY, Disp: 90 tablet, Rfl: 2     Lidocaine 0.5 % GEL, Externally apply topically as needed, Disp: , Rfl:      metFORMIN (GLUCOPHAGE XR) 500 MG 24 hr tablet, Take 1 tablet (500 mg) by mouth daily (with dinner) Use while being treated for his lungs. (Patient not taking: Reported on 1/9/2024), Disp: 15 tablet, Rfl: 0     metoprolol succinate ER (TOPROL XL) 100 MG 24 hr tablet, Take 1 tablet (100 mg) by mouth at bedtime, Disp: , Rfl:      nifedipine 0.2% in white petrolatum 0.2 % OINT ointment, Apply topically 4 times daily as needed (anal fissure), Disp: 100 g, Rfl: 1     oxybutynin (DITROPAN) 5 MG tablet, Take 1 tablet (5 mg) by mouth At Bedtime, Disp: 90 tablet, Rfl: 4     PARoxetine (PAXIL) 20 MG tablet, Take 1 tablet (20 mg) by mouth every morning, Disp: 30 tablet, Rfl: 3     polyethylene glycol (MIRALAX) 17 GM/Dose powder, TAKE 17 GRAMS(1 CAPFUL) BY MOUTH DAILY, Disp: 840 g, Rfl: 4     predniSONE (DELTASONE) 20 MG tablet, 2 tablets today and tomorrow, then 1 po qd, Disp: 7 tablet, Rfl: 0     QUEtiapine (SEROQUEL) 50 MG tablet, Take 1.5 tablets (75 mg) by mouth at bedtime, Disp: 45 tablet, Rfl: 4     rosuvastatin (CRESTOR) 40 MG tablet, Take 1 tablet (40 mg) by mouth daily, Disp: 90 tablet, Rfl: 4     tamsulosin (FLOMAX) 0.4 MG capsule, TAKE 2 CAPSULES(0.8 MG) BY MOUTH EVERY EVENING,  "Disp: 180 capsule, Rfl: 4     metoprolol succinate ER (TOPROL XL) 50 MG 24 hr tablet, Take 1 tablet (50 mg) by mouth daily, Disp: 90 tablet, Rfl: 3     nitroGLYcerin (NITROSTAT) 0.4 MG sublingual tablet, For chest pain place 1 tablet under the tongue every 5 minutes for 3 doses. If symptoms persist 5 minutes after 1st dose call 911., Disp: 30 tablet, Rfl: 3     omeprazole (PRILOSEC) 20 MG DR capsule, Take 1 capsule (20 mg) by mouth daily, Disp: 90 capsule, Rfl: 1    Current Facility-Administered Medications:      albuterol (PROVENTIL HFA/VENTOLIN HFA) inhaler, 2 puff, Inhalation, Once, Sonya Leonard MD    Family History   Problem Relation Age of Onset     No Known Problems Mother      Lung Cancer Father 56        fatal     No Known Problems Daughter      Other - See Comments Son         congenital deformity of right leg; he uses a leg prosthesis        reports that he quit smoking about 3 years ago. His smoking use included cigarettes. He has a 30 pack-year smoking history. He has never been exposed to tobacco smoke. He has never used smokeless tobacco. He reports that he does not drink alcohol and does not use drugs.    Review of Systems:  The 12 system review is within normal limits except for as mentioned above.  General ROS: No complaints or constitutional symptoms  Ophthalmic ROS: No complaints of visual changes  Skin: No complaints or symptoms   Endocrine: No complaints or symptoms  Hematologic/Lymphatic: No symptoms or complaints  Psychiatric: No symptoms or complaints  Respiratory ROS: no cough, shortness of breath, or wheezing  Cardiovascular ROS: no chest pain or dyspnea on exertion  Gastrointestinal ROS: As per HPI  Genito-Urinary ROS: no dysuria, trouble voiding, or hematuria  Musculoskeletal ROS: no joint or muscle pain  Neurological ROS: no TIA or stroke symptoms      EXAM:  Ht 1.651 m (5' 5\")   Wt 86.9 kg (191 lb 9.6 oz)   BMI 31.88 kg/m    GENERAL: Well developed male, No acute distress, pleasant " and conversant   EYES: Pupils equal, round and reactive, no scleral icterus  ABDOMEN: soft, non tender  SKIN: Pink, warm and dry, no obvious rashes or lesions   NEURO:No focal deficits, ambulatory  MUSCULOSKELETAL:No obvious deformities, no swelling, normal appearing      LABS:  Lab Results   Component Value Date    WBC 7.7 12/20/2023    HGB 14.4 12/20/2023    HCT 42.9 12/20/2023    MCV 89 12/20/2023     12/20/2023     INR/Prothrombin Time  @LABRCNTIP(NA,K,CL,co2,bun,creatinine,labglom,glucose,calcium)@  Lab Results   Component Value Date    ALT 63 08/28/2023    AST 41 08/28/2023    ALKPHOS 79 08/28/2023       IMAGES:   Relevant images were reviewed and discussed with the patient.  Notable findings were: CT images reviewed    Assessment/Plan:   Nadya Blake is a 58 year old male with signs and symptoms consistent with incidental gallstones.  I have explained the pathophysiology of gallstones in detail as well as the surgical versus non-operative management strategies.      The risks of surgery were discussed in detail which include, but are not limited to, bile leak, bleeding, infection, injury to surrounding structures, the need to convert to an open procedure, blood clots, stroke, heart attack and death.  Additionally, the risks of non operative management were discussed which include, but are not limited to, worsening infection, increased pain, sepsis and death.     He understands everything which was discussed.  At this point, he does not really have any symptoms relating to his gallstones.  If at any time he has any recurrent pain in the RUQ or any other type of biliary colic symptoms, he will follow up with me for an ongoing evaluation and possible surgery.       Jef Wang DO FACS  [phone]  [facility] Department of Surgery      Again, thank you for allowing me to participate in the care of your patient.        Sincerely,        Jef Wang DO

## 2024-01-08 NOTE — PROGRESS NOTES
HPI: Nadya Blake is a 58 year old male referred to see me by Az Briseno for incidental gallstones.  He recently had a CT scan of his chest which showed very small gallstones.  He did have one episode of sharp pain in the RUQ not associated with food lasting only a few seconds.  He denies any biliary colic symptoms, , nausea, vomiting, fevers, chills, juandice or any other symptoms at present.       Allergies:Atorvastatin, Cortisone, Lisinopril, and Methylprednisolone    Past Medical History:   Diagnosis Date    Acute non-ST elevation myocardial infarction (NSTEMI) (H) 6/22/2020    Adenomatous polyp of colon     Ascending aorta dilatation (H24)     Carpal tunnel syndrome     Chronic superficial gastritis     Coronary artery disease due to lipid rich plaque 6/23/2020    Depression with anxiety     Dyslipidemia, goal LDL below 70 6/23/2020    Essential hypertension 9/2/2012    Fatty liver disease, nonalcoholic     GERD (gastroesophageal reflux disease)     IBS (irritable bowel syndrome)     Left lumbar radiculopathy     Multinodular goiter     Old torn meniscus of knee     Paroxysmal atrial fibrillation (H)     Perianal abscess     Post herpetic neuralgia     Post-traumatic osteoarthritis of both knees     Primary osteoarthritis of left hip     Primary osteoarthritis of right hip     Pulmonary nodule     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 Ragsdale    CV CORONARY ANGIOGRAM N/A 6/23/2020    Procedure: Coronary Angiogram;  Surgeon: Ariane Lester MD;  Location: [facility] Cath Lab;  Service: Cardiology    CV CORONARY ANGIOGRAM N/A 10/6/2022    Procedure: Coronary Angiogram;  Surgeon: Javon John MD;  Location: [facility] CATH LAB CV     CV IABP INSERT N/A 6/24/2020    Procedure: Intra Aortic Balloon Pump Insertion;  Surgeon: Ariane Lester MD;  Location: [facility] Cath Lab;  Service: Cardiology    CV LEFT HEART CATH N/A 10/6/2022    Procedure: Left Heart Catheterization;  Surgeon: Javon John MD;  Location: [facility] CV    CV LEFT HEART CATHETERIZATION WITHOUT LEFT VENTRICULOGRAM Left 6/23/2020    Procedure: Left Heart Catheterization Without Left Ventriculogram;  Surgeon: Ariane Lester MD;  Location: [facility] Cath Lab;  Service: Cardiology    CV LEFT VENTRICULOGRAM N/A 10/6/2022    Procedure: Left Ventriculogram;  Surgeon: Javon John MD;  Location: [facility] CV       CURRENT MEDS:    Current Outpatient Medications:     acetaminophen (TYLENOL) 500 MG tablet, Take 1-2 tablets (500-1,000 mg) by mouth every 6 hours as needed for mild pain, Disp: 360 tablet, Rfl: 3    albuterol (PROAIR HFA/PROVENTIL HFA/VENTOLIN HFA) 108 (90 Base) MCG/ACT inhaler, Inhale 2 puffs into the lungs every 6 hours as needed for shortness of breath, wheezing or cough, Disp: 18 g, Rfl: 0    aspirin (ASA) 81 MG chewable tablet, CHEW AND SWALLOW 1 TABLET(81 MG) BY MOUTH DAILY, Disp: 90 tablet, Rfl: 2    benzonatate (TESSALON) 200 MG capsule, Take 1 capsule (200 mg) by mouth 3 times daily as needed for cough, Disp: 30 capsule, Rfl: 0    busPIRone (BUSPAR) 5 MG tablet, Take 1 tablet (5 mg) by mouth daily In the morning and an additional tablet later in the day as needed for anxiety, Disp: 180 tablet, Rfl: 0    Cholecalciferol (VITAMIN D3) 50 MCG (2000 UT) CAPS, TAKE 1 CAPSULE BY MOUTH DAILY, Disp: 90 capsule, Rfl: 1    cyanocobalamin (VITAMIN B-12) 1000 MCG tablet, Take 1 tablet (1,000 mcg) by mouth daily, Disp: 90 tablet, Rfl: 4    diclofenac (VOLTAREN) 1 % topical gel, Apply 4 g topically 4 times daily, Disp: 500 g, Rfl: 2    empagliflozin (JARDIANCE) 25 MG TABS tablet, Take 1 tablet (25 mg) by mouth daily, Disp: 90 tablet, Rfl: 4     escitalopram (LEXAPRO) 20 MG tablet, Take 0.5 tablets (10 mg) by mouth daily For 4 days then stop, Disp: , Rfl:     ipratropium (ATROVENT) 0.06 % nasal spray, Spray 2 sprays into both nostrils 4 times daily, Disp: 15 mL, Rfl: 1    isosorbide mononitrate (IMDUR) 30 MG 24 hr tablet, TAKE 1 TABLET(30 MG) BY MOUTH DAILY, Disp: 90 tablet, Rfl: 2    Lidocaine 0.5 % GEL, Externally apply topically as needed, Disp: , Rfl:     metFORMIN (GLUCOPHAGE XR) 500 MG 24 hr tablet, Take 1 tablet (500 mg) by mouth daily (with dinner) Use while being treated for his lungs. (Patient not taking: Reported on 1/9/2024), Disp: 15 tablet, Rfl: 0    metoprolol succinate ER (TOPROL XL) 100 MG 24 hr tablet, Take 1 tablet (100 mg) by mouth at bedtime, Disp: , Rfl:     nifedipine 0.2% in white petrolatum 0.2 % OINT ointment, Apply topically 4 times daily as needed (anal fissure), Disp: 100 g, Rfl: 1    oxybutynin (DITROPAN) 5 MG tablet, Take 1 tablet (5 mg) by mouth At Bedtime, Disp: 90 tablet, Rfl: 4    PARoxetine (PAXIL) 20 MG tablet, Take 1 tablet (20 mg) by mouth every morning, Disp: 30 tablet, Rfl: 3    polyethylene glycol (MIRALAX) 17 GM/Dose powder, TAKE 17 GRAMS(1 CAPFUL) BY MOUTH DAILY, Disp: 840 g, Rfl: 4    predniSONE (DELTASONE) 20 MG tablet, 2 tablets today and tomorrow, then 1 po qd, Disp: 7 tablet, Rfl: 0    QUEtiapine (SEROQUEL) 50 MG tablet, Take 1.5 tablets (75 mg) by mouth at bedtime, Disp: 45 tablet, Rfl: 4    rosuvastatin (CRESTOR) 40 MG tablet, Take 1 tablet (40 mg) by mouth daily, Disp: 90 tablet, Rfl: 4    tamsulosin (FLOMAX) 0.4 MG capsule, TAKE 2 CAPSULES(0.8 MG) BY MOUTH EVERY EVENING, Disp: 180 capsule, Rfl: 4    metoprolol succinate ER (TOPROL XL) 50 MG 24 hr tablet, Take 1 tablet (50 mg) by mouth daily, Disp: 90 tablet, Rfl: 3    nitroGLYcerin (NITROSTAT) 0.4 MG sublingual tablet, For chest pain place 1 tablet under the tongue every 5 minutes for 3 doses. If symptoms persist 5 minutes after 1st dose call 911., Disp:  "30 tablet, Rfl: 3    omeprazole (PRILOSEC) 20 MG DR capsule, Take 1 capsule (20 mg) by mouth daily, Disp: 90 capsule, Rfl: 1    Current Facility-Administered Medications:     albuterol (PROVENTIL HFA/VENTOLIN HFA) inhaler, 2 puff, Inhalation, Once, Sonya Leonard MD    Family History   Problem Relation Age of Onset    No Known Problems Mother     Lung Cancer Father 56        fatal    No Known Problems Daughter     Other - See Comments Son         congenital deformity of right leg; he uses a leg prosthesis        reports that he quit smoking about 3 years ago. His smoking use included cigarettes. He has a 30 pack-year smoking history. He has never been exposed to tobacco smoke. He has never used smokeless tobacco. He reports that he does not drink alcohol and does not use drugs.    Review of Systems:  The 12 system review is within normal limits except for as mentioned above.  General ROS: No complaints or constitutional symptoms  Ophthalmic ROS: No complaints of visual changes  Skin: No complaints or symptoms   Endocrine: No complaints or symptoms  Hematologic/Lymphatic: No symptoms or complaints  Psychiatric: No symptoms or complaints  Respiratory ROS: no cough, shortness of breath, or wheezing  Cardiovascular ROS: no chest pain or dyspnea on exertion  Gastrointestinal ROS: As per HPI  Genito-Urinary ROS: no dysuria, trouble voiding, or hematuria  Musculoskeletal ROS: no joint or muscle pain  Neurological ROS: no TIA or stroke symptoms      EXAM:  Ht 1.651 m (5' 5\")   Wt 86.9 kg (191 lb 9.6 oz)   BMI 31.88 kg/m    GENERAL: Well developed male, No acute distress, pleasant and conversant   EYES: Pupils equal, round and reactive, no scleral icterus  ABDOMEN: soft, non tender  SKIN: Pink, warm and dry, no obvious rashes or lesions   NEURO:No focal deficits, ambulatory  MUSCULOSKELETAL:No obvious deformities, no swelling, normal appearing      LABS:  Lab Results   Component Value Date    WBC 7.7 12/20/2023    HGB 14.4 " 12/20/2023    HCT 42.9 12/20/2023    MCV 89 12/20/2023     12/20/2023     INR/Prothrombin Time  @LABRCNTIP(NA,K,CL,co2,bun,creatinine,labglom,glucose,calcium)@  Lab Results   Component Value Date    ALT 63 08/28/2023    AST 41 08/28/2023    ALKPHOS 79 08/28/2023       IMAGES:   Relevant images were reviewed and discussed with the patient.  Notable findings were: CT images reviewed    Assessment/Plan:   Nadya Blake is a 58 year old male with signs and symptoms consistent with incidental gallstones.  I have explained the pathophysiology of gallstones in detail as well as the surgical versus non-operative management strategies.      The risks of surgery were discussed in detail which include, but are not limited to, bile leak, bleeding, infection, injury to surrounding structures, the need to convert to an open procedure, blood clots, stroke, heart attack and death.  Additionally, the risks of non operative management were discussed which include, but are not limited to, worsening infection, increased pain, sepsis and death.     He understands everything which was discussed.  At this point, he does not really have any symptoms relating to his gallstones.  If at any time he has any recurrent pain in the RUQ or any other type of biliary colic symptoms, he will follow up with me for an ongoing evaluation and possible surgery.       Jef Wang DO FACS  [phone]  [facility] Department of Surgery

## 2024-01-09 ENCOUNTER — ALLIED HEALTH/NURSE VISIT (OUTPATIENT)
Dept: EDUCATION SERVICES | Facility: CLINIC | Age: 59
End: 2024-01-09
Payer: COMMERCIAL

## 2024-01-09 ENCOUNTER — LAB (OUTPATIENT)
Dept: LAB | Facility: CLINIC | Age: 59
End: 2024-01-09
Payer: COMMERCIAL

## 2024-01-09 VITALS — WEIGHT: 190.4 LBS | BODY MASS INDEX: 31.68 KG/M2

## 2024-01-09 DIAGNOSIS — E11.9 TYPE 2 DIABETES MELLITUS WITHOUT COMPLICATION, WITHOUT LONG-TERM CURRENT USE OF INSULIN (H): Primary | ICD-10-CM

## 2024-01-09 DIAGNOSIS — E11.9 DIABETES MELLITUS, TYPE 2 (H): Primary | ICD-10-CM

## 2024-01-09 DIAGNOSIS — E11.9 TYPE 2 DIABETES MELLITUS WITHOUT COMPLICATION, WITHOUT LONG-TERM CURRENT USE OF INSULIN (H): ICD-10-CM

## 2024-01-09 LAB — HBA1C MFR BLD: 5.5 % (ref 0–5.6)

## 2024-01-09 PROCEDURE — G0108 DIAB MANAGE TRN  PER INDIV: HCPCS | Mod: AE

## 2024-01-09 PROCEDURE — 83036 HEMOGLOBIN GLYCOSYLATED A1C: CPT

## 2024-01-09 PROCEDURE — 36415 COLL VENOUS BLD VENIPUNCTURE: CPT

## 2024-01-09 PROCEDURE — 99207 PR DROP WITH A PROCEDURE: CPT

## 2024-01-09 NOTE — PROGRESS NOTES
HEART CARE ENCOUNTER NOTE      [facility]  [phone]      Assessment/Recommendations   Assessment:   Coronary artery disease: As post four-vessel CABG with LIMA to LAD, right radial artery to right posterior descending artery, reverse SVG to obtuse marginal and diagonal artery on 6/24/2020.  Stress cardiac MRI 12/23/2021 showed no ischemia but patient has been noting exertional dyspnea.  Coronary angiogram 10/6/2022 noted occluded SVG graft to right PDA.  Patient notes that the Imdur has resolved his chest pain/squeezing.  Chronic congestive heart failure with preserved ejection fraction: Appears euvolemic on exam.  Cardiac catheterization 10/6/2022 showed normal left-sided filling pressure.  Patient is on beta-blocker, Jardiance  Essential hypertension: On metoprolol 100 mg daily, Imdur 30 mg daily  Dyslipidemia: On rosuvastatin 40 mg daily.  Last lipids 8/20/2023 showed LDL of 32 with triglycerides of 159.  Diabetes mellitus type 2: Non-insulin-dependent.  On Jardiance, metformin.  Last hemoglobin A1c 5.5.  Possible HANNAH    Plan:   Decrease metoprolol to 50 mg daily to see if this improves patient's bradycardia and fatigue as patient was still noting heart rates in the 40s  Continue Imdur given improvement of chest pain  Follow-up as scheduled with Dr. Burns 3/12/2024 per patient's request        The level of medical decision making during this visit was of moderate complexity.       History of Present Illness/Subjective    HPI: Nadya Blake is a 58 year old male with PMHx of coronary artery disease with history of NSTEMI status post four-vessel CABG with LIMA to LAD, right radial artery to RPDA, reverse SVG to OM and diagonal branch on 6/24/2020, HFpEF, hypertension, dyslipidemia presents for follow-up.    Patient has history of chest pain which is not necessarily exertional but has been worse with emotional stress that improves with sublingual nitroglycerin.  Patient started  isosorbide mononitrate 30 mg daily and has helped some with this chest pain but led to some lightheadedness.  Metoprolol dose was decreased from 150 to 100 mg daily.    Patient notes that he has not had any of the squeezing chest pain that he had initially noted since starting the Imdur.  Also notes that his headaches have improved.  Continues to endorse occasional dizziness when his blood pressure is low along with fatigue.  Will have blood pressures in the low 50s to high 40s.  Patient also notes he has been sick for 2 months with a chronic cough and had 2 rounds of antibiotics.  Is finally feeling better in the past 3 to 4 days.      Patient also notes he had stopped omeprazole at the recommendation of his primary care provider.  Notes an acidic taste in his mouth lately.  Recommended doing a 2-week trial of this and then stopping it.  He denies shortness of breath, dyspnea on exertion, orthopnea, PND, palpitations, chest pain, abdominal fullness/bloating, and lower extremity edema.        MR cardiac with stress 12/23/2021  1.  Pharmacological Regadenoson stress cardiac MRI is negative for inducible myocardial ischemia.   2.  Pharmacological stress ECG is negative for inducible myocardial ischemia.   3. Normal left ventricular size, wall thickness and systolic function. The quantified left ventricular  ejection fraction is 59 %.  Very small area of non-transmural myocardial scar in the mid inferior wall is  identified.    4.  Normal right ventricular size and systolic function.    5.  No significant valvular abnormalities.  6. Ascending aorta measures 38 mm.     Cardiac catheterization 10/6/2022:  Left ventricular filling pressures are normal.  3rd Mrg lesion is 90% stenosed.  Prox RCA lesion is 75% stenosed.  Prox RCA to Mid RCA lesion is 40% stenosed.  Dist RCA lesion is 75% stenosed.  Mid RCA lesion is 30% stenosed.  RPDA lesion is 50% stenosed.  RPAV lesion is 40% stenosed.  Prox LAD lesion is 70%  "stenosed.  1st Diag-1 lesion is 95% stenosed.  1st Diag-2 lesion is 95% stenosed.  2nd Diag lesion is 99% stenosed.  Mid LAD-1 lesion is 75% stenosed.  Mid LAD-2 lesion is 75% stenosed.  Dist LAD lesion is 70% stenosed.  Vein graft to right PDA is totally occluded  Vein graft to second diagonal is widely patent  Vein graft to third OM is widely patent  LUZ MARIA graft to mid distal LAD is patent     Physical Examination  Review of Systems   Vitals: BP 96/70 (BP Location: Right arm, Patient Position: Sitting, Cuff Size: Adult Regular)   Pulse 51   Resp 16   Ht 1.651 m (5' 5\")   Wt 86.2 kg (190 lb)   BMI 31.62 kg/m    BMI= Body mass index is 31.62 kg/m .  Wt Readings from Last 3 Encounters:   01/09/24 86.4 kg (190 lb 6.4 oz)   01/08/24 86.9 kg (191 lb 9.6 oz)   12/27/23 85.5 kg (188 lb 8 oz)           ENT/Mouth: membranes moist, no oral lesions or bleeding gums.      EYES:  no scleral icterus, normal conjunctivae       Chest/Lungs:   lungs are clear to auscultation, no rales or wheezing,  equal chest wall expansion    Cardiovascular:   Regular. Normal first and second heart sounds with no murmurs, rubs, or gallops; the radial pulses are intact,  no edema bilaterally        Extremities: no cyanosis or clubbing   Skin: no xanthelasma, warm.    Neurologic: no tremors     Psychiatric: alert and oriented x3, calm        Please refer above for cardiac ROS details.        Medical History  Surgical History Family History Social History   Past Medical History:   Diagnosis Date    Acute non-ST elevation myocardial infarction (NSTEMI) (H) 6/22/2020    Adenomatous polyp of colon     Ascending aorta dilatation (H24)     Carpal tunnel syndrome     Chronic superficial gastritis     Coronary artery disease due to lipid rich plaque 6/23/2020    Depression with anxiety     Dyslipidemia, goal LDL below 70 6/23/2020    Essential hypertension 9/2/2012    Fatty liver disease, nonalcoholic     GERD (gastroesophageal reflux disease)     IBS " (irritable bowel syndrome)     Left lumbar radiculopathy     Multinodular goiter     Old torn meniscus of knee     Paroxysmal atrial fibrillation (H)     Perianal abscess     Post herpetic neuralgia     Post-traumatic osteoarthritis of both knees     Primary osteoarthritis of left hip     Primary osteoarthritis of right hip     Pulmonary nodule     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)     Thyroid nodule     TMJ arthritis     Tobacco use disorder 11/1/2013    Vitamin D deficiency 9/30/2020     Past Surgical History:   Procedure Laterality Date    APPENDECTOMY  1995    BYPASS GRAFT ARTERY CORONARY  06/24/2020    Dr. Ragsdale    CV CORONARY ANGIOGRAM N/A 6/23/2020    Procedure: Coronary Angiogram;  Surgeon: Ariane Lester MD;  Location: [facility] Cath Lab;  Service: Cardiology    CV CORONARY ANGIOGRAM N/A 10/6/2022    Procedure: Coronary Angiogram;  Surgeon: Javon John MD;  Location: [facility] CV    CV IABP INSERT N/A 6/24/2020    Procedure: Intra Aortic Balloon Pump Insertion;  Surgeon: Ariane Lester MD;  Location: [facility] Cath Lab;  Service: Cardiology    CV LEFT HEART CATH N/A 10/6/2022    Procedure: Left Heart Catheterization;  Surgeon: Javon John MD;  Location: [facility] CV    CV LEFT HEART CATHETERIZATION WITHOUT LEFT VENTRICULOGRAM Left 6/23/2020    Procedure: Left Heart Catheterization Without Left Ventriculogram;  Surgeon: Ariane Lester MD;  Location: [facility] Cath Lab;  Service: Cardiology    CV LEFT VENTRICULOGRAM N/A 10/6/2022    Procedure: Left Ventriculogram;  Surgeon: Javon John MD;  Location: [facility] CV     Family History   Problem Relation Age of Onset    No Known Problems Mother     Lung Cancer Father 56        fatal    No Known Problems Daughter     Other - See Comments Son         congenital deformity of right leg; he uses a leg prosthesis        Social History     Socioeconomic History    Marital  status:      Spouse name: Carlee    Number of children: 2    Years of education: Not on file    Highest education level: Not on file   Occupational History    Not on file   Tobacco Use    Smoking status: Former     Packs/day: 1.00     Years: 30.00     Additional pack years: 0.00     Total pack years: 30.00     Types: Cigarettes     Quit date: 3/23/2020     Years since quitting: 3.8     Passive exposure: Never    Smokeless tobacco: Never    Tobacco comments:     stopped 3/24/2020   Vaping Use    Vaping Use: Never used   Substance and Sexual Activity    Alcohol use: No    Drug use: Never    Sexual activity: Yes     Partners: Female   Other Topics Concern    Not on file   Social History Narrative    , Carlee, BA chemistry and taking AA courses at [employer].  From [place]; bachelors in geology and computer science. [employer] (2005) and [employer] (2008).  On SSDI, PTSD.       Social Determinants of Health     Financial Resource Strain: Low Risk  (12/27/2023)    Financial Resource Strain     Within the past 12 months, have you or your family members you live with been unable to get utilities (heat, electricity) when it was really needed?: No   Food Insecurity: Low Risk  (12/27/2023)    Food Insecurity     Within the past 12 months, did you worry that your food would run out before you got money to buy more?: No     Within the past 12 months, did the food you bought just not last and you didn t have money to get more?: No   Transportation Needs: Low Risk  (12/27/2023)    Transportation Needs     Within the past 12 months, has lack of transportation kept you from medical appointments, getting your medicines, non-medical meetings or appointments, work, or from getting things that you need?: No   Physical Activity: Not on file   Stress: Not on file   Social Connections: Not on file   Interpersonal Safety: Not on file   Housing Stability: Low Risk  (12/27/2023)    Housing Stability     Do you have housing? : Yes     Are  you worried about losing your housing?: No           Medications  Allergies   Current Outpatient Medications   Medication Sig Dispense Refill    acetaminophen (TYLENOL) 500 MG tablet Take 1-2 tablets (500-1,000 mg) by mouth every 6 hours as needed for mild pain 360 tablet 3    albuterol (PROAIR HFA/PROVENTIL HFA/VENTOLIN HFA) 108 (90 Base) MCG/ACT inhaler Inhale 2 puffs into the lungs every 6 hours as needed for shortness of breath, wheezing or cough 18 g 0    aspirin (ASA) 81 MG chewable tablet CHEW AND SWALLOW 1 TABLET(81 MG) BY MOUTH DAILY 90 tablet 2    benzonatate (TESSALON) 200 MG capsule Take 1 capsule (200 mg) by mouth 3 times daily as needed for cough 30 capsule 0    busPIRone (BUSPAR) 5 MG tablet Take 1 tablet (5 mg) by mouth daily In the morning and an additional tablet later in the day as needed for anxiety 180 tablet 0    Cholecalciferol (VITAMIN D3) 50 MCG (2000 UT) CAPS TAKE 1 CAPSULE BY MOUTH DAILY 90 capsule 1    cyanocobalamin (VITAMIN B-12) 1000 MCG tablet Take 1 tablet (1,000 mcg) by mouth daily 90 tablet 4    diclofenac (VOLTAREN) 1 % topical gel Apply 4 g topically 4 times daily 500 g 2    empagliflozin (JARDIANCE) 25 MG TABS tablet Take 1 tablet (25 mg) by mouth daily 90 tablet 4    escitalopram (LEXAPRO) 20 MG tablet Take 0.5 tablets (10 mg) by mouth daily For 4 days then stop      ipratropium (ATROVENT) 0.06 % nasal spray Spray 2 sprays into both nostrils 4 times daily 15 mL 1    isosorbide mononitrate (IMDUR) 30 MG 24 hr tablet TAKE 1 TABLET(30 MG) BY MOUTH DAILY 90 tablet 2    Lidocaine 0.5 % GEL Externally apply topically as needed      metFORMIN (GLUCOPHAGE XR) 500 MG 24 hr tablet Take 1 tablet (500 mg) by mouth daily (with dinner) Use while being treated for his lungs. (Patient not taking: Reported on 1/9/2024) 15 tablet 0    metoprolol succinate ER (TOPROL XL) 100 MG 24 hr tablet Take 1 tablet (100 mg) by mouth at bedtime      nifedipine 0.2% in white petrolatum 0.2 % OINT ointment  Apply topically 4 times daily as needed (anal fissure) 100 g 1    nitroGLYcerin (NITROSTAT) 0.4 MG sublingual tablet For chest pain place 1 tablet under the tongue every 5 minutes for 3 doses. If symptoms persist 5 minutes after 1st dose call 911. 30 tablet 1    omeprazole (PRILOSEC) 20 MG DR capsule Take 1 capsule (20 mg) by mouth daily 90 capsule 1    oxybutynin (DITROPAN) 5 MG tablet Take 1 tablet (5 mg) by mouth At Bedtime 90 tablet 4    PARoxetine (PAXIL) 20 MG tablet Take 1 tablet (20 mg) by mouth every morning 30 tablet 3    polyethylene glycol (MIRALAX) 17 GM/Dose powder TAKE 17 GRAMS(1 CAPFUL) BY MOUTH DAILY 840 g 4    predniSONE (DELTASONE) 20 MG tablet 2 tablets today and tomorrow, then 1 po qd 7 tablet 0    QUEtiapine (SEROQUEL) 50 MG tablet Take 1.5 tablets (75 mg) by mouth at bedtime 45 tablet 4    rosuvastatin (CRESTOR) 40 MG tablet Take 1 tablet (40 mg) by mouth daily 90 tablet 4    tamsulosin (FLOMAX) 0.4 MG capsule TAKE 2 CAPSULES(0.8 MG) BY MOUTH EVERY EVENING 180 capsule 4       Allergies   Allergen Reactions    Atorvastatin Diarrhea    Cortisone Other (See Comments)     pain    Lisinopril Cough     started after CABG for unclear reason    Methylprednisolone Other (See Comments)     ?          Lab Results    Chemistry/lipid CBC Cardiac Enzymes/BNP/TSH/INR   Recent Labs   Lab Test 08/28/23  1436   CHOL 97   HDL 33*   LDL 32   TRIG 159*     Recent Labs   Lab Test 08/28/23  1436 01/18/23  1037 11/21/22  0947   LDL 32 30 27     Recent Labs   Lab Test 12/20/23 2039      POTASSIUM 4.3   CHLORIDE 105   CO2 26   *   BUN 16.2   CR 1.22*   GFRESTIMATED 70   TERESE 9.0     Recent Labs   Lab Test 12/20/23 2039 08/28/23  1436 06/27/23  1134   CR 1.22* 1.21* 1.10     Recent Labs   Lab Test 01/09/24  0940 10/03/23  0906 06/27/23  1134   A1C 5.5 5.6 5.7*          Recent Labs   Lab Test 12/20/23 2039   WBC 7.7   HGB 14.4   HCT 42.9   MCV 89        Recent Labs   Lab Test 12/20/23 2039  08/28/23  1436 01/18/23  1037   HGB 14.4 15.2 14.5    Recent Labs   Lab Test 11/21/20  0001 07/27/20  0703 07/27/20  0115   TROPONINI <0.01 <0.01 <0.01     Recent Labs   Lab Test 12/20/23  2039 07/27/20  0115 06/22/20  2322   BNP  --  79* <10   NTBNP <36  --   --      Recent Labs   Lab Test 08/28/23  1436   TSH 1.24     Recent Labs   Lab Test 07/27/20  0115 06/25/20  0427 06/24/20  2012   INR 1.10 1.32* 1.39*        Teodora Ordonez PA-C

## 2024-01-09 NOTE — LETTER
1/9/2024         RE: Nadya Blake  [address]  [facility] [id number]        Dear Colleague,    Thank you for referring your patient, Nadya Blake, to the [facility]. Please see a copy of my visit note below.    Diabetes Self-Management Education & Support    Presents for: Individual review    Type of Service: In Person Visit      ASSESSMENT:  Patient returns to clinic today to review medications and to assess/assist with current diabetes self management skills. Patient arrived today with his wife Charlette. Medications were reviewed and patient confirmed they are currently taking 25 mg Jardiance daily. Was on 500 mg Metformin XR while being treated for his lungs (steroids and antibiotics), but has since been discontinued. No missed or skipped doses. Patient is currently using a personal CGM to monitor their blood glucose which we do have linked remotely to [employer].     Prior to today's appointment we rechecked Nadya's A1c which returned lower than previous. Patient was commended on his hard work.     Intake was reviewed and they are currently eating 2 meals per day which appear to lack protein. See diet history below. Patient complained about lack of appetite for the last two months, and change in taste for the last week. Encouraged patient to discuss this with his physician. Does not appear to be related to DM management. Activity is excellent with patient aiming to walk on his treadmill for 30 minutes each morning. Encouraged patient to do what he is able to tolerate while remaining safe - complained about low pulse and low blood pressure - encouraged to discuss with Cardiology at tomorrow's appointment. Prior to todays appointment I reviewed their most recent office visit notes as well as lab results.      Patient's most recent   Lab Results   Component Value Date    A1C 5.5 01/09/2024     is meeting goal of <7.0    Diabetes knowledge and skills assessment:  "  Patient is knowledgeable in diabetes management concepts related to: Healthy Eating, Being Active, and Taking Medication    Based on learning assessment above, most appropriate setting for further diabetes education would be: Individual setting.      PLAN  See Patient Instructions for co-developed, patient-stated behavior change goals.  AVS printed and provided to patient today. See Follow-Up section for recommended follow-up.      Topics to cover at upcoming visits: Healthy Eating, Being Active, Taking Medication, Problem Solving, Reducing Risks, and Healthy Coping    Follow-up: PCP 2/9/24 and CDE 2/13/24    See Care Plan for co-developed, patient-state behavior change goals.  AVS provided for patient today.    Education Materials Provided:  High protein food list      SUBJECTIVE/OBJECTIVE:  Presents for: Individual review  Accompanied by: Spouse  Diabetes education in the past 24mo: Yes  Focus of Visit: Taking Medication, Healthy Coping, Healthy Eating, Being Active  Diabetes type: Type 2  Date of diagnosis: 2022  Disease course: Improving  How confident are you filling out medical forms by yourself:: Not at all  Other concerns:: None  Cultural Influences/Ethnic Background:  Not  or     Diabetes Symptoms & Complications:  Fatigue: Yes  Symptom course: Stable  Weight trend: Stable       Patient Problem List and Family Medical History reviewed for relevant medical history, current medical status, and diabetes risk factors.    Vitals:  Wt 86.4 kg (190 lb 6.4 oz)   BMI 31.68 kg/m    Estimated body mass index is 31.68 kg/m  as calculated from the following:    Height as of 1/8/24: 1.651 m (5' 5\").    Weight as of this encounter: 86.4 kg (190 lb 6.4 oz).   Last 3 BP:   BP Readings from Last 3 Encounters:   12/27/23 106/80   12/20/23 130/78   12/15/23 108/74       History   Smoking Status     Former     Packs/day: 1.00     Years: 30.00     Types: Cigarettes     Quit date: 3/23/2020   Smokeless Tobacco " "    Never       Labs:  Lab Results   Component Value Date    A1C 5.5 01/09/2024     Lab Results   Component Value Date     12/20/2023     05/13/2022     Lab Results   Component Value Date    LDL 32 08/28/2023    LDL 41 08/07/2020     Direct Measure HDL   Date Value Ref Range Status   08/28/2023 33 (L) >=40 mg/dL Final   ]  GFR Estimate   Date Value Ref Range Status   12/20/2023 70 >60 mL/min/1.73m2 Final   05/24/2021 >60 >60 mL/min/1.73m2 Final     GFR Estimate If Black   Date Value Ref Range Status   05/24/2021 >60 >60 mL/min/1.73m2 Final     Lab Results   Component Value Date    CR 1.22 12/20/2023     No results found for: \"MICROALBUMIN\"    Healthy Eating:  Healthy Eating Assessed Today: Yes  Meal planning/habits: Heart healthy, Other (Vegan)  Meals include: Lunch, Dinner, Evening Snack  Lunch: tea, tahini with wheat bread  Dinner: rice, eggplant, beans  Snacks: oranges, bananas, apples, apple juice with cinnamon  Beverages: Water, Tea, Coffee, Juice    Being Active:  Being Active Assessed Today: Yes  Exercise:: Yes  Days per week of moderate to strenuous exercise (like a brisk walk): 5  On average, minutes per day of exercise at this level: 30  How intense was your typical exercise? : Moderate (like brisk walking)  Exercise Minutes per Week: 150  Barrier to exercise: None    Monitoring:  Monitoring Assessed Today: No  Times checking blood sugar at home (number): Never      Taking Medications:  Diabetes Medication(s)       Biguanides       metFORMIN (GLUCOPHAGE XR) 500 MG 24 hr tablet Take 1 tablet (500 mg) by mouth daily (with dinner) Use while being treated for his lungs.     Patient not taking: Reported on 1/9/2024       Sodium-Glucose Co-Transporter 2 (SGLT2) Inhibitors       empagliflozin (JARDIANCE) 25 MG TABS tablet Take 1 tablet (25 mg) by mouth daily            Taking Medication Assessed Today: Yes  Current Treatments: Oral Medication (taken by mouth)  Problems taking diabetes medications " regularly?: No    Problem Solving:  Is the patient at risk for hypoglycemia?: No  Is the patient at risk for DKA?: No  Does patient have severe weather/disaster plan for diabetes management?: Not Needed  Does patient have sick day plan for diabetes management?: Not Needed      Healthy Coping:  Healthy Coping Assessed Today: Yes  Emotional response to diabetes: Confidence diabetes can be controlled  Informal Support system:: Children, Spouse  Stage of change: MAINTENANCE (Working to maintain change, with risk of relapse)  Patient Activation Measure Survey Score:       No data to display                  Care Plan and Education Provided:  Care Plan: Diabetes   Updates made by Jojo Soria RN since 1/9/2024 12:00 AM        Problem: HbA1C Not In Goal         Goal: Establish Regular Follow-Ups with PCP         Task: Discuss with PCP the recommended timing for patient's next follow up visit(s)    Responsible User: Jojo Soria RN        Task: Discuss schedule for PCP visits with patient Completed 1/9/2024   Responsible User: Jojo Soria RN        Goal: Get HbA1C Level in Goal    This Visit's Progress: 100%   Note:    Maintain A1c <7%       Task: Educate patient on diabetes education self-management topics Completed 1/9/2024   Responsible User: Jojo Soria RN        Task: Educate patient on benefits of regular glucose monitoring    Responsible User: Jojo Soria RN        Task: Refer patient to appropriate extended care team member, as needed (Medication Therapy Management, Behavioral Health, Physical Therapy, etc.)    Responsible User: Jojo Soria RN        Task: Discuss diabetes treatment plan with patient Completed 1/9/2024   Responsible User: Jojo Soria RN        Problem: Diabetes Self-Management Education Needed to Optimize Self-Care Behaviors         Goal: Understand diabetes pathophysiology and disease progression         Task: Provide education on diabetes pathophysiology and  disease progression specfic to patient's diabetes type Completed 1/9/2024   Responsible User: Jojo Soria RN        Goal: Healthy Eating - follow a healthy eating pattern for diabetes    This Visit's Progress: 50%   Note:    Incorporate protein source with each meal and snack       Task: Provide education on portion control and consistency in amount, composition and timing of food intake Completed 1/9/2024   Responsible User: Jojo Soria RN        Task: Provide education on managing carbohydrate intake (carbohydrate counting, plate planning method, etc.) Completed 1/9/2024   Responsible User: Jojo Soria RN        Task: Provide education on weight management    Responsible User: Jojo Soria RN        Task: Provide education on heart healthy eating    Responsible User: Jojo Soria RN        Task: Provide education on eating out    Responsible User: Jojo Soria RN        Task: Develop individualized healthy eating plan with patient    Responsible User: Jojo Soria RN        Goal: Being Active - get regular physical activity, working up to at least 150 minutes per week         Task: Provide education on relationship of activity to glucose and precautions to take if at risk for low glucose Completed 1/9/2024   Responsible User: Jojo Soria RN        Task: Discuss barriers to physical activity with patient    Responsible User: Jojo Soria RN        Task: Develop physical activity plan with patient    Responsible User: Jojo Soria RN        Task: Explore community resources including walking groups, assistance programs, and home videos    Responsible User: Jojo Soria RN        Goal: Monitoring - monitor glucose and ketones as directed         Task: Provide education on blood glucose monitoring (purpose, proper technique, frequency, glucose targets, interpreting results, when to use glucose control solution, sharps disposal)    Responsible User: Yang  Jojo FLORES RN        Task: Provide education on continuous glucose monitoring (sensor placement, use of edu or /reader, understanding glucose trends, alerts and alarms, differences between sensor glucose and blood glucose)    Responsible User: Jojo Soria RN        Task: Provide education on ketone monitoring (when to monitor, frequency, etc.)    Responsible User: Jojo Soria RN        Goal: Taking Medication - patient is consistently taking medications as directed         Task: Provide education on action of prescribed medication, including when to take and possible side effects Completed 1/9/2024   Responsible User: Jojo Soria RN        Task: Provide education on insulin and injectable diabetes medications, including administration, storage, site selection and rotation for injection sites    Responsible User: Jojo Soria RN        Task: Discuss barriers to medication adherence with patient and provide management technique ideas as appropriate    Responsible User: Jojo Soria RN        Task: Provide education on frequency and refill details of medications    Responsible User: Jojo Soria RN        Goal: Problem Solving - know how to prevent and manage short-term diabetes complications         Task: Provide education on high blood glucose - causes, signs/symptoms, prevention and treatment    Responsible User: Jojo Soria RN        Task: Provide education on low blood glucose - causes, signs/symptoms, prevention, treatment, carrying a carbohydrate source at all times, and medical identification    Responsible User: Jojo Soria RN        Task: Provide education on safe travel with diabetes    Responsible User: Jojo Soria RN        Task: Provide education on how to care for diabetes on sick days    Responsible User: Jojo Soria RN        Task: Provide education on when to call a health care provider    Responsible User: Jojo Soria RN         Goal: Reducing Risks - know how to prevent and treat long-term diabetes complications    This Visit's Progress: 100%        Task: Provide education on major complications of diabetes, prevention, early diagnostic measures and treatment of complications Completed 1/9/2024   Responsible User: Jojo Soria RN        Task: Provide education on recommended care for dental, eye and foot health    Responsible User: Jojo Soria RN        Task: Provide education on Hemoglobin A1c - goals and relationship to blood glucose levels Completed 1/9/2024   Responsible User: Jojo Soria RN        Task: Provide education on recommendations for heart health - lipid levels and goals, blood pressure and goals, and aspirin therapy, if indicated    Responsible User: Jojo Soria RN        Task: Provide education on tobacco cessation    Responsible User: Jojo Soria RN        Goal: Healthy Coping - use available resources to cope with the challenges of managing diabetes         Task: Discuss recognizing feelings about having diabetes    Responsible User: Jojo Soria RN        Task: Provide education on the benefits of making appropriate lifestyle changes    Responsible User: Jojo Soria RN        Task: Provide education on benefits of utilizing support systems    Responsible User: Jojo Soria RN        Task: Discuss methods for coping with stress    Responsible User: Jojo Soria RN        Task: Provide education on when to seek professional counseling    Responsible User: Jojo Soria RN          Thank you,  Jojo Soria RN [facility]  Certified Diabetes Care and   Visit type : DSMT    Time Spent: 30 minutes  Encounter Type: Individual    Any diabetes medication dose changes were made via the CDE Protocol per the patient's referring provider. A copy of this encounter was shared with the provider.

## 2024-01-09 NOTE — PROGRESS NOTES
Diabetes Self-Management Education & Support    Presents for: Individual review    Type of Service: In Person Visit      ASSESSMENT:  Patient returns to clinic today to review medications and to assess/assist with current diabetes self management skills. Patient arrived today with his wife Charlette. Medications were reviewed and patient confirmed they are currently taking 25 mg Jardiance daily. Was on 500 mg Metformin XR while being treated for his lungs (steroids and antibiotics), but has since been discontinued. No missed or skipped doses. Patient is not currently monitoring BG at home..     Prior to today's appointment we rechecked Nadya's A1c which returned lower than previous. Patient was commended on his hard work.     Intake was reviewed and they are currently eating 2 meals per day which appear to lack protein. See diet history below. Patient complained about lack of appetite for the last two months, and change in taste for the last week. Encouraged patient to discuss this with his physician. Does not appear to be related to DM management. Activity is excellent with patient aiming to walk on his treadmill for 30 minutes each morning. Encouraged patient to do what he is able to tolerate while remaining safe - complained about low pulse and low blood pressure - encouraged to discuss with Cardiology at tomorrow's appointment. Prior to todays appointment I reviewed their most recent office visit notes as well as lab results.      Patient's most recent   Lab Results   Component Value Date    A1C 5.5 01/09/2024     is meeting goal of <7.0    Diabetes knowledge and skills assessment:   Patient is knowledgeable in diabetes management concepts related to: Healthy Eating, Being Active, and Taking Medication    Based on learning assessment above, most appropriate setting for further diabetes education would be: Individual setting.      PLAN  See Patient Instructions for co-developed, patient-stated behavior change  "goals.  AVS printed and provided to patient today. See Follow-Up section for recommended follow-up.      Topics to cover at upcoming visits: Healthy Eating, Being Active, Taking Medication, Problem Solving, Reducing Risks, and Healthy Coping    Follow-up: PCP 2/9/24 and CDE 2/13/24    See Care Plan for co-developed, patient-state behavior change goals.  AVS provided for patient today.    Education Materials Provided:  High protein food list      SUBJECTIVE/OBJECTIVE:  Presents for: Individual review  Accompanied by: Spouse  Diabetes education in the past 24mo: Yes  Focus of Visit: Taking Medication, Healthy Coping, Healthy Eating, Being Active  Diabetes type: Type 2  Date of diagnosis: 2022  Disease course: Improving  How confident are you filling out medical forms by yourself:: Not at all  Other concerns:: None  Cultural Influences/Ethnic Background:  Not  or     Diabetes Symptoms & Complications:  Fatigue: Yes  Symptom course: Stable  Weight trend: Stable       Patient Problem List and Family Medical History reviewed for relevant medical history, current medical status, and diabetes risk factors.    Vitals:  Wt 86.4 kg (190 lb 6.4 oz)   BMI 31.68 kg/m    Estimated body mass index is 31.68 kg/m  as calculated from the following:    Height as of 1/8/24: 1.651 m (5' 5\").    Weight as of this encounter: 86.4 kg (190 lb 6.4 oz).   Last 3 BP:   BP Readings from Last 3 Encounters:   12/27/23 106/80   12/20/23 130/78   12/15/23 108/74       History   Smoking Status    Former    Packs/day: 1.00    Years: 30.00    Types: Cigarettes    Quit date: 3/23/2020   Smokeless Tobacco    Never       Labs:  Lab Results   Component Value Date    A1C 5.5 01/09/2024     Lab Results   Component Value Date     12/20/2023     05/13/2022     Lab Results   Component Value Date    LDL 32 08/28/2023    LDL 41 08/07/2020     Direct Measure HDL   Date Value Ref Range Status   08/28/2023 33 (L) >=40 mg/dL Final " "  ]  GFR Estimate   Date Value Ref Range Status   12/20/2023 70 >60 mL/min/1.73m2 Final   05/24/2021 >60 >60 mL/min/1.73m2 Final     GFR Estimate If Black   Date Value Ref Range Status   05/24/2021 >60 >60 mL/min/1.73m2 Final     Lab Results   Component Value Date    CR 1.22 12/20/2023     No results found for: \"MICROALBUMIN\"    Healthy Eating:  Healthy Eating Assessed Today: Yes  Meal planning/habits: Heart healthy, Other (Vegan)  Meals include: Lunch, Dinner, Evening Snack  Lunch: tea, tahini with wheat bread  Dinner: rice, eggplant, beans  Snacks: oranges, bananas, apples, apple juice with cinnamon  Beverages: Water, Tea, Coffee, Juice    Being Active:  Being Active Assessed Today: Yes  Exercise:: Yes  Days per week of moderate to strenuous exercise (like a brisk walk): 5  On average, minutes per day of exercise at this level: 30  How intense was your typical exercise? : Moderate (like brisk walking)  Exercise Minutes per Week: 150  Barrier to exercise: None    Monitoring:  Monitoring Assessed Today: No  Times checking blood sugar at home (number): Never      Taking Medications:  Diabetes Medication(s)       Biguanides       metFORMIN (GLUCOPHAGE XR) 500 MG 24 hr tablet Take 1 tablet (500 mg) by mouth daily (with dinner) Use while being treated for his lungs.     Patient not taking: Reported on 1/9/2024       Sodium-Glucose Co-Transporter 2 (SGLT2) Inhibitors       empagliflozin (JARDIANCE) 25 MG TABS tablet Take 1 tablet (25 mg) by mouth daily            Taking Medication Assessed Today: Yes  Current Treatments: Oral Medication (taken by mouth)  Problems taking diabetes medications regularly?: No    Problem Solving:  Is the patient at risk for hypoglycemia?: No  Is the patient at risk for DKA?: No  Does patient have severe weather/disaster plan for diabetes management?: Not Needed  Does patient have sick day plan for diabetes management?: Not Needed      Healthy Coping:  Healthy Coping Assessed Today: " Yes  Emotional response to diabetes: Confidence diabetes can be controlled  Informal Support system:: Children, Spouse  Stage of change: MAINTENANCE (Working to maintain change, with risk of relapse)  Patient Activation Measure Survey Score:       No data to display                  Care Plan and Education Provided:  Care Plan: Diabetes   Updates made by Jojo Soria RN since 1/9/2024 12:00 AM        Problem: HbA1C Not In Goal         Goal: Establish Regular Follow-Ups with PCP         Task: Discuss with PCP the recommended timing for patient's next follow up visit(s)    Responsible User: Jojo Soria RN        Task: Discuss schedule for PCP visits with patient Completed 1/9/2024   Responsible User: Jojo Soria RN        Goal: Get HbA1C Level in Goal    This Visit's Progress: 100%   Note:    Maintain A1c <7%       Task: Educate patient on diabetes education self-management topics Completed 1/9/2024   Responsible User: Jojo Soria RN        Task: Educate patient on benefits of regular glucose monitoring    Responsible User: Jojo Soria RN        Task: Refer patient to appropriate extended care team member, as needed (Medication Therapy Management, Behavioral Health, Physical Therapy, etc.)    Responsible User: oJjo Soria RN        Task: Discuss diabetes treatment plan with patient Completed 1/9/2024   Responsible User: Jojo Soria RN        Problem: Diabetes Self-Management Education Needed to Optimize Self-Care Behaviors         Goal: Understand diabetes pathophysiology and disease progression         Task: Provide education on diabetes pathophysiology and disease progression specfic to patient's diabetes type Completed 1/9/2024   Responsible User: Jojo Soria RN        Goal: Healthy Eating - follow a healthy eating pattern for diabetes    This Visit's Progress: 50%   Note:    Incorporate protein source with each meal and snack       Task: Provide education on portion  control and consistency in amount, composition and timing of food intake Completed 1/9/2024   Responsible User: Jojo Soria RN        Task: Provide education on managing carbohydrate intake (carbohydrate counting, plate planning method, etc.) Completed 1/9/2024   Responsible User: Jojo Soria RN        Task: Provide education on weight management    Responsible User: Jojo Soria RN        Task: Provide education on heart healthy eating    Responsible User: Jojo Soria RN        Task: Provide education on eating out    Responsible User: Jojo Soria RN        Task: Develop individualized healthy eating plan with patient    Responsible User: Jojo Soria RN        Goal: Being Active - get regular physical activity, working up to at least 150 minutes per week         Task: Provide education on relationship of activity to glucose and precautions to take if at risk for low glucose Completed 1/9/2024   Responsible User: Jojo Soria RN        Task: Discuss barriers to physical activity with patient    Responsible User: Jojo Soria RN        Task: Develop physical activity plan with patient    Responsible User: Jojo Soria RN        Task: Explore community resources including walking groups, assistance programs, and home videos    Responsible User: Jojo Soria RN        Goal: Monitoring - monitor glucose and ketones as directed         Task: Provide education on blood glucose monitoring (purpose, proper technique, frequency, glucose targets, interpreting results, when to use glucose control solution, sharps disposal)    Responsible User: Jojo Soria RN        Task: Provide education on continuous glucose monitoring (sensor placement, use of edu or /reader, understanding glucose trends, alerts and alarms, differences between sensor glucose and blood glucose)    Responsible User: Jojo Soria RN        Task: Provide education on ketone monitoring  (when to monitor, frequency, etc.)    Responsible User: Jojo Soria RN        Goal: Taking Medication - patient is consistently taking medications as directed         Task: Provide education on action of prescribed medication, including when to take and possible side effects Completed 1/9/2024   Responsible User: Jojo Soria RN        Task: Provide education on insulin and injectable diabetes medications, including administration, storage, site selection and rotation for injection sites    Responsible User: Jojo Soria RN        Task: Discuss barriers to medication adherence with patient and provide management technique ideas as appropriate    Responsible User: Jojo Soria RN        Task: Provide education on frequency and refill details of medications    Responsible User: Jojo Soria RN        Goal: Problem Solving - know how to prevent and manage short-term diabetes complications         Task: Provide education on high blood glucose - causes, signs/symptoms, prevention and treatment    Responsible User: Jojo Soria RN        Task: Provide education on low blood glucose - causes, signs/symptoms, prevention, treatment, carrying a carbohydrate source at all times, and medical identification    Responsible User: Jojo Soria RN        Task: Provide education on safe travel with diabetes    Responsible User: Jojo Soria RN        Task: Provide education on how to care for diabetes on sick days    Responsible User: Jojo Soria RN        Task: Provide education on when to call a health care provider    Responsible User: Jojo Soria RN        Goal: Reducing Risks - know how to prevent and treat long-term diabetes complications    This Visit's Progress: 100%        Task: Provide education on major complications of diabetes, prevention, early diagnostic measures and treatment of complications Completed 1/9/2024   Responsible User: Jojo Soria RN        Task:  Provide education on recommended care for dental, eye and foot health    Responsible User: Jojo Soria RN        Task: Provide education on Hemoglobin A1c - goals and relationship to blood glucose levels Completed 1/9/2024   Responsible User: Jojo Soria RN        Task: Provide education on recommendations for heart health - lipid levels and goals, blood pressure and goals, and aspirin therapy, if indicated    Responsible User: Jojo Soria RN        Task: Provide education on tobacco cessation    Responsible User: Jojo Soria RN        Goal: Healthy Coping - use available resources to cope with the challenges of managing diabetes         Task: Discuss recognizing feelings about having diabetes    Responsible User: Jojo Soria RN        Task: Provide education on the benefits of making appropriate lifestyle changes    Responsible User: Jojo Soria RN        Task: Provide education on benefits of utilizing support systems    Responsible User: Jojo Soria RN        Task: Discuss methods for coping with stress    Responsible User: Jojo Soria RN        Task: Provide education on when to seek professional counseling    Responsible User: Jojo Soria RN          Thank you,  Jojo Soria RN [facility]  Certified Diabetes Care and   Visit type : DSMT    Time Spent: 30 minutes  Encounter Type: Individual    Any diabetes medication dose changes were made via the CDE Protocol per the patient's referring provider. A copy of this encounter was shared with the provider.

## 2024-01-09 NOTE — PATIENT INSTRUCTIONS
1. Eat 3 balanced meals each day - Monitor carb intake and aim for 60 grams per meal  This would be equal to 4 choices ~  1 choice = 15 grams    Do not wait longer than 4-5 hours to eat something  Snacks limit to no more than 30 grams of carbohydrates or 2 choices  Make sure you include protein source with each meal and at bedtime - this has been shown to help with blood glucose elevations    See protein list hand out!    3. Incorporate 30 minutes activity into each day - does not need to be all at one time & walking counts    4. Take diabetes medications as prescribed: 25 mg Jardiance    Follow-up 2/13/24    Thank you for coming in to see me today !      I value your experience and would be very thankful for your time in providing feedback in our clinic survey.    You may receive an e-mail, text message or even something in the mail from [facility].  This is a survey to let us know how we are doing - the survey will be related to your diabetes education and me.

## 2024-01-10 ENCOUNTER — OFFICE VISIT (OUTPATIENT)
Dept: CARDIOLOGY | Facility: CLINIC | Age: 59
End: 2024-01-10
Payer: COMMERCIAL

## 2024-01-10 VITALS
SYSTOLIC BLOOD PRESSURE: 96 MMHG | HEIGHT: 65 IN | DIASTOLIC BLOOD PRESSURE: 70 MMHG | HEART RATE: 51 BPM | BODY MASS INDEX: 31.65 KG/M2 | WEIGHT: 190 LBS | RESPIRATION RATE: 16 BRPM

## 2024-01-10 DIAGNOSIS — E78.5 DYSLIPIDEMIA, GOAL LDL BELOW 70: Chronic | ICD-10-CM

## 2024-01-10 DIAGNOSIS — I25.118 CORONARY ARTERY DISEASE OF NATIVE ARTERY OF NATIVE HEART WITH STABLE ANGINA PECTORIS (H): ICD-10-CM

## 2024-01-10 DIAGNOSIS — E11.9 TYPE 2 DIABETES MELLITUS WITHOUT COMPLICATION, WITHOUT LONG-TERM CURRENT USE OF INSULIN (H): ICD-10-CM

## 2024-01-10 DIAGNOSIS — I10 ESSENTIAL HYPERTENSION: Primary | Chronic | ICD-10-CM

## 2024-01-10 DIAGNOSIS — R05.2 SUBACUTE COUGH: ICD-10-CM

## 2024-01-10 PROCEDURE — 99214 OFFICE O/P EST MOD 30 MIN: CPT | Performed by: STUDENT IN AN ORGANIZED HEALTH CARE EDUCATION/TRAINING PROGRAM

## 2024-01-10 RX ORDER — METOPROLOL SUCCINATE 50 MG/1
50 TABLET, EXTENDED RELEASE ORAL DAILY
Qty: 90 TABLET | Refills: 3 | Status: SHIPPED | OUTPATIENT
Start: 2024-01-10 | End: 2024-09-20

## 2024-01-10 RX ORDER — NITROGLYCERIN 0.4 MG/1
TABLET SUBLINGUAL
Qty: 30 TABLET | Refills: 3 | Status: SHIPPED | OUTPATIENT
Start: 2024-01-10 | End: 2024-06-07

## 2024-01-10 NOTE — LETTER
1/10/2024    Az Briseno MD  [address] [facility] [address]    RE: Nadya Blake       Dear Colleague,     I had the pleasure of seeing Nadya Blake in the [facility].    HEART CARE ENCOUNTER NOTE      [facility]  [phone]      Assessment/Recommendations   Assessment:   Coronary artery disease: As post four-vessel CABG with LIMA to LAD, right radial artery to right posterior descending artery, reverse SVG to obtuse marginal and diagonal artery on 6/24/2020.  Stress cardiac MRI 12/23/2021 showed no ischemia but patient has been noting exertional dyspnea.  Coronary angiogram 10/6/2022 noted occluded SVG graft to right PDA.  Patient notes that the Imdur has resolved his chest pain/squeezing.  Chronic congestive heart failure with preserved ejection fraction: Appears euvolemic on exam.  Cardiac catheterization 10/6/2022 showed normal left-sided filling pressure.  Patient is on beta-blocker, Jardiance  Essential hypertension: On metoprolol 100 mg daily, Imdur 30 mg daily  Dyslipidemia: On rosuvastatin 40 mg daily.  Last lipids 8/20/2023 showed LDL of 32 with triglycerides of 159.  Diabetes mellitus type 2: Non-insulin-dependent.  On Jardiance, metformin.  Last hemoglobin A1c 5.5.  Possible HANNAH    Plan:   Decrease metoprolol to 50 mg daily to see if this improves patient's bradycardia and fatigue as patient was still noting heart rates in the 40s  Continue Imdur given improvement of chest pain  Follow-up as scheduled with Dr. Burns 3/12/2024 per patient's request        The level of medical decision making during this visit was of moderate complexity.       History of Present Illness/Subjective    HPI: Nadya Blake is a 58 year old male with PMHx of coronary artery disease with history of NSTEMI status post four-vessel CABG with LIMA to LAD, right radial artery to RPDA, reverse SVG to OM and diagonal branch on 6/24/2020, HFpEF, hypertension,  dyslipidemia presents for follow-up.    Patient has history of chest pain which is not necessarily exertional but has been worse with emotional stress that improves with sublingual nitroglycerin.  Patient started isosorbide mononitrate 30 mg daily and has helped some with this chest pain but led to some lightheadedness.  Metoprolol dose was decreased from 150 to 100 mg daily.    Patient notes that he has not had any of the squeezing chest pain that he had initially noted since starting the Imdur.  Also notes that his headaches have improved.  Continues to endorse occasional dizziness when his blood pressure is low along with fatigue.  Will have blood pressures in the low 50s to high 40s.  Patient also notes he has been sick for 2 months with a chronic cough and had 2 rounds of antibiotics.  Is finally feeling better in the past 3 to 4 days.      Patient also notes he had stopped omeprazole at the recommendation of his primary care provider.  Notes an acidic taste in his mouth lately.  Recommended doing a 2-week trial of this and then stopping it.  He denies shortness of breath, dyspnea on exertion, orthopnea, PND, palpitations, chest pain, abdominal fullness/bloating, and lower extremity edema.        MR cardiac with stress 12/23/2021  1.  Pharmacological Regadenoson stress cardiac MRI is negative for inducible myocardial ischemia.   2.  Pharmacological stress ECG is negative for inducible myocardial ischemia.   3. Normal left ventricular size, wall thickness and systolic function. The quantified left ventricular  ejection fraction is 59 %.  Very small area of non-transmural myocardial scar in the mid inferior wall is  identified.    4.  Normal right ventricular size and systolic function.    5.  No significant valvular abnormalities.  6. Ascending aorta measures 38 mm.     Cardiac catheterization 10/6/2022:  Left ventricular filling pressures are normal.  3rd Mrg lesion is 90% stenosed.  Prox RCA lesion is 75%  "stenosed.  Prox RCA to Mid RCA lesion is 40% stenosed.  Dist RCA lesion is 75% stenosed.  Mid RCA lesion is 30% stenosed.  RPDA lesion is 50% stenosed.  RPAV lesion is 40% stenosed.  Prox LAD lesion is 70% stenosed.  1st Diag-1 lesion is 95% stenosed.  1st Diag-2 lesion is 95% stenosed.  2nd Diag lesion is 99% stenosed.  Mid LAD-1 lesion is 75% stenosed.  Mid LAD-2 lesion is 75% stenosed.  Dist LAD lesion is 70% stenosed.  Vein graft to right PDA is totally occluded  Vein graft to second diagonal is widely patent  Vein graft to third OM is widely patent  LUZ MARIA graft to mid distal LAD is patent     Physical Examination  Review of Systems   Vitals: BP 96/70 (BP Location: Right arm, Patient Position: Sitting, Cuff Size: Adult Regular)   Pulse 51   Resp 16   Ht 1.651 m (5' 5\")   Wt 86.2 kg (190 lb)   BMI 31.62 kg/m    BMI= Body mass index is 31.62 kg/m .  Wt Readings from Last 3 Encounters:   01/09/24 86.4 kg (190 lb 6.4 oz)   01/08/24 86.9 kg (191 lb 9.6 oz)   12/27/23 85.5 kg (188 lb 8 oz)           ENT/Mouth: membranes moist, no oral lesions or bleeding gums.      EYES:  no scleral icterus, normal conjunctivae       Chest/Lungs:   lungs are clear to auscultation, no rales or wheezing,  equal chest wall expansion    Cardiovascular:   Regular. Normal first and second heart sounds with no murmurs, rubs, or gallops; the radial pulses are intact,  no edema bilaterally        Extremities: no cyanosis or clubbing   Skin: no xanthelasma, warm.    Neurologic: no tremors     Psychiatric: alert and oriented x3, calm        Please refer above for cardiac ROS details.        Medical History  Surgical History Family History Social History   Past Medical History:   Diagnosis Date    Acute non-ST elevation myocardial infarction (NSTEMI) (H) 6/22/2020    Adenomatous polyp of colon     Ascending aorta dilatation (H24)     Carpal tunnel syndrome     Chronic superficial gastritis     Coronary artery disease due to lipid rich plaque " 6/23/2020    Depression with anxiety     Dyslipidemia, goal LDL below 70 6/23/2020    Essential hypertension 9/2/2012    Fatty liver disease, nonalcoholic     GERD (gastroesophageal reflux disease)     IBS (irritable bowel syndrome)     Left lumbar radiculopathy     Multinodular goiter     Old torn meniscus of knee     Paroxysmal atrial fibrillation (H)     Perianal abscess     Post herpetic neuralgia     Post-traumatic osteoarthritis of both knees     Primary osteoarthritis of left hip     Primary osteoarthritis of right hip     Pulmonary nodule     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)     Thyroid nodule     TMJ arthritis     Tobacco use disorder 11/1/2013    Vitamin D deficiency 9/30/2020     Past Surgical History:   Procedure Laterality Date    APPENDECTOMY  1995    BYPASS GRAFT ARTERY CORONARY  06/24/2020    Dr. Ragsdale    CV CORONARY ANGIOGRAM N/A 6/23/2020    Procedure: Coronary Angiogram;  Surgeon: Ariane Lester MD;  Location: [facility] Cath Lab;  Service: Cardiology    CV CORONARY ANGIOGRAM N/A 10/6/2022    Procedure: Coronary Angiogram;  Surgeon: Javon John MD;  Location: [facility] CV    CV IABP INSERT N/A 6/24/2020    Procedure: Intra Aortic Balloon Pump Insertion;  Surgeon: Ariane Lester MD;  Location: [facility] Cath Lab;  Service: Cardiology    CV LEFT HEART CATH N/A 10/6/2022    Procedure: Left Heart Catheterization;  Surgeon: Javon John MD;  Location: [facility] CV    CV LEFT HEART CATHETERIZATION WITHOUT LEFT VENTRICULOGRAM Left 6/23/2020    Procedure: Left Heart Catheterization Without Left Ventriculogram;  Surgeon: Ariane Lester MD;  Location: [facility] Cath Lab;  Service: Cardiology    CV LEFT VENTRICULOGRAM N/A 10/6/2022    Procedure: Left Ventriculogram;  Surgeon: Javon John MD;  Location: [facility] CV     Family History   Problem Relation Age of Onset    No Known Problems Mother     Lung Cancer Father  56        fatal    No Known Problems Daughter     Other - See Comments Son         congenital deformity of right leg; he uses a leg prosthesis        Social History     Socioeconomic History    Marital status:      Spouse name: Carlee    Number of children: 2    Years of education: Not on file    Highest education level: Not on file   Occupational History    Not on file   Tobacco Use    Smoking status: Former     Packs/day: 1.00     Years: 30.00     Additional pack years: 0.00     Total pack years: 30.00     Types: Cigarettes     Quit date: 3/23/2020     Years since quitting: 3.8     Passive exposure: Never    Smokeless tobacco: Never    Tobacco comments:     stopped 3/24/2020   Vaping Use    Vaping Use: Never used   Substance and Sexual Activity    Alcohol use: No    Drug use: Never    Sexual activity: Yes     Partners: Female   Other Topics Concern    Not on file   Social History Narrative    , Carlee, BA chemistry and taking AA courses at [employer].  From [place]; bachelors in geology and computer science. Son, Grace (2005) and Sherrie (2008).  On SSDI, PTSD.       Social Determinants of Health     Financial Resource Strain: Low Risk  (12/27/2023)    Financial Resource Strain     Within the past 12 months, have you or your family members you live with been unable to get utilities (heat, electricity) when it was really needed?: No   Food Insecurity: Low Risk  (12/27/2023)    Food Insecurity     Within the past 12 months, did you worry that your food would run out before you got money to buy more?: No     Within the past 12 months, did the food you bought just not last and you didn t have money to get more?: No   Transportation Needs: Low Risk  (12/27/2023)    Transportation Needs     Within the past 12 months, has lack of transportation kept you from medical appointments, getting your medicines, non-medical meetings or appointments, work, or from getting things that you need?: No   Physical Activity: Not on  file   Stress: Not on file   Social Connections: Not on file   Interpersonal Safety: Not on file   Housing Stability: Low Risk  (12/27/2023)    Housing Stability     Do you have housing? : Yes     Are you worried about losing your housing?: No           Medications  Allergies   Current Outpatient Medications   Medication Sig Dispense Refill    acetaminophen (TYLENOL) 500 MG tablet Take 1-2 tablets (500-1,000 mg) by mouth every 6 hours as needed for mild pain 360 tablet 3    albuterol (PROAIR HFA/PROVENTIL HFA/VENTOLIN HFA) 108 (90 Base) MCG/ACT inhaler Inhale 2 puffs into the lungs every 6 hours as needed for shortness of breath, wheezing or cough 18 g 0    aspirin (ASA) 81 MG chewable tablet CHEW AND SWALLOW 1 TABLET(81 MG) BY MOUTH DAILY 90 tablet 2    benzonatate (TESSALON) 200 MG capsule Take 1 capsule (200 mg) by mouth 3 times daily as needed for cough 30 capsule 0    busPIRone (BUSPAR) 5 MG tablet Take 1 tablet (5 mg) by mouth daily In the morning and an additional tablet later in the day as needed for anxiety 180 tablet 0    Cholecalciferol (VITAMIN D3) 50 MCG (2000 UT) CAPS TAKE 1 CAPSULE BY MOUTH DAILY 90 capsule 1    cyanocobalamin (VITAMIN B-12) 1000 MCG tablet Take 1 tablet (1,000 mcg) by mouth daily 90 tablet 4    diclofenac (VOLTAREN) 1 % topical gel Apply 4 g topically 4 times daily 500 g 2    empagliflozin (JARDIANCE) 25 MG TABS tablet Take 1 tablet (25 mg) by mouth daily 90 tablet 4    escitalopram (LEXAPRO) 20 MG tablet Take 0.5 tablets (10 mg) by mouth daily For 4 days then stop      ipratropium (ATROVENT) 0.06 % nasal spray Spray 2 sprays into both nostrils 4 times daily 15 mL 1    isosorbide mononitrate (IMDUR) 30 MG 24 hr tablet TAKE 1 TABLET(30 MG) BY MOUTH DAILY 90 tablet 2    Lidocaine 0.5 % GEL Externally apply topically as needed      metFORMIN (GLUCOPHAGE XR) 500 MG 24 hr tablet Take 1 tablet (500 mg) by mouth daily (with dinner) Use while being treated for his lungs. (Patient not taking:  Reported on 1/9/2024) 15 tablet 0    metoprolol succinate ER (TOPROL XL) 100 MG 24 hr tablet Take 1 tablet (100 mg) by mouth at bedtime      nifedipine 0.2% in white petrolatum 0.2 % OINT ointment Apply topically 4 times daily as needed (anal fissure) 100 g 1    nitroGLYcerin (NITROSTAT) 0.4 MG sublingual tablet For chest pain place 1 tablet under the tongue every 5 minutes for 3 doses. If symptoms persist 5 minutes after 1st dose call 911. 30 tablet 1    omeprazole (PRILOSEC) 20 MG DR capsule Take 1 capsule (20 mg) by mouth daily 90 capsule 1    oxybutynin (DITROPAN) 5 MG tablet Take 1 tablet (5 mg) by mouth At Bedtime 90 tablet 4    PARoxetine (PAXIL) 20 MG tablet Take 1 tablet (20 mg) by mouth every morning 30 tablet 3    polyethylene glycol (MIRALAX) 17 GM/Dose powder TAKE 17 GRAMS(1 CAPFUL) BY MOUTH DAILY 840 g 4    predniSONE (DELTASONE) 20 MG tablet 2 tablets today and tomorrow, then 1 po qd 7 tablet 0    QUEtiapine (SEROQUEL) 50 MG tablet Take 1.5 tablets (75 mg) by mouth at bedtime 45 tablet 4    rosuvastatin (CRESTOR) 40 MG tablet Take 1 tablet (40 mg) by mouth daily 90 tablet 4    tamsulosin (FLOMAX) 0.4 MG capsule TAKE 2 CAPSULES(0.8 MG) BY MOUTH EVERY EVENING 180 capsule 4       Allergies   Allergen Reactions    Atorvastatin Diarrhea    Cortisone Other (See Comments)     pain    Lisinopril Cough     started after CABG for unclear reason    Methylprednisolone Other (See Comments)     ?          Lab Results    Chemistry/lipid CBC Cardiac Enzymes/BNP/TSH/INR   Recent Labs   Lab Test 08/28/23  1436   CHOL 97   HDL 33*   LDL 32   TRIG 159*     Recent Labs   Lab Test 08/28/23  1436 01/18/23  1037 11/21/22  0947   LDL 32 30 27     Recent Labs   Lab Test 12/20/23 2039      POTASSIUM 4.3   CHLORIDE 105   CO2 26   *   BUN 16.2   CR 1.22*   GFRESTIMATED 70   TERESE 9.0     Recent Labs   Lab Test 12/20/23 2039 08/28/23  1436 06/27/23  1134   CR 1.22* 1.21* 1.10     Recent Labs   Lab Test 01/09/24  0940  10/03/23  0906 06/27/23  1134   A1C 5.5 5.6 5.7*          Recent Labs   Lab Test 12/20/23 2039   WBC 7.7   HGB 14.4   HCT 42.9   MCV 89        Recent Labs   Lab Test 12/20/23 2039 08/28/23  1436 01/18/23  1037   HGB 14.4 15.2 14.5    Recent Labs   Lab Test 11/21/20  0001 07/27/20  0703 07/27/20  0115   TROPONINI <0.01 <0.01 <0.01     Recent Labs   Lab Test 12/20/23 2039 07/27/20  0115 06/22/20  2322   BNP  --  79* <10   NTBNP <36  --   --      Recent Labs   Lab Test 08/28/23  1436   TSH 1.24     Recent Labs   Lab Test 07/27/20  0115 06/25/20  0427 06/24/20 2012   INR 1.10 1.32* 1.39*        Teodora Ordonez PA-C        Thank you for allowing me to participate in the care of your patient.      Sincerely,     Teodora Ordonez PA-C     [facility]  cc:   Casi Raman MD  [address] [place] [address]

## 2024-01-10 NOTE — PATIENT INSTRUCTIONS
Nadya Blake,    It was a pleasure to see you today at the [facility].     My recommendations after this visit include:    - Decrease metoprolol to 50 mg daily to see if this improves heart rate and fatigue   - Continue Imdur  - Follow up with Dr. Burns 3/12/24    - Please call [phone], if you have any questions or concerns      Teodora Ordonez PA-C

## 2024-01-10 NOTE — LETTER
January 10, 2024      Nadya Blake  [address]  [facility] [address]        To Whom It May Concern,     Given patient's heart disease it is recommended that patient try to avoid public transportation given often needing to be outside in the cold weather as well as close contact with other people.        Sincerely,        Teodora Ordonez PA-C

## 2024-01-17 ENCOUNTER — OFFICE VISIT (OUTPATIENT)
Dept: INTERNAL MEDICINE | Facility: CLINIC | Age: 59
End: 2024-01-17
Payer: COMMERCIAL

## 2024-01-17 VITALS
SYSTOLIC BLOOD PRESSURE: 122 MMHG | OXYGEN SATURATION: 98 % | HEIGHT: 65 IN | TEMPERATURE: 97.5 F | DIASTOLIC BLOOD PRESSURE: 86 MMHG | WEIGHT: 190.3 LBS | RESPIRATION RATE: 24 BRPM | HEART RATE: 52 BPM | BODY MASS INDEX: 31.71 KG/M2

## 2024-01-17 DIAGNOSIS — I25.118 CORONARY ARTERY DISEASE OF NATIVE ARTERY OF NATIVE HEART WITH STABLE ANGINA PECTORIS (H): ICD-10-CM

## 2024-01-17 DIAGNOSIS — K60.2 ANAL FISSURE: ICD-10-CM

## 2024-01-17 DIAGNOSIS — J31.0 CHRONIC RHINITIS: ICD-10-CM

## 2024-01-17 DIAGNOSIS — N39.44 NOCTURNAL ENURESIS: ICD-10-CM

## 2024-01-17 DIAGNOSIS — I10 ESSENTIAL HYPERTENSION: Chronic | ICD-10-CM

## 2024-01-17 DIAGNOSIS — E11.9 TYPE 2 DIABETES MELLITUS WITHOUT COMPLICATION, WITHOUT LONG-TERM CURRENT USE OF INSULIN (H): ICD-10-CM

## 2024-01-17 DIAGNOSIS — R05.2 SUBACUTE COUGH: Primary | ICD-10-CM

## 2024-01-17 PROCEDURE — 99214 OFFICE O/P EST MOD 30 MIN: CPT | Performed by: INTERNAL MEDICINE

## 2024-01-17 RX ORDER — AZELASTINE 1 MG/ML
1 SPRAY, METERED NASAL 2 TIMES DAILY
Qty: 30 ML | Refills: 1 | Status: SHIPPED | OUTPATIENT
Start: 2024-01-17

## 2024-01-17 ASSESSMENT — PAIN SCALES - GENERAL: PAINLEVEL: SEVERE PAIN (7)

## 2024-01-17 NOTE — PATIENT INSTRUCTIONS
Restart the omeprazole 20 mg daily in the morning.     Switch nasal spray from atrovent to astelin. Use as needed for the congestion or feeling phlegm in back of throat.

## 2024-01-17 NOTE — PROGRESS NOTES
Assessment & Plan   Problem List Items Addressed This Visit          Nervous and Auditory    Coronary artery disease, CABG 6/2020     Stable today and at cardiology visit 1/10.  -Continue ASA, Jardiance 25, rosuvastatin 40, metoprolol XL 50 mg daily and Imdur 30             Respiratory    Subacute cough - Primary     Since our last visit, patient tells me his cough has significantly improved.  This may be a combination of using ipratropium nasal spray for postnasal drip and taking omeprazole for a few days for contribution of GERD.  He has had a few nosebleeds using the ipratropium and his nares are much improved without erythema or edema today.  -Transition nasal spray from ipratropium to Astelin, can use as needed  -Instructed him to resume taking omeprazole 20 mg daily since he is still having a sour taste in the back of his mouth  -Follow-up with PCP as scheduled 2/9/2024         Relevant Medications    azelastine (ASTELIN) 0.1 % nasal spray       Digestive    Anal fissure - Dr. Campoverde     Has some bleeding because of this intermittently.  Will be prescribing incontinence supplies, which will help with catching blood from when this flares instead of getting on undergarments.          Relevant Orders    Incontinence Supplies Order for DME - ONLY FOR DME       Endocrine    Type 2 diabetes mellitus without complication, without long-term current use of insulin (H)     Doing well at visit with diabetes educator 1/9 with A1c of 5.5.  -Continue Jardiance            Circulatory    Essential hypertension (Chronic)     Well-controlled on current regimen today            Urinary    Nocturnal enuresis     Patient does notice some issues with incontinence at night, possibly in the setting of overactive bladder.  -Reasonable to use incontinence supplies, DME order written for this today  -Continue current dose of oxybutynin         Relevant Orders    Incontinence Supplies Order for DME - ONLY FOR DME     Other Visit  Diagnoses       Chronic rhinitis        Relevant Medications    azelastine (ASTELIN) 0.1 % nasal spray           Prescription drug management    FUTURE APPOINTMENTS:       - Follow-up visit in Feb with PCP as scheduled       Subjective   Nadya is a 58 year old, presenting for the following health issues:  Follow Up (3 week follow up ) and  Follow Up        1/17/2024     8:51 AM   Additional Questions   Roomed by Joe ALAS   Accompanied by Wife     History of Present Illness     Sania is here with his wife for follow-up.  Since our last visit, he tells me his cough is decreased by 50%.  He thinks this is on account of the nasal sprays as he says he really feels his nose is opened up and his breathing is better.  He only took the omeprazole for about 10 days and then stopped because he thought it might be harmful for his heart.  He is still having a sour taste in the back of his throat.  Has had a few episodes of nosebleed, resolved on their own.    He did see cardiology for follow-up on 1/10.  Overall, felt things were doing okay.  Plan to decrease metoprolol from 100 to 50 mg daily for bradycardia, and continue imdur since it had helped his chest pain.    Incontinence: During if he could get a prescription for diapers/incontinence supplies.  Patient and wife tell me that he sometimes at night has urinary incontinence so wears diapers that he got from his son.  Additionally, sometimes his anal fissure will flareup and bleed and wearing a diaper for this is nicer so that it is not getting on his clothes.     He eats 2-3 servings of fruits and vegetables daily.He consumes 2 sweetened beverage(s) daily.He exercises with enough effort to increase his heart rate 10 to 19 minutes per day.  He exercises with enough effort to increase his heart rate 7 days per week.   He is taking medications regularly.    Review of Systems  Constitutional, neuro, ENT, endocrine, pulmonary, cardiac, gastrointestinal, genitourinary,  "musculoskeletal, integument and psychiatric systems are negative, except as otherwise noted.        Objective    /86 (BP Location: Right arm, Patient Position: Sitting, Cuff Size: Adult Large)   Pulse 52   Temp 97.5  F (36.4  C) (Oral)   Resp 24   Ht 1.651 m (5' 5\")   Wt 86.3 kg (190 lb 4.8 oz)   SpO2 98%   BMI 31.67 kg/m    Body mass index is 31.67 kg/m .    Physical Exam   GENERAL: alert and no distress  EYES: Eyes grossly normal to inspection, PERRL and conjunctivae and sclerae normal  HENT: nares much improved, no further edema or erythema and mouth without ulcers or lesions  RESP: lungs clear to auscultation - no rales, rhonchi or wheezes  CV: regular rate and rhythm, normal S1 S2, no S3 or S4, no murmur, click or rub, no peripheral edema  ABDOMEN: soft, nontender, no hepatosplenomegaly, no masses and bowel sounds normal  MS: no gross musculoskeletal defects noted, no edema  SKIN: no suspicious lesions or rashes  NEURO: Normal strength and tone, mentation intact and speech normal  PSYCH: mentation appears normal, affect normal/bright  LYMPH: no cervical, supraclavicular, axillary, or inguinal adenopathy            Signed Electronically by: Casi Raman MD      Via the [employer] Maintenance questionnaire, the patient has reported the following services have been completed -Eye Exam, this information has been sent to the abstraction team.  "

## 2024-01-17 NOTE — ASSESSMENT & PLAN NOTE
Doing well at visit with diabetes educator 1/9 with A1c of 5.5.  -Continue Jardiance  
Has some bleeding because of this intermittently.  Will be prescribing incontinence supplies, which will help with catching blood from when this flares instead of getting on undergarments.   
Patient does notice some issues with incontinence at night, possibly in the setting of overactive bladder.  -Reasonable to use incontinence supplies, DME order written for this today  -Continue current dose of oxybutynin  
Since our last visit, patient tells me his cough has significantly improved.  This may be a combination of using ipratropium nasal spray for postnasal drip and taking omeprazole for a few days for contribution of GERD.  He has had a few nosebleeds using the ipratropium and his nares are much improved without erythema or edema today.  -Transition nasal spray from ipratropium to Astelin, can use as needed  -Instructed him to resume taking omeprazole 20 mg daily since he is still having a sour taste in the back of his mouth  -Follow-up with PCP as scheduled 2/9/2024  
Stable today and at cardiology visit 1/10.  -Continue ASA, Jardiance 25, rosuvastatin 40, metoprolol XL 50 mg daily and Imdur 30   
Well-controlled on current regimen today  
OB

## 2024-01-18 DIAGNOSIS — R05.2 SUBACUTE COUGH: ICD-10-CM

## 2024-01-18 RX ORDER — ALBUTEROL SULFATE 90 UG/1
2 AEROSOL, METERED RESPIRATORY (INHALATION) EVERY 6 HOURS PRN
Qty: 18 G | Refills: 0 | Status: SHIPPED | OUTPATIENT
Start: 2024-01-18 | End: 2024-02-28

## 2024-01-25 ENCOUNTER — OFFICE VISIT (OUTPATIENT)
Dept: SLEEP MEDICINE | Facility: CLINIC | Age: 59
End: 2024-01-25
Payer: COMMERCIAL

## 2024-01-25 VITALS
OXYGEN SATURATION: 97 % | BODY MASS INDEX: 32.02 KG/M2 | WEIGHT: 192.2 LBS | RESPIRATION RATE: 14 BRPM | DIASTOLIC BLOOD PRESSURE: 78 MMHG | HEART RATE: 55 BPM | SYSTOLIC BLOOD PRESSURE: 110 MMHG | HEIGHT: 65 IN

## 2024-01-25 DIAGNOSIS — E66.811 OBESITY (BMI 30.0-34.9): ICD-10-CM

## 2024-01-25 DIAGNOSIS — R29.818 SUSPECTED SLEEP APNEA: Primary | ICD-10-CM

## 2024-01-25 DIAGNOSIS — N39.44 NOCTURNAL ENURESIS: ICD-10-CM

## 2024-01-25 DIAGNOSIS — I50.32 CHRONIC HEART FAILURE WITH PRESERVED EJECTION FRACTION (H): ICD-10-CM

## 2024-01-25 DIAGNOSIS — R06.83 SNORING: ICD-10-CM

## 2024-01-25 DIAGNOSIS — G47.00 INSOMNIA, UNSPECIFIED TYPE: ICD-10-CM

## 2024-01-25 DIAGNOSIS — F43.10 PTSD (POST-TRAUMATIC STRESS DISORDER): ICD-10-CM

## 2024-01-25 PROCEDURE — 99214 OFFICE O/P EST MOD 30 MIN: CPT | Performed by: NURSE PRACTITIONER

## 2024-01-25 ASSESSMENT — SLEEP AND FATIGUE QUESTIONNAIRES
HOW LIKELY ARE YOU TO NOD OFF OR FALL ASLEEP WHILE SITTING AND READING: MODERATE CHANCE OF DOZING
HOW LIKELY ARE YOU TO NOD OFF OR FALL ASLEEP WHILE WATCHING TV: MODERATE CHANCE OF DOZING
HOW LIKELY ARE YOU TO NOD OFF OR FALL ASLEEP WHILE LYING DOWN TO REST IN THE AFTERNOON WHEN CIRCUMSTANCES PERMIT: MODERATE CHANCE OF DOZING
HOW LIKELY ARE YOU TO NOD OFF OR FALL ASLEEP WHILE SITTING AND TALKING TO SOMEONE: MODERATE CHANCE OF DOZING
HOW LIKELY ARE YOU TO NOD OFF OR FALL ASLEEP WHILE SITTING INACTIVE IN A PUBLIC PLACE: MODERATE CHANCE OF DOZING
HOW LIKELY ARE YOU TO NOD OFF OR FALL ASLEEP IN A CAR, WHILE STOPPED FOR A FEW MINUTES IN TRAFFIC: MODERATE CHANCE OF DOZING
HOW LIKELY ARE YOU TO NOD OFF OR FALL ASLEEP WHILE SITTING QUIETLY AFTER LUNCH WITHOUT ALCOHOL: WOULD NEVER DOZE
HOW LIKELY ARE YOU TO NOD OFF OR FALL ASLEEP WHEN YOU ARE A PASSENGER IN A CAR FOR AN HOUR WITHOUT A BREAK: HIGH CHANCE OF DOZING

## 2024-01-25 NOTE — PROGRESS NOTES
Sleep Medicine: Follow-Up Visit    Chief Complaint   Patient presents with    Sleep Problem     Cardiology referred he need a sleep study.        **The patient has declined use of an [place]ian  for this visit.     Nadya Blake is a 58-year-old male with a PMH pertinent for HTN, CAD/NSTEMI - s/p CABG x 4, HFpEF (59%), DM2, CKD stage IIIa, PTSD, BPH, former smoker, and obesity who presents today, accompanied by his wife who is aiding in translation, for follow-up of possible sleep disordered breathing in the setting of heart failure. His wife reports symptoms snoring, witnessed apneas, excessive daytime sleepiness, difficulty falling/staying asleep, some problems getting back to sleep, nocturia, and poorly restful sleep which have worsened since our last visit. He was last seen in a virtual visit with me for sleep consultation in 1/2023 and a subsequent order was placed for recommended PSG evaluation for possible sleep disordered breathing at that time. He never underwent the sleep testing at that time and is returning at the request of his cardiologist for the evaluation. Of note, he has PTSD and has screaming and dream enactment behaviors to some extent. He follows with his psychiatrist for this who is also managing his insomnia, currently taking seroquel.      Bedtime: 12 AM.  Wake time: 6 AM back to sleep until 10-10:30 AM. Falls asleep in occasionally up to 2 hours. Wakes 3-4 times per night for 30 minutes. Naps: 1 times per week for 30-60 minutes.   Patient gets approximately 8-10 hours of sleep each night.    SCALES:  EPWORTH SLEEPINESS SCALE    Sitting and reading 2   Watching TV 2   Sitting, inactive in a public place (theatre or mtg.) 2    As a passenger in a car 3   Lying down to rest in the afternoon when circumstance permit 2   Sitting and talking to someone 2   Sitting quietly after lunch without alcohol 0   In a car, while stopped for a few minutes in traffic 2   TOTAL SCORE 15     INSOMNIA  SEVERITY INDEX     Difficulty falling asleep 3   Difficult staying asleep 1   Problems waking up to early 1   How SATISFIED/DISSATISFIED are you with your CURRENT sleep pattern? 2   How NOTICEABLE to others do you think your sleep pattern is in terms of your quality of life? 2   How WORRIED/DISTRESSED are you about your current sleep pattern? 3   To what extent do you consider your sleep problem to INTERFERE with your daily fuctioning(e.g. daytime fatigue, mood, ability to function at work/daily chores, concentration, mood,etc.) CURRENTLY? 3   INSOMNIA SEVERITY INDEX TOTAL SCORE 15         Past medical/surgical history, family history, social history, medications and allergies were reviewed.      Problem List:  Patient Active Problem List    Diagnosis Date Noted    Nocturnal enuresis 01/17/2024     Priority: Medium    Subacute cough 12/28/2023     Priority: Medium    Gallstones 12/28/2023     Priority: Medium    Type 2 diabetes mellitus without complication, without long-term current use of insulin (H) 08/28/2023     Priority: Medium    Microscopic hematuria - Dr. Tineo 07/27/2023     Priority: Medium    Chronic kidney disease, stage 3a (H) 11/21/2022     Priority: Medium    Simple chronic bronchitis (H) 07/05/2022     Priority: Medium    Anal fissure - Dr. Campoverde 08/30/2021     Priority: Medium    Benign prostatic hyperplasia with nocturia 09/30/2020     Priority: Medium    PTSD (post-traumatic stress disorder) 09/30/2020     Priority: Medium    S/P CABG x 4 07/02/2020     Priority: Medium    Coronary artery disease, CABG 6/2020 06/23/2020     Priority: Medium    Dyslipidemia, goal LDL below 70 06/23/2020     Priority: Medium    Ascending aorta dilatation (H24) 02/16/2020     Priority: Medium     Formatting of this note might be different from the original.  4.3 cm since 2014.      History of colonic polyps 09/14/2017     Priority: Medium    Nonalcoholic fatty liver disease 07/12/2017     Priority: Medium     Primary osteoarthritis of left hip 04/28/2017     Priority: Medium    Primary osteoarthritis of right hip 04/28/2017     Priority: Medium    Thyroid nodule 03/07/2016     Priority: Medium    Post-traumatic osteoarthritis of both knees 06/08/2015     Priority: Medium    Pulmonary nodule 11/11/2014     Priority: Medium     Formatting of this note might be different from the original.  CT scan 11-11-14. 2 mm each. Repeat scan in one year. Patient is a smoker. Positive family history of lung cancer.  3-14-16 2 stable 2 mm nodules, unchanged on repeat ct scan.  5-26-17 stable nodules. No pulmonary abnormality.      Gastroesophageal reflux disease with esophagitis without hemorrhage 10/30/2014     Priority: Medium     2-8-13 EGD nonspecific gastritis. Treated for H pylori in the past.  3-20-19 non specific gastritis. Path neg.        Recurrent major depressive disorder, in partial remission (H24) 09/02/2012     Priority: Medium    Essential hypertension 09/02/2012     Priority: Medium        Current Outpatient Medications   Medication    acetaminophen (TYLENOL) 500 MG tablet    albuterol (PROAIR HFA/PROVENTIL HFA/VENTOLIN HFA) 108 (90 Base) MCG/ACT inhaler    aspirin (ASA) 81 MG chewable tablet    azelastine (ASTELIN) 0.1 % nasal spray    benzonatate (TESSALON) 200 MG capsule    busPIRone (BUSPAR) 5 MG tablet    Cholecalciferol (VITAMIN D3) 50 MCG (2000 UT) CAPS    cyanocobalamin (VITAMIN B-12) 1000 MCG tablet    diclofenac (VOLTAREN) 1 % topical gel    empagliflozin (JARDIANCE) 25 MG TABS tablet    escitalopram (LEXAPRO) 20 MG tablet    ipratropium (ATROVENT) 0.06 % nasal spray    isosorbide mononitrate (IMDUR) 30 MG 24 hr tablet    Lidocaine 0.5 % GEL    metoprolol succinate ER (TOPROL XL) 50 MG 24 hr tablet    nifedipine 0.2% in white petrolatum 0.2 % OINT ointment    nitroGLYcerin (NITROSTAT) 0.4 MG sublingual tablet    omeprazole (PRILOSEC) 20 MG DR capsule    oxybutynin (DITROPAN) 5 MG tablet    PARoxetine (PAXIL)  "20 MG tablet    polyethylene glycol (MIRALAX) 17 GM/Dose powder    predniSONE (DELTASONE) 20 MG tablet    QUEtiapine (SEROQUEL) 50 MG tablet    rosuvastatin (CRESTOR) 40 MG tablet    tamsulosin (FLOMAX) 0.4 MG capsule     Current Facility-Administered Medications   Medication    albuterol (PROVENTIL HFA/VENTOLIN HFA) inhaler       /78   Pulse 55   Resp 14   Ht 1.651 m (5' 5\")   Wt 87.2 kg (192 lb 3.2 oz)   SpO2 97%   BMI 31.98 kg/m      Impression/Plan:  1. Suspected sleep apnea  2. Snoring  3. Chronic heart failure with preserved ejection fraction (H)  4. PTSD (post-traumatic stress disorder)  5. Nocturnal enuresis  6. Insomnia, unspecified type  7. Obesity (BMI 30.0-34.9)  - Comprehensive Sleep Study; Future    The patient returns, accompanied by his wife, to review his current symptoms which have increased since our last visit and to request sleep evaluation at this time.  His symptoms are consistent with suspected obstructive sleep apnea.  A comprehensive sleep study order was placed for diagnostic in-lab split-night PSG to evaluate for HANNAH in the setting of chronic heart failure with preserved EF and nocturnal enuresis.    I have asked him to follow-up with his psychiatrist as they are managing his PTSD and insomnia and have recently adjusted his quetiapine dosing.  I am reluctant to utilize zolpidem in a patient with PTSD for the sleep study in the setting, thus, I have asked the patient to follow-up with his psychiatrist for consideration for a sleep aid on the night of the sleep study, minimally.  The patient should bring this with him on the night of the sleep test and take it when directed by staff.    Nadya Blake will follow up in about 3 month(s) after the sleep study to review the results and to determine next steps.     34 minutes spent with patient, all of which were spent face-to-face counseling, consulting, chart review/documentation, and coordinating plan of care on the date of " the encounter.      GENET Ybarra CNP  Sleep Medicine      CC:  Az Briseno,       This note was written with the assistance of the Dragon voice-dictation technology software. The final document, although reviewed, may contain errors. For corrections, please contact the office.

## 2024-01-25 NOTE — PATIENT INSTRUCTIONS
"          MY TREATMENT INFORMATION FOR SLEEP APNEA-  Nadya Blake    DOCTOR : GENET Ybarra CNP    Am I having a sleep study at a sleep center?  --->Due to normal delays, you will be contacted within 2-4 weeks to schedule    Am I having a home sleep study?  --->Watch the video for the device you are using:    -/drop off device-   https://www.[employer].com/watch?v=yGGFBdELGhk    -Disposable device sent out require phone/computer application-   https://www.[employer]tec.com/watch?v=BCce_vbiwxE      Frequently asked questions:  1. What is Obstructive Sleep Apnea (HANNAH)? HANNAH is the most common type of sleep apnea. Apnea means, \"without breath.\"  Apnea is most often caused by narrowing or collapse of the upper airway as muscles relax during sleep.   Almost everyone has occasional apneas. Most people with sleep apnea have had brief interruptions at night frequently for many years.  The severity of sleep apnea is related to how frequent and severe the events are.   2. What are the consequences of HANNAH? Symptoms include: feeling sleepy during the day, snoring loudly, gasping or stopping of breathing, trouble sleeping, and occasionally morning headaches or heartburn at night.  Sleepiness can be serious and even increase the risk of falling asleep while driving. Other health consequences may include development of high blood pressure and other cardiovascular disease in persons who are susceptible. Untreated HANNAH  can contribute to heart disease, stroke and diabetes.   3. What are the treatment options? In most situations, sleep apnea is a lifelong disease that must be managed with daily therapy. Medications are not effective for sleep apnea and surgery is generally not considered until other therapies have been tried. Your treatment is your choice . Continuous Positive Airway (CPAP) works right away and is the therapy that is effective in nearly everyone. An oral device to hold your jaw forward is usually the next most " reliable option. Other options include postioning devices (to keep you off your back), weight loss, and surgery including a tongue pacing device. There is more detail about some of these options below.  4. Are my sleep studies covered by insurance? Although we will request verification of coverage, we advise you also check in advance of the study to ensure there is coverage.    Important tips for those choosing CPAP and similar devices  For new devices, sign up for device EDU to monitor your device for your followup visits  We encourage you to utilize the [employer] edu or website (myAir web (resmed.com) ) to monitor your therapy progress and share the data with your healthcare team when you discuss your sleep apnea.                                                    Know your equipment:  CPAP is continuous positive airway pressure that prevents obstructive sleep apnea by keeping the throat from collapsing while you are sleeping. In most cases, the device is  smart  and can slowly self-adjusts if your throat collapses and keeps a record every day of how well you are treated-this information is available to you and your care team.  BPAP is bilevel positive airway pressure that keeps your throat open and also assists each breath with a pressure boost to maintain adequate breathing.  Special kinds of BPAP are used in patients who have inadequate breathing from lung or heart disease. In most cases, the device is  smart  and can slowly self-adjusts to assist breathing. Like CPAP, the device keeps a record of how well you are treated.  Your mask is your connection to the device. You get to choose what feels most comfortable and the staff will help to make sure if fits. Here: are some examples of the different masks that are available: Magnetic mask aids may assist with use but there are safety issues that should be addressed when considering with magnets* ( see end of discussion).       Key points to remember on your  journey with sleep apnea:  Sleep study.  PAP devices often need to be adjusted during a sleep study to show that they are effective and adjusted right.  Good tips to remember: Try wearing just the mask during a quiet time during the day so your body adapts to wearing it. A humidifier is recommended for comfort in most cases to prevent drying of your nose and throat. Allergy medication from your provider may help you if you are having nasal congestion.  Getting settled-in. It takes more than one night for most of us to get used to wearing a mask. Try wearing just the mask during a quiet time during the day so your body adapts to wearing it. A humidifier is recommended for comfort in most cases. Our team will work with you carefully on the first day and will be in contact within 4 days and again at 2 and 4 weeks for advice and remote device adjustments. Your therapy is evaluated by the device each day.   Use it every night. The more you are able to sleep naturally for 7-8 hours, the more likely you will have good sleep and to prevent health risks or symptoms from sleep apnea. Even if you use it 4 hours it helps. Occasionally all of us are unable to use a medical therapy, in sleep apnea, it is not dangerous to miss one night.   Communicate. Call our skilled team on the number provided on the first day if your visit for problems that make it difficult to wear the device. Over 2 out of 3 patients can learn to wear the device long-term with help from our team. Remember to call our team or your sleep providers if you are unable to wear the device as we may have other solutions for those who cannot adapt to mask CPAP therapy. It is recommended that you sleep your sleep provider within the first 3 months and yearly after that if you are not having problems.   Use it for your health. We encourage use of CPAP masks during daytime quiet periods to allow your face and brain to adapt to the sensation of CPAP so that it will be a  more natural sensation to awaken to at night or during naps. This can be very useful during the first few weeks or months of adapting to CPAP though it does not help medically to wear CPAP during wakefulness and  should not be used as a strategy just to meet guidelines.  Take care of your equipment. Make sure you clean your mask and tubing using directions every day and that your filter and mask are replaced as recommended or if they are not working.     *Masks with magnets:  Updated Contraindications  Masks with magnetic components are contraindicated for use by patients where they, or anyone in close physical contact while using the mask, have the following:   Active medical implants that interact with magnets (i.e., pacemakers, implantable cardioverter defibrillators (ICD), neurostimulators, cerebrospinal fluid (CSF) shunts, insulin/infusion pumps)   Metallic implants/objects containing ferromagnetic material (i.e., aneurysm clips/flow disruption devices, embolic coils, stents, valves, electrodes, implants to restore hearing or balance with implanted magnets, ocular implants, metallic splinters in the eye)  Updated Warning  Keep the mask magnets at a safe distance of at least 6 inches (150 mm) away from implants or medical devices that may be adversely affected by magnetic interference. This warning applies to you or anyone in close physical contact with your mask. The magnets are in the frame and lower headgear clips, with a magnetic field strength of up to 400mT. When worn, they connect to secure the mask but may inadvertently detach while asleep.  Implants/medical devices, including those listed within contraindications, may be adversely affected if they change function under external magnetic fields or contain ferromagnetic materials that attract/repel to magnetic fields (some metallic implants, e.g., contact lenses with metal, dental implants, metallic cranial plates, screws, josias hole covers, and bone  substitute devices). Consult your physician and  of your implant / other medical device for information on the potential adverse effects of magnetic fields.    BESIDES CPAP, WHAT OTHER THERAPIES ARE THERE?    Positioning Device  Positioning devices are generally used when sleep apnea is mild and only occurs on your back.This example shows a pillow that straps around the waist. It may be appropriate for those whose sleep study shows milder sleep apnea that occurs primarily when lying flat on one's back. Preliminary studies have shown benefit but effectiveness at home may need to be verified by a home sleep test. These devices are generally not covered by medical insurance.  Examples of devices that maintain sleeping on the back to prevent snoring and mild sleep apnea.    Belt type body positioner  http://RELEASEIF/    Electronic reminder  http://nightshifttherapy.com/            Oral Appliance  What is oral appliance therapy?  An oral appliance device fits on your teeth at night like a retainer used after having braces. The device is made by a specialized dentist and requires several visits over 1-2 months before a manufactured device is made to fit your teeth and is adjusted to prevent your sleep apnea. Once an oral device is working properly, snoring should be improved. A home sleep test may be recommended at that time if to determine whether the sleep apnea is adequately treated.       Some things to remember:  -Oral devices are often, but not always, covered by your medical insurance. Be sure to check with your insurance provider.   -If you are referred for oral therapy, you will be given a list of specialized dentists to consider or you may choose to visit the Web site of the American Academy of Dental Sleep Medicine  -Oral devices are less likely to work if you have severe sleep apnea or are extremely overweight.     More detailed information  An oral appliance is a small acrylic device that fits  over the upper and lower teeth  (similar to a retainer or a mouth guard). This device slightly moves jaw forward, which moves the base of the tongue forward, opens the airway, improves breathing for effective treat snoring and obstructive sleep apnea in perhaps 7 out of 10 people .  The best working devices are custom-made by a dental device  after a mold is made of the teeth 1, 2, 3.  When is an oral appliance indicated?  Oral appliance therapy is recommended as a first-line treatment for patients with primary snoring, mild sleep apnea, and for patients with moderate sleep apnea who prefer appliance therapy to use of CPAP4, 5. Severity of sleep apnea is determined by sleep testing and is based on the number of respiratory events per hour of sleep.   How successful is oral appliance therapy?  The success rate of oral appliance therapy in patients with mild sleep apnea is 75-80% while in patients with moderate sleep apnea it is 50-70%. The chance of success in patients with severe sleep apnea is 40-50%. The research also shows that oral appliances have a beneficial effect on the cardiovascular health of HANNAH patients at the same magnitude as CPAP therapy7.  Oral appliances should be a second-line treatment in cases of severe sleep apnea, but if not completely successful then a combination therapy utilizing CPAP plus oral appliance therapy may be effective. Oral appliances tend to be effective in a broad range of patients although studies show that the patients who have the highest success are females, younger patients, those with milder disease, and less severe obesity. 3, 6.   Finding a dentist that practices dental sleep medicine  Specific training is available through the American Academy of Dental Sleep Medicine for dentists interested in working in the field of sleep. To find a dentist who is educated in the field of sleep and the use of oral appliances, near you, visit the Web site of the American  Academy of Dental Sleep Medicine.    References  1. Lisa et al. Objectively measured vs self-reported compliance during oral appliance therapy for sleep-disordered breathing. Chest 2013; 144(5): 4979-3721.  2. Christianne et al. Objective measurement of compliance during oral appliance therapy for sleep-disordered breathing. Thorax 2013; 68(1): 91-96.  3. Santana, et al. Mandibular advancement devices in 620 men and women with HANNAH and snoring: tolerability and predictors of treatment success. Chest 2004; 125: 7702-7721.  4. Barbara et al. Oral appliances for snoring and HANNAH: a review. Sleep 2006; 29: 244-262.  5. Luciana et al. Oral appliance treatment for HANNAH: an update. J Clin Sleep Med 2014; 10(2): 215-227.  6. Donita et al. Predictors of OSAH treatment outcome. J Dent Res 2007; 86: 6712-7706.      Weight Loss:    Weight loss is a long-term strategy that may improve sleep apnea in some patients.    Weight management is a personal decision and the decision should be based on your interest and the potential benefits.  If you are interested in exploring weight loss strategies, the following discussion covers the impact on weight loss on sleep apnea and the approaches that may be successful.    Being overweight does not necessarily mean you will have health consequences.  Those who have BMI over 35 or over 27 with existing medical conditions carries greater risk.   Weight loss decreases severity of sleep apnea in most people with obesity. For those with mild obesity who have developed snoring with weight gain, even 15-30 pound weight loss can improve and occasionally eliminate sleep apnea.  Structured and life-long dietary and health habits are necessary to lose weight and keep healthier weight levels.     Though there may be significant health benefits from weight loss, long-term weight loss is very difficult to achieve- studies show success with dietary management in less than 10% of people. In  addition, substantial weight loss may require years of dietary control and may be difficult if patients have severe obesity. In these cases, surgical management may be considered.  Finally, older individuals who have tolerated obesity without health complications may be less likely to benefit from weight loss strategies.      Body mass index is 31.98 kg/m .    Surgery:    Surgery for obstructive sleep apnea is considered generally only when other therapies fail to work. Surgery may be discussed with you if you are having a difficult time tolerating CPAP and or when there is an abnormal structure that requires surgical correction.  Nose and throat surgeries often enlarge the airway to prevent collapse.  Most of these surgeries create pain for 1-2 weeks and up to half of the most common surgeries are not effective throughout life.  You should carefully discuss the benefits and drawbacks to surgery with your sleep provider and surgeon to determine if it is the best solution for you.   More information  Surgery for HANNAH is directed at areas that are responsible for narrowing or complete obstruction of the airway during sleep.  There are a wide range of procedures available to enlarge and/or stabilize the airway to prevent blockage of breathing in the three major areas where it can occur: the palate, tongue, and nasal regions.  Successful surgical treatment depends on the accurate identification of the factors responsible for obstructive sleep apnea in each person.  A personalized approach is required because there is no single treatment that works well for everyone.  Because of anatomic variation, consultation with an examination by a sleep surgeon is a critical first step in determining what surgical options are best for each patient.  In some cases, examination during sedation may be recommended in order to guide the selection of procedures.  Patients will be counseled about risks and benefits as well as the typical  recovery course after surgery. Surgery is typically not a cure for a person s HANNAH.  However, surgery will often significantly improve one s HANNAH severity (termed  success rate ).  Even in the absence of a cure, surgery will decrease the cardiovascular risk associated with OSA7; improve overall quality of life8 (sleepiness, functionality, sleep quality, etc).      Palate Procedures:  Patients with HANNAH often have narrowing of their airway in the region of their tonsils and uvula.  The goals of palate procedures are to widen the airway in this region as well as to help the tissues resist collapse.  Modern palate procedure techniques focus on tissue conservation and soft tissue rearrangement, rather than tissue removal.  Often the uvula is preserved in this procedure. Residual sleep apnea is common in patient after pharyngoplasty with an average reduction in sleep apnea events of 33%2.      Tongue Procedures:  ExamWhile patients are awake, the muscles that surround the throat are active and keep this region open for breathing. These muscles relax during sleep, allowing the tongue and other structures to collapse and block breathing.  There are several different tongue procedures available.  Selection of a tongue base procedure depends on characteristics seen on physical exam.  Generally, procedures are aimed at removing bulky tissues in this area or preventing the back of the tongue from falling back during sleep.  Success rates for tongue surgery range from 50-62%3.    Hypoglossal Nerve Stimulation:  Hypoglossal nerve stimulation has recently received approval from the [place] Food and Drug Administration for the treatment of obstructive sleep apnea.  This is based on research showing that the system was safe and effective in treating sleep apnea6.  Results showed that the median AHI score decreased 68%, from 29.3 to 9.0. This therapy uses an implant system that senses breathing patterns and delivers mild  stimulation to airway muscles, which keeps the airway open during sleep.  The system consists of three fully implanted components: a small generator (similar in size to a pacemaker), a breathing sensor, and a stimulation lead.  Using a small handheld remote, a patient turns the therapy on before bed and off upon awakening.    Candidates for this device must be greater than 18 years of age, have moderate to severe HANNAH (AHI between 15-65), BMI less than 35, have tried CPAP/oral appliance for at least 8 weeks without success, and have appropriate upper airway anatomy (determined by a sleep endoscopy performed by Dr. Ace Almanza or Dr. Daren Palma).    Hypoglossal Nerve Stimulation Pathway:    The sleep surgeon s office will work with the patient through the insurance prior-authorization process (including communications and appeals).    Nasal Procedures:  Nasal obstruction can interfere with nasal breathing during the day and night.  Studies have shown that relief of nasal obstruction can improve the ability of some patients to tolerate positive airway pressure therapy for obstructive sleep apnea1.  Treatment options include medications such as nasal saline, topical corticosteroid and antihistamine sprays, and oral medications such as antihistamines or decongestants. Non-surgical treatments can include external nasal dilators for selected patients. If these are not successful by themselves, surgery can improve the nasal airway either alone or in combination with these other options.      Combination Procedures:  Combination of surgical procedures and other treatments may be recommended, particularly if patients have more than one area of narrowing or persistent positional disease.  The success rate of combination surgery ranges from 66-80%2,3.    References  Pawan ALAS. The Role of the Nose in Snoring and Obstructive Sleep Apnoea: An Update.  Eur Arch Otorhinolaryngol. 2011; 268: 1365-73.   Lulu REYES; Renny SAHU;  Laila JR; Pallanch JF; Anushka MB; Camryn SG; Anselmo CHRISTIANSEN. Surgical modifications of the upper airway for obstructive sleep apnea in adults: a systematic review and meta-analysis. SLEEP 2010;33(10):4852-4074. Albino BOOTHE. Hypopharyngeal surgery in obstructive sleep apnea: an evidence-based medicine review.  Arch Otolaryngol Head Neck Surg. 2006 Feb;132(2):206-13.  Russ YH1, Judi Y, Campos SCOTTY. The efficacy of anatomically based multilevel surgery for obstructive sleep apnea. Otolaryngol Head Neck Surg. 2003 Oct;129(4):327-35.  Kezirian E, Goldberg A. Hypopharyngeal Surgery in Obstructive Sleep Apnea: An Evidence-Based Medicine Review. Arch Otolaryngol Head Neck Surg. 2006 Feb;132(2):206-13.  Edilma PJ et al. Upper-Airway Stimulation for Obstructive Sleep Apnea.  N Engl J Med. 2014 Jan 9;370(2):139-49.  Addie Y et al. Increased Incidence of Cardiovascular Disease in Middle-aged Men with Obstructive Sleep Apnea. Am J Respir Crit Care Med; 2002 166: 159-165  Weathers EM et al. Studying Life Effects and Effectiveness of Palatopharyngoplasty (SLEEP) study: Subjective Outcomes of Isolated Uvulopalatopharyngoplasty. Otolaryngol Head Neck Surg. 2011; 144: 623-631.        WHAT IF I ONLY HAVE SNORING?    Mandibular advancement devices, lateral sleep positioning, long-term weight loss and treatment of nasal allergies have been shown to improve snoring.  Exercising tongue muscles with a game (https://[employer]/us/edu/SetMeUp-reduce-snoring/zs5707500683) or stimulating the tongue during the day with a device (https://doi.org/10.3390/diu01751719) have improved snoring in some individuals.  https://www.tuul.[employer]/  https://www.sleepfoundation.org/best-anti-snoring-mouthpieces-and-mouthguards    Remember to Drive Safe... Drive Alive     Sleep health profoundly affects your health, mood, and your safety.  Thirty three percent of the population (one in three of us) is not getting enough sleep and many have a sleep disorder. Not  getting enough sleep or having an untreated / undertreated sleep condition may make us sleepy without even knowing it. In fact, our driving could be dramatically impaired due to our sleep health. As your provider, here are some things I would like you to know about driving:     Here are some warning signs for impairment and dangerous drowsy driving:              -Having been awake more than 16 hours               -Looking tired               -Eyelid drooping              -Head nodding (it could be too late at this point)              -Driving for more than 30 minutes     Some things you could do to make the driving safer if you are experiencing some drowsiness:              -Stop driving and rest              -Call for transportation              -Make sure your sleep disorder is adequately treated     Some things that have been shown NOT to work when experiencing drowsiness while driving:              -Turning on the radio              -Opening windows              -Eating any  distracting  /  entertaining  foods (e.g., sunflower seeds, candy, or any other)              -Talking on the phone      Your decision may not only impact your life, but also the life of others. Please, remember to drive safe for yourself and all of us.

## 2024-01-30 ENCOUNTER — PATIENT OUTREACH (OUTPATIENT)
Dept: CARE COORDINATION | Facility: CLINIC | Age: 59
End: 2024-01-30
Payer: COMMERCIAL

## 2024-01-30 NOTE — PROGRESS NOTES
Clinic Care Coordination Contact  Follow Up Progress Note      Assessment: CCC RN spoke with patient wife Carlee today to follow up on patient's goal and to discuss any needs or concerns. Carlee said overall patient is doing well at home. She stated he continues to attend all of his medical appointments and takes his medications daily as directed. Patient painting from time to time.  No other needs or concerns at this time.     Care Gaps:    Health Maintenance Due   Topic Date Due    ZOSTER IMMUNIZATION (1 of 2) Never done    DTAP/TDAP/TD IMMUNIZATION (3 - Td or Tdap) 04/30/2022    COVID-19 Vaccine (6 - 2023-24 season) 09/01/2023    DIABETIC FOOT EXAM  12/21/2023    DEPRESSION 6 MO INDEX REPEAT PHQ-9  01/18/2024       Scheduled with PCP on 2/9/24      Care Plans  Care Plan: General       Problem: HP GENERAL PROBLEM       Goal: 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       Start Date: 2/8/2023 Expected End Date: 2/7/2024    Recent Progress: 80%    Priority: Medium    Note:     Barriers: Health concerns  Strengths: Motivated. Strong family support.   Patient expressed understanding of goal: Yes  Action steps to achieve this goal:  1. I will look into the options of what type of painting interests me.   2. I will also look into community offered classes that may help get me started towards his goal.   3. I will report progress towards this goal at schedule outreach telephone calls from my CCC team.      Discussed on 12/21/23                              Intervention/Education provided during outreach: Discussed the importance of taking his medications as directed. Encouraged attendance at all upcoming appointments. Encouraged her to contact Care Coordination for any additional needs or concerns.      Outreach Frequency: monthly, more frequently as needed    Plan: CCC RN will continue to monitor, support patient with current goal and will be available to assist as nursing needs arise.     Care  Coordinator will follow up in one month.

## 2024-02-01 DIAGNOSIS — F41.1 GAD (GENERALIZED ANXIETY DISORDER): ICD-10-CM

## 2024-02-01 RX ORDER — BUSPIRONE HYDROCHLORIDE 5 MG/1
TABLET ORAL
Qty: 180 TABLET | Refills: 0 | Status: SHIPPED | OUTPATIENT
Start: 2024-02-01 | End: 2024-03-04

## 2024-02-05 ENCOUNTER — VIRTUAL VISIT (OUTPATIENT)
Dept: PSYCHIATRY | Facility: CLINIC | Age: 59
End: 2024-02-05
Payer: COMMERCIAL

## 2024-02-05 DIAGNOSIS — F99 INSOMNIA DUE TO OTHER MENTAL DISORDER: ICD-10-CM

## 2024-02-05 DIAGNOSIS — F43.10 PTSD (POST-TRAUMATIC STRESS DISORDER): ICD-10-CM

## 2024-02-05 DIAGNOSIS — F33.1 DEPRESSION, MAJOR, RECURRENT, MODERATE (H): Primary | ICD-10-CM

## 2024-02-05 DIAGNOSIS — F41.1 GAD (GENERALIZED ANXIETY DISORDER): ICD-10-CM

## 2024-02-05 DIAGNOSIS — F51.05 INSOMNIA DUE TO OTHER MENTAL DISORDER: ICD-10-CM

## 2024-02-05 PROCEDURE — 99213 OFFICE O/P EST LOW 20 MIN: CPT | Mod: 95 | Performed by: NURSE PRACTITIONER

## 2024-02-05 NOTE — PROGRESS NOTES
"         Outpatient Psychiatric Progress Note    Name: Nadya Blake   : 1965                    Primary Care Provider: Az Briseno MD   Therapist: Yes    PHQ-9 scores:      2023    10:39 AM 12/15/2023     1:27 PM 2024     9:28 AM   PHQ-9 SCORE   PHQ-9 Total Score MyChart 18 (Moderately severe depression) 18 (Moderately severe depression) 15 (Moderately severe depression)   PHQ-9 Total Score 18 18 15       ISI-7 scores:      2023    11:06 AM 2023    10:41 AM 2024     9:33 AM   ISI-7 SCORE   Total Score 14 (moderate anxiety) 15 (severe anxiety) 14 (moderate anxiety)   Total Score 14 15 14       Patient Identification:    Patient is a 58 year old year old,    Not  or  male  who presents for return visit with me.  Patient is currently unemployed. Patient attended the session with his wife , who they agreed to have interview with. Patient prefers to be called: \" Nadya\".    Current medications include: acetaminophen (TYLENOL) 500 MG tablet, Take 1-2 tablets (500-1,000 mg) by mouth every 6 hours as needed for mild pain  albuterol (PROAIR HFA/PROVENTIL HFA/VENTOLIN HFA) 108 (90 Base) MCG/ACT inhaler, Inhale 2 puffs into the lungs every 6 hours as needed for shortness of breath, wheezing or cough  aspirin (ASA) 81 MG chewable tablet, CHEW AND SWALLOW 1 TABLET(81 MG) BY MOUTH DAILY  azelastine (ASTELIN) 0.1 % nasal spray, Spray 1 spray into both nostrils 2 times daily  benzonatate (TESSALON) 200 MG capsule, Take 1 capsule (200 mg) by mouth 3 times daily as needed for cough  busPIRone (BUSPAR) 5 MG tablet, TAKE 1 TABLET BY MOUTH EVERY MORNING AND AN ADDITIONAL TABLET LATER IN THE DAY AS NEEDED FOR ANXIETY  Cholecalciferol (VITAMIN D3) 50 MCG (2000 UT) CAPS, TAKE 1 CAPSULE BY MOUTH DAILY  cyanocobalamin (VITAMIN B-12) 1000 MCG tablet, Take 1 tablet (1,000 mcg) by mouth daily  diclofenac (VOLTAREN) 1 % topical gel, Apply 4 g topically 4 times " daily  empagliflozin (JARDIANCE) 25 MG TABS tablet, Take 1 tablet (25 mg) by mouth daily  escitalopram (LEXAPRO) 20 MG tablet, Take 0.5 tablets (10 mg) by mouth daily For 4 days then stop  ipratropium (ATROVENT) 0.06 % nasal spray, Spray 2 sprays into both nostrils 4 times daily  isosorbide mononitrate (IMDUR) 30 MG 24 hr tablet, TAKE 1 TABLET(30 MG) BY MOUTH DAILY  Lidocaine 0.5 % GEL, Externally apply topically as needed  metoprolol succinate ER (TOPROL XL) 50 MG 24 hr tablet, Take 1 tablet (50 mg) by mouth daily  nifedipine 0.2% in white petrolatum 0.2 % OINT ointment, Apply topically 4 times daily as needed (anal fissure)  nitroGLYcerin (NITROSTAT) 0.4 MG sublingual tablet, For chest pain place 1 tablet under the tongue every 5 minutes for 3 doses. If symptoms persist 5 minutes after 1st dose call 911.  omeprazole (PRILOSEC) 20 MG DR capsule, Take 1 capsule (20 mg) by mouth daily  oxybutynin (DITROPAN) 5 MG tablet, Take 1 tablet (5 mg) by mouth At Bedtime  PARoxetine (PAXIL) 20 MG tablet, Take 1 tablet (20 mg) by mouth every morning  polyethylene glycol (MIRALAX) 17 GM/Dose powder, TAKE 17 GRAMS(1 CAPFUL) BY MOUTH DAILY  predniSONE (DELTASONE) 20 MG tablet, 2 tablets today and tomorrow, then 1 po qd  QUEtiapine (SEROQUEL) 50 MG tablet, Take 1.5 tablets (75 mg) by mouth at bedtime  rosuvastatin (CRESTOR) 40 MG tablet, Take 1 tablet (40 mg) by mouth daily  tamsulosin (FLOMAX) 0.4 MG capsule, TAKE 2 CAPSULES(0.8 MG) BY MOUTH EVERY EVENING    albuterol (PROVENTIL HFA/VENTOLIN HFA) inhaler         The [place] Prescription Monitoring Program has been reviewed and there are no concerns about diversionary activity for controlled substances at this time.      I was able to review most recent Primary Care Provider, specialty provider, and therapy visit notes that I have access to.     Today, patient reports that he is feeling better.  A sleep study is scheduled for June 24.  He continues to have frequent awakenings at  nighttime.  While he also experiences some chest pain it is less intense.  He continues to have daytime sleepiness with low energy but things are better for him this week.  His wife voiced no concerns at this time and they both feel that the paroxetine has been helpful in curbing his depression and anxiety symptoms.       has a past medical history of Acute non-ST elevation myocardial infarction (NSTEMI) (H) (6/22/2020), Adenomatous polyp of colon, Ascending aorta dilatation (H24), Carpal tunnel syndrome, Chronic superficial gastritis, Coronary artery disease due to lipid rich plaque (6/23/2020), Depression with anxiety, Dyslipidemia, goal LDL below 70 (6/23/2020), Essential hypertension (9/2/2012), Fatty liver disease, nonalcoholic, GERD (gastroesophageal reflux disease), IBS (irritable bowel syndrome), Left lumbar radiculopathy, Multinodular goiter, Old torn meniscus of knee, Paroxysmal atrial fibrillation (H), Perianal abscess, Post herpetic neuralgia, Post-traumatic osteoarthritis of both knees, Primary osteoarthritis of left hip, Primary osteoarthritis of right hip, Pulmonary nodule, 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), Thyroid nodule, TMJ arthritis, Tobacco use disorder (11/1/2013), and Vitamin D deficiency (9/30/2020).    Social history updates:    No change in social history to report today    Substance use updates:    Alcohol use  Tobacco use: No    Vital Signs:   There were no vitals taken for this visit.    Labs:    Most recent laboratory results reviewed and no new labs.     Mental Status Examination:  Appearance: awake, alert and casual dress  Attitude: cooperative  Eye Contact:  adequate  Gait and Station: No dizziness or falls  Psychomotor Behavior:  intact station, gait and muscle tone  Oriented to:  time, person, and place  Attention Span and Concentration:  Normal  Speech:   vtspeech: clear, coherent and Speaks: English  Mood:  anxious, depressed, and better  Affect:   mood congruent  Associations:  no loose associations  Thought Process:  goal oriented  Thought Content:  no evidence of suicidal ideation or homicidal ideation, no auditory hallucinations present, and no visual hallucinations present  Recent and Remote Memory:  intact Not formally assessed. No amnesia.  Fund of Knowledge: appropriate  Insight:  good  Judgment: good  Impulse Control:  good    Suicide Risk Assessment:  Today Nadya Blake reports no thoughts to harm themself or others. In addition, there are notable risk factors for self-harm, including anxiety and withdrawing. However, risk is mitigated by commitment to family, history of seeking help when needed, future oriented, denies suicidal intent or plan, and denies homicidal ideation, intent, or plan. Therefore, based on all available evidence including the factors cited above, Nadya Blake does not appear to be at imminent risk for self-harm, does not meet criteria for a 72-hr hold, and therefore remains appropriate for ongoing outpatient level of care.  A thorough assessment of risk factors related to suicide and self-harm have been reviewed and are noted above. The patient convincingly denies suicidality on several occasions. Local community safety resources printed and reviewed for patient to use if needed. There was no deceit detected, and the patient presented in a manner that was believable.     DSM5 Diagnosis:  296.32 (F33.1) Major Depressive Disorder, Recurrent Episode, Moderate _ and With melancholic features  300.02 (F41.1) Generalized Anxiety Disorder  309.81 (F43.10) Posttraumatic Stress Disorder (includes Posttraumatic Stress Disorder for Children 6 Years and Younger)  Without dissociative symptoms  780.52 (G47.00) Insomnia Disorder   With non-sleep disorder mental comorbidity  Recurrent      Medical comorbidities include:   Patient Active Problem List    Diagnosis Date Noted    Nocturnal enuresis 01/17/2024     Priority: Medium     Subacute cough 12/28/2023     Priority: Medium    Gallstones 12/28/2023     Priority: Medium    Type 2 diabetes mellitus without complication, without long-term current use of insulin (H) 08/28/2023     Priority: Medium    Microscopic hematuria - Dr. Tineo 07/27/2023     Priority: Medium    Chronic kidney disease, stage 3a (H) 11/21/2022     Priority: Medium    Simple chronic bronchitis (H) 07/05/2022     Priority: Medium    Anal fissure - Dr. Campoverde 08/30/2021     Priority: Medium    Benign prostatic hyperplasia with nocturia 09/30/2020     Priority: Medium    PTSD (post-traumatic stress disorder) 09/30/2020     Priority: Medium    S/P CABG x 4 07/02/2020     Priority: Medium    Coronary artery disease, CABG 6/2020 06/23/2020     Priority: Medium    Dyslipidemia, goal LDL below 70 06/23/2020     Priority: Medium    Ascending aorta dilatation (H24) 02/16/2020     Priority: Medium     Formatting of this note might be different from the original.  4.3 cm since 2014.      History of colonic polyps 09/14/2017     Priority: Medium    Nonalcoholic fatty liver disease 07/12/2017     Priority: Medium    Primary osteoarthritis of left hip 04/28/2017     Priority: Medium    Primary osteoarthritis of right hip 04/28/2017     Priority: Medium    Thyroid nodule 03/07/2016     Priority: Medium    Post-traumatic osteoarthritis of both knees 06/08/2015     Priority: Medium    Pulmonary nodule 11/11/2014     Priority: Medium     Formatting of this note might be different from the original.  CT scan 11-11-14. 2 mm each. Repeat scan in one year. Patient is a smoker. Positive family history of lung cancer.  3-14-16 2 stable 2 mm nodules, unchanged on repeat ct scan.  5-26-17 stable nodules. No pulmonary abnormality.      Gastroesophageal reflux disease with esophagitis without hemorrhage 10/30/2014     Priority: Medium     2-8-13 EGD nonspecific gastritis. Treated for H pylori in the past.  3-20-19 non specific gastritis. Path  neg.        Recurrent major depressive disorder, in partial remission (H24) 09/02/2012     Priority: Medium    Essential hypertension 09/02/2012     Priority: Medium       Assessment:    Nadya Blake continues to take quetiapine at bedtime to slow down racing thoughts and help him sleep.  However he still is experiencing frequent awakenings but a sleep study is pending on June 24.  Since started the paroxetine he has been more calm and less depressed and anxious.  That we will continue at 20 mg daily.  Talk therapy continues as he processed his past trauma experiences and learned how to manage life adjustments and stressors..    Medication side effects and alternatives were reviewed. Health promotion activities recommended and reviewed today. All questions addressed. Education and counseling completed regarding risks and benefits of medications and psychotherapy options.    Treatment Plan:      1.  Continue quetiapine 75 mg at bedtime  2.  Continue paroxetine 20 mg daily  3.  Continue talk therapy    Continue all other medications as reviewed per electronic medical record today.   Safety plan reviewed. To the Emergency Department as needed or call after hours crisis line at [phone] or [phone]. [place] Crisis Text [place]. Text [place] to 536602 or Suicide LifeLine Chat: suicidepreventionlifeline.org/chat/  To schedule individual or family therapy, call [facility] at [phone]  Schedule an appointment with me in March or sooner as needed. Call [facility] at [phone] to schedule.  Follow up with primary care provider as planned or for acute medical concerns.  Call the psychiatric nurse line with medication questions or concerns at [phone]  MyChart may be used to communicate with your provider, but this is not intended to be used for emergencies.        Crisis Resources   The EmPath is an adults only unit located at [facility] is a short term  (generally less than 23 hour stay) designed for crisis intervention and stabilization. Pts have the opportunity to meet quickly with a behavioral health team for evaluation in a calm and peaceful therapuetic environment. To be evaluated for admission pts are triaged throught the [facility] ED.      The following hotlines are for both adults and children. The and are open 24 hours a day, 7 days a week unless noted otherwise.        Crisis Lines      Crisis Text Line  Text 873314  You will be connected with a trained live crisis counselor to provide support.        Gambling Hotline  6.763.061.4794 [hope]        línea de crisis española  [phone]        [facility] Neovasc Helpline  [phone]        National Hope Line  8.891.926.7769 [hope]        National Suicide Prevention Lifeline  Free and confidential support  988 or 1.549.362.TALK [8255]  http://suicidepreventionlifeline.org        [employer] [place] [facility] (LGBTQ Youth Crisis Line)  7.635.961.4637  text START to 297-552        's Crisis Line  7.506.227.2588 (Press 1)  or text 627349    [facility] Mental Health Crisis Response  Within [place], call **CRISIS [**090936] to be connected to a mental health professional who can assist you.        [facility] Crisis  [phone]      [facility] Mobile Crisis  [phone]      [facility] Crisis  [phone]      [facility] Mobile Crisis  [phone] (adults)  [phone] (children)      [facility] Mobile Crisis  [phone] (adults)  [phone] (children)      [facility] Mobile Crisis  [phone]      [facility] Mobile Crisis  [phone]    Community Resources      Fast Tracker  Linking people to mental health and substance use disorder resources  fasttrackBigfoot Networksn.org        [place] Mental Health Warmline  Peer to peer support  5 pm to 9 am 7 days/week  7.495.310.8061  https://mnwitw.org/cooper        National [place] on Mental Illness (DANA)   105.930.2967 or 1.888.DANA.HELPS  https://namimn.org/        [facility] Urgent Care for Adult Mental Health  [facility] residents only   [address] [facility] [address] [facility]  [phone]        [facility]  Free mental health counseling  https://walkin.org/  612.870.0565 X2    Mental Health Apps      Calm Harm  https://stickapps.co.uk/      My3  https://my3app.org/      [place] Safety Plan  https://www.Phoseon Technologyafetyplan.org/   Administrative Billing:   Time spent with patient includes counseling and coordination of care regarding above diagnoses and treatment plan.    Patient Status:  This is a continuous care patient and medications will be prescribed by the psychiatric provider until further indicated.    Signed:   SINGH Mo-BC   Psychiatry

## 2024-02-05 NOTE — NURSING NOTE
Is the patient currently in the state of [place]? YES    Visit mode:VIDEO    If the visit is dropped, the patient can be reconnected by: VIDEO VISIT: Send to e-mail at: [email or web address]    Will anyone else be joining the visit? NO  (If patient encounters technical issues they should call [phone]    How would you like to obtain your AVS? MyChart    Are changes needed to the allergy or medication list? No    Reason for visit: RECHLONNIE ARCHER

## 2024-02-05 NOTE — PROGRESS NOTES
Virtual Visit Details    Type of service:  Video Visit   Video Start Time: 10:17 AM  Video End Time: 1043 a.m.    Originating Location (pt. Location): Home    Distant Location (provider location):  On-site  Platform used for Video Visit: Leo

## 2024-02-05 NOTE — PATIENT INSTRUCTIONS
"Patient Education   The Panel Psychiatry Program  What to Expect  Here's what to expect in the Panel Psychiatry Program.   About the program  You'll be meeting with a psychiatric doctor to check your mental health. A psychiatric doctor helps you deal with troubling thoughts and feelings by giving you medicine. They'll make sure you know the plan for your care. You may see them for a long time. When you're feeling better, they may refer you back to seeing your family doctor.   If you have any questions, we'll be glad to talk to you.  About visits  Be open  At your visits, please talk openly about your problems. It may feel hard, but it's the best way for us to help you.  Cancelling visits  If you can't come to your visit, please call us right away at [phone]. If you don't cancel at least 24 hours (1 full day) before your visit, that's \"late cancellation.\"  Not showing up for your visits  Being very late is the same as not showing up. You'll be a \"no show\" if:  You're more than 15 minutes late for a 30-minute (half hour) visit.  You're more than 30 minutes late for a 60-minute (full hour) visit.  If you cancel late or don't show up 2 times within 6 months, we may end your care.  Getting help between visits  If you need help between visits, you can call us Monday to Friday from 8 a.m. to 4:30 p.m. at [phone].  Emergency care  Call 911 or go to the nearest emergency department if your life or someone else's life is in danger.  Call 988 anytime to reach the national Suicide and Crisis hotline.  Medicine refills  To refill your medicine, call your pharmacy. You can also call [facility]'s Behavioral Access at [phone], Monday to Friday, 8 a.m. to 4:30 p.m. It can take 1 to 3 business days to get a refill.   Forms, letters, and tests  You may have papers to fill out, like FMLA, short-term disability, and workability. We can help you with these forms at your visits, but you must have an " appointment. You may need more than 1 visit for this, to be in an intensive therapy program, or both.  Before we can give you medicine for ADHD, we may refer you to get tested for it or confirm it another way.  We may not be able to give you an emotional support animal letter.  We don't do mental health checks ordered by the court.   We don't do mental health testing, but we can refer you to get tested.   Thank you for choosing us for your care.  For informational purposes only. Not to replace the advice of your health care provider. Copyright   2022 [employer]. All rights reserved. [facility] 249236 - 12/22.       Treatment Plan:      1.  Continue quetiapine 75 mg at bedtime  2.  Continue paroxetine 20 mg daily  3.  Continue talk therapy        Continue all other medical directions per primary care provider.   Continue all other medications as reviewed per electronic medical record today.   Safety plan reviewed. To the Emergency Department as needed or call after hours crisis line at [phone] or [phone]. [place] Crisis Text Line: Text [place] to 049081  or  Suicide LifeLine Chat: suicidepreventionlifeline.org/chat/  To schedule individual or family therapy, call [facility] at [phone].   Schedule an appointment with me in 6 weeks or sooner as needed.  Call [facility] at [phone] to schedule.  Follow up with primary care provider as planned or for acute medical concerns.  Call the psychiatric nurse line with medication questions or concerns at [phone].  NOWBOXhart may be used to communicate with your provider, but this is not intended to be used for emergencies.        Crisis Resources   The EmPath is an adults only unit located at [facility] is a short term (generally less than 23 hour stay) designed for crisis intervention and stabilization. Pts have the opportunity to meet quickly with a behavioral health team for evaluation in a  calm and peaceful therapuetic environment. To be evaluated for admission pts are triaged throught the [facility] ED.      The following hotlines are for both adults and children. The and are open 24 hours a day, 7 days a week unless noted otherwise.        Crisis Lines      Crisis Text Line  Text 364889  You will be connected with a trained live crisis counselor to provide support.        [place]ambling Hotline  9.361.918.4703 [hope]        línea de crisis española  157.281.4795        [facility] Helpline  [phone]        National Hope Line  8.775.753.9350 [hope]        National Suicide Prevention Lifeline  Free and confidential support  988 or 1.620.142.TALK [8255]  http://suicidepreventionlifeline.org        [employer] [place] [facility] (LGBTQ Youth Crisis Line)  2.909.307.3047  text START to 929-661        [place] Crisis Line  0.648.454.0087 (Press 1)  or text 377415    [facility] Mental Health Crisis Response  Within [place], call **CRISIS [**464650] to be connected to a mental health professional who can assist you.        [facility] Crisis  [phone]      [facility] Mobile Crisis  [phone]      [facility] Crisis  [phone]      [facility] Mobile Crisis  [phone] (adults)  [phone] (children)      [facility] Mobile Crisis  [phone] (adults)  [phone] (children)      [facility] Mobile Crisis  [phone]      [facility] Mobile Crisis  [phone]    Community Resources      Fast Tracker  Linking people to mental health and substance use disorder resources  fasttrackermn.org        [place] Mental Health Warmline  Peer to peer support  5 pm to 9 am 7 days/week  3.579.790.9116  https://mnwitw.org/cooper        National [place] on Mental Illness (DANA)  170.771.8538 or 1.888.DANA.HELPS  https://namimn.org/        [facility] Urgent Care for Adult Mental Health  [facility] residents only   [address] [place] [address]   [phone]        [facility]  Free mental health counseling  https://walkin.org/  [phone] X2    Mental Health Apps      Calm Harm  https://calmharm.co.uk/      My3  https://my3app.org/      [place] Safety Plan  https://www.mysafetyplan.org/

## 2024-02-07 DIAGNOSIS — E53.8 VITAMIN B12 DEFICIENCY (NON ANEMIC): ICD-10-CM

## 2024-02-07 RX ORDER — LANOLIN ALCOHOL/MO/W.PET/CERES
1000 CREAM (GRAM) TOPICAL DAILY
Qty: 90 TABLET | Refills: 2 | Status: SHIPPED | OUTPATIENT
Start: 2024-02-07 | End: 2024-02-09

## 2024-02-09 ENCOUNTER — OFFICE VISIT (OUTPATIENT)
Dept: INTERNAL MEDICINE | Facility: CLINIC | Age: 59
End: 2024-02-09
Payer: COMMERCIAL

## 2024-02-09 VITALS
HEIGHT: 66 IN | WEIGHT: 191 LBS | DIASTOLIC BLOOD PRESSURE: 68 MMHG | SYSTOLIC BLOOD PRESSURE: 112 MMHG | TEMPERATURE: 97.6 F | OXYGEN SATURATION: 94 % | RESPIRATION RATE: 18 BRPM | BODY MASS INDEX: 30.7 KG/M2 | HEART RATE: 59 BPM

## 2024-02-09 DIAGNOSIS — I25.118 CORONARY ARTERY DISEASE OF NATIVE ARTERY OF NATIVE HEART WITH STABLE ANGINA PECTORIS (H): ICD-10-CM

## 2024-02-09 DIAGNOSIS — I77.810 ASCENDING AORTA DILATATION (H): ICD-10-CM

## 2024-02-09 DIAGNOSIS — J41.0 SIMPLE CHRONIC BRONCHITIS (H): ICD-10-CM

## 2024-02-09 DIAGNOSIS — E11.69 TYPE 2 DIABETES MELLITUS WITH OTHER SPECIFIED COMPLICATION, WITHOUT LONG-TERM CURRENT USE OF INSULIN (H): ICD-10-CM

## 2024-02-09 DIAGNOSIS — E11.9 TYPE 2 DIABETES MELLITUS WITHOUT COMPLICATION, WITHOUT LONG-TERM CURRENT USE OF INSULIN (H): Primary | ICD-10-CM

## 2024-02-09 DIAGNOSIS — N18.31 CHRONIC KIDNEY DISEASE, STAGE 3A (H): ICD-10-CM

## 2024-02-09 DIAGNOSIS — E53.8 VITAMIN B12 DEFICIENCY (NON ANEMIC): ICD-10-CM

## 2024-02-09 PROCEDURE — 99214 OFFICE O/P EST MOD 30 MIN: CPT | Performed by: INTERNAL MEDICINE

## 2024-02-09 RX ORDER — LANOLIN ALCOHOL/MO/W.PET/CERES
1000 CREAM (GRAM) TOPICAL DAILY
Qty: 90 TABLET | Refills: 4 | Status: SHIPPED | OUTPATIENT
Start: 2024-02-09

## 2024-02-09 ASSESSMENT — PATIENT HEALTH QUESTIONNAIRE - PHQ9
SUM OF ALL RESPONSES TO PHQ QUESTIONS 1-9: 9
10. IF YOU CHECKED OFF ANY PROBLEMS, HOW DIFFICULT HAVE THESE PROBLEMS MADE IT FOR YOU TO DO YOUR WORK, TAKE CARE OF THINGS AT HOME, OR GET ALONG WITH OTHER PEOPLE: VERY DIFFICULT
SUM OF ALL RESPONSES TO PHQ QUESTIONS 1-9: 9

## 2024-02-09 NOTE — PROGRESS NOTES
"  Office Visit - Follow Up   Chetjennifer Blake   58 year old male    Date of Visit: 2/9/2024    Chief Complaint   Patient presents with    Hospital F/U     ER follow up from 12/20/23 for bronchitis, coughing for over 2 months.        Assessment and Plan   1. Vitamin B12 deficiency (non anemic)  - cyanocobalamin (VITAMIN B-12) 1000 MCG tablet; Take 1 tablet (1,000 mcg) by mouth daily  Dispense: 90 tablet; Refill: 4    2. Type 2 diabetes mellitus without complication, without long-term current use of insulin (H)  Has been well-controlled continue same    3. Coronary artery disease, CABG 6/2020  Continue secondary prevention    4. Chronic kidney disease, stage 3a (H)  Stable  5. Simple chronic bronchitis (H)  Improved    6. Ascending aorta dilatation (H24)  Followed by cardiology    7. Type 2 diabetes mellitus with other specified complication, without long-term current use of insulin (H)  As above      Return in about 4 weeks (around 3/8/2024) for Follow up.     History of Present Illness   This 58 year old man you are seen in a monthly basis with significant medical issues comes in after I have been out for about 2 months.  He had numerous ER or urgent care visits as well as visits with internist felt the system was gone.  He is now feeling better.  He had significant issues with bronchitis which has resolved.       Physical Exam   General Appearance:   No acute distress    /68 (BP Location: Left arm, Patient Position: Sitting, Cuff Size: Adult Regular)   Pulse 59   Temp 97.6  F (36.4  C) (Tympanic)   Resp 18   Ht 1.681 m (5' 6.2\")   Wt 86.6 kg (191 lb)   SpO2 94%   BMI 30.64 kg/m      Heart rate controlled rhythm regular lungs clear to auscultation bilaterally     Additional Information   Current Outpatient Medications   Medication Sig Dispense Refill    acetaminophen (TYLENOL) 500 MG tablet Take 1-2 tablets (500-1,000 mg) by mouth every 6 hours as needed for mild pain 360 tablet 3    albuterol (PROAIR " HFA/PROVENTIL HFA/VENTOLIN HFA) 108 (90 Base) MCG/ACT inhaler Inhale 2 puffs into the lungs every 6 hours as needed for shortness of breath, wheezing or cough 18 g 0    aspirin (ASA) 81 MG chewable tablet CHEW AND SWALLOW 1 TABLET(81 MG) BY MOUTH DAILY 90 tablet 2    azelastine (ASTELIN) 0.1 % nasal spray Spray 1 spray into both nostrils 2 times daily 30 mL 1    busPIRone (BUSPAR) 5 MG tablet TAKE 1 TABLET BY MOUTH EVERY MORNING AND AN ADDITIONAL TABLET LATER IN THE DAY AS NEEDED FOR ANXIETY 180 tablet 0    Cholecalciferol (VITAMIN D3) 50 MCG (2000 UT) CAPS TAKE 1 CAPSULE BY MOUTH DAILY 90 capsule 1    cyanocobalamin (VITAMIN B-12) 1000 MCG tablet Take 1 tablet (1,000 mcg) by mouth daily 90 tablet 4    diclofenac (VOLTAREN) 1 % topical gel Apply 4 g topically 4 times daily 500 g 2    empagliflozin (JARDIANCE) 25 MG TABS tablet Take 1 tablet (25 mg) by mouth daily 90 tablet 4    escitalopram (LEXAPRO) 20 MG tablet Take 0.5 tablets (10 mg) by mouth daily For 4 days then stop      ipratropium (ATROVENT) 0.06 % nasal spray Spray 2 sprays into both nostrils 4 times daily 15 mL 1    isosorbide mononitrate (IMDUR) 30 MG 24 hr tablet TAKE 1 TABLET(30 MG) BY MOUTH DAILY 90 tablet 2    Lidocaine 0.5 % GEL Externally apply topically as needed      metoprolol succinate ER (TOPROL XL) 50 MG 24 hr tablet Take 1 tablet (50 mg) by mouth daily 90 tablet 3    nifedipine 0.2% in white petrolatum 0.2 % OINT ointment Apply topically 4 times daily as needed (anal fissure) 100 g 1    nitroGLYcerin (NITROSTAT) 0.4 MG sublingual tablet For chest pain place 1 tablet under the tongue every 5 minutes for 3 doses. If symptoms persist 5 minutes after 1st dose call 911. 30 tablet 3    omeprazole (PRILOSEC) 20 MG DR capsule Take 1 capsule (20 mg) by mouth daily 90 capsule 1    oxybutynin (DITROPAN) 5 MG tablet Take 1 tablet (5 mg) by mouth At Bedtime 90 tablet 4    PARoxetine (PAXIL) 20 MG tablet Take 1 tablet (20 mg) by mouth every morning 30  tablet 3    polyethylene glycol (MIRALAX) 17 GM/Dose powder TAKE 17 GRAMS(1 CAPFUL) BY MOUTH DAILY 840 g 4    QUEtiapine (SEROQUEL) 50 MG tablet Take 1.5 tablets (75 mg) by mouth at bedtime 45 tablet 4    rosuvastatin (CRESTOR) 40 MG tablet Take 1 tablet (40 mg) by mouth daily 90 tablet 4    tamsulosin (FLOMAX) 0.4 MG capsule TAKE 2 CAPSULES(0.8 MG) BY MOUTH EVERY EVENING 180 capsule 4    benzonatate (TESSALON) 200 MG capsule Take 1 capsule (200 mg) by mouth 3 times daily as needed for cough (Patient not taking: Reported on 2/9/2024) 30 capsule 0    predniSONE (DELTASONE) 20 MG tablet 2 tablets today and tomorrow, then 1 po qd (Patient not taking: Reported on 2/9/2024) 7 tablet 0       Time:      Az Briseno MD

## 2024-02-13 ENCOUNTER — ALLIED HEALTH/NURSE VISIT (OUTPATIENT)
Dept: EDUCATION SERVICES | Facility: CLINIC | Age: 59
End: 2024-02-13
Payer: COMMERCIAL

## 2024-02-13 VITALS — BODY MASS INDEX: 30.72 KG/M2 | WEIGHT: 191.5 LBS

## 2024-02-13 DIAGNOSIS — E11.9 TYPE 2 DIABETES MELLITUS WITHOUT COMPLICATION, WITHOUT LONG-TERM CURRENT USE OF INSULIN (H): Primary | ICD-10-CM

## 2024-02-13 PROCEDURE — G0108 DIAB MANAGE TRN  PER INDIV: HCPCS | Mod: AE

## 2024-02-13 NOTE — PATIENT INSTRUCTIONS
1. Eat 3 balanced meals each day - Monitor carb intake and limit to 60-75 grams per meal  This would be equal to 4-5 choices ~  1 choice = 15 grams    Go get yourself some protein powder to add to your breakfast     Do not wait longer than 4-5 hours to eat something  Snacks limit to no more than 30 grams of carbohydrates or 2 choices  Make sure you include protein source with each meal and at bedtime - this has been shown to help with blood glucose elevations        2. Incorporate at least 30 minutes activity into each day - does not need to be all at one time & walking counts    4. Take diabetes medications as prescribed Continue Jardiance 25 mg each day       Thank you for coming in to see me today !      I value your experience and would be very thankful for your time in providing feedback in our clinic survey.    You may receive an e-mail, text message or even something in the mail from [employer].  This is a survey to let us know how we are doing - the survey will be related to your diabetes education and me.

## 2024-02-13 NOTE — LETTER
2/13/2024         RE: Nadya Blake  [address]  [facility] [id number]        Dear Colleague,    Thank you for referring your patient, Nadya Blake, to the [facility]. Please see a copy of my visit note below.    Diabetes Self-Management Education & Support    Presents for: Individual review    Type of Service: In Person Visit      ASSESSMENT:  Nadya is a very pleasant 58-year-old gentleman who returns to clinic today per his request for assessment/assistance with his current diabetes self-management skills.  He arrived today accompanied by his wife Carlee.  Medications were reviewed and Nadya confirms he continues to take 25 mg of Jardiance p.o. daily with no missed or skipped doses.  We went over current intake and it appears his appetite has improved somewhat and he is now eating more substantial meals and prior when he would just have juice or fruit.  We talked about  the importance of inclusion of protein in meals and I advised him to this may help him with his energy level.  He reported he has been having a lot of difficulty with sleeping, will wake numerous times throughout the night and is having nightmares.  During visit today his affect was somewhat flat and he was quiet and seemed somewhat down.  They last year, he has had quite a few complex medical issues and I think this contributes to his anxiety and depression.  I informed him that based on his most recent A1c, his glycemic control and diabetes management continues to be excellent around told him to keep up the good work.  I will see him again in approximately 4 to 5 weeks, per his request instructed him to call with any questions or concerns that might come up before then.        Patient's most recent   Lab Results   Component Value Date    A1C 5.5 01/09/2024     is meeting goal of <7.0    Diabetes knowledge and skills assessment:   Patient is knowledgeable in diabetes management concepts related to:  "Healthy Eating, Being Active, Taking Medication, and Reducing Risks    Continue education with the following diabetes management concepts: Healthy Eating and Healthy Coping    Based on learning assessment above, most appropriate setting for further diabetes education would be: Individual setting.      PLAN    See Patient Instructions for co-developed, patient-stated behavior change goals.  AVS printed and provided to patient today. See Follow-Up section for recommended follow-up.       Topics to cover at upcoming visits: Healthy Eating, Reducing Risks, and Healthy Coping    Follow-up: 3/12/24    See Care Plan for co-developed, patient-state behavior change goals.  AVS provided for patient today.    Education Materials Provided:  No new materials provided today      SUBJECTIVE/OBJECTIVE:  Presents for: Individual review  Accompanied by: Spouse  Diabetes education in the past 24 mo: Yes (1/9/24)  Focus of Visit: Taking Medication, Healthy Coping, Healthy Eating, Being Active  Diabetes type: Type 2  Date of diagnosis: 2022  Disease course: Stable  How confident are you filling out medical forms by yourself:: Not at all  Difficulty affording diabetes medication?: No  Other concerns:: None, English as a second language  Cultural Influences/Ethnic Background:  Not  or       Diabetes Symptoms & Complications:  Weight trend: Stable  Symptom course: Stable  Disease course: Stable       Patient Problem List and Family Medical History reviewed for relevant medical history, current medical status, and diabetes risk factors.    Vitals:  Wt 86.9 kg (191 lb 8 oz)   BMI 30.72 kg/m    Estimated body mass index is 30.72 kg/m  as calculated from the following:    Height as of 2/9/24: 1.681 m (5' 6.2\").    Weight as of this encounter: 86.9 kg (191 lb 8 oz).   Last 3 BP:   BP Readings from Last 3 Encounters:   02/09/24 112/68   01/25/24 110/78   01/17/24 122/86       History   Smoking Status     Former     Packs/day: 1.00 " "    Years: 30.00     Types: Cigarettes     Quit date: 3/23/2020   Smokeless Tobacco     Never       Labs:  Lab Results   Component Value Date    A1C 5.5 01/09/2024     Lab Results   Component Value Date     12/20/2023     05/13/2022     Lab Results   Component Value Date    LDL 32 08/28/2023    LDL 41 08/07/2020     Direct Measure HDL   Date Value Ref Range Status   08/28/2023 33 (L) >=40 mg/dL Final   ]  GFR Estimate   Date Value Ref Range Status   12/20/2023 70 >60 mL/min/1.73m2 Final   05/24/2021 >60 >60 mL/min/1.73m2 Final     GFR Estimate If Black   Date Value Ref Range Status   05/24/2021 >60 >60 mL/min/1.73m2 Final     Lab Results   Component Value Date    CR 1.22 12/20/2023     No results found for: \"MICROALBUMIN\"    Healthy Eating:  Healthy Eating Assessed Today: Yes  Do you have any food allergies or intolerances?: No  Meal planning/habits: Heart healthy, Other  Please elaborate:: vegan diet  Who cooks/prepares meals for you?: Spouse  Who purchases food in  your home?: Spouse  Meals include: Lunch, Dinner, Evening Snack  Breakfast: : ranjith, parsley, lemon, cucumber  Lunch: tea, tahini with rye bread - apple juice  Dinner: 2 falafel sandwiches - hummus and tahini with date juice and eggplant added  Beverages: Water, Tea, Juice    Being Active:  Being Active Assessed Today: Yes  Exercise:: Yes  Days per week of moderate to strenuous exercise (like a brisk walk): 5  On average, minutes per day of exercise at this level: 30  How intense was your typical exercise? : Moderate (like brisk walking)  Exercise Minutes per Week: 150  Barrier to exercise: None    Monitoring:  Monitoring Assessed Today: No  Times checking blood sugar at home (number): Never    Patient currently not monitoring blood glucose independently at home.    Taking Medications:  Diabetes Medication(s)       Sodium-Glucose Co-Transporter 2 (SGLT2) Inhibitors       empagliflozin (JARDIANCE) 25 MG TABS tablet Take 1 tablet (25 " mg) by mouth daily            Taking Medication Assessed Today: Yes  Current Treatments: Oral Medication (taken by mouth)  Problems taking diabetes medications regularly?: No  Diabetes medication side effects?: No    Problem Solving:  Problem Solving Assessed Today: Yes  Is the patient at risk for hypoglycemia?: No  Is the patient at risk for DKA?: No  Does patient have severe weather/disaster plan for diabetes management?: Not Needed  Does patient have sick day plan for diabetes management?: Not Needed              Reducing Risks:  Reducing Risks Assessed Today: Yes  Diabetes Risks: Age over 45 years, Ethnicity, Hyperlipidemia  CAD Risks: Dyslipidemia, Diabetes Mellitus, Male sex, Hypertension    Healthy Coping:  Healthy Coping Assessed Today: Yes  Emotional response to diabetes: Ready to learn, Fear/Anxiety  Informal Support system:: Children, Spouse  Stage of change: ACTION (Actively working towards change)  Support resources: In-person Offerings  Patient Activation Measure Survey Score:       No data to display                  Care Plan and Education Provided:  Care Plan: Diabetes   Updates made by Jojo Soria RN since 2/14/2024 12:00 AM        Problem: HbA1C Not In Goal         Goal: Establish Regular Follow-Ups with PCP    This Visit's Progress: 100%        Goal: Get HbA1C Level in Goal    This Visit's Progress: 100%   Recent Progress: 100%   Note:    Maintain A1c <7%       Problem: Diabetes Self-Management Education Needed to Optimize Self-Care Behaviors         Goal: Healthy Eating - follow a healthy eating pattern for diabetes    This Visit's Progress: 70%   Recent Progress: 50%   Note:    Incorporate protein source with each meal and snack       Task: Provide education on heart healthy eating Completed 2/14/2024   Responsible User: Jojo Soria RN        Goal: Being Active - get regular physical activity, working up to at least 150 minutes per week    This Visit's Progress: 100%        Task:  Develop physical activity plan with patient Completed 2/14/2024   Responsible User: Jojo Soria RN        Goal: Taking Medication - patient is consistently taking medications as directed    This Visit's Progress: 100%        Goal: Reducing Risks - know how to prevent and treat long-term diabetes complications    This Visit's Progress: 100%   Recent Progress: 100%        Goal: Healthy Coping - use available resources to cope with the challenges of managing diabetes    This Visit's Progress: 70%        Task: Discuss recognizing feelings about having diabetes Completed 2/14/2024   Responsible User: Jojo Soria RN        Task: Provide education on benefits of utilizing support systems Completed 2/14/2024   Responsible User: Jojo Soria RN        Task: Discuss methods for coping with stress Completed 2/14/2024   Responsible User: Jojo Soria RN          Thank you,  Jojo Soria RN [facility]  Certified Diabetes Care and   Visit type : DSMT      Time Spent: 30 minutes  Encounter Type: Individual    Any diabetes medication dose changes were made via the CDE Protocol per the patient's referring provider and primary care provider. A copy of this encounter was shared with the provider.     Much or all of the text in this note was generated through the use of the Dragon Dictate voice-to-text software.Errors in spelling or words which seem out of context are unintentional. Sound alike errors, in particular, may have escaped editing.

## 2024-02-14 NOTE — PROGRESS NOTES
Diabetes Self-Management Education & Support    Presents for: Individual review    Type of Service: In Person Visit      ASSESSMENT:  Nadya is a very pleasant 58-year-old gentleman who returns to clinic today per his request for assessment/assistance with his current diabetes self-management skills.  He arrived today accompanied by his wife Carlee.  Medications were reviewed and Nadya confirms he continues to take 25 mg of Jardiance p.o. daily with no missed or skipped doses.  We went over current intake and it appears his appetite has improved somewhat and he is now eating more substantial meals and prior when he would just have juice or fruit.  We talked about  the importance of inclusion of protein in meals and I advised him to this may help him with his energy level.  He reported he has been having a lot of difficulty with sleeping, will wake numerous times throughout the night and is having nightmares.  During visit today his affect was somewhat flat and he was quiet and seemed somewhat down.  They last year, he has had quite a few complex medical issues and I think this contributes to his anxiety and depression.  I informed him that based on his most recent A1c, his glycemic control and diabetes management continues to be excellent around told him to keep up the good work.  I will see him again in approximately 4 to 5 weeks, per his request instructed him to call with any questions or concerns that might come up before then.        Patient's most recent   Lab Results   Component Value Date    A1C 5.5 01/09/2024     is meeting goal of <7.0    Diabetes knowledge and skills assessment:   Patient is knowledgeable in diabetes management concepts related to: Healthy Eating, Being Active, Taking Medication, and Reducing Risks    Continue education with the following diabetes management concepts: Healthy Eating and Healthy Coping    Based on learning assessment above, most appropriate setting for further diabetes  "education would be: Individual setting.      PLAN    See Patient Instructions for co-developed, patient-stated behavior change goals.  AVS printed and provided to patient today. See Follow-Up section for recommended follow-up.       Topics to cover at upcoming visits: Healthy Eating, Reducing Risks, and Healthy Coping    Follow-up: 3/12/24    See Care Plan for co-developed, patient-state behavior change goals.  AVS provided for patient today.    Education Materials Provided:  No new materials provided today      SUBJECTIVE/OBJECTIVE:  Presents for: Individual review  Accompanied by: Spouse  Diabetes education in the past 24 mo: Yes (1/9/24)  Focus of Visit: Taking Medication, Healthy Coping, Healthy Eating, Being Active  Diabetes type: Type 2  Date of diagnosis: 2022  Disease course: Stable  How confident are you filling out medical forms by yourself:: Not at all  Difficulty affording diabetes medication?: No  Other concerns:: None, English as a second language  Cultural Influences/Ethnic Background:  Not  or       Diabetes Symptoms & Complications:  Weight trend: Stable  Symptom course: Stable  Disease course: Stable       Patient Problem List and Family Medical History reviewed for relevant medical history, current medical status, and diabetes risk factors.    Vitals:  Wt 86.9 kg (191 lb 8 oz)   BMI 30.72 kg/m    Estimated body mass index is 30.72 kg/m  as calculated from the following:    Height as of 2/9/24: 1.681 m (5' 6.2\").    Weight as of this encounter: 86.9 kg (191 lb 8 oz).   Last 3 BP:   BP Readings from Last 3 Encounters:   02/09/24 112/68   01/25/24 110/78   01/17/24 122/86       History   Smoking Status    Former    Packs/day: 1.00    Years: 30.00    Types: Cigarettes    Quit date: 3/23/2020   Smokeless Tobacco    Never       Labs:  Lab Results   Component Value Date    A1C 5.5 01/09/2024     Lab Results   Component Value Date     12/20/2023     05/13/2022     Lab Results " "  Component Value Date    LDL 32 08/28/2023    LDL 41 08/07/2020     Direct Measure HDL   Date Value Ref Range Status   08/28/2023 33 (L) >=40 mg/dL Final   ]  GFR Estimate   Date Value Ref Range Status   12/20/2023 70 >60 mL/min/1.73m2 Final   05/24/2021 >60 >60 mL/min/1.73m2 Final     GFR Estimate If Black   Date Value Ref Range Status   05/24/2021 >60 >60 mL/min/1.73m2 Final     Lab Results   Component Value Date    CR 1.22 12/20/2023     No results found for: \"MICROALBUMIN\"    Healthy Eating:  Healthy Eating Assessed Today: Yes  Do you have any food allergies or intolerances?: No  Meal planning/habits: Heart healthy, Other  Please elaborate:: vegan diet  Who cooks/prepares meals for you?: Spouse  Who purchases food in  your home?: Spouse  Meals include: Lunch, Dinner, Evening Snack  Breakfast: : ranjith, parsley, lemon, cucumber  Lunch: tea, tahini with rye bread - apple juice  Dinner: 2 falafel sandwiches - hummus and tahini with date juice and eggplant added  Beverages: Water, Tea, Juice    Being Active:  Being Active Assessed Today: Yes  Exercise:: Yes  Days per week of moderate to strenuous exercise (like a brisk walk): 5  On average, minutes per day of exercise at this level: 30  How intense was your typical exercise? : Moderate (like brisk walking)  Exercise Minutes per Week: 150  Barrier to exercise: None    Monitoring:  Monitoring Assessed Today: No  Times checking blood sugar at home (number): Never    Patient currently not monitoring blood glucose independently at home.    Taking Medications:  Diabetes Medication(s)       Sodium-Glucose Co-Transporter 2 (SGLT2) Inhibitors       empagliflozin (JARDIANCE) 25 MG TABS tablet Take 1 tablet (25 mg) by mouth daily            Taking Medication Assessed Today: Yes  Current Treatments: Oral Medication (taken by mouth)  Problems taking diabetes medications regularly?: No  Diabetes medication side effects?: No    Problem Solving:  Problem Solving Assessed " Today: Yes  Is the patient at risk for hypoglycemia?: No  Is the patient at risk for DKA?: No  Does patient have severe weather/disaster plan for diabetes management?: Not Needed  Does patient have sick day plan for diabetes management?: Not Needed              Reducing Risks:  Reducing Risks Assessed Today: Yes  Diabetes Risks: Age over 45 years, Ethnicity, Hyperlipidemia  CAD Risks: Dyslipidemia, Diabetes Mellitus, Male sex, Hypertension    Healthy Coping:  Healthy Coping Assessed Today: Yes  Emotional response to diabetes: Ready to learn, Fear/Anxiety  Informal Support system:: Children, Spouse  Stage of change: ACTION (Actively working towards change)  Support resources: In-person Offerings  Patient Activation Measure Survey Score:       No data to display                  Care Plan and Education Provided:  Care Plan: Diabetes   Updates made by Jojo Soria RN since 2/14/2024 12:00 AM        Problem: HbA1C Not In Goal         Goal: Establish Regular Follow-Ups with PCP    This Visit's Progress: 100%        Goal: Get HbA1C Level in Goal    This Visit's Progress: 100%   Recent Progress: 100%   Note:    Maintain A1c <7%       Problem: Diabetes Self-Management Education Needed to Optimize Self-Care Behaviors         Goal: Healthy Eating - follow a healthy eating pattern for diabetes    This Visit's Progress: 70%   Recent Progress: 50%   Note:    Incorporate protein source with each meal and snack       Task: Provide education on heart healthy eating Completed 2/14/2024   Responsible User: Jojo Soria RN        Goal: Being Active - get regular physical activity, working up to at least 150 minutes per week    This Visit's Progress: 100%        Task: Develop physical activity plan with patient Completed 2/14/2024   Responsible User: Jojo Soria RN        Goal: Taking Medication - patient is consistently taking medications as directed    This Visit's Progress: 100%        Goal: Reducing Risks - know how  to prevent and treat long-term diabetes complications    This Visit's Progress: 100%   Recent Progress: 100%        Goal: Healthy Coping - use available resources to cope with the challenges of managing diabetes    This Visit's Progress: 70%        Task: Discuss recognizing feelings about having diabetes Completed 2/14/2024   Responsible User: Jojo Soria RN        Task: Provide education on benefits of utilizing support systems Completed 2/14/2024   Responsible User: Jojo Soria RN        Task: Discuss methods for coping with stress Completed 2/14/2024   Responsible User: Jojo Soria, RN          Thank you,  Jojo Soria RN [facility]  Certified Diabetes Care and   Visit type : DSMT      Time Spent: 30 minutes  Encounter Type: Individual    Any diabetes medication dose changes were made via the CDE Protocol per the patient's referring provider and primary care provider. A copy of this encounter was shared with the provider.     Much or all of the text in this note was generated through the use of the Dragon Dictate voice-to-text software.Errors in spelling or words which seem out of context are unintentional. Sound alike errors, in particular, may have escaped editing.

## 2024-02-28 DIAGNOSIS — R05.2 SUBACUTE COUGH: ICD-10-CM

## 2024-02-28 RX ORDER — ALBUTEROL SULFATE 90 UG/1
2 AEROSOL, METERED RESPIRATORY (INHALATION) EVERY 6 HOURS PRN
Qty: 18 G | Refills: 0 | Status: SHIPPED | OUTPATIENT
Start: 2024-02-28 | End: 2024-04-05

## 2024-02-28 NOTE — TELEPHONE ENCOUNTER
Prescription approved per [facility] Refill Protocol.  Vicenta Moseley, RN  [facility] Triage Nurse

## 2024-03-04 ENCOUNTER — VIRTUAL VISIT (OUTPATIENT)
Dept: PSYCHIATRY | Facility: CLINIC | Age: 59
End: 2024-03-04
Payer: COMMERCIAL

## 2024-03-04 DIAGNOSIS — F33.1 DEPRESSION, MAJOR, RECURRENT, MODERATE (H): Primary | ICD-10-CM

## 2024-03-04 DIAGNOSIS — F41.1 GAD (GENERALIZED ANXIETY DISORDER): ICD-10-CM

## 2024-03-04 DIAGNOSIS — F43.10 PTSD (POST-TRAUMATIC STRESS DISORDER): ICD-10-CM

## 2024-03-04 DIAGNOSIS — F99 INSOMNIA DUE TO OTHER MENTAL DISORDER: ICD-10-CM

## 2024-03-04 DIAGNOSIS — F51.05 INSOMNIA DUE TO OTHER MENTAL DISORDER: ICD-10-CM

## 2024-03-04 PROCEDURE — 99213 OFFICE O/P EST LOW 20 MIN: CPT | Mod: 95 | Performed by: NURSE PRACTITIONER

## 2024-03-04 RX ORDER — BUSPIRONE HYDROCHLORIDE 5 MG/1
5 TABLET ORAL 2 TIMES DAILY
Qty: 180 TABLET | Refills: 0 | Status: SHIPPED | OUTPATIENT
Start: 2024-03-04 | End: 2024-07-25

## 2024-03-04 RX ORDER — PAROXETINE 20 MG/1
20 TABLET, FILM COATED ORAL EVERY MORNING
Qty: 30 TABLET | Refills: 3 | Status: SHIPPED | OUTPATIENT
Start: 2024-03-04 | End: 2024-07-25

## 2024-03-04 ASSESSMENT — PATIENT HEALTH QUESTIONNAIRE - PHQ9
SUM OF ALL RESPONSES TO PHQ QUESTIONS 1-9: 8
SUM OF ALL RESPONSES TO PHQ QUESTIONS 1-9: 8
10. IF YOU CHECKED OFF ANY PROBLEMS, HOW DIFFICULT HAVE THESE PROBLEMS MADE IT FOR YOU TO DO YOUR WORK, TAKE CARE OF THINGS AT HOME, OR GET ALONG WITH OTHER PEOPLE: VERY DIFFICULT

## 2024-03-04 NOTE — PATIENT INSTRUCTIONS
"Patient Education   The Panel Psychiatry Program  What to Expect  Here's what to expect in the Panel Psychiatry Program.   About the program  You'll be meeting with a psychiatric doctor to check your mental health. A psychiatric doctor helps you deal with troubling thoughts and feelings by giving you medicine. They'll make sure you know the plan for your care. You may see them for a long time. When you're feeling better, they may refer you back to seeing your family doctor.   If you have any questions, we'll be glad to talk to you.  About visits  Be open  At your visits, please talk openly about your problems. It may feel hard, but it's the best way for us to help you.  Cancelling visits  If you can't come to your visit, please call us right away at [phone]. If you don't cancel at least 24 hours (1 full day) before your visit, that's \"late cancellation.\"  Not showing up for your visits  Being very late is the same as not showing up. You'll be a \"no show\" if:  You're more than 15 minutes late for a 30-minute (half hour) visit.  You're more than 30 minutes late for a 60-minute (full hour) visit.  If you cancel late or don't show up 2 times within 6 months, we may end your care.  Getting help between visits  If you need help between visits, you can call us Monday to Friday from 8 a.m. to 4:30 p.m. at [phone].  Emergency care  Call 911 or go to the nearest emergency department if your life or someone else's life is in danger.  Call 988 anytime to reach the national Suicide and Crisis hotline.  Medicine refills  To refill your medicine, call your pharmacy. You can also call [facility]'s Behavioral Access at [phone], Monday to Friday, 8 a.m. to 4:30 p.m. It can take 1 to 3 business days to get a refill.   Forms, letters, and tests  You may have papers to fill out, like FMLA, short-term disability, and workability. We can help you with these forms at your visits, but you must have an " appointment. You may need more than 1 visit for this, to be in an intensive therapy program, or both.  Before we can give you medicine for ADHD, we may refer you to get tested for it or confirm it another way.  We may not be able to give you an emotional support animal letter.  We don't do mental health checks ordered by the court.   We don't do mental health testing, but we can refer you to get tested.   Thank you for choosing us for your care.  For informational purposes only. Not to replace the advice of your health care provider. Copyright   2022 [employer]. All rights reserved. [employer] 204639 - 12/22.       Treatment Plan:      1.  Quetiapine 75 mg daily  2.  Buspirone 5 mg 2 times daily  3.  Paroxetine 20 mg daily  4.  Continue talk therapy    Continue all other medical directions per primary care provider.   Continue all other medications as reviewed per electronic medical record today.   Safety plan reviewed. To the Emergency Department as needed or call after hours crisis line at [phone] or [phone]. [place] Crisis Text [place]: Text [place] to 888941  or  Suicide LifeLine Chat: suicidepreventionlifeline.org/chat/  To schedule individual or family therapy, call [facility] at [phone].   Schedule an appointment with me in 6 weeks or sooner as needed.  Call [facility] at [phone] to schedule.  Follow up with primary care provider as planned or for acute medical concerns.  Call the psychiatric nurse line with medication questions or concerns at [phone].  [employer] may be used to communicate with your provider, but this is not intended to be used for emergencies.        Crisis Resources   The EmPath is an adults only unit located at [facility] is a short term (generally less than 23 hour stay) designed for crisis intervention and stabilization. Pts have the opportunity to meet quickly with a behavioral health team for evaluation  in a calm and peaceful therapuetic environment. To be evaluated for admission pts are triaged throught the [facility] ED.      The following hotlines are for both adults and children. The and are open 24 hours a day, 7 days a week unless noted otherwise.        Crisis Lines      Crisis Text Line  Text 461203  You will be connected with a trained live crisis counselor to provide support.        Gambling Hotline  5.843.856.2264 [hope]        línea de crisis española  [phone]        [facility] Helpline  [phone]        National Hope Line  6.530.170.7012 [hope]        National Suicide Prevention Lifeline  Free and confidential support  988 or 1.185.169.TALK [8255]  http://suicidepreventionlifeline.org        The [place] Project (LGBTQ Youth Crisis Line)  4.860.495.5351  text START to 103-068        [place]'s Crisis Line  9.350.711.3637 (Press 1)  or text 442468    [facility] [place] Mental Health Crisis Response  Within [place], call **CRISIS [**716352] to be connected to a mental health professional who can assist you.        [facility] Crisis  [phone]      [facility] Mobile Crisis  [phone]      [facility] Crisis  [phone]      [facility] Mobile Crisis  [phone] (adults)  [phone] (children)      [facility] Mobile Crisis  [phone] (adults)  [phone] (children)      [facility] Mobile Crisis  [phone]      [facility] Mobile Crisis  [phone]    Community Resources      Fast Tracker  Linking people to mental health and substance use disorder resources  fasttrackermn.org        [place] Mental Health Warmline  Peer to peer support  5 pm to 9 am 7 days/week  7.731.422.1400  https://mnwitw.org/cooper        National [place] on Mental Illness (DANA)  399.922.5256 or 1.888.DANA.HELPS  https://namimn.org/        [facility] Urgent Care for Adult Mental Health  [facility] residents only   [address] [place], [place] [address]Marshal Marshal   Rod  [phone]        [facility]  Free mental health counseling  https://walkin.org/  612.870.0565 X2    Mental Health Apps      Calm Harm  https://calmharm.co.uk/      My3  https://my3app.org/      [place] Safety Plan  https://www.mysafetyplan.org/

## 2024-03-04 NOTE — NURSING NOTE
Is the patient currently in the state of [place]? YES    Visit mode:VIDEO    If the visit is dropped, the patient can be reconnected by: VIDEO VISIT: Text to cell phone:   Telephone Information:   Mobile [phone]       Will anyone else be joining the visit? No  (If patient encounters technical issues they should call [phone])    How would you like to obtain your AVS? MyChart    Are changes needed to the allergy or medication list? No    Rooming Documentation: Assigned questionnaire(s) completed .    Reason for visit: RECHGIANNI Shipley

## 2024-03-04 NOTE — PROGRESS NOTES
"Virtual Visit Details    Type of service:  Video Visit   Video Start Time: 8:23 AM  Video End Time:8:42 AM    Originating Location (pt. Location): Home    Distant Location (provider location):  On-site  Platform used for Video Visit: [facility]           Outpatient Psychiatric Progress Note    Name: Nadya Blake   : 1965                    Primary Care Provider: Az Briseno MD   Therapist: Yes    PHQ-9 scores:      12/15/2023     1:27 PM 2024     9:28 AM 2024     9:36 AM   PHQ-9 SCORE   PHQ-9 Total Score MyChart 18 (Moderately severe depression) 15 (Moderately severe depression) 9 (Mild depression)   PHQ-9 Total Score 18 15 9       ISI-7 scores:      2023    11:06 AM 2023    10:41 AM 2024     9:33 AM   ISI-7 SCORE   Total Score 14 (moderate anxiety) 15 (severe anxiety) 14 (moderate anxiety)   Total Score 14 15 14       Patient Identification:    Patient is a 58 year old year old,    Not  or  male  who presents for return visit with me.  Patient is currently unemployed. Patient attended the session with his wife , who they agreed to have interview with. Patient prefers to be called: \" Sania\".    Current medications include: acetaminophen (TYLENOL) 500 MG tablet, Take 1-2 tablets (500-1,000 mg) by mouth every 6 hours as needed for mild pain  albuterol (PROAIR HFA/PROVENTIL HFA/VENTOLIN HFA) 108 (90 Base) MCG/ACT inhaler, Inhale 2 puffs into the lungs every 6 hours as needed for shortness of breath, wheezing or cough  aspirin (ASA) 81 MG chewable tablet, CHEW AND SWALLOW 1 TABLET(81 MG) BY MOUTH DAILY  azelastine (ASTELIN) 0.1 % nasal spray, Spray 1 spray into both nostrils 2 times daily  benzonatate (TESSALON) 200 MG capsule, Take 1 capsule (200 mg) by mouth 3 times daily as needed for cough (Patient not taking: Reported on 2024)  busPIRone (BUSPAR) 5 MG tablet, TAKE 1 TABLET BY MOUTH EVERY MORNING AND AN ADDITIONAL TABLET LATER IN THE DAY " AS NEEDED FOR ANXIETY  Cholecalciferol (VITAMIN D3) 50 MCG (2000 UT) CAPS, TAKE 1 CAPSULE BY MOUTH DAILY  cyanocobalamin (VITAMIN B-12) 1000 MCG tablet, Take 1 tablet (1,000 mcg) by mouth daily  diclofenac (VOLTAREN) 1 % topical gel, Apply 4 g topically 4 times daily  empagliflozin (JARDIANCE) 25 MG TABS tablet, Take 1 tablet (25 mg) by mouth daily  escitalopram (LEXAPRO) 20 MG tablet, Take 0.5 tablets (10 mg) by mouth daily For 4 days then stop  ipratropium (ATROVENT) 0.06 % nasal spray, Spray 2 sprays into both nostrils 4 times daily  isosorbide mononitrate (IMDUR) 30 MG 24 hr tablet, TAKE 1 TABLET(30 MG) BY MOUTH DAILY  Lidocaine 0.5 % GEL, Externally apply topically as needed  metoprolol succinate ER (TOPROL XL) 50 MG 24 hr tablet, Take 1 tablet (50 mg) by mouth daily  nifedipine 0.2% in white petrolatum 0.2 % OINT ointment, Apply topically 4 times daily as needed (anal fissure)  nitroGLYcerin (NITROSTAT) 0.4 MG sublingual tablet, For chest pain place 1 tablet under the tongue every 5 minutes for 3 doses. If symptoms persist 5 minutes after 1st dose call 911.  omeprazole (PRILOSEC) 20 MG DR capsule, Take 1 capsule (20 mg) by mouth daily  oxybutynin (DITROPAN) 5 MG tablet, Take 1 tablet (5 mg) by mouth At Bedtime  PARoxetine (PAXIL) 20 MG tablet, Take 1 tablet (20 mg) by mouth every morning  polyethylene glycol (MIRALAX) 17 GM/Dose powder, TAKE 17 GRAMS(1 CAPFUL) BY MOUTH DAILY  predniSONE (DELTASONE) 20 MG tablet, 2 tablets today and tomorrow, then 1 po qd (Patient not taking: Reported on 2/9/2024)  QUEtiapine (SEROQUEL) 50 MG tablet, Take 1.5 tablets (75 mg) by mouth at bedtime  rosuvastatin (CRESTOR) 40 MG tablet, Take 1 tablet (40 mg) by mouth daily  tamsulosin (FLOMAX) 0.4 MG capsule, TAKE 2 CAPSULES(0.8 MG) BY MOUTH EVERY EVENING    albuterol (PROVENTIL HFA/VENTOLIN HFA) inhaler         The [place] Prescription Monitoring Program has been reviewed and there are no concerns about diversionary activity for  controlled substances at this time.      I was able to review most recent Primary Care Provider, specialty provider, and therapy visit notes that I have access to.     Today, patient reports that he has an anal fissure that is bleeding which has prompted him to lay on the couch today.  He needs surgery but is unable to have anesthesia until he has a heart catheterization first.  As he has had to wear disposable undergarments, his skin is breaking down as he is allergic to the material.  He is taking Tylenol for pain.  Sleep is disrupted because of this.  When Radha is in pain his anxiety raises.  He continues to see his therapist to help him work through his medical condition and his mood symptoms.  Paroxetine continues to be helpful.  Recently his metoprolol dose was decreased due to having a pulse below 50.  Quetiapine is helpful for sleep.       has a past medical history of Acute non-ST elevation myocardial infarction (NSTEMI) (H) (6/22/2020), Adenomatous polyp of colon, Ascending aorta dilatation (H24), Carpal tunnel syndrome, Chronic superficial gastritis, Coronary artery disease due to lipid rich plaque (6/23/2020), Depression with anxiety, Dyslipidemia, goal LDL below 70 (6/23/2020), Essential hypertension (9/2/2012), Fatty liver disease, nonalcoholic, GERD (gastroesophageal reflux disease), IBS (irritable bowel syndrome), Left lumbar radiculopathy, Multinodular goiter, Old torn meniscus of knee, Paroxysmal atrial fibrillation (H), Perianal abscess, Post herpetic neuralgia, Post-traumatic osteoarthritis of both knees, Primary osteoarthritis of left hip, Primary osteoarthritis of right hip, Pulmonary nodule, 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), Thyroid nodule, TMJ arthritis, Tobacco use disorder (11/1/2013), and Vitamin D deficiency (9/30/2020).    Social history updates:    No change in social history to report today    Substance use updates:    No alcohol use  reported  Tobacco use: No    Vital Signs:   There were no vitals taken for this visit.    Labs:    Most recent laboratory results reviewed and no new labs.     Mental Status Examination:  Appearance: awake, alert and casual dress  Attitude: cooperative  Eye Contact:  adequate  Gait and Station: No dizziness or falls  Psychomotor Behavior:   Lying on couch  Oriented to:  time, person, and place  Attention Span and Concentration:  Normal  Speech:   vtspeech: clear, coherent and Speaks: English  Mood:  anxious and depressed  Affect:  mood congruent  Associations:  no loose associations  Thought Process:  goal oriented  Thought Content:  no evidence of suicidal ideation or homicidal ideation, no auditory hallucinations present, and no visual hallucinations present  Recent and Remote Memory:  intact Not formally assessed. No amnesia.  Fund of Knowledge: appropriate  Insight:  good  Judgment: good  Impulse Control:  good    Suicide Risk Assessment:  Today Nadya Blake reports no thoughts to harm themself or others. In addition, there are notable risk factors for self-harm, including anxiety and withdrawing. However, risk is mitigated by commitment to family, history of seeking help when needed, future oriented, denies suicidal intent or plan, and denies homicidal ideation, intent, or plan. Therefore, based on all available evidence including the factors cited above, Nadya Blake does not appear to be at imminent risk for self-harm, does not meet criteria for a 72-hr hold, and therefore remains appropriate for ongoing outpatient level of care.  A thorough assessment of risk factors related to suicide and self-harm have been reviewed and are noted above. The patient convincingly denies suicidality on several occasions. Local community safety resources printed and reviewed for patient to use if needed. There was no deceit detected, and the patient presented in a manner that was believable.     DSM5  Diagnosis:  296.32 (F33.1) Major Depressive Disorder, Recurrent Episode, Moderate _ and With melancholic features  300.02 (F41.1) Generalized Anxiety Disorder  309.81 (F43.10) Posttraumatic Stress Disorder (includes Posttraumatic Stress Disorder for Children 6 Years and Younger)  Without dissociative symptoms  780.52 (G47.00) Insomnia Disorder   With non-sleep disorder mental comorbidity  Recurrent      Medical comorbidities include:   Patient Active Problem List    Diagnosis Date Noted    Nocturnal enuresis 01/17/2024     Priority: Medium    Subacute cough 12/28/2023     Priority: Medium    Gallstones 12/28/2023     Priority: Medium    Type 2 diabetes mellitus without complication, without long-term current use of insulin (H) 08/28/2023     Priority: Medium    Microscopic hematuria - Dr. Tineo 07/27/2023     Priority: Medium    Chronic kidney disease, stage 3a (H) 11/21/2022     Priority: Medium    Simple chronic bronchitis (H) 07/05/2022     Priority: Medium    Anal fissure - Dr. Campoverde 08/30/2021     Priority: Medium    Benign prostatic hyperplasia with nocturia 09/30/2020     Priority: Medium    PTSD (post-traumatic stress disorder) 09/30/2020     Priority: Medium    S/P CABG x 4 07/02/2020     Priority: Medium    Coronary artery disease, CABG 6/2020 06/23/2020     Priority: Medium    Dyslipidemia, goal LDL below 70 06/23/2020     Priority: Medium    Ascending aorta dilatation (H24) 02/16/2020     Priority: Medium     Formatting of this note might be different from the original.  4.3 cm since 2014.      History of colonic polyps 09/14/2017     Priority: Medium    Nonalcoholic fatty liver disease 07/12/2017     Priority: Medium    Primary osteoarthritis of left hip 04/28/2017     Priority: Medium    Primary osteoarthritis of right hip 04/28/2017     Priority: Medium    Thyroid nodule 03/07/2016     Priority: Medium    Post-traumatic osteoarthritis of both knees 06/08/2015     Priority: Medium    Pulmonary  nodule 11/11/2014     Priority: Medium     Formatting of this note might be different from the original.  CT scan 11-11-14. 2 mm each. Repeat scan in one year. Patient is a smoker. Positive family history of lung cancer.  3-14-16 2 stable 2 mm nodules, unchanged on repeat ct scan.  5-26-17 stable nodules. No pulmonary abnormality.      Gastroesophageal reflux disease with esophagitis without hemorrhage 10/30/2014     Priority: Medium     2-8-13 EGD nonspecific gastritis. Treated for H pylori in the past.  3-20-19 non specific gastritis. Path neg.        Recurrent major depressive disorder, in partial remission (H24) 09/02/2012     Priority: Medium    Essential hypertension 09/02/2012     Priority: Medium       Assessment:    Nadya Blake is experiencing pain from an anal fissure that is bleeding.  His wife is caring for him as he is homebound for now.  Anxiety and depression heightened when he is in pain like this.  Counseling services will continue to help him process his ever-changing medical condition.  Since changing to the paroxetine, both him and his wife report that his depression and anxiety symptoms are less.  He sleeps as well as he can with the current dose of quetiapine at bedtime.  Buspirone continues twice daily to help with excessive worry..    Medication side effects and alternatives were reviewed. Health promotion activities recommended and reviewed today. All questions addressed. Education and counseling completed regarding risks and benefits of medications and psychotherapy options.    Treatment Plan:      1.  Quetiapine 75 mg daily  2.  Buspirone 5 mg 2 times daily  3.  Paroxetine 20 mg daily  4.  Continue talk therapy    Continue all other medications as reviewed per electronic medical record today.   Safety plan reviewed. To the Emergency Department as needed or call after hours crisis line at [phone] or [phone]. [place] Crisis Text Line. Text [place] to 099607 or Suicide LifeLine  Chat: suicidepreventionlifeline.org/chat/  To schedule individual or family therapy, call [facility] at [phone]  Schedule an appointment with me in 4 weeks or sooner as needed. Call [facility] at [phone] to schedule.  Follow up with primary care provider as planned or for acute medical concerns.  Call the psychiatric nurse line with medication questions or concerns at [phone]  MyChart may be used to communicate with your provider, but this is not intended to be used for emergencies.        Crisis Resources   The EmPath is an adults only unit located at [facility] is a short term (generally less than 23 hour stay) designed for crisis intervention and stabilization. Pts have the opportunity to meet quickly with a behavioral health team for evaluation in a calm and peaceful therapuetic environment. To be evaluated for admission pts are triaged throught the [facility] ED.      The following hotlines are for both adults and children. The and are open 24 hours a day, 7 days a week unless noted otherwise.        Crisis Lines      Crisis Text Line  Text 608270  You will be connected with a trained live crisis counselor to provide support.        [place] Hotline  3.941.887.9124 [hope]        línea de crisis española  [phone]        [facility]  [phone]        National Hope Line  6.593.312.0335 [hope]        National Suicide Prevention Lifeline  Free and confidential support  988 or 1.672.914.TALK [8255]  http://suicidepreventionlifeline.org        The [place] Project (LGBTQ Youth Crisis Line)  0.521.680.5273  text START to 498-313        's Crisis Line  7.364.609.7758 (Press 1)  or text 990144    [facility] Mental Health Crisis Response  Within [place], call **CRISIS [**062513] to be connected to a mental health professional who can assist you.        [facility] [place] Crisis  [phone]      [facility]  Crisis  [phone]      [facility] Crisis  [phone]      [facility] Mobile Crisis  [phone] (adults)  [phone] (children)      [facility] Mobile Crisis  [phone] (adults)  [phone] (children)      [facility] Mobile Crisis  [phone]      [facility] Mobile Crisis  [phone]    Community Resources      Fast Tracker  Linking people to mental health and substance use disorder resources  [employer]        [place] Mental Health Warmline  Peer to peer support  5 pm to 9 am 7 days/week  6.226.707.3062  https://mnwitw.org/cooper        National [place] on Mental Illness (DANA)  442.138.8329 or 1.888.DANA.HELPS  https://namimn.org/        [facility] for Adult Mental Health  [facility] residents [address]  [phone]        [facility]  Free mental health counseling  https://walkin.org/  [phone] X2    Mental Health Apps      Calm Harm  https://calmharm.co.uk/      My3  https://my3app.org/      [place] Safety Plan  https://www.mysafetyplan.org/   Administrative Billing:   Time spent with patient includes counseling and coordination of care regarding above diagnoses and treatment plan.    Patient Status:  This is a continuous care patient and medications will be prescribed by the psychiatric provider until further indicated.    Signed:   SINGH Mo-BC   Psychiatry

## 2024-03-07 ENCOUNTER — PATIENT OUTREACH (OUTPATIENT)
Dept: CARE COORDINATION | Facility: CLINIC | Age: 59
End: 2024-03-07
Payer: COMMERCIAL

## 2024-03-07 NOTE — PROGRESS NOTES
Clinic Care Coordination - Chart Review Only    Situation: Ambulatory Care Coordination leader performing chart review related to staff coverage planning.    Assessment: Patient was scheduled for monthly outreach by Lead CC as in person on 3/8. Per chart review, patient has PCP appointment tomorrow at [place] and writer believes CC was going to meet patient following that visit.     Unfortunately Lead CC is unavailable tomorrow.  Writer placed outreach call to patient and leave message to explain that he would not be available to meet with tomorrow, however that does NOT change the planned PCP visit.      Plan: Lead CC will contact patient upon return to the office.     Eloisa Porter, ISRAELN, RN   Manager of Ambulatory Care Management  [facility]

## 2024-03-08 ENCOUNTER — OFFICE VISIT (OUTPATIENT)
Dept: INTERNAL MEDICINE | Facility: CLINIC | Age: 59
End: 2024-03-08
Payer: COMMERCIAL

## 2024-03-08 VITALS
TEMPERATURE: 97.4 F | RESPIRATION RATE: 16 BRPM | OXYGEN SATURATION: 96 % | WEIGHT: 194.4 LBS | DIASTOLIC BLOOD PRESSURE: 79 MMHG | HEIGHT: 67 IN | HEART RATE: 57 BPM | BODY MASS INDEX: 30.51 KG/M2 | SYSTOLIC BLOOD PRESSURE: 119 MMHG

## 2024-03-08 DIAGNOSIS — R10.13 DYSPEPSIA: ICD-10-CM

## 2024-03-08 DIAGNOSIS — R10.13 ABDOMINAL PAIN, EPIGASTRIC: ICD-10-CM

## 2024-03-08 DIAGNOSIS — K30 DELAYED GASTRIC EMPTYING: ICD-10-CM

## 2024-03-08 DIAGNOSIS — I25.118 CORONARY ARTERY DISEASE OF NATIVE ARTERY OF NATIVE HEART WITH STABLE ANGINA PECTORIS (H): Primary | ICD-10-CM

## 2024-03-08 DIAGNOSIS — J10.1 INFLUENZA B: ICD-10-CM

## 2024-03-08 PROCEDURE — 80053 COMPREHEN METABOLIC PANEL: CPT | Performed by: INTERNAL MEDICINE

## 2024-03-08 PROCEDURE — 99214 OFFICE O/P EST MOD 30 MIN: CPT | Performed by: INTERNAL MEDICINE

## 2024-03-08 PROCEDURE — 83690 ASSAY OF LIPASE: CPT | Performed by: INTERNAL MEDICINE

## 2024-03-08 PROCEDURE — 36415 COLL VENOUS BLD VENIPUNCTURE: CPT | Performed by: INTERNAL MEDICINE

## 2024-03-08 RX ORDER — ISOSORBIDE MONONITRATE 30 MG/1
30 TABLET, EXTENDED RELEASE ORAL DAILY
Qty: 90 TABLET | Refills: 4 | Status: SHIPPED | OUTPATIENT
Start: 2024-03-08 | End: 2024-06-07

## 2024-03-08 RX ORDER — OSELTAMIVIR PHOSPHATE 75 MG/1
75 CAPSULE ORAL 2 TIMES DAILY
Qty: 10 CAPSULE | Refills: 0 | Status: SHIPPED | OUTPATIENT
Start: 2024-03-08 | End: 2024-03-13

## 2024-03-08 ASSESSMENT — ANXIETY QUESTIONNAIRES
5. BEING SO RESTLESS THAT IT IS HARD TO SIT STILL: MORE THAN HALF THE DAYS
8. IF YOU CHECKED OFF ANY PROBLEMS, HOW DIFFICULT HAVE THESE MADE IT FOR YOU TO DO YOUR WORK, TAKE CARE OF THINGS AT HOME, OR GET ALONG WITH OTHER PEOPLE?: VERY DIFFICULT
IF YOU CHECKED OFF ANY PROBLEMS ON THIS QUESTIONNAIRE, HOW DIFFICULT HAVE THESE PROBLEMS MADE IT FOR YOU TO DO YOUR WORK, TAKE CARE OF THINGS AT HOME, OR GET ALONG WITH OTHER PEOPLE: VERY DIFFICULT
4. TROUBLE RELAXING: MORE THAN HALF THE DAYS
7. FEELING AFRAID AS IF SOMETHING AWFUL MIGHT HAPPEN: MORE THAN HALF THE DAYS
GAD7 TOTAL SCORE: 14
1. FEELING NERVOUS, ANXIOUS, OR ON EDGE: MORE THAN HALF THE DAYS
6. BECOMING EASILY ANNOYED OR IRRITABLE: MORE THAN HALF THE DAYS
3. WORRYING TOO MUCH ABOUT DIFFERENT THINGS: MORE THAN HALF THE DAYS
7. FEELING AFRAID AS IF SOMETHING AWFUL MIGHT HAPPEN: MORE THAN HALF THE DAYS
GAD7 TOTAL SCORE: 14
GAD7 TOTAL SCORE: 14
2. NOT BEING ABLE TO STOP OR CONTROL WORRYING: MORE THAN HALF THE DAYS

## 2024-03-08 NOTE — PROGRESS NOTES
"  Office Visit - Follow Up   Nadya Blake   58 year old male    Date of Visit: 3/8/2024    Chief Complaint   Patient presents with    office visit     Recheck Medication     Pt is here for 1 month follow up        Assessment and Plan   1. Coronary artery disease of native artery of native heart with stable angina pectoris (H24)  - isosorbide mononitrate (IMDUR) 30 MG 24 hr tablet; Take 1 tablet (30 mg) by mouth daily  Dispense: 90 tablet; Refill: 4    2. Dyspepsia  Some concern for gastric outlet obstruction on CT performed in December  - XR Upper GI w Small Bowel Follow Through; Future    3. Delayed gastric emptying  - XR Upper GI w Small Bowel Follow Through; Future    4. Abdominal pain, epigastric  Also has known gallstones  - Comprehensive metabolic panel; Future  - Lipase; Future  - Comprehensive metabolic panel  - Lipase    5. Influenza B  Symptom complex could be an from influenza B, has some mild upper respiratory symptoms starting along with his GI symptoms could all be help influenza B, treat with Tamiflu as below  - oseltamivir (TAMIFLU) 75 MG capsule; Take 1 capsule (75 mg) by mouth 2 times daily for 5 days  Dispense: 10 capsule; Refill: 0    Return in about 4 weeks (around 4/5/2024) for Follow up.     History of Present Illness   This 58 year old man comes in with his wife.  She has had some influenza-like symptoms and swab showed influenza B.  The patient is starting to manifest the symptoms self.  He is even had some abdominal pain and nausea.  This occurs about half of her eating.  He has had some evidence of delayed gastric emptying or gastric outlet obstruction on imaging and he also has known gallstones.       Physical Exam   General Appearance:   No acute distress    /79 (BP Location: Left arm, Patient Position: Sitting, Cuff Size: Adult Regular)   Pulse 57   Temp 97.4  F (36.3  C) (Oral)   Resp 16   Ht 1.69 m (5' 6.54\")   Wt 88.2 kg (194 lb 6.4 oz)   SpO2 96%   BMI 30.87 kg/m  "     This in the right upper quadrant     Additional Information   Current Outpatient Medications   Medication Sig Dispense Refill    acetaminophen (TYLENOL) 500 MG tablet Take 1-2 tablets (500-1,000 mg) by mouth every 6 hours as needed for mild pain 360 tablet 3    albuterol (PROAIR HFA/PROVENTIL HFA/VENTOLIN HFA) 108 (90 Base) MCG/ACT inhaler Inhale 2 puffs into the lungs every 6 hours as needed for shortness of breath, wheezing or cough 18 g 0    aspirin (ASA) 81 MG chewable tablet CHEW AND SWALLOW 1 TABLET(81 MG) BY MOUTH DAILY 90 tablet 2    azelastine (ASTELIN) 0.1 % nasal spray Spray 1 spray into both nostrils 2 times daily 30 mL 1    busPIRone (BUSPAR) 5 MG tablet Take 1 tablet (5 mg) by mouth 2 times daily 180 tablet 0    Cholecalciferol (VITAMIN D3) 50 MCG (2000 UT) CAPS TAKE 1 CAPSULE BY MOUTH DAILY 90 capsule 1    cyanocobalamin (VITAMIN B-12) 1000 MCG tablet Take 1 tablet (1,000 mcg) by mouth daily 90 tablet 4    diclofenac (VOLTAREN) 1 % topical gel Apply 4 g topically 4 times daily 500 g 2    empagliflozin (JARDIANCE) 25 MG TABS tablet Take 1 tablet (25 mg) by mouth daily 90 tablet 4    escitalopram (LEXAPRO) 20 MG tablet Take 0.5 tablets (10 mg) by mouth daily For 4 days then stop      ipratropium (ATROVENT) 0.06 % nasal spray Spray 2 sprays into both nostrils 4 times daily 15 mL 1    isosorbide mononitrate (IMDUR) 30 MG 24 hr tablet Take 1 tablet (30 mg) by mouth daily 90 tablet 4    Lidocaine 0.5 % GEL Externally apply topically as needed      metoprolol succinate ER (TOPROL XL) 50 MG 24 hr tablet Take 1 tablet (50 mg) by mouth daily 90 tablet 3    nifedipine 0.2% in white petrolatum 0.2 % OINT ointment Apply topically 4 times daily as needed (anal fissure) 100 g 1    nitroGLYcerin (NITROSTAT) 0.4 MG sublingual tablet For chest pain place 1 tablet under the tongue every 5 minutes for 3 doses. If symptoms persist 5 minutes after 1st dose call 911. 30 tablet 3    omeprazole (PRILOSEC) 20 MG DR capsule  Take 1 capsule (20 mg) by mouth daily 90 capsule 1    oseltamivir (TAMIFLU) 75 MG capsule Take 1 capsule (75 mg) by mouth 2 times daily for 5 days 10 capsule 0    oxybutynin (DITROPAN) 5 MG tablet Take 1 tablet (5 mg) by mouth At Bedtime 90 tablet 4    PARoxetine (PAXIL) 20 MG tablet Take 1 tablet (20 mg) by mouth every morning 30 tablet 3    polyethylene glycol (MIRALAX) 17 GM/Dose powder TAKE 17 GRAMS(1 CAPFUL) BY MOUTH DAILY 840 g 4    QUEtiapine (SEROQUEL) 50 MG tablet Take 1.5 tablets (75 mg) by mouth at bedtime 45 tablet 4    rosuvastatin (CRESTOR) 40 MG tablet Take 1 tablet (40 mg) by mouth daily 90 tablet 4    tamsulosin (FLOMAX) 0.4 MG capsule TAKE 2 CAPSULES(0.8 MG) BY MOUTH EVERY EVENING 180 capsule 4       Time:      Az Briseno MD  Answers submitted by the patient for this visit:  ISI-7 (Submitted on 3/8/2024)  ISI 7 TOTAL SCORE: 14  Depression / Anxiety Questionnaire (Submitted on 3/8/2024)  Chief Complaint: Chronic problems general questions HPI Form  Depression/Anxiety: Depression & Anxiety  Heart Failure Visit (Submitted on 3/8/2024)  Chief Complaint: Chronic problems general questions HPI Form  Dyspnea:: No  Edema:: No  Are you using more pillows than usual at night?: No  Do you cough at night?: No  Checking weight daily:: Yes  Weight change recently:: None  Heart Medication Side Effects:: None  Frequency:: None  Vascular Disease (Submitted on 3/8/2024)  Chief Complaint: Chronic problems general questions HPI Form  Do you take an aspirin every day?: Yes  Depression & Anxiety (Submitted on 3/8/2024)  Chief Complaint: Chronic problems general questions HPI Form  Status since last visit:: no change  Anxiety since last: : no change  Other associated symptoms of depression:: No  Other associated symotome: : No  Significant life event: : health concerns, other  Anxious:: Yes  Current substance use:: No  General Questionnaire (Submitted on 3/8/2024)  Chief Complaint: Chronic problems general questions  HPI Form  How many servings of fruits and vegetables do you eat daily?: 2-3  On average, how many sweetened beverages do you drink each day (Examples: soda, juice, sweet tea, etc.  Do NOT count diet or artificially sweetened beverages)?: 2  How many minutes a day do you exercise enough to make your heart beat faster?: 20 to 29  How many days a week do you exercise enough to make your heart beat faster?: 6  How many days per week do you miss taking your medication?: 0

## 2024-03-09 LAB
ALBUMIN SERPL BCG-MCNC: 4.4 G/DL (ref 3.5–5.2)
ALP SERPL-CCNC: 83 U/L (ref 40–150)
ALT SERPL W P-5'-P-CCNC: 28 U/L (ref 0–70)
ANION GAP SERPL CALCULATED.3IONS-SCNC: 8 MMOL/L (ref 7–15)
AST SERPL W P-5'-P-CCNC: 22 U/L (ref 0–45)
BILIRUB SERPL-MCNC: 0.2 MG/DL
BUN SERPL-MCNC: 14.7 MG/DL (ref 6–20)
CALCIUM SERPL-MCNC: 9 MG/DL (ref 8.6–10)
CHLORIDE SERPL-SCNC: 105 MMOL/L (ref 98–107)
CREAT SERPL-MCNC: 1.1 MG/DL (ref 0.67–1.17)
DEPRECATED HCO3 PLAS-SCNC: 27 MMOL/L (ref 22–29)
EGFRCR SERPLBLD CKD-EPI 2021: 79 ML/MIN/1.73M2
GLUCOSE SERPL-MCNC: 122 MG/DL (ref 70–99)
LIPASE SERPL-CCNC: 44 U/L (ref 13–60)
POTASSIUM SERPL-SCNC: 4.3 MMOL/L (ref 3.4–5.3)
PROT SERPL-MCNC: 7 G/DL (ref 6.4–8.3)
SODIUM SERPL-SCNC: 140 MMOL/L (ref 135–145)

## 2024-03-12 ENCOUNTER — OFFICE VISIT (OUTPATIENT)
Dept: CARDIOLOGY | Facility: CLINIC | Age: 59
End: 2024-03-12
Payer: COMMERCIAL

## 2024-03-12 VITALS
SYSTOLIC BLOOD PRESSURE: 106 MMHG | DIASTOLIC BLOOD PRESSURE: 74 MMHG | BODY MASS INDEX: 30.45 KG/M2 | HEIGHT: 67 IN | RESPIRATION RATE: 16 BRPM | HEART RATE: 58 BPM | WEIGHT: 194 LBS

## 2024-03-12 DIAGNOSIS — E11.69 TYPE 2 DIABETES MELLITUS WITH OTHER SPECIFIED COMPLICATION, WITHOUT LONG-TERM CURRENT USE OF INSULIN (H): ICD-10-CM

## 2024-03-12 DIAGNOSIS — I25.709 CORONARY ARTERY DISEASE INVOLVING CORONARY BYPASS GRAFT OF NATIVE HEART WITH ANGINA PECTORIS (H): ICD-10-CM

## 2024-03-12 DIAGNOSIS — E78.5 DYSLIPIDEMIA, GOAL LDL BELOW 70: Chronic | ICD-10-CM

## 2024-03-12 DIAGNOSIS — I50.32 CHRONIC HEART FAILURE WITH PRESERVED EJECTION FRACTION (H): ICD-10-CM

## 2024-03-12 DIAGNOSIS — I10 ESSENTIAL HYPERTENSION: Chronic | ICD-10-CM

## 2024-03-12 PROCEDURE — 99214 OFFICE O/P EST MOD 30 MIN: CPT | Performed by: GENERAL ACUTE CARE HOSPITAL

## 2024-03-12 PROCEDURE — G2211 COMPLEX E/M VISIT ADD ON: HCPCS | Performed by: GENERAL ACUTE CARE HOSPITAL

## 2024-03-12 NOTE — PROGRESS NOTES
HEART CARE ENCOUNTER NOTE        Assessment/Recommendations   Assessment:    Coronary artery disease status post four-vessel coronary artery bypass grafting (left internal mammary artery to the left anterior descending artery, right radial artery to the right posterior descending artery, reversed saphenous venous grafts to obtuse marginal and diagonal artery branches) on 6/24/2020. No ischemia seen on stress cardiac MRI on 12/23/2021 but he has been having exertional dyspnea with his saphenous venous graft to the right posterior descending artery noted to be occluded on coronary angiography 10/6/2022. He reports stable anginal symptoms.  Chronic congestive heart failure with preserved left ventricular ejection fraction. He seems euvolemic and normal left-sided filling pressure was noted on cardiac catheterization 10/6/2022.  Essential hypertension. Blood pressure is low normal today.  Dyslipidemia. Last LDL 32 mg/dL.  Non insulin-dependent diabetes mellitus type 2. Last hemoglobin A1c 5.5%.  Former smoker.  Possible obstructive sleep apnea.  Body mass index is 30.81 kg/m .    Plan:  We again discussed percutaneous coronary intervention of his native right coronary artery but is fearful of doing so and wants to continue medical therapy.  Continue metoprolol succinate at reduced dose of 50 mg daily.  Continue isosorbide mononitrate 30 mg daily.  Rosuvastatin 40 mg and aspirin 81 mg daily.   He was seen in sleep medicine 1/25/2024 but it does not appear he has yet scheduled his sleep study.  Follow-up with me or Teodora Ordonez in 2 months per patient request.         History of Present Illness   Mr. Nadya Blake is a 58 year old male with a significant past history of CAD with history of NSTEMI s/p 4V CABG (LIMA to LAD, right radial artery to RPDA, reversed SVGs to an OM and diagonal artery branch) on 6/24/2020, HFpEF, HTN, dyslipidemia, and former smoker presenting for follow-up.      He was last seen by Teodora  Mery 1/10/2024. He was noting fatigue and lightheadedness with pulses in the 40s. His metoprolol dose was reduced to 50 mg once daily. His pulse is higher but he still feels the same. He also gets periodic exertional chest tightness and shortness of breath but feels better with isosorbide mononitrate and overall his symptoms seem stable.    His wife recently tested positive for influenza B. He was started on olsetamivir as a precaution although he is not sure he is having and symptoms.    No shortness of breath at rest, pre-syncope, syncope, lower extremity swelling, palpitations, paroxysmal nocturnal dyspnea (PND), or orthopnea.     Cardiac Problems and Cardiac Diagnostics     Most Recent Cardiac testing:  ECG 10/6/2022 (personally reviewed and interpreted): sinus bradycardia HR 57 bpm with 1st degree AV block  ms, nonspecific T wave changes     ECHO 6/27/2020 (report reviewed):     Normal left ventricular size and systolic function. The left ventricular wall motion is normal. The calculated left ventricular ejection fraction is 71%.    Normal right ventricular size and systolic function.    No hemodynamically significant valvular heart abnormalities.    The ascending aorta is mildly dilated.    When compared to the previous study dated 6/23/2020, no significant change.     Stress cardiac MRI 12/23/2021 (report reviewed):  1.  Pharmacological Regadenoson stress cardiac MRI is negative for inducible myocardial ischemia.   2.  Pharmacological stress ECG is negative for inducible myocardial ischemia.   3. Normal left ventricular size, wall thickness and systolic function. The quantified left ventricular  ejection fraction is 59 %.  Very small area of non-transmural myocardial scar in the mid inferior wall is identified.    4.  Normal right ventricular size and systolic function.    5.  No significant valvular abnormalities.  6. Ascending aorta measures 38 mm.      Stress test 5/21/2021 (report reviewed):    The  nuclear stress test is negative for inducible myocardial ischemia or infarction.    The left ventricular ejection fraction at stress is 65%.    There is no prior study for comparison.     Cardiac cath 10/6/2022 (report reviewed):   Left ventricular filling pressures are normal.  3rd Mrg lesion is 90% stenosed.  Prox RCA lesion is 75% stenosed.  Prox RCA to Mid RCA lesion is 40% stenosed.  Dist RCA lesion is 75% stenosed.  Mid RCA lesion is 30% stenosed.  RPDA lesion is 50% stenosed.  RPAV lesion is 40% stenosed.  Prox LAD lesion is 70% stenosed.  1st Diag-1 lesion is 95% stenosed.  1st Diag-2 lesion is 95% stenosed.  2nd Diag lesion is 99% stenosed.  Mid LAD-1 lesion is 75% stenosed.  Mid LAD-2 lesion is 75% stenosed.  Dist LAD lesion is 70% stenosed.  Vein graft to right PDA is totally occluded  Vein graft to second diagonal is widely patent  Vein graft to third OM is widely patent  LUZ MARIA graft to mid distal LAD is patent     Medications  Allergies   Current Outpatient Medications   Medication Sig Dispense Refill    acetaminophen (TYLENOL) 500 MG tablet Take 1-2 tablets (500-1,000 mg) by mouth every 6 hours as needed for mild pain 360 tablet 3    albuterol (PROAIR HFA/PROVENTIL HFA/VENTOLIN HFA) 108 (90 Base) MCG/ACT inhaler Inhale 2 puffs into the lungs every 6 hours as needed for shortness of breath, wheezing or cough 18 g 0    aspirin (ASA) 81 MG chewable tablet CHEW AND SWALLOW 1 TABLET(81 MG) BY MOUTH DAILY 90 tablet 2    azelastine (ASTELIN) 0.1 % nasal spray Spray 1 spray into both nostrils 2 times daily 30 mL 1    busPIRone (BUSPAR) 5 MG tablet Take 1 tablet (5 mg) by mouth 2 times daily 180 tablet 0    Cholecalciferol (VITAMIN D3) 50 MCG (2000 UT) CAPS TAKE 1 CAPSULE BY MOUTH DAILY 90 capsule 1    cyanocobalamin (VITAMIN B-12) 1000 MCG tablet Take 1 tablet (1,000 mcg) by mouth daily 90 tablet 4    diclofenac (VOLTAREN) 1 % topical gel Apply 4 g topically 4 times daily 500 g 2    empagliflozin (JARDIANCE) 25  MG TABS tablet Take 1 tablet (25 mg) by mouth daily 90 tablet 4    escitalopram (LEXAPRO) 20 MG tablet Take 0.5 tablets (10 mg) by mouth daily For 4 days then stop      ipratropium (ATROVENT) 0.06 % nasal spray Spray 2 sprays into both nostrils 4 times daily 15 mL 1    isosorbide mononitrate (IMDUR) 30 MG 24 hr tablet Take 1 tablet (30 mg) by mouth daily 90 tablet 4    Lidocaine 0.5 % GEL Externally apply topically as needed      metoprolol succinate ER (TOPROL XL) 50 MG 24 hr tablet Take 1 tablet (50 mg) by mouth daily 90 tablet 3    nifedipine 0.2% in white petrolatum 0.2 % OINT ointment Apply topically 4 times daily as needed (anal fissure) 100 g 1    nitroGLYcerin (NITROSTAT) 0.4 MG sublingual tablet For chest pain place 1 tablet under the tongue every 5 minutes for 3 doses. If symptoms persist 5 minutes after 1st dose call 911. 30 tablet 3    omeprazole (PRILOSEC) 20 MG DR capsule Take 1 capsule (20 mg) by mouth daily 90 capsule 1    oseltamivir (TAMIFLU) 75 MG capsule Take 1 capsule (75 mg) by mouth 2 times daily for 5 days 10 capsule 0    oxybutynin (DITROPAN) 5 MG tablet Take 1 tablet (5 mg) by mouth At Bedtime 90 tablet 4    PARoxetine (PAXIL) 20 MG tablet Take 1 tablet (20 mg) by mouth every morning 30 tablet 3    polyethylene glycol (MIRALAX) 17 GM/Dose powder TAKE 17 GRAMS(1 CAPFUL) BY MOUTH DAILY 840 g 4    QUEtiapine (SEROQUEL) 50 MG tablet Take 1.5 tablets (75 mg) by mouth at bedtime 45 tablet 4    rosuvastatin (CRESTOR) 40 MG tablet Take 1 tablet (40 mg) by mouth daily 90 tablet 4    tamsulosin (FLOMAX) 0.4 MG capsule TAKE 2 CAPSULES(0.8 MG) BY MOUTH EVERY EVENING 180 capsule 4      Allergies   Allergen Reactions    Atorvastatin Diarrhea    Cortisone Other (See Comments)     pain    Lisinopril Cough     started after CABG for unclear reason    Methylprednisolone Other (See Comments)     ?        Physical Examination Review of Systems   /74 (BP Location: Right arm, Patient Position: Sitting,  "Cuff Size: Adult Regular)   Pulse 58   Resp 16   Ht 1.69 m (5' 6.54\")   Wt 88 kg (194 lb)   BMI 30.81 kg/m    Body mass index is 30.81 kg/m .  Wt Readings from Last 3 Encounters:   03/12/24 88 kg (194 lb)   03/08/24 88.2 kg (194 lb 6.4 oz)   02/13/24 86.9 kg (191 lb 8 oz)       General Appearance:   Pleasant  male, appears  stated age. no acute distress, normal body habitus   ENT/Mouth: membranes moist, no apparent gingival bleeding.      EYES:  no scleral icterus, normal conjunctivae   Neck: no carotid bruits. No anterior cervical lymphadenopaty   Respiratory:   lungs are clear to auscultation, no rales or wheezing, well-healed sternal scar, equal chest wall expansion    Cardiovascular:   Regular rhythm, normal rate. Normal first and second heart sounds with no murmurs, rubs, or gallops; the carotid, radial and posterior tibial pulses are intact, Jugular venous pressure normal, no edema bilaterally    Abdomen/GI:  no organomegaly, masses, bruits, or tenderness; bowel sounds are present   Extremities: no cyanosis or clubbing   Skin: no xanthelasma, warm.    Heme/lymph/ Immunology No apparent bleeding noted.   Neurologic: Alert and oriented. normal gait, no tremors     Psychiatric: Pleasant, calm, appropriate affect.    A complete 10 system review of systems was performed and is negative except as mentioned in the HPI/subjective.         Past History   Past Medical History:   Past Medical History:   Diagnosis Date    Acute non-ST elevation myocardial infarction (NSTEMI) (H) 6/22/2020    Adenomatous polyp of colon     Ascending aorta dilatation (H24)     Carpal tunnel syndrome     Chronic superficial gastritis     Coronary artery disease due to lipid rich plaque 6/23/2020    Depression with anxiety     Dyslipidemia, goal LDL below 70 6/23/2020    Essential hypertension 9/2/2012    Fatty liver disease, nonalcoholic     GERD (gastroesophageal reflux disease)     IBS (irritable bowel syndrome)     Left lumbar " radiculopathy     Multinodular goiter     Old torn meniscus of knee     Paroxysmal atrial fibrillation (H)     Perianal abscess     Post herpetic neuralgia     Post-traumatic osteoarthritis of both knees     Primary osteoarthritis of left hip     Primary osteoarthritis of right hip     Pulmonary nodule     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 Ragsdale    CV CORONARY ANGIOGRAM N/A 6/23/2020    Procedure: Coronary Angiogram;  Surgeon: Ariane Lester MD;  Location: [facility] Cath Lab;  Service: Cardiology    CV CORONARY ANGIOGRAM N/A 10/6/2022    Procedure: Coronary Angiogram;  Surgeon: Javon John MD;  Location: [facility] CV    CV IABP INSERT N/A 6/24/2020    Procedure: Intra Aortic Balloon Pump Insertion;  Surgeon: Ariane Lester MD;  Location: [facility] Cath Lab;  Service: Cardiology    CV LEFT HEART CATH N/A 10/6/2022    Procedure: Left Heart Catheterization;  Surgeon: Javon John MD;  Location: [facility] CV    CV LEFT HEART CATHETERIZATION WITHOUT LEFT VENTRICULOGRAM Left 6/23/2020    Procedure: Left Heart Catheterization Without Left Ventriculogram;  Surgeon: Ariane Lester MD;  Location: [facility] Cath Lab;  Service: Cardiology    CV LEFT VENTRICULOGRAM N/A 10/6/2022    Procedure: Left Ventriculogram;  Surgeon: Javon John MD;  Location: [facility] CV       Family History:   Family History   Problem Relation Age of Onset    No Known Problems Mother     Lung Cancer Father 56        fatal    No Known Problems Daughter     Other - See Comments Son         congenital deformity of right leg; he uses a leg prosthesis        Social History:   Social History     Socioeconomic  History    Marital status:      Spouse name: Carlee    Number of children: 2    Years of education: Not on file    Highest education level: Not on file   Occupational History    Not on file   Tobacco Use    Smoking status: Former     Packs/day: 1.00     Years: 30.00     Additional pack years: 0.00     Total pack years: 30.00     Types: Cigarettes     Quit date: 3/23/2020     Years since quitting: 3.9     Passive exposure: Never    Smokeless tobacco: Never    Tobacco comments:     stopped 3/24/2020   Vaping Use    Vaping Use: Never used   Substance and Sexual Activity    Alcohol use: No    Drug use: Never    Sexual activity: Yes     Partners: Female   Other Topics Concern    Not on file   Social History Narrative    , Carlee, BA chemistry and taking AA courses at [employer].  From [place]; bachelors in geology and computer science. [employer] (2005) and [employer] (2008).  On SSDI, PTSD.       Social Determinants of Health     Financial Resource Strain: Low Risk  (12/27/2023)    Financial Resource Strain     Within the past 12 months, have you or your family members you live with been unable to get utilities (heat, electricity) when it was really needed?: No   Food Insecurity: Low Risk  (12/27/2023)    Food Insecurity     Within the past 12 months, did you worry that your food would run out before you got money to buy more?: No     Within the past 12 months, did the food you bought just not last and you didn t have money to get more?: No   Transportation Needs: Low Risk  (12/27/2023)    Transportation Needs     Within the past 12 months, has lack of transportation kept you from medical appointments, getting your medicines, non-medical meetings or appointments, work, or from getting things that you need?: No   Physical Activity: Not on file   Stress: Not on file   Social Connections: Not on file   Interpersonal Safety: Not on file   Housing Stability: Low Risk  (12/27/2023)    Housing Stability     Do you have  housing? : Yes     Are you worried about losing your housing?: No              Lab Results    Chemistry/lipid CBC Cardiac Enzymes/BNP/TSH/INR   Lab Results   Component Value Date    CHOL 97 08/28/2023    HDL 33 (L) 08/28/2023    LDL 32 08/28/2023    TRIG 159 (H) 08/28/2023    CR 1.10 03/08/2024    BUN 14.7 03/08/2024    POTASSIUM 4.3 03/08/2024     03/08/2024    CO2 27 03/08/2024      Lab Results   Component Value Date    WBC 7.7 12/20/2023    HGB 14.4 12/20/2023    HCT 42.9 12/20/2023    MCV 89 12/20/2023     12/20/2023    A1C 5.5 01/09/2024     Lab Results   Component Value Date    A1C 5.5 01/09/2024    Lab Results   Component Value Date    TROPONINI <0.01 11/21/2020    BNP 79 (H) 07/27/2020    NTBNP <36 12/20/2023    TSH 1.24 08/28/2023    INR 1.10 07/27/2020          Sloan Burns MD [facility]  Non-Invasive Cardiologist  [facility]  Pager [phone]

## 2024-03-12 NOTE — LETTER
3/12/2024    Az Briseno MD  [address] [facility] [address]    RE: Nadya Blake       Dear Colleague,     I had the pleasure of seeing Nadya Blake in the [facility].  HEART CARE ENCOUNTER NOTE        Assessment/Recommendations   Assessment:    Coronary artery disease status post four-vessel coronary artery bypass grafting (left internal mammary artery to the left anterior descending artery, right radial artery to the right posterior descending artery, reversed saphenous venous grafts to obtuse marginal and diagonal artery branches) on 6/24/2020. No ischemia seen on stress cardiac MRI on 12/23/2021 but he has been having exertional dyspnea with his saphenous venous graft to the right posterior descending artery noted to be occluded on coronary angiography 10/6/2022. He reports stable anginal symptoms.  Chronic congestive heart failure with preserved left ventricular ejection fraction. He seems euvolemic and normal left-sided filling pressure was noted on cardiac catheterization 10/6/2022.  Essential hypertension. Blood pressure is low normal today.  Dyslipidemia. Last LDL 32 mg/dL.  Non insulin-dependent diabetes mellitus type 2. Last hemoglobin A1c 5.5%.  Former smoker.  Possible obstructive sleep apnea.  Body mass index is 30.81 kg/m .    Plan:  We again discussed percutaneous coronary intervention of his native right coronary artery but is fearful of doing so and wants to continue medical therapy.  Continue metoprolol succinate at reduced dose of 50 mg daily.  Continue isosorbide mononitrate 30 mg daily.  Rosuvastatin 40 mg and aspirin 81 mg daily.   He was seen in sleep medicine 1/25/2024 but it does not appear he has yet scheduled his sleep study.  Follow-up with me or Teodora Ordonez in 2 months per patient request.         History of Present Illness   Mr. Nadya Blake is a 58 year old male with a significant past history of CAD with history of NSTEMI s/p 4V CABG  (DUNNE to LAD, right radial artery to RPDA, reversed SVGs to an OM and diagonal artery branch) on 6/24/2020, HFpEF, HTN, dyslipidemia, and former smoker presenting for follow-up.      He was last seen by Teodora Ordonez 1/10/2024. He was noting fatigue and lightheadedness with pulses in the 40s. His metoprolol dose was reduced to 50 mg once daily. His pulse is higher but he still feels the same. He also gets periodic exertional chest tightness and shortness of breath but feels better with isosorbide mononitrate and overall his symptoms seem stable.    His wife recently tested positive for influenza B. He was started on olsetamivir as a precaution although he is not sure he is having and symptoms.    No shortness of breath at rest, pre-syncope, syncope, lower extremity swelling, palpitations, paroxysmal nocturnal dyspnea (PND), or orthopnea.     Cardiac Problems and Cardiac Diagnostics     Most Recent Cardiac testing:  ECG 10/6/2022 (personally reviewed and interpreted): sinus bradycardia HR 57 bpm with 1st degree AV block  ms, nonspecific T wave changes     ECHO 6/27/2020 (report reviewed):     Normal left ventricular size and systolic function. The left ventricular wall motion is normal. The calculated left ventricular ejection fraction is 71%.    Normal right ventricular size and systolic function.    No hemodynamically significant valvular heart abnormalities.    The ascending aorta is mildly dilated.    When compared to the previous study dated 6/23/2020, no significant change.     Stress cardiac MRI 12/23/2021 (report reviewed):  1.  Pharmacological Regadenoson stress cardiac MRI is negative for inducible myocardial ischemia.   2.  Pharmacological stress ECG is negative for inducible myocardial ischemia.   3. Normal left ventricular size, wall thickness and systolic function. The quantified left ventricular  ejection fraction is 59 %.  Very small area of non-transmural myocardial scar in the mid inferior wall  is identified.    4.  Normal right ventricular size and systolic function.    5.  No significant valvular abnormalities.  6. Ascending aorta measures 38 mm.      Stress test 5/21/2021 (report reviewed):    The nuclear stress test is negative for inducible myocardial ischemia or infarction.    The left ventricular ejection fraction at stress is 65%.    There is no prior study for comparison.     Cardiac cath 10/6/2022 (report reviewed):   Left ventricular filling pressures are normal.  3rd Mrg lesion is 90% stenosed.  Prox RCA lesion is 75% stenosed.  Prox RCA to Mid RCA lesion is 40% stenosed.  Dist RCA lesion is 75% stenosed.  Mid RCA lesion is 30% stenosed.  RPDA lesion is 50% stenosed.  RPAV lesion is 40% stenosed.  Prox LAD lesion is 70% stenosed.  1st Diag-1 lesion is 95% stenosed.  1st Diag-2 lesion is 95% stenosed.  2nd Diag lesion is 99% stenosed.  Mid LAD-1 lesion is 75% stenosed.  Mid LAD-2 lesion is 75% stenosed.  Dist LAD lesion is 70% stenosed.  Vein graft to right PDA is totally occluded  Vein graft to second diagonal is widely patent  Vein graft to third OM is widely patent  LUZ MARIA graft to mid distal LAD is patent     Medications  Allergies   Current Outpatient Medications   Medication Sig Dispense Refill    acetaminophen (TYLENOL) 500 MG tablet Take 1-2 tablets (500-1,000 mg) by mouth every 6 hours as needed for mild pain 360 tablet 3    albuterol (PROAIR HFA/PROVENTIL HFA/VENTOLIN HFA) 108 (90 Base) MCG/ACT inhaler Inhale 2 puffs into the lungs every 6 hours as needed for shortness of breath, wheezing or cough 18 g 0    aspirin (ASA) 81 MG chewable tablet CHEW AND SWALLOW 1 TABLET(81 MG) BY MOUTH DAILY 90 tablet 2    azelastine (ASTELIN) 0.1 % nasal spray Spray 1 spray into both nostrils 2 times daily 30 mL 1    busPIRone (BUSPAR) 5 MG tablet Take 1 tablet (5 mg) by mouth 2 times daily 180 tablet 0    Cholecalciferol (VITAMIN D3) 50 MCG (2000 UT) CAPS TAKE 1 CAPSULE BY MOUTH DAILY 90 capsule 1     cyanocobalamin (VITAMIN B-12) 1000 MCG tablet Take 1 tablet (1,000 mcg) by mouth daily 90 tablet 4    diclofenac (VOLTAREN) 1 % topical gel Apply 4 g topically 4 times daily 500 g 2    empagliflozin (JARDIANCE) 25 MG TABS tablet Take 1 tablet (25 mg) by mouth daily 90 tablet 4    escitalopram (LEXAPRO) 20 MG tablet Take 0.5 tablets (10 mg) by mouth daily For 4 days then stop      ipratropium (ATROVENT) 0.06 % nasal spray Spray 2 sprays into both nostrils 4 times daily 15 mL 1    isosorbide mononitrate (IMDUR) 30 MG 24 hr tablet Take 1 tablet (30 mg) by mouth daily 90 tablet 4    Lidocaine 0.5 % GEL Externally apply topically as needed      metoprolol succinate ER (TOPROL XL) 50 MG 24 hr tablet Take 1 tablet (50 mg) by mouth daily 90 tablet 3    nifedipine 0.2% in white petrolatum 0.2 % OINT ointment Apply topically 4 times daily as needed (anal fissure) 100 g 1    nitroGLYcerin (NITROSTAT) 0.4 MG sublingual tablet For chest pain place 1 tablet under the tongue every 5 minutes for 3 doses. If symptoms persist 5 minutes after 1st dose call 911. 30 tablet 3    omeprazole (PRILOSEC) 20 MG DR capsule Take 1 capsule (20 mg) by mouth daily 90 capsule 1    oseltamivir (TAMIFLU) 75 MG capsule Take 1 capsule (75 mg) by mouth 2 times daily for 5 days 10 capsule 0    oxybutynin (DITROPAN) 5 MG tablet Take 1 tablet (5 mg) by mouth At Bedtime 90 tablet 4    PARoxetine (PAXIL) 20 MG tablet Take 1 tablet (20 mg) by mouth every morning 30 tablet 3    polyethylene glycol (MIRALAX) 17 GM/Dose powder TAKE 17 GRAMS(1 CAPFUL) BY MOUTH DAILY 840 g 4    QUEtiapine (SEROQUEL) 50 MG tablet Take 1.5 tablets (75 mg) by mouth at bedtime 45 tablet 4    rosuvastatin (CRESTOR) 40 MG tablet Take 1 tablet (40 mg) by mouth daily 90 tablet 4    tamsulosin (FLOMAX) 0.4 MG capsule TAKE 2 CAPSULES(0.8 MG) BY MOUTH EVERY EVENING 180 capsule 4      Allergies   Allergen Reactions    Atorvastatin Diarrhea    Cortisone Other (See Comments)     pain     "Lisinopril Cough     started after CABG for unclear reason    Methylprednisolone Other (See Comments)     ?        Physical Examination Review of Systems   /74 (BP Location: Right arm, Patient Position: Sitting, Cuff Size: Adult Regular)   Pulse 58   Resp 16   Ht 1.69 m (5' 6.54\")   Wt 88 kg (194 lb)   BMI 30.81 kg/m    Body mass index is 30.81 kg/m .  Wt Readings from Last 3 Encounters:   03/12/24 88 kg (194 lb)   03/08/24 88.2 kg (194 lb 6.4 oz)   02/13/24 86.9 kg (191 lb 8 oz)       General Appearance:   Pleasant  male, appears  stated age. no acute distress, normal body habitus   ENT/Mouth: membranes moist, no apparent gingival bleeding.      EYES:  no scleral icterus, normal conjunctivae   Neck: no carotid bruits. No anterior cervical lymphadenopaty   Respiratory:   lungs are clear to auscultation, no rales or wheezing, well-healed sternal scar, equal chest wall expansion    Cardiovascular:   Regular rhythm, normal rate. Normal first and second heart sounds with no murmurs, rubs, or gallops; the carotid, radial and posterior tibial pulses are intact, Jugular venous pressure normal, no edema bilaterally    Abdomen/GI:  no organomegaly, masses, bruits, or tenderness; bowel sounds are present   Extremities: no cyanosis or clubbing   Skin: no xanthelasma, warm.    Heme/lymph/ Immunology No apparent bleeding noted.   Neurologic: Alert and oriented. normal gait, no tremors     Psychiatric: Pleasant, calm, appropriate affect.    A complete 10 system review of systems was performed and is negative except as mentioned in the HPI/subjective.         Past History   Past Medical History:   Past Medical History:   Diagnosis Date    Acute non-ST elevation myocardial infarction (NSTEMI) (H) 6/22/2020    Adenomatous polyp of colon     Ascending aorta dilatation (H24)     Carpal tunnel syndrome     Chronic superficial gastritis     Coronary artery disease due to lipid rich plaque 6/23/2020    Depression with anxiety  "    Dyslipidemia, goal LDL below 70 6/23/2020    Essential hypertension 9/2/2012    Fatty liver disease, nonalcoholic     GERD (gastroesophageal reflux disease)     IBS (irritable bowel syndrome)     Left lumbar radiculopathy     Multinodular goiter     Old torn meniscus of knee     Paroxysmal atrial fibrillation (H)     Perianal abscess     Post herpetic neuralgia     Post-traumatic osteoarthritis of both knees     Primary osteoarthritis of left hip     Primary osteoarthritis of right hip     Pulmonary nodule     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 Ragsdale    CV CORONARY ANGIOGRAM N/A 6/23/2020    Procedure: Coronary Angiogram;  Surgeon: Ariane Lester MD;  Location: [facility] Cath Lab;  Service: Cardiology    CV CORONARY ANGIOGRAM N/A 10/6/2022    Procedure: Coronary Angiogram;  Surgeon: Javon John MD;  Location: [facility] CV    CV IABP INSERT N/A 6/24/2020    Procedure: Intra Aortic Balloon Pump Insertion;  Surgeon: Ariane Lester MD;  Location: [facility] Cath Lab;  Service: Cardiology    CV LEFT HEART CATH N/A 10/6/2022    Procedure: Left Heart Catheterization;  Surgeon: Javon John MD;  Location: [facility] CV    CV LEFT HEART CATHETERIZATION WITHOUT LEFT VENTRICULOGRAM Left 6/23/2020    Procedure: Left Heart Catheterization Without Left Ventriculogram;  Surgeon: Ariane Lester MD;  Location: [facility] Cath Lab;  Service: Cardiology    CV LEFT VENTRICULOGRAM N/A 10/6/2022    Procedure: Left Ventriculogram;  Surgeon: Javon John MD;  Location: [facility] CV       Family History:   Family History   Problem Relation Age of Onset    No Known Problems Mother     Lung  Cancer Father 56        fatal    No Known Problems Daughter     Other - See Comments Son         congenital deformity of right leg; he uses a leg prosthesis        Social History:   Social History     Socioeconomic History    Marital status:      Spouse name: Carlee    Number of children: 2    Years of education: Not on file    Highest education level: Not on file   Occupational History    Not on file   Tobacco Use    Smoking status: Former     Packs/day: 1.00     Years: 30.00     Additional pack years: 0.00     Total pack years: 30.00     Types: Cigarettes     Quit date: 3/23/2020     Years since quitting: 3.9     Passive exposure: Never    Smokeless tobacco: Never    Tobacco comments:     stopped 3/24/2020   Vaping Use    Vaping Use: Never used   Substance and Sexual Activity    Alcohol use: No    Drug use: Never    Sexual activity: Yes     Partners: Female   Other Topics Concern    Not on file   Social History Narrative    , Carlee, BA chemistry and taking AA courses at [employer].  From [place]; bachelors in geology and computer science. Son, Grace (2005) and Sherrie (2008).  On SSDI, PTSD.       Social Determinants of Health     Financial Resource Strain: Low Risk  (12/27/2023)    Financial Resource Strain     Within the past 12 months, have you or your family members you live with been unable to get utilities (heat, electricity) when it was really needed?: No   Food Insecurity: Low Risk  (12/27/2023)    Food Insecurity     Within the past 12 months, did you worry that your food would run out before you got money to buy more?: No     Within the past 12 months, did the food you bought just not last and you didn t have money to get more?: No   Transportation Needs: Low Risk  (12/27/2023)    Transportation Needs     Within the past 12 months, has lack of transportation kept you from medical appointments, getting your medicines, non-medical meetings or appointments, work, or from getting things that you need?:  No   Physical Activity: Not on file   Stress: Not on file   Social Connections: Not on file   Interpersonal Safety: Not on file   Housing Stability: Low Risk  (12/27/2023)    Housing Stability     Do you have housing? : Yes     Are you worried about losing your housing?: No              Lab Results    Chemistry/lipid CBC Cardiac Enzymes/BNP/TSH/INR   Lab Results   Component Value Date    CHOL 97 08/28/2023    HDL 33 (L) 08/28/2023    LDL 32 08/28/2023    TRIG 159 (H) 08/28/2023    CR 1.10 03/08/2024    BUN 14.7 03/08/2024    POTASSIUM 4.3 03/08/2024     03/08/2024    CO2 27 03/08/2024      Lab Results   Component Value Date    WBC 7.7 12/20/2023    HGB 14.4 12/20/2023    HCT 42.9 12/20/2023    MCV 89 12/20/2023     12/20/2023    A1C 5.5 01/09/2024     Lab Results   Component Value Date    A1C 5.5 01/09/2024    Lab Results   Component Value Date    TROPONINI <0.01 11/21/2020    BNP 79 (H) 07/27/2020    NTBNP <36 12/20/2023    TSH 1.24 08/28/2023    INR 1.10 07/27/2020          Sloan Burns MD [facility]  Non-Invasive Cardiologist  [facility]  Pager [phone]      Thank you for allowing me to participate in the care of your patient.      Sincerely,     Sloan Burns MD     [facility]  cc:   Sloan Burns MD  [address]  [place] [address]

## 2024-03-21 ENCOUNTER — MYC MEDICAL ADVICE (OUTPATIENT)
Dept: INTERNAL MEDICINE | Facility: CLINIC | Age: 59
End: 2024-03-21
Payer: COMMERCIAL

## 2024-03-21 DIAGNOSIS — J98.8 RESPIRATORY INFECTION: Primary | ICD-10-CM

## 2024-03-21 RX ORDER — AZITHROMYCIN 250 MG/1
TABLET, FILM COATED ORAL
Qty: 6 TABLET | Refills: 0 | Status: SHIPPED | OUTPATIENT
Start: 2024-03-21 | End: 2024-03-26

## 2024-03-28 DIAGNOSIS — I25.10 CORONARY ARTERY DISEASE DUE TO LIPID RICH PLAQUE: Chronic | ICD-10-CM

## 2024-03-28 DIAGNOSIS — I25.83 CORONARY ARTERY DISEASE DUE TO LIPID RICH PLAQUE: Chronic | ICD-10-CM

## 2024-03-28 DIAGNOSIS — E11.9 TYPE 2 DIABETES MELLITUS WITHOUT COMPLICATION, WITHOUT LONG-TERM CURRENT USE OF INSULIN (H): ICD-10-CM

## 2024-03-29 RX ORDER — EMPAGLIFLOZIN 25 MG/1
25 TABLET, FILM COATED ORAL DAILY
Qty: 60 TABLET | Refills: 0 | Status: SHIPPED | OUTPATIENT
Start: 2024-03-29 | End: 2024-05-17

## 2024-04-04 ENCOUNTER — MEDICAL CORRESPONDENCE (OUTPATIENT)
Dept: HEALTH INFORMATION MANAGEMENT | Facility: CLINIC | Age: 59
End: 2024-04-04
Payer: COMMERCIAL

## 2024-04-04 ENCOUNTER — TELEPHONE (OUTPATIENT)
Dept: INTERNAL MEDICINE | Facility: CLINIC | Age: 59
End: 2024-04-04
Payer: COMMERCIAL

## 2024-04-04 ENCOUNTER — PATIENT OUTREACH (OUTPATIENT)
Dept: CARE COORDINATION | Facility: CLINIC | Age: 59
End: 2024-04-04
Payer: COMMERCIAL

## 2024-04-04 NOTE — PROGRESS NOTES
Clinic Care Coordination Contact  [facility]/Voicemail    Clinical Data: Care Coordinator Outreach         No data to display                Left message on patient's voicemail with call back information and requested return call.    Plan: Care Coordinator will try to reach patient again in one week.    David Myhre, RN   [facility] RN  [phone]

## 2024-04-04 NOTE — TELEPHONE ENCOUNTER
[facility] - adult underwear    Form received and placed in 's inbox since she was the one who ordered DME for patient

## 2024-04-05 ENCOUNTER — APPOINTMENT (OUTPATIENT)
Dept: NURSING | Facility: CLINIC | Age: 59
End: 2024-04-05
Payer: COMMERCIAL

## 2024-04-05 ENCOUNTER — MEDICAL CORRESPONDENCE (OUTPATIENT)
Dept: HEALTH INFORMATION MANAGEMENT | Facility: CLINIC | Age: 59
End: 2024-04-05

## 2024-04-05 ENCOUNTER — OFFICE VISIT (OUTPATIENT)
Dept: INTERNAL MEDICINE | Facility: CLINIC | Age: 59
End: 2024-04-05
Payer: COMMERCIAL

## 2024-04-05 VITALS
HEIGHT: 67 IN | WEIGHT: 198 LBS | SYSTOLIC BLOOD PRESSURE: 103 MMHG | HEART RATE: 58 BPM | BODY MASS INDEX: 31.08 KG/M2 | OXYGEN SATURATION: 97 % | TEMPERATURE: 97.8 F | DIASTOLIC BLOOD PRESSURE: 73 MMHG | RESPIRATION RATE: 18 BRPM

## 2024-04-05 DIAGNOSIS — E11.9 TYPE 2 DIABETES MELLITUS WITHOUT COMPLICATION, WITHOUT LONG-TERM CURRENT USE OF INSULIN (H): Primary | ICD-10-CM

## 2024-04-05 DIAGNOSIS — I25.118 CORONARY ARTERY DISEASE OF NATIVE ARTERY OF NATIVE HEART WITH STABLE ANGINA PECTORIS (H): ICD-10-CM

## 2024-04-05 DIAGNOSIS — J01.90 ACUTE SINUSITIS WITH SYMPTOMS > 10 DAYS: ICD-10-CM

## 2024-04-05 DIAGNOSIS — R05.2 SUBACUTE COUGH: ICD-10-CM

## 2024-04-05 PROCEDURE — G2211 COMPLEX E/M VISIT ADD ON: HCPCS | Performed by: INTERNAL MEDICINE

## 2024-04-05 PROCEDURE — 99214 OFFICE O/P EST MOD 30 MIN: CPT | Performed by: INTERNAL MEDICINE

## 2024-04-05 RX ORDER — ALBUTEROL SULFATE 90 UG/1
2 AEROSOL, METERED RESPIRATORY (INHALATION) EVERY 6 HOURS PRN
Qty: 18 G | Refills: 0 | Status: SHIPPED | OUTPATIENT
Start: 2024-04-05 | End: 2024-04-29

## 2024-04-05 ASSESSMENT — ANXIETY QUESTIONNAIRES
2. NOT BEING ABLE TO STOP OR CONTROL WORRYING: MORE THAN HALF THE DAYS
5. BEING SO RESTLESS THAT IT IS HARD TO SIT STILL: MORE THAN HALF THE DAYS
7. FEELING AFRAID AS IF SOMETHING AWFUL MIGHT HAPPEN: MORE THAN HALF THE DAYS
6. BECOMING EASILY ANNOYED OR IRRITABLE: SEVERAL DAYS
GAD7 TOTAL SCORE: 13
7. FEELING AFRAID AS IF SOMETHING AWFUL MIGHT HAPPEN: MORE THAN HALF THE DAYS
GAD7 TOTAL SCORE: 13
1. FEELING NERVOUS, ANXIOUS, OR ON EDGE: MORE THAN HALF THE DAYS
GAD7 TOTAL SCORE: 13
8. IF YOU CHECKED OFF ANY PROBLEMS, HOW DIFFICULT HAVE THESE MADE IT FOR YOU TO DO YOUR WORK, TAKE CARE OF THINGS AT HOME, OR GET ALONG WITH OTHER PEOPLE?: VERY DIFFICULT
4. TROUBLE RELAXING: MORE THAN HALF THE DAYS
IF YOU CHECKED OFF ANY PROBLEMS ON THIS QUESTIONNAIRE, HOW DIFFICULT HAVE THESE PROBLEMS MADE IT FOR YOU TO DO YOUR WORK, TAKE CARE OF THINGS AT HOME, OR GET ALONG WITH OTHER PEOPLE: VERY DIFFICULT
3. WORRYING TOO MUCH ABOUT DIFFERENT THINGS: MORE THAN HALF THE DAYS

## 2024-04-05 ASSESSMENT — PAIN SCALES - GENERAL: PAINLEVEL: EXTREME PAIN (8)

## 2024-04-05 ASSESSMENT — PATIENT HEALTH QUESTIONNAIRE - PHQ9
SUM OF ALL RESPONSES TO PHQ QUESTIONS 1-9: 13
SUM OF ALL RESPONSES TO PHQ QUESTIONS 1-9: 13
10. IF YOU CHECKED OFF ANY PROBLEMS, HOW DIFFICULT HAVE THESE PROBLEMS MADE IT FOR YOU TO DO YOUR WORK, TAKE CARE OF THINGS AT HOME, OR GET ALONG WITH OTHER PEOPLE: VERY DIFFICULT

## 2024-04-05 NOTE — PROGRESS NOTES
Office Visit - Follow Up   Nadya Blake   58 year old male    Date of Visit: 4/5/2024    Chief Complaint   Patient presents with    Follow Up     1 month follow up, not improving from bronchitis still coughing, pt reports coughing up blood, upper abdominal pain after eating  Reports fasting for Ramadan (14 hours and no water) .   - Depression/Anxiety   - Heart Failure       Chest Pain     Pt report SOB with activity and cough, productive w blood.   Pt reports nausea and dizziness when coughing, pt reports needing to sit down.   Pt denies vomiting.         Assessment and Plan   1. Type 2 diabetes mellitus without complication, without long-term current use of insulin (H)  Has been well-controlled continues to    2. Coronary artery disease of native artery of native heart with stable angina pectoris (H24)  Reviewed cardiology notes, continue secondary prevention, consider coronary intervention    3. Subacute cough  Patient with ongoing cough after influenza A, more than a month out quite bothersome treated with azithromycin without improvement, try albuterol and add Augmentin.  - albuterol (PROAIR HFA/PROVENTIL HFA/VENTOLIN HFA) 108 (90 Base) MCG/ACT inhaler; Inhale 2 puffs into the lungs every 6 hours as needed for shortness of breath, wheezing or cough  Dispense: 18 g; Refill: 0    4. Acute sinusitis with symptoms > 10 days  - amoxicillin-clavulanate (AUGMENTIN) 875-125 MG tablet; Take 1 tablet by mouth 2 times daily for 10 days  Dispense: 20 tablet; Refill: 0      Return in about 4 weeks (around 5/3/2024) for Follow up.     History of Present Illness   This 58 year old man comes in for follow-up.  He had influenza B over a month ago and continues to have cough and feels unwell.  He is coughing so much that his chest hurts.  He was treated with azithromycin last month with improvement while he is on it but now symptoms have returned.  He recently met with cardiology and they reviewed his coronary anatomy and  "again suggested potential for coronary intervention which he declines.  He does have occasional chest pain.       Physical Exam   General Appearance:   No acute distress    /73 (BP Location: Left arm, Patient Position: Sitting, Cuff Size: Adult Regular)   Pulse 58   Temp 97.8  F (36.6  C) (Tympanic)   Resp 18   Ht 1.69 m (5' 6.54\")   Wt 89.8 kg (198 lb)   SpO2 97%   BMI 31.45 kg/m      Heart rate controlled rhythm regular lungs are generally clear to auscultation bilaterally frequent coughing     Additional Information   Current Outpatient Medications   Medication Sig Dispense Refill    acetaminophen (TYLENOL) 500 MG tablet Take 1-2 tablets (500-1,000 mg) by mouth every 6 hours as needed for mild pain 360 tablet 3    albuterol (PROAIR HFA/PROVENTIL HFA/VENTOLIN HFA) 108 (90 Base) MCG/ACT inhaler Inhale 2 puffs into the lungs every 6 hours as needed for shortness of breath, wheezing or cough 18 g 0    amoxicillin-clavulanate (AUGMENTIN) 875-125 MG tablet Take 1 tablet by mouth 2 times daily for 10 days 20 tablet 0    aspirin (ASA) 81 MG chewable tablet CHEW AND SWALLOW 1 TABLET(81 MG) BY MOUTH DAILY 90 tablet 2    azelastine (ASTELIN) 0.1 % nasal spray Spray 1 spray into both nostrils 2 times daily 30 mL 1    busPIRone (BUSPAR) 5 MG tablet Take 1 tablet (5 mg) by mouth 2 times daily 180 tablet 0    Cholecalciferol (VITAMIN D3) 50 MCG (2000 UT) CAPS TAKE 1 CAPSULE BY MOUTH DAILY 90 capsule 1    cyanocobalamin (VITAMIN B-12) 1000 MCG tablet Take 1 tablet (1,000 mcg) by mouth daily 90 tablet 4    diclofenac (VOLTAREN) 1 % topical gel Apply 4 g topically 4 times daily 500 g 2    empagliflozin (JARDIANCE) 25 MG TABS tablet TAKE 1 TABLET(25 MG) BY MOUTH DAILY 60 tablet 0    escitalopram (LEXAPRO) 20 MG tablet Take 0.5 tablets (10 mg) by mouth daily For 4 days then stop      ipratropium (ATROVENT) 0.06 % nasal spray Spray 2 sprays into both nostrils 4 times daily 15 mL 1    isosorbide mononitrate (IMDUR) 30 MG " 24 hr tablet Take 1 tablet (30 mg) by mouth daily 90 tablet 4    Lidocaine 0.5 % GEL Externally apply topically as needed      metoprolol succinate ER (TOPROL XL) 50 MG 24 hr tablet Take 1 tablet (50 mg) by mouth daily 90 tablet 3    nifedipine 0.2% in white petrolatum 0.2 % OINT ointment Apply topically 4 times daily as needed (anal fissure) 100 g 1    nitroGLYcerin (NITROSTAT) 0.4 MG sublingual tablet For chest pain place 1 tablet under the tongue every 5 minutes for 3 doses. If symptoms persist 5 minutes after 1st dose call 911. 30 tablet 3    omeprazole (PRILOSEC) 20 MG DR capsule Take 1 capsule (20 mg) by mouth daily 90 capsule 1    oxybutynin (DITROPAN) 5 MG tablet Take 1 tablet (5 mg) by mouth At Bedtime 90 tablet 4    PARoxetine (PAXIL) 20 MG tablet Take 1 tablet (20 mg) by mouth every morning 30 tablet 3    polyethylene glycol (MIRALAX) 17 GM/Dose powder TAKE 17 GRAMS(1 CAPFUL) BY MOUTH DAILY 840 g 4    QUEtiapine (SEROQUEL) 50 MG tablet Take 1.5 tablets (75 mg) by mouth at bedtime 45 tablet 4    rosuvastatin (CRESTOR) 40 MG tablet Take 1 tablet (40 mg) by mouth daily 90 tablet 4    tamsulosin (FLOMAX) 0.4 MG capsule TAKE 2 CAPSULES(0.8 MG) BY MOUTH EVERY EVENING 180 capsule 4       Time:     The longitudinal plan of care for the diagnosis(es)/condition(s) as documented were addressed during this visit. Due to the added complexity in care, I will continue to support Mieshaandreina in the subsequent management and with ongoing continuity of care.     Az Briseno MD  Answers submitted by the patient for this visit:  Patient Health Questionnaire (Submitted on 4/5/2024)  If you checked off any problems, how difficult have these problems made it for you to do your work, take care of things at home, or get along with other people?: Very difficult  PHQ9 TOTAL SCORE: 13  ISI-7 (Submitted on 4/5/2024)  ISI 7 TOTAL SCORE: 13  Depression / Anxiety Questionnaire (Submitted on 4/5/2024)  Chief Complaint: Chronic problems  general questions HPI Form  Depression/Anxiety: Depression & Anxiety  Heart Failure Visit (Submitted on 4/5/2024)  Chief Complaint: Chronic problems general questions HPI Form  Dyspnea:: with rest and activity  Status:: improved  Edema:: No  Are you using more pillows than usual at night?: No  Do you cough at night?: Yes  Checking weight daily:: Yes  Weight change recently:: None  Heart Medication Side Effects:: cough  Frequency:: None  Depression & Anxiety (Submitted on 4/5/2024)  Chief Complaint: Chronic problems general questions HPI Form  Status since last visit:: no change  Anxiety since last: : no change  Other associated symptoms of depression:: No  Other associated symotome: : No  Significant life event: : health concerns  Anxious:: Yes  Current substance use:: No  General Questionnaire (Submitted on 4/5/2024)  Chief Complaint: Chronic problems general questions HPI Form  How many servings of fruits and vegetables do you eat daily?: 2-3  On average, how many sweetened beverages do you drink each day (Examples: soda, juice, sweet tea, etc.  Do NOT count diet or artificially sweetened beverages)?: 2  How many minutes a day do you exercise enough to make your heart beat faster?: 20 to 29  How many days a week do you exercise enough to make your heart beat faster?: 6  How many days per week do you miss taking your medication?: 0

## 2024-04-16 ENCOUNTER — ALLIED HEALTH/NURSE VISIT (OUTPATIENT)
Dept: EDUCATION SERVICES | Facility: CLINIC | Age: 59
End: 2024-04-16
Payer: COMMERCIAL

## 2024-04-16 VITALS — WEIGHT: 194.2 LBS | BODY MASS INDEX: 30.84 KG/M2

## 2024-04-16 DIAGNOSIS — E11.9 TYPE 2 DIABETES MELLITUS WITHOUT COMPLICATION, WITHOUT LONG-TERM CURRENT USE OF INSULIN (H): Primary | ICD-10-CM

## 2024-04-16 PROCEDURE — G0108 DIAB MANAGE TRN  PER INDIV: HCPCS | Mod: AE

## 2024-04-16 NOTE — LETTER
4/16/2024         RE: Nadya Blake  [address]  [facility] [id number]        Dear Colleague,    Thank you for referring your patient, Nadya Blake, to the [facility]. Please see a copy of my visit note below.    Diabetes Self-Management Education & Support    Presents for: Individual review    Type of Service: In Person Visit      ASSESSMENT:  Sania  is a very pleasant 58-year-old gentleman who returns to clinic today per his request for assessment/assistance with his current diabetes self-management skills.  He arrived today accompanied by his wife Carlee.  Medications were reviewed and Nadya confirms he continues to take 25 mg of Jardiance p.o. daily with no missed or skipped doses.  We went over current intake and it appears his appetite has improved somewhat and he is now eating more substantial meals and prior when he would just have juice or fruit.  We talked about  the importance of inclusion of protein in meals and I advised him to this may help him with his energy level.  He reported he has been having a lot of difficulty with sleeping, will wake numerous times throughout the night and is having nightmares.  He is currently seeing psychiatrist on a regular basis to help him with this. I informed him that based on his most recent A1c, his glycemic control and diabetes management continues to be excellent around told him to keep up the good work.  I will see him again in approximately 4 to 5 weeks, per his request instructed him to call with any questions or concerns that might come up before then     Patient's most recent   Lab Results   Component Value Date    A1C 5.5 01/09/2024     is meeting goal of <7.0    Diabetes knowledge and skills assessment:   Patient is knowledgeable in diabetes management concepts related to: Healthy Eating, Being Active, Taking Medication, and Reducing Risks    Continue education with the following diabetes management concepts:  "Healthy Eating, Being Active, and Healthy Coping    Based on learning assessment above, most appropriate setting for further diabetes education would be: Individual setting.      PLAN    See Patient Instructions for co-developed, patient-stated behavior change goals.  AVS printed and provided to patient today. See Follow-Up section for recommended follow-up.       Topics to cover at upcoming visits: Healthy Eating, Being Active, and Reducing Risks    Follow-up: 5/21/24    See Care Plan for co-developed, patient-state behavior change goals.  AVS provided for patient today.    Education Materials Provided:  No new materials provided today      SUBJECTIVE/OBJECTIVE:  Presents for: Individual review  Accompanied by: Spouse  Diabetes education in the past 24 mo: Yes (2/13/234)  Focus of Visit: Taking Medication, Healthy Coping, Healthy Eating, Being Active  Diabetes type: Type 2  Date of diagnosis: 2022  Disease course: Stable  How confident are you filling out medical forms by yourself:: Not at all  Difficulty affording diabetes medication?: No  Other concerns:: None, English as a second language  Cultural Influences/Ethnic Background:  Not  or       Diabetes Symptoms & Complications:  Weight trend: Stable  Symptom course: Stable  Disease course: Stable  Complications assessed today?: Yes  Heart disease: Yes    Patient Problem List and Family Medical History reviewed for relevant medical history, current medical status, and diabetes risk factors.    Vitals:  Wt 88.1 kg (194 lb 3.2 oz)   BMI 30.84 kg/m    Estimated body mass index is 30.84 kg/m  as calculated from the following:    Height as of 4/5/24: 1.69 m (5' 6.54\").    Weight as of this encounter: 88.1 kg (194 lb 3.2 oz).   Last 3 BP:   BP Readings from Last 3 Encounters:   04/05/24 103/73   03/12/24 106/74   03/08/24 119/79       History   Smoking Status     Former     Types: Cigarettes   Smokeless Tobacco     Never       Labs:  Lab Results " "  Component Value Date    A1C 5.5 01/09/2024     Lab Results   Component Value Date     03/08/2024     05/13/2022     Lab Results   Component Value Date    LDL 32 08/28/2023    LDL 41 08/07/2020     Direct Measure HDL   Date Value Ref Range Status   08/28/2023 33 (L) >=40 mg/dL Final   ]  GFR Estimate   Date Value Ref Range Status   03/08/2024 79 >60 mL/min/1.73m2 Final   05/24/2021 >60 >60 mL/min/1.73m2 Final     GFR Estimate If Black   Date Value Ref Range Status   05/24/2021 >60 >60 mL/min/1.73m2 Final     Lab Results   Component Value Date    CR 1.10 03/08/2024     No results found for: \"MICROALBUMIN\"    Healthy Eating:  Healthy Eating Assessed Today: Yes  Cultural/Protestant diet restrictions?:  (observes Ramadan)  Do you have any food allergies or intolerances?: No  Meal planning/habits: Heart healthy, Other  Please elaborate:: vegan diet  Who cooks/prepares meals for you?: Spouse  Who purchases food in  your home?: Spouse  Meals include: Lunch, Dinner, Evening Snack  Breakfast: : ranjith, parsley, lemon, cucumber  Lunch: tea, tahini with rye bread - apple juice- veggie burger  Dinner: fruit - will mix to have a fruit salad  Beverages: Water, Tea, Juice  Has patient met with a dietitian in the past?: No    Being Active:  Being Active Assessed Today: Yes  Exercise:: Yes  Days per week of moderate to strenuous exercise (like a brisk walk): 5  On average, minutes per day of exercise at this level: 20  How intense was your typical exercise? : Light (like stretching or slow walking)  Exercise Minutes per Week: 100  Barrier to exercise: None    Monitoring:  Monitoring Assessed Today: No  Times checking blood sugar at home (number): Never        Taking Medications:  Diabetes Medication(s)       Sodium-Glucose Co-Transporter 2 (SGLT2) Inhibitors       empagliflozin (JARDIANCE) 25 MG TABS tablet TAKE 1 TABLET(25 MG) BY MOUTH DAILY            Taking Medication Assessed Today: Yes  Current Treatments: " Oral Medication (taken by mouth)  Problems taking diabetes medications regularly?: No  Diabetes medication side effects?: No    Problem Solving:  Problem Solving Assessed Today: Yes  Is the patient at risk for hypoglycemia?: No  Is the patient at risk for DKA?: No  Does patient have severe weather/disaster plan for diabetes management?: Not Needed  Does patient have sick day plan for diabetes management?: Not Needed              Reducing Risks:  Reducing Risks Assessed Today: Yes  Diabetes Risks: Age over 45 years, Ethnicity, Hyperlipidemia  CAD Risks: Dyslipidemia, Diabetes Mellitus, Male sex, Hypertension    Healthy Coping:  Healthy Coping Assessed Today: Yes  Emotional response to diabetes: Ready to learn, Fear/Anxiety  Informal Support system:: Children, Spouse  Stage of change: ACTION (Actively working towards change)  Support resources: In-person Offerings  Patient Activation Measure Survey Score:       No data to display                  Care Plan and Education Provided:  Care Plan: Diabetes   Updates made by Jojo Soria RN since 4/19/2024 12:00 AM        Problem: HbA1C Not In Goal         Goal: Establish Regular Follow-Ups with PCP    This Visit's Progress: 100%   Recent Progress: 100%        Goal: Get HbA1C Level in Goal    This Visit's Progress: 100%   Recent Progress: 100%   Note:    Maintain A1c <7%       Problem: Diabetes Self-Management Education Needed to Optimize Self-Care Behaviors         Goal: Healthy Eating - follow a healthy eating pattern for diabetes    This Visit's Progress: 80%   Recent Progress: 70%   Note:    Incorporate protein source with each meal and snack       Goal: Being Active - get regular physical activity, working up to at least 150 minutes per week    This Visit's Progress: 100%   Recent Progress: 100%        Goal: Taking Medication - patient is consistently taking medications as directed    This Visit's Progress: 100%   Recent Progress: 100%        Goal: Reducing Risks -  know how to prevent and treat long-term diabetes complications    This Visit's Progress: 100%   Recent Progress: 100%        Goal: Healthy Coping - use available resources to cope with the challenges of managing diabetes    This Visit's Progress: 70%   Recent Progress: 70%        Task: Provide education on the benefits of making appropriate lifestyle changes Completed 4/19/2024   Responsible User: Jojo Soria, RN        Task: Provide education on when to seek professional counseling Completed 4/19/2024   Responsible User: Jojo Soria, RN          Thank you,  Jojo Soria RN [facility]  Certified Diabetes Care and   Visit type : DSMT      Time Spent: 30 minutes  Encounter Type: Individual    Any diabetes medication dose changes were made via the CDE Protocol per the patient's referring provider and primary care provider. A copy of this encounter was shared with the provider.   Much or all of the text in this note was generated through the use of the Dragon Dictate voice-to-text software.Errors in spelling or words which seem out of context are unintentional. Sound alike errors, in particular, may have escaped editing.

## 2024-04-16 NOTE — PATIENT INSTRUCTIONS
1. Eat 3 balanced meals each day - Monitor carb intake and limit to 60-75 grams per meal  This would be equal to 4-5 choices ~  1 choice = 15 grams    Do not wait longer than 4-5 hours to eat something  Snacks limit to no more than 30 grams of carbohydrates or 2 choices  Make sure you include protein source with each meal and at bedtime - this has been shown to help with blood glucose elevations      3. Incorporate 30 minutes activity into each day - does not need to be all at one time & walking counts    4. Take diabetes medications as prescribed continue Jardiance 25 mg each day       Thank you for coming in to see me today ! It is always good to see you both      I value your experience and would be very thankful for your time in providing feedback in our clinic survey.    You may receive an e-mail, text message or even something in the mail from [employer].  This is a survey to let us know how we are doing - the survey will be related to your diabetes education and me.

## 2024-04-18 ENCOUNTER — VIRTUAL VISIT (OUTPATIENT)
Dept: PSYCHIATRY | Facility: CLINIC | Age: 59
End: 2024-04-18
Payer: COMMERCIAL

## 2024-04-18 DIAGNOSIS — F51.05 INSOMNIA DUE TO OTHER MENTAL DISORDER: ICD-10-CM

## 2024-04-18 DIAGNOSIS — F43.10 PTSD (POST-TRAUMATIC STRESS DISORDER): ICD-10-CM

## 2024-04-18 DIAGNOSIS — F33.1 DEPRESSION, MAJOR, RECURRENT, MODERATE (H): Primary | ICD-10-CM

## 2024-04-18 DIAGNOSIS — F41.1 GAD (GENERALIZED ANXIETY DISORDER): ICD-10-CM

## 2024-04-18 DIAGNOSIS — F99 INSOMNIA DUE TO OTHER MENTAL DISORDER: ICD-10-CM

## 2024-04-18 PROCEDURE — 99214 OFFICE O/P EST MOD 30 MIN: CPT | Mod: 95 | Performed by: NURSE PRACTITIONER

## 2024-04-18 ASSESSMENT — PAIN SCALES - GENERAL: PAINLEVEL: NO PAIN (0)

## 2024-04-18 NOTE — PROGRESS NOTES
"Virtual Visit Details    Type of service:  Video Visit   Video Start Time:  8:18 AM  Video End Time: 8:45 AM    Originating Location (pt. Location): Home    Distant Location (provider location):  On-site  Platform used for Video Visit: [facility]           Outpatient Psychiatric Progress Note    Name: Nadya Blake   : 1965                    Primary Care Provider: Az Briseno MD   Therapist: Yes    PHQ-9 scores:      2024     9:36 AM 3/4/2024     8:05 AM 2024     9:00 AM   PHQ-9 SCORE   PHQ-9 Total Score MyChart 9 (Mild depression) 8 (Mild depression) 13 (Moderate depression)   PHQ-9 Total Score 9 8 13       ISI-7 scores:      2024     9:33 AM 3/8/2024     9:35 AM 2024     9:06 AM   ISI-7 SCORE   Total Score 14 (moderate anxiety) 14 (moderate anxiety) 13 (moderate anxiety)   Total Score 14 14 13       Patient Identification:    Patient is a 58 year old year old,    Not  or  male  who presents for return visit with me.  Patient is currently unemployed. Patient attended the session with his wife , who they agreed to have interview with. Patient prefers to be called: \" Nadya\".    Current medications include:   Current Outpatient Medications   Medication Sig Dispense Refill    acetaminophen (TYLENOL) 500 MG tablet Take 1-2 tablets (500-1,000 mg) by mouth every 6 hours as needed for mild pain 360 tablet 3    albuterol (PROAIR HFA/PROVENTIL HFA/VENTOLIN HFA) 108 (90 Base) MCG/ACT inhaler Inhale 2 puffs into the lungs every 6 hours as needed for shortness of breath, wheezing or cough 18 g 0    aspirin (ASA) 81 MG chewable tablet CHEW AND SWALLOW 1 TABLET(81 MG) BY MOUTH DAILY 90 tablet 2    azelastine (ASTELIN) 0.1 % nasal spray Spray 1 spray into both nostrils 2 times daily 30 mL 1    busPIRone (BUSPAR) 5 MG tablet Take 1 tablet (5 mg) by mouth 2 times daily 180 tablet 0    Cholecalciferol (VITAMIN D3) 50 MCG (2000 UT) CAPS TAKE 1 CAPSULE BY MOUTH DAILY 90 " capsule 1    cyanocobalamin (VITAMIN B-12) 1000 MCG tablet Take 1 tablet (1,000 mcg) by mouth daily 90 tablet 4    diclofenac (VOLTAREN) 1 % topical gel Apply 4 g topically 4 times daily 500 g 2    empagliflozin (JARDIANCE) 25 MG TABS tablet TAKE 1 TABLET(25 MG) BY MOUTH DAILY 60 tablet 0    escitalopram (LEXAPRO) 20 MG tablet Take 0.5 tablets (10 mg) by mouth daily For 4 days then stop      ipratropium (ATROVENT) 0.06 % nasal spray Spray 2 sprays into both nostrils 4 times daily 15 mL 1    isosorbide mononitrate (IMDUR) 30 MG 24 hr tablet Take 1 tablet (30 mg) by mouth daily 90 tablet 4    Lidocaine 0.5 % GEL Externally apply topically as needed      metoprolol succinate ER (TOPROL XL) 50 MG 24 hr tablet Take 1 tablet (50 mg) by mouth daily 90 tablet 3    nifedipine 0.2% in white petrolatum 0.2 % OINT ointment Apply topically 4 times daily as needed (anal fissure) 100 g 1    nitroGLYcerin (NITROSTAT) 0.4 MG sublingual tablet For chest pain place 1 tablet under the tongue every 5 minutes for 3 doses. If symptoms persist 5 minutes after 1st dose call 911. 30 tablet 3    omeprazole (PRILOSEC) 20 MG DR capsule Take 1 capsule (20 mg) by mouth daily 90 capsule 1    oxybutynin (DITROPAN) 5 MG tablet Take 1 tablet (5 mg) by mouth At Bedtime 90 tablet 4    PARoxetine (PAXIL) 20 MG tablet Take 1 tablet (20 mg) by mouth every morning 30 tablet 3    polyethylene glycol (MIRALAX) 17 GM/Dose powder TAKE 17 GRAMS(1 CAPFUL) BY MOUTH DAILY 840 g 4    QUEtiapine (SEROQUEL) 50 MG tablet Take 1.5 tablets (75 mg) by mouth at bedtime 45 tablet 4    rosuvastatin (CRESTOR) 40 MG tablet Take 1 tablet (40 mg) by mouth daily 90 tablet 4    tamsulosin (FLOMAX) 0.4 MG capsule TAKE 2 CAPSULES(0.8 MG) BY MOUTH EVERY EVENING 180 capsule 4     Current Facility-Administered Medications   Medication Dose Route Frequency Provider Last Rate Last Admin    albuterol (PROVENTIL HFA/VENTOLIN HFA) inhaler  2 puff Inhalation Once Sonya Leonard MD         "    The [place] Prescription Monitoring Program has been reviewed and there are no concerns about diversionary activity for controlled substances at this time.      I was able to review most recent Primary Care Provider, specialty provider, and therapy visit notes that I have access to.     Today, patient reports that he finished taking antibiotics 2 days ago for bronchitis.  He is doing better physically today but tells me mentally he is \"so-so\".  He is having no suicide thinking.  He continues to meet with his counselor twice weekly.  He continues to worry about many things including his health and the limitations imposed on him by having heart failure.  He worries about leaving his family without him.  Chandni wakes up frequently throughout the night to urinate.  His wife has noticed drops of blood in his disposable brief and plans to visit with his PCP at their next visit about this.  Radha admits to not having much of an appetite and his wife tells me that he eats \"to survive\".  He has become a vegan now that he was recommended to have this diet from his doctor.  He is not sad about not being able to work due to his health.  A sleep study is pending in June.       has a past medical history of Acute non-ST elevation myocardial infarction (NSTEMI) (H) (6/22/2020), Adenomatous polyp of colon, Ascending aorta dilatation (H24), Carpal tunnel syndrome, Chronic superficial gastritis, Coronary artery disease due to lipid rich plaque (6/23/2020), Depression with anxiety, Dyslipidemia, goal LDL below 70 (6/23/2020), Essential hypertension (9/2/2012), Fatty liver disease, nonalcoholic, GERD (gastroesophageal reflux disease), IBS (irritable bowel syndrome), Left lumbar radiculopathy, Multinodular goiter, Old torn meniscus of knee, Paroxysmal atrial fibrillation (H), Perianal abscess, Post herpetic neuralgia, Post-traumatic osteoarthritis of both knees, Primary osteoarthritis of left hip, Primary osteoarthritis of " right hip, Pulmonary nodule, 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), Thyroid nodule, TMJ arthritis, Tobacco use disorder (11/1/2013), and Vitamin D deficiency (9/30/2020).    Social history updates:    No change in his social history to report today    Substance use updates:    No alcohol use reported  Tobacco use: No    Vital Signs:   There were no vitals taken for this visit.    Labs:    Most recent laboratory results reviewed and no new labs.     Mental Status Examination:  Appearance: awake, alert and casual dress  Attitude: cooperative  Eye Contact:  adequate  Gait and Station: No dizziness or falls  Psychomotor Behavior:  intact station, gait and muscle tone  Oriented to:  time, person, and place  Attention Span and Concentration:  Normal  Speech:   vtspeech: clear, coherent and Speaks: English  Mood:  anxious and depressed  Affect:  mood congruent  Associations:  no loose associations  Thought Process:  goal oriented  Thought Content:  no evidence of suicidal ideation or homicidal ideation, no auditory hallucinations present, and no visual hallucinations present  Recent and Remote Memory:  intact Not formally assessed. No amnesia.  Fund of Knowledge: appropriate  Insight:  good  Judgment: good  Impulse Control:  good    Suicide Risk Assessment:  Today Nadya Blake reports no thoughts to harm themself or others. In addition, there are notable risk factors for self-harm, including anxiety, comorbid medical condition of cardiac condition, and withdrawing. However, risk is mitigated by commitment to family, history of seeking help when needed, future oriented, denies suicidal intent or plan, and denies homicidal ideation, intent, or plan. Therefore, based on all available evidence including the factors cited above, Nadya Blake does not appear to be at imminent risk for self-harm, does not meet criteria for a 72-hr hold, and therefore remains appropriate for ongoing  outpatient level of care.  A thorough assessment of risk factors related to suicide and self-harm have been reviewed and are noted above. The patient convincingly denies suicidality on several occasions. Local community safety resources printed and reviewed for patient to use if needed. There was no deceit detected, and the patient presented in a manner that was believable.     DSM5 Diagnosis:  296.32 (F33.1) Major Depressive Disorder, Recurrent Episode, Moderate _ and With melancholic features  300.02 (F41.1) Generalized Anxiety Disorder  309.81 (F43.10) Posttraumatic Stress Disorder (includes Posttraumatic Stress Disorder for Children 6 Years and Younger)  Without dissociative symptoms  780.52 (G47.00) Insomnia Disorder   With non-sleep disorder mental comorbidity  Episodic      Medical comorbidities include:   Patient Active Problem List    Diagnosis Date Noted    Nocturnal enuresis 01/17/2024     Priority: Medium    Subacute cough 12/28/2023     Priority: Medium    Gallstones 12/28/2023     Priority: Medium    Type 2 diabetes mellitus without complication, without long-term current use of insulin (H) 08/28/2023     Priority: Medium    Microscopic hematuria - Dr. Tineo 07/27/2023     Priority: Medium    Chronic kidney disease, stage 3a (H) 11/21/2022     Priority: Medium    Simple chronic bronchitis (H) 07/05/2022     Priority: Medium    Anal fissure - Dr. Campoverde 08/30/2021     Priority: Medium    Benign prostatic hyperplasia with nocturia 09/30/2020     Priority: Medium    PTSD (post-traumatic stress disorder) 09/30/2020     Priority: Medium    S/P CABG x 4 07/02/2020     Priority: Medium    Coronary artery disease, CABG 6/2020 06/23/2020     Priority: Medium    Dyslipidemia, goal LDL below 70 06/23/2020     Priority: Medium    Ascending aorta dilatation (H24) 02/16/2020     Priority: Medium     Formatting of this note might be different from the original.  4.3 cm since 2014.      History of colonic polyps  09/14/2017     Priority: Medium    Nonalcoholic fatty liver disease 07/12/2017     Priority: Medium    Primary osteoarthritis of left hip 04/28/2017     Priority: Medium    Primary osteoarthritis of right hip 04/28/2017     Priority: Medium    Thyroid nodule 03/07/2016     Priority: Medium    Post-traumatic osteoarthritis of both knees 06/08/2015     Priority: Medium    Pulmonary nodule 11/11/2014     Priority: Medium     Formatting of this note might be different from the original.  CT scan 11-11-14. 2 mm each. Repeat scan in one year. Patient is a smoker. Positive family history of lung cancer.  3-14-16 2 stable 2 mm nodules, unchanged on repeat ct scan.  5-26-17 stable nodules. No pulmonary abnormality.      Gastroesophageal reflux disease with esophagitis without hemorrhage 10/30/2014     Priority: Medium     2-8-13 EGD nonspecific gastritis. Treated for H pylori in the past.  3-20-19 non specific gastritis. Path neg.        Recurrent major depressive disorder, in partial remission (H24) 09/02/2012     Priority: Medium    Essential hypertension 09/02/2012     Priority: Medium       Assessment:    Nadya Blake remains saddened about the limitations he has on the abilities to be employed to support his family.  He has been tolerating the paroxetine well for managing symptoms of depression and anxiety.  The Lexapro was discontinued.  Buspirone continues 2 times daily to manage his generalized anxiety.  He continues talk therapy to help him learn skills to adjust to changes in his health status.  Quetiapine will continue at bedtime as is.  He desires no changes in the dose to help him sleep better..    Medication side effects and alternatives were reviewed. Health promotion activities recommended and reviewed today. All questions addressed. Education and counseling completed regarding risks and benefits of medications and psychotherapy options.    Treatment Plan:      1.  Buspirone 5 mg 2 times daily  2.   Lexapro discontinued  3.  Paroxetine 20 mg daily  4.  Quetiapine 75 mg at bedtime  5.  Continue talk therapy    Continue all other medications as reviewed per electronic medical record today.   Safety plan reviewed. To the Emergency Department as needed or call after hours crisis line at [phone] or [phone]. [place] Crisis Text Line. Text [place] to 079494 or Suicide LifeLine Chat: Continuent.org/chat/  To schedule individual or family therapy, call [facility] at [phone]  Schedule an appointment with me in 6 weeks or sooner as needed. Call [facility] at [phone] to schedule.  Follow up with primary care provider as planned or for acute medical concerns.  Call the psychiatric nurse line with medication questions or concerns at [phone]  MyChart may be used to communicate with your provider, but this is not intended to be used for emergencies.        Crisis Resources   The [facility] is an adults only unit located at [facility] is a short term (generally less than 23 hour stay) designed for crisis intervention and stabilization. Pts have the opportunity to meet quickly with a behavioral health team for evaluation in a calm and peaceful therapuetic environment. To be evaluated for admission pts are triaged throught the [facility] ED.      The following hotlines are for both adults and children. The and are open 24 hours a day, 7 days a week unless noted otherwise.        Crisis Lines      Crisis Text Line  Text 047383  You will be connected with a trained live crisis counselor to provide support.        [employer] Hotline  5.534.424.7138 [hope]        línea de crisis española  413.084.5304        [facility] WaveConnex Helpline  295.638.1020        National Hope Line  2.877.309.7422 [hope]        National Suicide Prevention Lifeline  Free and confidential support  988 or 5.218.733.TALK [6255]  http://suicidepreventionBandcampline.org         The [place] Project (LGBTQ Youth Crisis Line)  0.856.427.6871  text START to 790-429        's Crisis Line  7.977.303.3627 (Press 1)  or text 091564    [facility] Mental Health Crisis Response  Within [place], call **CRISIS [**208335] to be connected to a mental health professional who can assist you.        [facility] Crisis  [phone]      [facility] Mobile Crisis  [phone]      [facility] Crisis  [phone]      [facility] Mobile Crisis  [phone] (adults)  [phone] (children)      [facility] Mobile Crisis  [phone] (adults)  [phone] (children)      [facility] Mobile Crisis  [phone]      [facility] Mobile Crisis  [phone]    Community Resources      Fast Tracker  Linking people to mental health and substance use disorder resources  PersistIQckDataRankn.org        [place] Mental Health Warmline  Peer to peer support  5 pm to 9 am 7 days/week  1.909.969.8666  https://mnwitw.org/cooper        National [place] on Mental Illness (DANA)  468.462.1624 or 1.888.DANA.HELPS  https://namimn.org/        [facility] Urgent [facility] for Adult Mental Health  [facility] residents [place]   [address]  [phone]        [facility]  Free mental health counseling  https://walkin.org/  612.870.0565 X2    Mental Health Apps      Calm Harm  https://calmharm.co.uk/      My3  https://my3app.org/      [employer] Safety Plan  https://www.mysafetyplan.org/   Administrative Billing:   Time spent with patient includes counseling and coordination of care regarding above diagnoses and treatment plan.    Patient Status:  This is a continuous care patient and medications will be prescribed by the psychiatric provider until further indicated.    Signed:   SINGH Mo-BC   Psychiatry

## 2024-04-18 NOTE — NURSING NOTE
Is the patient currently in the state of [place]? YES    Visit mode:VIDEO    If the visit is dropped, the patient can be reconnected by: VIDEO VISIT: Text to cell phone:   Telephone Information:   Mobile [phone]       Will anyone else be joining the visit? NO  (If patient encounters technical issues they should call [phone])    How would you like to obtain your AVS? MyChart    Are changes needed to the allergy or medication list? No    Are refills needed on medications prescribed by this physician? NO    Reason for visit: RECHECK    Gloria ARCHER

## 2024-04-19 NOTE — PROGRESS NOTES
Diabetes Self-Management Education & Support    Presents for: Individual review    Type of Service: In Person Visit      ASSESSMENT:  Sania  is a very pleasant 58-year-old gentleman who returns to clinic today per his request for assessment/assistance with his current diabetes self-management skills.  He arrived today accompanied by his wife Carlee.  Medications were reviewed and Nadya confirms he continues to take 25 mg of Jardiance p.o. daily with no missed or skipped doses.  We went over current intake and it appears his appetite has improved somewhat and he is now eating more substantial meals and prior when he would just have juice or fruit.  We talked about  the importance of inclusion of protein in meals and I advised him to this may help him with his energy level.  He reported he has been having a lot of difficulty with sleeping, will wake numerous times throughout the night and is having nightmares.  He is currently seeing psychiatrist on a regular basis to help him with this. I informed him that based on his most recent A1c, his glycemic control and diabetes management continues to be excellent around told him to keep up the good work.  I will see him again in approximately 4 to 5 weeks, per his request instructed him to call with any questions or concerns that might come up before then     Patient's most recent   Lab Results   Component Value Date    A1C 5.5 01/09/2024     is meeting goal of <7.0    Diabetes knowledge and skills assessment:   Patient is knowledgeable in diabetes management concepts related to: Healthy Eating, Being Active, Taking Medication, and Reducing Risks    Continue education with the following diabetes management concepts: Healthy Eating, Being Active, and Healthy Coping    Based on learning assessment above, most appropriate setting for further diabetes education would be: Individual setting.      PLAN    See Patient Instructions for co-developed, patient-stated behavior  "change goals.  AVS printed and provided to patient today. See Follow-Up section for recommended follow-up.       Topics to cover at upcoming visits: Healthy Eating, Being Active, and Reducing Risks    Follow-up: 5/21/24    See Care Plan for co-developed, patient-state behavior change goals.  AVS provided for patient today.    Education Materials Provided:  No new materials provided today      SUBJECTIVE/OBJECTIVE:  Presents for: Individual review  Accompanied by: Spouse  Diabetes education in the past 24 mo: Yes (2/13/234)  Focus of Visit: Taking Medication, Healthy Coping, Healthy Eating, Being Active  Diabetes type: Type 2  Date of diagnosis: 2022  Disease course: Stable  How confident are you filling out medical forms by yourself:: Not at all  Difficulty affording diabetes medication?: No  Other concerns:: None, English as a second language  Cultural Influences/Ethnic Background:  Not  or       Diabetes Symptoms & Complications:  Weight trend: Stable  Symptom course: Stable  Disease course: Stable  Complications assessed today?: Yes  Heart disease: Yes    Patient Problem List and Family Medical History reviewed for relevant medical history, current medical status, and diabetes risk factors.    Vitals:  Wt 88.1 kg (194 lb 3.2 oz)   BMI 30.84 kg/m    Estimated body mass index is 30.84 kg/m  as calculated from the following:    Height as of 4/5/24: 1.69 m (5' 6.54\").    Weight as of this encounter: 88.1 kg (194 lb 3.2 oz).   Last 3 BP:   BP Readings from Last 3 Encounters:   04/05/24 103/73   03/12/24 106/74   03/08/24 119/79       History   Smoking Status    Former    Types: Cigarettes   Smokeless Tobacco    Never       Labs:  Lab Results   Component Value Date    A1C 5.5 01/09/2024     Lab Results   Component Value Date     03/08/2024     05/13/2022     Lab Results   Component Value Date    LDL 32 08/28/2023    LDL 41 08/07/2020     Direct Measure HDL   Date Value Ref Range Status " "  08/28/2023 33 (L) >=40 mg/dL Final   ]  GFR Estimate   Date Value Ref Range Status   03/08/2024 79 >60 mL/min/1.73m2 Final   05/24/2021 >60 >60 mL/min/1.73m2 Final     GFR Estimate If Black   Date Value Ref Range Status   05/24/2021 >60 >60 mL/min/1.73m2 Final     Lab Results   Component Value Date    CR 1.10 03/08/2024     No results found for: \"MICROALBUMIN\"    Healthy Eating:  Healthy Eating Assessed Today: Yes  Cultural/Gnosticist diet restrictions?:  (observes Ramadan)  Do you have any food allergies or intolerances?: No  Meal planning/habits: Heart healthy, Other  Please elaborate:: vegan diet  Who cooks/prepares meals for you?: Spouse  Who purchases food in  your home?: Spouse  Meals include: Lunch, Dinner, Evening Snack  Breakfast: : ranjith, parsley, lemon, cucumber  Lunch: tea, tahini with rye bread - apple juice- veggie burger  Dinner: fruit - will mix to have a fruit salad  Beverages: Water, Tea, Juice  Has patient met with a dietitian in the past?: No    Being Active:  Being Active Assessed Today: Yes  Exercise:: Yes  Days per week of moderate to strenuous exercise (like a brisk walk): 5  On average, minutes per day of exercise at this level: 20  How intense was your typical exercise? : Light (like stretching or slow walking)  Exercise Minutes per Week: 100  Barrier to exercise: None    Monitoring:  Monitoring Assessed Today: No  Times checking blood sugar at home (number): Never        Taking Medications:  Diabetes Medication(s)       Sodium-Glucose Co-Transporter 2 (SGLT2) Inhibitors       empagliflozin (JARDIANCE) 25 MG TABS tablet TAKE 1 TABLET(25 MG) BY MOUTH DAILY            Taking Medication Assessed Today: Yes  Current Treatments: Oral Medication (taken by mouth)  Problems taking diabetes medications regularly?: No  Diabetes medication side effects?: No    Problem Solving:  Problem Solving Assessed Today: Yes  Is the patient at risk for hypoglycemia?: No  Is the patient at risk for DKA?: " No  Does patient have severe weather/disaster plan for diabetes management?: Not Needed  Does patient have sick day plan for diabetes management?: Not Needed              Reducing Risks:  Reducing Risks Assessed Today: Yes  Diabetes Risks: Age over 45 years, Ethnicity, Hyperlipidemia  CAD Risks: Dyslipidemia, Diabetes Mellitus, Male sex, Hypertension    Healthy Coping:  Healthy Coping Assessed Today: Yes  Emotional response to diabetes: Ready to learn, Fear/Anxiety  Informal Support system:: Children, Spouse  Stage of change: ACTION (Actively working towards change)  Support resources: In-person Offerings  Patient Activation Measure Survey Score:       No data to display                  Care Plan and Education Provided:  Care Plan: Diabetes   Updates made by Jojo Soria RN since 4/19/2024 12:00 AM        Problem: HbA1C Not In Goal         Goal: Establish Regular Follow-Ups with PCP    This Visit's Progress: 100%   Recent Progress: 100%        Goal: Get HbA1C Level in Goal    This Visit's Progress: 100%   Recent Progress: 100%   Note:    Maintain A1c <7%       Problem: Diabetes Self-Management Education Needed to Optimize Self-Care Behaviors         Goal: Healthy Eating - follow a healthy eating pattern for diabetes    This Visit's Progress: 80%   Recent Progress: 70%   Note:    Incorporate protein source with each meal and snack       Goal: Being Active - get regular physical activity, working up to at least 150 minutes per week    This Visit's Progress: 100%   Recent Progress: 100%        Goal: Taking Medication - patient is consistently taking medications as directed    This Visit's Progress: 100%   Recent Progress: 100%        Goal: Reducing Risks - know how to prevent and treat long-term diabetes complications    This Visit's Progress: 100%   Recent Progress: 100%        Goal: Healthy Coping - use available resources to cope with the challenges of managing diabetes    This Visit's Progress: 70%   Recent  Progress: 70%        Task: Provide education on the benefits of making appropriate lifestyle changes Completed 4/19/2024   Responsible User: Jojo Soria RN        Task: Provide education on when to seek professional counseling Completed 4/19/2024   Responsible User: Jojo Soria RN          Thank you,  Jojo Soria RN [facility]  Certified Diabetes Care and   Visit type : DSMT      Time Spent: 30 minutes  Encounter Type: Individual    Any diabetes medication dose changes were made via the CDE Protocol per the patient's referring provider and primary care provider. A copy of this encounter was shared with the provider.   Much or all of the text in this note was generated through the use of the Dragon Dictate voice-to-text software.Errors in spelling or words which seem out of context are unintentional. Sound alike errors, in particular, may have escaped editing.

## 2024-04-19 NOTE — PATIENT INSTRUCTIONS
"Patient Education   The Panel Psychiatry Program  What to Expect  Here's what to expect in the Panel Psychiatry Program.   About the program  You'll be meeting with a psychiatric doctor to check your mental health. A psychiatric doctor helps you deal with troubling thoughts and feelings by giving you medicine. They'll make sure you know the plan for your care. You may see them for a long time. When you're feeling better, they may refer you back to seeing your family doctor.   If you have any questions, we'll be glad to talk to you.  About visits  Be open  At your visits, please talk openly about your problems. It may feel hard, but it's the best way for us to help you.  Cancelling visits  If you can't come to your visit, please call us right away at [phone]. If you don't cancel at least 24 hours (1 full day) before your visit, that's \"late cancellation.\"  Not showing up for your visits  Being very late is the same as not showing up. You'll be a \"no show\" if:  You're more than 15 minutes late for a 30-minute (half hour) visit.  You're more than 30 minutes late for a 60-minute (full hour) visit.  If you cancel late or don't show up 2 times within 6 months, we may end your care.  Getting help between visits  If you need help between visits, you can call us Monday to Friday from 8 a.m. to 4:30 p.m. at [phone].  Emergency care  Call 911 or go to the nearest emergency department if your life or someone else's life is in danger.  Call 988 anytime to reach the national Suicide and Crisis hotline.  Medicine refills  To refill your medicine, call your pharmacy. You can also call [facility]'s Behavioral Access at [phone], Monday to Friday, 8 a.m. to 4:30 p.m. It can take 1 to 3 business days to get a refill.   Forms, letters, and tests  You may have papers to fill out, like FMLA, short-term disability, and workability. We can help you with these forms at your visits, but you must have an " appointment. You may need more than 1 visit for this, to be in an intensive therapy program, or both.  Before we can give you medicine for ADHD, we may refer you to get tested for it or confirm it another way.  We may not be able to give you an emotional support animal letter.  We don't do mental health checks ordered by the court.   We don't do mental health testing, but we can refer you to get tested.   Thank you for choosing us for your care.  For informational purposes only. Not to replace the advice of your health care provider. Copyright   2022 [facility] [employer]. All rights reserved. [employer] 529780 - 12/22.       Treatment Plan:      1.  Buspirone 5 mg 2 times daily  2.  Lexapro discontinued  3.  Paroxetine 20 mg daily  4.  Quetiapine 75 mg at bedtime  5.  Continue talk therapy    Continue all other medical directions per primary care provider.   Continue all other medications as reviewed per electronic medical record today.   Safety plan reviewed. To the Emergency Department as needed or call after hours crisis line at [phone] or [phone]. [place] Crisis Text Line: Text [place] to 602524  or  Suicide LifeLine Chat: suicidepreventionlifeline.org/chat/  To schedule individual or family therapy, call [facility] at [phone].   Schedule an appointment with me in 6 weeks or sooner as needed.  Call [facility] at [phone] to schedule.  Follow up with primary care provider as planned or for acute medical concerns.  Call the psychiatric nurse line with medication questions or concerns at [phone].  Local Matters may be used to communicate with your provider, but this is not intended to be used for emergencies.        Crisis Resources   The [facility] is an adults only unit located at [facility] in [place] is a short term (generally less than 23 hour stay) designed for crisis intervention and stabilization. Pts have the opportunity to meet quickly with a  behavioral health team for evaluation in a calm and peaceful therapuetic environment. To be evaluated for admission pts are triaged throught the [facility] ED.      The following hotlines are for both adults and children. The and are open 24 hours a day, 7 days a week unless noted otherwise.        Crisis Lines      Crisis Text Line  Text 568254  You will be connected with a trained live crisis counselor to provide support.        Gambling Hotline  4.488.921.3736 [hope]        línea de crisis española  327.932.0709        [place] [employer] Helpline  579.572.9864        National Hope Line  0.551.976.5850 [hope]        National Suicide Prevention Lifeline  Free and confidential support  988 or 1.502.224.TALK [8255]  http://suicidepreventionlifeline.org        The [place] Project (LGBTQ Youth Crisis Line)  8.069.300.2462  text START to 569-058        [facility] Crisis Line  3.016.173.8634 (Press 1)  or text 001308    [facility] Mental Health Crisis Response  Within [place], call **CRISIS [**557287] to be connected to a mental health professional who can assist you.        [facility] Crisis  [phone]      [facility] Mobile Crisis  [phone]      [facility] Crisis  [phone]      [facility] Mobile Crisis  [phone] (adults)  [phone] (children)      [facility] Mobile Crisis  [phone] (adults)  [phone] (children)      [facility] Mobile Crisis  [phone]      [facility] Mobile Crisis  [phone]    Community Resources      Fast Tracker  Linking people to mental health and substance use disorder resources  fasttrackermn.org        [place] Mental Health Warmline  Peer to peer support  5 pm to 9 am 7 days/week  1.854.285.3685  https://mnwitw.org/cooper        [employer] [place] [employer]ness (DANA)  438.491.5678 or 1.888.DANA.HELPS  https://namimn.org/        [facility]  [facility] residents  [address]  [phone]        [facility]  Free mental health counseling  https://walkin.org/  612.870.0565 X2    Mental Health Apps      Calm Harm  https://calmharm.co.uk/      My3  https://my3app.org/      [employer] Safety Plan  https://www.mysafetyplan.org/

## 2024-04-27 DIAGNOSIS — R05.2 SUBACUTE COUGH: ICD-10-CM

## 2024-04-29 DIAGNOSIS — R35.1 NOCTURIA: ICD-10-CM

## 2024-04-29 RX ORDER — ALBUTEROL SULFATE 90 UG/1
AEROSOL, METERED RESPIRATORY (INHALATION)
Qty: 18 G | Refills: 2 | Status: SHIPPED | OUTPATIENT
Start: 2024-04-29

## 2024-04-29 RX ORDER — TAMSULOSIN HYDROCHLORIDE 0.4 MG/1
CAPSULE ORAL
Qty: 180 CAPSULE | Refills: 4 | OUTPATIENT
Start: 2024-04-29

## 2024-04-29 NOTE — TELEPHONE ENCOUNTER
Americo Briseno,     Patient's wife Carlee called and said patient has been out of the medication for 3 days. She is wondering if he will be able to get the medication today. Please advise.     Thanks,     Dave Myhre, RN   CCC RN

## 2024-05-03 ENCOUNTER — OFFICE VISIT (OUTPATIENT)
Dept: INTERNAL MEDICINE | Facility: CLINIC | Age: 59
End: 2024-05-03
Payer: COMMERCIAL

## 2024-05-03 VITALS
OXYGEN SATURATION: 93 % | RESPIRATION RATE: 16 BRPM | WEIGHT: 189.7 LBS | TEMPERATURE: 97.7 F | HEART RATE: 56 BPM | SYSTOLIC BLOOD PRESSURE: 101 MMHG | BODY MASS INDEX: 31.61 KG/M2 | HEIGHT: 65 IN | DIASTOLIC BLOOD PRESSURE: 66 MMHG

## 2024-05-03 DIAGNOSIS — J41.0 SIMPLE CHRONIC BRONCHITIS (H): ICD-10-CM

## 2024-05-03 DIAGNOSIS — E11.9 TYPE 2 DIABETES MELLITUS WITHOUT COMPLICATION, WITHOUT LONG-TERM CURRENT USE OF INSULIN (H): ICD-10-CM

## 2024-05-03 DIAGNOSIS — N18.31 CHRONIC KIDNEY DISEASE, STAGE 3A (H): ICD-10-CM

## 2024-05-03 DIAGNOSIS — I25.118 CORONARY ARTERY DISEASE OF NATIVE ARTERY OF NATIVE HEART WITH STABLE ANGINA PECTORIS (H): ICD-10-CM

## 2024-05-03 DIAGNOSIS — R35.1 BENIGN PROSTATIC HYPERPLASIA WITH NOCTURIA: Primary | ICD-10-CM

## 2024-05-03 DIAGNOSIS — N40.1 BENIGN PROSTATIC HYPERPLASIA WITH NOCTURIA: Primary | ICD-10-CM

## 2024-05-03 DIAGNOSIS — R05.2 SUBACUTE COUGH: ICD-10-CM

## 2024-05-03 PROCEDURE — 99214 OFFICE O/P EST MOD 30 MIN: CPT | Performed by: INTERNAL MEDICINE

## 2024-05-03 PROCEDURE — G2211 COMPLEX E/M VISIT ADD ON: HCPCS | Performed by: INTERNAL MEDICINE

## 2024-05-03 RX ORDER — TAMSULOSIN HYDROCHLORIDE 0.4 MG/1
0.8 CAPSULE ORAL EVERY EVENING
Qty: 180 CAPSULE | Refills: 4 | Status: SHIPPED | OUTPATIENT
Start: 2024-05-03

## 2024-05-03 RX ORDER — FINASTERIDE 5 MG/1
5 TABLET, FILM COATED ORAL DAILY
Qty: 90 TABLET | Refills: 4 | Status: SHIPPED | OUTPATIENT
Start: 2024-05-03

## 2024-05-03 ASSESSMENT — PAIN SCALES - GENERAL: PAINLEVEL: NO PAIN (0)

## 2024-05-03 ASSESSMENT — PATIENT HEALTH QUESTIONNAIRE - PHQ9: SUM OF ALL RESPONSES TO PHQ QUESTIONS 1-9: 12

## 2024-05-03 NOTE — PROGRESS NOTES
"  Office Visit - Follow Up   Nadya Blake   58 year old male    Date of Visit: 5/3/2024    Chief Complaint   Patient presents with    Follow Up    Recheck Medication     Pt reports that he is here for his week follow up and med refill, and paper work.        Assessment and Plan   1. Benign prostatic hyperplasia with nocturia  Resume Flomax, add finasteride  - tamsulosin (FLOMAX) 0.4 MG capsule; Take 2 capsules (0.8 mg) by mouth every evening  Dispense: 180 capsule; Refill: 4  - finasteride (PROSCAR) 5 MG tablet; Take 1 tablet (5 mg) by mouth daily  Dispense: 90 tablet; Refill: 4    2. Type 2 diabetes mellitus without complication, without long-term current use of insulin (H)  Has been well-controlled continue same    3. Subacute cough  4. Simple chronic bronchitis (H)  Exacerbated by influenza, no doing better    5. Chronic kidney disease, stage 3a (H)  Stable    6. Coronary artery disease of native artery of native heart with stable angina pectoris (H24)  Likely has a component of musculoskeletal pain as well as anginal pain which is stable, follows with cardiology    Return in about 4 weeks (around 5/31/2024) for Follow up.     History of Present Illness   This 58 year old man comes in for follow-up.  Overall he is improved.  His cough is better.  Breathing better.  Still with intermittent left-sided chest pain and follows closely with cardiology.  He ran out of Flomax and has been getting up quite a bit to urinate at night.       Physical Exam   General Appearance:   No acute distress    /66 (BP Location: Left arm, Patient Position: Sitting, Cuff Size: Adult Regular)   Pulse 56   Temp 97.7  F (36.5  C) (Tympanic)   Resp 16   Ht 1.638 m (5' 4.5\")   Wt 86 kg (189 lb 11.2 oz)   SpO2 93%   BMI 32.06 kg/m      Tenderness to palpation along the chest wall on the left side     Additional Information   Current Outpatient Medications   Medication Sig Dispense Refill    acetaminophen (TYLENOL) 500 MG " tablet Take 1-2 tablets (500-1,000 mg) by mouth every 6 hours as needed for mild pain 360 tablet 3    albuterol (VENTOLIN HFA) 108 (90 Base) MCG/ACT inhaler INHALE 2 PUFFS INTO THE LUNGS EVERY 6 HOURS AS NEEDED FOR SHORTNESS OF BREATH OR WHEEZING OR COUGH 18 g 2    aspirin (ASA) 81 MG chewable tablet CHEW AND SWALLOW 1 TABLET(81 MG) BY MOUTH DAILY 90 tablet 2    azelastine (ASTELIN) 0.1 % nasal spray Spray 1 spray into both nostrils 2 times daily 30 mL 1    busPIRone (BUSPAR) 5 MG tablet Take 1 tablet (5 mg) by mouth 2 times daily 180 tablet 0    Cholecalciferol (VITAMIN D3) 50 MCG (2000 UT) CAPS TAKE 1 CAPSULE BY MOUTH DAILY 90 capsule 1    cyanocobalamin (VITAMIN B-12) 1000 MCG tablet Take 1 tablet (1,000 mcg) by mouth daily 90 tablet 4    diclofenac (VOLTAREN) 1 % topical gel Apply 4 g topically 4 times daily 500 g 2    empagliflozin (JARDIANCE) 25 MG TABS tablet TAKE 1 TABLET(25 MG) BY MOUTH DAILY 60 tablet 0    finasteride (PROSCAR) 5 MG tablet Take 1 tablet (5 mg) by mouth daily 90 tablet 4    ipratropium (ATROVENT) 0.06 % nasal spray Spray 2 sprays into both nostrils 4 times daily 15 mL 1    isosorbide mononitrate (IMDUR) 30 MG 24 hr tablet Take 1 tablet (30 mg) by mouth daily 90 tablet 4    Lidocaine 0.5 % GEL Externally apply topically as needed      metoprolol succinate ER (TOPROL XL) 50 MG 24 hr tablet Take 1 tablet (50 mg) by mouth daily 90 tablet 3    nifedipine 0.2% in white petrolatum 0.2 % OINT ointment Apply topically 4 times daily as needed (anal fissure) 100 g 1    nitroGLYcerin (NITROSTAT) 0.4 MG sublingual tablet For chest pain place 1 tablet under the tongue every 5 minutes for 3 doses. If symptoms persist 5 minutes after 1st dose call 911. 30 tablet 3    omeprazole (PRILOSEC) 20 MG DR capsule Take 1 capsule (20 mg) by mouth daily 90 capsule 1    oxybutynin (DITROPAN) 5 MG tablet Take 1 tablet (5 mg) by mouth At Bedtime 90 tablet 4    PARoxetine (PAXIL) 20 MG tablet Take 1 tablet (20 mg) by  mouth every morning 30 tablet 3    polyethylene glycol (MIRALAX) 17 GM/Dose powder TAKE 17 GRAMS(1 CAPFUL) BY MOUTH DAILY 840 g 4    QUEtiapine (SEROQUEL) 50 MG tablet Take 1.5 tablets (75 mg) by mouth at bedtime 45 tablet 4    rosuvastatin (CRESTOR) 40 MG tablet Take 1 tablet (40 mg) by mouth daily 90 tablet 4    tamsulosin (FLOMAX) 0.4 MG capsule Take 2 capsules (0.8 mg) by mouth every evening 180 capsule 4       Time:     The longitudinal plan of care for the diagnosis(es)/condition(s) as documented were addressed during this visit. Due to the added complexity in care, I will continue to support Nadya in the subsequent management and with ongoing continuity of care.     Az Briseno MD  Answers submitted by the patient for this visit:  General Questionnaire (Submitted on 5/3/2024)  Chief Complaint: Chronic problems general questions HPI Form  What is the reason for your visit today? : 4 weeks follow up  How many servings of fruits and vegetables do you eat daily?: 2-3  On average, how many sweetened beverages do you drink each day (Examples: soda, juice, sweet tea, etc.  Do NOT count diet or artificially sweetened beverages)?: 0  How many minutes a day do you exercise enough to make your heart beat faster?: 9 or less  How many days a week do you exercise enough to make your heart beat faster?: 3 or less  How many days per week do you miss taking your medication?: 0

## 2024-05-12 ENCOUNTER — HEALTH MAINTENANCE LETTER (OUTPATIENT)
Age: 59
End: 2024-05-12

## 2024-05-17 DIAGNOSIS — E11.9 TYPE 2 DIABETES MELLITUS WITHOUT COMPLICATION, WITHOUT LONG-TERM CURRENT USE OF INSULIN (H): ICD-10-CM

## 2024-05-17 DIAGNOSIS — I25.83 CORONARY ARTERY DISEASE DUE TO LIPID RICH PLAQUE: Chronic | ICD-10-CM

## 2024-05-17 DIAGNOSIS — I25.10 CORONARY ARTERY DISEASE DUE TO LIPID RICH PLAQUE: Chronic | ICD-10-CM

## 2024-05-17 RX ORDER — EMPAGLIFLOZIN 25 MG/1
25 TABLET, FILM COATED ORAL DAILY
Qty: 90 TABLET | Refills: 0 | Status: SHIPPED | OUTPATIENT
Start: 2024-05-17 | End: 2024-09-16

## 2024-05-20 DIAGNOSIS — E11.9 TYPE 2 DIABETES MELLITUS WITHOUT COMPLICATION, WITHOUT LONG-TERM CURRENT USE OF INSULIN (H): Primary | ICD-10-CM

## 2024-06-07 ENCOUNTER — OFFICE VISIT (OUTPATIENT)
Dept: CARDIOLOGY | Facility: CLINIC | Age: 59
End: 2024-06-07
Payer: COMMERCIAL

## 2024-06-07 VITALS
BODY MASS INDEX: 31.49 KG/M2 | RESPIRATION RATE: 16 BRPM | WEIGHT: 189 LBS | HEIGHT: 65 IN | SYSTOLIC BLOOD PRESSURE: 94 MMHG | DIASTOLIC BLOOD PRESSURE: 70 MMHG | HEART RATE: 52 BPM

## 2024-06-07 DIAGNOSIS — I10 ESSENTIAL HYPERTENSION: Chronic | ICD-10-CM

## 2024-06-07 DIAGNOSIS — E78.5 DYSLIPIDEMIA, GOAL LDL BELOW 70: Chronic | ICD-10-CM

## 2024-06-07 DIAGNOSIS — I25.118 CORONARY ARTERY DISEASE OF NATIVE ARTERY OF NATIVE HEART WITH STABLE ANGINA PECTORIS (H): Primary | ICD-10-CM

## 2024-06-07 PROCEDURE — 99214 OFFICE O/P EST MOD 30 MIN: CPT | Performed by: PHYSICIAN ASSISTANT

## 2024-06-07 RX ORDER — ISOSORBIDE MONONITRATE 30 MG/1
30 TABLET, EXTENDED RELEASE ORAL DAILY
Qty: 90 TABLET | Refills: 4 | Status: SHIPPED | OUTPATIENT
Start: 2024-06-07

## 2024-06-07 RX ORDER — NITROGLYCERIN 0.4 MG/1
TABLET SUBLINGUAL
Qty: 30 TABLET | Refills: 3 | Status: SHIPPED | OUTPATIENT
Start: 2024-06-07

## 2024-06-07 NOTE — LETTER
6/7/2024    Az Briseno MD  [address] [facility] [address]    RE: Nadya PEARL Hayden       Dear Colleague,     I had the pleasure of seeing Nadya Blake in the [facility].  HEART CARE ENCOUNTER NOTE       [facility]  [phone]    Assessment/Recommendations   Assessment:  Coronary artery disease s/p CABG x 4 (LIMA-LAD, R radial-PDA, VG-OM, VG-Diag in June 2020) - vein graft to right PDA noted to be occluded on coronary angiogram October 2022. - His anginal symptoms seem stable overall.  He prefers continuing with medical management for his symptoms at this time.  Chronic heart failure with preserved ejection fraction, NYHA class II - euvolemic and compensated.  Dyslipidemia - LDL 32  Hypertension -blood pressure is on the lower side today. Repeat was 100/65. Currently asymptomatic  Non-insulin-dependent type 2 diabetes mellitus -A1c 5.5.  On Jardiance, metformin which is managed per his PCP    Plan:  Continue metoprolol, aspirin, Crestor, Imdur  Return and warning precautions discussed with patient and his wife who verbalized understanding.  Follow-up with sleep study as scheduled.  Follow-up with Dr. Burns    Thank you for the opportunity to participate in the care of Nadya Blake. It's been a pleasure working with him.  Please do not hesitate to call with any questions or concerns.       History of Present Illness/Subjective    I had the opportunity to see Nadya Blake at the [facility] for follow-up visit.  His wife is accompanying him for today's visit.    He is a very pleasant 58-year-old male who has a past medical history significant for coronary artery disease status post CABG x 4 (June 2020), dyslipidemia, hypertension, type 2 diabetes mellitus, chronic kidney disease stage III, GERD.    He reports some midsternal chest discomfort at night usually radiate to his left arm and this occurs typically 3-4 times a week.  He uses  "sublingual nitroglycerin maybe once weekly.  He reports some difficulty going up stairs due to fatigue and shortness of breath.  He currently walks 30 minutes twice daily and will have shortness of breath and fatigue with this.  He denies swelling to his legs.  He has lost some weight over the past several months but he reports a good appetite.  He is careful healthy diet.  He denies shortness of breath when lying but he does use 2 pillows at night to help him sleep for comfort.    Nadya Blake denies exertional chest discomfort, palpitations, shortness of breath at rest, PND, orthopnea, lightheadedness, dizziness, pre-syncope or syncope.  Nadya Blake also denies any recent weight loss, changes in appetite, nausea or vomiting.   ____________________________________________________________  Coronary Angiogram: 10/6/2022  Left ventricular filling pressures are normal.  3rd Mrg lesion is 90% stenosed.  Prox RCA lesion is 75% stenosed.  Prox RCA to Mid RCA lesion is 40% stenosed.  Dist RCA lesion is 75% stenosed.  Mid RCA lesion is 30% stenosed.  RPDA lesion is 50% stenosed.  RPAV lesion is 40% stenosed.  Prox LAD lesion is 70% stenosed.  1st Diag-1 lesion is 95% stenosed.  1st Diag-2 lesion is 95% stenosed.  2nd Diag lesion is 99% stenosed.  Mid LAD-1 lesion is 75% stenosed.  Mid LAD-2 lesion is 75% stenosed.  Dist LAD lesion is 70% stenosed.  Vein graft to right PDA is totally occluded  Vein graft to second diagonal is widely patent  Vein graft to third OM is widely patent  LUZ MARIA graft to mid distal LAD is patent       Physical Examination Review of Systems   Vitals: BP 94/70 (BP Location: Left arm, Patient Position: Sitting, Cuff Size: Adult Regular)   Pulse 52   Resp 16   Ht 1.651 m (5' 5\")   Wt 85.7 kg (189 lb)   BMI 31.45 kg/m    BMI= Body mass index is 31.45 kg/m .  Wt Readings from Last 3 Encounters:   06/07/24 85.7 kg (189 lb)   05/03/24 86 kg (189 lb 11.2 oz)   04/16/24 88.1 kg (194 lb 3.2 oz) "       General Appearance:   Alert, cooperative and in no acute distress   ENT/Mouth: membranes moist, no oral lesions or bleeding gums.      EYES:  no scleral icterus, normal conjunctivae   Neck: Supple without lymphadenopathy.  Thyroid not visualized   Chest/Lungs:   Slightly diminished, otherwise clear   Cardiovascular:   Regular. Normal first and second heart sounds with no murmurs, rubs, or gallops; the carotid, radial and posterior tibial pulses are intact, no edema bilaterally    Abdomen:  Soft and nontender. Bowel sounds are present in all quadrants   Extremities: no cyanosis or clubbing.    Skin: no xanthelasma, warm.    Neurologic: normal gait, normal  bilateral, no tremors   Psychiatric: Normal mood and affect       Please refer above for cardiac ROS details.      Medical History  Surgical History Family History Social History   Past Medical History:   Diagnosis Date    Acute non-ST elevation myocardial infarction (NSTEMI) (H) 6/22/2020    Adenomatous polyp of colon     Ascending aorta dilatation (H24)     Carpal tunnel syndrome     Chronic superficial gastritis     Coronary artery disease due to lipid rich plaque 6/23/2020    Depression with anxiety     Dyslipidemia, goal LDL below 70 6/23/2020    Essential hypertension 9/2/2012    Fatty liver disease, nonalcoholic     GERD (gastroesophageal reflux disease)     IBS (irritable bowel syndrome)     Left lumbar radiculopathy     Multinodular goiter     Old torn meniscus of knee     Paroxysmal atrial fibrillation (H)     Perianal abscess     Post herpetic neuralgia     Post-traumatic osteoarthritis of both knees     Primary osteoarthritis of left hip     Primary osteoarthritis of right hip     Pulmonary nodule     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)     Thyroid nodule     TMJ arthritis     Tobacco use disorder 11/1/2013    Vitamin D deficiency 9/30/2020     Past Surgical History:   Procedure Laterality Date    APPENDECTOMY   1995    BYPASS GRAFT ARTERY CORONARY  2020    Dr. Ragsdale    CV CORONARY ANGIOGRAM N/A 2020    Procedure: Coronary Angiogram;  Surgeon: Ariane Lester MD;  Location: [facility] Cath Lab;  Service: Cardiology    CV CORONARY ANGIOGRAM N/A 10/6/2022    Procedure: Coronary Angiogram;  Surgeon: Javon John MD;  Location: [facility] CV    CV IABP INSERT N/A 2020    Procedure: Intra Aortic Balloon Pump Insertion;  Surgeon: Ariane Lester MD;  Location: [facility] Cath Lab;  Service: Cardiology    CV LEFT HEART CATH N/A 10/6/2022    Procedure: Left Heart Catheterization;  Surgeon: Javon John MD;  Location: [facility] CV    CV LEFT HEART CATHETERIZATION WITHOUT LEFT VENTRICULOGRAM Left 2020    Procedure: Left Heart Catheterization Without Left Ventriculogram;  Surgeon: Ariane Lester MD;  Location: [facility] Cath Lab;  Service: Cardiology    CV LEFT VENTRICULOGRAM N/A 10/6/2022    Procedure: Left Ventriculogram;  Surgeon: Javon John MD;  Location: [facility] CV     Family History   Problem Relation Age of Onset    No Known Problems Mother     Lung Cancer Father 56        fatal    No Known Problems Daughter     Other - See Comments Son         congenital deformity of right leg; he uses a leg prosthesis    Social History     Socioeconomic History    Marital status:      Spouse name: Carlee    Number of children: 2    Years of education: Not on file    Highest education level: Not on file   Occupational History    Not on file   Tobacco Use    Smoking status: Former     Current packs/day: 0.00     Average packs/day: 1 pack/day for 30.0 years (30.0 ttl pk-yrs)     Types: Cigarettes     Start date: 3/23/1990     Quit date: 3/23/2020     Years since quittin.2     Passive exposure: Never    Smokeless tobacco: Never    Tobacco comments:     stopped 3/24/2020   Vaping Use    Vaping status: Never Used   Substance and Sexual Activity    Alcohol use:  No    Drug use: Never    Sexual activity: Yes     Partners: Female   Other Topics Concern    Not on file   Social History Narrative    , Carlee, BA chemistry and taking AA courses at [employer].  From [place]; bachelors in geology and computer science. Son, Grace (2005) and Sherrie (2008).  On SSDI, PTSD.       Social Determinants of Health     Financial Resource Strain: Low Risk  (12/27/2023)    Financial Resource Strain     Within the past 12 months, have you or your family members you live with been unable to get utilities (heat, electricity) when it was really needed?: No   Food Insecurity: Low Risk  (12/27/2023)    Food Insecurity     Within the past 12 months, did you worry that your food would run out before you got money to buy more?: No     Within the past 12 months, did the food you bought just not last and you didn t have money to get more?: No   Transportation Needs: Low Risk  (12/27/2023)    Transportation Needs     Within the past 12 months, has lack of transportation kept you from medical appointments, getting your medicines, non-medical meetings or appointments, work, or from getting things that you need?: No   Physical Activity: Not on file   Stress: Not on file   Social Connections: Unknown (1/1/2022)    Received from [facility], [facility]    Social Connections     Frequency of Communication with Friends and Family: Not on file   Interpersonal Safety: Low Risk  (5/3/2024)    Interpersonal Safety     Do you feel physically and emotionally safe where you currently live?: Yes     Within the past 12 months, have you been hit, slapped, kicked or otherwise physically hurt by someone?: No     Within the past 12 months, have you been humiliated or emotionally abused in other ways by your partner or ex-partner?: No   Housing Stability: Low Risk  (12/27/2023)    Housing Stability     Do you have housing? : Yes     Are you worried about losing  your housing?: No          Medications  Allergies   Current Outpatient Medications   Medication Sig Dispense Refill    acetaminophen (TYLENOL) 500 MG tablet Take 1-2 tablets (500-1,000 mg) by mouth every 6 hours as needed for mild pain 360 tablet 3    albuterol (VENTOLIN HFA) 108 (90 Base) MCG/ACT inhaler INHALE 2 PUFFS INTO THE LUNGS EVERY 6 HOURS AS NEEDED FOR SHORTNESS OF BREATH OR WHEEZING OR COUGH 18 g 2    aspirin (ASA) 81 MG chewable tablet CHEW AND SWALLOW 1 TABLET(81 MG) BY MOUTH DAILY 90 tablet 2    azelastine (ASTELIN) 0.1 % nasal spray Spray 1 spray into both nostrils 2 times daily 30 mL 1    busPIRone (BUSPAR) 5 MG tablet Take 1 tablet (5 mg) by mouth 2 times daily 180 tablet 0    Cholecalciferol (VITAMIN D3) 50 MCG (2000 UT) CAPS TAKE 1 CAPSULE BY MOUTH DAILY 90 capsule 1    cyanocobalamin (VITAMIN B-12) 1000 MCG tablet Take 1 tablet (1,000 mcg) by mouth daily 90 tablet 4    diclofenac (VOLTAREN) 1 % topical gel Apply 4 g topically 4 times daily 500 g 2    empagliflozin (JARDIANCE) 25 MG TABS tablet TAKE 1 TABLET(25 MG) BY MOUTH DAILY 90 tablet 0    finasteride (PROSCAR) 5 MG tablet Take 1 tablet (5 mg) by mouth daily 90 tablet 4    ipratropium (ATROVENT) 0.06 % nasal spray Spray 2 sprays into both nostrils 4 times daily 15 mL 1    isosorbide mononitrate (IMDUR) 30 MG 24 hr tablet Take 1 tablet (30 mg) by mouth daily 90 tablet 4    Lidocaine 0.5 % GEL Externally apply topically as needed      metoprolol succinate ER (TOPROL XL) 50 MG 24 hr tablet Take 1 tablet (50 mg) by mouth daily 90 tablet 3    nifedipine 0.2% in white petrolatum 0.2 % OINT ointment Apply topically 4 times daily as needed (anal fissure) 100 g 1    nitroGLYcerin (NITROSTAT) 0.4 MG sublingual tablet For chest pain place 1 tablet under the tongue every 5 minutes for 3 doses. If symptoms persist 5 minutes after 1st dose call 911. 30 tablet 3    omeprazole (PRILOSEC) 20 MG DR capsule Take 1 capsule (20 mg) by mouth daily 90 capsule 1     oxybutynin (DITROPAN) 5 MG tablet Take 1 tablet (5 mg) by mouth At Bedtime 90 tablet 4    PARoxetine (PAXIL) 20 MG tablet Take 1 tablet (20 mg) by mouth every morning 30 tablet 3    polyethylene glycol (MIRALAX) 17 GM/Dose powder TAKE 17 GRAMS(1 CAPFUL) BY MOUTH DAILY 840 g 4    QUEtiapine (SEROQUEL) 50 MG tablet Take 1.5 tablets (75 mg) by mouth at bedtime 45 tablet 4    rosuvastatin (CRESTOR) 40 MG tablet Take 1 tablet (40 mg) by mouth daily 90 tablet 4    tamsulosin (FLOMAX) 0.4 MG capsule Take 2 capsules (0.8 mg) by mouth every evening 180 capsule 4    Allergies   Allergen Reactions    Atorvastatin Diarrhea    Cortisone Other (See Comments)     pain    Lisinopril Cough     started after CABG for unclear reason    Methylprednisolone Other (See Comments)     ?         Lab Results    Chemistry/lipid CBC Cardiac Enzymes/BNP/TSH/INR   Recent Labs   Lab Test 08/28/23  1436   CHOL 97   HDL 33*   LDL 32   TRIG 159*     Recent Labs   Lab Test 08/28/23  1436 01/18/23  1037 11/21/22  0947   LDL 32 30 27     Recent Labs   Lab Test 03/08/24  1023      POTASSIUM 4.3   CHLORIDE 105   CO2 27   *   BUN 14.7   CR 1.10   GFRESTIMATED 79   TERESE 9.0     Recent Labs   Lab Test 03/08/24  1023 12/20/23 2039 08/28/23  1436   CR 1.10 1.22* 1.21*     Recent Labs   Lab Test 01/09/24  0940 10/03/23  0906 06/27/23  1134   A1C 5.5 5.6 5.7*    Recent Labs   Lab Test 12/20/23 2039   WBC 7.7   HGB 14.4   HCT 42.9   MCV 89        Recent Labs   Lab Test 12/20/23  2039 08/28/23  1436 01/18/23  1037   HGB 14.4 15.2 14.5    Recent Labs   Lab Test 11/21/20  0001 07/27/20  0703 07/27/20  0115   TROPONINI <0.01 <0.01 <0.01     Recent Labs   Lab Test 12/20/23  2039 07/27/20  0115 06/22/20  2322   BNP  --  79* <10   NTBNP <36  --   --      Recent Labs   Lab Test 08/28/23  1436   TSH 1.24     Recent Labs   Lab Test 07/27/20  0115 06/25/20  0427 06/24/20 2012   INR 1.10 1.32* 1.39*        30 minutes spent on the date of encounter doing  chart review, review of outside records, review of test results, interpretation with above tests, patient visit, documentation, and discussion with family.      This note has been dictated using voice recognition software. Any grammatical or context distortions are unintentional and inherent to the software.    Scarlett Kerr PA-C  Structural Heart Program  [facility]               Thank you for allowing me to participate in the care of your patient.      Sincerely,     Scarlett Kerr PA-C     [facility]  cc:   Az Briseno MD  [address] [place] [id number]

## 2024-06-07 NOTE — PROGRESS NOTES
HEART CARE ENCOUNTER NOTE       [facility]  [phone]    Assessment/Recommendations   Assessment:  Coronary artery disease s/p CABG x 4 (LIMA-LAD, R radial-PDA, VG-OM, VG-Diag in June 2020) - vein graft to right PDA noted to be occluded on coronary angiogram October 2022. - His anginal symptoms seem stable overall.  He prefers continuing with medical management for his symptoms at this time.  Chronic heart failure with preserved ejection fraction, NYHA class II - euvolemic and compensated.  Dyslipidemia - LDL 32  Hypertension -blood pressure is on the lower side today. Repeat was 100/65. Currently asymptomatic  Non-insulin-dependent type 2 diabetes mellitus -A1c 5.5.  On Jardiance, metformin which is managed per his PCP    Plan:  Continue metoprolol, aspirin, Crestor, Imdur  Return and warning precautions discussed with patient and his wife who verbalized understanding.  Follow-up with sleep study as scheduled.  Follow-up with Dr. Burns    Thank you for the opportunity to participate in the care of Nadya PEARL Hayden. It's been a pleasure working with him.  Please do not hesitate to call with any questions or concerns.       History of Present Illness/Subjective    I had the opportunity to see Nadya Blake at the [facility] for follow-up visit.  His wife is accompanying him for today's visit.    He is a very pleasant 58-year-old male who has a past medical history significant for coronary artery disease status post CABG x 4 (June 2020), dyslipidemia, hypertension, type 2 diabetes mellitus, chronic kidney disease stage III, GERD.    He reports some midsternal chest discomfort at night usually radiate to his left arm and this occurs typically 3-4 times a week.  He uses sublingual nitroglycerin maybe once weekly.  He reports some difficulty going up stairs due to fatigue and shortness of breath.  He currently walks 30 minutes twice daily and will have shortness of breath and  "fatigue with this.  He denies swelling to his legs.  He has lost some weight over the past several months but he reports a good appetite.  He is careful healthy diet.  He denies shortness of breath when lying but he does use 2 pillows at night to help him sleep for comfort.    Nadya Blake denies exertional chest discomfort, palpitations, shortness of breath at rest, PND, orthopnea, lightheadedness, dizziness, pre-syncope or syncope.  Nadya Blake also denies any recent weight loss, changes in appetite, nausea or vomiting.   ____________________________________________________________  Coronary Angiogram: 10/6/2022  Left ventricular filling pressures are normal.  3rd Mrg lesion is 90% stenosed.  Prox RCA lesion is 75% stenosed.  Prox RCA to Mid RCA lesion is 40% stenosed.  Dist RCA lesion is 75% stenosed.  Mid RCA lesion is 30% stenosed.  RPDA lesion is 50% stenosed.  RPAV lesion is 40% stenosed.  Prox LAD lesion is 70% stenosed.  1st Diag-1 lesion is 95% stenosed.  1st Diag-2 lesion is 95% stenosed.  2nd Diag lesion is 99% stenosed.  Mid LAD-1 lesion is 75% stenosed.  Mid LAD-2 lesion is 75% stenosed.  Dist LAD lesion is 70% stenosed.  Vein graft to right PDA is totally occluded  Vein graft to second diagonal is widely patent  Vein graft to third OM is widely patent  LUZ MARIA graft to mid distal LAD is patent       Physical Examination Review of Systems   Vitals: BP 94/70 (BP Location: Left arm, Patient Position: Sitting, Cuff Size: Adult Regular)   Pulse 52   Resp 16   Ht 1.651 m (5' 5\")   Wt 85.7 kg (189 lb)   BMI 31.45 kg/m    BMI= Body mass index is 31.45 kg/m .  Wt Readings from Last 3 Encounters:   06/07/24 85.7 kg (189 lb)   05/03/24 86 kg (189 lb 11.2 oz)   04/16/24 88.1 kg (194 lb 3.2 oz)       General Appearance:   Alert, cooperative and in no acute distress   ENT/Mouth: membranes moist, no oral lesions or bleeding gums.      EYES:  no scleral icterus, normal conjunctivae   Neck: Supple without " lymphadenopathy.  Thyroid not visualized   Chest/Lungs:   Slightly diminished, otherwise clear   Cardiovascular:   Regular. Normal first and second heart sounds with no murmurs, rubs, or gallops; the carotid, radial and posterior tibial pulses are intact, no edema bilaterally    Abdomen:  Soft and nontender. Bowel sounds are present in all quadrants   Extremities: no cyanosis or clubbing.    Skin: no xanthelasma, warm.    Neurologic: normal gait, normal  bilateral, no tremors   Psychiatric: Normal mood and affect       Please refer above for cardiac ROS details.      Medical History  Surgical History Family History Social History   Past Medical History:   Diagnosis Date    Acute non-ST elevation myocardial infarction (NSTEMI) (H) 6/22/2020    Adenomatous polyp of colon     Ascending aorta dilatation (H24)     Carpal tunnel syndrome     Chronic superficial gastritis     Coronary artery disease due to lipid rich plaque 6/23/2020    Depression with anxiety     Dyslipidemia, goal LDL below 70 6/23/2020    Essential hypertension 9/2/2012    Fatty liver disease, nonalcoholic     GERD (gastroesophageal reflux disease)     IBS (irritable bowel syndrome)     Left lumbar radiculopathy     Multinodular goiter     Old torn meniscus of knee     Paroxysmal atrial fibrillation (H)     Perianal abscess     Post herpetic neuralgia     Post-traumatic osteoarthritis of both knees     Primary osteoarthritis of left hip     Primary osteoarthritis of right hip     Pulmonary nodule     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)     Thyroid nodule     TMJ arthritis     Tobacco use disorder 11/1/2013    Vitamin D deficiency 9/30/2020     Past Surgical History:   Procedure Laterality Date    APPENDECTOMY  1995    BYPASS GRAFT ARTERY CORONARY  06/24/2020    Dr. Ragsdale    CV CORONARY ANGIOGRAM N/A 6/23/2020    Procedure: Coronary Angiogram;  Surgeon: Ariane Lester MD;  Location: [facility] Cath Lab;   Service: Cardiology    CV CORONARY ANGIOGRAM N/A 10/6/2022    Procedure: Coronary Angiogram;  Surgeon: Javon John MD;  Location: [facility] CV    CV IABP INSERT N/A 2020    Procedure: Intra Aortic Balloon Pump Insertion;  Surgeon: Ariane Lester MD;  Location: [facility] Cath Lab;  Service: Cardiology    CV LEFT HEART CATH N/A 10/6/2022    Procedure: Left Heart Catheterization;  Surgeon: Javon John MD;  Location: [facility] CV    CV LEFT HEART CATHETERIZATION WITHOUT LEFT VENTRICULOGRAM Left 2020    Procedure: Left Heart Catheterization Without Left Ventriculogram;  Surgeon: Ariane Lester MD;  Location: [facility] Cath Lab;  Service: Cardiology    CV LEFT VENTRICULOGRAM N/A 10/6/2022    Procedure: Left Ventriculogram;  Surgeon: Javon John MD;  Location: [facility] CV     Family History   Problem Relation Age of Onset    No Known Problems Mother     Lung Cancer Father 56        fatal    No Known Problems Daughter     Other - See Comments Son         congenital deformity of right leg; he uses a leg prosthesis    Social History     Socioeconomic History    Marital status:      Spouse name: Carlee    Number of children: 2    Years of education: Not on file    Highest education level: Not on file   Occupational History    Not on file   Tobacco Use    Smoking status: Former     Current packs/day: 0.00     Average packs/day: 1 pack/day for 30.0 years (30.0 ttl pk-yrs)     Types: Cigarettes     Start date: 3/23/1990     Quit date: 3/23/2020     Years since quittin.2     Passive exposure: Never    Smokeless tobacco: Never    Tobacco comments:     stopped 3/24/2020   Vaping Use    Vaping status: Never Used   Substance and Sexual Activity    Alcohol use: No    Drug use: Never    Sexual activity: Yes     Partners: Female   Other Topics Concern    Not on file   Social History Narrative    , Carlee, BA chemistry and taking AA courses at [employer].  From  [place]; bachelors in geology and computer science. [employer] (2005) and Sherrie (2008).  On SSDI, PTSD.       Social Determinants of Health     Financial Resource Strain: Low Risk  (12/27/2023)    Financial Resource Strain     Within the past 12 months, have you or your family members you live with been unable to get utilities (heat, electricity) when it was really needed?: No   Food Insecurity: Low Risk  (12/27/2023)    Food Insecurity     Within the past 12 months, did you worry that your food would run out before you got money to buy more?: No     Within the past 12 months, did the food you bought just not last and you didn t have money to get more?: No   Transportation Needs: Low Risk  (12/27/2023)    Transportation Needs     Within the past 12 months, has lack of transportation kept you from medical appointments, getting your medicines, non-medical meetings or appointments, work, or from getting things that you need?: No   Physical Activity: Not on file   Stress: Not on file   Social Connections: Unknown (1/1/2022)    Received from [facility], [facility]    Social Connections     Frequency of Communication with Friends and Family: Not on file   Interpersonal Safety: Low Risk  (5/3/2024)    Interpersonal Safety     Do you feel physically and emotionally safe where you currently live?: Yes     Within the past 12 months, have you been hit, slapped, kicked or otherwise physically hurt by someone?: No     Within the past 12 months, have you been humiliated or emotionally abused in other ways by your partner or ex-partner?: No   Housing Stability: Low Risk  (12/27/2023)    Housing Stability     Do you have housing? : Yes     Are you worried about losing your housing?: No          Medications  Allergies   Current Outpatient Medications   Medication Sig Dispense Refill    acetaminophen (TYLENOL) 500 MG tablet Take 1-2 tablets (500-1,000 mg) by mouth every  6 hours as needed for mild pain 360 tablet 3    albuterol (VENTOLIN HFA) 108 (90 Base) MCG/ACT inhaler INHALE 2 PUFFS INTO THE LUNGS EVERY 6 HOURS AS NEEDED FOR SHORTNESS OF BREATH OR WHEEZING OR COUGH 18 g 2    aspirin (ASA) 81 MG chewable tablet CHEW AND SWALLOW 1 TABLET(81 MG) BY MOUTH DAILY 90 tablet 2    azelastine (ASTELIN) 0.1 % nasal spray Spray 1 spray into both nostrils 2 times daily 30 mL 1    busPIRone (BUSPAR) 5 MG tablet Take 1 tablet (5 mg) by mouth 2 times daily 180 tablet 0    Cholecalciferol (VITAMIN D3) 50 MCG (2000 UT) CAPS TAKE 1 CAPSULE BY MOUTH DAILY 90 capsule 1    cyanocobalamin (VITAMIN B-12) 1000 MCG tablet Take 1 tablet (1,000 mcg) by mouth daily 90 tablet 4    diclofenac (VOLTAREN) 1 % topical gel Apply 4 g topically 4 times daily 500 g 2    empagliflozin (JARDIANCE) 25 MG TABS tablet TAKE 1 TABLET(25 MG) BY MOUTH DAILY 90 tablet 0    finasteride (PROSCAR) 5 MG tablet Take 1 tablet (5 mg) by mouth daily 90 tablet 4    ipratropium (ATROVENT) 0.06 % nasal spray Spray 2 sprays into both nostrils 4 times daily 15 mL 1    isosorbide mononitrate (IMDUR) 30 MG 24 hr tablet Take 1 tablet (30 mg) by mouth daily 90 tablet 4    Lidocaine 0.5 % GEL Externally apply topically as needed      metoprolol succinate ER (TOPROL XL) 50 MG 24 hr tablet Take 1 tablet (50 mg) by mouth daily 90 tablet 3    nifedipine 0.2% in white petrolatum 0.2 % OINT ointment Apply topically 4 times daily as needed (anal fissure) 100 g 1    nitroGLYcerin (NITROSTAT) 0.4 MG sublingual tablet For chest pain place 1 tablet under the tongue every 5 minutes for 3 doses. If symptoms persist 5 minutes after 1st dose call 911. 30 tablet 3    omeprazole (PRILOSEC) 20 MG DR capsule Take 1 capsule (20 mg) by mouth daily 90 capsule 1    oxybutynin (DITROPAN) 5 MG tablet Take 1 tablet (5 mg) by mouth At Bedtime 90 tablet 4    PARoxetine (PAXIL) 20 MG tablet Take 1 tablet (20 mg) by mouth every morning 30 tablet 3    polyethylene glycol  (MIRALAX) 17 GM/Dose powder TAKE 17 GRAMS(1 CAPFUL) BY MOUTH DAILY 840 g 4    QUEtiapine (SEROQUEL) 50 MG tablet Take 1.5 tablets (75 mg) by mouth at bedtime 45 tablet 4    rosuvastatin (CRESTOR) 40 MG tablet Take 1 tablet (40 mg) by mouth daily 90 tablet 4    tamsulosin (FLOMAX) 0.4 MG capsule Take 2 capsules (0.8 mg) by mouth every evening 180 capsule 4    Allergies   Allergen Reactions    Atorvastatin Diarrhea    Cortisone Other (See Comments)     pain    Lisinopril Cough     started after CABG for unclear reason    Methylprednisolone Other (See Comments)     ?         Lab Results    Chemistry/lipid CBC Cardiac Enzymes/BNP/TSH/INR   Recent Labs   Lab Test 08/28/23  1436   CHOL 97   HDL 33*   LDL 32   TRIG 159*     Recent Labs   Lab Test 08/28/23  1436 01/18/23  1037 11/21/22  0947   LDL 32 30 27     Recent Labs   Lab Test 03/08/24  1023      POTASSIUM 4.3   CHLORIDE 105   CO2 27   *   BUN 14.7   CR 1.10   GFRESTIMATED 79   TERESE 9.0     Recent Labs   Lab Test 03/08/24  1023 12/20/23 2039 08/28/23  1436   CR 1.10 1.22* 1.21*     Recent Labs   Lab Test 01/09/24  0940 10/03/23  0906 06/27/23  1134   A1C 5.5 5.6 5.7*    Recent Labs   Lab Test 12/20/23 2039   WBC 7.7   HGB 14.4   HCT 42.9   MCV 89        Recent Labs   Lab Test 12/20/23 2039 08/28/23  1436 01/18/23  1037   HGB 14.4 15.2 14.5    Recent Labs   Lab Test 11/21/20  0001 07/27/20  0703 07/27/20  0115   TROPONINI <0.01 <0.01 <0.01     Recent Labs   Lab Test 12/20/23 2039 07/27/20  0115 06/22/20  2322   BNP  --  79* <10   NTBNP <36  --   --      Recent Labs   Lab Test 08/28/23  1436   TSH 1.24     Recent Labs   Lab Test 07/27/20  0115 06/25/20  0427 06/24/20 2012   INR 1.10 1.32* 1.39*        30 minutes spent on the date of encounter doing chart review, review of outside records, review of test results, interpretation with above tests, patient visit, documentation, and discussion with family.      This note has been dictated using voice  recognition software. Any grammatical or context distortions are unintentional and inherent to the software.    Scarlett Kerr PA-C  Structural Heart Program  [facility]

## 2024-06-07 NOTE — PATIENT INSTRUCTIONS
Nadya Blake,    It was a pleasure to see you today in the clinic regarding your follow-up vist.     My recommendations after this visit include:     - no medication changes today.    - continue to monitor your symptoms. Please call the clinic if you have worsening symptoms   - follow-up with Dr. Burns as scheduled      If you have questions or concerns, please call using the number below:      After Hours/Scheduling  [phone]        Scarlett Kerr PA-C  Structural Heart Program  [facility]

## 2024-06-14 ENCOUNTER — OFFICE VISIT (OUTPATIENT)
Dept: INTERNAL MEDICINE | Facility: CLINIC | Age: 59
End: 2024-06-14
Payer: COMMERCIAL

## 2024-06-14 VITALS
DIASTOLIC BLOOD PRESSURE: 64 MMHG | HEART RATE: 54 BPM | HEIGHT: 65 IN | OXYGEN SATURATION: 98 % | SYSTOLIC BLOOD PRESSURE: 95 MMHG | RESPIRATION RATE: 12 BRPM | TEMPERATURE: 97.8 F | WEIGHT: 191.4 LBS | BODY MASS INDEX: 31.89 KG/M2

## 2024-06-14 DIAGNOSIS — R10.84 ABDOMINAL PAIN, GENERALIZED: Primary | ICD-10-CM

## 2024-06-14 DIAGNOSIS — E11.9 TYPE 2 DIABETES MELLITUS WITHOUT COMPLICATION, WITHOUT LONG-TERM CURRENT USE OF INSULIN (H): ICD-10-CM

## 2024-06-14 DIAGNOSIS — I25.118 CORONARY ARTERY DISEASE OF NATIVE ARTERY OF NATIVE HEART WITH STABLE ANGINA PECTORIS (H): ICD-10-CM

## 2024-06-14 DIAGNOSIS — Z12.5 SCREENING FOR PROSTATE CANCER: ICD-10-CM

## 2024-06-14 DIAGNOSIS — R05.2 SUBACUTE COUGH: ICD-10-CM

## 2024-06-14 LAB
ALBUMIN SERPL BCG-MCNC: 4.3 G/DL (ref 3.5–5.2)
ALBUMIN UR-MCNC: NEGATIVE MG/DL
ALP SERPL-CCNC: 77 U/L (ref 40–150)
ALT SERPL W P-5'-P-CCNC: 30 U/L (ref 0–70)
ANION GAP SERPL CALCULATED.3IONS-SCNC: 9 MMOL/L (ref 7–15)
APPEARANCE UR: CLEAR
AST SERPL W P-5'-P-CCNC: 24 U/L (ref 0–45)
BACTERIA #/AREA URNS HPF: ABNORMAL /HPF
BILIRUB SERPL-MCNC: 0.2 MG/DL
BILIRUB UR QL STRIP: NEGATIVE
BUN SERPL-MCNC: 15.3 MG/DL (ref 6–20)
CALCIUM SERPL-MCNC: 9.1 MG/DL (ref 8.6–10)
CHLORIDE SERPL-SCNC: 105 MMOL/L (ref 98–107)
COLOR UR AUTO: YELLOW
CREAT SERPL-MCNC: 1.13 MG/DL (ref 0.67–1.17)
DEPRECATED HCO3 PLAS-SCNC: 28 MMOL/L (ref 22–29)
EGFRCR SERPLBLD CKD-EPI 2021: 76 ML/MIN/1.73M2
ERYTHROCYTE [DISTWIDTH] IN BLOOD BY AUTOMATED COUNT: 12.3 % (ref 10–15)
GLUCOSE SERPL-MCNC: 113 MG/DL (ref 70–99)
GLUCOSE UR STRIP-MCNC: 500 MG/DL
HBA1C MFR BLD: 5.7 % (ref 0–5.6)
HCT VFR BLD AUTO: 44 % (ref 40–53)
HGB BLD-MCNC: 14.3 G/DL (ref 13.3–17.7)
HGB UR QL STRIP: ABNORMAL
KETONES UR STRIP-MCNC: ABNORMAL MG/DL
LEUKOCYTE ESTERASE UR QL STRIP: NEGATIVE
MCH RBC QN AUTO: 29.5 PG (ref 26.5–33)
MCHC RBC AUTO-ENTMCNC: 32.5 G/DL (ref 31.5–36.5)
MCV RBC AUTO: 91 FL (ref 78–100)
NITRATE UR QL: NEGATIVE
PH UR STRIP: 7 [PH] (ref 5–8)
PLATELET # BLD AUTO: 172 10E3/UL (ref 150–450)
POTASSIUM SERPL-SCNC: 4.4 MMOL/L (ref 3.4–5.3)
PROT SERPL-MCNC: 6.9 G/DL (ref 6.4–8.3)
PSA SERPL DL<=0.01 NG/ML-MCNC: 1.3 NG/ML (ref 0–3.5)
RBC # BLD AUTO: 4.85 10E6/UL (ref 4.4–5.9)
RBC #/AREA URNS AUTO: ABNORMAL /HPF
SODIUM SERPL-SCNC: 142 MMOL/L (ref 135–145)
SP GR UR STRIP: 1.02 (ref 1–1.03)
SQUAMOUS #/AREA URNS AUTO: ABNORMAL /LPF
UROBILINOGEN UR STRIP-ACNC: 0.2 E.U./DL
WBC # BLD AUTO: 5.1 10E3/UL (ref 4–11)
WBC #/AREA URNS AUTO: ABNORMAL /HPF

## 2024-06-14 PROCEDURE — 81001 URINALYSIS AUTO W/SCOPE: CPT | Performed by: INTERNAL MEDICINE

## 2024-06-14 PROCEDURE — 99214 OFFICE O/P EST MOD 30 MIN: CPT | Performed by: INTERNAL MEDICINE

## 2024-06-14 PROCEDURE — 36415 COLL VENOUS BLD VENIPUNCTURE: CPT | Performed by: INTERNAL MEDICINE

## 2024-06-14 PROCEDURE — G0103 PSA SCREENING: HCPCS | Performed by: INTERNAL MEDICINE

## 2024-06-14 PROCEDURE — 83036 HEMOGLOBIN GLYCOSYLATED A1C: CPT | Performed by: INTERNAL MEDICINE

## 2024-06-14 PROCEDURE — G2211 COMPLEX E/M VISIT ADD ON: HCPCS | Performed by: INTERNAL MEDICINE

## 2024-06-14 PROCEDURE — 85027 COMPLETE CBC AUTOMATED: CPT | Performed by: INTERNAL MEDICINE

## 2024-06-14 PROCEDURE — 80053 COMPREHEN METABOLIC PANEL: CPT | Performed by: INTERNAL MEDICINE

## 2024-06-14 ASSESSMENT — PAIN SCALES - GENERAL: PAINLEVEL: SEVERE PAIN (6)

## 2024-06-14 NOTE — PROGRESS NOTES
Office Visit - Follow Up   Nadya Blake   58 year old male    Date of Visit: 6/14/2024    Chief Complaint   Patient presents with    Follow Up     1 month follow up.     Abdominal Pain     Has occurring for 10 days and is not improving just staying the same.     Dizziness     Pt reports dry mouth, thirsty. Wife reports he is vegan and reports that he has 1 meal at 3 to 4 pm.     Sleep Study     Has a sleep study 6/24        Assessment and Plan   1. Abdominal pain, generalized  Will check labs as below.  I would like him to increase omeprazole to twice daily.  Discussed importance of staying well-hydrated does not endorse symptoms of constipation.  H. pylori testing 2023 negative.  We did discuss that oxybutynin causes constipation and a dry mouth.  It has been quite helpful for his urinary symptoms.  - UA Macroscopic with reflex to Microscopic and Culture - Lab Collect; Future  - Comprehensive metabolic panel; Future  - CBC with platelets; Future  - UA Macroscopic with reflex to Microscopic and Culture - Lab Collect  - Comprehensive metabolic panel  - CBC with platelets    2. Subacute cough  - omeprazole (PRILOSEC) 20 MG DR capsule; Take 1 capsule (20 mg) by mouth 2 times daily  Dispense: 180 capsule; Refill: 4    3. Coronary artery disease, CABG 6/2020  We discussed the balance between treating his anginal symptoms with beta-blocker and isosorbide versus low blood pressure and lightheadedness posturally.  He would like to continue with his medications    4. Type 2 diabetes mellitus without complication, without long-term current use of insulin (H)  - Hemoglobin A1c; Future  - Hemoglobin A1c    5. Screening for prostate cancer  - PSA, screen; Future  - PSA, screen      Return in about 4 weeks (around 7/12/2024) for Follow up.     History of Present Illness   This 58 year old man comes in for follow-up.  He has been having some generalized abdominal pain for the past week or 2.  It is localized a little bit to  "the right lateral abdomen but can be throughout the abdomen and seems to be worse when he has not eaten.  He is taking omeprazole.  He states he is not constipated.  He does have a dry mouth.  He has been feeling lightheaded when he bends over and then stands up.       Physical Exam   General Appearance:   No acute distress    BP 95/64 (BP Location: Left arm, Patient Position: Sitting, Cuff Size: Adult Regular)   Pulse 54   Temp 97.8  F (36.6  C) (Oral)   Resp 12   Ht 1.651 m (5' 5\")   Wt 86.8 kg (191 lb 6.4 oz)   SpO2 98%   BMI 31.85 kg/m      No epigastric tenderness, some mild tenderness throughout the mid abdomen and on the right lateral abdomen.  No CVA tenderness, no right lower abdominal tenderness.     Additional Information   Current Outpatient Medications   Medication Sig Dispense Refill    acetaminophen (TYLENOL) 500 MG tablet Take 1-2 tablets (500-1,000 mg) by mouth every 6 hours as needed for mild pain 360 tablet 3    albuterol (VENTOLIN HFA) 108 (90 Base) MCG/ACT inhaler INHALE 2 PUFFS INTO THE LUNGS EVERY 6 HOURS AS NEEDED FOR SHORTNESS OF BREATH OR WHEEZING OR COUGH 18 g 2    aspirin (ASA) 81 MG chewable tablet CHEW AND SWALLOW 1 TABLET(81 MG) BY MOUTH DAILY 90 tablet 2    azelastine (ASTELIN) 0.1 % nasal spray Spray 1 spray into both nostrils 2 times daily 30 mL 1    busPIRone (BUSPAR) 5 MG tablet Take 1 tablet (5 mg) by mouth 2 times daily 180 tablet 0    Cholecalciferol (VITAMIN D3) 50 MCG (2000 UT) CAPS TAKE 1 CAPSULE BY MOUTH DAILY 90 capsule 1    cyanocobalamin (VITAMIN B-12) 1000 MCG tablet Take 1 tablet (1,000 mcg) by mouth daily 90 tablet 4    diclofenac (VOLTAREN) 1 % topical gel Apply 4 g topically 4 times daily 500 g 2    empagliflozin (JARDIANCE) 25 MG TABS tablet TAKE 1 TABLET(25 MG) BY MOUTH DAILY 90 tablet 0    finasteride (PROSCAR) 5 MG tablet Take 1 tablet (5 mg) by mouth daily 90 tablet 4    ipratropium (ATROVENT) 0.06 % nasal spray Spray 2 sprays into both nostrils 4 times " daily 15 mL 1    isosorbide mononitrate (IMDUR) 30 MG 24 hr tablet Take 1 tablet (30 mg) by mouth daily 90 tablet 4    Lidocaine 0.5 % GEL Externally apply topically as needed      metoprolol succinate ER (TOPROL XL) 50 MG 24 hr tablet Take 1 tablet (50 mg) by mouth daily 90 tablet 3    nifedipine 0.2% in white petrolatum 0.2 % OINT ointment Apply topically 4 times daily as needed (anal fissure) 100 g 1    nitroGLYcerin (NITROSTAT) 0.4 MG sublingual tablet For chest pain place 1 tablet under the tongue every 5 minutes for 3 doses. If symptoms persist 5 minutes after 1st dose call 911. 30 tablet 3    omeprazole (PRILOSEC) 20 MG DR capsule Take 1 capsule (20 mg) by mouth 2 times daily 180 capsule 4    oxybutynin (DITROPAN) 5 MG tablet Take 1 tablet (5 mg) by mouth At Bedtime 90 tablet 4    PARoxetine (PAXIL) 20 MG tablet Take 1 tablet (20 mg) by mouth every morning 30 tablet 3    polyethylene glycol (MIRALAX) 17 GM/Dose powder TAKE 17 GRAMS(1 CAPFUL) BY MOUTH DAILY 840 g 4    QUEtiapine (SEROQUEL) 50 MG tablet Take 1.5 tablets (75 mg) by mouth at bedtime 45 tablet 4    rosuvastatin (CRESTOR) 40 MG tablet Take 1 tablet (40 mg) by mouth daily 90 tablet 4    tamsulosin (FLOMAX) 0.4 MG capsule Take 2 capsules (0.8 mg) by mouth every evening 180 capsule 4       Time:     The longitudinal plan of care for the diagnosis(es)/condition(s) as documented were addressed during this visit. Due to the added complexity in care, I will continue to support Nadya in the subsequent management and with ongoing continuity of care.     Az Briseno MD  Answers submitted by the patient for this visit:  General Questionnaire (Submitted on 6/14/2024)  Chief Complaint: Chronic problems general questions HPI Form  What is the reason for your visit today? : 1 month follow and abdominal pain  How many servings of fruits and vegetables do you eat daily?: 2-3  On average, how many sweetened beverages do you drink each day (Examples: soda,  juice, sweet tea, etc.  Do NOT count diet or artificially sweetened beverages)?: 2  How many minutes a day do you exercise enough to make your heart beat faster?: 20 to 29  How many days a week do you exercise enough to make your heart beat faster?: 7  How many days per week do you miss taking your medication?: 0

## 2024-06-24 ENCOUNTER — THERAPY VISIT (OUTPATIENT)
Dept: SLEEP MEDICINE | Facility: CLINIC | Age: 59
End: 2024-06-24
Payer: COMMERCIAL

## 2024-06-24 ENCOUNTER — DOCUMENTATION ONLY (OUTPATIENT)
Dept: SLEEP MEDICINE | Facility: CLINIC | Age: 59
End: 2024-06-24

## 2024-06-24 DIAGNOSIS — G47.00 INSOMNIA, UNSPECIFIED TYPE: ICD-10-CM

## 2024-06-24 DIAGNOSIS — I50.32 CHRONIC HEART FAILURE WITH PRESERVED EJECTION FRACTION (H): ICD-10-CM

## 2024-06-24 DIAGNOSIS — F43.10 PTSD (POST-TRAUMATIC STRESS DISORDER): ICD-10-CM

## 2024-06-24 DIAGNOSIS — N39.44 NOCTURNAL ENURESIS: ICD-10-CM

## 2024-06-24 DIAGNOSIS — E66.811 OBESITY (BMI 30.0-34.9): ICD-10-CM

## 2024-06-24 DIAGNOSIS — R29.818 SUSPECTED SLEEP APNEA: ICD-10-CM

## 2024-06-24 DIAGNOSIS — R06.83 SNORING: ICD-10-CM

## 2024-06-24 PROCEDURE — 95810 POLYSOM 6/> YRS 4/> PARAM: CPT | Performed by: INTERNAL MEDICINE

## 2024-06-24 NOTE — Clinical Note
Miguel Roth, please find the PSG report in epic.  Relevant medical history includes HTN, CAD/NSTEMI - s/p CABG x 4, HFpEF (59%), DM2, CKD stage IIIa, PTSD, BPH, former smoker, and obesity.  Patient has moderate HANNAH AHI 18.7/hr and has no follow-up scheduled. Thanks, Eulalia

## 2024-06-25 ENCOUNTER — TELEPHONE (OUTPATIENT)
Dept: INTERNAL MEDICINE | Facility: CLINIC | Age: 59
End: 2024-06-25

## 2024-06-25 DIAGNOSIS — E11.9 TYPE 2 DIABETES MELLITUS WITHOUT COMPLICATION, WITHOUT LONG-TERM CURRENT USE OF INSULIN (H): Primary | ICD-10-CM

## 2024-06-25 NOTE — PATIENT INSTRUCTIONS
[place] [facility]    1. Your sleep study will be reviewed by a sleep physician within the next few days.     2. Please follow up in the sleep clinic as scheduled, or, make an appointment with your sleep provider to be seen within two weeks to discuss the results of the sleep study.    3. If you have any questions or problems with your treatment plan, please contact your sleep clinic provider at [phone] to further manage your condition.    4. Please review your attached medication list, and, at your follow-up appointment advise your sleep clinic provider about any changes.    5. Go to [email or web address] for more information about your sleep problems.    Alexis Hopson, PSGT  June 25, 2024

## 2024-06-29 DIAGNOSIS — E55.9 VITAMIN D DEFICIENCY: ICD-10-CM

## 2024-07-01 RX ORDER — ACETAMINOPHEN 160 MG
1 TABLET,DISINTEGRATING ORAL DAILY
Qty: 90 CAPSULE | Refills: 1 | Status: SHIPPED | OUTPATIENT
Start: 2024-07-01

## 2024-07-12 ENCOUNTER — OFFICE VISIT (OUTPATIENT)
Dept: CARDIOLOGY | Facility: CLINIC | Age: 59
End: 2024-07-12
Payer: COMMERCIAL

## 2024-07-12 VITALS
BODY MASS INDEX: 31.78 KG/M2 | SYSTOLIC BLOOD PRESSURE: 110 MMHG | DIASTOLIC BLOOD PRESSURE: 72 MMHG | WEIGHT: 191 LBS | RESPIRATION RATE: 14 BRPM | HEART RATE: 52 BPM

## 2024-07-12 DIAGNOSIS — I10 ESSENTIAL HYPERTENSION: Chronic | ICD-10-CM

## 2024-07-12 DIAGNOSIS — G47.33 OSA (OBSTRUCTIVE SLEEP APNEA): ICD-10-CM

## 2024-07-12 DIAGNOSIS — E78.5 DYSLIPIDEMIA, GOAL LDL BELOW 70: Chronic | ICD-10-CM

## 2024-07-12 DIAGNOSIS — I50.32 CHRONIC HEART FAILURE WITH PRESERVED EJECTION FRACTION (H): ICD-10-CM

## 2024-07-12 DIAGNOSIS — E11.69 TYPE 2 DIABETES MELLITUS WITH OTHER SPECIFIED COMPLICATION, WITHOUT LONG-TERM CURRENT USE OF INSULIN (H): ICD-10-CM

## 2024-07-12 DIAGNOSIS — I25.810 CORONARY ARTERY DISEASE INVOLVING CORONARY BYPASS GRAFT OF NATIVE HEART WITHOUT ANGINA PECTORIS: Primary | ICD-10-CM

## 2024-07-12 PROCEDURE — 99214 OFFICE O/P EST MOD 30 MIN: CPT | Performed by: GENERAL ACUTE CARE HOSPITAL

## 2024-07-12 PROCEDURE — G2211 COMPLEX E/M VISIT ADD ON: HCPCS | Performed by: GENERAL ACUTE CARE HOSPITAL

## 2024-07-12 NOTE — LETTER
7/12/2024    Az Briseno MD  [address] [facility] [address]    RE: Nadya Blake       Dear Colleague,     I had the pleasure of seeing Nadya Blake in the [facility].  HEART CARE ENCOUNTER NOTE      Assessment/Recommendations   Assessment:    Coronary artery disease status post four-vessel coronary artery bypass grafting (left internal mammary artery to the left anterior descending artery, right radial artery to the right posterior descending artery, reversed saphenous venous grafts to obtuse marginal and diagonal artery branches) on 6/24/2020. No ischemia seen on stress cardiac MRI on 12/23/2021 but he has been having exertional dyspnea with his saphenous venous graft to the right posterior descending artery noted to be occluded on coronary angiography 10/6/2022. He reports stable minimal anginal symptoms.  Chronic congestive heart failure with preserved left ventricular ejection fraction. He seems euvolemic and normal left-sided filling pressure was noted on cardiac catheterization 10/6/2022.  Essential hypertension. Blood pressure is low normal today.  Dyslipidemia. Last LDL 32 mg/dL.  Non insulin-dependent diabetes mellitus type 2. Last hemoglobin A1c 5.7%.  Former smoker.  Possible obstructive sleep apnea suggested on preliminary polysomnographic findings 6/24/2024.  Body mass index is 32.12 kg/m .    Plan:  We again discussed percutaneous coronary intervention of his native right coronary artery but is fearful of doing so and wants to continue medical therapy. He seems to be doing well now.  Continue metoprolol succinate at reduced dose of 50 mg daily.  Continue isosorbide mononitrate 30 mg daily.  Rosuvastatin 40 mg and aspirin 81 mg daily.   Follow-up with sleep medicine on his polysomnographic test results.  Follow-up with Teodora Ordonez in 2 months, with me in 6 months.         History of Present Illness   Mr. Nadya Blake is a 58 year old male with a  significant past history of CAD with history of NSTEMI s/p 4V CABG (LIMA to LAD, right radial artery to RPDA, reversed SVGs to an OM and diagonal artery branch) on 6/24/2020, HFpEF, HTN, dyslipidemia, and former smoker presenting for follow-up.     He had a sleep study 6/24/2024 with the full report still pending but the preliminary report suggests he qualifies for PAP therapy.    He gets chest pain 2-3 times per week, often at night when he is feeling anxious. He walks 1-2 times per day and does not get chest pain with this. He is taking isosorbide mononitrate regularly which seems to help.    No shortness of breath at rest or with exertion, light headedness/dizziness, pre-syncope, syncope, lower extremity swelling, palpitations, paroxysmal nocturnal dyspnea (PND), or orthopnea.     Cardiac Problems and Cardiac Diagnostics     Most Recent Cardiac testing:  ECG 10/6/2022 (personally reviewed and interpreted): sinus bradycardia HR 57 bpm with 1st degree AV block  ms, nonspecific T wave changes     ECHO 6/27/2020 (report reviewed):     Normal left ventricular size and systolic function. The left ventricular wall motion is normal. The calculated left ventricular ejection fraction is 71%.    Normal right ventricular size and systolic function.    No hemodynamically significant valvular heart abnormalities.    The ascending aorta is mildly dilated.    When compared to the previous study dated 6/23/2020, no significant change.     Stress cardiac MRI 12/23/2021 (report reviewed):  1.  Pharmacological Regadenoson stress cardiac MRI is negative for inducible myocardial ischemia.   2.  Pharmacological stress ECG is negative for inducible myocardial ischemia.   3. Normal left ventricular size, wall thickness and systolic function. The quantified left ventricular  ejection fraction is 59 %.  Very small area of non-transmural myocardial scar in the mid inferior wall is identified.    4.  Normal right ventricular size and  systolic function.    5.  No significant valvular abnormalities.  6. Ascending aorta measures 38 mm.      Stress test 5/21/2021 (report reviewed):    The nuclear stress test is negative for inducible myocardial ischemia or infarction.    The left ventricular ejection fraction at stress is 65%.    There is no prior study for comparison.     Cardiac cath 10/6/2022 (report reviewed):   Left ventricular filling pressures are normal.  3rd Mrg lesion is 90% stenosed.  Prox RCA lesion is 75% stenosed.  Prox RCA to Mid RCA lesion is 40% stenosed.  Dist RCA lesion is 75% stenosed.  Mid RCA lesion is 30% stenosed.  RPDA lesion is 50% stenosed.  RPAV lesion is 40% stenosed.  Prox LAD lesion is 70% stenosed.  1st Diag-1 lesion is 95% stenosed.  1st Diag-2 lesion is 95% stenosed.  2nd Diag lesion is 99% stenosed.  Mid LAD-1 lesion is 75% stenosed.  Mid LAD-2 lesion is 75% stenosed.  Dist LAD lesion is 70% stenosed.  Vein graft to right PDA is totally occluded  Vein graft to second diagonal is widely patent  Vein graft to third OM is widely patent  LUZ MARIA graft to mid distal LAD is patent     Medications  Allergies   Current Outpatient Medications   Medication Sig Dispense Refill    acetaminophen (TYLENOL) 500 MG tablet Take 1-2 tablets (500-1,000 mg) by mouth every 6 hours as needed for mild pain 360 tablet 3    albuterol (VENTOLIN HFA) 108 (90 Base) MCG/ACT inhaler INHALE 2 PUFFS INTO THE LUNGS EVERY 6 HOURS AS NEEDED FOR SHORTNESS OF BREATH OR WHEEZING OR COUGH 18 g 2    aspirin (ASA) 81 MG chewable tablet CHEW AND SWALLOW 1 TABLET(81 MG) BY MOUTH DAILY 90 tablet 2    azelastine (ASTELIN) 0.1 % nasal spray Spray 1 spray into both nostrils 2 times daily 30 mL 1    busPIRone (BUSPAR) 5 MG tablet Take 1 tablet (5 mg) by mouth 2 times daily 180 tablet 0    Cholecalciferol (VITAMIN D3) 50 MCG (2000 UT) CAPS TAKE 1 CAPSULE BY MOUTH DAILY 90 capsule 1    cyanocobalamin (VITAMIN B-12) 1000 MCG tablet Take 1 tablet (1,000 mcg) by mouth  daily 90 tablet 4    diclofenac (VOLTAREN) 1 % topical gel Apply 4 g topically 4 times daily 500 g 2    empagliflozin (JARDIANCE) 25 MG TABS tablet TAKE 1 TABLET(25 MG) BY MOUTH DAILY 90 tablet 0    finasteride (PROSCAR) 5 MG tablet Take 1 tablet (5 mg) by mouth daily 90 tablet 4    ipratropium (ATROVENT) 0.06 % nasal spray Spray 2 sprays into both nostrils 4 times daily 15 mL 1    isosorbide mononitrate (IMDUR) 30 MG 24 hr tablet Take 1 tablet (30 mg) by mouth daily 90 tablet 4    Lidocaine 0.5 % GEL Externally apply topically as needed      metoprolol succinate ER (TOPROL XL) 50 MG 24 hr tablet Take 1 tablet (50 mg) by mouth daily 90 tablet 3    nifedipine 0.2% in white petrolatum 0.2 % OINT ointment Apply topically 4 times daily as needed (anal fissure) 100 g 1    nitroGLYcerin (NITROSTAT) 0.4 MG sublingual tablet For chest pain place 1 tablet under the tongue every 5 minutes for 3 doses. If symptoms persist 5 minutes after 1st dose call 911. 30 tablet 3    omeprazole (PRILOSEC) 20 MG DR capsule Take 1 capsule (20 mg) by mouth 2 times daily 180 capsule 4    oxybutynin (DITROPAN) 5 MG tablet Take 1 tablet (5 mg) by mouth At Bedtime 90 tablet 4    PARoxetine (PAXIL) 20 MG tablet Take 1 tablet (20 mg) by mouth every morning 30 tablet 3    polyethylene glycol (MIRALAX) 17 GM/Dose powder TAKE 17 GRAMS(1 CAPFUL) BY MOUTH DAILY 840 g 4    QUEtiapine (SEROQUEL) 50 MG tablet Take 1.5 tablets (75 mg) by mouth at bedtime 45 tablet 4    rosuvastatin (CRESTOR) 40 MG tablet Take 1 tablet (40 mg) by mouth daily 90 tablet 4    tamsulosin (FLOMAX) 0.4 MG capsule Take 2 capsules (0.8 mg) by mouth every evening 180 capsule 4      Allergies   Allergen Reactions    Atorvastatin Diarrhea    Cortisone Other (See Comments)     pain    Lisinopril Cough     started after CABG for unclear reason    Methylprednisolone Other (See Comments)     ?        Physical Examination Review of Systems   /72 (BP Location: Right arm, Patient  Position: Sitting, Cuff Size: Adult Regular)   Pulse 52   Resp 14   Wt 86.6 kg (191 lb)   BMI 31.78 kg/m    Body mass index is 31.78 kg/m .  Wt Readings from Last 3 Encounters:   07/12/24 86.6 kg (191 lb)   06/14/24 86.8 kg (191 lb 6.4 oz)   06/07/24 85.7 kg (189 lb)       General Appearance:   Pleasant  male, appears  stated age. no acute distress, normal body habitus   ENT/Mouth: membranes moist, no apparent gingival bleeding.      EYES:  no scleral icterus, normal conjunctivae   Neck: no carotid bruits. No anterior cervical lymphadenopaty   Respiratory:   lungs are clear to auscultation, no rales or wheezing, well-healed sternal scar, equal chest wall expansion    Cardiovascular:   Regular rhythm, normal rate. Normal first and second heart sounds with no murmurs, rubs, or gallops; the carotid, radial and posterior tibial pulses are intact, Jugular venous pressure normal, no edema bilaterally    Abdomen/GI:  no organomegaly, masses, bruits, or tenderness; bowel sounds are present   Extremities: no cyanosis or clubbing   Skin: no xanthelasma, warm.    Heme/lymph/ Immunology No apparent bleeding noted.   Neurologic: Alert and oriented. normal gait, no tremors     Psychiatric: Pleasant, calm, appropriate affect.    A complete 10 system review of systems was performed and is negative except as mentioned in the HPI/subjective.         Past History   Past Medical History:   Past Medical History:   Diagnosis Date    Acute non-ST elevation myocardial infarction (NSTEMI) (H) 6/22/2020    Adenomatous polyp of colon     Ascending aorta dilatation (H24)     Carpal tunnel syndrome     Chronic superficial gastritis     Coronary artery disease due to lipid rich plaque 6/23/2020    Depression with anxiety     Dyslipidemia, goal LDL below 70 6/23/2020    Essential hypertension 9/2/2012    Fatty liver disease, nonalcoholic     GERD (gastroesophageal reflux disease)     IBS (irritable bowel syndrome)     Left lumbar  radiculopathy     Multinodular goiter     Old torn meniscus of knee     Paroxysmal atrial fibrillation (H)     Perianal abscess     Post herpetic neuralgia     Post-traumatic osteoarthritis of both knees     Primary osteoarthritis of left hip     Primary osteoarthritis of right hip     Pulmonary nodule     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 Ragsdale    CV CORONARY ANGIOGRAM N/A 6/23/2020    Procedure: Coronary Angiogram;  Surgeon: Ariane Lester MD;  Location: [facility] Cath Lab;  Service: Cardiology    CV CORONARY ANGIOGRAM N/A 10/6/2022    Procedure: Coronary Angiogram;  Surgeon: Javon John MD;  Location: [facility] CV    CV IABP INSERT N/A 6/24/2020    Procedure: Intra Aortic Balloon Pump Insertion;  Surgeon: Ariane Lester MD;  Location: [facility] Lab;  Service: Cardiology    CV LEFT HEART CATH N/A 10/6/2022    Procedure: Left Heart Catheterization;  Surgeon: Javon John MD;  Location: [facility] CV    CV LEFT HEART CATHETERIZATION WITHOUT LEFT VENTRICULOGRAM Left 6/23/2020    Procedure: Left Heart Catheterization Without Left Ventriculogram;  Surgeon: Ariane Lester MD;  Location: [facility] Cath Lab;  Service: Cardiology    CV LEFT VENTRICULOGRAM N/A 10/6/2022    Procedure: Left Ventriculogram;  Surgeon: Javon John MD;  Location: [facility] CV       Family History:   Family History   Problem Relation Age of Onset    No Known Problems Mother     Lung Cancer Father 56        fatal    No Known Problems Daughter     Other - See Comments Son         congenital deformity of right leg; he uses a leg prosthesis        Social History:   Social History     Socioeconomic  History    Marital status:      Spouse name: Carlee    Number of children: 2    Years of education: Not on file    Highest education level: Not on file   Occupational History    Not on file   Tobacco Use    Smoking status: Former     Current packs/day: 0.00     Average packs/day: 1 pack/day for 30.0 years (30.0 ttl pk-yrs)     Types: Cigarettes     Start date: 3/23/1990     Quit date: 3/23/2020     Years since quittin.3     Passive exposure: Never    Smokeless tobacco: Never    Tobacco comments:     stopped 3/24/2020   Vaping Use    Vaping status: Never Used   Substance and Sexual Activity    Alcohol use: No    Drug use: Never    Sexual activity: Yes     Partners: Female   Other Topics Concern    Not on file   Social History Narrative    , Carlee, BA chemistry and taking AA courses at [employer].  From [place]; bachelors in geology and computer science. [employer] (2005) and Sher[employer] (2008).  On SSDI, PTSD.       Social Determinants of Health     Financial Resource Strain: Low Risk  (2023)    Financial Resource Strain     Within the past 12 months, have you or your family members you live with been unable to get utilities (heat, electricity) when it was really needed?: No   Food Insecurity: Low Risk  (2023)    Food Insecurity     Within the past 12 months, did you worry that your food would run out before you got money to buy more?: No     Within the past 12 months, did the food you bought just not last and you didn t have money to get more?: No   Transportation Needs: Low Risk  (2023)    Transportation Needs     Within the past 12 months, has lack of transportation kept you from medical appointments, getting your medicines, non-medical meetings or appointments, work, or from getting things that you need?: No   Physical Activity: Not on file   Stress: Not on file   Social Connections: Unknown (2022)    [facility]    Social Connections     Frequency of Communication with Friends and Family: Not on file   Interpersonal Safety: Low Risk  (5/3/2024)    Interpersonal Safety     Do you feel physically and emotionally safe where you currently live?: Yes     Within the past 12 months, have you been hit, slapped, kicked or otherwise physically hurt by someone?: No     Within the past 12 months, have you been humiliated or emotionally abused in other ways by your partner or ex-partner?: No   Housing Stability: Low Risk  (12/27/2023)    Housing Stability     Do you have housing? : Yes     Are you worried about losing your housing?: No              Lab Results    Chemistry/lipid CBC Cardiac Enzymes/BNP/TSH/INR   Lab Results   Component Value Date    CHOL 97 08/28/2023    HDL 33 (L) 08/28/2023    LDL 32 08/28/2023    TRIG 159 (H) 08/28/2023    CR 1.13 06/14/2024    BUN 15.3 06/14/2024    POTASSIUM 4.4 06/14/2024     06/14/2024    CO2 28 06/14/2024      Lab Results   Component Value Date    WBC 5.1 06/14/2024    HGB 14.3 06/14/2024    HCT 44.0 06/14/2024    MCV 91 06/14/2024     06/14/2024    A1C 5.7 (H) 06/14/2024     Lab Results   Component Value Date    A1C 5.7 (H) 06/14/2024    Lab Results   Component Value Date    TROPONINI <0.01 11/21/2020    BNP 79 (H) 07/27/2020    NTBNP <36 12/20/2023    TSH 1.24 08/28/2023    INR 1.10 07/27/2020          Sloan Burns MD [facility]  Non-Invasive Cardiologist  [facility]  Pager [phone]      Thank you for allowing me to participate in the care of your patient.      Sincerely,     Sloan Burns MD     [facility]  cc:   Sloan Burns MD  [address] [facility] [address]  [place] [address]

## 2024-07-12 NOTE — PROGRESS NOTES
HEART CARE ENCOUNTER NOTE      Assessment/Recommendations   Assessment:    Coronary artery disease status post four-vessel coronary artery bypass grafting (left internal mammary artery to the left anterior descending artery, right radial artery to the right posterior descending artery, reversed saphenous venous grafts to obtuse marginal and diagonal artery branches) on 6/24/2020. No ischemia seen on stress cardiac MRI on 12/23/2021 but he has been having exertional dyspnea with his saphenous venous graft to the right posterior descending artery noted to be occluded on coronary angiography 10/6/2022. He reports stable minimal anginal symptoms.  Chronic congestive heart failure with preserved left ventricular ejection fraction. He seems euvolemic and normal left-sided filling pressure was noted on cardiac catheterization 10/6/2022.  Essential hypertension. Blood pressure is low normal today.  Dyslipidemia. Last LDL 32 mg/dL.  Non insulin-dependent diabetes mellitus type 2. Last hemoglobin A1c 5.7%.  Former smoker.  Possible obstructive sleep apnea suggested on preliminary polysomnographic findings 6/24/2024.  Body mass index is 32.12 kg/m .    Plan:  We again discussed percutaneous coronary intervention of his native right coronary artery but is fearful of doing so and wants to continue medical therapy. He seems to be doing well now.  Continue metoprolol succinate at reduced dose of 50 mg daily.  Continue isosorbide mononitrate 30 mg daily.  Rosuvastatin 40 mg and aspirin 81 mg daily.   Follow-up with sleep medicine on his polysomnographic test results.  Follow-up with Teodora Ordonez in 2 months, with me in 6 months.         History of Present Illness   Mr. Nadya Blake is a 58 year old male with a significant past history of CAD with history of NSTEMI s/p 4V CABG (LIMA to LAD, right radial artery to RPDA, reversed SVGs to an OM and diagonal artery branch) on 6/24/2020, HFpEF, HTN, dyslipidemia, and former smoker  presenting for follow-up.     He had a sleep study 6/24/2024 with the full report still pending but the preliminary report suggests he qualifies for PAP therapy.    He gets chest pain 2-3 times per week, often at night when he is feeling anxious. He walks 1-2 times per day and does not get chest pain with this. He is taking isosorbide mononitrate regularly which seems to help.    No shortness of breath at rest or with exertion, light headedness/dizziness, pre-syncope, syncope, lower extremity swelling, palpitations, paroxysmal nocturnal dyspnea (PND), or orthopnea.     Cardiac Problems and Cardiac Diagnostics     Most Recent Cardiac testing:  ECG 10/6/2022 (personally reviewed and interpreted): sinus bradycardia HR 57 bpm with 1st degree AV block  ms, nonspecific T wave changes     ECHO 6/27/2020 (report reviewed):     Normal left ventricular size and systolic function. The left ventricular wall motion is normal. The calculated left ventricular ejection fraction is 71%.    Normal right ventricular size and systolic function.    No hemodynamically significant valvular heart abnormalities.    The ascending aorta is mildly dilated.    When compared to the previous study dated 6/23/2020, no significant change.     Stress cardiac MRI 12/23/2021 (report reviewed):  1.  Pharmacological Regadenoson stress cardiac MRI is negative for inducible myocardial ischemia.   2.  Pharmacological stress ECG is negative for inducible myocardial ischemia.   3. Normal left ventricular size, wall thickness and systolic function. The quantified left ventricular  ejection fraction is 59 %.  Very small area of non-transmural myocardial scar in the mid inferior wall is identified.    4.  Normal right ventricular size and systolic function.    5.  No significant valvular abnormalities.  6. Ascending aorta measures 38 mm.      Stress test 5/21/2021 (report reviewed):    The nuclear stress test is negative for inducible myocardial ischemia  or infarction.    The left ventricular ejection fraction at stress is 65%.    There is no prior study for comparison.     Cardiac cath 10/6/2022 (report reviewed):   Left ventricular filling pressures are normal.  3rd Mrg lesion is 90% stenosed.  Prox RCA lesion is 75% stenosed.  Prox RCA to Mid RCA lesion is 40% stenosed.  Dist RCA lesion is 75% stenosed.  Mid RCA lesion is 30% stenosed.  RPDA lesion is 50% stenosed.  RPAV lesion is 40% stenosed.  Prox LAD lesion is 70% stenosed.  1st Diag-1 lesion is 95% stenosed.  1st Diag-2 lesion is 95% stenosed.  2nd Diag lesion is 99% stenosed.  Mid LAD-1 lesion is 75% stenosed.  Mid LAD-2 lesion is 75% stenosed.  Dist LAD lesion is 70% stenosed.  Vein graft to right PDA is totally occluded  Vein graft to second diagonal is widely patent  Vein graft to third OM is widely patent  LUZ MARIA graft to mid distal LAD is patent     Medications  Allergies   Current Outpatient Medications   Medication Sig Dispense Refill    acetaminophen (TYLENOL) 500 MG tablet Take 1-2 tablets (500-1,000 mg) by mouth every 6 hours as needed for mild pain 360 tablet 3    albuterol (VENTOLIN HFA) 108 (90 Base) MCG/ACT inhaler INHALE 2 PUFFS INTO THE LUNGS EVERY 6 HOURS AS NEEDED FOR SHORTNESS OF BREATH OR WHEEZING OR COUGH 18 g 2    aspirin (ASA) 81 MG chewable tablet CHEW AND SWALLOW 1 TABLET(81 MG) BY MOUTH DAILY 90 tablet 2    azelastine (ASTELIN) 0.1 % nasal spray Spray 1 spray into both nostrils 2 times daily 30 mL 1    busPIRone (BUSPAR) 5 MG tablet Take 1 tablet (5 mg) by mouth 2 times daily 180 tablet 0    Cholecalciferol (VITAMIN D3) 50 MCG (2000 UT) CAPS TAKE 1 CAPSULE BY MOUTH DAILY 90 capsule 1    cyanocobalamin (VITAMIN B-12) 1000 MCG tablet Take 1 tablet (1,000 mcg) by mouth daily 90 tablet 4    diclofenac (VOLTAREN) 1 % topical gel Apply 4 g topically 4 times daily 500 g 2    empagliflozin (JARDIANCE) 25 MG TABS tablet TAKE 1 TABLET(25 MG) BY MOUTH DAILY 90 tablet 0    finasteride (PROSCAR) 5  MG tablet Take 1 tablet (5 mg) by mouth daily 90 tablet 4    ipratropium (ATROVENT) 0.06 % nasal spray Spray 2 sprays into both nostrils 4 times daily 15 mL 1    isosorbide mononitrate (IMDUR) 30 MG 24 hr tablet Take 1 tablet (30 mg) by mouth daily 90 tablet 4    Lidocaine 0.5 % GEL Externally apply topically as needed      metoprolol succinate ER (TOPROL XL) 50 MG 24 hr tablet Take 1 tablet (50 mg) by mouth daily 90 tablet 3    nifedipine 0.2% in white petrolatum 0.2 % OINT ointment Apply topically 4 times daily as needed (anal fissure) 100 g 1    nitroGLYcerin (NITROSTAT) 0.4 MG sublingual tablet For chest pain place 1 tablet under the tongue every 5 minutes for 3 doses. If symptoms persist 5 minutes after 1st dose call 911. 30 tablet 3    omeprazole (PRILOSEC) 20 MG DR capsule Take 1 capsule (20 mg) by mouth 2 times daily 180 capsule 4    oxybutynin (DITROPAN) 5 MG tablet Take 1 tablet (5 mg) by mouth At Bedtime 90 tablet 4    PARoxetine (PAXIL) 20 MG tablet Take 1 tablet (20 mg) by mouth every morning 30 tablet 3    polyethylene glycol (MIRALAX) 17 GM/Dose powder TAKE 17 GRAMS(1 CAPFUL) BY MOUTH DAILY 840 g 4    QUEtiapine (SEROQUEL) 50 MG tablet Take 1.5 tablets (75 mg) by mouth at bedtime 45 tablet 4    rosuvastatin (CRESTOR) 40 MG tablet Take 1 tablet (40 mg) by mouth daily 90 tablet 4    tamsulosin (FLOMAX) 0.4 MG capsule Take 2 capsules (0.8 mg) by mouth every evening 180 capsule 4      Allergies   Allergen Reactions    Atorvastatin Diarrhea    Cortisone Other (See Comments)     pain    Lisinopril Cough     started after CABG for unclear reason    Methylprednisolone Other (See Comments)     ?        Physical Examination Review of Systems   /72 (BP Location: Right arm, Patient Position: Sitting, Cuff Size: Adult Regular)   Pulse 52   Resp 14   Wt 86.6 kg (191 lb)   BMI 31.78 kg/m    Body mass index is 31.78 kg/m .  Wt Readings from Last 3 Encounters:   07/12/24 86.6 kg (191 lb)   06/14/24 86.8 kg  (191 lb 6.4 oz)   06/07/24 85.7 kg (189 lb)       General Appearance:   Pleasant  male, appears  stated age. no acute distress, normal body habitus   ENT/Mouth: membranes moist, no apparent gingival bleeding.      EYES:  no scleral icterus, normal conjunctivae   Neck: no carotid bruits. No anterior cervical lymphadenopaty   Respiratory:   lungs are clear to auscultation, no rales or wheezing, well-healed sternal scar, equal chest wall expansion    Cardiovascular:   Regular rhythm, normal rate. Normal first and second heart sounds with no murmurs, rubs, or gallops; the carotid, radial and posterior tibial pulses are intact, Jugular venous pressure normal, no edema bilaterally    Abdomen/GI:  no organomegaly, masses, bruits, or tenderness; bowel sounds are present   Extremities: no cyanosis or clubbing   Skin: no xanthelasma, warm.    Heme/lymph/ Immunology No apparent bleeding noted.   Neurologic: Alert and oriented. normal gait, no tremors     Psychiatric: Pleasant, calm, appropriate affect.    A complete 10 system review of systems was performed and is negative except as mentioned in the HPI/subjective.         Past History   Past Medical History:   Past Medical History:   Diagnosis Date    Acute non-ST elevation myocardial infarction (NSTEMI) (H) 6/22/2020    Adenomatous polyp of colon     Ascending aorta dilatation (H24)     Carpal tunnel syndrome     Chronic superficial gastritis     Coronary artery disease due to lipid rich plaque 6/23/2020    Depression with anxiety     Dyslipidemia, goal LDL below 70 6/23/2020    Essential hypertension 9/2/2012    Fatty liver disease, nonalcoholic     GERD (gastroesophageal reflux disease)     IBS (irritable bowel syndrome)     Left lumbar radiculopathy     Multinodular goiter     Old torn meniscus of knee     Paroxysmal atrial fibrillation (H)     Perianal abscess     Post herpetic neuralgia     Post-traumatic osteoarthritis of both knees     Primary osteoarthritis of left  hip     Primary osteoarthritis of right hip     Pulmonary nodule     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 Ragsdale    CV CORONARY ANGIOGRAM N/A 6/23/2020    Procedure: Coronary Angiogram;  Surgeon: Ariane Lester MD;  Location: [facility] Cath Lab;  Service: Cardiology    CV CORONARY ANGIOGRAM N/A 10/6/2022    Procedure: Coronary Angiogram;  Surgeon: Javon John MD;  Location: [facility] CV    CV IABP INSERT N/A 6/24/2020    Procedure: Intra Aortic Balloon Pump Insertion;  Surgeon: Ariane Lester MD;  Location: [facility] Cath Lab;  Service: Cardiology    CV LEFT HEART CATH N/A 10/6/2022    Procedure: Left Heart Catheterization;  Surgeon: Javon John MD;  Location: [facility] CV    CV LEFT HEART CATHETERIZATION WITHOUT LEFT VENTRICULOGRAM Left 6/23/2020    Procedure: Left Heart Catheterization Without Left Ventriculogram;  Surgeon: Ariane Lester MD;  Location: [facility] Cath Lab;  Service: Cardiology    CV LEFT VENTRICULOGRAM N/A 10/6/2022    Procedure: Left Ventriculogram;  Surgeon: Javon John MD;  Location: [facility] LAB CV       Family History:   Family History   Problem Relation Age of Onset    No Known Problems Mother     Lung Cancer Father 56        fatal    No Known Problems Daughter     Other - See Comments Son         congenital deformity of right leg; he uses a leg prosthesis        Social History:   Social History     Socioeconomic History    Marital status:      Spouse name: Carlee    Number of children: 2    Years of education: Not on file    Highest education level: Not on file   Occupational History    Not on file   Tobacco Use    Smoking status: Former      Current packs/day: 0.00     Average packs/day: 1 pack/day for 30.0 years (30.0 ttl pk-yrs)     Types: Cigarettes     Start date: 3/23/1990     Quit date: 3/23/2020     Years since quittin.3     Passive exposure: Never    Smokeless tobacco: Never    Tobacco comments:     stopped 3/24/2020   Vaping Use    Vaping status: Never Used   Substance and Sexual Activity    Alcohol use: No    Drug use: Never    Sexual activity: Yes     Partners: Female   Other Topics Concern    Not on file   Social History Narrative    , Carlee, BA chemistry and taking AA courses at [employer].  From [place]; bachelors in geology and computer science. Son, Grace (2005) and Sherrie (2008).  On SSDI, PTSD.       Social Determinants of Health     Financial Resource Strain: Low Risk  (2023)    Financial Resource Strain     Within the past 12 months, have you or your family members you live with been unable to get utilities (heat, electricity) when it was really needed?: No   Food Insecurity: Low Risk  (2023)    Food Insecurity     Within the past 12 months, did you worry that your food would run out before you got money to buy more?: No     Within the past 12 months, did the food you bought just not last and you didn t have money to get more?: No   Transportation Needs: Low Risk  (2023)    Transportation Needs     Within the past 12 months, has lack of transportation kept you from medical appointments, getting your medicines, non-medical meetings or appointments, work, or from getting things that you need?: No   Physical Activity: Not on file   Stress: Not on file   Social Connections: Unknown (2022)    Received from [facility], [facility]    Social Connections     Frequency of Communication with Friends and Family: Not on file   Interpersonal Safety: Low Risk  (5/3/2024)    Interpersonal Safety     Do you feel physically and emotionally safe where you  currently live?: Yes     Within the past 12 months, have you been hit, slapped, kicked or otherwise physically hurt by someone?: No     Within the past 12 months, have you been humiliated or emotionally abused in other ways by your partner or ex-partner?: No   Housing Stability: Low Risk  (12/27/2023)    Housing Stability     Do you have housing? : Yes     Are you worried about losing your housing?: No              Lab Results    Chemistry/lipid CBC Cardiac Enzymes/BNP/TSH/INR   Lab Results   Component Value Date    CHOL 97 08/28/2023    HDL 33 (L) 08/28/2023    LDL 32 08/28/2023    TRIG 159 (H) 08/28/2023    CR 1.13 06/14/2024    BUN 15.3 06/14/2024    POTASSIUM 4.4 06/14/2024     06/14/2024    CO2 28 06/14/2024      Lab Results   Component Value Date    WBC 5.1 06/14/2024    HGB 14.3 06/14/2024    HCT 44.0 06/14/2024    MCV 91 06/14/2024     06/14/2024    A1C 5.7 (H) 06/14/2024     Lab Results   Component Value Date    A1C 5.7 (H) 06/14/2024    Lab Results   Component Value Date    TROPONINI <0.01 11/21/2020    BNP 79 (H) 07/27/2020    NTBNP <36 12/20/2023    TSH 1.24 08/28/2023    INR 1.10 07/27/2020          Sloan Burns MD [facility]  Non-Invasive Cardiologist  [facility]  Pager [phone]

## 2024-07-14 NOTE — PROCEDURES
"  SLEEP STUDY INTERPRETATION  DIAGNOSTIC POLYSOMNOGRAPHY REPORT      Patient: MICHAELA DANIEL  YOB: 1965  Study Date: 6/24/2024  MRN: [id number]  Referring Provider: -  Ordering Provider: GENET Craft Thomas    Indications for Polysomnography: The patient is a 58-year-old Male who is 5' 5\" and weighs 192.0 lbs. His BMI is 32.0, Hickman sleepiness scale - and neck circumference is - cm. Relevant medical history includes HTN, CAD/NSTEMI - s/p CABG x 4, HFpEF (59%), DM2, CKD stage IIIa, PTSD, BPH, former smoker, and obesity. A diagnostic polysomnogram was performed to evaluate for sleep apnea /CSA/hypoventilation/hypoxemia.    Polysomnogram Data: A full night polysomnogram recorded the standard physiologic parameters including EEG, EOG, EMG, ECG, nasal and oral airflow. Respiratory parameters of chest and abdominal movements were recorded with respiratory inductance plethysmography. Oxygen saturation was recorded by pulse oximetry. Hypopnea scoring rule used: 1B 4%.    Sleep Architecture: Sleep architecture was remarkable for prolonged initial sleep latency with reduced sleep efficiency. All sleep stages were seen.  REM sleep was reduced.    The total recording time of the polysomnogram was 533.8 minutes. The total sleep time was 330.0 minutes. Sleep latency was increased at 142.9 minutes without the use of a sleep aid. REM latency was 148.5 minutes. Arousal index was normal at 15.6 arousals per hour. Sleep efficiency was decreased at 61.8%. Wake after sleep onset was 60.5 minutes. The patient spent 4.4% of total sleep time in Stage N1, 54.2% in Stage N2, 29.8% in Stage N3, and 11.5% in REM. Time in REM supine was - minutes.    Respiration: Moderate obstructive sleep apnea was present, pronounced during supine sleep position. There was no evidence of sleep associated hypoxemia. Since REM sleep in supine position was not seen, it is plausible that the overall severity if the sleep related breathing " disorder may have been underestimated.  Events ? The polysomnogram revealed a presence of 1 obstructive, 5 centrals, and - mixed apneas resulting in an apnea index of 1.1 events per hour. There were 97 obstructive hypopneas and - central hypopneas resulting in an obstructive hypopnea index of 17.6 and central hypopnea index of - events per hour. The combined apnea/hypopnea index was 18.7 events per hour (central apnea/hypopnea index was 0.9 events per hour). The REM AHI was 9.5 events per hour. The supine AHI was 49.9 events per hour. The RERA index was 0.7 events per hour.  The RDI was 19.5 events per hour.  Snoring - was reported as loud.  Respiratory rate and pattern - was notable for normal respiratory rate and pattern.  Sustained Sleep Associated Hypoventilation - Transcutaneous carbon dioxide monitoring was not used, however significant hypoventilation was not suggested by oximetry.  Sleep Associated Hypoxemia - (Greater than 5 minutes O2 sat at or below 88%) was not present. Baseline oxygen saturation was 94.1%. Lowest oxygen saturation was 85.0%. Time spent less than or equal to 88% was 1.2 minutes. Time spent less than or equal to 89% was 3.8 minutes.    Movement Activity: There were no periodic limb movements or abnormal sleep-related behaviors   Periodic Limb Activity - There were - PLMs during the entire study. The PLM index was - movements per hour. The PLM Arousal Index was - per hour.  REM EMG Activity - Excessive transient/sustained muscle activity was not present.  Nocturnal Behavior - Abnormal sleep related behaviors were not noted during/arising out of NREM / REM sleep.   Bruxism - Not apparent.    Cardiac Summary: Normal sinus rhythm was noted with occasional bradycardia.  The average pulse rate was 53.0 bpm. The minimum pulse rate was 39.0 bpm while the maximum pulse rate was 83.0 bpm.  Occasional bradycardia was noted.    Assessment:   Sleep architecture was remarkable for prolonged initial  sleep latency with reduced sleep efficiency. All sleep stages were seen.  REM sleep was reduced.    Moderate obstructive sleep apnea was present, pronounced during supine sleep position. There was no evidence of sleep associated hypoxemia. Since REM sleep in supine position was not seen, it is plausible that the overall severity if the sleep related breathing disorder may have been underestimated.  There were no periodic limb movements or abnormal sleep-related behaviors.    Normal sinus rhythm was noted with occasional bradycardia.    Recommendations:  Recommend repeat polysomnography with full night titration of positive airway pressure therapy for the control of sleep disordered breathing.  Based on the presence of moderate obstructive sleep apnea, treatment could be empirically initiated with Auto?titrating PAP therapy with a range of 5 to 15 cmH2O. Recommend clinical follow up with sleep management team.  Alternate option includes combination of positional therapy during sleep and dental appliance through referral to Sleep Dentistry for the treatment of obstructive sleep apnea and snoring  Advice regarding the risks of drowsy driving.  Suggest optimizing sleep schedule and avoiding sleep deprivation.  Weight management (if BMI > 30).  Pharmacologic therapy should be used for management of restless legs syndrome only if present and clinically indicated and not based on the presence of periodic limb movements alone.    Diagnostic Codes:   Obstructive Sleep Apnea G47.33    6/24/2024 [place] [facility] Sleep Study (192.0 lbs) - AHI 18.7, RDI 19.5, Supine AHI 49.9, REM AHI 9.5, Low O2 85.0%, Time Spent ?88% 1.2 minutes / Time Spent ?89% 3.8 minutes.     _____________________________________   Electronically Signed By: (Jerrell Jay MD), 7/13/24

## 2024-07-14 NOTE — PROCEDURES
"         SLEEP STUDY INTERPRETATION  DIAGNOSTIC POLYSOMNOGRAPHY REPORT      Patient: MICHAELA DANIEL  YOB: 1965  Study Date: 6/24/2024  MRN: [id number]  Referring Provider: -  Ordering Provider: GENET Craft Thomas    Indications for Polysomnography: The patient is a 58-year-old Male who is 5' 5\" and weighs 192.0 lbs. His BMI is 32.0, Petal sleepiness scale - and neck circumference is - cm. Relevant medical history includes HTN, CAD/NSTEMI - s/p CABG x 4, HFpEF (59%), DM2, CKD stage IIIa, PTSD, BPH, former smoker, and obesity. A diagnostic polysomnogram was performed to evaluate for sleep apnea /CSA/hypoventilation/hypoxemia.    Polysomnogram Data: A full night polysomnogram recorded the standard physiologic parameters including EEG, EOG, EMG, ECG, nasal and oral airflow. Respiratory parameters of chest and abdominal movements were recorded with respiratory inductance plethysmography. Oxygen saturation was recorded by pulse oximetry. Hypopnea scoring rule used: 1B 4%.    Sleep Architecture: Sleep architecture was remarkable for prolonged initial sleep latency with reduced sleep efficiency. All sleep stages were seen.  REM sleep was reduced.    The total recording time of the polysomnogram was 533.8 minutes. The total sleep time was 330.0 minutes. Sleep latency was increased at 142.9 minutes without the use of a sleep aid. REM latency was 148.5 minutes. Arousal index was normal at 15.6 arousals per hour. Sleep efficiency was decreased at 61.8%. Wake after sleep onset was 60.5 minutes. The patient spent 4.4% of total sleep time in Stage N1, 54.2% in Stage N2, 29.8% in Stage N3, and 11.5% in REM. Time in REM supine was - minutes.    Respiration: Moderate obstructive sleep apnea was present, pronounced during supine sleep position. There was no evidence of sleep associated hypoxemia. Since REM sleep in supine position was not seen, it is plausible that the overall severity if the sleep related " breathing disorder may have been underestimated.  Events ? The polysomnogram revealed a presence of 1 obstructive, 5 centrals, and - mixed apneas resulting in an apnea index of 1.1 events per hour. There were 97 obstructive hypopneas and - central hypopneas resulting in an obstructive hypopnea index of 17.6 and central hypopnea index of - events per hour. The combined apnea/hypopnea index was 18.7 events per hour (central apnea/hypopnea index was 0.9 events per hour). The REM AHI was 9.5 events per hour. The supine AHI was 49.9 events per hour. The RERA index was 0.7 events per hour.  The RDI was 19.5 events per hour.   Snoring - was reported as loud.   Respiratory rate and pattern - was notable for normal respiratory rate and pattern.   Sustained Sleep Associated Hypoventilation - Transcutaneous carbon dioxide monitoring was not used, however significant hypoventilation was not suggested by oximetry.   Sleep Associated Hypoxemia - (Greater than 5 minutes O2 sat at or below 88%) was not present. Baseline oxygen saturation was 94.1%. Lowest oxygen saturation was 85.0%. Time spent less than or equal to 88% was 1.2 minutes. Time spent less than or equal to 89% was 3.8 minutes.    Movement Activity: There were no periodic limb movements or abnormal sleep-related behaviors    Periodic Limb Activity - There were - PLMs during the entire study. The PLM index was - movements per hour. The PLM Arousal Index was - per hour.   REM EMG Activity - Excessive transient/sustained muscle activity was not present.   Nocturnal Behavior - Abnormal sleep related behaviors were not noted during/arising out of NREM / REM sleep.    Bruxism - Not apparent.    Cardiac Summary: Normal sinus rhythm was noted with occasional bradycardia.   The average pulse rate was 53.0 bpm. The minimum pulse rate was 39.0 bpm while the maximum pulse rate was 83.0 bpm.  Occasional bradycardia was noted.    Assessment:    Sleep architecture was remarkable for  prolonged initial sleep latency with reduced sleep efficiency. All sleep stages were seen.  REM sleep was reduced.     Moderate obstructive sleep apnea was present, pronounced during supine sleep position. There was no evidence of sleep associated hypoxemia. Since REM sleep in supine position was not seen, it is plausible that the overall severity if the sleep related breathing disorder may have been underestimated.   There were no periodic limb movements or abnormal sleep-related behaviors.     Normal sinus rhythm was noted with occasional bradycardia.    Recommendations:   Recommend repeat polysomnography with full night titration of positive airway pressure therapy for the control of sleep disordered breathing.   Based on the presence of moderate obstructive sleep apnea, treatment could be empirically initiated with Auto?titrating PAP therapy with a range of 5 to 15 cmH2O. Recommend clinical follow up with sleep management team.   Alternate option includes combination of positional therapy during sleep and dental appliance through referral to Sleep Dentistry for the treatment of obstructive sleep apnea and snoring   Advice regarding the risks of drowsy driving.   Suggest optimizing sleep schedule and avoiding sleep deprivation.   Weight management (if BMI > 30).   Pharmacologic therapy should be used for management of restless legs syndrome only if present and clinically indicated and not based on the presence of periodic limb movements alone.    Diagnostic Codes:   Obstructive Sleep Apnea G47.33    6/24/2024 [facility] Study (192.0 lbs) - AHI 18.7, RDI 19.5, Supine AHI 49.9, REM AHI 9.5, Low O2 85.0%, Time Spent ?88% 1.2 minutes / Time Spent ?89% 3.8 minutes.     _____________________________________  Electronically Signed By: (Jerrell Jay MD), 7/13/24

## 2024-07-15 ENCOUNTER — TELEPHONE (OUTPATIENT)
Dept: SLEEP MEDICINE | Facility: CLINIC | Age: 59
End: 2024-07-15
Payer: COMMERCIAL

## 2024-07-15 LAB — SLPCOMP: NORMAL

## 2024-07-15 NOTE — TELEPHONE ENCOUNTER
Test Results    Contacts       Contact Date/Time Type Contact Phone/Fax    07/15/2024 12:17 PM CDT Phone (Incoming) Carlee Montes (Emergency Contact) [phone] (H)            Who ordered the test:  Az Craft    Type of test: Sleep study    Date of test:  6/24/2024    Where was the test performed:  [facility]    What are your questions/concerns?:  patient would like to know if he should be starting c-pap therapy.    Could we send this information to you in DermApproved or would you prefer to receive a phone call?:   Patient would like to be contacted via DermApproved

## 2024-07-19 ENCOUNTER — OFFICE VISIT (OUTPATIENT)
Dept: INTERNAL MEDICINE | Facility: CLINIC | Age: 59
End: 2024-07-19
Payer: COMMERCIAL

## 2024-07-19 VITALS
DIASTOLIC BLOOD PRESSURE: 81 MMHG | HEART RATE: 57 BPM | TEMPERATURE: 97.3 F | SYSTOLIC BLOOD PRESSURE: 116 MMHG | WEIGHT: 192 LBS | BODY MASS INDEX: 31.99 KG/M2 | HEIGHT: 65 IN | OXYGEN SATURATION: 99 % | RESPIRATION RATE: 14 BRPM

## 2024-07-19 DIAGNOSIS — F43.10 PTSD (POST-TRAUMATIC STRESS DISORDER): Chronic | ICD-10-CM

## 2024-07-19 DIAGNOSIS — G47.33 OSA (OBSTRUCTIVE SLEEP APNEA): Primary | ICD-10-CM

## 2024-07-19 DIAGNOSIS — I25.118 CORONARY ARTERY DISEASE OF NATIVE ARTERY OF NATIVE HEART WITH STABLE ANGINA PECTORIS (H): ICD-10-CM

## 2024-07-19 PROCEDURE — G2211 COMPLEX E/M VISIT ADD ON: HCPCS | Performed by: INTERNAL MEDICINE

## 2024-07-19 PROCEDURE — 99213 OFFICE O/P EST LOW 20 MIN: CPT | Performed by: INTERNAL MEDICINE

## 2024-07-19 ASSESSMENT — ANXIETY QUESTIONNAIRES
IF YOU CHECKED OFF ANY PROBLEMS ON THIS QUESTIONNAIRE, HOW DIFFICULT HAVE THESE PROBLEMS MADE IT FOR YOU TO DO YOUR WORK, TAKE CARE OF THINGS AT HOME, OR GET ALONG WITH OTHER PEOPLE: VERY DIFFICULT
5. BEING SO RESTLESS THAT IT IS HARD TO SIT STILL: MORE THAN HALF THE DAYS
3. WORRYING TOO MUCH ABOUT DIFFERENT THINGS: MORE THAN HALF THE DAYS
7. FEELING AFRAID AS IF SOMETHING AWFUL MIGHT HAPPEN: MORE THAN HALF THE DAYS
1. FEELING NERVOUS, ANXIOUS, OR ON EDGE: MORE THAN HALF THE DAYS
6. BECOMING EASILY ANNOYED OR IRRITABLE: MORE THAN HALF THE DAYS
GAD7 TOTAL SCORE: 14
4. TROUBLE RELAXING: MORE THAN HALF THE DAYS
2. NOT BEING ABLE TO STOP OR CONTROL WORRYING: MORE THAN HALF THE DAYS
7. FEELING AFRAID AS IF SOMETHING AWFUL MIGHT HAPPEN: MORE THAN HALF THE DAYS
8. IF YOU CHECKED OFF ANY PROBLEMS, HOW DIFFICULT HAVE THESE MADE IT FOR YOU TO DO YOUR WORK, TAKE CARE OF THINGS AT HOME, OR GET ALONG WITH OTHER PEOPLE?: VERY DIFFICULT
GAD7 TOTAL SCORE: 14
GAD7 TOTAL SCORE: 14

## 2024-07-19 ASSESSMENT — PAIN SCALES - GENERAL: PAINLEVEL: EXTREME PAIN (8)

## 2024-07-19 NOTE — PROGRESS NOTES
"  Office Visit - Follow Up   Nadya Blake   58 year old male    Date of Visit: 7/19/2024    Chief Complaint   Patient presents with    Nightmare      Nightmares are worse than before.     Back Pain     Lower back pain - #8 on pain scale        Assessment and Plan   1. HANNAH (obstructive sleep apnea)  Reviewed sleep study - AHI 18 overall, 50 supine.  Advised to sleep on his side.  Doesn't have follow up with sleep provider until November.  Will start on auto titrating CPAP in the meantime 5-15  - CPAP Order for DME - ONLY FOR DME    2. PTSD (post-traumatic stress disorder)  Having more nightmares, a lot of stress at home.  Will discuss with psych    3. Coronary artery disease, CABG 6/2020  Continue medical management, consider PCI to occluded graft (pt would like to avoid)    Return in about 4 weeks (around 8/16/2024).     History of Present Illness   This 58 year old man comes in for f/u        Physical Exam   General Appearance:   NAD    /81 (BP Location: Left arm, Patient Position: Sitting, Cuff Size: Adult Large)   Pulse 57   Temp 97.3  F (36.3  C) (Tympanic)   Resp 14   Ht 1.651 m (5' 5\")   Wt 87.1 kg (192 lb)   SpO2 99%   BMI 31.95 kg/m           Additional Information   Current Outpatient Medications   Medication Sig Dispense Refill    acetaminophen (TYLENOL) 500 MG tablet Take 1-2 tablets (500-1,000 mg) by mouth every 6 hours as needed for mild pain 360 tablet 3    albuterol (VENTOLIN HFA) 108 (90 Base) MCG/ACT inhaler INHALE 2 PUFFS INTO THE LUNGS EVERY 6 HOURS AS NEEDED FOR SHORTNESS OF BREATH OR WHEEZING OR COUGH 18 g 2    aspirin (ASA) 81 MG chewable tablet CHEW AND SWALLOW 1 TABLET(81 MG) BY MOUTH DAILY 90 tablet 2    azelastine (ASTELIN) 0.1 % nasal spray Spray 1 spray into both nostrils 2 times daily 30 mL 1    busPIRone (BUSPAR) 5 MG tablet Take 1 tablet (5 mg) by mouth 2 times daily 180 tablet 0    Cholecalciferol (VITAMIN D3) 50 MCG (2000 UT) CAPS TAKE 1 CAPSULE BY MOUTH DAILY 90 " capsule 1    cyanocobalamin (VITAMIN B-12) 1000 MCG tablet Take 1 tablet (1,000 mcg) by mouth daily 90 tablet 4    diclofenac (VOLTAREN) 1 % topical gel Apply 4 g topically 4 times daily 500 g 2    empagliflozin (JARDIANCE) 25 MG TABS tablet TAKE 1 TABLET(25 MG) BY MOUTH DAILY 90 tablet 0    finasteride (PROSCAR) 5 MG tablet Take 1 tablet (5 mg) by mouth daily 90 tablet 4    ipratropium (ATROVENT) 0.06 % nasal spray Spray 2 sprays into both nostrils 4 times daily 15 mL 1    isosorbide mononitrate (IMDUR) 30 MG 24 hr tablet Take 1 tablet (30 mg) by mouth daily 90 tablet 4    Lidocaine 0.5 % GEL Externally apply topically as needed      metoprolol succinate ER (TOPROL XL) 50 MG 24 hr tablet Take 1 tablet (50 mg) by mouth daily 90 tablet 3    nifedipine 0.2% in white petrolatum 0.2 % OINT ointment Apply topically 4 times daily as needed (anal fissure) 100 g 1    nitroGLYcerin (NITROSTAT) 0.4 MG sublingual tablet For chest pain place 1 tablet under the tongue every 5 minutes for 3 doses. If symptoms persist 5 minutes after 1st dose call 911. 30 tablet 3    omeprazole (PRILOSEC) 20 MG DR capsule Take 1 capsule (20 mg) by mouth 2 times daily 180 capsule 4    oxybutynin (DITROPAN) 5 MG tablet Take 1 tablet (5 mg) by mouth At Bedtime 90 tablet 4    PARoxetine (PAXIL) 20 MG tablet Take 1 tablet (20 mg) by mouth every morning 30 tablet 3    polyethylene glycol (MIRALAX) 17 GM/Dose powder TAKE 17 GRAMS(1 CAPFUL) BY MOUTH DAILY 840 g 4    QUEtiapine (SEROQUEL) 50 MG tablet Take 1.5 tablets (75 mg) by mouth at bedtime 45 tablet 4    rosuvastatin (CRESTOR) 40 MG tablet Take 1 tablet (40 mg) by mouth daily 90 tablet 4    tamsulosin (FLOMAX) 0.4 MG capsule Take 2 capsules (0.8 mg) by mouth every evening 180 capsule 4       Time:     The longitudinal plan of care for the diagnosis(es)/condition(s) as documented were addressed during this visit. Due to the added complexity in care, I will continue to support Nadya in the  subsequent management and with ongoing continuity of care.     Az Briseno MD  Answers submitted by the patient for this visit:  Patient Health Questionnaire (Submitted on 7/19/2024)  If you checked off any problems, how difficult have these problems made it for you to do your work, take care of things at home, or get along with other people?: Very difficult  PHQ9 TOTAL SCORE: 12  ISI-7 (Submitted on 7/19/2024)  ISI 7 TOTAL SCORE: 14  Depression / Anxiety Questionnaire (Submitted on 7/19/2024)  Chief Complaint: Chronic problems general questions HPI Form  Depression/Anxiety: Depression & Anxiety  Heart Failure Visit (Submitted on 7/19/2024)  Chief Complaint: Chronic problems general questions HPI Form  Dyspnea:: with activity only  Status:: same as usual  Edema:: No  Are you using more pillows than usual at night?: No  Do you cough at night?: No  Checking weight daily:: Yes  Weight change recently:: None  Heart Medication Side Effects:: None  Frequency:: 1 time  Vascular Disease (Submitted on 7/19/2024)  Chief Complaint: Chronic problems general questions HPI Form  Do you take an aspirin every day?: Yes  Depression & Anxiety (Submitted on 7/19/2024)  Chief Complaint: Chronic problems general questions HPI Form  Status since last visit:: no change  Anxiety since last: : no change  Other associated symptoms of depression:: No  Other associated symotome: : No  Significant life event: : health concerns  Anxious:: No  Current substance use:: No  General Questionnaire (Submitted on 7/19/2024)  Chief Complaint: Chronic problems general questions HPI Form  How many servings of fruits and vegetables do you eat daily?: 2-3  On average, how many sweetened beverages do you drink each day (Examples: soda, juice, sweet tea, etc.  Do NOT count diet or artificially sweetened beverages)?: 2  How many minutes a day do you exercise enough to make your heart beat faster?: 20 to 29  How many days a week do you exercise enough to  make your heart beat faster?: 7  How many days per week do you miss taking your medication?: 0

## 2024-07-22 ENCOUNTER — DOCUMENTATION ONLY (OUTPATIENT)
Dept: SLEEP MEDICINE | Facility: CLINIC | Age: 59
End: 2024-07-22
Payer: COMMERCIAL

## 2024-07-22 DIAGNOSIS — G47.33 OBSTRUCTIVE SLEEP APNEA (ADULT) (PEDIATRIC): Primary | ICD-10-CM

## 2024-07-22 NOTE — PROGRESS NOTES
Patient was offered choice of vendor and chose [facility].  Patient Nadya Blake was set up at [place] on July 22, 2024. Patient received a [employer]med Airsense 11 Pressures were set at  5-15 cm H2O.   Patient s ramp is 5 cm H2O for Auto and FLEX/EPR is EPR, 2.  Patient received a [employer] Mask name: Eson 2 Nasal mask size Medium, heated tubing and heated humidifier.  Patient has the following compliance requirements: usage only.  Patient has a follow up on 11/1/24 with GENET Ybarra, CNP.    SERGIO TAM

## 2024-07-25 ENCOUNTER — VIRTUAL VISIT (OUTPATIENT)
Dept: PSYCHIATRY | Facility: CLINIC | Age: 59
End: 2024-07-25
Payer: COMMERCIAL

## 2024-07-25 ENCOUNTER — DOCUMENTATION ONLY (OUTPATIENT)
Dept: SLEEP MEDICINE | Facility: CLINIC | Age: 59
End: 2024-07-25
Payer: COMMERCIAL

## 2024-07-25 DIAGNOSIS — F33.1 DEPRESSION, MAJOR, RECURRENT, MODERATE (H): Primary | ICD-10-CM

## 2024-07-25 DIAGNOSIS — F41.1 GAD (GENERALIZED ANXIETY DISORDER): ICD-10-CM

## 2024-07-25 DIAGNOSIS — F99 INSOMNIA DUE TO OTHER MENTAL DISORDER: ICD-10-CM

## 2024-07-25 DIAGNOSIS — F51.05 INSOMNIA DUE TO OTHER MENTAL DISORDER: ICD-10-CM

## 2024-07-25 DIAGNOSIS — F43.10 PTSD (POST-TRAUMATIC STRESS DISORDER): ICD-10-CM

## 2024-07-25 PROCEDURE — 99214 OFFICE O/P EST MOD 30 MIN: CPT | Mod: 95 | Performed by: NURSE PRACTITIONER

## 2024-07-25 PROCEDURE — G2211 COMPLEX E/M VISIT ADD ON: HCPCS | Mod: 95 | Performed by: NURSE PRACTITIONER

## 2024-07-25 RX ORDER — BUSPIRONE HYDROCHLORIDE 5 MG/1
5 TABLET ORAL 2 TIMES DAILY
Qty: 180 TABLET | Refills: 1 | Status: SHIPPED | OUTPATIENT
Start: 2024-07-25

## 2024-07-25 RX ORDER — PAROXETINE 20 MG/1
20 TABLET, FILM COATED ORAL EVERY MORNING
Qty: 90 TABLET | Refills: 1 | Status: SHIPPED | OUTPATIENT
Start: 2024-07-25 | End: 2024-09-04 | Stop reason: DRUGHIGH

## 2024-07-25 RX ORDER — QUETIAPINE FUMARATE 50 MG/1
75 TABLET, FILM COATED ORAL AT BEDTIME
Qty: 135 TABLET | Refills: 1 | Status: SHIPPED | OUTPATIENT
Start: 2024-07-25

## 2024-07-25 ASSESSMENT — PAIN SCALES - GENERAL: PAINLEVEL: NO PAIN (0)

## 2024-07-25 NOTE — PROGRESS NOTES
3 day Sleep therapy management telephone visit    Diagnostic AHI:            Confirmed with patient at time of call- Yes Patient is still interested in STM service       Subjective measures:  Pt reports his mask is uncomfortable. [facility] Pt was given my contact information and instructed to reach out with any questions or concerns.       Order settings:  CPAP MIN CPAP MAX   5 cm H2O 15 cm H2O       Device settings:  CPAP MIN CPAP MAX EPR RESMED SOFT RESPONSE SETTING   5.0 cm  H20 15.0 cm  H20 TWO OFF       Compliance 100 %    Assessment: Nightly usage over four hours     Action plan: Patient to have 14 day STM visit.     Patient has the following upcoming sleep appts:  Future Sleep Appointments         Provider Department    11/1/2024 9:30 AM (Arrive by 9:15 AM) Az Craft, GENET [facility]            Replacement device: No  STM ordered by provider: Yes     Total time spent on accessing and  interpreting remote patient PAP therapy data  10 minutes    Total time spent counseling, coaching  and reviewing PAP therapy data with patient  2 minutes    [id number] no

## 2024-07-25 NOTE — PATIENT INSTRUCTIONS
"Patient Education   The Panel Psychiatry Program  What to Expect  Here's what to expect in the Panel Psychiatry Program.   About the program  You'll be meeting with a psychiatric doctor to check your mental health. A psychiatric doctor helps you deal with troubling thoughts and feelings by giving you medicine. They'll make sure you know the plan for your care. You may see them for a long time. When you're feeling better, they may refer you back to seeing your family doctor.   If you have any questions, we'll be glad to talk to you.  About visits  Be open  At your visits, please talk openly about your problems. It may feel hard, but it's the best way for us to help you.  Cancelling visits  If you can't come to your visit, please call us right away at [phone]. If you don't cancel at least 24 hours (1 full day) before your visit, that's \"late cancellation.\"  Not showing up for your visits  Being very late is the same as not showing up. You'll be a \"no show\" if:  You're more than 15 minutes late for a 30-minute (half hour) visit.  You're more than 30 minutes late for a 60-minute (full hour) visit.  If you cancel late or don't show up 2 times within 6 months, we may end your care.  Getting help between visits  If you need help between visits, you can call us Monday to Friday from 8 a.m. to 4:30 p.m. at [phone].  Emergency care  Call 911 or go to the nearest emergency department if your life or someone else's life is in danger.  Call 988 anytime to reach the national Suicide and Crisis hotline.  Medicine refills  To refill your medicine, call your pharmacy. You can also call [facility]'s Behavioral Access at [phone], Monday to Friday, 8 a.m. to 4:30 p.m. It can take 1 to 3 business days to get a refill.   Forms, letters, and tests  You may have papers to fill out, like FMLA, short-term disability, and workability. We can help you with these forms at your visits, but you must have an " appointment. You may need more than 1 visit for this, to be in an intensive therapy program, or both.  Before we can give you medicine for ADHD, we may refer you to get tested for it or confirm it another way.  We may not be able to give you an emotional support animal letter.  We don't do mental health checks ordered by the court.   We don't do mental health testing, but we can refer you to get tested.   Thank you for choosing us for your care.  For informational purposes only. Not to replace the advice of your health care provider. Copyright   2022 [employer]. All rights reserved. [place] [id number] - 12/22.  Treatment Plan:  1.  Continue buspirone 5 mg 2 times daily  2.  Continue paroxetine 20 mg daily  3.  Continue quetiapine 75 mg at bedtime  4.  Continue talk therapy  5.  I will speak with your primary care provider about future refills of your medications until a psychiatrist is able to be provided to you through [facility]    Continue all other medical directions per primary care provider.   Continue all other medications as reviewed per electronic medical record today.   Safety plan reviewed. To the Emergency Department as needed or call after hours crisis line at [phone] or [phone]. [place] Crisis Text Line: Text [place] to 533385  or  Suicide LifeLine Chat: suicidepreventionlifeline.org/chat/  To schedule individual or family therapy, call [facility] at [phone].   Schedule an appointment with me in 6 weeks or sooner as needed.  Call [facility] at [phone] to schedule.  Follow up with primary care provider as planned or for acute medical concerns.  Call the psychiatric nurse line with medication questions or concerns at [phone].  SpecialtyCarehart may be used to communicate with your provider, but this is not intended to be used for emergencies.        Crisis Resources   The [facility]Path is an adults only unit located at [facility] in [place]  is a short term (generally less than 23 hour stay) designed for crisis intervention and stabilization. Pts have the opportunity to meet quickly with a behavioral health team for evaluation in a calm and peaceful therapuetic environment. To be evaluated for admission pts are triaged throught the [facility] ED.      The following hotlines are for both adults and children. The and are open 24 hours a day, 7 days a week unless noted otherwise.        Crisis Lines      Crisis Text Line  Text 000221  You will be connected with a trained live crisis counselor to provide support.        Gambling Hotline  2.703.204.6100 [hope]        línea de crisis española  [phone]        [facility] [employer] Helpline  520.699.0627        National Hope Line  9.007.109.6093 [hope]        National Suicide Prevention Lifeline  Free and confidential support  988 or 1.309.146.TALK [8255]  http://suicidepreventionlifeline.org        The [place] Project (LGBTQ Youth Crisis Line)  1.559.132.6243  text START to 049-962        [place]'s Crisis Line  0.030.007.0284 (Press 1)  or text 240729    [facility] Mental Health Crisis Response  Within [place], call **CRISIS [**090448] to be connected to a mental health professional who can assist you.        [facility] Crisis  [phone]      [facility] Mobile Crisis  [phone]      [facility] Crisis  [phone]      [facility] Mobile Crisis  [phone] (adults)  [phone] (children)      [facility] Mobile Crisis  [phone] (adults)  [phone] (children)      [facility] Mobile Crisis  [phone]      [facility] Mobile Crisis  [phone]    Community Resources      Fast Tracker  Linking people to mental health and substance use disorder resources  fasttrackermn.org        [place] Mental Health Warmline  Peer to peer support  5 pm to 9 am 7 days/week  1.568.918.1618  https://mnwitw.org/cooper        National [place] on Mental  Illness (DANA)  600.702.2612 or 1.888.DANA.HELPS  https://namimn.org/        [facility] Urgent Care for Adult Mental Health  [facility] residents [place]   [address] [facility]  [phone]        [facility]  Free mental health counseling  https://walkin.org/  612.870.0565 X2    Mental Health Apps      Calm Harm  https://calmharm.co.uk/      My3  https://my3app.org/      [place] Safety Plan  https://www.mysafetyplan.org/

## 2024-07-25 NOTE — Clinical Note
Good morning Dr. Briseno, I have been treating Nadya for his depression and anxiety symptoms for several months.  He is stable on his current regimen despite concerns about depression and anxiety symptoms.  He has a counselor that he sees twice a week.  Today I informed him that I will be retiring in September 13.  I was checking with you to see if you would be able to provide him with refills on his medications when he needs them.  I am hoping that a psychiatrist will be available to him soon through the [facility] system.  Thank you for considering this.  Have a great day!  Petra

## 2024-07-25 NOTE — PROGRESS NOTES
"Virtual Visit Details    Type of service:  Video Visit   Video Start Time: 9:22 AM  Video End Time: 9:45 AM    Originating Location (pt. Location): Home    Distant Location (provider location):  On-site  Platform used for Video Visit: [facility]         Outpatient Psychiatric Progress Note    Name: Nadya Blake   : 1965                    Primary Care Provider: Az Briseno MD   Therapist: Yes    PHQ-9 scores:      2024     8:08 AM 5/3/2024     8:29 AM 2024     9:01 AM   PHQ-9 SCORE   PHQ-9 Total Score MyChart 15 (Moderately severe depression)  12 (Moderate depression)   PHQ-9 Total Score 15 12 12       ISI-7 scores:      3/8/2024     9:35 AM 2024     9:06 AM 2024     9:09 AM   SII-7 SCORE   Total Score 14 (moderate anxiety) 13 (moderate anxiety) 14 (moderate anxiety)   Total Score 14 13 14       Patient Identification:    Patient is a 58 year old year old,    Not  or  male  who presents for return visit with me.  Patient is currently unemployed. Patient attended the session with his wife and an  , who they agreed to have interview with. Patient prefers to be called: \" Nadya\".    Current medications include:   Current Outpatient Medications   Medication Sig Dispense Refill    acetaminophen (TYLENOL) 500 MG tablet Take 1-2 tablets (500-1,000 mg) by mouth every 6 hours as needed for mild pain 360 tablet 3    albuterol (VENTOLIN HFA) 108 (90 Base) MCG/ACT inhaler INHALE 2 PUFFS INTO THE LUNGS EVERY 6 HOURS AS NEEDED FOR SHORTNESS OF BREATH OR WHEEZING OR COUGH 18 g 2    aspirin (ASA) 81 MG chewable tablet CHEW AND SWALLOW 1 TABLET(81 MG) BY MOUTH DAILY 90 tablet 2    azelastine (ASTELIN) 0.1 % nasal spray Spray 1 spray into both nostrils 2 times daily 30 mL 1    busPIRone (BUSPAR) 5 MG tablet Take 1 tablet (5 mg) by mouth 2 times daily 180 tablet 0    Cholecalciferol (VITAMIN D3) 50 MCG (2000 UT) CAPS TAKE 1 CAPSULE BY MOUTH DAILY 90 " capsule 1    cyanocobalamin (VITAMIN B-12) 1000 MCG tablet Take 1 tablet (1,000 mcg) by mouth daily 90 tablet 4    diclofenac (VOLTAREN) 1 % topical gel Apply 4 g topically 4 times daily 500 g 2    empagliflozin (JARDIANCE) 25 MG TABS tablet TAKE 1 TABLET(25 MG) BY MOUTH DAILY 90 tablet 0    finasteride (PROSCAR) 5 MG tablet Take 1 tablet (5 mg) by mouth daily 90 tablet 4    ipratropium (ATROVENT) 0.06 % nasal spray Spray 2 sprays into both nostrils 4 times daily 15 mL 1    isosorbide mononitrate (IMDUR) 30 MG 24 hr tablet Take 1 tablet (30 mg) by mouth daily 90 tablet 4    Lidocaine 0.5 % GEL Externally apply topically as needed      metoprolol succinate ER (TOPROL XL) 50 MG 24 hr tablet Take 1 tablet (50 mg) by mouth daily 90 tablet 3    nifedipine 0.2% in white petrolatum 0.2 % OINT ointment Apply topically 4 times daily as needed (anal fissure) 100 g 1    nitroGLYcerin (NITROSTAT) 0.4 MG sublingual tablet For chest pain place 1 tablet under the tongue every 5 minutes for 3 doses. If symptoms persist 5 minutes after 1st dose call 911. 30 tablet 3    omeprazole (PRILOSEC) 20 MG DR capsule Take 1 capsule (20 mg) by mouth 2 times daily 180 capsule 4    oxybutynin (DITROPAN) 5 MG tablet Take 1 tablet (5 mg) by mouth At Bedtime 90 tablet 4    PARoxetine (PAXIL) 20 MG tablet Take 1 tablet (20 mg) by mouth every morning 30 tablet 3    polyethylene glycol (MIRALAX) 17 GM/Dose powder TAKE 17 GRAMS(1 CAPFUL) BY MOUTH DAILY 840 g 4    QUEtiapine (SEROQUEL) 50 MG tablet Take 1.5 tablets (75 mg) by mouth at bedtime 45 tablet 4    rosuvastatin (CRESTOR) 40 MG tablet Take 1 tablet (40 mg) by mouth daily 90 tablet 4    tamsulosin (FLOMAX) 0.4 MG capsule Take 2 capsules (0.8 mg) by mouth every evening 180 capsule 4     Current Facility-Administered Medications   Medication Dose Route Frequency Provider Last Rate Last Admin    albuterol (PROVENTIL HFA/VENTOLIN HFA) inhaler  2 puff Inhalation Once Sonya Leonard MD            The  [place] Prescription Monitoring Program has been reviewed and there are no concerns about diversionary activity for controlled substances at this time.      I was able to review most recent Primary Care Provider, specialty provider, and therapy visit notes that I have access to.     Today, patient reports that he now is using a CPAP machine.  For the past month he has had worsening depression symptoms.  He denies any suicide thinking.  Anxiety heightens as he continues to work through his medical issues.  It is difficult for him to leave the house.  He describes his appetite as poor and now weighs 189 pounds.  His wife voiced no concerns today and they desire no changes in the medication schedule.           has a past medical history of Acute non-ST elevation myocardial infarction (NSTEMI) (H) (6/22/2020), Adenomatous polyp of colon, Ascending aorta dilatation (H24), Carpal tunnel syndrome, Chronic superficial gastritis, Coronary artery disease due to lipid rich plaque (6/23/2020), Depression with anxiety, Dyslipidemia, goal LDL below 70 (6/23/2020), Essential hypertension (9/2/2012), Fatty liver disease, nonalcoholic, GERD (gastroesophageal reflux disease), IBS (irritable bowel syndrome), Left lumbar radiculopathy, Multinodular goiter, Old torn meniscus of knee, Paroxysmal atrial fibrillation (H), Perianal abscess, Post herpetic neuralgia, Post-traumatic osteoarthritis of both knees, Primary osteoarthritis of left hip, Primary osteoarthritis of right hip, Pulmonary nodule, 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), Thyroid nodule, TMJ arthritis, Tobacco use disorder (11/1/2013), and Vitamin D deficiency (9/30/2020).    Social history updates:    He continues to live with his wife and children.  He remains unemployed.    Vital Signs:   There were no vitals taken for this visit.    Labs:    Most recent laboratory results reviewed and no new labs.     Mental Status Examination:  Appearance:  awake, alert and casual dress  Attitude: cooperative  Eye Contact:  adequate  Gait and Station: No dizziness or falls  Psychomotor Behavior:  intact station, gait and muscle tone  Oriented to:  time, person, and place  Attention Span and Concentration:  Normal  Speech:   vtspeech: paucity of speech and Speaks: English  Mood:  anxious and depressed  Affect:  restricted range  Associations:  no loose associations  Thought Process:  linear  Thought Content:  no evidence of suicidal ideation or homicidal ideation, no auditory hallucinations present, and no visual hallucinations present  Recent and Remote Memory:  intact Not formally assessed. No amnesia.  Fund of Knowledge: appropriate  Insight:  good  Judgment: good  Impulse Control:  good    Suicide Risk Assessment:  Today Nadya Blake reports no thoughts to harm themself or others. In addition, there are notable risk factors for self-harm, including anxiety, comorbid medical condition of cardiac complications, and withdrawing. However, risk is mitigated by commitment to family, history of seeking help when needed, future oriented, denies suicidal intent or plan, and denies homicidal ideation, intent, or plan. Therefore, based on all available evidence including the factors cited above, Nadya Blake does not appear to be at imminent risk for self-harm, does not meet criteria for a 72-hr hold, and therefore remains appropriate for ongoing outpatient level of care.  A thorough assessment of risk factors related to suicide and self-harm have been reviewed and are noted above. The patient convincingly denies suicidality on several occasions. Local community safety resources printed and reviewed for patient to use if needed. There was no deceit detected, and the patient presented in a manner that was believable.     DSM5 Diagnosis:  296.32 (F33.1) Major Depressive Disorder, Recurrent Episode, Moderate _ and With melancholic features  300.02 (F41.1) Generalized  Anxiety Disorder  309.81 (F43.10) Posttraumatic Stress Disorder (includes Posttraumatic Stress Disorder for Children 6 Years and Younger)  Without dissociative symptoms  780.52 (G47.00) Insomnia Disorder   With non-sleep disorder mental comorbidity  Recurrent      Medical comorbidities include:   Patient Active Problem List    Diagnosis Date Noted    Nocturnal enuresis 01/17/2024     Priority: Medium    Subacute cough 12/28/2023     Priority: Medium    Gallstones 12/28/2023     Priority: Medium    Type 2 diabetes mellitus without complication, without long-term current use of insulin (H) 08/28/2023     Priority: Medium    Microscopic hematuria - Dr. Tineo 07/27/2023     Priority: Medium    Chronic kidney disease, stage 3a (H) 11/21/2022     Priority: Medium    Simple chronic bronchitis (H) 07/05/2022     Priority: Medium    Anal fissure - Dr. Campoverde 08/30/2021     Priority: Medium    Benign prostatic hyperplasia with nocturia 09/30/2020     Priority: Medium    PTSD (post-traumatic stress disorder) 09/30/2020     Priority: Medium    S/P CABG x 4 07/02/2020     Priority: Medium    Coronary artery disease, CABG 6/2020 06/23/2020     Priority: Medium    Dyslipidemia, goal LDL below 70 06/23/2020     Priority: Medium    Ascending aorta dilatation (H24) 02/16/2020     Priority: Medium     Formatting of this note might be different from the original.  4.3 cm since 2014.      History of colonic polyps 09/14/2017     Priority: Medium    Nonalcoholic fatty liver disease 07/12/2017     Priority: Medium    Primary osteoarthritis of left hip 04/28/2017     Priority: Medium    Primary osteoarthritis of right hip 04/28/2017     Priority: Medium    Thyroid nodule 03/07/2016     Priority: Medium    Post-traumatic osteoarthritis of both knees 06/08/2015     Priority: Medium    Pulmonary nodule 11/11/2014     Priority: Medium     Formatting of this note might be different from the original.  CT scan 11-11-14. 2 mm each. Repeat  scan in one year. Patient is a smoker. Positive family history of lung cancer.  3-14-16 2 stable 2 mm nodules, unchanged on repeat ct scan.  5-26-17 stable nodules. No pulmonary abnormality.      Gastroesophageal reflux disease with esophagitis without hemorrhage 10/30/2014     Priority: Medium     2-8-13 EGD nonspecific gastritis. Treated for H pylori in the past.  3-20-19 non specific gastritis. Path neg.        Recurrent major depressive disorder, in partial remission (H24) 09/02/2012     Priority: Medium    Essential hypertension 09/02/2012     Priority: Medium       Assessment:    Nadya Blake continues to voice depressed mood and anxiety symptoms.  He is saddened by not being able to do the things that he used to do.  I informed him and his wife of my upcoming USP on September 13.  I will speak with his primary care provider to provide him with future refills of his medications but the plan is for him to continue with long-term psychiatry care..    Medication side effects and alternatives were reviewed. Health promotion activities recommended and reviewed today. All questions addressed. Education and counseling completed regarding risks and benefits of medications and psychotherapy options.    Treatment Plan:      1.  Continue buspirone 5 mg 2 times daily for anxiety  2.  Continue paroxetine 20 mg daily for depression and anxiety  3.  Continue quetiapine 75 mg at bedtime for depression and to help you sleep  4.  Continue talk therapy  5.  I will speak with your primary care provider about future refills of your medications until a psychiatrist is able to be provided to you through [facility]    Continue all other medications as reviewed per electronic medical record today.   Safety plan reviewed. To the Emergency Department as needed or call after hours crisis line at [phone] or [phone]. [place] Crisis Text Line. Text [place] to 894694 or Suicide LifeLine Chat:  suicidepreventionlifeline.org/chat/  To schedule individual or family therapy, call [facility] at [phone]  Schedule an appointment with me in September or sooner as needed. Call [facility] at [phone] to schedule.  Follow up with primary care provider as planned or for acute medical concerns.  Call the psychiatric nurse line with medication questions or concerns at [phone]  MyChart may be used to communicate with your provider, but this is not intended to be used for emergencies.        Crisis Resources   The EmPath is an adults only unit located at [facility] is a short term (generally less than 23 hour stay) designed for crisis intervention and stabilization. Pts have the opportunity to meet quickly with a behavioral health team for evaluation in a calm and peaceful therapuetic environment. To be evaluated for admission pts are triaged throught the [facility] ED.      The following hotlines are for both adults and children. The and are open 24 hours a day, 7 days a week unless noted otherwise.        Crisis Lines      Crisis Text Line  Text 401442  You will be connected with a trained live crisis counselor to provide support.        Gambling Hotline  4.157.606.6295 [hope]        línea de crisis española  [phone]        [facility]  [phone]        National Hope Line  1.359.384.4945 [hope]        National Suicide Prevention Lifeline  Free and confidential support  988 or 1.568.542.TALK [8255]  http://suicidepreventionlifeline.org        The [place] Project (LGBTQ Youth Crisis Line)  7.521.628.3558  text START to 613-019        's Crisis Line  0.601.875.3600 (Press 1)  or text 876008    [facility] Mental Health Crisis Response  Within [place], call **CRISIS [**522533] to be connected to a mental health professional who can assist you.        [facility] Crisis  [phone]      [facility]   [phone]      [facility] Crisis  [phone]      [facility] Mobile Crisis  [phone] (adults)  [phone] (children)      [facility] Crisis  [phone] (adults)  [phone] (children)      [facility] Mobile Crisis  [phone]      [facility] Mobile Crisis  [phone]    Community Resources      Fast Tracker  Linking people to mental health and substance use disorder resources  [employer]MindSet Rx        [place] Mental Health Warmline  Peer to peer support  5 pm to 9 am 7 days/week  0.792.270.7946  https://mnwitw.org/cooper        National [place] on Mental Illness (DANA)  485.901.9935 or 1.888.DANA.HELPS  https://namimn.org/        [facility] Urgent Care for Adult Mental Health  [facility] residents [address]  [phone]        [facility]  Free mental health counseling  https://walkin.org/  612.870.0565 X2    Mental Health Apps      Calm Harm  https://calmharm.co.uk/      My3  https://my3app.org/      [employer] Safety Plan  https://www.mysafetyplan.org/   Administrative Billing:   Time spent with patient includes counseling and coordination of care regarding above diagnoses and treatment plan.    The longitudinal plan of care for the diagnosis(es)/condition(s) as documented were addressed during this visit. Due to the added complexity in care, I will continue to support Nadya in the subsequent management and with ongoing continuity of care.    Patient Status:  This is a continuous care patient and medications will be prescribed by the psychiatric provider until further indicated.    Signed:   SINGH Mo-BC   Psychiatry

## 2024-07-25 NOTE — NURSING NOTE
Is the patient currently in the state of [place]? YES    Current patient location: [address] [place] [address] [place]  [facility] [address]    Visit mode:VIDEO    If the visit is dropped, the patient can be reconnected by: VIDEO VISIT: Text to cell phone:   Telephone Information:   Mobile [phone]       Will anyone else be joining the visit? No  (If patient encounters technical issues they should call [phone])    How would you like to obtain your AVS? MyChart    Are changes needed to the allergy or medication list? No    Are refills needed on medications prescribed by this physician? NO    Rooming Documentation: Unable to complete qnrs due to time.    Reason for visit: RECHECK     GIANNI Mcclain

## 2024-07-27 DIAGNOSIS — N32.81 OVERACTIVE BLADDER: ICD-10-CM

## 2024-07-29 ENCOUNTER — PATIENT OUTREACH (OUTPATIENT)
Dept: CARE COORDINATION | Facility: CLINIC | Age: 59
End: 2024-07-29
Payer: COMMERCIAL

## 2024-07-29 RX ORDER — OXYBUTYNIN CHLORIDE 5 MG/1
5 TABLET ORAL AT BEDTIME
Qty: 90 TABLET | Refills: 2 | Status: SHIPPED | OUTPATIENT
Start: 2024-07-29

## 2024-08-01 ENCOUNTER — TRANSFERRED RECORDS (OUTPATIENT)
Dept: MULTI SPECIALTY CLINIC | Facility: CLINIC | Age: 59
End: 2024-08-01

## 2024-08-01 LAB — RETINOPATHY: NORMAL

## 2024-08-05 DIAGNOSIS — Z95.1 S/P CABG (CORONARY ARTERY BYPASS GRAFT): ICD-10-CM

## 2024-08-06 RX ORDER — ASPIRIN 81 MG/1
TABLET, CHEWABLE ORAL
Qty: 90 TABLET | Refills: 2 | Status: SHIPPED | OUTPATIENT
Start: 2024-08-06

## 2024-08-07 ENCOUNTER — DOCUMENTATION ONLY (OUTPATIENT)
Dept: SLEEP MEDICINE | Facility: CLINIC | Age: 59
End: 2024-08-07
Payer: COMMERCIAL

## 2024-08-07 NOTE — PROGRESS NOTES
14  DAY STM VISIT    Diagnostic AHI: PS.7         Message left for patient to return call.    Assessment: Pt meeting objective benchmarks.       Action plan: waiting for patient to return call.  and pt to have 30 day STM visit.      Device type: Auto-CPAP    PAP settings: CPAP min 5.0 cm  H20       CPAP max 15.0 cm  H20      95th% pressure 8.1 cm  H20        RESMED EPR level Setting: TWO    RESMED Soft response setting:  OFF    Mask type:      Objective measures: 14 day rolling measures      Compliance  85 %      Leak  40.4  lpm  last  upload      AHI 2.49   last  upload      Average number of minutes 318      Objective measure goal  Compliance   Goal >70%  Leak   Goal < 24 lpm  AHI  Goal < 5  Usage  Goal >240    Patient has the following upcoming sleep appts:  Future Sleep Appointments         Provider Department    2024 9:30 AM (Arrive by 9:15 AM) Az Craft APRN [facility] [place]            Total time spent on accessing and interpreting remote patient PAP therapy data  10 minutes    Total time spent counseling, coaching  and reviewing PAP therapy data with patient  9 minutes    [id number]  [id number]  no (3 day STM)

## 2024-08-28 ENCOUNTER — PATIENT OUTREACH (OUTPATIENT)
Dept: CARE COORDINATION | Facility: CLINIC | Age: 59
End: 2024-08-28
Payer: COMMERCIAL

## 2024-08-28 DIAGNOSIS — M17.2 POST-TRAUMATIC OSTEOARTHRITIS OF BOTH KNEES: Primary | Chronic | ICD-10-CM

## 2024-08-28 DIAGNOSIS — F33.41 RECURRENT MAJOR DEPRESSIVE DISORDER, IN PARTIAL REMISSION (H): Chronic | ICD-10-CM

## 2024-08-28 NOTE — LETTER
[facility]  Patient Centered Plan of Care  About Me:        Patient Name:  Nadya Covington and Juanita,     It was nice to talk with you! Here is a copy of your Care Plan with the goal we are supporting you with at this time. Please let me know if I can help in any other way.     Thanks,     Dave Myhre, RN   CCC RN  [phone]    YOB: 1965  Age:         59 year old   Tracy MRN:    [id number] Telephone Information:  Home Phone [phone]   Mobile [phone]       Address:  [facility] [address]  [place] [facility] [place] [address] Email address:  [email or web address]      Emergency Contact(s)    Name Relationship Lgl Grd Work Phone Home Phone Mobile Phone   1. GALEJUANITA Spouse   [phone]            Primary language:  Nepali     needed? No   [place] [employer]:  [phone] op. 1  Other communication barriers:None    Preferred Method of Communication:     Current living arrangement: I live in a private home with spouse    Mobility Status/ Medical Equipment: Independent        Health Maintenance  Health Maintenance Reviewed: Due/Overdue   Health Maintenance Due   Topic Date Due    ZOSTER IMMUNIZATION (1 of 2) Never done    DTAP/TDAP/TD IMMUNIZATION (3 - Td or Tdap) 04/30/2022    COVID-19 Vaccine (7 - 2023-24 season) 09/01/2023    LIPID  08/28/2024    MICROALBUMIN  08/28/2024    YEARLY PREVENTIVE VISIT  08/28/2024    A1C  09/14/2024          My Access Plan  Medical Emergency 911   Primary Clinic Line [facility] - [phone]   24 Hour Appointment Line [phone] or  [phone]) (toll-free)   24 Hour Nurse Line [phone] (toll-free)   Preferred Urgent Care [facility], [phone]     Preferred Hospital [facility]  [phone]     [id number] - [place], [place] - [address] AT [facility] [address]     Behavioral Health  Crisis Line The National Suicide Prevention Lifeline at 1-219.514.4569 or Text/Call 988           My Care Team Members  Patient Care Team         Relationship Specialty Notifications Start End    Az Briseno MD PCP - General   9/22/20     Phone: [phone] Fax: [phone]         [address] [facility] [address]    Myhre, David J, RN Lead Care Coordinator Primary Care - CC Admissions 11/19/20      Phone: [phone]    Phone: [phone]         Az Briseno MD Assigned PCP   6/16/21     Phone: [phone] Fax: [phone]         [address] [facility] [address]    Inessa Cade ARMHS worker   11/15/21     Phone: [phone]         Sloan Burns MD Assigned Heart and Vascular Provider   12/12/21     Phone: [phone] Fax: [phone] [address] [facility] [address] [facility] [address]    Az Craft APRN CNP Assigned Sleep Provider   1/21/23     Phone: [phone] Fax: [phone]         [address] [address] [facility] [address]    Jojo Soria RN Registered Nurse Diabetes Education  3/21/23     Gabbi Blackmon NP Assigned Behavioral Health Provider   11/4/23     Phone: [phone] Fax: [phone]         [address] [facility] [facility] [address] [place] [address]    Jojo Soria RN Registered Nurse Diabetes Education  1/9/24     Jef Wang DO Assigned Surgical Provider   1/25/24     Phone: [phone] Fax: [phone]         [address] [facility] [address] [facility] [address]                My Care Plans  Self Management and Treatment Plan    Care Plan  Care Plan: Mental Health       Problem: Mental Health Symptoms Need Improvement       Goal: I would like establish care with a new psychiatric provider.       Start Date: 8/28/2024 Expected End Date: 8/27/2025    This Visit's Progress: 10%    Priority: High    Note:     Barriers: current psychiatric provider retiring.   Strengths: strong advocate for himself, strong family support.   Patient expressed  understanding of goal: yes  Action steps to achieve this goal:  1. I understand the [facility] RN will request a mental health referral from Dr. Briseno.   2. I understand once the mental health referral has been placed, Goran will assist me with finding a new provider.   3. I will attend all scheduled appointments with my new psychiatric provider.                                  Action Plans on File:                       Advance Care Plans/Directives:   Advanced Care Plan/Directives on file:   No    Discussed with patient/caregiver(s):   Declined Further Information             My Medical and Care Information  Problem List   Patient Active Problem List   Diagnosis    Recurrent major depressive disorder, in partial remission (H24)    Gastroesophageal reflux disease with esophagitis without hemorrhage    Essential hypertension    Post-traumatic osteoarthritis of both knees    Primary osteoarthritis of left hip    Primary osteoarthritis of right hip    Coronary artery disease, CABG 6/2020    Dyslipidemia, goal LDL below 70    Benign prostatic hyperplasia with nocturia    PTSD (post-traumatic stress disorder)    Ascending aorta dilatation (H24)    Anal fissure - Dr. Campoverde    Simple chronic bronchitis (H)    History of colonic polyps    Pulmonary nodule    S/P CABG x 4    Thyroid nodule    Nonalcoholic fatty liver disease    Chronic kidney disease, stage 3a (H)    Microscopic hematuria - Dr. Tineo    Type 2 diabetes mellitus without complication, without long-term current use of insulin (H)    Subacute cough    Gallstones    Nocturnal enuresis      Current Medications and Allergies:  See printed Medication Report.    Care Coordination Start Date: 11/19/2020   Frequency of Care Coordination: monthly, more frequently as needed     Form Last Updated: 08/31/2024

## 2024-08-30 ENCOUNTER — VIRTUAL VISIT (OUTPATIENT)
Dept: PSYCHIATRY | Facility: CLINIC | Age: 59
End: 2024-08-30
Payer: COMMERCIAL

## 2024-08-30 DIAGNOSIS — F43.10 PTSD (POST-TRAUMATIC STRESS DISORDER): ICD-10-CM

## 2024-08-30 DIAGNOSIS — F99 INSOMNIA DUE TO OTHER MENTAL DISORDER: ICD-10-CM

## 2024-08-30 DIAGNOSIS — F33.1 DEPRESSION, MAJOR, RECURRENT, MODERATE (H): Primary | ICD-10-CM

## 2024-08-30 DIAGNOSIS — F51.05 INSOMNIA DUE TO OTHER MENTAL DISORDER: ICD-10-CM

## 2024-08-30 DIAGNOSIS — F41.1 GAD (GENERALIZED ANXIETY DISORDER): ICD-10-CM

## 2024-08-30 PROCEDURE — 99214 OFFICE O/P EST MOD 30 MIN: CPT | Mod: 95 | Performed by: NURSE PRACTITIONER

## 2024-08-30 PROCEDURE — G2211 COMPLEX E/M VISIT ADD ON: HCPCS | Mod: 95 | Performed by: NURSE PRACTITIONER

## 2024-08-30 RX ORDER — PAROXETINE 30 MG/1
30 TABLET, FILM COATED ORAL EVERY MORNING
Qty: 90 TABLET | Refills: 1 | Status: SHIPPED | OUTPATIENT
Start: 2024-08-30

## 2024-08-30 ASSESSMENT — ANXIETY QUESTIONNAIRES
GAD7 TOTAL SCORE: 13
7. FEELING AFRAID AS IF SOMETHING AWFUL MIGHT HAPPEN: SEVERAL DAYS
8. IF YOU CHECKED OFF ANY PROBLEMS, HOW DIFFICULT HAVE THESE MADE IT FOR YOU TO DO YOUR WORK, TAKE CARE OF THINGS AT HOME, OR GET ALONG WITH OTHER PEOPLE?: SOMEWHAT DIFFICULT

## 2024-08-30 ASSESSMENT — PATIENT HEALTH QUESTIONNAIRE - PHQ9
SUM OF ALL RESPONSES TO PHQ QUESTIONS 1-9: 10
10. IF YOU CHECKED OFF ANY PROBLEMS, HOW DIFFICULT HAVE THESE PROBLEMS MADE IT FOR YOU TO DO YOUR WORK, TAKE CARE OF THINGS AT HOME, OR GET ALONG WITH OTHER PEOPLE: SOMEWHAT DIFFICULT
SUM OF ALL RESPONSES TO PHQ QUESTIONS 1-9: 10

## 2024-08-30 NOTE — NURSING NOTE
Is the patient currently in the state of [place]? YES    Current patient location: [facility] [address]  [facility] [address]    Visit mode:VIDEO    If the visit is dropped, the patient can be reconnected by: VIDEO VISIT: Text to cell phone:   Telephone Information:   Mobile [phone]       Will anyone else be joining the visit? No  (If patient encounters technical issues they should call [phone])    How would you like to obtain your AVS? MyChart    Are changes needed to the allergy or medication list? No    Are refills needed on medications prescribed by this physician? NO    Rooming Documentation: Unable to complete qnrs due to time.    Reason for visit: GIANNI Crum

## 2024-08-30 NOTE — PROGRESS NOTES
"Virtual Visit Details    Type of service:  Video Visit   Video Start Time: 9:53 AM  Video End Time: 10:15 AM    Originating Location (pt. Location): Home    Distant Location (provider location):  Off-site  Platform used for Video Visit: [facility]             Outpatient Psychiatric Progress Note    Name: Nadya Blake   : 1965                    Primary Care Provider: Az Briseno MD   Therapist: Yes    PHQ-9 scores:      5/3/2024     8:29 AM 2024     9:01 AM 2024     9:38 AM   PHQ-9 SCORE   PHQ-9 Total Score MyChart  12 (Moderate depression) 10 (Moderate depression)   PHQ-9 Total Score 12 12 10       ISI-7 scores:      2024     9:06 AM 2024     9:09 AM 2024     9:40 AM   ISI-7 SCORE   Total Score 13 (moderate anxiety) 14 (moderate anxiety) 13 (moderate anxiety)   Total Score 13 14 13       Patient Identification:    Patient is a 59 year old year old,    Not  or  male  who presents for return visit with me.  Patient is currently unemployed. Patient attended the session with his wife , who they agreed to have interview with. Patient prefers to be called: \" Nadya\".    Current medications include:   Current Outpatient Medications   Medication Sig Dispense Refill    acetaminophen (TYLENOL) 500 MG tablet Take 1-2 tablets (500-1,000 mg) by mouth every 6 hours as needed for mild pain 360 tablet 3    albuterol (VENTOLIN HFA) 108 (90 Base) MCG/ACT inhaler INHALE 2 PUFFS INTO THE LUNGS EVERY 6 HOURS AS NEEDED FOR SHORTNESS OF BREATH OR WHEEZING OR COUGH 18 g 2    aspirin (ASA) 81 MG chewable tablet CHEW AND SWALLOW 1 TABLET(81 MG) BY MOUTH DAILY 90 tablet 2    azelastine (ASTELIN) 0.1 % nasal spray Spray 1 spray into both nostrils 2 times daily 30 mL 1    busPIRone (BUSPAR) 5 MG tablet Take 1 tablet (5 mg) by mouth 2 times daily 180 tablet 1    Cholecalciferol (VITAMIN D3) 50 MCG (2000 UT) CAPS TAKE 1 CAPSULE BY MOUTH DAILY 90 capsule 1    cyanocobalamin " (VITAMIN B-12) 1000 MCG tablet Take 1 tablet (1,000 mcg) by mouth daily 90 tablet 4    diclofenac (VOLTAREN) 1 % topical gel Apply 4 g topically 4 times daily 500 g 2    empagliflozin (JARDIANCE) 25 MG TABS tablet TAKE 1 TABLET(25 MG) BY MOUTH DAILY 90 tablet 0    finasteride (PROSCAR) 5 MG tablet Take 1 tablet (5 mg) by mouth daily 90 tablet 4    ipratropium (ATROVENT) 0.06 % nasal spray Spray 2 sprays into both nostrils 4 times daily 15 mL 1    isosorbide mononitrate (IMDUR) 30 MG 24 hr tablet Take 1 tablet (30 mg) by mouth daily 90 tablet 4    Lidocaine 0.5 % GEL Externally apply topically as needed      metoprolol succinate ER (TOPROL XL) 50 MG 24 hr tablet Take 1 tablet (50 mg) by mouth daily 90 tablet 3    nifedipine 0.2% in white petrolatum 0.2 % OINT ointment Apply topically 4 times daily as needed (anal fissure) 100 g 1    nitroGLYcerin (NITROSTAT) 0.4 MG sublingual tablet For chest pain place 1 tablet under the tongue every 5 minutes for 3 doses. If symptoms persist 5 minutes after 1st dose call 911. 30 tablet 3    omeprazole (PRILOSEC) 20 MG DR capsule Take 1 capsule (20 mg) by mouth 2 times daily 180 capsule 4    oxyBUTYnin (DITROPAN) 5 MG tablet TAKE 1 TABLET(5 MG) BY MOUTH AT BEDTIME 90 tablet 2    PARoxetine (PAXIL) 20 MG tablet Take 1 tablet (20 mg) by mouth every morning 90 tablet 1    polyethylene glycol (MIRALAX) 17 GM/Dose powder TAKE 17 GRAMS(1 CAPFUL) BY MOUTH DAILY 840 g 4    QUEtiapine (SEROQUEL) 50 MG tablet Take 1.5 tablets (75 mg) by mouth at bedtime 135 tablet 1    rosuvastatin (CRESTOR) 40 MG tablet Take 1 tablet (40 mg) by mouth daily 90 tablet 4    tamsulosin (FLOMAX) 0.4 MG capsule Take 2 capsules (0.8 mg) by mouth every evening 180 capsule 4     Current Facility-Administered Medications   Medication Dose Route Frequency Provider Last Rate Last Admin    albuterol (PROVENTIL HFA/VENTOLIN HFA) inhaler  2 puff Inhalation Once Sonya Leonard MD            The [place] Prescription  Monitoring Program has been reviewed and there are no concerns about diversionary activity for controlled substances at this time.      I was able to review most recent Primary Care Provider, specialty provider, and therapy visit notes that I have access to.     Today, patient reports that he gets up repeatedly throughout the night.  His cardiac issues continue with heart flutters.  Anxiety and depression symptoms are being processed in talk therapy.  Overall he feels that his mood state has changed very little.  He does, however leave the house daily to go on walks.  His wife reports no concerns.  He denies any periods of irritability.       has a past medical history of Acute non-ST elevation myocardial infarction (NSTEMI) (H) (6/22/2020), Adenomatous polyp of colon, Ascending aorta dilatation (H24), Carpal tunnel syndrome, Chronic superficial gastritis, Coronary artery disease due to lipid rich plaque (6/23/2020), Depression with anxiety, Dyslipidemia, goal LDL below 70 (6/23/2020), Essential hypertension (9/2/2012), Fatty liver disease, nonalcoholic, GERD (gastroesophageal reflux disease), IBS (irritable bowel syndrome), Left lumbar radiculopathy, Multinodular goiter, Old torn meniscus of knee, Paroxysmal atrial fibrillation (H), Perianal abscess, Post herpetic neuralgia, Post-traumatic osteoarthritis of both knees, Primary osteoarthritis of left hip, Primary osteoarthritis of right hip, Pulmonary nodule, 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), Thyroid nodule, TMJ arthritis, Tobacco use disorder (11/1/2013), and Vitamin D deficiency (9/30/2020).    Social history updates:    No change in social history to report today      Vital Signs:   There were no vitals taken for this visit.    Labs:    Most recent laboratory results reviewed and no new labs.     Mental Status Examination:  Appearance: awake, alert, casual dress, and mild distress  Attitude: cooperative  Eye Contact:   adequate  Gait and Station: No dizziness or falls  Psychomotor Behavior:  intact station, gait and muscle tone  Oriented to:  time, person, and place  Attention Span and Concentration:  Normal  Speech:   vtspeech: clear, coherent and Speaks: English  Mood:  anxious and depressed  Affect:  mood congruent  Associations:  no loose associations  Thought Process:  goal oriented  Thought Content:  no evidence of suicidal ideation or homicidal ideation, no auditory hallucinations present, and no visual hallucinations present  Recent and Remote Memory:  intact Not formally assessed. No amnesia.  Fund of Knowledge: appropriate  Insight:  good  Judgment: good  Impulse Control:  good    Suicide Risk Assessment:  Today Nadya Blake reports no thoughts to harm themself or others. In addition, there are notable risk factors for self-harm, including anxiety, comorbid medical condition of cardiac limitations, and withdrawing. However, risk is mitigated by commitment to family, history of seeking help when needed, future oriented, denies suicidal intent or plan, and denies homicidal ideation, intent, or plan. Therefore, based on all available evidence including the factors cited above, Nadya Blake does not appear to be at imminent risk for self-harm, does not meet criteria for a 72-hr hold, and therefore remains appropriate for ongoing outpatient level of care.  A thorough assessment of risk factors related to suicide and self-harm have been reviewed and are noted above. The patient convincingly denies suicidality on several occasions. Local community safety resources printed and reviewed for patient to use if needed. There was no deceit detected, and the patient presented in a manner that was believable.     DSM5 Diagnosis:  296.32 (F33.1) Major Depressive Disorder, Recurrent Episode, Moderate _ and With melancholic features  300.02 (F41.1) Generalized Anxiety Disorder  309.81 (F43.10) Posttraumatic Stress Disorder  (includes Posttraumatic Stress Disorder for Children 6 Years and Younger)  Without dissociative symptoms  780.52 (G47.00) Insomnia Disorder   With non-sleep disorder mental comorbidity  Recurrent      Medical comorbidities include:   Patient Active Problem List    Diagnosis Date Noted    Nocturnal enuresis 01/17/2024     Priority: Medium    Subacute cough 12/28/2023     Priority: Medium    Gallstones 12/28/2023     Priority: Medium    Type 2 diabetes mellitus without complication, without long-term current use of insulin (H) 08/28/2023     Priority: Medium    Microscopic hematuria - Dr. Tineo 07/27/2023     Priority: Medium    Chronic kidney disease, stage 3a (H) 11/21/2022     Priority: Medium    Simple chronic bronchitis (H) 07/05/2022     Priority: Medium    Anal fissure - Dr. Campoverde 08/30/2021     Priority: Medium    Benign prostatic hyperplasia with nocturia 09/30/2020     Priority: Medium    PTSD (post-traumatic stress disorder) 09/30/2020     Priority: Medium    S/P CABG x 4 07/02/2020     Priority: Medium    Coronary artery disease, CABG 6/2020 06/23/2020     Priority: Medium    Dyslipidemia, goal LDL below 70 06/23/2020     Priority: Medium    Ascending aorta dilatation (H24) 02/16/2020     Priority: Medium     Formatting of this note might be different from the original.  4.3 cm since 2014.      History of colonic polyps 09/14/2017     Priority: Medium    Nonalcoholic fatty liver disease 07/12/2017     Priority: Medium    Primary osteoarthritis of left hip 04/28/2017     Priority: Medium    Primary osteoarthritis of right hip 04/28/2017     Priority: Medium    Thyroid nodule 03/07/2016     Priority: Medium    Post-traumatic osteoarthritis of both knees 06/08/2015     Priority: Medium    Pulmonary nodule 11/11/2014     Priority: Medium     Formatting of this note might be different from the original.  CT scan 11-11-14. 2 mm each. Repeat scan in one year. Patient is a smoker. Positive family history of  lung cancer.  3-14-16 2 stable 2 mm nodules, unchanged on repeat ct scan.  5-26-17 stable nodules. No pulmonary abnormality.      Gastroesophageal reflux disease with esophagitis without hemorrhage 10/30/2014     Priority: Medium     2-8-13 EGD nonspecific gastritis. Treated for H pylori in the past.  3-20-19 non specific gastritis. Path neg.        Recurrent major depressive disorder, in partial remission (H24) 09/02/2012     Priority: Medium    Essential hypertension 09/02/2012     Priority: Medium       Assessment:    Nadya Blake remained stable with his depression and anxiety mood.  As they continue, I increased the dose of his paroxetine.  He receives talk therapy to help process ways to cope with mood symptoms that worsen according to his medical state..    Medication side effects and alternatives were reviewed. Health promotion activities recommended and reviewed today. All questions addressed. Education and counseling completed regarding risks and benefits of medications and psychotherapy options.    Treatment Plan:      1.  Buspirone 5 mg 2 times daily for anxiety  2.  Increase paroxetine to 30 mg daily for depression and anxiety symptoms  3.  Continue quetiapine 75 mg at bedtime to decrease racing thoughts and to help you sleep at night  4.  Someone will contact you to schedule an appointment for continued psychiatric care with a new provider as I will be retiring September 13    Continue all other medications as reviewed per electronic medical record today.   Safety plan reviewed. To the Emergency Department as needed or call after hours crisis line at [phone] or [phone]. [place] Crisis Text Line. Text [place] to 994742 or Suicide LifeLine Chat: suicidepreventionlifeline.org/chat/  To schedule individual or family therapy, call [facility] at [phone]  Schedule an appointment with me in 3 months or sooner as needed. Call [facility] at [phone] to  schedule.  Follow up with primary care provider as planned or for acute medical concerns.  Call the psychiatric nurse line with medication questions or concerns at [phone]  MyChart may be used to communicate with your provider, but this is not intended to be used for emergencies.        Crisis Resources   The [facility] is a short term (generally less than 23 hour stay) designed for crisis intervention and stabilization. Pts have the opportunity to meet quickly with a behavioral health team for evaluation in a calm and peaceful therapuetic environment. To be evaluated for admission pts are triaged throught the [facility] ED.      The following hotlines are for both adults and children. The and are open 24 hours a day, 7 days a week unless noted otherwise.        Crisis Lines      Crisis Text Line  Text 024581  You will be connected with a trained live crisis counselor to provide support.        [place] Hotline  7.667.675.0272 [hope]        línea de crisis española  407.134.2475        [facility]  [phone]        National Hope Line  4.925.102.3705 [hope]        National Suicide Prevention Lifeline  Free and confidential support  988 or 1.396.817.TALK [8255]  http://suicidepreventionlifeline.org        The [place] Project (LGBTQ Youth Crisis Line)  3.707.695.5713  text START to 786-199        [place]'s Crisis Line  8.566.933.6814 (Press 1)  or text 881724    [facility] Mental Health Crisis Response  Within [place], call **CRISIS [**308178] to be connected to a mental health professional who can assist you.        [facility] Crisis  [phone]      [facility] Mobile Crisis  [phone]      [facility] Crisis  [phone]      [facility] Mobile Crisis  [phone] (adults)  [phone] (children)      [facility] Mobile Crisis  [phone] (adults)  [phone] (children)      [facility]  Mobile Crisis  [phone]      [facility] Mobile Crisis  [phone]    Community Resources      Fast Tracker  Linking people to mental health and substance use disorder resources  [employer] Platformn.org        [place] Mental Health Warmline  Peer to peer support  5 pm to 9 am 7 days/week  4.775.985.7959  https://mnwitw.org/cooper        National [place] on Mental Illness (DANA)  940.000.8675 or 1.888.DANA.HELPS  https://namimn.org/        [facility]  [facility] residents only   [address] [place] [address]  [phone]        [facility]  Free mental health counseling  https://walkin.org/  612.870.0565 X2    Mental Health Apps      Calm Harm  https://calmharm.co.uk/      My3  https://my3app.org/      [place] Safety Plan  https://www.mysafetyplan.org/   Administrative Billing:   Time spent with patient includes counseling and coordination of care regarding above diagnoses and treatment plan.    The longitudinal plan of care for the diagnosis(es)/condition(s) as documented were addressed during this visit. Due to the added complexity in care, I will continue to support Nadya in the subsequent management and with ongoing continuity of care.    Patient Status:  This is a continuous care patient and medications will be prescribed by the psychiatric provider until further indicated.    Signed:   SINGH Mo-BC   Psychiatry

## 2024-08-31 NOTE — PROGRESS NOTES
Clinic Care Coordination Contact  Clinic Care Coordination Contact  OUTREACH    Referral Information:  Referral Source: PCP    Primary Diagnosis: Behavioral Health    Chief Complaint   Patient presents with    Clinic Care Coordination - Initial        Universal Utilization:   Clinic Utilization  Difficulty keeping appointments:: No  Compliance Concerns: No  No-Show Concerns: No  No PCP office visit in Past Year: No  Utilization      No Show Count (past year)  0             ED Visits  1             Hospital Admissions  0                    Current as of: 8/31/2024  6:10 AM                Clinical Concerns:  Current Medical Concerns:     Patient is a 59 year old man with a history of fatty liver, GERD, pre-diabetes, coronary artery disease, CABG 6/2020, post-traumatic stress disorder, osteoarthritis in both knees, primary osteoarthritis of both hips, PTSD, major depression disorder, benign prostatic hyperplasia.    Current Behavioral Concerns:   Patient denied any medical concerns during today's assessment. He said takes his medication daily as directed and is good about attending his medical appointments. Patient's wife Carlee is his primary caregiver.   Current Behavioral Concerns: Patient reported a PTSD, anxiety and depression. He continues to work with a therapist, psychiatric provider and [facility] worker. Patient reported his psychiatric provider will be retiring in the next couple of months. He requested assistance with finding a new psychiatric provider at today's assessment. Goal around this concern was established today.   Education Provided to patient:  Discussed the importance of taking his medications as directed. Encouraged him to to attend all upcoming appointments. Encouraged him  to contact writer for any additional needs or concerns.      Pain (GOAL):: No  Health Maintenance Reviewed: Due/Overdue   Health Maintenance Due   Topic Date Due    ZOSTER IMMUNIZATION (1 of 2) Never done    DTAP/TDAP/TD  IMMUNIZATION (3 - Td or Tdap) 04/30/2022    COVID-19 Vaccine (7 - 2023-24 season) 09/01/2023    LIPID  08/28/2024    MICROALBUMIN  08/28/2024    YEARLY PREVENTIVE VISIT  08/28/2024    A1C  09/14/2024          Medication Management:  Medication review status: Medications reviewed and no changes reported per patient.        Patient's wife Carlee continues to assist him his medications. He stated he is compliant with his medications.      Functional Status:  Dependent ADLs:: Bathing, Toileting, Dressing  Dependent IADLs:: Cleaning, Cooking, Laundry, Shopping, Meal Preparation, Medication Management, Money Management, Transportation  Bed or wheelchair confined:: No  Mobility Status: Independent  Fallen 2 or more times in the past year?: No  Any fall with injury in the past year?: No    Living Situation:  Current living arrangement:: I live in a private home with spouse  Type of residence:: [facility]    Lifestyle & Psychosocial Needs:    Social Determinants of Health     Food Insecurity: Low Risk  (12/27/2023)    Food Insecurity     Within the past 12 months, did you worry that your food would run out before you got money to buy more?: No     Within the past 12 months, did the food you bought just not last and you didn t have money to get more?: No   Depression: At risk (8/30/2024)    PHQ-2     PHQ-2 Score: 4   Housing Stability: Low Risk  (12/27/2023)    Housing Stability     Do you have housing? : Yes     Are you worried about losing your housing?: No   Tobacco Use: Medium Risk (8/30/2024)    Patient History     Smoking Tobacco Use: Former     Smokeless Tobacco Use: Never     Passive Exposure: Never   Financial Resource Strain: Low Risk  (12/27/2023)    Financial Resource Strain     Within the past 12 months, have you or your family members you live with been unable to get utilities (heat, electricity) when it was really needed?: No   Alcohol Use: Not on file   Transportation Needs: Low Risk  (12/27/2023)     Transportation Needs     Within the past 12 months, has lack of transportation kept you from medical appointments, getting your medicines, non-medical meetings or appointments, work, or from getting things that you need?: No   Physical Activity: Not on file   Interpersonal Safety: Low Risk  (5/3/2024)    Interpersonal Safety     Do you feel physically and emotionally safe where you currently live?: Yes     Within the past 12 months, have you been hit, slapped, kicked or otherwise physically hurt by someone?: No     Within the past 12 months, have you been humiliated or emotionally abused in other ways by your partner or ex-partner?: No   Stress: Not on file   Social Connections: Unknown (1/1/2022)    [facility]    Social Connections     Frequency of Communication with Friends and Family: Not on file   Health Literacy: Not on file     Diet:: Regular (Vegan Diet)  Inadequate nutrition (GOAL):: No  Tube Feeding: No  Inadequate activity/exercise (GOAL):: No  Significant changes in sleep pattern (GOAL): No  Transportation means:: Regular car, Friend     Mormon or spiritual beliefs that impact treatment:: No  Mental health DX:: Yes  Mental health DX how managed:: Medication, Outpatient Counseling, Psychiatrist  Mental health management concern (GOAL):: Yes  Informal Support system:: Children, Spouse, Family        Financial Concerns: Patient and his wife denied any financial concerns at this time.      Resources and Interventions:  Current Resources:      Community Resources: PCA, County Worker, OP Mental [facility]  Supplies Currently Used at Home: None  Equipment Currently Used at Home: grab bar, tub/shower, grab bar, toilet  Employment Status: disabled         Advance Care Plan/Directive  Advanced Care Plans/Directives on file:: No  Discussed with patient/caregiver:: Declined Further Information    Referrals Placed: Mental  Health     Care Plan:  Care Plan: Mental Health       Problem: Mental Health Symptoms Need Improvement       Goal: I would like establish care with a new psychiatric provider.       Start Date: 8/28/2024 Expected End Date: 8/27/2025    This Visit's Progress: 10%    Priority: High    Note:     Barriers: current psychiatric provider retiring.   Strengths: strong advocate for himself, strong family support.   Patient expressed understanding of goal: yes  Action steps to achieve this goal:  1. I understand the [facility] RN will request a mental health referral from Dr. Briseno.   2. I understand once the mental health referral has been placed, Goran will assist me with finding a new provider.   3. I will attend all scheduled appointments with my new psychiatric provider.                                  Patient/Caregiver understanding: Verbalized understanding of goal and other information discussed at today's assessment.     Outreach Frequency: monthly, more frequently as needed  Future Appointments                In 3 days Jojo Soria RN [facility], [facility] MPL[facility]    In 1 week Az Briseno MD [facility], [facility] SPMW    In 2 weeks Teodora Ordonez PA-C [facility], [facility]    In 2 months Az Craft APRN CNP [facility]            Plan: [facility] RN will continue to monitor, support patient with current goal and will be available to assist as nursing needs arise.

## 2024-09-04 NOTE — PATIENT INSTRUCTIONS
"Patient Education   The Panel Psychiatry Program  What to Expect  Here's what to expect in the Panel Psychiatry Program.   About the program  You'll be meeting with a psychiatric doctor to check your mental health. A psychiatric doctor helps you deal with troubling thoughts and feelings by giving you medicine. They'll make sure you know the plan for your care. You may see them for a long time. When you're feeling better, they may refer you back to seeing your family doctor.   If you have any questions, we'll be glad to talk to you.  About visits  Be open  At your visits, please talk openly about your problems. It may feel hard, but it's the best way for us to help you.  Cancelling visits  If you can't come to your visit, please call us right away at [phone]. If you don't cancel at least 24 hours (1 full day) before your visit, that's \"late cancellation.\"  Not showing up for your visits  Being very late is the same as not showing up. You'll be a \"no show\" if:  You're more than 15 minutes late for a 30-minute (half hour) visit.  You're more than 30 minutes late for a 60-minute (full hour) visit.  If you cancel late or don't show up 2 times within 6 months, we may end your care.  Getting help between visits  If you need help between visits, you can call us Monday to Friday from 8 a.m. to 4:30 p.m. at [phone].  Emergency care  Call 911 or go to the nearest emergency department if your life or someone else's life is in danger.  Call 988 anytime to reach the national Suicide and Crisis hotline.  Medicine refills  To refill your medicine, call your pharmacy. You can also call [facility]'s Behavioral Access at [phone], Monday to Friday, 8 a.m. to 4:30 p.m. It can take 1 to 3 business days to get a refill.   Forms, letters, and tests  You may have papers to fill out, like FMLA, short-term disability, and workability. We can help you with these forms at your visits, but you must have an " appointment. You may need more than 1 visit for this, to be in an intensive therapy program, or both.  Before we can give you medicine for ADHD, we may refer you to get tested for it or confirm it another way.  We may not be able to give you an emotional support animal letter.  We don't do mental health checks ordered by the court.   We don't do mental health testing, but we can refer you to get tested.   Thank you for choosing us for your care.  For informational purposes only. Not to replace the advice of your health care provider. Copyright   2022 [facility]. All rights reserved. [employer] 857218 - 12/22.       Treatment Plan:      1.  Buspirone 5 mg 2 times daily for anxiety  2.  Increase paroxetine to 30 mg daily for depression and anxiety symptoms  3.  Continue quetiapine 75 mg at bedtime to decrease racing thoughts and to help you sleep at night  4.  Someone will contact you to schedule an appointment for continued psychiatric care with a new provider as I will be retiring September 13    Continue all other medical directions per primary care provider.   Continue all other medications as reviewed per electronic medical record today.   Safety plan reviewed. To the Emergency Department as needed or call after hours crisis line at [phone] or [phone]. [place] Crisis Text Line: Text [place] to 243754  or  Suicide LifeLine Chat: suicidepreventionlifeline.org/chat/  To schedule individual or family therapy, call [facility] at [phone].   Schedule an appointment in 3 months or sooner as needed.  Call [facility] at [phone] to schedule.  Follow up with primary care provider as planned or for acute medical concerns.  Call the psychiatric nurse line with medication questions or concerns at [phone].  MyChart may be used to communicate with your provider, but this is not intended to be used for emergencies.        Crisis Resources   The [facility] is an  adults only unit located at [facility] in [place] is a short term (generally less than 23 hour stay) designed for crisis intervention and stabilization. Pts have the opportunity to meet quickly with a behavioral health team for evaluation in a calm and peaceful therapuetic environment. To be evaluated for admission pts are triaged throught the [facility] ED.      The following hotlines are for both adults and children. The and are open 24 hours a day, 7 days a week unless noted otherwise.        Crisis Lines      Crisis Text Line  Text 406261  You will be connected with a trained live crisis counselor to provide support.        Gambling Hotline  2.205.370.5476 [hope]        línea de crisis española  [phone]        [employer] Helpline  [phone]        National Hope Line  1.861.333.6584 [hope]        National Suicide Prevention Lifeline  Free and confidential support  988 or 1.312.769.TALK [8255]  http://suicidepreventionlifeline.org        The [place] [employer] (LGBTQ Youth Crisis Line)  9.312.217.3329  text START to 206-701        's Crisis Line  4.025.211.7238 (Press 1)  or text 112332    [facility] Mental Health Crisis Response  Within [place], call **CRISIS [**744771] to be connected to a mental health professional who can assist you.        [facility] Crisis  [phone]      [facility] Mobile Crisis  [phone]      [facility] Crisis  [phone]      [facility] Mobile Crisis  [phone] (adults)  [phone] (children)      [facility] Mobile Crisis  [phone] (adults)  [phone] (children)      [facility] Mobile Crisis  [phone]      [facility] Mobile Crisis  [phone]    Community Resources      Fast Tracker  Linking people to mental health and substance use disorder resources  fasttrackTrakn.org        [place] Mental Health Warmline  Peer to peer support  5 pm to 9 am 7 days/week  6.232.860.5509   https://mnwitw.org/cooper        National An[place] on Mental Illness (DANA)  120.765.7269 or 1.888.DANA.HELPS  https://namimn.org/        [facility]  [facility]   [address]  [phone]        [facility]  Free mental health counseling  https://walkin.org/  612.870.0565 X2    Mental Health Apps      Calm Harm  https://calmharm.co.uk/      My3  https://my3app.org/      [place]vette Safety Plan  https://www.mysafetyplan.org/

## 2024-09-09 DIAGNOSIS — E53.8 VITAMIN B12 DEFICIENCY (NON ANEMIC): ICD-10-CM

## 2024-09-09 RX ORDER — LANOLIN ALCOHOL/MO/W.PET/CERES
1000 CREAM (GRAM) TOPICAL DAILY
Qty: 90 TABLET | Refills: 4 | OUTPATIENT
Start: 2024-09-09

## 2024-09-12 ENCOUNTER — OFFICE VISIT (OUTPATIENT)
Dept: INTERNAL MEDICINE | Facility: CLINIC | Age: 59
End: 2024-09-12
Payer: COMMERCIAL

## 2024-09-12 VITALS
WEIGHT: 188.8 LBS | SYSTOLIC BLOOD PRESSURE: 100 MMHG | HEIGHT: 65 IN | BODY MASS INDEX: 31.46 KG/M2 | TEMPERATURE: 97.4 F | HEART RATE: 55 BPM | RESPIRATION RATE: 17 BRPM | OXYGEN SATURATION: 96 % | DIASTOLIC BLOOD PRESSURE: 69 MMHG

## 2024-09-12 DIAGNOSIS — Z86.0100 HISTORY OF COLONIC POLYPS: ICD-10-CM

## 2024-09-12 DIAGNOSIS — F43.10 PTSD (POST-TRAUMATIC STRESS DISORDER): Chronic | ICD-10-CM

## 2024-09-12 DIAGNOSIS — N18.31 CHRONIC KIDNEY DISEASE, STAGE 3A (H): ICD-10-CM

## 2024-09-12 DIAGNOSIS — E11.9 TYPE 2 DIABETES MELLITUS WITHOUT COMPLICATION, WITHOUT LONG-TERM CURRENT USE OF INSULIN (H): ICD-10-CM

## 2024-09-12 DIAGNOSIS — Z87.891 PERSONAL HISTORY OF TOBACCO USE: ICD-10-CM

## 2024-09-12 DIAGNOSIS — K60.2 ANAL FISSURE: ICD-10-CM

## 2024-09-12 DIAGNOSIS — I25.118 CORONARY ARTERY DISEASE OF NATIVE ARTERY OF NATIVE HEART WITH STABLE ANGINA PECTORIS (H): ICD-10-CM

## 2024-09-12 DIAGNOSIS — Z95.1 S/P CABG X 4: ICD-10-CM

## 2024-09-12 DIAGNOSIS — Z00.00 ANNUAL PHYSICAL EXAM: Primary | ICD-10-CM

## 2024-09-12 DIAGNOSIS — M16.12 PRIMARY OSTEOARTHRITIS OF LEFT HIP: Chronic | ICD-10-CM

## 2024-09-12 DIAGNOSIS — N40.1 BENIGN PROSTATIC HYPERPLASIA WITH NOCTURIA: Chronic | ICD-10-CM

## 2024-09-12 DIAGNOSIS — M17.2 POST-TRAUMATIC OSTEOARTHRITIS OF BOTH KNEES: Chronic | ICD-10-CM

## 2024-09-12 DIAGNOSIS — J41.0 SIMPLE CHRONIC BRONCHITIS (H): ICD-10-CM

## 2024-09-12 DIAGNOSIS — E78.5 DYSLIPIDEMIA, GOAL LDL BELOW 70: Chronic | ICD-10-CM

## 2024-09-12 DIAGNOSIS — K21.00 GASTROESOPHAGEAL REFLUX DISEASE WITH ESOPHAGITIS WITHOUT HEMORRHAGE: ICD-10-CM

## 2024-09-12 DIAGNOSIS — F33.41 RECURRENT MAJOR DEPRESSIVE DISORDER, IN PARTIAL REMISSION (H): Chronic | ICD-10-CM

## 2024-09-12 DIAGNOSIS — M16.11 PRIMARY OSTEOARTHRITIS OF RIGHT HIP: Chronic | ICD-10-CM

## 2024-09-12 DIAGNOSIS — G47.33 OBSTRUCTIVE SLEEP APNEA SYNDROME: ICD-10-CM

## 2024-09-12 DIAGNOSIS — Z12.5 SCREENING FOR PROSTATE CANCER: ICD-10-CM

## 2024-09-12 DIAGNOSIS — I10 ESSENTIAL HYPERTENSION: Chronic | ICD-10-CM

## 2024-09-12 DIAGNOSIS — E04.1 THYROID NODULE: ICD-10-CM

## 2024-09-12 DIAGNOSIS — I77.810 ASCENDING AORTA DILATATION (H): ICD-10-CM

## 2024-09-12 DIAGNOSIS — R35.1 BENIGN PROSTATIC HYPERPLASIA WITH NOCTURIA: Chronic | ICD-10-CM

## 2024-09-12 PROBLEM — K76.0 NONALCOHOLIC FATTY LIVER DISEASE: Status: RESOLVED | Noted: 2017-07-12 | Resolved: 2024-09-12

## 2024-09-12 PROBLEM — N39.44 NOCTURNAL ENURESIS: Status: RESOLVED | Noted: 2024-01-17 | Resolved: 2024-09-12

## 2024-09-12 PROBLEM — R05.2 SUBACUTE COUGH: Status: RESOLVED | Noted: 2023-12-28 | Resolved: 2024-09-12

## 2024-09-12 LAB
ALBUMIN SERPL BCG-MCNC: 4.4 G/DL (ref 3.5–5.2)
ALP SERPL-CCNC: 76 U/L (ref 40–150)
ALT SERPL W P-5'-P-CCNC: 25 U/L (ref 0–70)
ANION GAP SERPL CALCULATED.3IONS-SCNC: 9 MMOL/L (ref 7–15)
AST SERPL W P-5'-P-CCNC: 20 U/L (ref 0–45)
BILIRUB SERPL-MCNC: 0.2 MG/DL
BUN SERPL-MCNC: 15.1 MG/DL (ref 6–20)
CALCIUM SERPL-MCNC: 8.9 MG/DL (ref 8.8–10.4)
CHLORIDE SERPL-SCNC: 104 MMOL/L (ref 98–107)
CHOLEST SERPL-MCNC: 104 MG/DL
CREAT SERPL-MCNC: 1.1 MG/DL (ref 0.67–1.17)
EGFRCR SERPLBLD CKD-EPI 2021: 78 ML/MIN/1.73M2
ERYTHROCYTE [DISTWIDTH] IN BLOOD BY AUTOMATED COUNT: 12.2 % (ref 10–15)
FASTING STATUS PATIENT QL REPORTED: ABNORMAL
FASTING STATUS PATIENT QL REPORTED: ABNORMAL
GLUCOSE SERPL-MCNC: 111 MG/DL (ref 70–99)
HBA1C MFR BLD: 5.8 % (ref 0–5.6)
HCO3 SERPL-SCNC: 26 MMOL/L (ref 22–29)
HCT VFR BLD AUTO: 43.2 % (ref 40–53)
HDLC SERPL-MCNC: 36 MG/DL
HGB BLD-MCNC: 14.2 G/DL (ref 13.3–17.7)
LDLC SERPL CALC-MCNC: 48 MG/DL
MCH RBC QN AUTO: 29.7 PG (ref 26.5–33)
MCHC RBC AUTO-ENTMCNC: 32.9 G/DL (ref 31.5–36.5)
MCV RBC AUTO: 90 FL (ref 78–100)
NONHDLC SERPL-MCNC: 68 MG/DL
PLATELET # BLD AUTO: 193 10E3/UL (ref 150–450)
POTASSIUM SERPL-SCNC: 4.7 MMOL/L (ref 3.4–5.3)
PROT SERPL-MCNC: 6.9 G/DL (ref 6.4–8.3)
PSA SERPL DL<=0.01 NG/ML-MCNC: 0.72 NG/ML (ref 0–3.5)
RBC # BLD AUTO: 4.78 10E6/UL (ref 4.4–5.9)
SODIUM SERPL-SCNC: 139 MMOL/L (ref 135–145)
TRIGL SERPL-MCNC: 98 MG/DL
TSH SERPL DL<=0.005 MIU/L-ACNC: 1.28 UIU/ML (ref 0.3–4.2)
WBC # BLD AUTO: 5.7 10E3/UL (ref 4–11)

## 2024-09-12 PROCEDURE — 82570 ASSAY OF URINE CREATININE: CPT | Performed by: INTERNAL MEDICINE

## 2024-09-12 PROCEDURE — 99214 OFFICE O/P EST MOD 30 MIN: CPT | Mod: 25 | Performed by: INTERNAL MEDICINE

## 2024-09-12 PROCEDURE — 83036 HEMOGLOBIN GLYCOSYLATED A1C: CPT | Performed by: INTERNAL MEDICINE

## 2024-09-12 PROCEDURE — G0103 PSA SCREENING: HCPCS | Performed by: INTERNAL MEDICINE

## 2024-09-12 PROCEDURE — 80061 LIPID PANEL: CPT | Performed by: INTERNAL MEDICINE

## 2024-09-12 PROCEDURE — 85027 COMPLETE CBC AUTOMATED: CPT | Performed by: INTERNAL MEDICINE

## 2024-09-12 PROCEDURE — 90471 IMMUNIZATION ADMIN: CPT | Performed by: INTERNAL MEDICINE

## 2024-09-12 PROCEDURE — 82043 UR ALBUMIN QUANTITATIVE: CPT | Performed by: INTERNAL MEDICINE

## 2024-09-12 PROCEDURE — 84443 ASSAY THYROID STIM HORMONE: CPT | Performed by: INTERNAL MEDICINE

## 2024-09-12 PROCEDURE — G0296 VISIT TO DETERM LDCT ELIG: HCPCS | Performed by: INTERNAL MEDICINE

## 2024-09-12 PROCEDURE — 90673 RIV3 VACCINE NO PRESERV IM: CPT | Performed by: INTERNAL MEDICINE

## 2024-09-12 PROCEDURE — 99396 PREV VISIT EST AGE 40-64: CPT | Mod: 25 | Performed by: INTERNAL MEDICINE

## 2024-09-12 PROCEDURE — 36415 COLL VENOUS BLD VENIPUNCTURE: CPT | Performed by: INTERNAL MEDICINE

## 2024-09-12 PROCEDURE — 80053 COMPREHEN METABOLIC PANEL: CPT | Performed by: INTERNAL MEDICINE

## 2024-09-12 NOTE — PATIENT INSTRUCTIONS
Lung Cancer Screening   Frequently Asked Questions  If you are at high-risk for lung cancer, getting screened with low-dose computed tomography (LDCT) every year can help save your life. This handout offers answers to some of the most common questions about lung cancer screening. If you have other questions, please call [phone] ([phone]).     What is it?  Lung cancer screening uses special X-ray technology to create an image of your lung tissue. The exam is quick and easy and takes less than 10 seconds. We don t give you any medicine or use any needles. You can eat before and after the exam. You don t need to change your clothes as long as the clothing on your chest doesn t contain metal. But, you do need to be able to hold your breath for at least 6 seconds during the exam.    What is the goal of lung cancer screening?  The goal of lung cancer screening is to save lives. Many times, lung cancer is not found until a person starts having physical symptoms. Lung cancer screening can help detect lung cancer in the earliest stages when it may be easier to treat.    Who should be screened for lung cancer?  We suggest lung cancer screening for anyone who is at high-risk for lung cancer. You are in the high-risk group if you:      are between the ages of 55 and 79, and    have smoked at least 1 pack of cigarettes a day for 20 or more years, and    still smoke or have quit within the past 15 years.    However, if you have a new cough or shortness of breath, you should talk to your doctor before being screened.    Why does it matter if I have symptoms?  Certain symptoms can be a sign that you have a condition in your lungs that should be checked and treated by your doctor. These symptoms include fever, chest pain, a new or changing cough, shortness of breath that you have never felt before, coughing up blood or unexplained weight loss. Having any of these symptoms can greatly affect the results of lung  cancer screening.       Should all smokers get an LDCT lung cancer screening exam?  It depends. Lung cancer screening is for a very specific group of men and women who have a history of heavy smoking over a long period of time (see  Who should be screened for lung cancer  above).  I am in the high-risk group, but have been diagnosed with cancer in the past. Is LDCT lung cancer screening right for me?  In some cases, you should not have LDCT lung screening, such as when your doctor is already following your cancer with CT scan studies. Your doctor will help you decide if LDCT lung screening is right for you.  Do I need to have a screening exam every year?  Yes. If you are in the high-risk group described earlier, you should get an LDCT lung cancer screening exam every year until you are 79, or are no longer willing or able to undergo screening and possible procedures to diagnose and treat lung cancer.  How effective is LDCT at preventing death from lung cancer?  Studies have shown that LDCT lung cancer screening can lower the risk of death from lung cancer by 20 percent in people who are at high-risk.  What are the risks?  There are some risks and limitations of LDCT lung cancer screening. We want to make sure you understand the risks and benefits, so please let us know if you have any questions. Your doctor may want to talk with you more about these risks.    Radiation exposure: As with any exam that uses radiation, there is a very small increased risk of cancer. The amount of radiation in LDCT is small--about the same amount a person would get from a mammogram. Your doctor orders the exam when he or she feels the potential benefits outweigh the risks.    False negatives: No test is perfect, including LDCT. It is possible that you may have a medical condition, including lung cancer, that is not found during your exam. This is called a false negative result.    False positives and more testing: LDCT very often finds  something in the lung that could be cancer, but in fact is not. This is called a false positive result. False positive tests often cause anxiety. To make sure these findings are not cancer, you may need to have more tests. These tests will be done only if you give us permission. Sometimes patients need a treatment that can have side effects, such as a biopsy. For more information on false positives, see  What can I expect from the results?     Findings not related to lung cancer: Your LDCT exam also takes pictures of areas of your body next to your lungs. In a very small number of cases, the CT scan will show an abnormal finding in one of these areas, such as your kidneys, adrenal glands, liver or thyroid. This finding may not be serious, but you may need more tests. Your doctor can help you decide what other tests you may need, if any.  What can I expect from the results?  About 1 out of 4 LDCT exams will find something that may need more tests. Most of the time, these findings are lung nodules. Lung nodules are very small collections of tissue in the lung. These nodules are very common, and the vast majority--more than 97 percent--are not cancer (benign). Most are normal lymph nodes or small areas of scarring from past infections.  But, if a small lung nodule is found to be cancer, the cancer can be cured more than 90 percent of the time. To know if the nodule is cancer, we may need to get more images before your next yearly screening exam. If the nodule has suspicious features (for example, it is large, has an odd shape or grows over time), we will refer you to a specialist for further testing.  Will my doctor also get the results?  Yes. Your doctor will get a copy of your results.  Is it okay to keep smoking now that there s a cancer screening exam?  No. Tobacco is one of the strongest cancer-causing agents. It causes not only lung cancer, but other cancers and cardiovascular (heart) diseases as well. The damage  caused by smoking builds over time. This means that the longer you smoke, the higher your risk of disease. While it is never too late to quit, the sooner you quit, the better.  Where can I find help to quit smoking?  The best way to prevent lung cancer is to stop smoking. If you have already quit smoking, congratulations and keep it up! For help on quitting smoking, please call [employer] at 0-800-QUITNOW (1-503.178.7839) or the American Cancer Society at 1-393.637.6059 to find local resources near you.  One-on-one health coaching:  If you d prefer to work individually with a health care provider on tobacco cessation, we offer:      Medication Therapy Management:  Our specially trained pharmacists work closely with you and your doctor to help you quit smoking.  Call [phone] or [phone] (toll free).    Patient Education   Preventive Care Advice   This is general advice given by our system to help you stay healthy. However, your care team may have specific advice just for you. Please talk to your care team about your preventive care needs.  Nutrition  Eat 5 or more servings of fruits and vegetables each day.  Try wheat bread, brown rice and whole grain pasta (instead of white bread, rice, and pasta).  Get enough calcium and vitamin D. Check the label on foods and aim for 100% of the RDA (recommended daily allowance).  Lifestyle  Exercise at least 150 minutes each week  (30 minutes a day, 5 days a week).  Do muscle strengthening activities 2 days a week. These help control your weight and prevent disease.  No smoking.  Wear sunscreen to prevent skin cancer.  Have a dental exam and cleaning every 6 months.  Yearly exams  See your health care team every year to talk about:  Any changes in your health.  Any medicines your care team has prescribed.  Preventive care, family planning, and ways to prevent chronic diseases.  Shots (vaccines)   HPV shots (up to age 26), if you've never had them before.  Hepatitis B  shots (up to age 59), if you've never had them before.  COVID-19 shot: Get this shot when it's due.  Flu shot: Get a flu shot every year.  Tetanus shot: Get a tetanus shot every 10 years.  Pneumococcal, hepatitis A, and RSV shots: Ask your care team if you need these based on your risk.  Shingles shot (for age 50 and up)  General health tests  Diabetes screening:  Starting at age 35, Get screened for diabetes at least every 3 years.  If you are younger than age 35, ask your care team if you should be screened for diabetes.  Cholesterol test: At age 39, start having a cholesterol test every 5 years, or more often if advised.  Bone density scan (DEXA): At age 50, ask your care team if you should have this scan for osteoporosis (brittle bones).  Hepatitis C: Get tested at least once in your life.  STIs (sexually transmitted infections)  Before age 24: Ask your care team if you should be screened for STIs.  After age 24: Get screened for STIs if you're at risk. You are at risk for STIs (including HIV) if:  You are sexually active with more than one person.  You don't use condoms every time.  You or a partner was diagnosed with a sexually transmitted infection.  If you are at risk for HIV, ask about PrEP medicine to prevent HIV.  Get tested for HIV at least once in your life, whether you are at risk for HIV or not.  Cancer screening tests  Cervical cancer screening: If you have a cervix, begin getting regular cervical cancer screening tests starting at age 21.  Breast cancer scan (mammogram): If you've ever had breasts, begin having regular mammograms starting at age 40. This is a scan to check for breast cancer.  Colon cancer screening: It is important to start screening for colon cancer at age 45.  Have a colonoscopy test every 10 years (or more often if you're at risk) Or, ask your provider about stool tests like a FIT test every year or Cologuard test every 3 years.  To learn more about your testing options, visit:    .  For help making a decision, visit:   https://bit.ly/ln44431.  Prostate cancer screening test: If you have a prostate, ask your care team if a prostate cancer screening test (PSA) at age 55 is right for you.  Lung cancer screening: If you are a current or former smoker ages 50 to 80, ask your care team if ongoing lung cancer screenings are right for you.  For informational purposes only. Not to replace the advice of your health care provider. Copyright   2023 [employer]. All rights reserved. Clinically reviewed by the [facility] Transitions Program. [employer] [id number] - REV 01/24.

## 2024-09-12 NOTE — PROGRESS NOTES
Lung Cancer Screening Shared Decision Making Visit     Nadya Blake, a 59 year old male, is eligible for lung cancer screening    History   Smoking Status     Former     Types: Cigarettes   Smokeless Tobacco     Never       I have discussed with patient the risks and benefits of screening for lung cancer with low-dose CT.     The risks include:    radiation exposure: one low dose chest CT has as much ionizing radiation as about 15 chest x-rays, or 6 months of background radiation living in [place]      false positives: most findings/nodules are NOT cancer, but some might still require additional diagnostic evaluation, including biopsy    over-diagnosis: some slow growing cancers that might never have been clinically significant will be detected and treated unnecessarily     The benefit of early detection of lung cancer is contingent upon adherence to annual screening or more frequent follow up if indicated.     Furthermore, to benefit from screening, Nadya must be willing and able to undergo diagnostic procedures, if indicated. Although no specific guide is available for determining severity of comorbidities, it is reasonable to withhold screening in patients who have greater mortality risk from other diseases.     We did discuss that the best way to prevent lung cancer is to not smoke.    Some patients may value a numeric estimation of lung cancer risk when evaluating if lung cancer screening is right for them, here is one calculator:    ShouldIScreen

## 2024-09-12 NOTE — PROGRESS NOTES
Preventive Care Visit  [facility]  Az Briseno MD, Internal Medicine  Sep 12, 2024      SUBJECTIVE:   Nadya is a 59 year old, presenting for the following:  RECHECK (1 follow-up CABG, hypertension, Apnea, diabetes)        9/12/2024    10:55 AM   Additional Questions   Roomed by Dilcia   Accompanied by Carlee -wife         9/12/2024    10:55 AM   Patient Reported Additional Medications   Patient reports taking the following new medications N/A     Are you in the first 12 months of your Medicare coverage?  No    HPI        Have you ever done Advance Care Planning? (For example, a Health Directive, POLST, or a discussion with a medical provider or your loved ones about your wishes): No, advance care planning information given to patient to review.  Patient plans to discuss their wishes with loved ones or provider.      Healthy Habits  Ability to successfully perform ADLs: Yes  Hearing impairment: No  Home Safety: safe      2/21/2023     9:04 AM   PHQ-2 ( 1999 Pf[employer])   PHQ-2 Score Incomplete         8/30/2024     9:38 AM   PHQ   PHQ-9 Total Score 10   Q9: Thoughts of better off dead/self-harm past 2 weeks Not at all     Fall Risk: No falls in the last 12 months     Fall risk     Cognitive Screening   1) Repeat 3 items (Leader, Season, Table)    2) Clock draw: NORMAL  3) 3 item recall: Recalls 3 objects  Results: 3 items recalled: COGNITIVE IMPAIRMENT LESS LIKELY    Mini-CogTM Copyright S Mikel. Licensed by the author for use in [facility]; reprinted with permission ([email or web address]). All rights reserved.      Reviewed and updated as needed this visit by clinical staff   Tobacco  Allergies  Meds              Reviewed and updated as needed this visit by Provider                  Social History     Tobacco Use    Smoking status: Former     Current packs/day: 0.00     Average packs/day: 1 pack/day for 30.0 years (30.0 ttl pk-yrs)     Types: Cigarettes     Start date: 3/23/1990     Quit  date: 3/23/2020     Years since quittin.4     Passive exposure: Never    Smokeless tobacco: Never    Tobacco comments:     stopped 3/24/2020   Substance Use Topics    Alcohol use: No             2023     1:31 PM   Alcohol Use   Prescreen: >3 drinks/day or >7 drinks/week? Not Applicable   Do you have a current opioid prescription? No  Do you use any other controlled substances or medications that are not prescribed by a provider? None    Current providers sharing in care for this patient include:   Patient Care Team:  Az Briseno MD as PCP - General  MyhGoran santiago, RN as Lead Care Coordinator (Primary Care - CC)  Az Briseno MD as Assigned PCP  Inessa Cade as [facility] worker  Sloan Burns MD as Assigned Heart and Vascular Provider  Az Craft APRN CNP as Assigned Sleep Provider  Jojo Soria RN as Registered Nurse (Diabetes Education)  Gabbi Blackmon NP as Assigned Behavioral Health Provider  Jojo Soria RN as Registered Nurse (Diabetes Education)  Jef Wang DO as Assigned Surgical Provider    The following health maintenance items are reviewed in Epic and correct as of today:  Health Maintenance   Topic Date Due    ZOSTER IMMUNIZATION (1 of 2) Never done    DTAP/TDAP/TD IMMUNIZATION (3 - Td or Tdap) 2022    LIPID  2024    MICROALBUMIN  2024    INFLUENZA VACCINE (1) 2024    COVID-19 Vaccine ( season) 2024    YEARLY PREVENTIVE VISIT  2024    A1C  2024    ANNUAL REVIEW OF HM ORDERS  2024    BMP  2024    LUNG CANCER SCREENING  2024    EYE EXAM  2024    PHQ-9  2025    DIABETIC FOOT EXAM  2025    ALT  2025    CBC  2025    HEMOGLOBIN  2025    HF ACTION PLAN  2026    ADVANCE CARE PLANNING  2028    COLORECTAL CANCER SCREENING  10/25/2028    TSH W/FREE T4 REFLEX  Completed    SPIROMETRY  Completed    HEPATITIS C SCREENING  Completed    HIV SCREENING  " Completed    COPD ACTION PLAN  Completed    Pneumococcal Vaccine: Pediatrics (0 to 5 Years) and At-Risk Patients (6 to 64 Years)  Completed    URINALYSIS  Completed    DEPRESSION ACTION PLAN  Addressed    HPV IMMUNIZATION  Aged Out    MENINGITIS IMMUNIZATION  Aged Out    RSV MONOCLONAL ANTIBODY  Aged Out    HEPATITIS B IMMUNIZATION  Discontinued       Review of Systems     OBJECTIVE:   /69 (BP Location: Left arm, Patient Position: Sitting, Cuff Size: Adult Regular)   Pulse 55   Temp 97.4  F (36.3  C) (Tympanic)   Resp 17   Ht 1.651 m (5' 5\")   Wt 85.6 kg (188 lb 12.8 oz)   SpO2 96%   BMI 31.42 kg/m     Estimated body mass index is 31.42 kg/m  as calculated from the following:    Height as of this encounter: 1.651 m (5' 5\").    Weight as of this encounter: 85.6 kg (188 lb 12.8 oz).  Physical Exam  EYES: Eyelids, conjunctiva, and sclera were normal. Pupils were normal. Cornea, iris, and lens were normal bilaterally.  HEAD, EARS, NOSE, MOUTH, AND THROAT: Head and face were normal. Hearing was normal to voice and the ears were normal to external exam. Nose appearance was normal and there was no discharge.   NECK: Neck appearance was normal..  RESPIRATORY: Breathing pattern was normal and the chest moved symmetrically.  Lung sounds were normal and there were no abnormal sounds.  CARDIOVASCULAR: Heart rate and rhythm were normal.  S1 and S2 were normal and there were no extra sounds or murmurs. There was no peripheral edema.  GASTROINTESTINAL: The abdomen was normal in contour.  NEUROLOGIC: The patient was alert and oriented to person, place, time, and circumstance. Speech was normal. Cranial nerves were normal. Motor strength was normal for age. The patient was normally coordinated.  PSYCHIATRIC:  Mood and affect were normal and the patient had normal recent and remote memory. The patient's judgment and insight were normal.    ASSESSMENT / PLAN:   1. Annual physical exam  This is a 69-year-old man with " issues as discussed below    2. History of colonic polyps    2028    3. Screening for prostate cancer  - Prostate Specific Antigen Screen; Future    4. Personal history of tobacco use  - Prof fee: Shared Decision Making for Lung Cancer Screening  - CT Chest Lung Cancer Scrn Low Dose wo; Future    5. Coronary artery disease of native artery of native heart with stable angina pectoris (H24)  Continue secondary prevention, vegan diet and close follow-up with cardiology  - Lipid panel reflex to direct LDL Non-fasting; Future    6. S/P CABG x 4    7. Type 2 diabetes mellitus without complication, without long-term current use of insulin (H)  Has been well-controlled, continue same  - HEMOGLOBIN A1C; Future  - Comprehensive metabolic panel; Future  - TSH with free T4 reflex; Future  - Albumin Random Urine Quantitative with Creat Ratio; Future    8. Essential hypertension  Pressure okay continue same    9. Dyslipidemia, goal LDL below 70  Continue statin    10. Chronic kidney disease, stage 3a (H)  Stable, continue Jardiance for renal protection  - CBC with platelets; Future    11. Gastroesophageal reflux disease with esophagitis without hemorrhage  Stable, continue omeprazole    12. Ascending aorta dilatation (H24)  Being followed by cardiology    13. Obstructive sleep apnea syndrome  Now on CPAP, using and benefiting from this sleeping much better feeling better during the day    14. Simple chronic bronchitis (H)  Stable no longer smokes    15. Recurrent major depressive disorder, in partial remission (H24)  Stable, psychiatrist retiring will be established with a new psychiatrist.    16. PTSD (post-traumatic stress disorder)    17. Benign prostatic hyperplasia with nocturia  Stable continue current medications oxybutynin has been helpful    18. Anal fissure - Dr. Campoverde  Stable, benefit from nifedipine ointment    19. Thyroid nodule  Will follow-up with Dr. Quevedo  -  Thyroid; Future    20. Primary osteoarthritis  "of right hip  21. Primary osteoarthritis of left hip  22. Post-traumatic osteoarthritis of both knees  Stable    Counseling  Reviewed preventive health counseling, as reflected in patient instructions      BMI  Estimated body mass index is 31.42 kg/m  as calculated from the following:    Height as of this encounter: 1.651 m (5' 5\").    Weight as of this encounter: 85.6 kg (188 lb 12.8 oz).   Weight management plan: Discussed healthy diet and exercise guidelines      He reports that he quit smoking about 4 years ago. His smoking use included cigarettes. He started smoking about 34 years ago. He has a 30 pack-year smoking history. He has never been exposed to tobacco smoke. He has never used smokeless tobacco.      Appropriate preventive services were discussed with this patient, including applicable screening as appropriate for fall prevention, nutrition, physical activity, Tobacco-use cessation, weight loss and cognition.  Checklist reviewing preventive services available has been given to the patient.    Reviewed patients plan of care and provided an AVS. The Basic Care Plan (routine screening as documented in Health Maintenance) for Nadya meets the Care Plan requirement. This Care Plan has been established and reviewed with the Patient and spouse.          Signed Electronically by: Az Briseno MD    Identified Health Risks  I have reviewed Opioid Use Disorder and Substance Use Disorder risk factors and made any needed referrals.   "

## 2024-09-13 LAB
CREAT UR-MCNC: 151 MG/DL
MICROALBUMIN UR-MCNC: 32.9 MG/L
MICROALBUMIN/CREAT UR: 21.79 MG/G CR (ref 0–17)

## 2024-09-16 ENCOUNTER — TELEPHONE (OUTPATIENT)
Dept: INTERNAL MEDICINE | Facility: CLINIC | Age: 59
End: 2024-09-16
Payer: COMMERCIAL

## 2024-09-16 DIAGNOSIS — E53.8 VITAMIN B12 DEFICIENCY (NON ANEMIC): ICD-10-CM

## 2024-09-16 DIAGNOSIS — E11.9 TYPE 2 DIABETES MELLITUS WITHOUT COMPLICATION, WITHOUT LONG-TERM CURRENT USE OF INSULIN (H): ICD-10-CM

## 2024-09-16 DIAGNOSIS — I25.83 CORONARY ARTERY DISEASE DUE TO LIPID RICH PLAQUE: Chronic | ICD-10-CM

## 2024-09-16 DIAGNOSIS — I25.10 CORONARY ARTERY DISEASE DUE TO LIPID RICH PLAQUE: Chronic | ICD-10-CM

## 2024-09-16 RX ORDER — LANOLIN ALCOHOL/MO/W.PET/CERES
1000 CREAM (GRAM) TOPICAL DAILY
Qty: 90 TABLET | Refills: 4 | OUTPATIENT
Start: 2024-09-16

## 2024-09-16 NOTE — TELEPHONE ENCOUNTER
Patient's wife calls stating they need a refill of jardiance. RN verified that Jardiance refill was sent today and no further action is needed. Carlee verbalizes understanding.

## 2024-09-19 NOTE — PROGRESS NOTES
HEART CARE ENCOUNTER NOTE      [facility]  [phone]      Assessment/Recommendations   Assessment:   Coronary artery disease: As post four-vessel CABG with LIMA to LAD, right radial artery to right posterior descending artery, reverse SVG to obtuse marginal and diagonal artery on 6/24/2020.  Stress cardiac MRI 12/23/2021 showed no ischemia but patient had been noting exertional dyspnea.  Coronary angiogram 10/6/2022 noted occluded SVG graft to right PDA.  Patient's angina was stable well-controlled on Imdur 30 mg.  Patient noted in the last 2 months he had been taking nitroglycerin 2 times a week as he was having more chest discomfort and into the left arm.  We again discussed PCI to RCA and patient declined this.  Will try to increase Imdur to 60 mg daily while being cautious of blood pressure.  Chronic congestive heart failure with preserved ejection fraction: Appears euvolemic on exam.  Cardiac catheterization 10/6/2022 showed normal left-sided filling pressure.  Patient is on beta-blocker, Jardiance  Essential hypertension: On metoprolol 50 mg daily, Imdur 30 mg daily.  Patient has been having lower heart rates in the 40s and feeling lightheaded at those times.  Will decrease metoprolol to 25 mg daily while increasing Imdur for more anginal symptoms.  Patient will continue monitoring blood pressure and if dropping lower into the 80s or still feeling lightheaded we will go back down to 30.  Dyslipidemia: On rosuvastatin 40 mg daily.  Last lipids 9/12/24 showed LDL of 48.  Diabetes mellitus type 2: Non-insulin-dependent.  On Jardiance, metformin.  Last hemoglobin A1c 5.8.  Possible HANNAH: Using CPAP now and notes having more energy since using this.  Has upcoming appointment with sleep medicine.    Plan:   Decrease metoprolol to 25 mg daily given patient noting heart rates in the 40s and feeling lightheaded at those times  Will trial increasing isosorbide mononitrate to 60 mg daily while  being cautious of blood pressure.  Patient will monitor his blood pressure and if dropping into the 80s systolic or feeling lightheaded we will back off and go down to 30 mg  Continue rosuvastatin 40 mg daily  Continue using CPAP  Follow-up with me, Teodora Ordonez PA-C in 2 months per patient's request      Follow-up with Dr. Burns in 4 months       History of Present Illness/Subjective    HPI: Nadya Blake is a 58 year old male with PMHx of coronary artery disease with history of NSTEMI status post four-vessel CABG with LIMA to LAD, right radial artery to RPDA, reverse SVG to OM and diagonal branch on 6/24/2020, HFpEF, hypertension, dyslipidemia presents for follow-up. Patient has history of chest pain which is not necessarily exertional, but has been worse with emotional stress that improves with sublingual nitroglycerin.  Patient started isosorbide mononitrate 30 mg daily and has helped some with this chest pain but led to some lightheadedness.  Metoprolol dose was decreased from 150 to 100 mg daily.  I had decreased this further to 50 mg daily to see if this improved his bradycardia and fatigue.  Patient last saw Dr. Burns 7/12/2024 where they again discussed PCI of the native RCA, but patient was fearful and wanted to continue medical therapy as he had been doing well.  Patient did undergo sleep study, but was waiting for follow-up with sleep medicine to go over results.    Patient continues to note some angina but feels it has been worsening the past 2 months.  Notes that he gets chest tightness that goes into his left arm more with emotional stress when he is at rest.  Patient has been taking a nitroglycerin about twice a week and notes symptoms improved after 10 minutes of taking the nitroglycerin.  Patient denies improving until he takes nitro.  Patient notes he has been walking once to twice every day around 15 minutes and denies any exertional angina or dyspnea with that.  Patient notes that his heart  rates have been lower in the 40s and he has had some times of feeling lightheaded.  He denies shortness of breath, dyspnea on exertion, orthopnea, PND, palpitations, chest pain, abdominal fullness/bloating, and lower extremity edema.        MR cardiac with stress 12/23/2021  1.  Pharmacological Regadenoson stress cardiac MRI is negative for inducible myocardial ischemia.   2.  Pharmacological stress ECG is negative for inducible myocardial ischemia.   3. Normal left ventricular size, wall thickness and systolic function. The quantified left ventricular  ejection fraction is 59 %.  Very small area of non-transmural myocardial scar in the mid inferior wall is  identified.    4.  Normal right ventricular size and systolic function.    5.  No significant valvular abnormalities.  6. Ascending aorta measures 38 mm.     Cardiac catheterization 10/6/2022:  Left ventricular filling pressures are normal.  3rd Mrg lesion is 90% stenosed.  Prox RCA lesion is 75% stenosed.  Prox RCA to Mid RCA lesion is 40% stenosed.  Dist RCA lesion is 75% stenosed.  Mid RCA lesion is 30% stenosed.  RPDA lesion is 50% stenosed.  RPAV lesion is 40% stenosed.  Prox LAD lesion is 70% stenosed.  1st Diag-1 lesion is 95% stenosed.  1st Diag-2 lesion is 95% stenosed.  2nd Diag lesion is 99% stenosed.  Mid LAD-1 lesion is 75% stenosed.  Mid LAD-2 lesion is 75% stenosed.  Dist LAD lesion is 70% stenosed.  Vein graft to right PDA is totally occluded  Vein graft to second diagonal is widely patent  Vein graft to third OM is widely patent  LUZ MARIA graft to mid distal LAD is patent     Physical Examination  Review of Systems   Vitals: BP 94/60 (BP Location: Right arm, Patient Position: Sitting, Cuff Size: Adult Regular)   Pulse 56   Resp 14   Wt 85.3 kg (188 lb)   BMI 31.28 kg/m    BMI= Body mass index is 31.28 kg/m .  Wt Readings from Last 3 Encounters:   09/20/24 85.3 kg (188 lb)   09/12/24 85.6 kg (188 lb 12.8 oz)   07/19/24 87.1 kg (192 lb)            ENT/Mouth: membranes moist, no oral lesions or bleeding gums.      EYES:  no scleral icterus, normal conjunctivae       Chest/Lungs:   lungs are clear to auscultation, no rales or wheezing,  equal chest wall expansion    Cardiovascular:   Regular. Normal first and second heart sounds with no murmurs, rubs, or gallops; the radial pulses are intact,  no edema bilaterally        Extremities: no cyanosis or clubbing   Skin: no xanthelasma, warm.    Neurologic: no tremors     Psychiatric: alert and oriented x3, calm        Please refer above for cardiac ROS details.        Medical History  Surgical History Family History Social History   Past Medical History:   Diagnosis Date    Acute non-ST elevation myocardial infarction (NSTEMI) (H) 6/22/2020    Adenomatous polyp of colon     Ascending aorta dilatation (H24)     Carpal tunnel syndrome     Chronic superficial gastritis     Coronary artery disease due to lipid rich plaque 6/23/2020    Depression with anxiety     Dyslipidemia, goal LDL below 70 6/23/2020    Essential hypertension 9/2/2012    Fatty liver disease, nonalcoholic     GERD (gastroesophageal reflux disease)     IBS (irritable bowel syndrome)     Left lumbar radiculopathy     Multinodular goiter     Old torn meniscus of knee     Paroxysmal atrial fibrillation (H)     Perianal abscess     Post herpetic neuralgia     Post-traumatic osteoarthritis of both knees     Primary osteoarthritis of left hip     Primary osteoarthritis of right hip     Pulmonary nodule     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)     Thyroid nodule     TMJ arthritis     Tobacco use disorder 11/1/2013    Vitamin D deficiency 9/30/2020     Past Surgical History:   Procedure Laterality Date    APPENDECTOMY  1995    BYPASS GRAFT ARTERY CORONARY  06/24/2020    Dr. Ragsdale    CV CORONARY ANGIOGRAM N/A 6/23/2020    Procedure: Coronary Angiogram;  Surgeon: Ariane Lester MD;  Location: [facility] Cath Lab;  Service:  Cardiology    CV CORONARY ANGIOGRAM N/A 10/6/2022    Procedure: Coronary Angiogram;  Surgeon: Javon John MD;  Location: [facility] CV    CV IABP INSERT N/A 2020    Procedure: Intra Aortic Balloon Pump Insertion;  Surgeon: Ariane Lester MD;  Location: [facility] Cath Lab;  Service: Cardiology    CV LEFT HEART CATH N/A 10/6/2022    Procedure: Left Heart Catheterization;  Surgeon: Javon John MD;  Location: [facility] CV    CV LEFT HEART CATHETERIZATION WITHOUT LEFT VENTRICULOGRAM Left 2020    Procedure: Left Heart Catheterization Without Left Ventriculogram;  Surgeon: Ariane Lester MD;  Location: [facility] Cath Lab;  Service: Cardiology    CV LEFT VENTRICULOGRAM N/A 10/6/2022    Procedure: Left Ventriculogram;  Surgeon: Javon John MD;  Location: [facility] CV     Family History   Problem Relation Age of Onset    No Known Problems Mother     Lung Cancer Father 56        fatal    No Known Problems Daughter     Other - See Comments Son         congenital deformity of right leg; he uses a leg prosthesis        Social History     Socioeconomic History    Marital status:      Spouse name: Carlee    Number of children: 2    Years of education: Not on file    Highest education level: Not on file   Occupational History    Not on file   Tobacco Use    Smoking status: Former     Current packs/day: 0.00     Average packs/day: 1 pack/day for 30.0 years (30.0 ttl pk-yrs)     Types: Cigarettes     Start date: 3/23/1990     Quit date: 3/23/2020     Years since quittin.4     Passive exposure: Never    Smokeless tobacco: Never    Tobacco comments:     stopped 3/24/2020   Vaping Use    Vaping status: Never Used   Substance and Sexual Activity    Alcohol use: No    Drug use: Never    Sexual activity: Yes     Partners: Female   Other Topics Concern    Not on file   Social History Narrative    , Carlee, BA chemistry and taking AA courses at [employer].  From [place];  bachelors in geology and computer science. [employer] (2005) and [employer] (2008).  On SSDI, PTSD.       Social Determinants of Health     Financial Resource Strain: Low Risk  (12/27/2023)    Financial Resource Strain     Within the past 12 months, have you or your family members you live with been unable to get utilities (heat, electricity) when it was really needed?: No   Food Insecurity: Low Risk  (12/27/2023)    Food Insecurity     Within the past 12 months, did you worry that your food would run out before you got money to buy more?: No     Within the past 12 months, did the food you bought just not last and you didn t have money to get more?: No   Transportation Needs: Low Risk  (12/27/2023)    Transportation Needs     Within the past 12 months, has lack of transportation kept you from medical appointments, getting your medicines, non-medical meetings or appointments, work, or from getting things that you need?: No   Physical Activity: Not on file   Stress: Not on file   Social Connections: Unknown (1/1/2022)    Received from [facility] [employer] [facility], [facility] [employer] & [facility]    Social Connections     Frequency of Communication with Friends and Family: Not on file   Interpersonal Safety: Low Risk  (5/3/2024)    Interpersonal Safety     Do you feel physically and emotionally safe where you currently live?: Yes     Within the past 12 months, have you been hit, slapped, kicked or otherwise physically hurt by someone?: No     Within the past 12 months, have you been humiliated or emotionally abused in other ways by your partner or ex-partner?: No   Housing Stability: Low Risk  (12/27/2023)    Housing Stability     Do you have housing? : Yes     Are you worried about losing your housing?: No           Medications  Allergies   Current Outpatient Medications   Medication Sig Dispense Refill    acetaminophen (TYLENOL) 500 MG tablet Take 1-2 tablets (500-1,000 mg) by mouth every 6  hours as needed for mild pain 360 tablet 3    albuterol (VENTOLIN HFA) 108 (90 Base) MCG/ACT inhaler INHALE 2 PUFFS INTO THE LUNGS EVERY 6 HOURS AS NEEDED FOR SHORTNESS OF BREATH OR WHEEZING OR COUGH 18 g 2    aspirin (ASA) 81 MG chewable tablet CHEW AND SWALLOW 1 TABLET(81 MG) BY MOUTH DAILY 90 tablet 2    azelastine (ASTELIN) 0.1 % nasal spray Spray 1 spray into both nostrils 2 times daily 30 mL 1    busPIRone (BUSPAR) 5 MG tablet Take 1 tablet (5 mg) by mouth 2 times daily 180 tablet 1    Cholecalciferol (VITAMIN D3) 50 MCG (2000 UT) CAPS TAKE 1 CAPSULE BY MOUTH DAILY 90 capsule 1    cyanocobalamin (VITAMIN B-12) 1000 MCG tablet Take 1 tablet (1,000 mcg) by mouth daily 90 tablet 4    diclofenac (VOLTAREN) 1 % topical gel Apply 4 g topically 4 times daily 500 g 2    empagliflozin (JARDIANCE) 25 MG TABS tablet Take 1 tablet (25 mg) by mouth daily. 90 tablet 2    finasteride (PROSCAR) 5 MG tablet Take 1 tablet (5 mg) by mouth daily 90 tablet 4    ipratropium (ATROVENT) 0.06 % nasal spray Spray 2 sprays into both nostrils 4 times daily 15 mL 1    isosorbide mononitrate (IMDUR) 30 MG 24 hr tablet Take 1 tablet (30 mg) by mouth daily 90 tablet 4    Lidocaine 0.5 % GEL Externally apply topically as needed      metoprolol succinate ER (TOPROL XL) 50 MG 24 hr tablet Take 1 tablet (50 mg) by mouth daily 90 tablet 3    nifedipine 0.2% in white petrolatum 0.2 % OINT ointment Apply topically 4 times daily as needed (anal fissure) 100 g 1    nitroGLYcerin (NITROSTAT) 0.4 MG sublingual tablet For chest pain place 1 tablet under the tongue every 5 minutes for 3 doses. If symptoms persist 5 minutes after 1st dose call 911. 30 tablet 3    omeprazole (PRILOSEC) 20 MG DR capsule Take 1 capsule (20 mg) by mouth 2 times daily 180 capsule 4    oxyBUTYnin (DITROPAN) 5 MG tablet TAKE 1 TABLET(5 MG) BY MOUTH AT BEDTIME 90 tablet 2    PARoxetine (PAXIL) 30 MG tablet Take 1 tablet (30 mg) by mouth every morning. 90 tablet 1    polyethylene  glycol (MIRALAX) 17 GM/Dose powder TAKE 17 GRAMS(1 CAPFUL) BY MOUTH DAILY 840 g 4    QUEtiapine (SEROQUEL) 50 MG tablet Take 1.5 tablets (75 mg) by mouth at bedtime 135 tablet 1    rosuvastatin (CRESTOR) 40 MG tablet Take 1 tablet (40 mg) by mouth daily 90 tablet 4    tamsulosin (FLOMAX) 0.4 MG capsule Take 2 capsules (0.8 mg) by mouth every evening 180 capsule 4       Allergies   Allergen Reactions    Atorvastatin Diarrhea    Cortisone Other (See Comments)     pain    Lisinopril Cough     started after CABG for unclear reason    Methylprednisolone Other (See Comments)     ?          Lab Results    Chemistry/lipid CBC Cardiac Enzymes/BNP/TSH/INR   Recent Labs   Lab Test 08/28/23 1436   CHOL 97   HDL 33*   LDL 32   TRIG 159*     Recent Labs   Lab Test 08/28/23  1436 01/18/23  1037 11/21/22  0947   LDL 32 30 27     Recent Labs   Lab Test 12/20/23 2039      POTASSIUM 4.3   CHLORIDE 105   CO2 26   *   BUN 16.2   CR 1.22*   GFRESTIMATED 70   TERESE 9.0     Recent Labs   Lab Test 12/20/23 2039 08/28/23  1436 06/27/23  1134   CR 1.22* 1.21* 1.10     Recent Labs   Lab Test 01/09/24  0940 10/03/23  0906 06/27/23  1134   A1C 5.5 5.6 5.7*          Recent Labs   Lab Test 12/20/23 2039   WBC 7.7   HGB 14.4   HCT 42.9   MCV 89        Recent Labs   Lab Test 12/20/23 2039 08/28/23  1436 01/18/23  1037   HGB 14.4 15.2 14.5    Recent Labs   Lab Test 11/21/20  0001 07/27/20  0703 07/27/20 0115   TROPONINI <0.01 <0.01 <0.01     Recent Labs   Lab Test 12/20/23 2039 07/27/20  0115 06/22/20  2322   BNP  --  79* <10   NTBNP <36  --   --      Recent Labs   Lab Test 08/28/23  1436   TSH 1.24     Recent Labs   Lab Test 07/27/20  0115 06/25/20  0427 06/24/20 2012   INR 1.10 1.32* 1.39*        Teodora A Mery, PA-C

## 2024-09-20 ENCOUNTER — OFFICE VISIT (OUTPATIENT)
Dept: CARDIOLOGY | Facility: CLINIC | Age: 59
End: 2024-09-20
Payer: COMMERCIAL

## 2024-09-20 VITALS
RESPIRATION RATE: 14 BRPM | SYSTOLIC BLOOD PRESSURE: 94 MMHG | DIASTOLIC BLOOD PRESSURE: 60 MMHG | BODY MASS INDEX: 31.28 KG/M2 | HEART RATE: 56 BPM | WEIGHT: 188 LBS

## 2024-09-20 DIAGNOSIS — I25.118 CORONARY ARTERY DISEASE OF NATIVE ARTERY OF NATIVE HEART WITH STABLE ANGINA PECTORIS (H): ICD-10-CM

## 2024-09-20 DIAGNOSIS — E78.5 DYSLIPIDEMIA, GOAL LDL BELOW 70: Chronic | ICD-10-CM

## 2024-09-20 DIAGNOSIS — I50.32 CHRONIC HEART FAILURE WITH PRESERVED EJECTION FRACTION (H): ICD-10-CM

## 2024-09-20 DIAGNOSIS — E11.69 TYPE 2 DIABETES MELLITUS WITH OTHER SPECIFIED COMPLICATION, WITHOUT LONG-TERM CURRENT USE OF INSULIN (H): ICD-10-CM

## 2024-09-20 DIAGNOSIS — I10 ESSENTIAL HYPERTENSION: Chronic | ICD-10-CM

## 2024-09-20 DIAGNOSIS — G47.33 OSA (OBSTRUCTIVE SLEEP APNEA): ICD-10-CM

## 2024-09-20 PROCEDURE — 99214 OFFICE O/P EST MOD 30 MIN: CPT | Performed by: STUDENT IN AN ORGANIZED HEALTH CARE EDUCATION/TRAINING PROGRAM

## 2024-09-20 PROCEDURE — G2211 COMPLEX E/M VISIT ADD ON: HCPCS | Performed by: STUDENT IN AN ORGANIZED HEALTH CARE EDUCATION/TRAINING PROGRAM

## 2024-09-20 RX ORDER — METOPROLOL SUCCINATE 25 MG/1
25 TABLET, EXTENDED RELEASE ORAL DAILY
Qty: 90 TABLET | Refills: 3 | Status: SHIPPED | OUTPATIENT
Start: 2024-09-20

## 2024-09-20 NOTE — PATIENT INSTRUCTIONS
Nadya Blake,    It was a pleasure to see you today at the [facility].     My recommendations after this visit include:    - Decrease metoprolol to 25 mg daily (new prescription sent to pharmacy)  - Increase isosorbide mononitrate (imdur) to 60 mg daily (start taking two tablets- you can take at the same time or split the dose to morning and evening if your blood pressure is getting low)  - Keep monitoring blood pressure and let me know if going into the 80s or feeling lightheaded  - Follow up with me in 2 months, Dr. uBrns in 4 months    - Please call [phone], if you have any questions or concerns      Teodora Ordonez PA-C

## 2024-09-20 NOTE — LETTER
9/20/2024    Az Briseno MD  [address] [facility] [facility] [address]    RE: Nadya Blake       Dear Colleague,     I had the pleasure of seeing Nadya Blake in the [facility].    HEART CARE ENCOUNTER NOTE      [facility]  [phone]      Assessment/Recommendations   Assessment:   Coronary artery disease: As post four-vessel CABG with LIMA to LAD, right radial artery to right posterior descending artery, reverse SVG to obtuse marginal and diagonal artery on 6/24/2020.  Stress cardiac MRI 12/23/2021 showed no ischemia but patient had been noting exertional dyspnea.  Coronary angiogram 10/6/2022 noted occluded SVG graft to right PDA.  Patient's angina was stable well-controlled on Imdur 30 mg.  Patient noted in the last 2 months he had been taking nitroglycerin 2 times a week as he was having more chest discomfort and into the left arm.  We again discussed PCI to RCA and patient declined this.  Will try to increase Imdur to 60 mg daily while being cautious of blood pressure.  Chronic congestive heart failure with preserved ejection fraction: Appears euvolemic on exam.  Cardiac catheterization 10/6/2022 showed normal left-sided filling pressure.  Patient is on beta-blocker, Jardiance  Essential hypertension: On metoprolol 50 mg daily, Imdur 30 mg daily.  Patient has been having lower heart rates in the 40s and feeling lightheaded at those times.  Will decrease metoprolol to 25 mg daily while increasing Imdur for more anginal symptoms.  Patient will continue monitoring blood pressure and if dropping lower into the 80s or still feeling lightheaded we will go back down to 30.  Dyslipidemia: On rosuvastatin 40 mg daily.  Last lipids 9/12/24 showed LDL of 48.  Diabetes mellitus type 2: Non-insulin-dependent.  On Jardiance, metformin.  Last hemoglobin A1c 5.8.  Possible HANNAH: Using CPAP now and notes having more energy since using this.  Has upcoming appointment with sleep  medicine.    Plan:   Decrease metoprolol to 25 mg daily given patient noting heart rates in the 40s and feeling lightheaded at those times  Will trial increasing isosorbide mononitrate to 60 mg daily while being cautious of blood pressure.  Patient will monitor his blood pressure and if dropping into the 80s systolic or feeling lightheaded we will back off and go down to 30 mg  Continue rosuvastatin 40 mg daily  Continue using CPAP  Follow-up with me, Teodora Ordonez PA-C in 2 months per patient's request      Follow-up with Dr. Burns in 4 months       History of Present Illness/Subjective    HPI: Nadya Blake is a 58 year old male with PMHx of coronary artery disease with history of NSTEMI status post four-vessel CABG with LIMA to LAD, right radial artery to RPDA, reverse SVG to OM and diagonal branch on 6/24/2020, HFpEF, hypertension, dyslipidemia presents for follow-up. Patient has history of chest pain which is not necessarily exertional, but has been worse with emotional stress that improves with sublingual nitroglycerin.  Patient started isosorbide mononitrate 30 mg daily and has helped some with this chest pain but led to some lightheadedness.  Metoprolol dose was decreased from 150 to 100 mg daily.  I had decreased this further to 50 mg daily to see if this improved his bradycardia and fatigue.  Patient last saw Dr. Burns 7/12/2024 where they again discussed PCI of the native RCA, but patient was fearful and wanted to continue medical therapy as he had been doing well.  Patient did undergo sleep study, but was waiting for follow-up with sleep medicine to go over results.    Patient continues to note some angina but feels it has been worsening the past 2 months.  Notes that he gets chest tightness that goes into his left arm more with emotional stress when he is at rest.  Patient has been taking a nitroglycerin about twice a week and notes symptoms improved after 10 minutes of taking the nitroglycerin.   Patient denies improving until he takes nitro.  Patient notes he has been walking once to twice every day around 15 minutes and denies any exertional angina or dyspnea with that.  Patient notes that his heart rates have been lower in the 40s and he has had some times of feeling lightheaded.  He denies shortness of breath, dyspnea on exertion, orthopnea, PND, palpitations, chest pain, abdominal fullness/bloating, and lower extremity edema.        MR cardiac with stress 12/23/2021  1.  Pharmacological Regadenoson stress cardiac MRI is negative for inducible myocardial ischemia.   2.  Pharmacological stress ECG is negative for inducible myocardial ischemia.   3. Normal left ventricular size, wall thickness and systolic function. The quantified left ventricular  ejection fraction is 59 %.  Very small area of non-transmural myocardial scar in the mid inferior wall is  identified.    4.  Normal right ventricular size and systolic function.    5.  No significant valvular abnormalities.  6. Ascending aorta measures 38 mm.     Cardiac catheterization 10/6/2022:  Left ventricular filling pressures are normal.  3rd Mrg lesion is 90% stenosed.  Prox RCA lesion is 75% stenosed.  Prox RCA to Mid RCA lesion is 40% stenosed.  Dist RCA lesion is 75% stenosed.  Mid RCA lesion is 30% stenosed.  RPDA lesion is 50% stenosed.  RPAV lesion is 40% stenosed.  Prox LAD lesion is 70% stenosed.  1st Diag-1 lesion is 95% stenosed.  1st Diag-2 lesion is 95% stenosed.  2nd Diag lesion is 99% stenosed.  Mid LAD-1 lesion is 75% stenosed.  Mid LAD-2 lesion is 75% stenosed.  Dist LAD lesion is 70% stenosed.  Vein graft to right PDA is totally occluded  Vein graft to second diagonal is widely patent  Vein graft to third OM is widely patent  LUZ MARIA graft to mid distal LAD is patent     Physical Examination  Review of Systems   Vitals: BP 94/60 (BP Location: Right arm, Patient Position: Sitting, Cuff Size: Adult Regular)   Pulse 56   Resp 14   Wt 85.3  kg (188 lb)   BMI 31.28 kg/m    BMI= Body mass index is 31.28 kg/m .  Wt Readings from Last 3 Encounters:   09/20/24 85.3 kg (188 lb)   09/12/24 85.6 kg (188 lb 12.8 oz)   07/19/24 87.1 kg (192 lb)           ENT/Mouth: membranes moist, no oral lesions or bleeding gums.      EYES:  no scleral icterus, normal conjunctivae       Chest/Lungs:   lungs are clear to auscultation, no rales or wheezing,  equal chest wall expansion    Cardiovascular:   Regular. Normal first and second heart sounds with no murmurs, rubs, or gallops; the radial pulses are intact,  no edema bilaterally        Extremities: no cyanosis or clubbing   Skin: no xanthelasma, warm.    Neurologic: no tremors     Psychiatric: alert and oriented x3, calm        Please refer above for cardiac ROS details.        Medical History  Surgical History Family History Social History   Past Medical History:   Diagnosis Date     Acute non-ST elevation myocardial infarction (NSTEMI) (H) 6/22/2020     Adenomatous polyp of colon      Ascending aorta dilatation (H24)      Carpal tunnel syndrome      Chronic superficial gastritis      Coronary artery disease due to lipid rich plaque 6/23/2020     Depression with anxiety      Dyslipidemia, goal LDL below 70 6/23/2020     Essential hypertension 9/2/2012     Fatty liver disease, nonalcoholic      GERD (gastroesophageal reflux disease)      IBS (irritable bowel syndrome)      Left lumbar radiculopathy      Multinodular goiter      Old torn meniscus of knee      Paroxysmal atrial fibrillation (H)      Perianal abscess      Post herpetic neuralgia      Post-traumatic osteoarthritis of both knees      Primary osteoarthritis of left hip      Primary osteoarthritis of right hip      Pulmonary nodule      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)      Thyroid nodule      TMJ arthritis      Tobacco use disorder 11/1/2013     Vitamin D deficiency 9/30/2020     Past Surgical History:   Procedure Laterality  Date     APPENDECTOMY  1995     BYPASS GRAFT ARTERY CORONARY  2020    Dr. Ragsdale     CV CORONARY ANGIOGRAM N/A 2020    Procedure: Coronary Angiogram;  Surgeon: Ariane Lester MD;  Location: [facility];  Service: Cardiology     CV CORONARY ANGIOGRAM N/A 10/6/2022    Procedure: Coronary Angiogram;  Surgeon: Javon John MD;  Location: [facility] CV     CV IABP INSERT N/A 2020    Procedure: Intra Aortic Balloon Pump Insertion;  Surgeon: Ariane Lester MD;  Location: [facility] Lab;  Service: Cardiology     CV LEFT HEART CATH N/A 10/6/2022    Procedure: Left Heart Catheterization;  Surgeon: Javon John MD;  Location: [facility] CV     CV LEFT HEART CATHETERIZATION WITHOUT LEFT VENTRICULOGRAM Left 2020    Procedure: Left Heart Catheterization Without Left Ventriculogram;  Surgeon: Ariane Lester MD;  Location: [facility] Lab;  Service: Cardiology     CV LEFT VENTRICULOGRAM N/A 10/6/2022    Procedure: Left Ventriculogram;  Surgeon: Javon John MD;  Location: [facility] CV     Family History   Problem Relation Age of Onset     No Known Problems Mother      Lung Cancer Father 56        fatal     No Known Problems Daughter      Other - See Comments Son         congenital deformity of right leg; he uses a leg prosthesis        Social History     Socioeconomic History     Marital status:      Spouse name: Carlee     Number of children: 2     Years of education: Not on file     Highest education level: Not on file   Occupational History     Not on file   Tobacco Use     Smoking status: Former     Current packs/day: 0.00     Average packs/day: 1 pack/day for 30.0 years (30.0 ttl pk-yrs)     Types: Cigarettes     Start date: 3/23/1990     Quit date: 3/23/2020     Years since quittin.4     Passive exposure: Never     Smokeless tobacco: Never     Tobacco comments:     stopped 3/24/2020   Vaping Use     Vaping status: Never Used    Substance and Sexual Activity     Alcohol use: No     Drug use: Never     Sexual activity: Yes     Partners: Female   Other Topics Concern     Not on file   Social History Narrative    , Carlee, BA chemistry and taking AA courses at [employer].  From [place]; bachelors in geology and computer science. Son, Grace (2005) and Sherrie (2008).  On SSDI, PTSD.       Social Determinants of Health     Financial Resource Strain: Low Risk  (12/27/2023)    Financial Resource Strain      Within the past 12 months, have you or your family members you live with been unable to get utilities (heat, electricity) when it was really needed?: No   Food Insecurity: Low Risk  (12/27/2023)    Food Insecurity      Within the past 12 months, did you worry that your food would run out before you got money to buy more?: No      Within the past 12 months, did the food you bought just not last and you didn t have money to get more?: No   Transportation Needs: Low Risk  (12/27/2023)    Transportation Needs      Within the past 12 months, has lack of transportation kept you from medical appointments, getting your medicines, non-medical meetings or appointments, work, or from getting things that you need?: No   Physical Activity: Not on file   Stress: Not on file   Social Connections: Unknown (1/1/2022)    Received from [facility], [facility]    Social Connections      Frequency of Communication with Friends and Family: Not on file   Interpersonal Safety: Low Risk  (5/3/2024)    Interpersonal Safety      Do you feel physically and emotionally safe where you currently live?: Yes      Within the past 12 months, have you been hit, slapped, kicked or otherwise physically hurt by someone?: No      Within the past 12 months, have you been humiliated or emotionally abused in other ways by your partner or ex-partner?: No   Housing Stability: Low Risk  (12/27/2023)    Housing Stability       Do you have housing? : Yes      Are you worried about losing your housing?: No           Medications  Allergies   Current Outpatient Medications   Medication Sig Dispense Refill     acetaminophen (TYLENOL) 500 MG tablet Take 1-2 tablets (500-1,000 mg) by mouth every 6 hours as needed for mild pain 360 tablet 3     albuterol (VENTOLIN HFA) 108 (90 Base) MCG/ACT inhaler INHALE 2 PUFFS INTO THE LUNGS EVERY 6 HOURS AS NEEDED FOR SHORTNESS OF BREATH OR WHEEZING OR COUGH 18 g 2     aspirin (ASA) 81 MG chewable tablet CHEW AND SWALLOW 1 TABLET(81 MG) BY MOUTH DAILY 90 tablet 2     azelastine (ASTELIN) 0.1 % nasal spray Spray 1 spray into both nostrils 2 times daily 30 mL 1     busPIRone (BUSPAR) 5 MG tablet Take 1 tablet (5 mg) by mouth 2 times daily 180 tablet 1     Cholecalciferol (VITAMIN D3) 50 MCG (2000 UT) CAPS TAKE 1 CAPSULE BY MOUTH DAILY 90 capsule 1     cyanocobalamin (VITAMIN B-12) 1000 MCG tablet Take 1 tablet (1,000 mcg) by mouth daily 90 tablet 4     diclofenac (VOLTAREN) 1 % topical gel Apply 4 g topically 4 times daily 500 g 2     empagliflozin (JARDIANCE) 25 MG TABS tablet Take 1 tablet (25 mg) by mouth daily. 90 tablet 2     finasteride (PROSCAR) 5 MG tablet Take 1 tablet (5 mg) by mouth daily 90 tablet 4     ipratropium (ATROVENT) 0.06 % nasal spray Spray 2 sprays into both nostrils 4 times daily 15 mL 1     isosorbide mononitrate (IMDUR) 30 MG 24 hr tablet Take 1 tablet (30 mg) by mouth daily 90 tablet 4     Lidocaine 0.5 % GEL Externally apply topically as needed       metoprolol succinate ER (TOPROL XL) 50 MG 24 hr tablet Take 1 tablet (50 mg) by mouth daily 90 tablet 3     nifedipine 0.2% in white petrolatum 0.2 % OINT ointment Apply topically 4 times daily as needed (anal fissure) 100 g 1     nitroGLYcerin (NITROSTAT) 0.4 MG sublingual tablet For chest pain place 1 tablet under the tongue every 5 minutes for 3 doses. If symptoms persist 5 minutes after 1st dose call 911. 30 tablet 3     omeprazole  (PRILOSEC) 20 MG DR capsule Take 1 capsule (20 mg) by mouth 2 times daily 180 capsule 4     oxyBUTYnin (DITROPAN) 5 MG tablet TAKE 1 TABLET(5 MG) BY MOUTH AT BEDTIME 90 tablet 2     PARoxetine (PAXIL) 30 MG tablet Take 1 tablet (30 mg) by mouth every morning. 90 tablet 1     polyethylene glycol (MIRALAX) 17 GM/Dose powder TAKE 17 GRAMS(1 CAPFUL) BY MOUTH DAILY 840 g 4     QUEtiapine (SEROQUEL) 50 MG tablet Take 1.5 tablets (75 mg) by mouth at bedtime 135 tablet 1     rosuvastatin (CRESTOR) 40 MG tablet Take 1 tablet (40 mg) by mouth daily 90 tablet 4     tamsulosin (FLOMAX) 0.4 MG capsule Take 2 capsules (0.8 mg) by mouth every evening 180 capsule 4       Allergies   Allergen Reactions     Atorvastatin Diarrhea     Cortisone Other (See Comments)     pain     Lisinopril Cough     started after CABG for unclear reason     Methylprednisolone Other (See Comments)     ?          Lab Results    Chemistry/lipid CBC Cardiac Enzymes/BNP/TSH/INR   Recent Labs   Lab Test 08/28/23  1436   CHOL 97   HDL 33*   LDL 32   TRIG 159*     Recent Labs   Lab Test 08/28/23  1436 01/18/23  1037 11/21/22  0947   LDL 32 30 27     Recent Labs   Lab Test 12/20/23 2039      POTASSIUM 4.3   CHLORIDE 105   CO2 26   *   BUN 16.2   CR 1.22*   GFRESTIMATED 70   TERESE 9.0     Recent Labs   Lab Test 12/20/23 2039 08/28/23  1436 06/27/23  1134   CR 1.22* 1.21* 1.10     Recent Labs   Lab Test 01/09/24  0940 10/03/23  0906 06/27/23  1134   A1C 5.5 5.6 5.7*          Recent Labs   Lab Test 12/20/23 2039   WBC 7.7   HGB 14.4   HCT 42.9   MCV 89        Recent Labs   Lab Test 12/20/23 2039 08/28/23  1436 01/18/23  1037   HGB 14.4 15.2 14.5    Recent Labs   Lab Test 11/21/20  0001 07/27/20  0703 07/27/20  0115   TROPONINI <0.01 <0.01 <0.01     Recent Labs   Lab Test 12/20/23 2039 07/27/20  0115 06/22/20  2322   BNP  --  79* <10   NTBNP <36  --   --      Recent Labs   Lab Test 08/28/23  1436   TSH 1.24     Recent Labs   Lab Test  07/27/20  0115 06/25/20  0427 06/24/20 2012   INR 1.10 1.32* 1.39*        Teodora Ordonez PA-C                                         Thank you for allowing me to participate in the care of your patient.      Sincerely,     Teodora Ordonez PA-C     [facility]  cc:   Sloan Burns MD  [address]  [place] [address]

## 2024-10-14 DIAGNOSIS — E78.5 DYSLIPIDEMIA, GOAL LDL BELOW 70: ICD-10-CM

## 2024-10-14 RX ORDER — ROSUVASTATIN CALCIUM 40 MG/1
40 TABLET, COATED ORAL DAILY
Qty: 90 TABLET | Refills: 2 | Status: SHIPPED | OUTPATIENT
Start: 2024-10-14

## 2024-10-31 ENCOUNTER — OFFICE VISIT (OUTPATIENT)
Dept: INTERNAL MEDICINE | Facility: CLINIC | Age: 59
End: 2024-10-31
Payer: MEDICAID

## 2024-10-31 VITALS
BODY MASS INDEX: 31.17 KG/M2 | HEIGHT: 65 IN | TEMPERATURE: 97.2 F | WEIGHT: 187.1 LBS | SYSTOLIC BLOOD PRESSURE: 102 MMHG | RESPIRATION RATE: 18 BRPM | DIASTOLIC BLOOD PRESSURE: 68 MMHG | OXYGEN SATURATION: 96 % | HEART RATE: 63 BPM

## 2024-10-31 DIAGNOSIS — I25.118 CORONARY ARTERY DISEASE OF NATIVE ARTERY OF NATIVE HEART WITH STABLE ANGINA PECTORIS (H): Primary | ICD-10-CM

## 2024-10-31 DIAGNOSIS — E11.9 TYPE 2 DIABETES MELLITUS WITHOUT COMPLICATION, WITHOUT LONG-TERM CURRENT USE OF INSULIN (H): ICD-10-CM

## 2024-10-31 DIAGNOSIS — F43.10 PTSD (POST-TRAUMATIC STRESS DISORDER): Chronic | ICD-10-CM

## 2024-10-31 DIAGNOSIS — N18.31 CHRONIC KIDNEY DISEASE, STAGE 3A (H): ICD-10-CM

## 2024-10-31 DIAGNOSIS — I10 ESSENTIAL HYPERTENSION: Chronic | ICD-10-CM

## 2024-10-31 DIAGNOSIS — J41.0 SIMPLE CHRONIC BRONCHITIS (H): ICD-10-CM

## 2024-10-31 PROCEDURE — 99214 OFFICE O/P EST MOD 30 MIN: CPT | Performed by: INTERNAL MEDICINE

## 2024-10-31 PROCEDURE — 91320 SARSCV2 VAC 30MCG TRS-SUC IM: CPT | Performed by: INTERNAL MEDICINE

## 2024-10-31 PROCEDURE — G2211 COMPLEX E/M VISIT ADD ON: HCPCS | Performed by: INTERNAL MEDICINE

## 2024-10-31 PROCEDURE — 90480 ADMN SARSCOV2 VAC 1/ONLY CMP: CPT | Performed by: INTERNAL MEDICINE

## 2024-10-31 ASSESSMENT — PATIENT HEALTH QUESTIONNAIRE - PHQ9: SUM OF ALL RESPONSES TO PHQ QUESTIONS 1-9: 9

## 2024-10-31 NOTE — PROGRESS NOTES
"  Office Visit - Follow Up   Chetmanasandreina DAE Blake   59 year old male    Date of Visit: 10/31/2024    Chief Complaint   Patient presents with    RECHECK        Assessment and Plan   1. Coronary artery disease, CABG 6/2020 (Primary)  Complex disease, continue secondary prevention.  Some discussion about intervention on RCA but he is declining for now    2. Essential hypertension  Blood pressure okay continue same    3. Simple chronic bronchitis (H)  Stable could be contributing to some of his dyspnea on exertion    4. Type 2 diabetes mellitus without complication, without long-term current use of insulin (H)  Has been well-controlled continues to    5. Chronic kidney disease, stage 3a (H)  Stable reviewed creatinine    6. PTSD (post-traumatic stress disorder)  Stable continue same    Return in about 4 weeks (around 11/28/2024) for Follow up.     History of Present Illness   This 59 year old man comes in with his wife for follow-up.  Recently saw cardiology.  Did decrease metoprolol and asked him to increase Imdur but he did not do this.  He is actually not having much in the form of chest pain or chest tightness right now.  He is walking 45 minutes on a treadmill and outside without any difficulty.  His main issue is that he gets short of breath when he walks upstairs.  It can take him a couple of minutes to catch his breath.  Otherwise doing okay.       Physical Exam   General Appearance:   No acute distress    /68 (BP Location: Left arm, Patient Position: Sitting, Cuff Size: Adult Regular)   Pulse 63   Temp 97.2  F (36.2  C) (Tympanic)   Resp 18   Ht 1.651 m (5' 5\")   Wt 84.9 kg (187 lb 1.6 oz)   SpO2 96%   BMI 31.14 kg/m      Heart rate controlled rhythm regular lungs clear to auscultation bilaterally no edema     Additional Information   Current Outpatient Medications   Medication Sig Dispense Refill    acetaminophen (TYLENOL) 500 MG tablet Take 1-2 tablets (500-1,000 mg) by mouth every 6 hours as needed " for mild pain 360 tablet 3    albuterol (VENTOLIN HFA) 108 (90 Base) MCG/ACT inhaler INHALE 2 PUFFS INTO THE LUNGS EVERY 6 HOURS AS NEEDED FOR SHORTNESS OF BREATH OR WHEEZING OR COUGH 18 g 2    aspirin (ASA) 81 MG chewable tablet CHEW AND SWALLOW 1 TABLET(81 MG) BY MOUTH DAILY 90 tablet 2    azelastine (ASTELIN) 0.1 % nasal spray Spray 1 spray into both nostrils 2 times daily 30 mL 1    busPIRone (BUSPAR) 5 MG tablet Take 1 tablet (5 mg) by mouth 2 times daily 180 tablet 1    Cholecalciferol (VITAMIN D3) 50 MCG (2000 UT) CAPS TAKE 1 CAPSULE BY MOUTH DAILY 90 capsule 1    cyanocobalamin (VITAMIN B-12) 1000 MCG tablet Take 1 tablet (1,000 mcg) by mouth daily 90 tablet 4    diclofenac (VOLTAREN) 1 % topical gel Apply 4 g topically 4 times daily 500 g 2    empagliflozin (JARDIANCE) 25 MG TABS tablet Take 1 tablet (25 mg) by mouth daily. 90 tablet 2    finasteride (PROSCAR) 5 MG tablet Take 1 tablet (5 mg) by mouth daily 90 tablet 4    ipratropium (ATROVENT) 0.06 % nasal spray Spray 2 sprays into both nostrils 4 times daily 15 mL 1    isosorbide mononitrate (IMDUR) 30 MG 24 hr tablet Take 1 tablet (30 mg) by mouth daily 90 tablet 4    Lidocaine 0.5 % GEL Externally apply topically as needed      metoprolol succinate ER (TOPROL XL) 25 MG 24 hr tablet Take 1 tablet (25 mg) by mouth daily. 90 tablet 3    nifedipine 0.2% in white petrolatum 0.2 % OINT ointment Apply topically 4 times daily as needed (anal fissure) 100 g 1    nitroGLYcerin (NITROSTAT) 0.4 MG sublingual tablet For chest pain place 1 tablet under the tongue every 5 minutes for 3 doses. If symptoms persist 5 minutes after 1st dose call 911. 30 tablet 3    omeprazole (PRILOSEC) 20 MG DR capsule Take 1 capsule (20 mg) by mouth 2 times daily 180 capsule 4    oxyBUTYnin (DITROPAN) 5 MG tablet TAKE 1 TABLET(5 MG) BY MOUTH AT BEDTIME 90 tablet 2    PARoxetine (PAXIL) 30 MG tablet Take 1 tablet (30 mg) by mouth every morning. 90 tablet 1    polyethylene glycol  (MIRALAX) 17 GM/Dose powder TAKE 17 GRAMS(1 CAPFUL) BY MOUTH DAILY 840 g 4    QUEtiapine (SEROQUEL) 50 MG tablet Take 1.5 tablets (75 mg) by mouth at bedtime 135 tablet 1    rosuvastatin (CRESTOR) 40 MG tablet TAKE 1 TABLET(40 MG) BY MOUTH DAILY 90 tablet 2    tamsulosin (FLOMAX) 0.4 MG capsule Take 2 capsules (0.8 mg) by mouth every evening 180 capsule 4       Time:     The longitudinal plan of care for the diagnosis(es)/condition(s) as documented were addressed during this visit. Due to the added complexity in care, I will continue to support Mieshaandreina in the subsequent management and with ongoing continuity of care.     Az Briseno MD  Answers submitted by the patient for this visit:  General Questionnaire (Submitted on 10/31/2024)  Chief Complaint: Chronic problems general questions HPI Form  What is the reason for your visit today? : General follow up  How many days per week do you miss taking your medication?: 0  Questionnaire about: Chronic problems general questions HPI Form (Submitted on 10/31/2024)  Chief Complaint: Chronic problems general questions HPI Form

## 2024-11-01 ENCOUNTER — VIRTUAL VISIT (OUTPATIENT)
Dept: SLEEP MEDICINE | Facility: CLINIC | Age: 59
End: 2024-11-01
Payer: COMMERCIAL

## 2024-11-01 VITALS — BODY MASS INDEX: 31.16 KG/M2 | WEIGHT: 187 LBS | HEIGHT: 65 IN

## 2024-11-01 DIAGNOSIS — G47.33 OSA (OBSTRUCTIVE SLEEP APNEA): Primary | ICD-10-CM

## 2024-11-01 PROCEDURE — 99213 OFFICE O/P EST LOW 20 MIN: CPT | Mod: 95 | Performed by: NURSE PRACTITIONER

## 2024-11-01 ASSESSMENT — SLEEP AND FATIGUE QUESTIONNAIRES
HOW LIKELY ARE YOU TO NOD OFF OR FALL ASLEEP IN A CAR, WHILE STOPPED FOR A FEW MINUTES IN TRAFFIC: WOULD NEVER DOZE
HOW LIKELY ARE YOU TO NOD OFF OR FALL ASLEEP WHILE SITTING QUIETLY AFTER LUNCH WITHOUT ALCOHOL: HIGH CHANCE OF DOZING
HOW LIKELY ARE YOU TO NOD OFF OR FALL ASLEEP WHILE SITTING INACTIVE IN A PUBLIC PLACE: SLIGHT CHANCE OF DOZING
HOW LIKELY ARE YOU TO NOD OFF OR FALL ASLEEP WHILE SITTING AND READING: MODERATE CHANCE OF DOZING
HOW LIKELY ARE YOU TO NOD OFF OR FALL ASLEEP WHILE LYING DOWN TO REST IN THE AFTERNOON WHEN CIRCUMSTANCES PERMIT: HIGH CHANCE OF DOZING
HOW LIKELY ARE YOU TO NOD OFF OR FALL ASLEEP WHILE WATCHING TV: MODERATE CHANCE OF DOZING
HOW LIKELY ARE YOU TO NOD OFF OR FALL ASLEEP WHILE SITTING AND TALKING TO SOMEONE: WOULD NEVER DOZE
HOW LIKELY ARE YOU TO NOD OFF OR FALL ASLEEP WHEN YOU ARE A PASSENGER IN A CAR FOR AN HOUR WITHOUT A BREAK: WOULD NEVER DOZE

## 2024-11-01 ASSESSMENT — PAIN SCALES - GENERAL: PAINLEVEL_OUTOF10: NO PAIN (0)

## 2024-11-01 NOTE — PATIENT INSTRUCTIONS
"MY INFORMATION ON SLEEP APNEA-  Nadya Blake    DOCTOR : GENET Ybarra CNP  SLEEP CENTER :      MY CONTACT NUMBER:   [facility]  [phone]  [facility]   [phone]  [facility]   [phone]      [facility]  [phone]  [facility]   [phone]    DME:  [facility] [place] [address], [address]  [place] [address]  Phone: [phone]    Hours:  Mon - Fri: 8:00 a.m. - 4:30 p.m.  Sat: Closed  Sun: Closed      Key Points:  1. What is Obstructive Sleep Apnea (HANNAH)? HANNAH is the most common type of sleep apnea. Apnea literally means, \"without breath.\" It is characterized by repetitive pauses in breathing, despite continued effort to breathe, and is usually associated with a reduction in blood oxygen saturation. Apneas can last 10 to over 60 seconds. It is caused by narrowing or collapse of the upper airway as muscles relax during sleep.   2. What are the consequences of HANNAH? Symptoms include: daytime sleepiness- possibly increasing the risk of falling asleep while driving, unrefreshing/restless sleep, snoring, insomnia, waking frequently to urinate, waking with heartburn or reflux, reduced concentration and memory, and morning headaches. Other health consequences may include development of high blood pressure. Untreated HANNAH also can contribute to heart disease, stroke and diabetes.   3. What are the treatment options? In most situations, sleep apnea is a lifelong disease that must be managed with daily therapy. Continuous Positive Airway (CPAP) is the most reliable treatment. A mouthguard to hold your jaw forward is usually the next most reliable option. Other options include postioning devices (to keep you off your back), nasal valves, tongue retaining device, weight loss, surgery. There is more detail about these options toward the end of this " document.  4. What are the most important things to remember about using CPAP?     WHERE CAN I FIND MORE INFORMATION?    American Academy of Sleep Medicine Patient information on sleep disorders:  http://yoursleep.aasmnet.org    CPAP -  WHY AND HOW?                 Continuous positive airway pressure, or CPAP, is the most effective treatment for obstructive sleep apnea. It works by blowing room air, through a mask, to hold your throat open. A decision to use CPAP is a major step forward in the pursuit of a healthier life. The successful use of CPAP will help you breathe easier, sleep better and live healthier. Using CPAP can be a positive experience if you keep these woods points in mind:  Commitment  CPAP is not a quick fix for your problem. It involves a long-term commitment to improve your sleep and your health.    Communication  Stay in close communication with both your sleep doctor and your CPAP supplier. Ask lots of questions and seek help when you need it.    Consistency  Use CPAP all night, every night and for every nap. You will receive the maximum health benefits from CPAP when you use it every time that you sleep. This will also make it easier for your body to adjust to the treatment.    Correction  The first machine and mask that you try may not be the best ones for you. Work with your sleep doctor and your CPAP supplier to make corrections to your equipment selection. Ask about trying a different type of machine or mask if you have ongoing problems. Make sure that your mask is a good fit and learn to use your equipment properly.    Challenge  Tell a family member or close friend to ask you each morning if you used your CPAP the previous night. Have someone to challenge you to give it your best effort.    Connection   Your adjustment to CPAP will be easier if you are able to connect with others who use the same treatment. Ask your sleep doctor if there is a support group in your area for people who have  "sleep apnea, or look for one on the Internet.  Comfort   Increase your level of comfort by using a saline spray, decongestant or heated humidifier if CPAP irritates your nose, mouth or throat. Use your unit's \"ramp\" setting to slowly get used to the air pressure level. There may be soft pads you can buy that will fit over your mask straps. Look on www.CPAP.com for accessories that can help make CPAP use more comfortable.  Cleaning   Clean your mask, tubing and headgear on a regular basis. Put this time in your schedule so that you don't forget to do it. Check and replace the filters for your CPAP unit and humidifier.    Completion   Although you are never finished with CPAP therapy, you should reward yourself by celebrating the completion of your first month of treatment. Expect this first month to be your hardest period of adjustment. It will involve some trial and error as you find the machine, mask and pressure settings that are right for you.    Continuation  After your first month of treatment, continue to make a daily commitment to use your CPAP all night, every night and for every nap.    CPAP-Tips to starting with success:  Begin using your CPAP for short periods of time during the day while you watch TV or read.    Use CPAP every night and for every nap. Using it less often reduces the health benefits and makes it harder for your body to get used to it.    Newer CPAP models are virtually silent; however, if you find the sound of your CPAP machine to be bothersome, place the unit under your bed to dampen the sound.     Make small adjustments to your mask, tubing, straps and headgear until you get the right fit. Tightening the mask may actually worsen the leak.  If it leaves significant marks on your face or irritates the bridge of your nose, it may not be the best mask for you.  Speak with the person who supplied the mask and consider trying other masks. Insurances will allow you to try different masks " during the first month of starting CPAP.  Insurance also covers a new mask, hose and filter about every 6 months.    Use a saline nasal spray to ease mild nasal congestion. Neti-Pot or saline nasal rinses may also help. Nasal gel sprays can help reduce nasal dryness.  Biotene mouthwash can be helpful to protect your teeth if you experience frequent dry mouth.  Dry mouth may be a sign of air escaping out of your mouth or out of the mask in the case of a full face mask.  Speak with your provider if you expect that is the case.     Take a nasal decongestant to relieve more severe nasal or sinus congestion.  Do not use Afrin (oxymetazoline) nasal spray more than 3 days in a row.  Speak with your sleep doctor if your nasal congestion is chronic.    Use a heated humidifier that fits your CPAP model to enhance your breathing comfort. Adjust the heat setting up if you get a dry nose or throat, down if you get condensation in the hose or mask.  Position the CPAP lower than you so that any condensation in the hose drains back into the machine rather than towards the mask.    Try a system that uses nasal pillows if traditional masks give you problems.    Clean your mask, tubing and headgear once a week. Make sure the equipment dries fully.    Regularly check and replace the filters for your CPAP unit and humidifier.    Work closely with your sleep provider and your CPAP supplier to make sure that you have the machine, mask and air pressure setting that works best for you. It is better to stop using it and call your provider to solve problems than to lay awake all night frustrated with the device.  Weight Loss:    Weight loss decreases severity of sleep apnea in most people with obesity. For those with mild obesity who have developed snoring with weight gain, even 15-30 pound weight loss can improve and occasionally eliminate sleep apnea.  Structured and life-long dietary and health habits are necessary to lose weight and keep  healthier weight levels.     Though there are significant health benefits from weight loss, long-term weight loss is very difficult to achieve- studies show success with dietary management in less than 10% of people. In addition, substantial weight loss may require years of dietary control and may be difficult if patients have severe obesity. In these cases, surgical management may be considered.    If you are interested in methods for weight loss, you should review the options discussed at the National Institutes of Health patient information sites:     http://win.niddk.nih.gov/publications/index.htm  http:/www.health.nih.gov/topic/WeightLossDieting    Bariatric programs offer counseling in all methods of weight loss:    Http:/www.[facility].org/Specialties/WeightLossSurgeryandMedicalMgmt/htm    Your BMI is Body mass index is 31.12 kg/m .    Weight management plan: Patient was referred to their PCP to discuss a diet and exercise plan.    Body mass index (BMI) is one way to tell whether you are at a healthy weight, overweight, or obese. It measures your weight in relation to your height.  A BMI of 18.5 to 24.9 is in the healthy range. A person with a BMI of 25 to 29.9 is considered overweight, and someone with a BMI of 30 or greater is considered obese.  Another way to find out if you are at risk for health problems caused by overweight and obesity is to measure your waist. If you are a woman and your waist is more than 35 inches, or if you are a man and your waist is more than 40 inches, your risk of disease may be higher.  More than two-thirds of American adults are considered overweight or obese. Being overweight or obese increases the risk for further weight gain.  Excess weight may lead to heart disease and diabetes. Creating and following plans for healthy eating and physical activity may help you improve your health.    Methods for maintaining or losing weight.    Weight control is part of healthy  lifestyle and includes exercise, emotional health, and healthy eating habits.  Careful eating habits lifelong is the mainstay of weight control.  Though there are significant health benefits from weight loss, long-term weight loss with diet alone may be very difficult to achieve- studies show long-term success with dietary management in less than 10% of people. Attaining a healthy weight may be especially difficult to achieve in those with severe obesity. In some cases, medications, devices and surgical management might be considered.    What can you do?    If you are overweight or obese and are interested in methods for weight loss, you should discuss this with your provider. In addition, we recommend that you review healthy life styles and methods for weight loss available through the National Institutes of Health patient information sites:     http://win.niddk.nih.gov/publications/index.htm

## 2024-11-01 NOTE — PROGRESS NOTES
Virtual Visit Details    Type of service:  Video Visit   Video Start Time: 9:35 AM  Video End Time:9:49 AM    Originating Location (pt. Location): Home    Distant Location (provider location):  Off-site  Platform used for Video Visit: [facility]      Sleep Apnea - Follow-up Visit:    Impression/Plan:  1. HANNAH (obstructive sleep apnea) (Primary)     Moderate Sleep apnea. Tolerating PAP well. Daytime symptoms are improved.  The patient is meeting compliance requirements at this time.  He continues to use and benefit from PAP therapy.  We reviewed his recent PSG results and APAP download data results today.    A review of his APAP download data over the last 30 days shows very good use and compliance and moderate HANNAH that is well-controlled on current pressure settings.  Of note, there is excessive air leakage seen on download data.    Continue current plan of care. No new orders placed at this visit.  We did discuss recommendations for replacing his mask and supplies and he can contact his DME for this.    Nadya Blake will follow up in about 6 month(s) to review APAP download data and use/compliance.     Twenty-five minutes spent with patient, all of which were spent face-to-face counseling, consulting, chart review/documentation, and coordinating plan of care on the date of the encounter.      GENET Ybarra CNP  Sleep Medicine      CC:  Az Briseno,         History of Present Illness:  Chief Complaint   Patient presents with    RECHECK    CPAP Follow Up     Compliance       Nadya Blake is a 59-year-old male with a PMH pertinent for HTN, CAD/NSTEMI - s/p CABG x 4, HFpEF (59%), DM2, CKD stage IIIa, PTSD, BPH, former smoker, and obesity who  presents for compliance follow-up of their moderate sleep apnea, managed with CPAP.  He was last seen in an office visit with me on 1/25/2024 and subsequently underwent overnight polysomnography testing which showed moderate HNANAH, pronounced during supine sleep  position, without sleep associated hypoxemia.  He was subsequently notified of the results and started on PAP therapy at that time.  Patient has the following compliance requirements: usage only.     Previous Study Results:   6/24/2024 [facility] Study (192.0 lbs) - AHI 18.7, RDI 19.5, Supine AHI 49.9, REM AHI 9.5, Low O2 85.0%, Time Spent <=88% 1.2 minutes / Time Spent <=89% 3.8 minutes.     DME: [facility]    Do you use a CPAP Machine at home: (Proxy-Rptd) Yes  Overall, on a scale of 0-10 how would you rate your CPAP (0 poor, 10 great): (Proxy-Rptd) 7    What type of mask do you use: (Proxy-Rptd) Nasal Mask  Is your mask comfortable: (Proxy-Rptd) No  If not, why: (Proxy-Rptd) head tightness cannot quite fit has tried 3 and last one is the best    Is your mask leaking: (Proxy-Rptd) No  If yes, where do you feel it:    How many night per week does the mask leak (0-7):      Do you notice snoring with mask on: (Proxy-Rptd) No  Do you notice gasping arousals with mask on: (Proxy-Rptd) No  Are you having significant oral or nasal dryness: (Proxy-Rptd) Yes  Is the pressure setting comfortable: (Proxy-Rptd) Yes  If not, why:      What is your typical bedtime: (Proxy-Rptd) 11pm  How long does it take you to go to sleep on PAP therapy: (Proxy-Rptd) 30 minutes  What time do you typically get out of bed for the day: (Proxy-Rptd) 6am  How many hours on average per night are you using PAP therapy: (Proxy-Rptd) 7-8  How many hours are you sleeping per night: (Proxy-Rptd) 7  Do you feel well rested in the morning: (Proxy-Rptd) No      ResMed AirSense 11 (set up on 7/22/2024 at [facility])  Auto-PAP 5.0 - 15.0 cmH2O 30 day usage data:    76% of days with > 4 hours of use. 0/30 days with no use.   Average use 272 minutes per day.   95%ile Leak 89.34 L/min.   CPAP 95% pressure 5.3 cm.   AHI 1.59 events per hour.        EPWORTH SLEEPINESS SCALE         11/1/2024     9:11 AM    [place] Sleepiness Scale ( M.WMarshal Johns   [id number]<br>ESS - [place]/English - Final version - 21 Nov 07 - [facility] Research [place].)   Sitting and reading Moderate chance of dozing   Watching TV Moderate chance of dozing   Sitting, inactive in a public place (e.g. a theatre or a meeting) Slight chance of dozing   As a passenger in a car for an hour without a break Would never doze   Lying down to rest in the afternoon when circumstances permit High chance of dozing   Sitting and talking to someone Would never doze   Sitting quietly after a lunch without alcohol High chance of dozing   In a car, while stopped for a few minutes in traffic Would never doze   [place] Score (MC) 11   [place] Score (Sleep) 11        Patient-reported       INSOMNIA SEVERITY INDEX (SHANITA)          11/1/2024     9:12 AM   Insomnia Severity Index (SHANITA)   Difficulty falling asleep 1    Difficulty staying asleep 2    Problems waking up too early 2    How SATISFIED/DISSATISFIED are you with your CURRENT sleep pattern? 1    How NOTICEABLE to others do you think your sleep problem is in terms of impairing the quality of your life? 2    How WORRIED/DISTRESSED are you about your current sleep problem? 1    To what extent do you consider your sleep problem to INTERFERE with your daily functioning (e.g. daytime fatigue, mood, ability to function at work/daily chores, concentration, memory, mood, etc.) CURRENTLY? 2    SHANITA Total Score 11        Patient-reported       Guidelines for Scoring/Interpretation:  Total score categories:  0-7 = No clinically significant insomnia   8-14 = Subthreshold insomnia   15-21 = Clinical insomnia (moderate severity)  22-28 = Clinical insomnia (severe)  Used via courtesy of www.Synaffixth.va.gov with permission from Edwardo Torres PhD., [facility]      Past medical/surgical history, family history, social history, medications and allergies were reviewed.        Problem List:  Patient Active Problem List    Diagnosis Date Noted    Obstructive sleep apnea syndrome  09/12/2024     Priority: Medium    Gallstones 12/28/2023     Priority: Medium    Type 2 diabetes mellitus without complication, without long-term current use of insulin (H) 08/28/2023     Priority: Medium    Microscopic hematuria - Dr. Tineo 07/27/2023     Priority: Medium    Chronic kidney disease, stage 3a (H) 11/21/2022     Priority: Medium    Simple chronic bronchitis (H) 07/05/2022     Priority: Medium    Anal fissure - Dr. Campoverde 08/30/2021     Priority: Medium    Benign prostatic hyperplasia with nocturia 09/30/2020     Priority: Medium    PTSD (post-traumatic stress disorder) 09/30/2020     Priority: Medium    S/P CABG x 4 07/02/2020     Priority: Medium    Coronary artery disease, CABG 6/2020 06/23/2020     Priority: Medium    Dyslipidemia, goal LDL below 70 06/23/2020     Priority: Medium    Ascending aorta dilatation (H) 02/16/2020     Priority: Medium     Formatting of this note might be different from the original.  4.3 cm since 2014.      History of colonic polyps 09/14/2017     Priority: Medium    Primary osteoarthritis of left hip 04/28/2017     Priority: Medium    Primary osteoarthritis of right hip 04/28/2017     Priority: Medium    Thyroid nodule 03/07/2016     Priority: Medium    Post-traumatic osteoarthritis of both knees 06/08/2015     Priority: Medium    Gastroesophageal reflux disease with esophagitis without hemorrhage 10/30/2014     Priority: Medium     2-8-13 EGD nonspecific gastritis. Treated for H pylori in the past.  3-20-19 non specific gastritis. Path neg.        Recurrent major depressive disorder, in partial remission (H) 09/02/2012     Priority: Medium    Essential hypertension 09/02/2012     Priority: Medium      Current Outpatient Medications   Medication Sig Dispense Refill    acetaminophen (TYLENOL) 500 MG tablet Take 1-2 tablets (500-1,000 mg) by mouth every 6 hours as needed for mild pain 360 tablet 3    albuterol (VENTOLIN HFA) 108 (90 Base) MCG/ACT inhaler INHALE 2  PUFFS INTO THE LUNGS EVERY 6 HOURS AS NEEDED FOR SHORTNESS OF BREATH OR WHEEZING OR COUGH 18 g 2    aspirin (ASA) 81 MG chewable tablet CHEW AND SWALLOW 1 TABLET(81 MG) BY MOUTH DAILY 90 tablet 2    azelastine (ASTELIN) 0.1 % nasal spray Spray 1 spray into both nostrils 2 times daily 30 mL 1    busPIRone (BUSPAR) 5 MG tablet Take 1 tablet (5 mg) by mouth 2 times daily 180 tablet 1    Cholecalciferol (VITAMIN D3) 50 MCG (2000 UT) CAPS TAKE 1 CAPSULE BY MOUTH DAILY 90 capsule 1    cyanocobalamin (VITAMIN B-12) 1000 MCG tablet Take 1 tablet (1,000 mcg) by mouth daily 90 tablet 4    diclofenac (VOLTAREN) 1 % topical gel Apply 4 g topically 4 times daily 500 g 2    empagliflozin (JARDIANCE) 25 MG TABS tablet Take 1 tablet (25 mg) by mouth daily. 90 tablet 2    finasteride (PROSCAR) 5 MG tablet Take 1 tablet (5 mg) by mouth daily 90 tablet 4    ipratropium (ATROVENT) 0.06 % nasal spray Spray 2 sprays into both nostrils 4 times daily 15 mL 1    isosorbide mononitrate (IMDUR) 30 MG 24 hr tablet Take 1 tablet (30 mg) by mouth daily 90 tablet 4    Lidocaine 0.5 % GEL Externally apply topically as needed      metoprolol succinate ER (TOPROL XL) 25 MG 24 hr tablet Take 1 tablet (25 mg) by mouth daily. 90 tablet 3    nifedipine 0.2% in white petrolatum 0.2 % OINT ointment Apply topically 4 times daily as needed (anal fissure) 100 g 1    nitroGLYcerin (NITROSTAT) 0.4 MG sublingual tablet For chest pain place 1 tablet under the tongue every 5 minutes for 3 doses. If symptoms persist 5 minutes after 1st dose call 911. 30 tablet 3    omeprazole (PRILOSEC) 20 MG DR capsule Take 1 capsule (20 mg) by mouth 2 times daily 180 capsule 4    oxyBUTYnin (DITROPAN) 5 MG tablet TAKE 1 TABLET(5 MG) BY MOUTH AT BEDTIME 90 tablet 2    PARoxetine (PAXIL) 30 MG tablet Take 1 tablet (30 mg) by mouth every morning. 90 tablet 1    polyethylene glycol (MIRALAX) 17 GM/Dose powder TAKE 17 GRAMS(1 CAPFUL) BY MOUTH DAILY 840 g 4    QUEtiapine (SEROQUEL) 50  "MG tablet Take 1.5 tablets (75 mg) by mouth at bedtime 135 tablet 1    rosuvastatin (CRESTOR) 40 MG tablet TAKE 1 TABLET(40 MG) BY MOUTH DAILY 90 tablet 2    tamsulosin (FLOMAX) 0.4 MG capsule Take 2 capsules (0.8 mg) by mouth every evening 180 capsule 4     Current Facility-Administered Medications   Medication Dose Route Frequency Provider Last Rate Last Admin    albuterol (PROVENTIL HFA/VENTOLIN HFA) inhaler  2 puff Inhalation Once Sonya Leonard MD         Ht 1.651 m (5' 5\")   Wt 84.8 kg (187 lb)   BMI 31.12 kg/m        This note was written with the assistance of the Dragon voice-dictation technology software. The final document, although reviewed, may contain errors. For corrections, please contact the office.    "

## 2024-11-12 ENCOUNTER — OFFICE VISIT (OUTPATIENT)
Dept: FAMILY MEDICINE | Facility: CLINIC | Age: 59
End: 2024-11-12
Payer: COMMERCIAL

## 2024-11-12 VITALS
HEIGHT: 66 IN | RESPIRATION RATE: 19 BRPM | TEMPERATURE: 98.7 F | DIASTOLIC BLOOD PRESSURE: 80 MMHG | BODY MASS INDEX: 30.1 KG/M2 | SYSTOLIC BLOOD PRESSURE: 123 MMHG | WEIGHT: 187.3 LBS | OXYGEN SATURATION: 97 % | HEART RATE: 58 BPM

## 2024-11-12 DIAGNOSIS — R42 DIZZINESS: Primary | ICD-10-CM

## 2024-11-12 DIAGNOSIS — R35.1 NOCTURIA: ICD-10-CM

## 2024-11-12 LAB
ALBUMIN UR-MCNC: NEGATIVE MG/DL
ANION GAP SERPL CALCULATED.3IONS-SCNC: 11 MMOL/L (ref 7–15)
APPEARANCE UR: CLEAR
ATRIAL RATE - MUSE: 54 BPM
BILIRUB UR QL STRIP: NEGATIVE
BUN SERPL-MCNC: 10.9 MG/DL (ref 6–20)
CALCIUM SERPL-MCNC: 9.2 MG/DL (ref 8.8–10.4)
CHLORIDE SERPL-SCNC: 105 MMOL/L (ref 98–107)
COLOR UR AUTO: YELLOW
CREAT SERPL-MCNC: 1 MG/DL (ref 0.67–1.17)
DIASTOLIC BLOOD PRESSURE - MUSE: NORMAL MMHG
EGFRCR SERPLBLD CKD-EPI 2021: 88 ML/MIN/1.73M2
ERYTHROCYTE [DISTWIDTH] IN BLOOD BY AUTOMATED COUNT: 12.1 % (ref 10–15)
GLUCOSE SERPL-MCNC: 86 MG/DL (ref 70–99)
GLUCOSE UR STRIP-MCNC: 500 MG/DL
HCO3 SERPL-SCNC: 23 MMOL/L (ref 22–29)
HCT VFR BLD AUTO: 45 % (ref 40–53)
HGB BLD-MCNC: 14.9 G/DL (ref 13.3–17.7)
HGB UR QL STRIP: NEGATIVE
INTERPRETATION ECG - MUSE: NORMAL
KETONES UR STRIP-MCNC: NEGATIVE MG/DL
LEUKOCYTE ESTERASE UR QL STRIP: NEGATIVE
MCH RBC QN AUTO: 29.8 PG (ref 26.5–33)
MCHC RBC AUTO-ENTMCNC: 33.1 G/DL (ref 31.5–36.5)
MCV RBC AUTO: 90 FL (ref 78–100)
NITRATE UR QL: NEGATIVE
P AXIS - MUSE: 25 DEGREES
PH UR STRIP: 7 [PH] (ref 5–8)
PLATELET # BLD AUTO: 196 10E3/UL (ref 150–450)
POTASSIUM SERPL-SCNC: 4.2 MMOL/L (ref 3.4–5.3)
PR INTERVAL - MUSE: 224 MS
QRS DURATION - MUSE: 96 MS
QT - MUSE: 408 MS
QTC - MUSE: 386 MS
R AXIS - MUSE: -35 DEGREES
RBC # BLD AUTO: 5 10E6/UL (ref 4.4–5.9)
SODIUM SERPL-SCNC: 139 MMOL/L (ref 135–145)
SP GR UR STRIP: 1.02 (ref 1–1.03)
SYSTOLIC BLOOD PRESSURE - MUSE: NORMAL MMHG
T AXIS - MUSE: 54 DEGREES
UROBILINOGEN UR STRIP-ACNC: 0.2 E.U./DL
VENTRICULAR RATE- MUSE: 54 BPM
WBC # BLD AUTO: 7.1 10E3/UL (ref 4–11)

## 2024-11-12 PROCEDURE — 85027 COMPLETE CBC AUTOMATED: CPT

## 2024-11-12 PROCEDURE — 93010 ELECTROCARDIOGRAM REPORT: CPT | Performed by: INTERNAL MEDICINE

## 2024-11-12 PROCEDURE — 36415 COLL VENOUS BLD VENIPUNCTURE: CPT

## 2024-11-12 PROCEDURE — 93005 ELECTROCARDIOGRAM TRACING: CPT

## 2024-11-12 PROCEDURE — 81003 URINALYSIS AUTO W/O SCOPE: CPT

## 2024-11-12 PROCEDURE — 99214 OFFICE O/P EST MOD 30 MIN: CPT

## 2024-11-12 PROCEDURE — 80048 BASIC METABOLIC PNL TOTAL CA: CPT

## 2024-11-12 RX ORDER — ONDANSETRON 4 MG/1
4 TABLET, FILM COATED ORAL EVERY 8 HOURS PRN
Qty: 30 TABLET | Refills: 0 | Status: SHIPPED | OUTPATIENT
Start: 2024-11-12

## 2024-11-12 RX ORDER — MECLIZINE HCL 12.5 MG 12.5 MG/1
12.5 TABLET ORAL 3 TIMES DAILY PRN
Qty: 30 TABLET | Refills: 0 | Status: SHIPPED | OUTPATIENT
Start: 2024-11-12

## 2024-11-12 ASSESSMENT — PAIN SCALES - GENERAL: PAINLEVEL_OUTOF10: NO PAIN (0)

## 2024-11-12 NOTE — PATIENT INSTRUCTIONS
"I believe vertigo is the cause of your symptoms.  To help with the symptoms, we need to help your body shift the crystals of the inner ear back to the proper position.  To do this, you need to try an exercise at home.  There is a maneuver called the \"half somersault\" that can be very effective in relieving the symptoms of vertigo.  The link for a video on how to perform this is below.  It may take several repetitions of of the maneuver.  https://[employer]/[email or web address] or go to YouTube and search Federica Peace MD Vertigo treatment.  I have prescribed medication called meclizine to help with the symptoms as well.  You may take this every 8 hours as needed, however it is not a replacement for trying the maneuvers to move the crystals back to the appropriate location in your inner ear.    I have also prescribed zofran for your nausea. You can take this up to 3 times daily to prevent vomiting.    Please follow up with your primary care provide in one week for a recheck of your symptoms.  If you develop severe worsening of the symptoms, fevers, weakness, vomiting not controlled by medication, or any other concerns, please be seen in the ER right away.    Benign Paroxysmal Positional Vertigo     Your health care provider may move your head in certain ways to treat your BPPV.     Benign paroxysmal positional vertigo (BPPV) is a problem with the inner ear. The inner ear contains the vestibular system. This system is what helps you keep your balance. BPPV causes a feeling of spinning. It is a common problem of the vestibular system.  Understanding the vestibular system  The vestibular system of the ear is made up of very tiny parts. They include the utricle, saccule, and semicircular canals. The utricle is a tiny organ that contains calcium crystals. In some people, the crystals can move into the semicircular canals. When this happens, the system no longer works as it should. This causes BPPV. Benign means it is not life " threatening. Paroxysmal means it happens suddenly. Positional means that it happens when you move your head. Vertigo is a feeling of spinning.  What causes BPPV?  Causes include injury to your head or neck. Other problems with the vestibular system may cause BPPV. In many people, the cause of BPPV is not known.  Symptoms of BPPV  You many have repeated feelings of spinning (vertigo). The vertigo usually lasts less than 1 minute. Some movements, such as rolling over in bed, can bring on vertigo.  Diagnosing BPPV  Your primary healthcare provider may diagnose and treat your BPPV. Or you may see an ear, nose, and throat doctor (otolaryngologist). In some cases, you may see a nervous system doctor (neurologist).  The healthcare provider will ask about your symptoms and your medical history. He or she will examine you. You may have hearing and balance tests. As part of the exam, your healthcare provider may have you move your head and body in certain ways. If you have BPPV, the movements can bring on vertigo. Your provider will also look for abnormal movements of your eyes. You may have other tests to check your vestibular or nervous systems.  Treatment for BPPV  Your healthcare provider may try to move the calcium crystals. This is done by having you move your head and neck in certain ways. This treatment is safe and often works well. You may also be told to do these movements at home. You may still have vertigo for a few weeks. Your healthcare provider will recheck your symptoms, usually in about a month. Special physical therapy may also be part of treatment. In rare cases, surgery may be needed for BPPV that does not go away.     When to call the healthcare provider  Call your healthcare provider right away if you have any of these:  Symptoms that do not go away with treatment  Symptoms that get worse  New symptoms   Date Last Reviewed: 5/1/2017

## 2024-11-12 NOTE — PROGRESS NOTES
Assessment & Plan     (R42) Dizziness  (primary encounter diagnosis)  Comment: DDx for Dizziness inclues the following: vertigo, orthostatic hypotension, hypoglycemia, hyponatremia, dehydration, middle ear infection, arrhythmia, TIA/stroke, structural lesions (primary or metastatic tumor, intracranial hemorrhage).      Most likely BPPV or other vertiginous etiology given he attests to room-spinning type dizziness.  Glucose is in process today, unlikely hypoglycemia.  Sodium in process today, unlikely hypernatremia.  Vitals normal, no signs of dry mucous membranes.  Mild dehydration possible.    EKG noting 1st degree AV block which is an intermittent finding on patient's prior EKGs.  Not likely the cause of his dizziness.  TMs both appear normal and patient no complaining of ear pain or fever to suggest otitis media.  No neurological findings to suggest TIA/Stroke.  Given lack of neurological findings, doubt structural lesions and I do not believe that imaging is indicated at this time.    Based on history and exam, the most likely etiology of this patient's dizziness is related to vertigo.  Patient is given meclizine, Zofran, and vertigo exercises for home.  Advised to follow-up with PCP, return to clinic with new or worsening symptoms.    Plan: EKG 12-lead, tracing only, Basic metabolic         panel  (Ca, Cl, CO2, Creat, Gluc, K, Na, BUN),         CBC with platelets, meclizine (ANTIVERT) 12.5         MG tablet, ondansetron (ZOFRAN) 4 MG tablet, UA        Macroscopic with reflex to Microscopic and         Culture - Lab Collect    (R35.1) Nocturia  Comment: Waking up a number of times at night to use the bathroom.  He notes that this is not completely unlike him, but feels that it is more profound recent.  Educated him that this could be a more chronic presentation rather than a symptom of acute infection, but we will process a urinalysis today to ensure that we are seeing an abnormal presentation of infection.   "Will treat him appropriately for this, but otherwise did encourage him to follow-up with his PCP for further discussion about nocturia management.  Plan: UA Macroscopic with reflex to Microscopic and         Culture - Lab Collect      This progress note has been dictated, with use of voice recognition software. Any grammatical, typographical, or context errors are unintentional and inherent to use of voice recognition software.     Ordering of each unique test  Prescription drug management  I spent a total of 19 minutes on the day of the visit.   Time spent by me today doing chart review, history and exam, documentation and further activities per the note    FUTURE APPOINTMENTS:       - Follow-up visit in 1 week if symptoms worsen or do not improve       - Follow-up for annual visit or as needed  Patient Instructions   I believe vertigo is the cause of your symptoms.  To help with the symptoms, we need to help your body shift the crystals of the inner ear back to the proper position.  To do this, you need to try an exercise at home.  There is a maneuver called the \"half somersault\" that can be very effective in relieving the symptoms of vertigo.  The link for a video on how to perform this is below.  It may take several repetitions of of the maneuver.  https://[employer]ight[employer].be/[email or web address] or go to YouTube and search Federica Peace MD Vertigo treatment.  I have prescribed medication called meclizine to help with the symptoms as well.  You may take this every 8 hours as needed, however it is not a replacement for trying the maneuvers to move the crystals back to the appropriate location in your inner ear.    I have also prescribed zofran for your nausea. You can take this up to 3 times daily to prevent vomiting.    Please follow up with your primary care provide in one week for a recheck of your symptoms.  If you develop severe worsening of the symptoms, fevers, weakness, vomiting not controlled by medication, or any other " concerns, please be seen in the ER right away.    Benign Paroxysmal Positional Vertigo     Your health care provider may move your head in certain ways to treat your BPPV.     Benign paroxysmal positional vertigo (BPPV) is a problem with the inner ear. The inner ear contains the vestibular system. This system is what helps you keep your balance. BPPV causes a feeling of spinning. It is a common problem of the vestibular system.  Understanding the vestibular system  The vestibular system of the ear is made up of very tiny parts. They include the utricle, saccule, and semicircular canals. The utricle is a tiny organ that contains calcium crystals. In some people, the crystals can move into the semicircular canals. When this happens, the system no longer works as it should. This causes BPPV. Benign means it is not life threatening. Paroxysmal means it happens suddenly. Positional means that it happens when you move your head. Vertigo is a feeling of spinning.  What causes BPPV?  Causes include injury to your head or neck. Other problems with the vestibular system may cause BPPV. In many people, the cause of BPPV is not known.  Symptoms of BPPV  You many have repeated feelings of spinning (vertigo). The vertigo usually lasts less than 1 minute. Some movements, such as rolling over in bed, can bring on vertigo.  Diagnosing BPPV  Your primary healthcare provider may diagnose and treat your BPPV. Or you may see an ear, nose, and throat doctor (otolaryngologist). In some cases, you may see a nervous system doctor (neurologist).  The healthcare provider will ask about your symptoms and your medical history. He or she will examine you. You may have hearing and balance tests. As part of the exam, your healthcare provider may have you move your head and body in certain ways. If you have BPPV, the movements can bring on vertigo. Your provider will also look for abnormal movements of your eyes. You may have other tests to check  your vestibular or nervous systems.  Treatment for BPPV  Your healthcare provider may try to move the calcium crystals. This is done by having you move your head and neck in certain ways. This treatment is safe and often works well. You may also be told to do these movements at home. You may still have vertigo for a few weeks. Your healthcare provider will recheck your symptoms, usually in about a month. Special physical therapy may also be part of treatment. In rare cases, surgery may be needed for BPPV that does not go away.     When to call the healthcare provider  Call your healthcare provider right away if you have any of these:  Symptoms that do not go away with treatment  Symptoms that get worse  New symptoms   Date Last Reviewed: 5/1/2017      Meryl Covington is a 59 year old, presenting for the following health issues:  office visit (Patient reports that they vomited three times last night and reports a high /110.He's having a hard time sleeping on back. When he wakes up he feels dizzy and his balance is off. Pulse is low at 49. Has a history of bypass surgery. Reports that he had bad shoulder pain 4 days ago and started to take IMDUR. )        11/12/2024    11:19 AM   Additional Questions   Roomed by Sonia   Accompanied by wife Carlee         11/12/2024    11:19 AM   Patient Reported Additional Medications   Patient reports taking the following new medications none     History of Present Illness       Reason for visit:  General follow up   He is taking medications regularly.   Nadya is a 59-year-old male with a past medical history significant for artery disease status post CABG in 2020, chronic bronchitis, HANNAH anal fissure, gallstones, dyslipidemia, thyroid nodule, type 2 diabetes, hypertension, ascending aortic dilation, osteoarthritis of the knees, depression, and PTSD who presents today with concerns for dizzy spells and vomiting.  Patient reports that yesterday evening after eating dinner  "he went to stand up in the kitchen at which time he became very dizzy and had a hard time focusing his eyes.  This resulted in 3 episodes of emesis.  He notes that the vomiting has since resolved, and the dizziness is not as profound, but he does present today still feeling a bit imbalanced with occasional dizzy spells.  He declines any blurry or double vision, headaches, ongoing stomach pain, nausea, vomiting, ringing in the ears or hearing loss, upper respiratory or flulike illness, chest pain, shortness of breath, difficulty breathing, palpitations, or acute confusion.  Notes that the dizzy spells mainly present at this point when he changes positions or goes to change activity.  He was having some right shoulder discomfort a number of days ago, so he has begun taking Imdur twice daily, and note that the shoulder pain has since resolved, and is mainly more present when he is sleeping at night.  He otherwise declines any other changes in his medications, hydration status, or fluid status      Review of Systems  Constitutional, HEENT, cardiovascular, pulmonary, gi and gu systems are negative, except as otherwise noted.      Objective    /80 (BP Location: Left arm, Patient Position: Sitting, Cuff Size: Adult Regular)   Pulse 58   Temp 98.7  F (37.1  C) (Tympanic)   Resp 19   Ht 1.664 m (5' 5.5\")   Wt 85 kg (187 lb 4.8 oz)   SpO2 97%   BMI 30.69 kg/m    Body mass index is 30.69 kg/m .  Physical Exam   GENERAL: alert and no distress  EYES: Eyes grossly normal to inspection, PERRL and conjunctivae and sclerae normal  HENT: ear canals and TM's normal, nose and mouth without ulcers or lesions  NECK: no adenopathy, no asymmetry, masses, or scars  RESP: lungs clear to auscultation - no rales, rhonchi or wheezes  CV: regular rate and rhythm, normal S1 S2, no S3 or S4, no murmur, click or rub, no peripheral edema  ABDOMEN: soft, nontender, no hepatosplenomegaly, no masses and bowel sounds normal  MS: no gross " musculoskeletal defects noted, no edema  NEURO: Normal strength and tone, mentation intact and speech normal. CN II-XII grossly intact    Results for orders placed or performed in visit on 11/12/24 (from the past 24 hours)   EKG 12-lead, tracing only   Result Value Ref Range    Systolic Blood Pressure  mmHg    Diastolic Blood Pressure  mmHg    Ventricular Rate 54 BPM    Atrial Rate 54 BPM    NV Interval 224 ms    QRS Duration 96 ms     ms    QTc 386 ms    P Axis 25 degrees    R AXIS -35 degrees    T Axis 54 degrees    Interpretation ECG       Sinus bradycardia with 1st degree A-V block  Left axis deviation  Nonspecific T wave abnormality  Abnormal ECG  When compared with ECG of 20-Dec-2023 19:45,  No significant change was found         Steph Willis DNP FNP-C  Family Nurse Practitioner - Same Day Provider  [facility] [place]    Signed Electronically by: GENET Rojas CNP

## 2024-11-13 ENCOUNTER — MYC MEDICAL ADVICE (OUTPATIENT)
Dept: INTERNAL MEDICINE | Facility: CLINIC | Age: 59
End: 2024-11-13
Payer: COMMERCIAL

## 2024-11-14 NOTE — TELEPHONE ENCOUNTER
Calling to ask if we can do this urgently as patient has fallen with the dizziness and would like the seated walker order please.    Please fax to [facility] in [place].

## 2024-11-15 DIAGNOSIS — R42 DIZZINESS: Primary | ICD-10-CM

## 2024-11-15 NOTE — TELEPHONE ENCOUNTER
Patient's spouse, Carlee, calls regarding receiving walker. DME order sent 11/15/24. Recommended she contact [facility] at [phone]. She verbalized understanding.

## 2024-11-20 ENCOUNTER — PATIENT OUTREACH (OUTPATIENT)
Dept: CARE COORDINATION | Facility: CLINIC | Age: 59
End: 2024-11-20
Payer: COMMERCIAL

## 2024-11-20 NOTE — PROGRESS NOTES
Follow Up Progress Note      Assessment: CCC RN spoke with patient's wife Charlette. She stated patient has found a new psychiatric provider, which she said he likes very much. Goal updated. She additionally reported patient has had issues with dizziness, but said patient reported to her today that it was much better. Patient has a follow up with PCP on 12/5/24. He will start physical therapy related to dizziness of 12/9/24. No other needs or concerns.      Care Gaps:    Health Maintenance Due   Topic Date Due    ZOSTER IMMUNIZATION (1 of 2) Never done    DTAP/TDAP/TD IMMUNIZATION (3 - Td or Tdap) 04/30/2022    LUNG CANCER SCREENING  12/20/2024       Scheduled with PCP on 12/5/24.       Care Plans  Care Plan: Mental Health       Problem: Mental Health Symptoms Need Improvement       Goal: I would like establish care with a new psychiatric provider.       Start Date: 8/28/2024 Expected End Date: 8/27/2025    This Visit's Progress: 60% Recent Progress: 10%    Priority: High    Note:     Barriers: current psychiatric provider retiring.   Strengths: strong advocate for himself, strong family support.   Patient expressed understanding of goal: yes  Action steps to achieve this goal:  1. I understand the [facility] RN will request a mental health referral from Dr. Briseno.   2. I understand once the mental health referral has been placed, Goran will assist me with finding a new provider.   3. I will attend all scheduled appointments with my new psychiatric provider.     Discussed on 11/20/24                                 Intervention/Education provided during outreach: Discussed the importance of patient taking his medications as directed. Encouraged attendance at all upcoming appointments. Encouraged her to contact Care Coordination for any additional needs or concerns.        Plan: [facility] RN will continue to monitor, support patient with current goal and will be available to assist as nursing needs arise.     Care Coordinator will  follow up in one month.

## 2024-11-25 NOTE — PROGRESS NOTES
HEART CARE ENCOUNTER NOTE      [facility]  [phone]      Assessment/Recommendations   Assessment:   Coronary artery disease: As post four-vessel CABG with LIMA to LAD, right radial artery to right posterior descending artery, reverse SVG to obtuse marginal and diagonal artery on 6/24/2020.  Stress cardiac MRI 12/23/2021 showed no ischemia but patient had been noting exertional dyspnea.  Coronary angiogram 10/6/2022 noted occluded SVG graft to right PDA.  Patient's angina was stable well-controlled on Imdur 30 mg.  In August and September he was having more chest discomfort and into the left arm.  We again discussed PCI to RCA and patient declined this.  We increased Imdur to 60 mg daily 2 months ago.  Patient notes improvement in his chest discomfort though still gets this once every 4 days at rest lasting around 45 minutes.  Chronic congestive heart failure with preserved ejection fraction: Appears euvolemic on exam.  Cardiac catheterization 10/6/2022 showed normal left-sided filling pressure.  Patient is on beta-blocker, Jardiance  Essential hypertension: On metoprolol 25 mg daily, Imdur 60 mg daily.    Dyslipidemia: On rosuvastatin 40 mg daily.  Last lipids 9/12/24 showed LDL of 48.  Diabetes mellitus type 2: Non-insulin-dependent.  On Jardiance, metformin.  Last hemoglobin A1c 5.8.  Possible HANNAH: Using CPAP now and notes having more energy since using this.      Plan:   Continue Imdur 30 mg twice daily  Go back down to 25 mg of metoprolol given lower blood pressure and some continued lightheadedness  Patient was seen by primary care office the day after an episode of vomiting accompanied with chest discomfort in the middle of the night on 11/11/2024.  Was diagnosed with vertigo and had improvement with meclizine and Zofran.  Patient had diarrhea at that time as well.  EKG was unremarkable.  If patient has another episode of chest discomfort with vomiting would recommend being seen in  the ER  Patient continues to decline PCI to RCA.  Could consider Ranexa in the future as blood pressure limits further titration of Imdur      Follow-up with Dr. Burns in 2 months, agnes Ordonez PA-C in 4 months per patient's request       History of Present Illness/Subjective    HPI: Nadya Blake is a 58 year old male with PMHx of coronary artery disease with history of NSTEMI status post four-vessel CABG with LIMA to LAD, right radial artery to RPDA, reverse SVG to OM and diagonal branch on 6/24/2020, HFpEF, hypertension, dyslipidemia presents for follow-up. Patient has history of chest pain which is not necessarily exertional, but has been worse with emotional stress that improves with sublingual nitroglycerin.  Patient started isosorbide mononitrate 30 mg daily and has helped some with this chest pain but led to some lightheadedness.  Metoprolol dose was decreased from 150 to 100 mg daily.  I had decreased this further to 50 mg daily to see if this improved his bradycardia and fatigue.  Patient last saw Dr. Burns 7/12/2024 where they again discussed PCI of the native RCA, but patient was fearful and wanted to continue medical therapy as he had been doing well.  Patient did undergo sleep study, but was waiting for follow-up with sleep medicine to go over results.  I last saw patient 9/20/2024 where patient had been noticing some worsening angina over the last 2 months that would go into his left arm with emotional stress while at rest.  We increased Imdur to 60 mg daily and decreased metoprolol to 25 mg daily given heart rates in the 40s and lightheadedness.    Patient notes the evening of 11/11/2024 he had an episode of vomiting in the middle of the night accompanied with chest discomfort and room spinning dizziness.  Patient notes he was unable to get up and go to the bathroom to throw up given balance being off.  Patient was seen by primary care office the following day and was diagnosed with likely  BPPV and given meclizine and Zofran.  Vitals were normal and EKG only showed first-degree AV block which was not new.  Symptoms improved with the medications.  Patient notes he completed the meclizine 3 days ago and has not had any recurrence.  Patient continues to note chest discomfort that switches sides and occasionally is in his right or left arm that occurs once every 4 days and lasts around 45 minutes occurring at rest and not with exertion.  Patient notes dyspnea on exertion with climbing stairs but not with walking.  Patient notes blood pressures typically 110 systolic.  Notes mild lightheadedness at times.  Denies palpitations, lower extremity edema.        MR cardiac with stress 12/23/2021  1.  Pharmacological Regadenoson stress cardiac MRI is negative for inducible myocardial ischemia.   2.  Pharmacological stress ECG is negative for inducible myocardial ischemia.   3. Normal left ventricular size, wall thickness and systolic function. The quantified left ventricular  ejection fraction is 59 %.  Very small area of non-transmural myocardial scar in the mid inferior wall is  identified.    4.  Normal right ventricular size and systolic function.    5.  No significant valvular abnormalities.  6. Ascending aorta measures 38 mm.     Cardiac catheterization 10/6/2022:  Left ventricular filling pressures are normal.  3rd Mrg lesion is 90% stenosed.  Prox RCA lesion is 75% stenosed.  Prox RCA to Mid RCA lesion is 40% stenosed.  Dist RCA lesion is 75% stenosed.  Mid RCA lesion is 30% stenosed.  RPDA lesion is 50% stenosed.  RPAV lesion is 40% stenosed.  Prox LAD lesion is 70% stenosed.  1st Diag-1 lesion is 95% stenosed.  1st Diag-2 lesion is 95% stenosed.  2nd Diag lesion is 99% stenosed.  Mid LAD-1 lesion is 75% stenosed.  Mid LAD-2 lesion is 75% stenosed.  Dist LAD lesion is 70% stenosed.  Vein graft to right PDA is totally occluded  Vein graft to second diagonal is widely patent  Vein graft to third OM is  widely patent  LUZ MARIA graft to mid distal LAD is patent     Physical Examination  Review of Systems   Vitals: BP 96/58 (BP Location: Left arm, Patient Position: Sitting, Cuff Size: Adult Regular)   Pulse 64   Resp 16   Wt 84.8 kg (187 lb)   BMI 30.65 kg/m    BMI= Body mass index is 30.65 kg/m .  Wt Readings from Last 3 Encounters:   11/26/24 84.8 kg (187 lb)   11/12/24 85 kg (187 lb 4.8 oz)   11/01/24 84.8 kg (187 lb)           ENT/Mouth: membranes moist, no oral lesions or bleeding gums.      EYES:  no scleral icterus, normal conjunctivae       Chest/Lungs:   lungs are clear to auscultation, no rales or wheezing,  equal chest wall expansion    Cardiovascular:   Regular. Normal first and second heart sounds with no murmurs, rubs, or gallops; the radial pulses are intact,  no edema bilaterally        Extremities: no cyanosis or clubbing   Skin: no xanthelasma, warm.    Neurologic: no tremors     Psychiatric: alert and oriented x3, calm        Please refer above for cardiac ROS details.        Medical History  Surgical History Family History Social History   Past Medical History:   Diagnosis Date    Acute non-ST elevation myocardial infarction (NSTEMI) (H) 6/22/2020    Adenomatous polyp of colon     Ascending aorta dilatation (H)     Carpal tunnel syndrome     Chronic superficial gastritis     Coronary artery disease due to lipid rich plaque 6/23/2020    Depression with anxiety     Dyslipidemia, goal LDL below 70 6/23/2020    Essential hypertension 9/2/2012    Fatty liver disease, nonalcoholic     GERD (gastroesophageal reflux disease)     IBS (irritable bowel syndrome)     Left lumbar radiculopathy     Multinodular goiter     Old torn meniscus of knee     Paroxysmal atrial fibrillation (H)     Perianal abscess     Post herpetic neuralgia     Post-traumatic osteoarthritis of both knees     Primary osteoarthritis of left hip     Primary osteoarthritis of right hip     Pulmonary nodule     Respiratory bronchiolitis  associated interstitial lung disease (H)     Thyroid nodule     TMJ arthritis     Tobacco use disorder 11/1/2013    Vitamin D deficiency 9/30/2020     Past Surgical History:   Procedure Laterality Date    APPENDECTOMY  1995    BYPASS GRAFT ARTERY CORONARY  06/24/2020    Dr. Ragsdale    CV CORONARY ANGIOGRAM N/A 6/23/2020    Procedure: Coronary Angiogram;  Surgeon: Ariane Lester MD;  Location: [facility] Cath Lab;  Service: Cardiology    CV CORONARY ANGIOGRAM N/A 10/6/2022    Procedure: Coronary Angiogram;  Surgeon: Javon John MD;  Location: [facility] CV    CV IABP INSERT N/A 6/24/2020    Procedure: Intra Aortic Balloon Pump Insertion;  Surgeon: Ariane Lester MD;  Location: [facility] Cath Lab;  Service: Cardiology    CV LEFT HEART CATH N/A 10/6/2022    Procedure: Left Heart Catheterization;  Surgeon: Javon John MD;  Location: [facility] CV    CV LEFT HEART CATHETERIZATION WITHOUT LEFT VENTRICULOGRAM Left 6/23/2020    Procedure: Left Heart Catheterization Without Left Ventriculogram;  Surgeon: Ariane Lester MD;  Location: [facility] Cath Lab;  Service: Cardiology    CV LEFT VENTRICULOGRAM N/A 10/6/2022    Procedure: Left Ventriculogram;  Surgeon: Javon John MD;  Location: [facility] LAB CV     Family History   Problem Relation Age of Onset    No Known Problems Mother     Lung Cancer Father 56        fatal    No Known Problems Daughter     Other - See Comments Son         congenital deformity of right leg; he uses a leg prosthesis        Social History     Socioeconomic History    Marital status:      Spouse name: Carlee    Number of children: 2    Years of education: Not on file    Highest education level: Not on file   Occupational History    Not on file   Tobacco Use    Smoking status: Former     Current packs/day: 0.00     Average packs/day: 1 pack/day for 30.0 years (30.0 ttl pk-yrs)     Types: Cigarettes     Start date: 3/23/1990     Quit date:  3/23/2020     Years since quittin.6     Passive exposure: Never    Smokeless tobacco: Never    Tobacco comments:     stopped 3/24/2020   Vaping Use    Vaping status: Never Used   Substance and Sexual Activity    Alcohol use: No    Drug use: Never    Sexual activity: Yes     Partners: Female   Other Topics Concern    Not on file   Social History Narrative    , Carlee, BA chemistry and taking AA courses at [employer].  From [place]; bachelors in geology and computer science. Son, Grace (2005) and Sherrie (2008).  On SSDI, PTSD.       Social Drivers of Health     Financial Resource Strain: Low Risk  (2023)    Financial Resource Strain     Within the past 12 months, have you or your family members you live with been unable to get utilities (heat, electricity) when it was really needed?: No   Food Insecurity: Low Risk  (2023)    Food Insecurity     Within the past 12 months, did you worry that your food would run out before you got money to buy more?: No     Within the past 12 months, did the food you bought just not last and you didn t have money to get more?: No   Transportation Needs: Low Risk  (2023)    Transportation Needs     Within the past 12 months, has lack of transportation kept you from medical appointments, getting your medicines, non-medical meetings or appointments, work, or from getting things that you need?: No   Physical Activity: Not on file   Stress: Not on file   Social Connections: Unknown (2022)    [facility]    Social Connections     Frequency of Communication with Friends and Family: Not on file   Interpersonal Safety: Low Risk  (5/3/2024)    Interpersonal Safety     Do you feel physically and emotionally safe where you currently live?: Yes     Within the past 12 months, have you been hit, slapped, kicked or otherwise physically hurt by someone?: No     Within the past 12 months,  have you been humiliated or emotionally abused in other ways by your partner or ex-partner?: No   Housing Stability: Low Risk  (12/27/2023)    Housing Stability     Do you have housing? : Yes     Are you worried about losing your housing?: No           Medications  Allergies   Current Outpatient Medications   Medication Sig Dispense Refill    acetaminophen (TYLENOL) 500 MG tablet Take 1-2 tablets (500-1,000 mg) by mouth every 6 hours as needed for mild pain 360 tablet 3    albuterol (VENTOLIN HFA) 108 (90 Base) MCG/ACT inhaler INHALE 2 PUFFS INTO THE LUNGS EVERY 6 HOURS AS NEEDED FOR SHORTNESS OF BREATH OR WHEEZING OR COUGH 18 g 2    aspirin (ASA) 81 MG chewable tablet CHEW AND SWALLOW 1 TABLET(81 MG) BY MOUTH DAILY 90 tablet 2    azelastine (ASTELIN) 0.1 % nasal spray Spray 1 spray into both nostrils 2 times daily 30 mL 1    busPIRone (BUSPAR) 5 MG tablet Take 1 tablet (5 mg) by mouth 2 times daily 180 tablet 1    Cholecalciferol (VITAMIN D3) 50 MCG (2000 UT) CAPS TAKE 1 CAPSULE BY MOUTH DAILY 90 capsule 1    cyanocobalamin (VITAMIN B-12) 1000 MCG tablet Take 1 tablet (1,000 mcg) by mouth daily 90 tablet 4    diclofenac (VOLTAREN) 1 % topical gel Apply 4 g topically 4 times daily 500 g 2    empagliflozin (JARDIANCE) 25 MG TABS tablet Take 1 tablet (25 mg) by mouth daily. 90 tablet 2    finasteride (PROSCAR) 5 MG tablet Take 1 tablet (5 mg) by mouth daily 90 tablet 4    ipratropium (ATROVENT) 0.06 % nasal spray Spray 2 sprays into both nostrils 4 times daily 15 mL 1    isosorbide mononitrate (IMDUR) 30 MG 24 hr tablet Take 1 tablet (30 mg) by mouth daily 90 tablet 4    Lidocaine 0.5 % GEL Externally apply topically as needed      meclizine (ANTIVERT) 12.5 MG tablet Take 1 tablet (12.5 mg) by mouth 3 times daily as needed for dizziness. 30 tablet 0    metoprolol succinate ER (TOPROL XL) 25 MG 24 hr tablet Take 1 tablet (25 mg) by mouth daily. 90 tablet 3    nifedipine 0.2% in white petrolatum 0.2 % OINT ointment  Apply topically 4 times daily as needed (anal fissure) 100 g 1    nitroGLYcerin (NITROSTAT) 0.4 MG sublingual tablet For chest pain place 1 tablet under the tongue every 5 minutes for 3 doses. If symptoms persist 5 minutes after 1st dose call 911. 30 tablet 3    omeprazole (PRILOSEC) 20 MG DR capsule Take 1 capsule (20 mg) by mouth 2 times daily 180 capsule 4    ondansetron (ZOFRAN) 4 MG tablet Take 1 tablet (4 mg) by mouth every 8 hours as needed for nausea. 30 tablet 0    oxyBUTYnin (DITROPAN) 5 MG tablet TAKE 1 TABLET(5 MG) BY MOUTH AT BEDTIME 90 tablet 2    PARoxetine (PAXIL) 30 MG tablet Take 1 tablet (30 mg) by mouth every morning. 90 tablet 1    polyethylene glycol (MIRALAX) 17 GM/Dose powder TAKE 17 GRAMS(1 CAPFUL) BY MOUTH DAILY 840 g 4    QUEtiapine (SEROQUEL) 50 MG tablet Take 1.5 tablets (75 mg) by mouth at bedtime 135 tablet 1    rosuvastatin (CRESTOR) 40 MG tablet TAKE 1 TABLET(40 MG) BY MOUTH DAILY 90 tablet 2    tamsulosin (FLOMAX) 0.4 MG capsule Take 2 capsules (0.8 mg) by mouth every evening 180 capsule 4       Allergies   Allergen Reactions    Atorvastatin Diarrhea    Cortisone Other (See Comments)     pain    Lisinopril Cough     started after CABG for unclear reason    Methylprednisolone Other (See Comments)     ?          Lab Results    Chemistry/lipid CBC Cardiac Enzymes/BNP/TSH/INR   Recent Labs   Lab Test 08/28/23  1436   CHOL 97   HDL 33*   LDL 32   TRIG 159*     Recent Labs   Lab Test 08/28/23  1436 01/18/23  1037 11/21/22  0947   LDL 32 30 27     Recent Labs   Lab Test 12/20/23 2039      POTASSIUM 4.3   CHLORIDE 105   CO2 26   *   BUN 16.2   CR 1.22*   GFRESTIMATED 70   TERESE 9.0     Recent Labs   Lab Test 12/20/23 2039 08/28/23  1436 06/27/23  1134   CR 1.22* 1.21* 1.10     Recent Labs   Lab Test 01/09/24  0940 10/03/23  0906 06/27/23  1134   A1C 5.5 5.6 5.7*          Recent Labs   Lab Test 12/20/23  2039   WBC 7.7   HGB 14.4   HCT 42.9   MCV 89        Recent Labs    Lab Test 12/20/23 2039 08/28/23  1436 01/18/23  1037   HGB 14.4 15.2 14.5    Recent Labs   Lab Test 11/21/20  0001 07/27/20  0703 07/27/20  0115   TROPONINI <0.01 <0.01 <0.01     Recent Labs   Lab Test 12/20/23 2039 07/27/20  0115 06/22/20  2322   BNP  --  79* <10   NTBNP <36  --   --      Recent Labs   Lab Test 08/28/23  1436   TSH 1.24     Recent Labs   Lab Test 07/27/20  0115 06/25/20  0427 06/24/20  2012   INR 1.10 1.32* 1.39*        Teodora Ordonez PA-C

## 2024-11-26 ENCOUNTER — OFFICE VISIT (OUTPATIENT)
Dept: CARDIOLOGY | Facility: CLINIC | Age: 59
End: 2024-11-26
Payer: COMMERCIAL

## 2024-11-26 VITALS
DIASTOLIC BLOOD PRESSURE: 58 MMHG | SYSTOLIC BLOOD PRESSURE: 96 MMHG | RESPIRATION RATE: 16 BRPM | BODY MASS INDEX: 30.65 KG/M2 | HEART RATE: 64 BPM | WEIGHT: 187 LBS

## 2024-11-26 DIAGNOSIS — I25.118 CORONARY ARTERY DISEASE OF NATIVE ARTERY OF NATIVE HEART WITH STABLE ANGINA PECTORIS (H): ICD-10-CM

## 2024-11-26 DIAGNOSIS — G47.33 OSA (OBSTRUCTIVE SLEEP APNEA): ICD-10-CM

## 2024-11-26 DIAGNOSIS — I10 ESSENTIAL HYPERTENSION: Chronic | ICD-10-CM

## 2024-11-26 DIAGNOSIS — E11.69 TYPE 2 DIABETES MELLITUS WITH OTHER SPECIFIED COMPLICATION, WITHOUT LONG-TERM CURRENT USE OF INSULIN (H): ICD-10-CM

## 2024-11-26 DIAGNOSIS — E78.5 DYSLIPIDEMIA, GOAL LDL BELOW 70: Chronic | ICD-10-CM

## 2024-11-26 DIAGNOSIS — I50.32 CHRONIC HEART FAILURE WITH PRESERVED EJECTION FRACTION (H): Primary | ICD-10-CM

## 2024-11-26 PROCEDURE — 99214 OFFICE O/P EST MOD 30 MIN: CPT | Performed by: STUDENT IN AN ORGANIZED HEALTH CARE EDUCATION/TRAINING PROGRAM

## 2024-11-26 PROCEDURE — G2211 COMPLEX E/M VISIT ADD ON: HCPCS | Performed by: STUDENT IN AN ORGANIZED HEALTH CARE EDUCATION/TRAINING PROGRAM

## 2024-11-26 NOTE — LETTER
11/26/2024    Az Briseno MD  [address] [facility] [address]    RE: Nadya Blake       Dear Colleague,     I had the pleasure of seeing Nadya Blake in the [facility].    HEART CARE ENCOUNTER NOTE      [facility]  [phone]      Assessment/Recommendations   Assessment:   Coronary artery disease: As post four-vessel CABG with LIMA to LAD, right radial artery to right posterior descending artery, reverse SVG to obtuse marginal and diagonal artery on 6/24/2020.  Stress cardiac MRI 12/23/2021 showed no ischemia but patient had been noting exertional dyspnea.  Coronary angiogram 10/6/2022 noted occluded SVG graft to right PDA.  Patient's angina was stable well-controlled on Imdur 30 mg.  In August and September he was having more chest discomfort and into the left arm.  We again discussed PCI to RCA and patient declined this.  We increased Imdur to 60 mg daily 2 months ago.  Patient notes improvement in his chest discomfort though still gets this once every 4 days at rest lasting around 45 minutes.  Chronic congestive heart failure with preserved ejection fraction: Appears euvolemic on exam.  Cardiac catheterization 10/6/2022 showed normal left-sided filling pressure.  Patient is on beta-blocker, Jardiance  Essential hypertension: On metoprolol 25 mg daily, Imdur 60 mg daily.    Dyslipidemia: On rosuvastatin 40 mg daily.  Last lipids 9/12/24 showed LDL of 48.  Diabetes mellitus type 2: Non-insulin-dependent.  On Jardiance, metformin.  Last hemoglobin A1c 5.8.  Possible HANNAH: Using CPAP now and notes having more energy since using this.      Plan:   Continue Imdur 30 mg twice daily  Go back down to 25 mg of metoprolol given lower blood pressure and some continued lightheadedness  Patient was seen by primary care office the day after an episode of vomiting accompanied with chest discomfort in the middle of the night on 11/11/2024.  Was diagnosed with vertigo  and had improvement with meclizine and Zofran.  Patient had diarrhea at that time as well.  EKG was unremarkable.  If patient has another episode of chest discomfort with vomiting would recommend being seen in the ER  Patient continues to decline PCI to RCA.  Could consider Ranexa in the future as blood pressure limits further titration of Imdur      Follow-up with Dr. Burns in 2 months, agnes Ordonez PA-C in 4 months per patient's request       History of Present Illness/Subjective    HPI: Nadya Blake is a 58 year old male with PMHx of coronary artery disease with history of NSTEMI status post four-vessel CABG with LIMA to LAD, right radial artery to RPDA, reverse SVG to OM and diagonal branch on 6/24/2020, HFpEF, hypertension, dyslipidemia presents for follow-up. Patient has history of chest pain which is not necessarily exertional, but has been worse with emotional stress that improves with sublingual nitroglycerin.  Patient started isosorbide mononitrate 30 mg daily and has helped some with this chest pain but led to some lightheadedness.  Metoprolol dose was decreased from 150 to 100 mg daily.  I had decreased this further to 50 mg daily to see if this improved his bradycardia and fatigue.  Patient last saw Dr. Burns 7/12/2024 where they again discussed PCI of the native RCA, but patient was fearful and wanted to continue medical therapy as he had been doing well.  Patient did undergo sleep study, but was waiting for follow-up with sleep medicine to go over results.  I last saw patient 9/20/2024 where patient had been noticing some worsening angina over the last 2 months that would go into his left arm with emotional stress while at rest.  We increased Imdur to 60 mg daily and decreased metoprolol to 25 mg daily given heart rates in the 40s and lightheadedness.    Patient notes the evening of 11/11/2024 he had an episode of vomiting in the middle of the night accompanied with chest discomfort and room  spinning dizziness.  Patient notes he was unable to get up and go to the bathroom to throw up given balance being off.  Patient was seen by primary care office the following day and was diagnosed with likely BPPV and given meclizine and Zofran.  Vitals were normal and EKG only showed first-degree AV block which was not new.  Symptoms improved with the medications.  Patient notes he completed the meclizine 3 days ago and has not had any recurrence.  Patient continues to note chest discomfort that switches sides and occasionally is in his right or left arm that occurs once every 4 days and lasts around 45 minutes occurring at rest and not with exertion.  Patient notes dyspnea on exertion with climbing stairs but not with walking.  Patient notes blood pressures typically 110 systolic.  Notes mild lightheadedness at times.  Denies palpitations, lower extremity edema.        MR cardiac with stress 12/23/2021  1.  Pharmacological Regadenoson stress cardiac MRI is negative for inducible myocardial ischemia.   2.  Pharmacological stress ECG is negative for inducible myocardial ischemia.   3. Normal left ventricular size, wall thickness and systolic function. The quantified left ventricular  ejection fraction is 59 %.  Very small area of non-transmural myocardial scar in the mid inferior wall is  identified.    4.  Normal right ventricular size and systolic function.    5.  No significant valvular abnormalities.  6. Ascending aorta measures 38 mm.     Cardiac catheterization 10/6/2022:  Left ventricular filling pressures are normal.  3rd Mrg lesion is 90% stenosed.  Prox RCA lesion is 75% stenosed.  Prox RCA to Mid RCA lesion is 40% stenosed.  Dist RCA lesion is 75% stenosed.  Mid RCA lesion is 30% stenosed.  RPDA lesion is 50% stenosed.  RPAV lesion is 40% stenosed.  Prox LAD lesion is 70% stenosed.  1st Diag-1 lesion is 95% stenosed.  1st Diag-2 lesion is 95% stenosed.  2nd Diag lesion is 99% stenosed.  Mid LAD-1 lesion is  75% stenosed.  Mid LAD-2 lesion is 75% stenosed.  Dist LAD lesion is 70% stenosed.  Vein graft to right PDA is totally occluded  Vein graft to second diagonal is widely patent  Vein graft to third OM is widely patent  LUZ MARIA graft to mid distal LAD is patent     Physical Examination  Review of Systems   Vitals: BP 96/58 (BP Location: Left arm, Patient Position: Sitting, Cuff Size: Adult Regular)   Pulse 64   Resp 16   Wt 84.8 kg (187 lb)   BMI 30.65 kg/m    BMI= Body mass index is 30.65 kg/m .  Wt Readings from Last 3 Encounters:   11/26/24 84.8 kg (187 lb)   11/12/24 85 kg (187 lb 4.8 oz)   11/01/24 84.8 kg (187 lb)           ENT/Mouth: membranes moist, no oral lesions or bleeding gums.      EYES:  no scleral icterus, normal conjunctivae       Chest/Lungs:   lungs are clear to auscultation, no rales or wheezing,  equal chest wall expansion    Cardiovascular:   Regular. Normal first and second heart sounds with no murmurs, rubs, or gallops; the radial pulses are intact,  no edema bilaterally        Extremities: no cyanosis or clubbing   Skin: no xanthelasma, warm.    Neurologic: no tremors     Psychiatric: alert and oriented x3, calm        Please refer above for cardiac ROS details.        Medical History  Surgical History Family History Social History   Past Medical History:   Diagnosis Date     Acute non-ST elevation myocardial infarction (NSTEMI) (H) 6/22/2020     Adenomatous polyp of colon      Ascending aorta dilatation (H)      Carpal tunnel syndrome      Chronic superficial gastritis      Coronary artery disease due to lipid rich plaque 6/23/2020     Depression with anxiety      Dyslipidemia, goal LDL below 70 6/23/2020     Essential hypertension 9/2/2012     Fatty liver disease, nonalcoholic      GERD (gastroesophageal reflux disease)      IBS (irritable bowel syndrome)      Left lumbar radiculopathy      Multinodular goiter      Old torn meniscus of knee      Paroxysmal atrial fibrillation (H)       Perianal abscess      Post herpetic neuralgia      Post-traumatic osteoarthritis of both knees      Primary osteoarthritis of left hip      Primary osteoarthritis of right hip      Pulmonary nodule      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)      Thyroid nodule      TMJ arthritis      Tobacco use disorder 11/1/2013     Vitamin D deficiency 9/30/2020     Past Surgical History:   Procedure Laterality Date     APPENDECTOMY  1995     BYPASS GRAFT ARTERY CORONARY  06/24/2020    Dr. Ragsdale     CV CORONARY ANGIOGRAM N/A 6/23/2020    Procedure: Coronary Angiogram;  Surgeon: Ariane Lester MD;  Location: [facility] Cath Lab;  Service: Cardiology     CV CORONARY ANGIOGRAM N/A 10/6/2022    Procedure: Coronary Angiogram;  Surgeon: Javon John MD;  Location: [facility] CV     CV IABP INSERT N/A 6/24/2020    Procedure: Intra Aortic Balloon Pump Insertion;  Surgeon: Ariane Lester MD;  Location: [facility] Cath Lab;  Service: Cardiology     CV LEFT HEART CATH N/A 10/6/2022    Procedure: Left Heart Catheterization;  Surgeon: Javon John MD;  Location: [facility] CV     CV LEFT HEART CATHETERIZATION WITHOUT LEFT VENTRICULOGRAM Left 6/23/2020    Procedure: Left Heart Catheterization Without Left Ventriculogram;  Surgeon: Ariane Lester MD;  Location: [facility] Cath Lab;  Service: Cardiology     CV LEFT VENTRICULOGRAM N/A 10/6/2022    Procedure: Left Ventriculogram;  Surgeon: Javon John MD;  Location: [facility] CV     Family History   Problem Relation Age of Onset     No Known Problems Mother      Lung Cancer Father 56        fatal     No Known Problems Daughter      Other - See Comments Son         congenital deformity of right leg; he uses a leg prosthesis        Social History     Socioeconomic History     Marital status:      Spouse name: Carlee     Number of children: 2     Years of education: Not on file     Highest education level: Not  on file   Occupational History     Not on file   Tobacco Use     Smoking status: Former     Current packs/day: 0.00     Average packs/day: 1 pack/day for 30.0 years (30.0 ttl pk-yrs)     Types: Cigarettes     Start date: 3/23/1990     Quit date: 3/23/2020     Years since quittin.6     Passive exposure: Never     Smokeless tobacco: Never     Tobacco comments:     stopped 3/24/2020   Vaping Use     Vaping status: Never Used   Substance and Sexual Activity     Alcohol use: No     Drug use: Never     Sexual activity: Yes     Partners: Female   Other Topics Concern     Not on file   Social History Narrative    , Carlee, BA chemistry and taking AA courses at "[employer].  From [place]; bachelors in geology and computer science. Son, Grace (2005) and Sherrie (2008).  On SSDI, PTSD.       Social Drivers of Health     Financial Resource Strain: Low Risk  (2023)    Financial Resource Strain      Within the past 12 months, have you or your family members you live with been unable to get utilities (heat, electricity) when it was really needed?: No   Food Insecurity: Low Risk  (2023)    Food Insecurity      Within the past 12 months, did you worry that your food would run out before you got money to buy more?: No      Within the past 12 months, did the food you bought just not last and you didn t have money to get more?: No   Transportation Needs: Low Risk  (2023)    Transportation Needs      Within the past 12 months, has lack of transportation kept you from medical appointments, getting your medicines, non-medical meetings or appointments, work, or from getting things that you need?: No   Physical Activity: Not on file   Stress: Not on file   Social Connections: Unknown (2022)    [facility] [email or web address] [facility]    Social Connections      Frequency of Communication with Friends and Family: Not on file   Interpersonal Safety: Low Risk   (5/3/2024)    Interpersonal Safety      Do you feel physically and emotionally safe where you currently live?: Yes      Within the past 12 months, have you been hit, slapped, kicked or otherwise physically hurt by someone?: No      Within the past 12 months, have you been humiliated or emotionally abused in other ways by your partner or ex-partner?: No   Housing Stability: Low Risk  (12/27/2023)    Housing Stability      Do you have housing? : Yes      Are you worried about losing your housing?: No           Medications  Allergies   Current Outpatient Medications   Medication Sig Dispense Refill     acetaminophen (TYLENOL) 500 MG tablet Take 1-2 tablets (500-1,000 mg) by mouth every 6 hours as needed for mild pain 360 tablet 3     albuterol (VENTOLIN HFA) 108 (90 Base) MCG/ACT inhaler INHALE 2 PUFFS INTO THE LUNGS EVERY 6 HOURS AS NEEDED FOR SHORTNESS OF BREATH OR WHEEZING OR COUGH 18 g 2     aspirin (ASA) 81 MG chewable tablet CHEW AND SWALLOW 1 TABLET(81 MG) BY MOUTH DAILY 90 tablet 2     azelastine (ASTELIN) 0.1 % nasal spray Spray 1 spray into both nostrils 2 times daily 30 mL 1     busPIRone (BUSPAR) 5 MG tablet Take 1 tablet (5 mg) by mouth 2 times daily 180 tablet 1     Cholecalciferol (VITAMIN D3) 50 MCG (2000 UT) CAPS TAKE 1 CAPSULE BY MOUTH DAILY 90 capsule 1     cyanocobalamin (VITAMIN B-12) 1000 MCG tablet Take 1 tablet (1,000 mcg) by mouth daily 90 tablet 4     diclofenac (VOLTAREN) 1 % topical gel Apply 4 g topically 4 times daily 500 g 2     empagliflozin (JARDIANCE) 25 MG TABS tablet Take 1 tablet (25 mg) by mouth daily. 90 tablet 2     finasteride (PROSCAR) 5 MG tablet Take 1 tablet (5 mg) by mouth daily 90 tablet 4     ipratropium (ATROVENT) 0.06 % nasal spray Spray 2 sprays into both nostrils 4 times daily 15 mL 1     isosorbide mononitrate (IMDUR) 30 MG 24 hr tablet Take 1 tablet (30 mg) by mouth daily 90 tablet 4     Lidocaine 0.5 % GEL Externally apply topically as needed       meclizine  (ANTIVERT) 12.5 MG tablet Take 1 tablet (12.5 mg) by mouth 3 times daily as needed for dizziness. 30 tablet 0     metoprolol succinate ER (TOPROL XL) 25 MG 24 hr tablet Take 1 tablet (25 mg) by mouth daily. 90 tablet 3     nifedipine 0.2% in white petrolatum 0.2 % OINT ointment Apply topically 4 times daily as needed (anal fissure) 100 g 1     nitroGLYcerin (NITROSTAT) 0.4 MG sublingual tablet For chest pain place 1 tablet under the tongue every 5 minutes for 3 doses. If symptoms persist 5 minutes after 1st dose call 911. 30 tablet 3     omeprazole (PRILOSEC) 20 MG DR capsule Take 1 capsule (20 mg) by mouth 2 times daily 180 capsule 4     ondansetron (ZOFRAN) 4 MG tablet Take 1 tablet (4 mg) by mouth every 8 hours as needed for nausea. 30 tablet 0     oxyBUTYnin (DITROPAN) 5 MG tablet TAKE 1 TABLET(5 MG) BY MOUTH AT BEDTIME 90 tablet 2     PARoxetine (PAXIL) 30 MG tablet Take 1 tablet (30 mg) by mouth every morning. 90 tablet 1     polyethylene glycol (MIRALAX) 17 GM/Dose powder TAKE 17 GRAMS(1 CAPFUL) BY MOUTH DAILY 840 g 4     QUEtiapine (SEROQUEL) 50 MG tablet Take 1.5 tablets (75 mg) by mouth at bedtime 135 tablet 1     rosuvastatin (CRESTOR) 40 MG tablet TAKE 1 TABLET(40 MG) BY MOUTH DAILY 90 tablet 2     tamsulosin (FLOMAX) 0.4 MG capsule Take 2 capsules (0.8 mg) by mouth every evening 180 capsule 4       Allergies   Allergen Reactions     Atorvastatin Diarrhea     Cortisone Other (See Comments)     pain     Lisinopril Cough     started after CABG for unclear reason     Methylprednisolone Other (See Comments)     ?          Lab Results    Chemistry/lipid CBC Cardiac Enzymes/BNP/TSH/INR   Recent Labs   Lab Test 08/28/23  1436   CHOL 97   HDL 33*   LDL 32   TRIG 159*     Recent Labs   Lab Test 08/28/23  1436 01/18/23  1037 11/21/22  0947   LDL 32 30 27     Recent Labs   Lab Test 12/20/23  2039      POTASSIUM 4.3   CHLORIDE 105   CO2 26   *   BUN 16.2   CR 1.22*   GFRESTIMATED 70   TERESE 9.0     Recent  Labs   Lab Test 12/20/23 2039 08/28/23  1436 06/27/23  1134   CR 1.22* 1.21* 1.10     Recent Labs   Lab Test 01/09/24  0940 10/03/23  0906 06/27/23  1134   A1C 5.5 5.6 5.7*          Recent Labs   Lab Test 12/20/23 2039   WBC 7.7   HGB 14.4   HCT 42.9   MCV 89        Recent Labs   Lab Test 12/20/23 2039 08/28/23  1436 01/18/23  1037   HGB 14.4 15.2 14.5    Recent Labs   Lab Test 11/21/20  0001 07/27/20  0703 07/27/20  0115   TROPONINI <0.01 <0.01 <0.01     Recent Labs   Lab Test 12/20/23 2039 07/27/20  0115 06/22/20  2322   BNP  --  79* <10   NTBNP <36  --   --      Recent Labs   Lab Test 08/28/23  1436   TSH 1.24     Recent Labs   Lab Test 07/27/20  0115 06/25/20  0427 06/24/20  2012   INR 1.10 1.32* 1.39*        Teodora Ordonez PA-C                                         Thank you for allowing me to participate in the care of your patient.      Sincerely,     Teodora Ordonez PA-C     [facility]  cc:   Teodora Ordonez PA-C  [facility] [place] [address]

## 2024-11-26 NOTE — PATIENT INSTRUCTIONS
Nadya Blake,    It was a pleasure to see you today at the [facility].     My recommendations after this visit include:    - Continue current medications- can go back to down to metoprolol 25 mg daily.  - Continue imdur 30 mg twice daily  - If having any more vomiting with chest pain go to the ER   - Continue using CPAP  - Follow up with Dr. Burns as scheduled 1/14/25. Can see me in 4 months    - Please call [phone], if you have any questions or concerns      Teodora Ordonez PA-C

## 2024-12-05 ENCOUNTER — OFFICE VISIT (OUTPATIENT)
Dept: INTERNAL MEDICINE | Facility: CLINIC | Age: 59
End: 2024-12-05
Payer: COMMERCIAL

## 2024-12-05 VITALS
TEMPERATURE: 97.5 F | WEIGHT: 188 LBS | HEIGHT: 66 IN | BODY MASS INDEX: 30.22 KG/M2 | RESPIRATION RATE: 16 BRPM | OXYGEN SATURATION: 99 % | SYSTOLIC BLOOD PRESSURE: 109 MMHG | DIASTOLIC BLOOD PRESSURE: 77 MMHG | HEART RATE: 53 BPM

## 2024-12-05 DIAGNOSIS — I25.118 CORONARY ARTERY DISEASE OF NATIVE ARTERY OF NATIVE HEART WITH STABLE ANGINA PECTORIS (H): Primary | ICD-10-CM

## 2024-12-05 DIAGNOSIS — N18.31 CHRONIC KIDNEY DISEASE, STAGE 3A (H): ICD-10-CM

## 2024-12-05 DIAGNOSIS — I10 ESSENTIAL HYPERTENSION: Chronic | ICD-10-CM

## 2024-12-05 DIAGNOSIS — E11.9 TYPE 2 DIABETES MELLITUS WITHOUT COMPLICATION, WITHOUT LONG-TERM CURRENT USE OF INSULIN (H): ICD-10-CM

## 2024-12-05 ASSESSMENT — PATIENT HEALTH QUESTIONNAIRE - PHQ9: SUM OF ALL RESPONSES TO PHQ QUESTIONS 1-9: 24

## 2024-12-05 ASSESSMENT — PAIN SCALES - GENERAL: PAINLEVEL_OUTOF10: MODERATE PAIN (5)

## 2024-12-05 NOTE — PROGRESS NOTES
"  Office Visit - Follow Up   Nadya DAE LarryHayden   59 year old male    Date of Visit: 12/5/2024    Chief Complaint   Patient presents with    Follow Up     Chronic heart issues.  He is having stomach and chest pain.        Assessment and Plan   1. Coronary artery disease, CABG 6/2020 (Primary)  Some question of whether or not his symptoms recently were anginal equivalent.  He is now taking Imdur twice daily.  Continues other medications.  Will be doing some vestibular therapy as well.    2. Essential hypertension  Blood pressure okay continue same    3. Type 2 diabetes mellitus without complication, without long-term current use of insulin (H)  Has been well-controlled continue same    4. Chronic kidney disease, stage 3a (H)  Chronic stable    Return in about 4 weeks (around 1/2/2025) for Follow up.     History of Present Illness   This 59 year old comes in for follow-up.  Since I saw him last he was seen in urgent care with some nausea and vomiting and vertigo and seen in the heart clinic.  Those symptoms have improved.  Lots of stress related to housing subsidies.       Physical Exam   General Appearance:   No acute distress    /77 (BP Location: Left arm, Patient Position: Sitting, Cuff Size: Adult Regular)   Pulse 53   Temp 97.5  F (36.4  C) (Tympanic)   Resp 16   Ht 1.664 m (5' 5.51\")   Wt 85.3 kg (188 lb)   SpO2 99%   BMI 30.80 kg/m      Heart rate controlled rhythm regular lungs clear to auscultation bilaterally no edema     Additional Information   Current Outpatient Medications   Medication Sig Dispense Refill    acetaminophen (TYLENOL) 500 MG tablet Take 1-2 tablets (500-1,000 mg) by mouth every 6 hours as needed for mild pain 360 tablet 3    albuterol (VENTOLIN HFA) 108 (90 Base) MCG/ACT inhaler INHALE 2 PUFFS INTO THE LUNGS EVERY 6 HOURS AS NEEDED FOR SHORTNESS OF BREATH OR WHEEZING OR COUGH 18 g 2    aspirin (ASA) 81 MG chewable tablet CHEW AND SWALLOW 1 TABLET(81 MG) BY MOUTH DAILY 90 tablet " 2    azelastine (ASTELIN) 0.1 % nasal spray Spray 1 spray into both nostrils 2 times daily 30 mL 1    busPIRone (BUSPAR) 5 MG tablet Take 1 tablet (5 mg) by mouth 2 times daily 180 tablet 1    Cholecalciferol (VITAMIN D3) 50 MCG (2000 UT) CAPS TAKE 1 CAPSULE BY MOUTH DAILY 90 capsule 1    cyanocobalamin (VITAMIN B-12) 1000 MCG tablet Take 1 tablet (1,000 mcg) by mouth daily 90 tablet 4    diclofenac (VOLTAREN) 1 % topical gel Apply 4 g topically 4 times daily 500 g 2    empagliflozin (JARDIANCE) 25 MG TABS tablet Take 1 tablet (25 mg) by mouth daily. 90 tablet 2    finasteride (PROSCAR) 5 MG tablet Take 1 tablet (5 mg) by mouth daily 90 tablet 4    ipratropium (ATROVENT) 0.06 % nasal spray Spray 2 sprays into both nostrils 4 times daily 15 mL 1    isosorbide mononitrate (IMDUR) 30 MG 24 hr tablet Take 1 tablet (30 mg) by mouth daily 90 tablet 4    Lidocaine 0.5 % GEL Externally apply topically as needed      meclizine (ANTIVERT) 12.5 MG tablet Take 1 tablet (12.5 mg) by mouth 3 times daily as needed for dizziness. 30 tablet 0    metoprolol succinate ER (TOPROL XL) 25 MG 24 hr tablet Take 1 tablet (25 mg) by mouth daily. 90 tablet 3    nifedipine 0.2% in white petrolatum 0.2 % OINT ointment Apply topically 4 times daily as needed (anal fissure) 100 g 1    nitroGLYcerin (NITROSTAT) 0.4 MG sublingual tablet For chest pain place 1 tablet under the tongue every 5 minutes for 3 doses. If symptoms persist 5 minutes after 1st dose call 911. 30 tablet 3    omeprazole (PRILOSEC) 20 MG DR capsule Take 1 capsule (20 mg) by mouth 2 times daily 180 capsule 4    ondansetron (ZOFRAN) 4 MG tablet Take 1 tablet (4 mg) by mouth every 8 hours as needed for nausea. 30 tablet 0    oxyBUTYnin (DITROPAN) 5 MG tablet TAKE 1 TABLET(5 MG) BY MOUTH AT BEDTIME 90 tablet 2    PARoxetine (PAXIL) 30 MG tablet Take 1 tablet (30 mg) by mouth every morning. 90 tablet 1    polyethylene glycol (MIRALAX) 17 GM/Dose powder TAKE 17 GRAMS(1 CAPFUL) BY  MOUTH DAILY 840 g 4    QUEtiapine (SEROQUEL) 50 MG tablet Take 1.5 tablets (75 mg) by mouth at bedtime 135 tablet 1    rosuvastatin (CRESTOR) 40 MG tablet TAKE 1 TABLET(40 MG) BY MOUTH DAILY 90 tablet 2    tamsulosin (FLOMAX) 0.4 MG capsule Take 2 capsules (0.8 mg) by mouth every evening 180 capsule 4       Time:     The longitudinal plan of care for the diagnosis(es)/condition(s) as documented were addressed during this visit. Due to the added complexity in care, I will continue to support Chetmanasandreina in the subsequent management and with ongoing continuity of care.     Az Briseno MD  Answers submitted by the patient for this visit:  General Questionnaire (Submitted on 12/5/2024)  Chief Complaint: Chronic problems general questions HPI Form  What is the reason for your visit today? : 1 month follow up for chronic conditions  How many days per week do you miss taking your medication?: 0  Questionnaire about: Chronic problems general questions HPI Form (Submitted on 12/5/2024)  Chief Complaint: Chronic problems general questions HPI Form

## 2024-12-11 ENCOUNTER — NURSE TRIAGE (OUTPATIENT)
Dept: INTERNAL MEDICINE | Facility: CLINIC | Age: 59
End: 2024-12-11
Payer: COMMERCIAL

## 2024-12-11 DIAGNOSIS — U07.1 INFECTION DUE TO 2019 NOVEL CORONAVIRUS: Primary | ICD-10-CM

## 2024-12-11 NOTE — TELEPHONE ENCOUNTER
"Nurse Triage SBAR    Is this a 2nd Level Triage? YES, LICENSED PRACTITIONER REVIEW IS REQUIRED    Situation: Cold symptoms with COVID-19 exposure    Background: History of essential hypertension, coronary artery disease, S/p CABG x4, dyslipidemia, ascending aorta dilatation, type 2 diabetes mellitus, and obstructive sleep apnea.     Assessment: Patient's wife, Carlee (CTC signed), calls with patient.     Symptom onset yesterday, 12/10/24. Symptoms of muscle soreness, dry mouth, dry cough, mild tiredness, and headache. Denies fever, has not taken temperature. Carlee states \"by my hand it is fine\" (he does not feel warm). Denies shortness of breath. Describes patient has coughing spells in which he worries he will become short of breath. Oxygen saturation at time of call 94%, wife states patient's oxygen saturation without cold symptoms is 94-95%. Heart rate 58-60. Patient and wife are worried, most bothersome symptom is dry cough. Describes mucus in his mouth (post nasal drip?) as green. Not coughing up any mucus. Has not tried any OTC medications due to patient's cardiac history and medications.     Wife tested positive for COVID-19 on Sunday, 12/8/24, at Urgent Care. They do not have home COVID-19 test.     Protocol Recommended Disposition:   Discuss With PCP And Callback By Nurse Within 1 Hour, See More Appropriate Protocol    Recommendation: Patient has albuterol inhaler from previous illness. Carlee locates inhaler during call. Reviewed albuterol instructions INHALE 2 PUFFS INTO THE LUNGS EVERY 6 HOURS AS NEEDED FOR SHORTNESS OF BREATH OR WHEEZING OR COUGH. Informed Carlee that if patient develops shortness of breath not relieved by albuterol inhaler, patient will need to be evaluated in the Emergency Department.     Informed Carlee that we would ask for Dr. Briseno's recommendations and call her back. If she does not answer, please send MyChart.     Routed to provider    Does the patient meet one of the following " criteria for ADS visit consideration? 16+ years old, with an [facility] PCP     TIP  Providers, please consider if this condition is appropriate for management at one of our Acute and Diagnostic Services sites.     If patient is a good candidate, please use dotphrase <dot>triageresponse and select Refer to ADS to document.     Reason for Disposition   HIGH RISK patient (e.g., weak immune system, age > 64 years, obesity with BMI of 30 or higher, pregnant, chronic lung disease) and COVID symptoms (e.g., cough, fever) (Exceptions: Already seen by doctor or NP/PA and no new or worsening symptoms.)   Symptoms of COVID-19 (e.g., cough, fever, SOB, or others) and within 14 days of COVID-19 EXPOSURE    Additional Information   Negative: SEVERE difficulty breathing (e.g., struggling for each breath, speaks in single words)   Negative: Difficult to awaken or acting confused (e.g., disoriented, slurred speech)   Negative: Bluish (or gray) lips or face now   Negative: Shock suspected (e.g., cold/pale/clammy skin, too weak to stand, low BP, rapid pulse)   Negative: Sounds like a life-threatening emergency to the triager   Negative: Diagnosed or suspected COVID-19 and symptoms lasting 3 or more weeks   Negative: COVID-19 exposure and no symptoms   Negative: COVID-19 vaccine reaction suspected (e.g., fever, headache, muscle aches) occurring 1 to 3 days after getting vaccine   Negative: COVID-19 vaccine, questions about   Negative: Exposure to someone known to have influenza (flu test positive) and flu-like symptoms (e.g., cough, runny nose, sore throat, SOB; with or without fever)   Negative: Possible COVID-19 symptoms and triager concerned about severity of symptoms or other causes   Negative: COVID-19 and breastfeeding, questions about   Negative: SEVERE or constant chest pain or pressure  (Exception: Mild central chest pain, present only when coughing.)   Negative: MODERATE difficulty breathing (e.g., speaks in phrases, SOB even at  rest, pulse 100-120)   Negative: Headache and stiff neck (can't touch chin to chest)   Negative: Chest pain or pressure  (Exception: MILD central chest pain, present only when coughing.)   Negative: Oxygen level (e.g., pulse oximetry) 90% or lower   Negative: Patient sounds very sick or weak to the triager   Negative: MILD difficulty breathing (e.g., minimal/no SOB at rest, SOB with walking, pulse <100)   Negative: Fever > 103 F (39.4 C)   Negative: Fever > 101 F (38.3 C) and over 60 years of age   Negative: Fever > 100 F (37.8 C) and bedridden (e.g., CVA, chronic illness, recovering from surgery)   Negative: Drinking very little and dehydration suspected (e.g., no urine > 12 hours, very dry mouth, very lightheaded)    Protocols used: Common Cold-A-OH, COVID-19 - Diagnosed or Onprhnptj-D-YZ

## 2024-12-11 NOTE — TELEPHONE ENCOUNTER
It sounds like he likely has COVID.  If he is interested in taking Paxlovid, could we assess his medications and identify the medications for which there are drug interactions and then I will be happy to review and prescribe with recommendations modification to his medications.

## 2024-12-11 NOTE — TELEPHONE ENCOUNTER
Notified Carlee of Dr. Briseno's response and instructions. She verbalized understanding. Informed Carlee that instruction would also be sent over patient's MyChart, she verbalized understanding. She has no further questions at this time.

## 2024-12-11 NOTE — TELEPHONE ENCOUNTER
Cassidy, I sent Paxlovid to his pharmacy.  He should decrease buspirone to 1/2 tablet once per day for 8 days.  He should hold Seroquel/quetiapine for 8 days.  He should take Flomax 1 tablet every other day for 8 days.  He should hold rosuvastatin for 8 days.  And he should hold nifedipine for 8 days or if needed he can use sparingly but not every day.

## 2024-12-11 NOTE — TELEPHONE ENCOUNTER
Reviewed medications with Carlee (spouse). Noted differences are patient taking the following medications PRN instead of how prescribed: Astelin nasal spray, Atrovent nasal spray, voltaren gel, and Miralax. Also reports taking 1-1.5 tablets of Quetiapine based on mood, not 1.5 tablets daily as prescribed. Patient interested in Paxlovid if Dr. Briseno recommends.     RN review of drug interactions with Paxlovid include: Buspirone, Quetiapine, Tamsulosin 0.8 mg daily, rosuvastatin, nifedipine ointment.

## 2024-12-30 DIAGNOSIS — K59.01 CONSTIPATION BY DELAYED COLONIC TRANSIT: ICD-10-CM

## 2024-12-30 RX ORDER — POLYETHYLENE GLYCOL 3350 17 G/17G
POWDER, FOR SOLUTION ORAL
Qty: 840 G | Refills: 4 | Status: SHIPPED | OUTPATIENT
Start: 2024-12-30

## 2024-12-31 ENCOUNTER — TRANSFERRED RECORDS (OUTPATIENT)
Dept: HEALTH INFORMATION MANAGEMENT | Facility: CLINIC | Age: 59
End: 2024-12-31
Payer: COMMERCIAL

## 2025-01-08 ENCOUNTER — PATIENT OUTREACH (OUTPATIENT)
Dept: CARE COORDINATION | Facility: CLINIC | Age: 60
End: 2025-01-08
Payer: COMMERCIAL

## 2025-01-08 NOTE — PROGRESS NOTES
Clinic Care Coordination Contact  Follow Up Progress Note      Assessment: [facility] RN spoke with patient today. He stated he was doing well at home. Patient said is adjusting well to his new psychiatric provider. He stated he has a follow up scheduled with her next month. Patient additionally stated he talks to her therapist twice week. He said he feels his depression is under good control at this time.   Patient reported he has started taking omeprazole twice daily and this is starting to help his GERD.   Patient denied any needs or concerns.       Care Gaps:    Health Maintenance Due   Topic Date Due    ZOSTER IMMUNIZATION (1 of 2) Never done    DTAP/TDAP/TD IMMUNIZATION (3 - Td or Tdap) 04/30/2022    A1C  12/12/2024    LUNG CANCER SCREENING  12/20/2024    DEPRESSION 6 MO INDEX REPEAT PHQ-9  01/01/2025       Scheduled with PCP on 2/5/25 for monthly visit.       Care Plans  Care Plan: Mental Health       Problem: Mental Health Symptoms Need Improvement       Goal: I would like establish care with a new psychiatric provider.       Start Date: 8/28/2024 Expected End Date: 8/27/2025    This Visit's Progress: 80% Recent Progress: 60%    Priority: High    Note:     Barriers: current psychiatric provider retiring.   Strengths: strong advocate for himself, strong family support.   Patient expressed understanding of goal: yes  Action steps to achieve this goal:  1. I understand the [facility] RN will request a mental health referral from Dr. Briseno.   2. I understand once the mental health referral has been placed, Goran will assist me with finding a new provider.   3. I will attend all scheduled appointments with my new psychiatric provider.     Discussed on 1/8/25                               Intervention/Education provided during outreach: Discussed the importance of taking his medications as directed. Encouraged him to attend all upcoming appointments as scheduled. Encouraged him to contact Care Coordination for any additional  needs or concerns.      Outreach Frequency: monthly, more frequently as needed        Plan: CCC RN will continue to monitor, support patient with current goal and will be available to assist as nursing needs arise.     Care Coordinator will follow up in one month.

## 2025-01-13 ENCOUNTER — THERAPY VISIT (OUTPATIENT)
Dept: PHYSICAL THERAPY | Facility: REHABILITATION | Age: 60
End: 2025-01-13
Payer: COMMERCIAL

## 2025-01-13 DIAGNOSIS — R42 DIZZINESS: ICD-10-CM

## 2025-01-13 PROCEDURE — 97161 PT EVAL LOW COMPLEX 20 MIN: CPT | Mod: GP | Performed by: PHYSICAL THERAPIST

## 2025-01-13 NOTE — PROGRESS NOTES
"PHYSICAL THERAPY EVALUATION  Type of Visit: Evaluation      Subjective         Presenting condition or subjective complaint: dizziness  [facility] Hx Add A: Vestibular      Question 1/13/2025  6:40 AM CST - Filed by Patient   How would you describe your symptoms? Attacks of dizziness   If you have attacks of dizziness, please complete the following:    When did it start? two month ago   When was the last attack? four days ago   How often do they occur? often   How long do they last? not sure   Do you have difficulty hearing?    Do you have noise in your ears? No   What helps your symptoms?    What makes your symptoms worse?    Which of the following activities create your symptoms?      On Nov 12th, the pt got up from bed and was very dizzy with subsequent vomiting. Over the past 2 months, Pt reports that his dizziness is \"sometimes\" and can be further exacerbated by going up stairs, quick supine to sit or sit to stand, and occasionally with walking. Pt describes his dizziness is spinning and that his vision can be off and that he cannot control it. Pt feels like his bouts can typically be around 10 minutes. Overall, the pt is feeling better but notes that the past 4-5 days it has been worse. Pt does note that he is on a lot of heart medications due to his cardiac history and has had some med changes. They would like to know if his dizziness is related to the \"crystals\" or possibly due to medication changes.     Date of onset: 11/12/24    Relevant medical history:     Dates & types of surgery: open heart surgry CABG x 4, 2021    Prior diagnostic imaging/testing results: Other none   Prior therapy history for the same diagnosis, illness or injury: No      Prior Level of Function  Transfers: Independent  Ambulation: Independent    Living Environment  Social support: With family members   Type of home: [facility]   Stairs to enter the home: Yes       Ramp: No   Stairs inside the home: Yes 12 Is there a railing: " No     Help at home: Self Cares (home health aide/personal care attendant, family, etc)  Equipment owned:       Employment: No    Hobbies/Interests: watching game    Patient goals for therapy: xxx    Pain assessment: Pain denied     Objective   Cognitive Status Examination  Orientation: Oriented to person, place and time   Level of Consciousness: Alert    OBSERVATION:   POSTURE: Sitting Posture: Rounded shoulders, Forward head    BED MOBILITY: Independent    TRANSFERS: Independent    GAIT:   Gait Description: Appears WNL with good pace and step length. No path deviations noted and pt steady.     BALANCE:     SPECIAL TESTS  Functional Gait Assessment (FGA)    (<22/30 indicates fall risk)     Dynamic Gait Index (DGI)    (<19/24 indicates fall risk)   Modified CTSIB Conditions (sec) Cond 1: 30  Cond 2: 30  Cond 4: 30  Cond 5 : 30, mod sway         SENSATION: LE Sensation WNL  COORDINATION: WNL    VESTIBULAR  Cervicogenic Screen    Neck ROM Normal     Oculomotor Screen    Ocular ROM Normal   Smooth Pursuit Normal   Saccades Normal   VOR Normal   VOR Cancellation Normal   Head Impulse Test Normal   Convergence Testing NT        Infrared Goggle Exam Vestibular Suppressant in Last 24 Hours? No  Exam Completed With: Infrared goggles   Spontaneous Nystagmus Negative   Gaze Evoked Nystagmus Negative   Head Shake Horizontal Nystagmus NT   Supine Head-Hanging Test NT    Left Right   [place]-Hallpike Negative Negative   HSCC Supine Roll Test Negative Negative   HSCC Forward Roll Test NT NT      BPPV Canal(s):  none  BPPV Type:  none    Dynamic Visual Acuity (DVA)    Static Acuity (LogMar)    Horizontal Head Movement at 1 Hz (LogMar)    Horizontal Head Movement at 2 Hz (LogMar)            Assessment & Plan   CLINICAL IMPRESSIONS  Medical Diagnosis: Vertigo    Treatment Diagnosis: Dizziness   Impression/Assessment: Patient is a 59 year old male with dizziness complaints for the past 2 months that have overall been improving.  The  following significant findings have been identified: Dizziness. These impairments interfere with their ability to perform self care tasks and driving  as compared to previous level of function. At today's evaluation, PT is unable to recreate pt's dizziness; PT rules out BPPV with positional testing. Pt's dizziness that has mostly improved and resolved no longer appears to have a vestibular component. At this time, pt does not require skilled 1:1 PT.     Clinical Decision Making (Complexity):  Clinical Presentation: Stable/Uncomplicated  Clinical Presentation Rationale: based on medical and personal factors listed in PT evaluation  Clinical Decision Making (Complexity): Low complexity    PLAN OF CARE  Treatment Interventions:  None    Long Term Goals      None      Frequency of Treatment: EVAL ONLY  Duration of Treatment: EVAL ONLY    Recommended Referrals to Other Professionals:  None  Education Assessment:   Learner/Method: Patient;Significant Other    Risks and benefits of evaluation/treatment have been explained.   Patient/Family/caregiver agrees with Plan of Care.     Evaluation Time:     PT Millie Low Complexity Minutes (40056): 40  Evaluation Only     Signing Clinician: Cherie Staples PT        [facility]                                                                                   OUTPATIENT PHYSICAL THERAPY      PLAN OF TREATMENT FOR OUTPATIENT REHABILITATION   Patient's Last Name, First Name, Nadya Ordonez YOB: 1965   Provider's Name   [facility]   Medical Record No.  [id number]     Onset Date: 11/12/24  Start of Care Date: 01/13/25     Medical Diagnosis:  Vertigo      PT Treatment Diagnosis:  Dizziness Plan of Treatment  Frequency/Duration: EVAL ONLY/ EVAL ONLY    Certification date from 01/13/25 to 01/13/25         See note for plan of treatment details and functional goals     Cherie Staples PT                          I CERTIFY THE NEED FOR THESE SERVICES FURNISHED UNDER        THIS PLAN OF TREATMENT AND WHILE UNDER MY CARE     (Physician attestation of this document indicates review and certification of the therapy plan).              Referring Provider:  Steph Willis    Initial Assessment  See Epic Evaluation- Start of Care Date: 01/13/25

## 2025-01-14 ENCOUNTER — OFFICE VISIT (OUTPATIENT)
Dept: CARDIOLOGY | Facility: CLINIC | Age: 60
End: 2025-01-14
Payer: COMMERCIAL

## 2025-01-14 ENCOUNTER — ORDERS ONLY (AUTO-RELEASED) (OUTPATIENT)
Dept: CARDIOLOGY | Facility: CLINIC | Age: 60
End: 2025-01-14

## 2025-01-14 VITALS
DIASTOLIC BLOOD PRESSURE: 82 MMHG | SYSTOLIC BLOOD PRESSURE: 117 MMHG | HEART RATE: 48 BPM | WEIGHT: 185 LBS | RESPIRATION RATE: 14 BRPM | BODY MASS INDEX: 30.31 KG/M2

## 2025-01-14 DIAGNOSIS — R00.1 SINUS BRADYCARDIA: Primary | ICD-10-CM

## 2025-01-14 DIAGNOSIS — I25.709 CORONARY ARTERY DISEASE INVOLVING CORONARY BYPASS GRAFT OF NATIVE HEART WITH ANGINA PECTORIS: ICD-10-CM

## 2025-01-14 DIAGNOSIS — R00.1 SINUS BRADYCARDIA: ICD-10-CM

## 2025-01-14 DIAGNOSIS — I50.32 CHRONIC HEART FAILURE WITH PRESERVED EJECTION FRACTION (H): ICD-10-CM

## 2025-01-14 DIAGNOSIS — E78.5 DYSLIPIDEMIA, GOAL LDL BELOW 70: Chronic | ICD-10-CM

## 2025-01-14 DIAGNOSIS — I10 ESSENTIAL HYPERTENSION: Chronic | ICD-10-CM

## 2025-01-14 DIAGNOSIS — G47.33 OSA (OBSTRUCTIVE SLEEP APNEA): ICD-10-CM

## 2025-01-14 DIAGNOSIS — E11.69 TYPE 2 DIABETES MELLITUS WITH OTHER SPECIFIED COMPLICATION, WITHOUT LONG-TERM CURRENT USE OF INSULIN (H): ICD-10-CM

## 2025-01-14 PROCEDURE — 99214 OFFICE O/P EST MOD 30 MIN: CPT | Performed by: GENERAL ACUTE CARE HOSPITAL

## 2025-01-14 NOTE — PATIENT INSTRUCTIONS
We will mail you a heart monitor to wear for 3 days.  Continue your current doses of medication for now.  See Teodora in 2 months and see me in 4-6 months

## 2025-01-14 NOTE — PROGRESS NOTES
HEART CARE ENCOUNTER NOTE          Assessment/Recommendations   Assessment:    Coronary artery disease status post four-vessel coronary artery bypass grafting (left internal mammary artery to the left anterior descending artery, right radial artery to the right posterior descending artery, reversed saphenous venous grafts to obtuse marginal and diagonal artery branches) on 6/24/2020. No ischemia seen on stress cardiac MRI on 12/23/2021 but he has been having exertional dyspnea with his saphenous venous graft to the right posterior descending artery noted to be occluded on coronary angiography 10/6/2022. He still has intermittent anginal symptoms which seem stable but are potentially activity-limiting.  Sinus bradycardia that has persisted despite a reduction in beta blocker therapy which may indicate sinus node dysfunction and could potentially be causing symptoms.  Chronic heart failure with preserved left ventricular ejection fraction. He seems euvolemic and normal left-sided filling pressure was noted on cardiac catheterization 10/6/2022.  Essential hypertension. Controlled.  Dyslipidemia. Last LDL 48 mg/dL.  Non insulin-dependent diabetes mellitus type 2. Last hemoglobin A1c 5.8%.  Former smoker.  Moderate obstructive sleep apnea diagnosed on polysomnographic testing 6/24/2024 on home CPAP.  Body mass index is 30.31 kg/m     Plan:  3-day Zio patch.  Continue metoprolol succinate 25 mg daily for now given its anti-anginal properties, but if he has significant bradycardia noted on ambulatory cardiac monitoring we may need to stop this.  Continue isosorbide mononitrate at 30 mg daily as he felt unwell at higher doses.  Given his limited tolerance to anti-anginal medication, we again discussed coronary angiography and possible percutaneous coronary intervention. He is open to considering this now based on how his symptoms are.  Rosuvastatin 40 mg and aspirin 81 mg daily.   Follow-up with Teodora Ordonez in 2 months,  with me in 4-6 months.         History of Present Illness   Mr. Nadya Blake is a 59 year old male with a significant past history of CAD with history of NSTEMI s/p 4V CABG (LIMA to LAD, right radial artery to RPDA, reversed SVGs to an OM and diagonal artery branch) on 6/24/2020, HFpEF, HTN, dyslipidemia, and former smoker presenting for follow-up.    He continues to get out of breath if he walks too fast or goes up stairs. He also still gets chest pain, sometimes with exertion but more commonly after eating in the evening. He does still use sublingual nitroglycerin which helps his chest pain but has not needed this in the past month.    He saw Teodora Ordonez in cardiology clinic 7/12/2024. He was noting bradycardia associated with lightheadedness. Metoprolol succinate was reduced to 25 mg daily and isosorbide mononitrate increased to 60 mg daily to maintain effective anti-anginal therapy. However, he felt more lightheaded and nauseous with this so he reduced isosorbide mononitrate to 30 mg a day and kept the metoprolol at 25 mg daily which seemed to help. He has also been having balance issues and a sensation of spinning felt to represent vertigo.    He also contracted COVID-19 last month and has had abdominal pain since then.    He was diagnosed with HANNAH and started CPAP which he thinks is helping him sleep better,.    No syncope, lower extremity swelling, palpitations, paroxysmal nocturnal dyspnea (PND), or orthopnea.     Cardiac Problems and Cardiac Diagnostics     Most Recent Cardiac testing:  ECG 10/6/2022 (personally reviewed and interpreted): sinus bradycardia HR 57 bpm with 1st degree AV block  ms, nonspecific T wave changes     ECHO 6/27/2020 (report reviewed):     Normal left ventricular size and systolic function. The left ventricular wall motion is normal. The calculated left ventricular ejection fraction is 71%.    Normal right ventricular size and systolic function.    No hemodynamically  significant valvular heart abnormalities.    The ascending aorta is mildly dilated.    When compared to the previous study dated 6/23/2020, no significant change.     Stress cardiac MRI 12/23/2021 (report reviewed):  1.  Pharmacological Regadenoson stress cardiac MRI is negative for inducible myocardial ischemia.   2.  Pharmacological stress ECG is negative for inducible myocardial ischemia.   3. Normal left ventricular size, wall thickness and systolic function. The quantified left ventricular  ejection fraction is 59 %.  Very small area of non-transmural myocardial scar in the mid inferior wall is identified.    4.  Normal right ventricular size and systolic function.    5.  No significant valvular abnormalities.  6. Ascending aorta measures 38 mm.      Stress test 5/21/2021 (report reviewed):    The nuclear stress test is negative for inducible myocardial ischemia or infarction.    The left ventricular ejection fraction at stress is 65%.    There is no prior study for comparison.     Cardiac cath 10/6/2022 (report reviewed):   Left ventricular filling pressures are normal.  3rd Mrg lesion is 90% stenosed.  Prox RCA lesion is 75% stenosed.  Prox RCA to Mid RCA lesion is 40% stenosed.  Dist RCA lesion is 75% stenosed.  Mid RCA lesion is 30% stenosed.  RPDA lesion is 50% stenosed.  RPAV lesion is 40% stenosed.  Prox LAD lesion is 70% stenosed.  1st Diag-1 lesion is 95% stenosed.  1st Diag-2 lesion is 95% stenosed.  2nd Diag lesion is 99% stenosed.  Mid LAD-1 lesion is 75% stenosed.  Mid LAD-2 lesion is 75% stenosed.  Dist LAD lesion is 70% stenosed.  Vein graft to right PDA is totally occluded  Vein graft to second diagonal is widely patent  Vein graft to third OM is widely patent  LUZ MARIA graft to mid distal LAD is patent     Medications  Allergies   Current Outpatient Medications   Medication Sig Dispense Refill    acetaminophen (TYLENOL) 500 MG tablet Take 1-2 tablets (500-1,000 mg) by mouth every 6 hours as needed  for mild pain 360 tablet 3    albuterol (VENTOLIN HFA) 108 (90 Base) MCG/ACT inhaler INHALE 2 PUFFS INTO THE LUNGS EVERY 6 HOURS AS NEEDED FOR SHORTNESS OF BREATH OR WHEEZING OR COUGH 18 g 2    aspirin (ASA) 81 MG chewable tablet CHEW AND SWALLOW 1 TABLET(81 MG) BY MOUTH DAILY 90 tablet 2    azelastine (ASTELIN) 0.1 % nasal spray Spray 1 spray into both nostrils 2 times daily 30 mL 1    busPIRone (BUSPAR) 5 MG tablet Take 1 tablet (5 mg) by mouth 2 times daily 180 tablet 1    Cholecalciferol (VITAMIN D3) 50 MCG (2000 UT) CAPS TAKE 1 CAPSULE BY MOUTH DAILY 90 capsule 1    cyanocobalamin (VITAMIN B-12) 1000 MCG tablet Take 1 tablet (1,000 mcg) by mouth daily 90 tablet 4    diclofenac (VOLTAREN) 1 % topical gel Apply 4 g topically 4 times daily 500 g 2    empagliflozin (JARDIANCE) 25 MG TABS tablet Take 1 tablet (25 mg) by mouth daily. 90 tablet 2    finasteride (PROSCAR) 5 MG tablet Take 1 tablet (5 mg) by mouth daily 90 tablet 4    ipratropium (ATROVENT) 0.06 % nasal spray Spray 2 sprays into both nostrils 4 times daily 15 mL 1    isosorbide mononitrate (IMDUR) 30 MG 24 hr tablet Take 1 tablet (30 mg) by mouth daily 90 tablet 4    Lidocaine 0.5 % GEL Externally apply topically as needed      meclizine (ANTIVERT) 12.5 MG tablet Take 1 tablet (12.5 mg) by mouth 3 times daily as needed for dizziness. 30 tablet 0    metoprolol succinate ER (TOPROL XL) 25 MG 24 hr tablet Take 1 tablet (25 mg) by mouth daily. 90 tablet 3    nifedipine 0.2% in white petrolatum 0.2 % OINT ointment Apply topically 4 times daily as needed (anal fissure) 100 g 1    nitroGLYcerin (NITROSTAT) 0.4 MG sublingual tablet For chest pain place 1 tablet under the tongue every 5 minutes for 3 doses. If symptoms persist 5 minutes after 1st dose call 911. 30 tablet 3    omeprazole (PRILOSEC) 20 MG DR capsule Take 1 capsule (20 mg) by mouth 2 times daily 180 capsule 4    ondansetron (ZOFRAN) 4 MG tablet Take 1 tablet (4 mg) by mouth every 8 hours as needed  for nausea. 30 tablet 0    oxyBUTYnin (DITROPAN) 5 MG tablet TAKE 1 TABLET(5 MG) BY MOUTH AT BEDTIME 90 tablet 2    PARoxetine (PAXIL) 30 MG tablet Take 1 tablet (30 mg) by mouth every morning. 90 tablet 1    polyethylene glycol (MIRALAX) 17 GM/Dose powder TAKE 17 GRAMS(1 CAPFUL) BY MOUTH DAILY 840 g 4    QUEtiapine (SEROQUEL) 50 MG tablet Take 1.5 tablets (75 mg) by mouth at bedtime 135 tablet 1    rosuvastatin (CRESTOR) 40 MG tablet TAKE 1 TABLET(40 MG) BY MOUTH DAILY 90 tablet 2    tamsulosin (FLOMAX) 0.4 MG capsule Take 2 capsules (0.8 mg) by mouth every evening 180 capsule 4      Allergies   Allergen Reactions    Atorvastatin Diarrhea    Cortisone Other (See Comments)     pain    Lisinopril Cough     started after CABG for unclear reason    Methylprednisolone Other (See Comments)     ?        Physical Examination Review of Systems   /82 (BP Location: Right arm, Patient Position: Sitting, Cuff Size: Adult Regular)   Pulse (!) 48   Resp 14   Wt 83.9 kg (185 lb)   BMI 30.31 kg/m    Body mass index is 30.31 kg/m .  Wt Readings from Last 3 Encounters:   01/14/25 83.9 kg (185 lb)   01/03/25 84.6 kg (186 lb 8 oz)   12/05/24 85.3 kg (188 lb)       General Appearance:   Pleasant  male, appears  stated age. no acute distress, normal body habitus   ENT/Mouth: membranes moist, no apparent gingival bleeding.      EYES:  no scleral icterus, normal conjunctivae   Neck: no carotid bruits. No anterior cervical lymphadenopaty   Respiratory:   lungs are clear to auscultation, no rales or wheezing, well-healed sternal scar, equal chest wall expansion    Cardiovascular:   Regular rhythm, normal rate. Normal first and second heart sounds with no murmurs, rubs, or gallops; the carotid, radial and posterior tibial pulses are intact, Jugular venous pressure normal, no edema bilaterally    Abdomen/GI:  no organomegaly, masses, bruits, or tenderness; bowel sounds are present   Extremities: no cyanosis or clubbing   Skin: no  xanthelasma, warm.    Heme/lymph/ Immunology No apparent bleeding noted.   Neurologic: Alert and oriented. normal gait, no tremors     Psychiatric: Pleasant, calm, appropriate affect.    A complete 10 system review of systems was performed and is negative except as mentioned in the HPI/subjective.         Past History   Past Medical History:   Past Medical History:   Diagnosis Date    Acute non-ST elevation myocardial infarction (NSTEMI) (H) 6/22/2020    Adenomatous polyp of colon     Ascending aorta dilatation     Carpal tunnel syndrome     Chronic superficial gastritis     Coronary artery disease due to lipid rich plaque 6/23/2020    Depression with anxiety     Dyslipidemia, goal LDL below 70 6/23/2020    Essential hypertension 9/2/2012    Fatty liver disease, nonalcoholic     GERD (gastroesophageal reflux disease)     IBS (irritable bowel syndrome)     Left lumbar radiculopathy     Multinodular goiter     Old torn meniscus of knee     Paroxysmal atrial fibrillation (H)     Perianal abscess     Post herpetic neuralgia     Post-traumatic osteoarthritis of both knees     Primary osteoarthritis of left hip     Primary osteoarthritis of right hip     Pulmonary nodule     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 Ragsdale    CV CORONARY ANGIOGRAM N/A 6/23/2020    Procedure: Coronary Angiogram;  Surgeon: Ariane Lester MD;  Location: [facility] Cath Lab;  Service: Cardiology    CV CORONARY ANGIOGRAM N/A 10/6/2022    Procedure: Coronary Angiogram;  Surgeon: Javon John MD;  Location: [facility] CATH LAB CV    CV IABP INSERT N/A 6/24/2020    Procedure: Intra Aortic Balloon Pump Insertion;  Surgeon: Ariane Lester MD;  Location:  [facility] Cath Lab;  Service: Cardiology    CV LEFT HEART CATH N/A 10/6/2022    Procedure: Left Heart Catheterization;  Surgeon: Javon John MD;  Location: [facility] CV    CV LEFT HEART CATHETERIZATION WITHOUT LEFT VENTRICULOGRAM Left 2020    Procedure: Left Heart Catheterization Without Left Ventriculogram;  Surgeon: Ariane Lester MD;  Location: [facility] Cath Lab;  Service: Cardiology    CV LEFT VENTRICULOGRAM N/A 10/6/2022    Procedure: Left Ventriculogram;  Surgeon: Javon John MD;  Location: [facility] CV       Family History:   Family History   Problem Relation Age of Onset    No Known Problems Mother     Lung Cancer Father 56        fatal    No Known Problems Daughter     Other - See Comments Son         congenital deformity of right leg; he uses a leg prosthesis        Social History:   Social History     Socioeconomic History    Marital status:      Spouse name: Carlee    Number of children: 2    Years of education: Not on file    Highest education level: Not on file   Occupational History    Not on file   Tobacco Use    Smoking status: Former     Current packs/day: 0.00     Average packs/day: 1 pack/day for 30.0 years (30.0 ttl pk-yrs)     Types: Cigarettes     Start date: 3/23/1990     Quit date: 3/23/2020     Years since quittin.8     Passive exposure: Never    Smokeless tobacco: Never    Tobacco comments:     stopped 3/24/2020   Vaping Use    Vaping status: Never Used   Substance and Sexual Activity    Alcohol use: No    Drug use: Never    Sexual activity: Yes     Partners: Female   Other Topics Concern    Not on file   Social History Narrative    , Carlee, BA chemistry and taking AA courses at [employer].  From [place]; bachelors in geology and computer science. Son, Grace () and Sherrie (2008).  On SSDI, PTSD.       Social Drivers of Health     Financial Resource Strain: Low Risk  (2023)    Financial Resource Strain     Within the past 12  months, have you or your family members you live with been unable to get utilities (heat, electricity) when it was really needed?: No   Food Insecurity: Low Risk  (12/27/2023)    Food Insecurity     Within the past 12 months, did you worry that your food would run out before you got money to buy more?: No     Within the past 12 months, did the food you bought just not last and you didn t have money to get more?: No   Transportation Needs: Low Risk  (12/27/2023)    Transportation Needs     Within the past 12 months, has lack of transportation kept you from medical appointments, getting your medicines, non-medical meetings or appointments, work, or from getting things that you need?: No   Physical Activity: Not on file   Stress: Not on file   Social Connections: Unknown (1/1/2022)    Received from [facility], [facility] [place]    Social Connections     Frequency of Communication with Friends and Family: Not on file   Interpersonal Safety: Low Risk  (12/5/2024)    Interpersonal Safety     Do you feel physically and emotionally safe where you currently live?: Yes     Within the past 12 months, have you been hit, slapped, kicked or otherwise physically hurt by someone?: No     Within the past 12 months, have you been humiliated or emotionally abused in other ways by your partner or ex-partner?: No   Housing Stability: Low Risk  (12/27/2023)    Housing Stability     Do you have housing? : Yes     Are you worried about losing your housing?: No              Lab Results    Chemistry/lipid CBC Cardiac Enzymes/BNP/TSH/INR   Lab Results   Component Value Date    CHOL 104 09/12/2024    HDL 36 (L) 09/12/2024    LDL 48 09/12/2024    TRIG 98 09/12/2024    CR 1.00 11/12/2024    BUN 10.9 11/12/2024    POTASSIUM 4.2 11/12/2024     11/12/2024    CO2 23 11/12/2024      Lab Results   Component Value Date    WBC 7.1 11/12/2024    HGB 14.9 11/12/2024    HCT 45.0 11/12/2024     MCV 90 11/12/2024     11/12/2024    A1C 5.8 (H) 09/12/2024     Lab Results   Component Value Date    A1C 5.8 (H) 09/12/2024    Lab Results   Component Value Date    TROPONINI <0.01 11/21/2020    BNP 79 (H) 07/27/2020    NTBNP <36 12/20/2023    TSH 1.28 09/12/2024    INR 1.10 07/27/2020          Sloan Burns MD [facility]  Non-Invasive Cardiologist  [facility]  Pager [phone]

## 2025-01-14 NOTE — LETTER
1/14/2025    Az Briseno MD  [address] [facility] [address]    RE: Nadya Blake       Dear Colleague,     I had the pleasure of seeing Nadya Blake in the [facility].  HEART CARE ENCOUNTER NOTE          Assessment/Recommendations   Assessment:    Coronary artery disease status post four-vessel coronary artery bypass grafting (left internal mammary artery to the left anterior descending artery, right radial artery to the right posterior descending artery, reversed saphenous venous grafts to obtuse marginal and diagonal artery branches) on 6/24/2020. No ischemia seen on stress cardiac MRI on 12/23/2021 but he has been having exertional dyspnea with his saphenous venous graft to the right posterior descending artery noted to be occluded on coronary angiography 10/6/2022. He still has intermittent anginal symptoms which seem stable but are potentially activity-limiting.  Sinus bradycardia that has persisted despite a reduction in beta blocker therapy which may indicate sinus node dysfunction and could potentially be causing symptoms.  Chronic heart failure with preserved left ventricular ejection fraction. He seems euvolemic and normal left-sided filling pressure was noted on cardiac catheterization 10/6/2022.  Essential hypertension. Controlled.  Dyslipidemia. Last LDL 48 mg/dL.  Non insulin-dependent diabetes mellitus type 2. Last hemoglobin A1c 5.8%.  Former smoker.  Moderate obstructive sleep apnea diagnosed on polysomnographic testing 6/24/2024 on home CPAP.  Body mass index is 30.31 kg/m     Plan:  3-day Zio patch.  Continue metoprolol succinate 25 mg daily for now given its anti-anginal properties, but if he has significant bradycardia noted on ambulatory cardiac monitoring we may need to stop this.  Continue isosorbide mononitrate at 30 mg daily as he felt unwell at higher doses.  Given his limited tolerance to anti-anginal medication, we again discussed coronary  angiography and possible percutaneous coronary intervention. He is open to considering this now based on how his symptoms are.  Rosuvastatin 40 mg and aspirin 81 mg daily.   Follow-up with Teodora Ordonez in 2 months, with me in 4-6 months.         History of Present Illness   Mr. Nadya Blake is a 59 year old male with a significant past history of CAD with history of NSTEMI s/p 4V CABG (LIMA to LAD, right radial artery to RPDA, reversed SVGs to an OM and diagonal artery branch) on 6/24/2020, HFpEF, HTN, dyslipidemia, and former smoker presenting for follow-up.    He continues to get out of breath if he walks too fast or goes up stairs. He also still gets chest pain, sometimes with exertion but more commonly after eating in the evening. He does still use sublingual nitroglycerin which helps his chest pain but has not needed this in the past month.    He saw Teodora Ordonez in cardiology clinic 7/12/2024. He was noting bradycardia associated with lightheadedness. Metoprolol succinate was reduced to 25 mg daily and isosorbide mononitrate increased to 60 mg daily to maintain effective anti-anginal therapy. However, he felt more lightheaded and nauseous with this so he reduced isosorbide mononitrate to 30 mg a day and kept the metoprolol at 25 mg daily which seemed to help. He has also been having balance issues and a sensation of spinning felt to represent vertigo.    He also contracted COVID-19 last month and has had abdominal pain since then.    He was diagnosed with HANNAH and started CPAP which he thinks is helping him sleep better,.    No syncope, lower extremity swelling, palpitations, paroxysmal nocturnal dyspnea (PND), or orthopnea.     Cardiac Problems and Cardiac Diagnostics     Most Recent Cardiac testing:  ECG 10/6/2022 (personally reviewed and interpreted): sinus bradycardia HR 57 bpm with 1st degree AV block  ms, nonspecific T wave changes     ECHO 6/27/2020 (report reviewed):     Normal left  ventricular size and systolic function. The left ventricular wall motion is normal. The calculated left ventricular ejection fraction is 71%.    Normal right ventricular size and systolic function.    No hemodynamically significant valvular heart abnormalities.    The ascending aorta is mildly dilated.    When compared to the previous study dated 6/23/2020, no significant change.     Stress cardiac MRI 12/23/2021 (report reviewed):  1.  Pharmacological Regadenoson stress cardiac MRI is negative for inducible myocardial ischemia.   2.  Pharmacological stress ECG is negative for inducible myocardial ischemia.   3. Normal left ventricular size, wall thickness and systolic function. The quantified left ventricular  ejection fraction is 59 %.  Very small area of non-transmural myocardial scar in the mid inferior wall is identified.    4.  Normal right ventricular size and systolic function.    5.  No significant valvular abnormalities.  6. Ascending aorta measures 38 mm.      Stress test 5/21/2021 (report reviewed):     The nuclear stress test is negative for inducible myocardial ischemia or infarction.     The left ventricular ejection fraction at stress is 65%.     There is no prior study for comparison.     Cardiac cath 10/6/2022 (report reviewed):   Left ventricular filling pressures are normal.  3rd Mrg lesion is 90% stenosed.  Prox RCA lesion is 75% stenosed.  Prox RCA to Mid RCA lesion is 40% stenosed.  Dist RCA lesion is 75% stenosed.  Mid RCA lesion is 30% stenosed.  RPDA lesion is 50% stenosed.  RPAV lesion is 40% stenosed.  Prox LAD lesion is 70% stenosed.  1st Diag-1 lesion is 95% stenosed.  1st Diag-2 lesion is 95% stenosed.  2nd Diag lesion is 99% stenosed.  Mid LAD-1 lesion is 75% stenosed.  Mid LAD-2 lesion is 75% stenosed.  Dist LAD lesion is 70% stenosed.  Vein graft to right PDA is totally occluded  Vein graft to second diagonal is widely patent  Vein graft to third OM is widely patent  LUZ MARIA graft to  mid distal LAD is patent     Medications  Allergies   Current Outpatient Medications   Medication Sig Dispense Refill     acetaminophen (TYLENOL) 500 MG tablet Take 1-2 tablets (500-1,000 mg) by mouth every 6 hours as needed for mild pain 360 tablet 3     albuterol (VENTOLIN HFA) 108 (90 Base) MCG/ACT inhaler INHALE 2 PUFFS INTO THE LUNGS EVERY 6 HOURS AS NEEDED FOR SHORTNESS OF BREATH OR WHEEZING OR COUGH 18 g 2     aspirin (ASA) 81 MG chewable tablet CHEW AND SWALLOW 1 TABLET(81 MG) BY MOUTH DAILY 90 tablet 2     azelastine (ASTELIN) 0.1 % nasal spray Spray 1 spray into both nostrils 2 times daily 30 mL 1     busPIRone (BUSPAR) 5 MG tablet Take 1 tablet (5 mg) by mouth 2 times daily 180 tablet 1     Cholecalciferol (VITAMIN D3) 50 MCG (2000 UT) CAPS TAKE 1 CAPSULE BY MOUTH DAILY 90 capsule 1     cyanocobalamin (VITAMIN B-12) 1000 MCG tablet Take 1 tablet (1,000 mcg) by mouth daily 90 tablet 4     diclofenac (VOLTAREN) 1 % topical gel Apply 4 g topically 4 times daily 500 g 2     empagliflozin (JARDIANCE) 25 MG TABS tablet Take 1 tablet (25 mg) by mouth daily. 90 tablet 2     finasteride (PROSCAR) 5 MG tablet Take 1 tablet (5 mg) by mouth daily 90 tablet 4     ipratropium (ATROVENT) 0.06 % nasal spray Spray 2 sprays into both nostrils 4 times daily 15 mL 1     isosorbide mononitrate (IMDUR) 30 MG 24 hr tablet Take 1 tablet (30 mg) by mouth daily 90 tablet 4     Lidocaine 0.5 % GEL Externally apply topically as needed       meclizine (ANTIVERT) 12.5 MG tablet Take 1 tablet (12.5 mg) by mouth 3 times daily as needed for dizziness. 30 tablet 0     metoprolol succinate ER (TOPROL XL) 25 MG 24 hr tablet Take 1 tablet (25 mg) by mouth daily. 90 tablet 3     nifedipine 0.2% in white petrolatum 0.2 % OINT ointment Apply topically 4 times daily as needed (anal fissure) 100 g 1     nitroGLYcerin (NITROSTAT) 0.4 MG sublingual tablet For chest pain place 1 tablet under the tongue every 5 minutes for 3 doses. If symptoms  persist 5 minutes after 1st dose call 911. 30 tablet 3     omeprazole (PRILOSEC) 20 MG DR capsule Take 1 capsule (20 mg) by mouth 2 times daily 180 capsule 4     ondansetron (ZOFRAN) 4 MG tablet Take 1 tablet (4 mg) by mouth every 8 hours as needed for nausea. 30 tablet 0     oxyBUTYnin (DITROPAN) 5 MG tablet TAKE 1 TABLET(5 MG) BY MOUTH AT BEDTIME 90 tablet 2     PARoxetine (PAXIL) 30 MG tablet Take 1 tablet (30 mg) by mouth every morning. 90 tablet 1     polyethylene glycol (MIRALAX) 17 GM/Dose powder TAKE 17 GRAMS(1 CAPFUL) BY MOUTH DAILY 840 g 4     QUEtiapine (SEROQUEL) 50 MG tablet Take 1.5 tablets (75 mg) by mouth at bedtime 135 tablet 1     rosuvastatin (CRESTOR) 40 MG tablet TAKE 1 TABLET(40 MG) BY MOUTH DAILY 90 tablet 2     tamsulosin (FLOMAX) 0.4 MG capsule Take 2 capsules (0.8 mg) by mouth every evening 180 capsule 4      Allergies   Allergen Reactions     Atorvastatin Diarrhea     Cortisone Other (See Comments)     pain     Lisinopril Cough     started after CABG for unclear reason     Methylprednisolone Other (See Comments)     ?        Physical Examination Review of Systems   /82 (BP Location: Right arm, Patient Position: Sitting, Cuff Size: Adult Regular)   Pulse (!) 48   Resp 14   Wt 83.9 kg (185 lb)   BMI 30.31 kg/m    Body mass index is 30.31 kg/m .  Wt Readings from Last 3 Encounters:   01/14/25 83.9 kg (185 lb)   01/03/25 84.6 kg (186 lb 8 oz)   12/05/24 85.3 kg (188 lb)       General Appearance:   Pleasant  male, appears  stated age. no acute distress, normal body habitus   ENT/Mouth: membranes moist, no apparent gingival bleeding.      EYES:  no scleral icterus, normal conjunctivae   Neck: no carotid bruits. No anterior cervical lymphadenopaty   Respiratory:   lungs are clear to auscultation, no rales or wheezing, well-healed sternal scar, equal chest wall expansion    Cardiovascular:   Regular rhythm, normal rate. Normal first and second heart sounds with no murmurs, rubs, or  gallops; the carotid, radial and posterior tibial pulses are intact, Jugular venous pressure normal, no edema bilaterally    Abdomen/GI:  no organomegaly, masses, bruits, or tenderness; bowel sounds are present   Extremities: no cyanosis or clubbing   Skin: no xanthelasma, warm.    Heme/lymph/ Immunology No apparent bleeding noted.   Neurologic: Alert and oriented. normal gait, no tremors     Psychiatric: Pleasant, calm, appropriate affect.    A complete 10 system review of systems was performed and is negative except as mentioned in the HPI/subjective.         Past History   Past Medical History:   Past Medical History:   Diagnosis Date     Acute non-ST elevation myocardial infarction (NSTEMI) (H) 6/22/2020     Adenomatous polyp of colon      Ascending aorta dilatation      Carpal tunnel syndrome      Chronic superficial gastritis      Coronary artery disease due to lipid rich plaque 6/23/2020     Depression with anxiety      Dyslipidemia, goal LDL below 70 6/23/2020     Essential hypertension 9/2/2012     Fatty liver disease, nonalcoholic      GERD (gastroesophageal reflux disease)      IBS (irritable bowel syndrome)      Left lumbar radiculopathy      Multinodular goiter      Old torn meniscus of knee      Paroxysmal atrial fibrillation (H)      Perianal abscess      Post herpetic neuralgia      Post-traumatic osteoarthritis of both knees      Primary osteoarthritis of left hip      Primary osteoarthritis of right hip      Pulmonary nodule      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 Ragsdale     CV CORONARY ANGIOGRAM N/A 6/23/2020    Procedure: Coronary Angiogram;  Surgeon: Ariane Lester MD;  Location: [facility]  Lab;  Service: Cardiology     CV CORONARY ANGIOGRAM N/A 10/6/2022    Procedure: Coronary Angiogram;  Surgeon: Javon John MD;  Location: [facility] CV     CV IABP INSERT N/A 2020    Procedure: Intra Aortic Balloon Pump Insertion;  Surgeon: Ariane Lester MD;  Location: [facility] Cath Lab;  Service: Cardiology     CV LEFT HEART CATH N/A 10/6/2022    Procedure: Left Heart Catheterization;  Surgeon: Javon John MD;  Location: [facility] CV     CV LEFT HEART CATHETERIZATION WITHOUT LEFT VENTRICULOGRAM Left 2020    Procedure: Left Heart Catheterization Without Left Ventriculogram;  Surgeon: Ariane Lester MD;  Location: [facility] Cath Lab;  Service: Cardiology     CV LEFT VENTRICULOGRAM N/A 10/6/2022    Procedure: Left Ventriculogram;  Surgeon: Javon John MD;  Location: [facility] LAB CV       Family History:   Family History   Problem Relation Age of Onset     No Known Problems Mother      Lung Cancer Father 56        fatal     No Known Problems Daughter      Other - See Comments Son         congenital deformity of right leg; he uses a leg prosthesis        Social History:   Social History     Socioeconomic History     Marital status:      Spouse name: Carlee     Number of children: 2     Years of education: Not on file     Highest education level: Not on file   Occupational History     Not on file   Tobacco Use     Smoking status: Former     Current packs/day: 0.00     Average packs/day: 1 pack/day for 30.0 years (30.0 ttl pk-yrs)     Types: Cigarettes     Start date: 3/23/1990     Quit date: 3/23/2020     Years since quittin.8     Passive exposure: Never     Smokeless tobacco: Never     Tobacco comments:     stopped 3/24/2020   Vaping Use     Vaping status: Never Used   Substance and Sexual Activity     Alcohol use: No     Drug use: Never     Sexual activity: Yes     Partners: Female   Other Topics Concern     Not on file   Social History Narrative     , Carlee, MELODIE chemistry and taking AA courses at [employer].  From [place]; bachelors in geology and computer science. Son, Grace (2005) and Sherrie (2008).  On SSDI, PTSD.       Social Drivers of Health     Financial Resource Strain: Low Risk  (12/27/2023)    Financial Resource Strain      Within the past 12 months, have you or your family members you live with been unable to get utilities (heat, electricity) when it was really needed?: No   Food Insecurity: Low Risk  (12/27/2023)    Food Insecurity      Within the past 12 months, did you worry that your food would run out before you got money to buy more?: No      Within the past 12 months, did the food you bought just not last and you didn t have money to get more?: No   Transportation Needs: Low Risk  (12/27/2023)    Transportation Needs      Within the past 12 months, has lack of transportation kept you from medical appointments, getting your medicines, non-medical meetings or appointments, work, or from getting things that you need?: No   Physical Activity: Not on file   Stress: Not on file   Social Connections: Unknown (1/1/2022)    Received from [facility], [facility]    Social Connections      Frequency of Communication with Friends and Family: Not on file   Interpersonal Safety: Low Risk  (12/5/2024)    Interpersonal Safety      Do you feel physically and emotionally safe where you currently live?: Yes      Within the past 12 months, have you been hit, slapped, kicked or otherwise physically hurt by someone?: No      Within the past 12 months, have you been humiliated or emotionally abused in other ways by your partner or ex-partner?: No   Housing Stability: Low Risk  (12/27/2023)    Housing Stability      Do you have housing? : Yes      Are you worried about losing your housing?: No              Lab Results    Chemistry/lipid CBC Cardiac Enzymes/BNP/TSH/INR   Lab Results   Component Value  Date    CHOL 104 09/12/2024    HDL 36 (L) 09/12/2024    LDL 48 09/12/2024    TRIG 98 09/12/2024    CR 1.00 11/12/2024    BUN 10.9 11/12/2024    POTASSIUM 4.2 11/12/2024     11/12/2024    CO2 23 11/12/2024      Lab Results   Component Value Date    WBC 7.1 11/12/2024    HGB 14.9 11/12/2024    HCT 45.0 11/12/2024    MCV 90 11/12/2024     11/12/2024    A1C 5.8 (H) 09/12/2024     Lab Results   Component Value Date    A1C 5.8 (H) 09/12/2024    Lab Results   Component Value Date    TROPONINI <0.01 11/21/2020    BNP 79 (H) 07/27/2020    NTBNP <36 12/20/2023    TSH 1.28 09/12/2024    INR 1.10 07/27/2020          Sloan Burns MD [facility]  Non-Invasive Cardiologist  [facility]  Pager [phone]      Thank you for allowing me to participate in the care of your patient.      Sincerely,     Sloan Burns MD     [facility]  cc:   Teodora Ordonez PA-C  [address] [place] [address]

## 2025-01-18 ENCOUNTER — HEALTH MAINTENANCE LETTER (OUTPATIENT)
Age: 60
End: 2025-01-18

## 2025-01-30 ENCOUNTER — TELEPHONE (OUTPATIENT)
Dept: CARDIOLOGY | Facility: CLINIC | Age: 60
End: 2025-01-30

## 2025-01-30 LAB — CV ZIO PRELIM RESULTS: NORMAL

## 2025-01-30 NOTE — TELEPHONE ENCOUNTER
[facility]    Phone Message    May a detailed message be left on voicemail: yes     Reason for Call: Requesting Results     Name/type of test: Zio Patch  Date of test: 1/14/2025  Was test done at a location other than [facility] (Please fill in the location if not [facility])?: Yes: [place], [place]    Action Taken: Other: Cardiology    Travel Screening: Not Applicable    Thank you!  [facility]       Date of Service:

## 2025-01-30 NOTE — TELEPHONE ENCOUNTER
----- Message -----  From: Sloan Burns MD  Sent: 1/30/2025   2:05 PM CST  To: Thelma Sands RN    I already sent him a turboBOTZ message earlier today.    Results  Received: Today  Sloan Burns MD Mychart, [place]  Heart rates look very good. I recommend continuing your current dose of metoprolol.    ----- Message -----  From: Thelma Sands RN  Sent: 1/30/2025   1:59 PM CST  To: Sloan Burns MD    Please see pt's request for Zio results from 1/29. Please review and advise. Thanks! LMS    ==  With the help of an Welsh , return phone call to [facility] ([facility]). Reviewed results/recommendations. Questions answered. Informed [facility] results can also be found in turboBOTZ. Understanding verbalized. Follow-up with RO pending 3/18/25. SHELLY

## 2025-02-05 ENCOUNTER — OFFICE VISIT (OUTPATIENT)
Dept: INTERNAL MEDICINE | Facility: CLINIC | Age: 60
End: 2025-02-05
Payer: COMMERCIAL

## 2025-02-05 VITALS
BODY MASS INDEX: 31.04 KG/M2 | TEMPERATURE: 97.8 F | OXYGEN SATURATION: 96 % | DIASTOLIC BLOOD PRESSURE: 65 MMHG | HEIGHT: 65 IN | RESPIRATION RATE: 17 BRPM | HEART RATE: 58 BPM | WEIGHT: 186.3 LBS | SYSTOLIC BLOOD PRESSURE: 97 MMHG

## 2025-02-05 DIAGNOSIS — F33.41 RECURRENT MAJOR DEPRESSIVE DISORDER, IN PARTIAL REMISSION: Chronic | ICD-10-CM

## 2025-02-05 DIAGNOSIS — I10 ESSENTIAL HYPERTENSION: Chronic | ICD-10-CM

## 2025-02-05 DIAGNOSIS — E11.9 TYPE 2 DIABETES MELLITUS WITHOUT COMPLICATION, WITHOUT LONG-TERM CURRENT USE OF INSULIN (H): Primary | ICD-10-CM

## 2025-02-05 DIAGNOSIS — F43.10 PTSD (POST-TRAUMATIC STRESS DISORDER): Chronic | ICD-10-CM

## 2025-02-05 DIAGNOSIS — R07.89 CHEST WALL PAIN: ICD-10-CM

## 2025-02-05 DIAGNOSIS — I25.118 CORONARY ARTERY DISEASE OF NATIVE ARTERY OF NATIVE HEART WITH STABLE ANGINA PECTORIS: ICD-10-CM

## 2025-02-05 DIAGNOSIS — E55.9 VITAMIN D DEFICIENCY: ICD-10-CM

## 2025-02-05 PROCEDURE — G2211 COMPLEX E/M VISIT ADD ON: HCPCS | Performed by: INTERNAL MEDICINE

## 2025-02-05 PROCEDURE — 99214 OFFICE O/P EST MOD 30 MIN: CPT | Performed by: INTERNAL MEDICINE

## 2025-02-05 ASSESSMENT — PAIN SCALES - GENERAL: PAINLEVEL_OUTOF10: NO PAIN (0)

## 2025-02-05 NOTE — PROGRESS NOTES
"  Office Visit - Follow Up   Nadya Blake   59 year old male    Date of Visit: 2/5/2025    Chief Complaint   Patient presents with    office visit     Patient reports they are here for 1 month follow up. Heart monitor from Dr. Burns that we should have on file. Has paperwork to be filled out 'Request for Medical Opinion'.         Assessment and Plan   1. Type 2 diabetes mellitus without complication, without long-term current use of insulin (H) (Primary)  Has been well-controlled continue same    2. Essential hypertension  Blood pressure looks okay continue same    3. Coronary artery disease, CABG 6/2020  Following with cardiology, pain in his left arm seems to be improved, continue to manage medically, ongoing discussion about coronary evaluation and intervention via catheter    4. PTSD (post-traumatic stress disorder)  5. Recurrent major depressive disorder, in partial remission  Continue with current plan    Return in about 4 weeks (around 3/5/2025) for Follow up.     History of Present Illness   This 59 year old man comes in for follow-up.  Recently saw cardiology and has a Zio patch which looked okay.  His left arm pain seems to be improved.  His wife cousin recently moved here, former  for the [employer] in [place]       Physical Exam   General Appearance:   No acute distress    BP 97/65 (BP Location: Left arm, Patient Position: Sitting, Cuff Size: Adult Large)   Pulse 58   Temp 97.8  F (36.6  C) (Temporal)   Resp 17   Ht 1.651 m (5' 5\")   Wt 84.5 kg (186 lb 4.8 oz)   SpO2 96%   BMI 31.00 kg/m      Heart rate controlled rhythm regular lungs clear to auscultation bilaterally     Additional Information   Current Outpatient Medications   Medication Sig Dispense Refill    acetaminophen (TYLENOL) 500 MG tablet Take 1-2 tablets (500-1,000 mg) by mouth every 6 hours as needed for mild pain 360 tablet 3    albuterol (VENTOLIN HFA) 108 (90 Base) MCG/ACT inhaler INHALE 2 PUFFS INTO THE LUNGS " EVERY 6 HOURS AS NEEDED FOR SHORTNESS OF BREATH OR WHEEZING OR COUGH 18 g 2    aspirin (ASA) 81 MG chewable tablet CHEW AND SWALLOW 1 TABLET(81 MG) BY MOUTH DAILY 90 tablet 2    azelastine (ASTELIN) 0.1 % nasal spray Spray 1 spray into both nostrils 2 times daily 30 mL 1    busPIRone (BUSPAR) 5 MG tablet Take 1 tablet (5 mg) by mouth 2 times daily 180 tablet 1    Cholecalciferol (VITAMIN D3) 50 MCG (2000 UT) CAPS TAKE 1 CAPSULE BY MOUTH DAILY 90 capsule 1    cyanocobalamin (VITAMIN B-12) 1000 MCG tablet Take 1 tablet (1,000 mcg) by mouth daily 90 tablet 4    diclofenac (VOLTAREN) 1 % topical gel Apply 4 g topically 4 times daily 500 g 2    empagliflozin (JARDIANCE) 25 MG TABS tablet Take 1 tablet (25 mg) by mouth daily. 90 tablet 2    finasteride (PROSCAR) 5 MG tablet Take 1 tablet (5 mg) by mouth daily 90 tablet 4    ipratropium (ATROVENT) 0.06 % nasal spray Spray 2 sprays into both nostrils 4 times daily 15 mL 1    isosorbide mononitrate (IMDUR) 30 MG 24 hr tablet Take 1 tablet (30 mg) by mouth daily 90 tablet 4    Lidocaine 0.5 % GEL Externally apply topically as needed      meclizine (ANTIVERT) 12.5 MG tablet Take 1 tablet (12.5 mg) by mouth 3 times daily as needed for dizziness. 30 tablet 0    metoprolol succinate ER (TOPROL XL) 25 MG 24 hr tablet Take 1 tablet (25 mg) by mouth daily. 90 tablet 3    nifedipine 0.2% in white petrolatum 0.2 % OINT ointment Apply topically 4 times daily as needed (anal fissure) 100 g 1    nitroGLYcerin (NITROSTAT) 0.4 MG sublingual tablet For chest pain place 1 tablet under the tongue every 5 minutes for 3 doses. If symptoms persist 5 minutes after 1st dose call 911. 30 tablet 3    omeprazole (PRILOSEC) 20 MG DR capsule Take 1 capsule (20 mg) by mouth 2 times daily 180 capsule 4    ondansetron (ZOFRAN) 4 MG tablet Take 1 tablet (4 mg) by mouth every 8 hours as needed for nausea. 30 tablet 0    oxyBUTYnin (DITROPAN) 5 MG tablet TAKE 1 TABLET(5 MG) BY MOUTH AT BEDTIME 90 tablet 2     PARoxetine (PAXIL) 30 MG tablet Take 1 tablet (30 mg) by mouth every morning. 90 tablet 1    polyethylene glycol (MIRALAX) 17 GM/Dose powder TAKE 17 GRAMS(1 CAPFUL) BY MOUTH DAILY 840 g 4    QUEtiapine (SEROQUEL) 50 MG tablet Take 1.5 tablets (75 mg) by mouth at bedtime 135 tablet 1    rosuvastatin (CRESTOR) 40 MG tablet TAKE 1 TABLET(40 MG) BY MOUTH DAILY 90 tablet 2    tamsulosin (FLOMAX) 0.4 MG capsule Take 2 capsules (0.8 mg) by mouth every evening 180 capsule 4       Time:     The longitudinal plan of care for the diagnosis(es)/condition(s) as documented were addressed during this visit. Due to the added complexity in care, I will continue to support Nadya in the subsequent management and with ongoing continuity of care.     Az Briseno MD  Answers submitted by the patient for this visit:  Patient Health Questionnaire (Submitted on 2/5/2025)  If you checked off any problems, how difficult have these problems made it for you to do your work, take care of things at home, or get along with other people?: Very difficult  PHQ9 TOTAL SCORE: 15  General Questionnaire (Submitted on 2/5/2025)  Chief Complaint: Chronic problems general questions HPI Form  What is the reason for your visit today? : follow up  How many servings of fruits and vegetables do you eat daily?: 2-3  On average, how many sweetened beverages do you drink each day (Examples: soda, juice, sweet tea, etc.  Do NOT count diet or artificially sweetened beverages)?: 2  How many minutes a day do you exercise enough to make your heart beat faster?: 20 to 29  How many days a week do you exercise enough to make your heart beat faster?: 7  How many days per week do you miss taking your medication?: 0  Questionnaire about: Chronic problems general questions HPI Form (Submitted on 2/5/2025)  Chief Complaint: Chronic problems general questions HPI Form

## 2025-02-06 RX ORDER — PSEUDOEPHED/ACETAMINOPH/DIPHEN 30MG-500MG
TABLET ORAL
Qty: 360 TABLET | Refills: 3 | Status: SHIPPED | OUTPATIENT
Start: 2025-02-06

## 2025-02-06 RX ORDER — ACETAMINOPHEN 160 MG
1 TABLET,DISINTEGRATING ORAL DAILY
Qty: 90 CAPSULE | Refills: 3 | Status: SHIPPED | OUTPATIENT
Start: 2025-02-06

## 2025-02-19 ENCOUNTER — TELEPHONE (OUTPATIENT)
Dept: INTERNAL MEDICINE | Facility: CLINIC | Age: 60
End: 2025-02-19
Payer: COMMERCIAL

## 2025-02-19 NOTE — TELEPHONE ENCOUNTER
Received form from [facility] to be signed. They ask for the DME to be added to the Medication List to ensure the insurance covers it.     Placed in PCP's inbox    Indy Caceres MA on 2/19/2025 at 3:27 PM

## 2025-02-24 ENCOUNTER — MEDICAL CORRESPONDENCE (OUTPATIENT)
Dept: HEALTH INFORMATION MANAGEMENT | Facility: CLINIC | Age: 60
End: 2025-02-24
Payer: COMMERCIAL

## 2025-02-24 NOTE — TELEPHONE ENCOUNTER
Form faxed and copied. One to records, one to provider's reserve bin.    Indy Caceres MA on 2/24/2025 at 12:42 PM

## 2025-02-25 NOTE — TELEPHONE ENCOUNTER
"Received fax back because [facility]: \"the initial prescription was not detailed enough to meet insurance requirements\" and \"please add DME products to medication list so that ongoing use is reviewed and documented at each physician visit\".     Form placed in PCP's inbox.    Indy Caceres MA on 2/25/2025 at 7:59 AM    "

## 2025-03-17 NOTE — PROGRESS NOTES
HEART CARE ENCOUNTER NOTE      [facility]  [phone]      Assessment/Recommendations   Assessment:   Coronary artery disease: As post four-vessel CABG with LIMA to LAD, right radial artery to right posterior descending artery, reverse SVG to obtuse marginal and diagonal artery on 6/24/2020.  Coronary angiogram 10/6/2022 noted occluded SVG graft to right PDA.  Patient remains on Imdur 30 mg daily.  We had increased to 30 mg twice daily but patient did not tolerate this.  Patient denies any chest discomfort.  Notes stable dyspnea on exertion with stairs but not for walks.  Chronic congestive heart failure with preserved ejection fraction: Appears euvolemic on exam.  Cardiac catheterization 10/6/2022 showed normal left-sided filling pressure.  Patient is on beta-blocker, Jardiance  Essential hypertension: Controlled on metoprolol 25 mg daily, Imdur 30 mg daily.    Dyslipidemia: Controlled on rosuvastatin 40 mg daily.  Last lipids 9/12/24 showed LDL of 48.  Diabetes mellitus type 2: Non-insulin-dependent.  On Jardiance, metformin.  Last hemoglobin A1c 5.8.  HANNAH: Using CPAP now and notes having more energy since using this.      Plan:   Continue current medications  If patient's shortness of breath starts to worsen or chest pain returns would consider PCI at that time.  Patient continues to decline PCI currently as symptoms have been stable.  Could consider Ranexa in the future as blood pressure limits further titration of Imdur      Follow-up with Dr. Burns in 2 months, me Teodora Ordonez PA-C in 4 months per patient's request       History of Present Illness/Subjective    HPI: Nadya Blake is a 58 year old male with PMHx of coronary artery disease with history of NSTEMI status post four-vessel CABG with LIMA to LAD, right radial artery to RPDA, reverse SVG to OM and diagonal branch on 6/24/2020, HFpEF, hypertension, dyslipidemia presents for follow-up. Patient has history of chest pain which  is not necessarily exertional, but has been worse with emotional stress that improves with sublingual nitroglycerin.  Patient started isosorbide mononitrate 30 mg daily and has helped some with this chest pain but led to some lightheadedness.  Metoprolol dose was decreased from 150 to 100 mg daily.  I had decreased this further to 50 mg daily to see if this improved his bradycardia and fatigue.  Patient last saw Dr. Burns 7/12/2024 where they again discussed PCI of the native RCA, but patient was fearful and wanted to continue medical therapy as he had been doing well.  Patient did undergo sleep study, but was waiting for follow-up with sleep medicine to go over results.  I saw patient 9/20/2024 where patient had been noticing some worsening angina over the last 2 months that would go into his left arm with emotional stress while at rest.  We increased Imdur to 60 mg daily and decreased metoprolol to 25 mg daily given heart rates in the 40s and lightheadedness.  Patient saw Dr. Burns 1/14/2025 where patient had not been tolerating the 60 mg of Imdur and went down to 30 mg daily.  3-day Zio patch was obtained to look for significant bradycardia which showed average rate of 61 bpm.  Given patient's symptoms there was discussion again about PCI to RCA which patient was more open to now.    Patient notes he is currently fasting for Ramadan so his energy has been lower.  Patient notes lightheadedness has been improved since metoprolol was decreased to 25 mg daily and denies any chest pains since being on Imdur.  Patient does note stable dyspnea on exertion with climbing stairs but goes for walks daily and denies any shortness of breath with this.  Denies palpitations, lower extremity edema.        Zio patch 1/29/2025  Zio monitoring from 1/20/2025 to 1/23/2025 (duration 3d 0h)  Predominant rhythm was sinus rhythm, 39 to 120bpm, average 61bpm.  No nonsustained or sustained tachyarrhythmias.  No atrial  "fibrillation.  Intermittent first degree AV block.  There were no pauses of greater than 3 seconds.  Rare premature atrial contractions beats (isolated <1%).  Rare premature ventricular contractions (isolated <1%).  Symptom triggers and diary entries (3) correlated to sinus rhythm 57 to 64bpm.    MR cardiac with stress 12/23/2021  1.  Pharmacological Regadenoson stress cardiac MRI is negative for inducible myocardial ischemia.   2.  Pharmacological stress ECG is negative for inducible myocardial ischemia.   3. Normal left ventricular size, wall thickness and systolic function. The quantified left ventricular  ejection fraction is 59 %.  Very small area of non-transmural myocardial scar in the mid inferior wall is  identified.    4.  Normal right ventricular size and systolic function.    5.  No significant valvular abnormalities.  6. Ascending aorta measures 38 mm.     Cardiac catheterization 10/6/2022:  Left ventricular filling pressures are normal.  3rd Mrg lesion is 90% stenosed.  Prox RCA lesion is 75% stenosed.  Prox RCA to Mid RCA lesion is 40% stenosed.  Dist RCA lesion is 75% stenosed.  Mid RCA lesion is 30% stenosed.  RPDA lesion is 50% stenosed.  RPAV lesion is 40% stenosed.  Prox LAD lesion is 70% stenosed.  1st Diag-1 lesion is 95% stenosed.  1st Diag-2 lesion is 95% stenosed.  2nd Diag lesion is 99% stenosed.  Mid LAD-1 lesion is 75% stenosed.  Mid LAD-2 lesion is 75% stenosed.  Dist LAD lesion is 70% stenosed.  Vein graft to right PDA is totally occluded  Vein graft to second diagonal is widely patent  Vein graft to third OM is widely patent  LUZ MARIA graft to mid distal LAD is patent     Physical Examination  Review of Systems   Vitals: /84 (BP Location: Right arm, Patient Position: Sitting, Cuff Size: Adult Regular)   Pulse 59   Resp 18   Ht 1.651 m (5' 5\")   Wt 86.2 kg (190 lb)   BMI 31.62 kg/m    BMI= Body mass index is 31.62 kg/m .  Wt Readings from Last 3 Encounters:   02/05/25 84.5 kg (186 " lb 4.8 oz)   01/14/25 83.9 kg (185 lb)   01/03/25 84.6 kg (186 lb 8 oz)           ENT/Mouth: membranes moist, no oral lesions or bleeding gums.      EYES:  no scleral icterus, normal conjunctivae       Chest/Lungs:   lungs are clear to auscultation, no rales or wheezing,  equal chest wall expansion    Cardiovascular:   Regular. Normal first and second heart sounds with no murmurs, rubs, or gallops; the radial pulses are intact,  no edema bilaterally        Extremities: no cyanosis or clubbing   Skin: no xanthelasma, warm.    Neurologic: no tremors     Psychiatric: alert and oriented x3, calm        Please refer above for cardiac ROS details.        Medical History  Surgical History Family History Social History   Past Medical History:   Diagnosis Date    Acute non-ST elevation myocardial infarction (NSTEMI) (H) 6/22/2020    Adenomatous polyp of colon     Ascending aorta dilatation     Carpal tunnel syndrome     Chronic superficial gastritis     Coronary artery disease due to lipid rich plaque 6/23/2020    Depression with anxiety     Dyslipidemia, goal LDL below 70 6/23/2020    Essential hypertension 9/2/2012    Fatty liver disease, nonalcoholic     GERD (gastroesophageal reflux disease)     IBS (irritable bowel syndrome)     Left lumbar radiculopathy     Multinodular goiter     Old torn meniscus of knee     Paroxysmal atrial fibrillation (H)     Perianal abscess     Post herpetic neuralgia     Post-traumatic osteoarthritis of both knees     Primary osteoarthritis of left hip     Primary osteoarthritis of right hip     Pulmonary nodule     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)     Thyroid nodule     TMJ arthritis     Tobacco use disorder 11/1/2013    Vitamin D deficiency 9/30/2020     Past Surgical History:   Procedure Laterality Date    APPENDECTOMY  1995    BYPASS GRAFT ARTERY CORONARY  06/24/2020    Dr. Ragsdale    CV CORONARY ANGIOGRAM N/A 6/23/2020    Procedure: Coronary Angiogram;   Surgeon: Ariane Lester MD;  Location: [facility];  Service: Cardiology    CV CORONARY ANGIOGRAM N/A 10/6/2022    Procedure: Coronary Angiogram;  Surgeon: Javon John MD;  Location: [facility] CV    CV IABP INSERT N/A 2020    Procedure: Intra Aortic Balloon Pump Insertion;  Surgeon: Ariane Lester MD;  Location: [facility] Lab;  Service: Cardiology    CV LEFT HEART CATH N/A 10/6/2022    Procedure: Left Heart Catheterization;  Surgeon: Javon John MD;  Location: [facility] CV    CV LEFT HEART CATHETERIZATION WITHOUT LEFT VENTRICULOGRAM Left 2020    Procedure: Left Heart Catheterization Without Left Ventriculogram;  Surgeon: Ariane Lester MD;  Location: [facility];  Service: Cardiology    CV LEFT VENTRICULOGRAM N/A 10/6/2022    Procedure: Left Ventriculogram;  Surgeon: Javon John MD;  Location: [facility] CV     Family History   Problem Relation Age of Onset    No Known Problems Mother     Lung Cancer Father 56        fatal    No Known Problems Daughter     Other - See Comments Son         congenital deformity of right leg; he uses a leg prosthesis        Social History     Socioeconomic History    Marital status:      Spouse name: Carlee    Number of children: 2    Years of education: Not on file    Highest education level: Not on file   Occupational History    Not on file   Tobacco Use    Smoking status: Former     Current packs/day: 0.00     Average packs/day: 1 pack/day for 30.0 years (30.0 ttl pk-yrs)     Types: Cigarettes     Start date: 3/23/1990     Quit date: 3/23/2020     Years since quittin.9     Passive exposure: Never    Smokeless tobacco: Never    Tobacco comments:     stopped 3/24/2020   Vaping Use    Vaping status: Never Used   Substance and Sexual Activity    Alcohol use: No    Drug use: Never    Sexual activity: Yes     Partners: Female   Other Topics Concern    Not on file   Social History Narrative     , Carlee, MELODIE chemistry and taking AA courses at [employer].  From [place]; bachelors in geology and computer science. Son, Grace (2005) and Sherrie (2008).  On SSDI, PTSD.       Social Drivers of Health     Financial Resource Strain: Low Risk  (12/27/2023)    Financial Resource Strain     Within the past 12 months, have you or your family members you live with been unable to get utilities (heat, electricity) when it was really needed?: No   Food Insecurity: Low Risk  (12/27/2023)    Food Insecurity     Within the past 12 months, did you worry that your food would run out before you got money to buy more?: No     Within the past 12 months, did the food you bought just not last and you didn t have money to get more?: No   Transportation Needs: Low Risk  (12/27/2023)    Transportation Needs     Within the past 12 months, has lack of transportation kept you from medical appointments, getting your medicines, non-medical meetings or appointments, work, or from getting things that you need?: No   Physical Activity: Not on file   Stress: Not on file   Social Connections: Unknown (1/1/2022)    [facility]    Social Connections     Frequency of Communication with Friends and Family: Not on file   Interpersonal Safety: Low Risk  (12/5/2024)    Interpersonal Safety     Do you feel physically and emotionally safe where you currently live?: Yes     Within the past 12 months, have you been hit, slapped, kicked or otherwise physically hurt by someone?: No     Within the past 12 months, have you been humiliated or emotionally abused in other ways by your partner or ex-partner?: No   Housing Stability: Low Risk  (12/27/2023)    Housing Stability     Do you have housing? : Yes     Are you worried about losing your housing?: No           Medications  Allergies   Current Outpatient Medications   Medication Sig Dispense Refill    acetaminophen  (TYLENOL) 500 MG tablet TAKE 1 TO 2 TABLETS(500 TO 1000 MG) BY MOUTH EVERY 6 HOURS AS NEEDED FOR MILD PAIN 360 tablet 3    albuterol (VENTOLIN HFA) 108 (90 Base) MCG/ACT inhaler INHALE 2 PUFFS INTO THE LUNGS EVERY 6 HOURS AS NEEDED FOR SHORTNESS OF BREATH OR WHEEZING OR COUGH 18 g 2    aspirin (ASA) 81 MG chewable tablet CHEW AND SWALLOW 1 TABLET(81 MG) BY MOUTH DAILY 90 tablet 2    azelastine (ASTELIN) 0.1 % nasal spray Spray 1 spray into both nostrils 2 times daily 30 mL 1    busPIRone (BUSPAR) 5 MG tablet Take 1 tablet (5 mg) by mouth 2 times daily 180 tablet 1    Cholecalciferol (VITAMIN D3) 50 MCG (2000 UT) CAPS TAKE 1 CAPSULE BY MOUTH DAILY 90 capsule 3    cyanocobalamin (VITAMIN B-12) 1000 MCG tablet Take 1 tablet (1,000 mcg) by mouth daily 90 tablet 4    diclofenac (VOLTAREN) 1 % topical gel Apply 4 g topically 4 times daily 500 g 2    empagliflozin (JARDIANCE) 25 MG TABS tablet Take 1 tablet (25 mg) by mouth daily. 90 tablet 2    finasteride (PROSCAR) 5 MG tablet Take 1 tablet (5 mg) by mouth daily 90 tablet 4    ipratropium (ATROVENT) 0.06 % nasal spray Spray 2 sprays into both nostrils 4 times daily 15 mL 1    isosorbide mononitrate (IMDUR) 30 MG 24 hr tablet Take 1 tablet (30 mg) by mouth daily 90 tablet 4    Lidocaine 0.5 % GEL Externally apply topically as needed      meclizine (ANTIVERT) 12.5 MG tablet Take 1 tablet (12.5 mg) by mouth 3 times daily as needed for dizziness. 30 tablet 0    metoprolol succinate ER (TOPROL XL) 25 MG 24 hr tablet Take 1 tablet (25 mg) by mouth daily. 90 tablet 3    nifedipine 0.2% in white petrolatum 0.2 % OINT ointment Apply topically 4 times daily as needed (anal fissure) 100 g 1    nitroGLYcerin (NITROSTAT) 0.4 MG sublingual tablet For chest pain place 1 tablet under the tongue every 5 minutes for 3 doses. If symptoms persist 5 minutes after 1st dose call 911. 30 tablet 3    omeprazole (PRILOSEC) 20 MG DR capsule Take 1 capsule (20 mg) by mouth 2 times daily 180 capsule  4    ondansetron (ZOFRAN) 4 MG tablet Take 1 tablet (4 mg) by mouth every 8 hours as needed for nausea. 30 tablet 0    oxyBUTYnin (DITROPAN) 5 MG tablet TAKE 1 TABLET(5 MG) BY MOUTH AT BEDTIME 90 tablet 2    PARoxetine (PAXIL) 30 MG tablet Take 1 tablet (30 mg) by mouth every morning. 90 tablet 1    polyethylene glycol (MIRALAX) 17 GM/Dose powder TAKE 17 GRAMS(1 CAPFUL) BY MOUTH DAILY 840 g 4    QUEtiapine (SEROQUEL) 50 MG tablet Take 1.5 tablets (75 mg) by mouth at bedtime 135 tablet 1    rosuvastatin (CRESTOR) 40 MG tablet TAKE 1 TABLET(40 MG) BY MOUTH DAILY 90 tablet 2    tamsulosin (FLOMAX) 0.4 MG capsule Take 2 capsules (0.8 mg) by mouth every evening 180 capsule 4       Allergies   Allergen Reactions    Atorvastatin Diarrhea    Cortisone Other (See Comments)     pain    Lisinopril Cough     started after CABG for unclear reason    Methylprednisolone Other (See Comments)     ?          Lab Results    Chemistry/lipid CBC Cardiac Enzymes/BNP/TSH/INR   Recent Labs   Lab Test 08/28/23 1436   CHOL 97   HDL 33*   LDL 32   TRIG 159*     Recent Labs   Lab Test 08/28/23 1436 01/18/23  1037 11/21/22  0947   LDL 32 30 27     Recent Labs   Lab Test 12/20/23 2039      POTASSIUM 4.3   CHLORIDE 105   CO2 26   *   BUN 16.2   CR 1.22*   GFRESTIMATED 70   TERESE 9.0     Recent Labs   Lab Test 12/20/23 2039 08/28/23  1436 06/27/23  1134   CR 1.22* 1.21* 1.10     Recent Labs   Lab Test 01/09/24  0940 10/03/23  0906 06/27/23  1134   A1C 5.5 5.6 5.7*          Recent Labs   Lab Test 12/20/23 2039   WBC 7.7   HGB 14.4   HCT 42.9   MCV 89        Recent Labs   Lab Test 12/20/23 2039 08/28/23  1436 01/18/23  1037   HGB 14.4 15.2 14.5    Recent Labs   Lab Test 11/21/20  0001 07/27/20  0703 07/27/20  0115   TROPONINI <0.01 <0.01 <0.01     Recent Labs   Lab Test 12/20/23 2039 07/27/20  0115 06/22/20  2322   BNP  --  79* <10   NTBNP <36  --   --      Recent Labs   Lab Test 08/28/23  1436   TSH 1.24     Recent Labs    Lab Test 07/27/20  0115 06/25/20  0427 06/24/20  2012   INR 1.10 1.32* 1.39*        Teodora Ordonez PA-C

## 2025-03-18 ENCOUNTER — OFFICE VISIT (OUTPATIENT)
Dept: CARDIOLOGY | Facility: CLINIC | Age: 60
End: 2025-03-18
Attending: STUDENT IN AN ORGANIZED HEALTH CARE EDUCATION/TRAINING PROGRAM
Payer: COMMERCIAL

## 2025-03-18 VITALS
BODY MASS INDEX: 31.65 KG/M2 | SYSTOLIC BLOOD PRESSURE: 104 MMHG | HEIGHT: 65 IN | RESPIRATION RATE: 18 BRPM | WEIGHT: 190 LBS | HEART RATE: 59 BPM | DIASTOLIC BLOOD PRESSURE: 84 MMHG

## 2025-03-18 DIAGNOSIS — I10 ESSENTIAL HYPERTENSION: Chronic | ICD-10-CM

## 2025-03-18 DIAGNOSIS — G47.33 OSA (OBSTRUCTIVE SLEEP APNEA): ICD-10-CM

## 2025-03-18 DIAGNOSIS — E11.69 TYPE 2 DIABETES MELLITUS WITH OTHER SPECIFIED COMPLICATION, WITHOUT LONG-TERM CURRENT USE OF INSULIN (H): ICD-10-CM

## 2025-03-18 DIAGNOSIS — E78.5 DYSLIPIDEMIA, GOAL LDL BELOW 70: Chronic | ICD-10-CM

## 2025-03-18 DIAGNOSIS — I50.32 CHRONIC HEART FAILURE WITH PRESERVED EJECTION FRACTION (H): ICD-10-CM

## 2025-03-18 PROCEDURE — 3079F DIAST BP 80-89 MM HG: CPT | Performed by: STUDENT IN AN ORGANIZED HEALTH CARE EDUCATION/TRAINING PROGRAM

## 2025-03-18 PROCEDURE — 3074F SYST BP LT 130 MM HG: CPT | Performed by: STUDENT IN AN ORGANIZED HEALTH CARE EDUCATION/TRAINING PROGRAM

## 2025-03-18 PROCEDURE — 99214 OFFICE O/P EST MOD 30 MIN: CPT | Performed by: STUDENT IN AN ORGANIZED HEALTH CARE EDUCATION/TRAINING PROGRAM

## 2025-03-18 NOTE — LETTER
3/18/2025    Az Briseno MD  [address] [facility] [address]    RE: Nadya Blake       Dear Colleague,     I had the pleasure of seeing Nadya Blake in the [facility].    HEART CARE ENCOUNTER NOTE      [facility]  [phone]      Assessment/Recommendations   Assessment:   Coronary artery disease: As post four-vessel CABG with LIMA to LAD, right radial artery to right posterior descending artery, reverse SVG to obtuse marginal and diagonal artery on 6/24/2020.  Coronary angiogram 10/6/2022 noted occluded SVG graft to right PDA.  Patient remains on Imdur 30 mg daily.  We had increased to 30 mg twice daily but patient did not tolerate this.  Patient denies any chest discomfort.  Notes stable dyspnea on exertion with stairs but not for walks.  Chronic congestive heart failure with preserved ejection fraction: Appears euvolemic on exam.  Cardiac catheterization 10/6/2022 showed normal left-sided filling pressure.  Patient is on beta-blocker, Jardiance  Essential hypertension: Controlled on metoprolol 25 mg daily, Imdur 30 mg daily.    Dyslipidemia: Controlled on rosuvastatin 40 mg daily.  Last lipids 9/12/24 showed LDL of 48.  Diabetes mellitus type 2: Non-insulin-dependent.  On Jardiance, metformin.  Last hemoglobin A1c 5.8.  HANNAH: Using CPAP now and notes having more energy since using this.      Plan:   Continue current medications  If patient's shortness of breath starts to worsen or chest pain returns would consider PCI at that time.  Patient continues to decline PCI currently as symptoms have been stable.  Could consider Ranexa in the future as blood pressure limits further titration of Imdur      Follow-up with Dr. Burns in 2 months, me Teodora Ordonez PA-C in 4 months per patient's request       History of Present Illness/Subjective    HPI: Nadya Blake is a 58 year old male with PMHx of coronary artery disease with history of NSTEMI status post  four-vessel CABG with LIMA to LAD, right radial artery to RPDA, reverse SVG to OM and diagonal branch on 6/24/2020, HFpEF, hypertension, dyslipidemia presents for follow-up. Patient has history of chest pain which is not necessarily exertional, but has been worse with emotional stress that improves with sublingual nitroglycerin.  Patient started isosorbide mononitrate 30 mg daily and has helped some with this chest pain but led to some lightheadedness.  Metoprolol dose was decreased from 150 to 100 mg daily.  I had decreased this further to 50 mg daily to see if this improved his bradycardia and fatigue.  Patient last saw Dr. Burns 7/12/2024 where they again discussed PCI of the native RCA, but patient was fearful and wanted to continue medical therapy as he had been doing well.  Patient did undergo sleep study, but was waiting for follow-up with sleep medicine to go over results.  I saw patient 9/20/2024 where patient had been noticing some worsening angina over the last 2 months that would go into his left arm with emotional stress while at rest.  We increased Imdur to 60 mg daily and decreased metoprolol to 25 mg daily given heart rates in the 40s and lightheadedness.  Patient saw Dr. Burns 1/14/2025 where patient had not been tolerating the 60 mg of Imdur and went down to 30 mg daily.  3-day Zio patch was obtained to look for significant bradycardia which showed average rate of 61 bpm.  Given patient's symptoms there was discussion again about PCI to RCA which patient was more open to now.    Patient notes he is currently fasting for Ramadan so his energy has been lower.  Patient notes lightheadedness has been improved since metoprolol was decreased to 25 mg daily and denies any chest pains since being on Imdur.  Patient does note stable dyspnea on exertion with climbing stairs but goes for walks daily and denies any shortness of breath with this.  Denies palpitations, lower extremity edema.        Zio patch  1/29/2025  Zio monitoring from 1/20/2025 to 1/23/2025 (duration 3d 0h)  Predominant rhythm was sinus rhythm, 39 to 120bpm, average 61bpm.  No nonsustained or sustained tachyarrhythmias.  No atrial fibrillation.  Intermittent first degree AV block.  There were no pauses of greater than 3 seconds.  Rare premature atrial contractions beats (isolated <1%).  Rare premature ventricular contractions (isolated <1%).  Symptom triggers and diary entries (3) correlated to sinus rhythm 57 to 64bpm.    MR cardiac with stress 12/23/2021  1.  Pharmacological Regadenoson stress cardiac MRI is negative for inducible myocardial ischemia.   2.  Pharmacological stress ECG is negative for inducible myocardial ischemia.   3. Normal left ventricular size, wall thickness and systolic function. The quantified left ventricular  ejection fraction is 59 %.  Very small area of non-transmural myocardial scar in the mid inferior wall is  identified.    4.  Normal right ventricular size and systolic function.    5.  No significant valvular abnormalities.  6. Ascending aorta measures 38 mm.     Cardiac catheterization 10/6/2022:  Left ventricular filling pressures are normal.  3rd Mrg lesion is 90% stenosed.  Prox RCA lesion is 75% stenosed.  Prox RCA to Mid RCA lesion is 40% stenosed.  Dist RCA lesion is 75% stenosed.  Mid RCA lesion is 30% stenosed.  RPDA lesion is 50% stenosed.  RPAV lesion is 40% stenosed.  Prox LAD lesion is 70% stenosed.  1st Diag-1 lesion is 95% stenosed.  1st Diag-2 lesion is 95% stenosed.  2nd Diag lesion is 99% stenosed.  Mid LAD-1 lesion is 75% stenosed.  Mid LAD-2 lesion is 75% stenosed.  Dist LAD lesion is 70% stenosed.  Vein graft to right PDA is totally occluded  Vein graft to second diagonal is widely patent  Vein graft to third OM is widely patent  LUZ MARIA graft to mid distal LAD is patent     Physical Examination  Review of Systems   Vitals: /84 (BP Location: Right arm, Patient Position: Sitting, Cuff Size:  "Adult Regular)   Pulse 59   Resp 18   Ht 1.651 m (5' 5\")   Wt 86.2 kg (190 lb)   BMI 31.62 kg/m    BMI= Body mass index is 31.62 kg/m .  Wt Readings from Last 3 Encounters:   02/05/25 84.5 kg (186 lb 4.8 oz)   01/14/25 83.9 kg (185 lb)   01/03/25 84.6 kg (186 lb 8 oz)           ENT/Mouth: membranes moist, no oral lesions or bleeding gums.      EYES:  no scleral icterus, normal conjunctivae       Chest/Lungs:   lungs are clear to auscultation, no rales or wheezing,  equal chest wall expansion    Cardiovascular:   Regular. Normal first and second heart sounds with no murmurs, rubs, or gallops; the radial pulses are intact,  no edema bilaterally        Extremities: no cyanosis or clubbing   Skin: no xanthelasma, warm.    Neurologic: no tremors     Psychiatric: alert and oriented x3, calm        Please refer above for cardiac ROS details.        Medical History  Surgical History Family History Social History   Past Medical History:   Diagnosis Date     Acute non-ST elevation myocardial infarction (NSTEMI) (H) 6/22/2020     Adenomatous polyp of colon      Ascending aorta dilatation      Carpal tunnel syndrome      Chronic superficial gastritis      Coronary artery disease due to lipid rich plaque 6/23/2020     Depression with anxiety      Dyslipidemia, goal LDL below 70 6/23/2020     Essential hypertension 9/2/2012     Fatty liver disease, nonalcoholic      GERD (gastroesophageal reflux disease)      IBS (irritable bowel syndrome)      Left lumbar radiculopathy      Multinodular goiter      Old torn meniscus of knee      Paroxysmal atrial fibrillation (H)      Perianal abscess      Post herpetic neuralgia      Post-traumatic osteoarthritis of both knees      Primary osteoarthritis of left hip      Primary osteoarthritis of right hip      Pulmonary nodule      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)      Thyroid nodule      TMJ arthritis      Tobacco use disorder 11/1/2013     Vitamin D " deficiency 2020     Past Surgical History:   Procedure Laterality Date     APPENDECTOMY  1995     BYPASS GRAFT ARTERY CORONARY  2020    Dr. Ragsdale     CV CORONARY ANGIOGRAM N/A 2020    Procedure: Coronary Angiogram;  Surgeon: Ariane Lester MD;  Location: [facility] Cath Lab;  Service: Cardiology     CV CORONARY ANGIOGRAM N/A 10/6/2022    Procedure: Coronary Angiogram;  Surgeon: Javon John MD;  Location: [facility] CV     CV IABP INSERT N/A 2020    Procedure: Intra Aortic Balloon Pump Insertion;  Surgeon: Ariane Lester MD;  Location: [facility] Cath Lab;  Service: Cardiology     CV LEFT HEART CATH N/A 10/6/2022    Procedure: Left Heart Catheterization;  Surgeon: Javon John MD;  Location: [facility] CV     CV LEFT HEART CATHETERIZATION WITHOUT LEFT VENTRICULOGRAM Left 2020    Procedure: Left Heart Catheterization Without Left Ventriculogram;  Surgeon: Ariane Lester MD;  Location: [facility] Cath Lab;  Service: Cardiology     CV LEFT VENTRICULOGRAM N/A 10/6/2022    Procedure: Left Ventriculogram;  Surgeon: Javon John MD;  Location: [facility] CV     Family History   Problem Relation Age of Onset     No Known Problems Mother      Lung Cancer Father 56        fatal     No Known Problems Daughter      Other - See Comments Son         congenital deformity of right leg; he uses a leg prosthesis        Social History     Socioeconomic History     Marital status:      Spouse name: Carlee     Number of children: 2     Years of education: Not on file     Highest education level: Not on file   Occupational History     Not on file   Tobacco Use     Smoking status: Former     Current packs/day: 0.00     Average packs/day: 1 pack/day for 30.0 years (30.0 ttl pk-yrs)     Types: Cigarettes     Start date: 3/23/1990     Quit date: 3/23/2020     Years since quittin.9     Passive exposure: Never     Smokeless tobacco: Never     Tobacco  comments:     stopped 3/24/2020   Vaping Use     Vaping status: Never Used   Substance and Sexual Activity     Alcohol use: No     Drug use: Never     Sexual activity: Yes     Partners: Female   Other Topics Concern     Not on file   Social History Narrative    , Carlee, BA chemistry and taking AA courses at [employer].  From [place]; bachelors in geology and computer science. Son, Grace (2005) and Sherrie (2008).  On SSDI, PTSD.       Social Drivers of Health     Financial Resource Strain: Low Risk  (12/27/2023)    Financial Resource Strain      Within the past 12 months, have you or your family members you live with been unable to get utilities (heat, electricity) when it was really needed?: No   Food Insecurity: Low Risk  (12/27/2023)    Food Insecurity      Within the past 12 months, did you worry that your food would run out before you got money to buy more?: No      Within the past 12 months, did the food you bought just not last and you didn t have money to get more?: No   Transportation Needs: Low Risk  (12/27/2023)    Transportation Needs      Within the past 12 months, has lack of transportation kept you from medical appointments, getting your medicines, non-medical meetings or appointments, work, or from getting things that you need?: No   Physical Activity: Not on file   Stress: Not on file   Social Connections: Unknown (1/1/2022)    Received from [facility], [facility]    Social Connections      Frequency of Communication with Friends and Family: Not on file   Interpersonal Safety: Low Risk  (12/5/2024)    Interpersonal Safety      Do you feel physically and emotionally safe where you currently live?: Yes      Within the past 12 months, have you been hit, slapped, kicked or otherwise physically hurt by someone?: No      Within the past 12 months, have you been humiliated or emotionally abused in other ways by your partner or ex-partner?:  No   Housing Stability: Low Risk  (12/27/2023)    Housing Stability      Do you have housing? : Yes      Are you worried about losing your housing?: No           Medications  Allergies   Current Outpatient Medications   Medication Sig Dispense Refill     acetaminophen (TYLENOL) 500 MG tablet TAKE 1 TO 2 TABLETS(500 TO 1000 MG) BY MOUTH EVERY 6 HOURS AS NEEDED FOR MILD PAIN 360 tablet 3     albuterol (VENTOLIN HFA) 108 (90 Base) MCG/ACT inhaler INHALE 2 PUFFS INTO THE LUNGS EVERY 6 HOURS AS NEEDED FOR SHORTNESS OF BREATH OR WHEEZING OR COUGH 18 g 2     aspirin (ASA) 81 MG chewable tablet CHEW AND SWALLOW 1 TABLET(81 MG) BY MOUTH DAILY 90 tablet 2     azelastine (ASTELIN) 0.1 % nasal spray Spray 1 spray into both nostrils 2 times daily 30 mL 1     busPIRone (BUSPAR) 5 MG tablet Take 1 tablet (5 mg) by mouth 2 times daily 180 tablet 1     Cholecalciferol (VITAMIN D3) 50 MCG (2000 UT) CAPS TAKE 1 CAPSULE BY MOUTH DAILY 90 capsule 3     cyanocobalamin (VITAMIN B-12) 1000 MCG tablet Take 1 tablet (1,000 mcg) by mouth daily 90 tablet 4     diclofenac (VOLTAREN) 1 % topical gel Apply 4 g topically 4 times daily 500 g 2     empagliflozin (JARDIANCE) 25 MG TABS tablet Take 1 tablet (25 mg) by mouth daily. 90 tablet 2     finasteride (PROSCAR) 5 MG tablet Take 1 tablet (5 mg) by mouth daily 90 tablet 4     ipratropium (ATROVENT) 0.06 % nasal spray Spray 2 sprays into both nostrils 4 times daily 15 mL 1     isosorbide mononitrate (IMDUR) 30 MG 24 hr tablet Take 1 tablet (30 mg) by mouth daily 90 tablet 4     Lidocaine 0.5 % GEL Externally apply topically as needed       meclizine (ANTIVERT) 12.5 MG tablet Take 1 tablet (12.5 mg) by mouth 3 times daily as needed for dizziness. 30 tablet 0     metoprolol succinate ER (TOPROL XL) 25 MG 24 hr tablet Take 1 tablet (25 mg) by mouth daily. 90 tablet 3     nifedipine 0.2% in white petrolatum 0.2 % OINT ointment Apply topically 4 times daily as needed (anal fissure) 100 g 1      nitroGLYcerin (NITROSTAT) 0.4 MG sublingual tablet For chest pain place 1 tablet under the tongue every 5 minutes for 3 doses. If symptoms persist 5 minutes after 1st dose call 911. 30 tablet 3     omeprazole (PRILOSEC) 20 MG DR capsule Take 1 capsule (20 mg) by mouth 2 times daily 180 capsule 4     ondansetron (ZOFRAN) 4 MG tablet Take 1 tablet (4 mg) by mouth every 8 hours as needed for nausea. 30 tablet 0     oxyBUTYnin (DITROPAN) 5 MG tablet TAKE 1 TABLET(5 MG) BY MOUTH AT BEDTIME 90 tablet 2     PARoxetine (PAXIL) 30 MG tablet Take 1 tablet (30 mg) by mouth every morning. 90 tablet 1     polyethylene glycol (MIRALAX) 17 GM/Dose powder TAKE 17 GRAMS(1 CAPFUL) BY MOUTH DAILY 840 g 4     QUEtiapine (SEROQUEL) 50 MG tablet Take 1.5 tablets (75 mg) by mouth at bedtime 135 tablet 1     rosuvastatin (CRESTOR) 40 MG tablet TAKE 1 TABLET(40 MG) BY MOUTH DAILY 90 tablet 2     tamsulosin (FLOMAX) 0.4 MG capsule Take 2 capsules (0.8 mg) by mouth every evening 180 capsule 4       Allergies   Allergen Reactions     Atorvastatin Diarrhea     Cortisone Other (See Comments)     pain     Lisinopril Cough     started after CABG for unclear reason     Methylprednisolone Other (See Comments)     ?          Lab Results    Chemistry/lipid CBC Cardiac Enzymes/BNP/TSH/INR   Recent Labs   Lab Test 08/28/23  1436   CHOL 97   HDL 33*   LDL 32   TRIG 159*     Recent Labs   Lab Test 08/28/23  1436 01/18/23  1037 11/21/22  0947   LDL 32 30 27     Recent Labs   Lab Test 12/20/23 2039      POTASSIUM 4.3   CHLORIDE 105   CO2 26   *   BUN 16.2   CR 1.22*   GFRESTIMATED 70   TERESE 9.0     Recent Labs   Lab Test 12/20/23  2039 08/28/23  1436 06/27/23  1134   CR 1.22* 1.21* 1.10     Recent Labs   Lab Test 01/09/24  0940 10/03/23  0906 06/27/23  1134   A1C 5.5 5.6 5.7*          Recent Labs   Lab Test 12/20/23 2039   WBC 7.7   HGB 14.4   HCT 42.9   MCV 89        Recent Labs   Lab Test 12/20/23  2039 08/28/23  1436 01/18/23  1037    HGB 14.4 15.2 14.5    Recent Labs   Lab Test 11/21/20  0001 07/27/20  0703 07/27/20  0115   TROPONINI <0.01 <0.01 <0.01     Recent Labs   Lab Test 12/20/23  2039 07/27/20  0115 06/22/20  2322   BNP  --  79* <10   NTBNP <36  --   --      Recent Labs   Lab Test 08/28/23  1436   TSH 1.24     Recent Labs   Lab Test 07/27/20  0115 06/25/20  0427 06/24/20  2012   INR 1.10 1.32* 1.39*        Teodora Ordonez PA-C                                         Thank you for allowing me to participate in the care of your patient.      Sincerely,     Teodora Ordonez PA-C     [facility]  cc:   Teodora Ordonez PA-C  [address] [place] [address]

## 2025-03-18 NOTE — PATIENT INSTRUCTIONS
Nadya Blake,    It was a pleasure to see you today at the [facility].     My recommendations after this visit include:    - Continue current medications.   - If the shortness of breath worsens or chest pain comes back let us know as I would recommend the stent placement at that time  - Follow up with Dr. Burns in 2 months and me in 4 months    - Please call [phone], if you have any questions or concerns      Teodora Ordonez PA-C

## 2025-03-25 ENCOUNTER — PATIENT OUTREACH (OUTPATIENT)
Dept: CARE COORDINATION | Facility: CLINIC | Age: 60
End: 2025-03-25
Payer: COMMERCIAL

## 2025-03-25 NOTE — LETTER
[facility]  Patient Centered Plan of Care  About Me:        Patient Name:  Nadya Lui and Juanita,     I hope you are both doing well! Here is a copy of your Care Plan with the goal we are supporting you with at this time. Please let me know if I can help in any way.     Thanks,     Dave Myhre, RN   CCC RN   [phone]    YOB: 1965  Age:         59 year old   Tracy MRN:    [id number] Telephone Information:  Home Phone [phone]   Mobile [phone]       Address:  [facility] [address]  [place] [address] Email address:  [email or web address]      Emergency Contact(s)    Name Relationship Lgl Grd Work Phone Home Phone Mobile Phone   1. GALEJUANITA Spouse    [phone]           Primary language:  Armenian     needed? No   [facility] [employer] [facility]:  [phone] op. 1  Other communication barriers:None    Preferred Method of Communication:     Current living arrangement: I live in a private home with spouse    Mobility Status/ Medical Equipment: Independent        Health Maintenance  Health Maintenance Reviewed: Due/Overdue   Health Maintenance Due   Topic Date Due    ZOSTER IMMUNIZATION (1 of 2) Never done    DTAP/TDAP/TD IMMUNIZATION (3 - Td or Tdap) 04/30/2022    A1C  12/12/2024    DEPRESSION 6 MO INDEX REPEAT PHQ-9  02/19/2025    DIABETIC FOOT EXAM  03/08/2025          My Access Plan  Medical Emergency 911   Primary Clinic Line [facility] - [phone]   24 Hour Appointment Line [phone] or  [phone]) (toll-free)   24 Hour Nurse Line [phone] (toll-free)   Preferred Urgent Care [facility], [phone]     Preferred Hospital [facility]  [phone]     Preferred Pharmacy [id number] - [facility] [address] AT [facility] [address]     Behavioral Health Crisis Line The National Suicide Prevention Lifeline at  1-483.221.8272 or Text/Call 988           My Care Team Members  Patient Care Team         Relationship Specialty Notifications Start End    Az Briseno MD PCP - General   9/22/20     Phone: [phone] Fax: [phone]         [address] [facility] [address]    Myhre, David J, RN Lead Care Coordinator Primary Care - CC Admissions 11/19/20      Phone: [phone]    Phone: [phone]         Az Briseno MD Assigned PCP   6/16/21     Phone: [phone] Fax: [phone]         [address] [facility] [address]    Inessa Cade [facility] worker   11/15/21     Phone: [phone]         Az Craft APRN CNP Assigned Sleep Provider   1/21/23     Phone: [phone] Fax: [phone]         [address] [place] [address] [address] [facility] [address]    Jojo Soria RN Registered Nurse Diabetes Education  3/21/23     Gabbi Blackmon NP Assigned Behavioral Health Provider   11/4/23     Phone: [phone] Fax: [phone]         [address] [facility] [address] [place] [address]    Jojo Soria, RN Registered Nurse Diabetes Education  1/9/24     Jef Wang DO Assigned Surgical Provider   1/25/24     Phone: [phone] Fax: [phone]         [address] [facility] [address] [facility] [address]    Teodora Ordonez PA-C Assigned Heart and Vascular Provider   3/23/25     Phone: [phone] Fax: [phone]         [address] [facility] [address]                My Care Plans  Self Management and Treatment Plan    Care Plan  Care Plan: Mental Health       Problem: Mental Health Symptoms Need Improvement       Goal: I would like establish care with a new psychiatric provider.       Start Date: 8/28/2024 Expected End Date: 8/27/2025    This Visit's Progress: 80% Recent Progress: 60%    Priority: High    Note:     Barriers: current psychiatric provider retiring.   Strengths: strong advocate for himself, strong family support.   Patient expressed understanding of goal: yes  Action steps to achieve  this goal:  1. I understand the [facility] RN will request a mental health referral from Dr. Briseno.   2. I understand once the mental health referral has been placed, Goran will assist me with finding a new provider.   3. I will attend all scheduled appointments with my new psychiatric provider.     Discussed on 1/8/25                               Action Plans on File:                       Advance Care Plans/Directives:   Advanced Care Plan/Directives on file: No    Discussed with patient/caregiver(s): Declined Further Information               My Medical and Care Information  Problem List   Patient Active Problem List   Diagnosis    Recurrent major depressive disorder, in partial remission    Gastroesophageal reflux disease with esophagitis without hemorrhage    Essential hypertension    Post-traumatic osteoarthritis of both knees    Primary osteoarthritis of left hip    Primary osteoarthritis of right hip    Coronary artery disease, CABG 6/2020    Dyslipidemia, goal LDL below 70    Benign prostatic hyperplasia with nocturia    PTSD (post-traumatic stress disorder)    Ascending aorta dilatation    Anal fissure - Dr. Campoverde    Simple chronic bronchitis (H)    History of colonic polyps    S/P CABG x 4    Thyroid nodule    Chronic kidney disease, stage 3a (H)    Microscopic hematuria - Dr. Tineo    Type 2 diabetes mellitus without complication, without long-term current use of insulin (H)    Gallstones    Obstructive sleep apnea syndrome      Current Medications:  Please refer to the most recent medication list provided to you by your medical team and reach out to your provider with any questions or to make any corrections.    Care Coordination Start Date: 11/19/2020   Frequency of Care Coordination: monthly, more frequently as needed     Form Last Updated: 03/25/2025

## 2025-03-25 NOTE — PROGRESS NOTES
Clinic Care Coordination Contact  [facility]/Voicemail    Clinical Data: Care Coordinator Outreach    Outreach Documentation Number of Outreach Attempt   3/25/2025   4:22 PM 1       Left message on patient's voicemail with call back information and requested return call.      Plan: Care Coordinator will try to reach patient again in 3-5 business days.    David Myhre, RN   [facility] RN  [phone]

## 2025-04-02 ENCOUNTER — PATIENT OUTREACH (OUTPATIENT)
Dept: CARE COORDINATION | Facility: CLINIC | Age: 60
End: 2025-04-02
Payer: COMMERCIAL

## 2025-04-02 NOTE — PROGRESS NOTES
Clinic Care Coordination Contact  Follow Up Progress Note      Assessment: CCC RN spoke with patient's wife Carlee today to follow up on goal and to discuss any needs or concerns. She stated patient has established with a new psychiatric provider and feels this is a good fit for him. Goal around establishing with a new psychiatric provider was completed per her request. Wrier will discuss new goal or more towards maintenance at next month's outreach call.     Care Gaps:    Health Maintenance Due   Topic Date Due    ZOSTER IMMUNIZATION (1 of 2) Never done    DTAP/TDAP/TD IMMUNIZATION (3 - Td or Tdap) 04/30/2022    A1C  12/12/2024    DEPRESSION 6 MO INDEX REPEAT PHQ-9  02/19/2025    DIABETIC FOOT EXAM  03/08/2025       Scheduled with PCP on 4/9/25          Intervention/Education provided during outreach: Discussed the importance of patient taking his medications as directed. Encouraged attend at all upcoming appointments. Encouraged them to contact Care Coordination for any additional needs or concerns.      Outreach Frequency: monthly, more frequently as needed    Plan: CCC RN will continue to monitor and will be available as nursing needs arise.     Care Coordinator will follow up in one month.

## 2025-04-09 ENCOUNTER — OFFICE VISIT (OUTPATIENT)
Dept: INTERNAL MEDICINE | Facility: CLINIC | Age: 60
End: 2025-04-09
Payer: COMMERCIAL

## 2025-04-09 VITALS
TEMPERATURE: 98 F | DIASTOLIC BLOOD PRESSURE: 83 MMHG | OXYGEN SATURATION: 96 % | SYSTOLIC BLOOD PRESSURE: 120 MMHG | HEIGHT: 65 IN | WEIGHT: 189.6 LBS | RESPIRATION RATE: 14 BRPM | HEART RATE: 57 BPM | BODY MASS INDEX: 31.59 KG/M2

## 2025-04-09 DIAGNOSIS — I25.118 CORONARY ARTERY DISEASE OF NATIVE ARTERY OF NATIVE HEART WITH STABLE ANGINA PECTORIS: ICD-10-CM

## 2025-04-09 DIAGNOSIS — R35.0 URINARY FREQUENCY: ICD-10-CM

## 2025-04-09 DIAGNOSIS — E11.9 TYPE 2 DIABETES MELLITUS WITHOUT COMPLICATION, WITHOUT LONG-TERM CURRENT USE OF INSULIN (H): Primary | ICD-10-CM

## 2025-04-09 DIAGNOSIS — M54.50 ACUTE BILATERAL LOW BACK PAIN WITHOUT SCIATICA: ICD-10-CM

## 2025-04-09 LAB
ALBUMIN UR-MCNC: NEGATIVE MG/DL
APPEARANCE UR: CLEAR
BILIRUB UR QL STRIP: NEGATIVE
COLOR UR AUTO: YELLOW
GLUCOSE UR STRIP-MCNC: 500 MG/DL
HGB UR QL STRIP: NEGATIVE
KETONES UR STRIP-MCNC: NEGATIVE MG/DL
LEUKOCYTE ESTERASE UR QL STRIP: NEGATIVE
NITRATE UR QL: NEGATIVE
PH UR STRIP: 6 [PH] (ref 5–8)
SP GR UR STRIP: 1.02 (ref 1–1.03)
UROBILINOGEN UR STRIP-ACNC: 0.2 E.U./DL

## 2025-04-09 PROCEDURE — 3079F DIAST BP 80-89 MM HG: CPT | Performed by: INTERNAL MEDICINE

## 2025-04-09 PROCEDURE — 3074F SYST BP LT 130 MM HG: CPT | Performed by: INTERNAL MEDICINE

## 2025-04-09 PROCEDURE — 99214 OFFICE O/P EST MOD 30 MIN: CPT | Performed by: INTERNAL MEDICINE

## 2025-04-09 PROCEDURE — G2211 COMPLEX E/M VISIT ADD ON: HCPCS | Performed by: INTERNAL MEDICINE

## 2025-04-09 PROCEDURE — 81003 URINALYSIS AUTO W/O SCOPE: CPT | Performed by: INTERNAL MEDICINE

## 2025-04-09 ASSESSMENT — PATIENT HEALTH QUESTIONNAIRE - PHQ9
10. IF YOU CHECKED OFF ANY PROBLEMS, HOW DIFFICULT HAVE THESE PROBLEMS MADE IT FOR YOU TO DO YOUR WORK, TAKE CARE OF THINGS AT HOME, OR GET ALONG WITH OTHER PEOPLE: VERY DIFFICULT
SUM OF ALL RESPONSES TO PHQ QUESTIONS 1-9: 13
SUM OF ALL RESPONSES TO PHQ QUESTIONS 1-9: 13

## 2025-04-09 NOTE — PROGRESS NOTES
"  Office Visit - Follow Up   Nadya Blake   59 year old male    Date of Visit: 4/9/2025    Chief Complaint   Patient presents with    RECHECK     Follow-up visit for diabetes and heart issues    Diabetes     Follow-up        Assessment and Plan   1. Type 2 diabetes mellitus without complication, without long-term current use of insulin (H) (Primary)  Has been well-controlled continue same with the exception of Jardiance which I would like him to hold for 5 days to see if his urinary symptoms improved    2. Urinary frequency  No evidence of infection, see above regarding Jardiance  - UA Macroscopic with reflex to Microscopic and Culture - Lab Collect; Future  - UA Macroscopic with reflex to Microscopic and Culture - Lab Collect    3. Acute bilateral low back pain without sciatica  He is going to see how this does over the next month and if not improving consider some physical therapy    4. Coronary artery disease, CABG 6/2020  Continue secondary prevention close follow-up with cardiology    Return in about 4 weeks (around 5/7/2025) for Follow up.     History of Present Illness   This 59 year old man comes in for follow-up.  He has had a lot of stress with current clinical situation.  He has had increased urinary frequency at night.  He is taking oxybutynin before night.  Continues on Jardiance.  No other infectious symptoms.  He bent over to pick something up and has been having some low back pain without radiculopathy or neurological deficit.  Still having very brief chest tightness in bed at night which she relates to stress.  Exercises without any chest tightness       Physical Exam   General Appearance:   No acute distress    /83 (BP Location: Left arm, Patient Position: Sitting, Cuff Size: Adult Regular)   Pulse 57   Temp 98  F (36.7  C) (Oral)   Resp 14   Ht 1.651 m (5' 5\")   Wt 86 kg (189 lb 9.6 oz)   SpO2 96%   BMI 31.55 kg/m      Heart rate controlled rhythm regular lungs good auscultation " bilaterally ambulates without difficulty     Additional Information   Current Outpatient Medications   Medication Sig Dispense Refill    acetaminophen (TYLENOL) 500 MG tablet TAKE 1 TO 2 TABLETS(500 TO 1000 MG) BY MOUTH EVERY 6 HOURS AS NEEDED FOR MILD PAIN 360 tablet 3    albuterol (VENTOLIN HFA) 108 (90 Base) MCG/ACT inhaler INHALE 2 PUFFS INTO THE LUNGS EVERY 6 HOURS AS NEEDED FOR SHORTNESS OF BREATH OR WHEEZING OR COUGH 18 g 2    aspirin (ASA) 81 MG chewable tablet CHEW AND SWALLOW 1 TABLET(81 MG) BY MOUTH DAILY 90 tablet 2    azelastine (ASTELIN) 0.1 % nasal spray Spray 1 spray into both nostrils 2 times daily 30 mL 1    busPIRone (BUSPAR) 5 MG tablet Take 1 tablet (5 mg) by mouth 2 times daily 180 tablet 1    Cholecalciferol (VITAMIN D3) 50 MCG (2000 UT) CAPS TAKE 1 CAPSULE BY MOUTH DAILY 90 capsule 3    cyanocobalamin (VITAMIN B-12) 1000 MCG tablet Take 1 tablet (1,000 mcg) by mouth daily 90 tablet 4    diclofenac (VOLTAREN) 1 % topical gel Apply 4 g topically 4 times daily 500 g 2    empagliflozin (JARDIANCE) 25 MG TABS tablet Take 1 tablet (25 mg) by mouth daily. 90 tablet 2    finasteride (PROSCAR) 5 MG tablet Take 1 tablet (5 mg) by mouth daily 90 tablet 4    ipratropium (ATROVENT) 0.06 % nasal spray Spray 2 sprays into both nostrils 4 times daily 15 mL 1    isosorbide mononitrate (IMDUR) 30 MG 24 hr tablet Take 1 tablet (30 mg) by mouth daily 90 tablet 4    Lidocaine 0.5 % GEL Externally apply topically as needed      meclizine (ANTIVERT) 12.5 MG tablet Take 1 tablet (12.5 mg) by mouth 3 times daily as needed for dizziness. 30 tablet 0    metoprolol succinate ER (TOPROL XL) 25 MG 24 hr tablet Take 1 tablet (25 mg) by mouth daily. 90 tablet 3    nifedipine 0.2% in white petrolatum 0.2 % OINT ointment Apply topically 4 times daily as needed (anal fissure) 100 g 1    nitroGLYcerin (NITROSTAT) 0.4 MG sublingual tablet For chest pain place 1 tablet under the tongue every 5 minutes for 3 doses. If symptoms  persist 5 minutes after 1st dose call 911. 30 tablet 3    omeprazole (PRILOSEC) 20 MG DR capsule Take 1 capsule (20 mg) by mouth 2 times daily 180 capsule 4    ondansetron (ZOFRAN) 4 MG tablet Take 1 tablet (4 mg) by mouth every 8 hours as needed for nausea. 30 tablet 0    oxyBUTYnin (DITROPAN) 5 MG tablet TAKE 1 TABLET(5 MG) BY MOUTH AT BEDTIME 90 tablet 2    PARoxetine (PAXIL) 30 MG tablet Take 1 tablet (30 mg) by mouth every morning. 90 tablet 1    polyethylene glycol (MIRALAX) 17 GM/Dose powder TAKE 17 GRAMS(1 CAPFUL) BY MOUTH DAILY 840 g 4    QUEtiapine (SEROQUEL) 50 MG tablet Take 1.5 tablets (75 mg) by mouth at bedtime 135 tablet 1    rosuvastatin (CRESTOR) 40 MG tablet TAKE 1 TABLET(40 MG) BY MOUTH DAILY 90 tablet 2    tamsulosin (FLOMAX) 0.4 MG capsule Take 2 capsules (0.8 mg) by mouth every evening 180 capsule 4       Time:     The longitudinal plan of care for the diagnosis(es)/condition(s) as documented were addressed during this visit. Due to the added complexity in care, I will continue to support Nadya in the subsequent management and with ongoing continuity of care.     Az Briseno MD  Answers submitted by the patient for this visit:  Patient Health Questionnaire (Submitted on 4/9/2025)  If you checked off any problems, how difficult have these problems made it for you to do your work, take care of things at home, or get along with other people?: Very difficult  PHQ9 TOTAL SCORE: 13  General Questionnaire (Submitted on 4/9/2025)  Chief Complaint: Chronic problems general questions HPI Form  What is the reason for your visit today? : monthly visit due to my heart issues  How many servings of fruits and vegetables do you eat daily?: 2-3  On average, how many sweetened beverages do you drink each day (Examples: soda, juice, sweet tea, etc.  Do NOT count diet or artificially sweetened beverages)?: 2  How many minutes a day do you exercise enough to make your heart beat faster?: 20 to 29  How many  days a week do you exercise enough to make your heart beat faster?: 6  How many days per week do you miss taking your medication?: 0  Questionnaire about: Chronic problems general questions HPI Form (Submitted on 4/9/2025)  Chief Complaint: Chronic problems general questions HPI Form

## 2025-04-18 NOTE — PROGRESS NOTES
Assessment:   Nadya Blake is a 59 y.o. male with past medical history significant for  postherpetic neuralgia, GERD, type 2 diabetes mellitus, dyslipidemia, hypertension, coronary artery disease, ascending aorta dilatation, BPH, depression, PTSD, CKD stage III, osteoarthritis of the hips and knees who presents today for follow-up regarding an acute flare of chronic low back pain with radiation to the right lower extremity.  Pain flared up about 1 month ago after bending.  My review of an MRI lumbar spine shows bilateral L5 pars defects with 2 mm L5-S1 spondylolisthesis.  There is a broad-based disc bulge at this level with moderate right and mild to moderate left foraminal stenosis.  And concerned he could have progressive spondylosis.  He also has pain wrapping around into the right lower abdomen/pelvis, unclear etiology.  On exam patient has slight weakness with right knee extensors, otherwise patient was neurologically intact.         Plan:     A shared decision making plan was used.  The patient's values and choices were respected.  The following represents what was discussed and decided upon by the physician assistant and the patient.  Patient's wife is present for the visit and helps provide some history.    1.  DIAGNOSTIC TESTS: I reviewed the MRI lumbar spine from 2022.  - I ordered an updated MRI lumbar spine.    2.  PHYSICAL THERAPY: Patient went to physical therapy for neck and back pain 2021/2022.  I offered a referral back to physical therapy.  He declined.  He states that he is too fatigued from his cardiac problems to participate in physical therapy.    3.  MEDICATIONS:  - Diazepam 5 mg, #2 with no refills was prescribed for claustrophobia for his MRI.  - I prescribed lidocaine patches.  - Patient had been applying Voltaren gel.  Recommended avoiding Voltaren gel due to comorbidities.  - Tylenol has not been helpful.    4.  INTERVENTIONS: No interventions were ordered today.  We could consider  interventional pain management if pain fails to improve.  Patient does have cortisone listed as an allergy.  His wife states that he had some flushing and swelling in his face after a steroid injection.  This might just be a typical side effect of steroid and not a true allergy.    5.  PATIENT EDUCATION: Patient is in agreement the above plan.  All questions were answered.    6.  FOLLOW-UP: Patient will follow-up after his MRI.  If he has questions or concerns, he should not hesitate to call.    Subjective:     Nadya Blake is a 59 year old male who presents today for follow-up regarding an acute flare of chronic low back pain.  I have not seen this patient since August 2022.  Patient reports that back pain flared up about 1 month ago after bending.  Pain was severe initially.  It has improved somewhat according to the patient's wife, but the patient states pain remains severe.    Patient complains of right-sided low back pain.  Pain begins just to the right of midline at approximately L5-S1.  Pain radiates into the right buttock and down the right lateral thigh to the knee.  Denies pain distal to the knee.  He also has pain that wraps around to the right flank into the right pelvis.  He just had a UA earlier this month which was normal.  He denies any dysuria or blood in the urine.  He does have urinary frequency which is a chronic issue.  He denies numbness, tingling, weakness down the legs.  Denies fevers.  He rates his pain today as a 7 out of 10.  At its worst it is a 9 out of 10.  At his best it is a 0 out of 10.  Pain is aggravated moving and bending.  Pain is alleviated with lying down on his left side.          Treatment to date:  - Physical therapy for neck and back pain 2021 and 2022  - No spine surgeries  - No spine injections  - Tylenol  - Diclofenac gel  - Gabapentin      Review of Systems:  Positive for urinary frequency, headache, pain worse at night.  Negative for numbness/tingling, weakness,  loss of bowel control, trip/double/falls, difficulty swallowing, difficulty with hand skills, fevers, unintentional weight loss.     Objective:   CONSTITUTIONAL:  Vital signs as above.  No acute distress.  The patient is well nourished and well groomed.    PSYCHIATRIC:  The patient is awake, alert, oriented to person, place and time.  The patient is answering questions appropriately with clear speech.  Normal affect.  HEENT: Normocephalic, atraumatic.  Sclera clear.    SKIN:  Skin over the face, neck bilateral upper extremities is clean, dry, intact without rashes.  MUSCULOSKELETAL: Tender to palpation right lower lumbar paraspinous muscles.  Gait is not antalgic.  4/5 strength right knee extensors, otherwise 5/5 strength bilateral hip flexors, knee flexors, left knee extensors, bilateral ankle dorsi/plantar flexors, EHL.  Straight leg raise causes right low back pain and posterior knee pain bilaterally.  NEUROLOGICAL: Reflexes are 2+ bilateral patellar and Achilles.  No ankle clonus bilaterally.  Negative Babinski's bilaterally.  Sensation to light touch is intact bilateral upper and lower extremities throughout.    RESULTS:      I reviewed the MRI lumbar spine from [facility] dated June 17, 2022.  This shows mild multilevel degenerative change.  At L3-4 there is a small disc bulge with mild right and mild to moderate left foraminal stenosis.  At L5-S1 there are bilateral pars defects with 2 mm spondylolisthesis.  There is a broad-based disc bulge with moderate right and mild left foraminal stenosis.  There is facet arthropathy throughout the lumbar spine up to moderate bilaterally L2-3, L4-5, L5-S1.

## 2025-04-21 ENCOUNTER — OFFICE VISIT (OUTPATIENT)
Dept: PHYSICAL MEDICINE AND REHAB | Facility: CLINIC | Age: 60
End: 2025-04-21
Payer: COMMERCIAL

## 2025-04-21 VITALS
BODY MASS INDEX: 31.64 KG/M2 | HEART RATE: 55 BPM | SYSTOLIC BLOOD PRESSURE: 123 MMHG | DIASTOLIC BLOOD PRESSURE: 81 MMHG | HEIGHT: 65 IN | WEIGHT: 189.9 LBS

## 2025-04-21 DIAGNOSIS — M54.16 LUMBAR RADICULAR PAIN: Primary | ICD-10-CM

## 2025-04-21 DIAGNOSIS — F40.240 CLAUSTROPHOBIA: ICD-10-CM

## 2025-04-21 PROCEDURE — 1125F AMNT PAIN NOTED PAIN PRSNT: CPT | Performed by: PHYSICIAN ASSISTANT

## 2025-04-21 PROCEDURE — 3079F DIAST BP 80-89 MM HG: CPT | Performed by: PHYSICIAN ASSISTANT

## 2025-04-21 PROCEDURE — 99214 OFFICE O/P EST MOD 30 MIN: CPT | Performed by: PHYSICIAN ASSISTANT

## 2025-04-21 PROCEDURE — 3074F SYST BP LT 130 MM HG: CPT | Performed by: PHYSICIAN ASSISTANT

## 2025-04-21 RX ORDER — DIAZEPAM 5 MG/1
TABLET ORAL
Qty: 2 TABLET | Refills: 0 | Status: SHIPPED | OUTPATIENT
Start: 2025-04-21

## 2025-04-21 RX ORDER — LIDOCAINE 4 G/G
1 PATCH TOPICAL EVERY 24 HOURS
Qty: 30 PATCH | Refills: 1 | Status: SHIPPED | OUTPATIENT
Start: 2025-04-21

## 2025-04-21 ASSESSMENT — PAIN SCALES - GENERAL: PAINLEVEL_OUTOF10: SEVERE PAIN (7)

## 2025-04-21 NOTE — LETTER
4/21/2025      Nadya Blake  [address]  [facility] [address]      Dear Colleague,    Thank you for referring your patient, Nadya Blake, to the [facility]. Please see a copy of my visit note below.    Assessment:   Nadya Blake is a 59 y.o. male with past medical history significant for  postherpetic neuralgia, GERD, type 2 diabetes mellitus, dyslipidemia, hypertension, coronary artery disease, ascending aorta dilatation, BPH, depression, PTSD, CKD stage III, osteoarthritis of the hips and knees who presents today for follow-up regarding an acute flare of chronic low back pain with radiation to the right lower extremity.  Pain flared up about 1 month ago after bending.  My review of an MRI lumbar spine shows bilateral L5 pars defects with 2 mm L5-S1 spondylolisthesis.  There is a broad-based disc bulge at this level with moderate right and mild to moderate left foraminal stenosis.  And concerned he could have progressive spondylosis.  He also has pain wrapping around into the right lower abdomen/pelvis, unclear etiology.  On exam patient has slight weakness with right knee extensors, otherwise patient was neurologically intact.         Plan:     A shared decision making plan was used.  The patient's values and choices were respected.  The following represents what was discussed and decided upon by the physician assistant and the patient.  Patient's wife is present for the visit and helps provide some history.    1.  DIAGNOSTIC TESTS: I reviewed the MRI lumbar spine from 2022.  - I ordered an updated MRI lumbar spine.    2.  PHYSICAL THERAPY: Patient went to physical therapy for neck and back pain 2021/2022.  I offered a referral back to physical therapy.  He declined.  He states that he is too fatigued from his cardiac problems to participate in physical therapy.    3.  MEDICATIONS:  - Diazepam 5 mg, #2 with no refills was prescribed for claustrophobia for his  MRI.  - I prescribed lidocaine patches.  - Patient had been applying Voltaren gel.  Recommended avoiding Voltaren gel due to comorbidities.  - Tylenol has not been helpful.    4.  INTERVENTIONS: No interventions were ordered today.  We could consider interventional pain management if pain fails to improve.  Patient does have cortisone listed as an allergy.  His wife states that he had some flushing and swelling in his face after a steroid injection.  This might just be a typical side effect of steroid and not a true allergy.    5.  PATIENT EDUCATION: Patient is in agreement the above plan.  All questions were answered.    6.  FOLLOW-UP: Patient will follow-up after his MRI.  If he has questions or concerns, he should not hesitate to call.    Subjective:     Nadya Blake is a 59 year old male who presents today for follow-up regarding an acute flare of chronic low back pain.  I have not seen this patient since August 2022.  Patient reports that back pain flared up about 1 month ago after bending.  Pain was severe initially.  It has improved somewhat according to the patient's wife, but the patient states pain remains severe.    Patient complains of right-sided low back pain.  Pain begins just to the right of midline at approximately L5-S1.  Pain radiates into the right buttock and down the right lateral thigh to the knee.  Denies pain distal to the knee.  He also has pain that wraps around to the right flank into the right pelvis.  He just had a UA earlier this month which was normal.  He denies any dysuria or blood in the urine.  He does have urinary frequency which is a chronic issue.  He denies numbness, tingling, weakness down the legs.  Denies fevers.  He rates his pain today as a 7 out of 10.  At its worst it is a 9 out of 10.  At his best it is a 0 out of 10.  Pain is aggravated moving and bending.  Pain is alleviated with lying down on his left side.          Treatment to date:  - Physical therapy for  neck and back pain 2021 and 2022  - No spine surgeries  - No spine injections  - Tylenol  - Diclofenac gel  - Gabapentin      Review of Systems:  Positive for urinary frequency, headache, pain worse at night.  Negative for numbness/tingling, weakness, loss of bowel control, trip/double/falls, difficulty swallowing, difficulty with hand skills, fevers, unintentional weight loss.     Objective:   CONSTITUTIONAL:  Vital signs as above.  No acute distress.  The patient is well nourished and well groomed.    PSYCHIATRIC:  The patient is awake, alert, oriented to person, place and time.  The patient is answering questions appropriately with clear speech.  Normal affect.  HEENT: Normocephalic, atraumatic.  Sclera clear.    SKIN:  Skin over the face, neck bilateral upper extremities is clean, dry, intact without rashes.  MUSCULOSKELETAL: Tender to palpation right lower lumbar paraspinous muscles.  Gait is not antalgic.  4/5 strength right knee extensors, otherwise 5/5 strength bilateral hip flexors, knee flexors, left knee extensors, bilateral ankle dorsi/plantar flexors, EHL.  Straight leg raise causes right low back pain and posterior knee pain bilaterally.  NEUROLOGICAL: Reflexes are 2+ bilateral patellar and Achilles.  No ankle clonus bilaterally.  Negative Babinski's bilaterally.  Sensation to light touch is intact bilateral upper and lower extremities throughout.    RESULTS:      I reviewed the MRI lumbar spine from [facility] dated June 17, 2022.  This shows mild multilevel degenerative change.  At L3-4 there is a small disc bulge with mild right and mild to moderate left foraminal stenosis.  At L5-S1 there are bilateral pars defects with 2 mm spondylolisthesis.  There is a broad-based disc bulge with moderate right and mild left foraminal stenosis.  There is facet arthropathy throughout the lumbar spine up to moderate bilaterally L2-3, L4-5, L5-S1.         Again, thank you for allowing me to participate in the care  of your patient.        Sincerely,        Shara Velásquez PA-C    Electronically signed

## 2025-04-21 NOTE — PATIENT INSTRUCTIONS
[facility] Scheduling    Please call [phone] to schedule your image(s) (select option#1). There are 3 different locations, see below. You can do walk-in visits for xray only images if you want.     [facility]  [address]  [address] [facility] [place]  [address] [facility], [address]  [place] [address]

## 2025-04-24 DIAGNOSIS — E78.5 DYSLIPIDEMIA, GOAL LDL BELOW 70: ICD-10-CM

## 2025-04-24 RX ORDER — ROSUVASTATIN CALCIUM 40 MG/1
40 TABLET, COATED ORAL DAILY
Qty: 90 TABLET | Refills: 2 | OUTPATIENT
Start: 2025-04-24

## 2025-04-27 ENCOUNTER — HEALTH MAINTENANCE LETTER (OUTPATIENT)
Age: 60
End: 2025-04-27

## 2025-04-28 ENCOUNTER — TELEPHONE (OUTPATIENT)
Dept: PHARMACY | Facility: OTHER | Age: 60
End: 2025-04-28
Payer: COMMERCIAL

## 2025-04-28 NOTE — TELEPHONE ENCOUNTER
MTM referral from: Patient's insurance ([employer] pay[employer] products)    MTM referral outreach attempt #1 on April 28, 2025 at 1:00 PM      Outcome: Lvm for wife to call back to schedule    Use Sheron PALOMO for the carrier/Plan on the flowsheet      Let Selwyn know if patient calls back to schedule.

## 2025-05-06 ENCOUNTER — PATIENT OUTREACH (OUTPATIENT)
Dept: CARE COORDINATION | Facility: CLINIC | Age: 60
End: 2025-05-06
Payer: COMMERCIAL

## 2025-05-06 NOTE — PROGRESS NOTES
Clinic Care Coordination Contact  Community Health Worker Follow Up    Care Gaps:     Health Maintenance Due   Topic Date Due    ZOSTER IMMUNIZATION (1 of 2) Never done    DTAP/TDAP/TD IMMUNIZATION (3 - Td or Tdap) 04/30/2022    A1C  12/12/2024    BMP  05/12/2025       Patient has an appointment with Dr. Briseno on 5/9/25.    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  Completed 8/31/2024      Start Date: 2/8/2023 Expected End Date: 2/7/2024    Recent Progress: 80%    Priority: Medium    Note:     Barriers: Health concerns  Strengths: Motivated. Strong family support.   Patient expressed understanding of goal: Yes  Action steps to achieve this goal:  1. I will look into the options of what type of painting interests me.   2. I will also look into community offered classes that may help get me started towards his goal.   3. I will report progress towards this goal at schedule outreach telephone calls from my CCC team.      Discussed on 1/3024                            Care Plan: Mental Health Completed 4/2/2025      Problem: Mental Health Symptoms Need Improvement  Resolved 4/2/2025      Goal: I would like establish care with a new psychiatric provider.  Completed 4/2/2025      Start Date: 8/28/2024 Expected End Date: 8/27/2025    This Visit's Progress: 100% Recent Progress: 80%    Priority: High    Note:     Barriers: current psychiatric provider retiring.   Strengths: strong advocate for himself, strong family support.   Patient expressed understanding of goal: yes  Action steps to achieve this goal:  1. I understand the [facility] RN will request a mental health referral from Dr. Briseno.   2. I understand once the mental health referral has been placed, Goran will assist me with finding a new provider.   3. I will attend all scheduled appointments with my new psychiatric provider.      Discussed on 4/2/25                          Intervention and Education during outreach:     CHW scheduled patient and his wife for an assessment with CCC RN for 5/12/25 at 0900.    CHW Plan: Next outreach is scheduled for next month.      Hattie ADKINS Ambulatory CHW  [facility] Care Coordination   [facility]   Office: [phone]

## 2025-05-07 ENCOUNTER — HOSPITAL ENCOUNTER (OUTPATIENT)
Dept: MRI IMAGING | Facility: HOSPITAL | Age: 60
Discharge: HOME | End: 2025-05-07
Attending: PHYSICIAN ASSISTANT
Payer: COMMERCIAL

## 2025-05-07 DIAGNOSIS — M54.16 LUMBAR RADICULAR PAIN: ICD-10-CM

## 2025-05-07 PROCEDURE — 72148 MRI LUMBAR SPINE W/O DYE: CPT

## 2025-05-08 ENCOUNTER — TELEPHONE (OUTPATIENT)
Dept: PHYSICAL MEDICINE AND REHAB | Facility: CLINIC | Age: 60
End: 2025-05-08
Payer: COMMERCIAL

## 2025-05-08 ENCOUNTER — RESULTS FOLLOW-UP (OUTPATIENT)
Dept: PHYSICAL MEDICINE AND REHAB | Facility: CLINIC | Age: 60
End: 2025-05-08

## 2025-05-08 NOTE — TELEPHONE ENCOUNTER
"Per PSP Shara Velásquez PA-C: \"Please call this patient and let him know that his MRI shows no significant change compared with the MRI from 2022. We can see moderate to severe narrowing around the nerve as it exits out of the spine at L5-S1. Patient to follow-up with me to review the results and discuss treatment options.\"    Phone call to patient to review results and provider's recommendations with assistance of Nepali  # [phone] Sania. Results given and explained. Discussed that she would like patient to return to clinic to further review/discuss the MRI results and the next steps in care. Transferred to scheduling to make appointment.    "

## 2025-05-08 NOTE — PROGRESS NOTES
Assessment:   Nadya Blake is a 59 y.o. male with past medical history significant for  postherpetic neuralgia, GERD, type 2 diabetes mellitus, dyslipidemia, hypertension, coronary artery disease, ascending aorta dilatation, BPH, depression, PTSD, CKD stage III, osteoarthritis of the hips and knees who presents today for follow-up regarding a flare of chronic low back pain with radiation to the right lower extremity.  Pain flared up about 6 weeks ago after bending.  My review of an MRI lumbar spine shows bilateral L5 pars defects with grade 1 anterolisthesis.  At this level there is moderate to severe right and moderate left foraminal stenosis.  Patient is neurologically intact on exam.       Plan:     A shared decision making plan was used.  The patient's values and choices were respected.  The following represents what was discussed and decided upon by the physician assistant and the patient.  Patient's wife is present for the visit and helps provide some history.    1.  DIAGNOSTIC TESTS: I reviewed the MRI lumbar spine.  No additional diagnostic test were ordered.    2.  PHYSICAL THERAPY: I entered a referral to physical therapy.    3.  MEDICATIONS:  - Gabapentin 300 mg at bedtime was prescribed.  He tried this medication several years ago and does not recall his response.  Risks reviewed.  - Patient can continue Tylenol as needed.  - Patient can continue IcyHot as needed.  He is using this instead of the lidocaine patches I prescribed.      4.  INTERVENTIONS: No interventions were ordered today.  Patient has cortisone listed as an allergy. His wife states that he had some flushing and swelling in his face after a steroid injection. This might just be a typical side effect of steroid and not a true allergy.  Nevertheless, he would like to avoid steroid injections if possible.  If he changes his mind, we could trial a right L5-S1 transforaminal epidural steroid injection.    5.  PATIENT EDUCATION: Patient is  in agreement the above plan.  All questions were answered.  - I told the patient that some people go on to need spine surgery for this type of problem.  He is not a good surgical candidate currently due to cardiac issues.  He will trial conservative treatments for now.    6.  FOLLOW-UP: Patient will follow-up with me in 6 weeks to monitor progress with physical therapy/gabapentin.  If he has questions or concerns in the meantime, he should not hesitate to call.    Subjective:     Nadya Blake is a 59 year old male who presents today for follow-up regarding an acute on chronic low back pain with radiation into the right lower extremity.  I last saw this patient April 21, 2025.  I ordered an MRI lumbar spine.  He returns today to review the results.  He denies any change in his pain since he was last seen.    Patient complains of right-sided low back pain.  Pain radiates into the right flank.  Pain also radiates into the right buttock and down the right lateral thigh to the knee.  Denies pain distal to the knee.  He occasionally feels numbness and tingling.  Denies weakness.  Denies loss of bowel or bladder control.  Denies fevers.  He rates his pain today as a 5 out of 10.  At its worst it is an 8 out of 10.  At its best it is a 1 out of 10.  Pain is aggravated with walking and standing.  Pain is alleviated with sitting.          Treatment to date:  - Physical therapy for neck and back pain 2021 and 2022  - No spine surgeries  - No spine injections  - Tylenol somewhat helpful  - Diclofenac gel  - Gabapentin  -IcyHot somewhat helpful      Review of Systems:  Positive for pain much worse at night.  Negative for numbness/tingling, loss of bowel/bladder control, inability to urinate, headache, trip/stumble/falls, difficulty swallowing, difficulty with hand skills, fevers, unintentional weight loss.     Objective:   CONSTITUTIONAL:  Vital signs as above.  No acute distress.  The patient is well nourished and well  groomed.    PSYCHIATRIC:  The patient is awake, alert, oriented to person, place and time.  The patient is answering questions appropriately with clear speech.  Normal affect.  HEENT: Normocephalic, atraumatic.  Sclera clear.    SKIN:  Skin over the face, neck bilateral upper extremities is clean, dry, intact without rashes.  MUSCULOSKELETAL: 5/5 strength bilateral hip flexors, knee flexors, knee extensors, bilateral ankle dorsi/plantar flexors, EHL.    NEUROLOGICAL sensation light touch intact bilateral lower extremities throughout.    RESULTS:      I reviewed the MRI lumbar spine dated May 7, 2025.  At L5-S1 there is grade 1 anterolisthesis in the setting of chronic bilateral L5 pars defects.  This causes moderate to the right and moderate left foraminal stenosis which is stable compared with prior MRI from 2022.

## 2025-05-09 ENCOUNTER — VIRTUAL VISIT (OUTPATIENT)
Dept: PHARMACY | Facility: CLINIC | Age: 60
End: 2025-05-09
Payer: COMMERCIAL

## 2025-05-09 DIAGNOSIS — N40.1 BENIGN PROSTATIC HYPERPLASIA WITH NOCTURIA: Chronic | ICD-10-CM

## 2025-05-09 DIAGNOSIS — F33.41 RECURRENT MAJOR DEPRESSIVE DISORDER, IN PARTIAL REMISSION: Chronic | ICD-10-CM

## 2025-05-09 DIAGNOSIS — M54.89 OTHER CHRONIC BACK PAIN: ICD-10-CM

## 2025-05-09 DIAGNOSIS — I10 ESSENTIAL HYPERTENSION: Primary | Chronic | ICD-10-CM

## 2025-05-09 DIAGNOSIS — K64.4 EXTERNAL HEMORRHOIDS: ICD-10-CM

## 2025-05-09 DIAGNOSIS — I25.118 CORONARY ARTERY DISEASE OF NATIVE ARTERY OF NATIVE HEART WITH STABLE ANGINA PECTORIS: ICD-10-CM

## 2025-05-09 DIAGNOSIS — T78.40XD ALLERGY, SUBSEQUENT ENCOUNTER: ICD-10-CM

## 2025-05-09 DIAGNOSIS — I50.9 CHRONIC CONGESTIVE HEART FAILURE, UNSPECIFIED HEART FAILURE TYPE (H): ICD-10-CM

## 2025-05-09 DIAGNOSIS — K60.2 ANAL FISSURE: ICD-10-CM

## 2025-05-09 DIAGNOSIS — G44.209 TENSION HEADACHE: ICD-10-CM

## 2025-05-09 DIAGNOSIS — G47.09 OTHER INSOMNIA: ICD-10-CM

## 2025-05-09 DIAGNOSIS — E78.5 DYSLIPIDEMIA, GOAL LDL BELOW 70: Chronic | ICD-10-CM

## 2025-05-09 DIAGNOSIS — G89.29 OTHER CHRONIC BACK PAIN: ICD-10-CM

## 2025-05-09 DIAGNOSIS — N39.46 MIXED STRESS AND URGE URINARY INCONTINENCE: ICD-10-CM

## 2025-05-09 DIAGNOSIS — K59.09 OTHER CONSTIPATION: ICD-10-CM

## 2025-05-09 DIAGNOSIS — R35.1 BENIGN PROSTATIC HYPERPLASIA WITH NOCTURIA: Chronic | ICD-10-CM

## 2025-05-09 DIAGNOSIS — Z78.9 TAKES DIETARY SUPPLEMENTS: ICD-10-CM

## 2025-05-09 PROCEDURE — 99605 MTMS BY PHARM NP 15 MIN: CPT | Mod: 93

## 2025-05-09 RX ORDER — ASPIRIN 81 MG/1
81 TABLET, CHEWABLE ORAL DAILY
Qty: 90 TABLET | Refills: 4 | Status: SHIPPED | OUTPATIENT
Start: 2025-05-09

## 2025-05-12 ENCOUNTER — OFFICE VISIT (OUTPATIENT)
Dept: PHYSICAL MEDICINE AND REHAB | Facility: CLINIC | Age: 60
End: 2025-05-12
Payer: COMMERCIAL

## 2025-05-12 ENCOUNTER — RESULTS FOLLOW-UP (OUTPATIENT)
Dept: INTERNAL MEDICINE | Facility: CLINIC | Age: 60
End: 2025-05-12

## 2025-05-12 VITALS
DIASTOLIC BLOOD PRESSURE: 75 MMHG | HEIGHT: 65 IN | WEIGHT: 190 LBS | SYSTOLIC BLOOD PRESSURE: 112 MMHG | BODY MASS INDEX: 31.65 KG/M2 | HEART RATE: 59 BPM

## 2025-05-12 DIAGNOSIS — M54.16 LUMBAR RADICULAR PAIN: Primary | ICD-10-CM

## 2025-05-12 DIAGNOSIS — M43.16 SPONDYLOLISTHESIS OF LUMBAR REGION: ICD-10-CM

## 2025-05-12 RX ORDER — GABAPENTIN 300 MG/1
300 CAPSULE ORAL AT BEDTIME
Qty: 30 CAPSULE | Refills: 1 | Status: SHIPPED | OUTPATIENT
Start: 2025-05-12

## 2025-05-12 ASSESSMENT — PAIN SCALES - GENERAL: PAINLEVEL_OUTOF10: MODERATE PAIN (5)

## 2025-05-12 NOTE — PATIENT INSTRUCTIONS
An order for physicaltherapy has been provided today.  Someone will call you to schedule physical therapy or you can call [phone] to schedule physical therapy.  It will be very important for you to do your physical therapy exercises on aregular basis to decrease your pain and prevent future flares of pain.

## 2025-05-12 NOTE — LETTER
5/12/2025      Nadya Blake  [address]  [facility] [id number]      Dear Colleague,    Thank you for referring your patient, Nadya Blake, to the [facility]. Please see a copy of my visit note below.    Assessment:   Nadya Blake is a 59 y.o. male with past medical history significant for  postherpetic neuralgia, GERD, type 2 diabetes mellitus, dyslipidemia, hypertension, coronary artery disease, ascending aorta dilatation, BPH, depression, PTSD, CKD stage III, osteoarthritis of the hips and knees who presents today for follow-up regarding a flare of chronic low back pain with radiation to the right lower extremity.  Pain flared up about 6 weeks ago after bending.  My review of an MRI lumbar spine shows bilateral L5 pars defects with grade 1 anterolisthesis.  At this level there is moderate to severe right and moderate left foraminal stenosis.  Patient is neurologically intact on exam.       Plan:     A shared decision making plan was used.  The patient's values and choices were respected.  The following represents what was discussed and decided upon by the physician assistant and the patient.  Patient's wife is present for the visit and helps provide some history.    1.  DIAGNOSTIC TESTS: I reviewed the MRI lumbar spine.  No additional diagnostic test were ordered.    2.  PHYSICAL THERAPY: I entered a referral to physical therapy.    3.  MEDICATIONS:  - Gabapentin 300 mg at bedtime was prescribed.  He tried this medication several years ago and does not recall his response.  Risks reviewed.  - Patient can continue Tylenol as needed.  - Patient can continue IcyHot as needed.  He is using this instead of the lidocaine patches I prescribed.      4.  INTERVENTIONS: No interventions were ordered today.  Patient has cortisone listed as an allergy. His wife states that he had some flushing and swelling in his face after a steroid injection. This might just be a typical  side effect of steroid and not a true allergy.  Nevertheless, he would like to avoid steroid injections if possible.  If he changes his mind, we could trial a right L5-S1 transforaminal epidural steroid injection.    5.  PATIENT EDUCATION: Patient is in agreement the above plan.  All questions were answered.  - I told the patient that some people go on to need spine surgery for this type of problem.  He is not a good surgical candidate currently due to cardiac issues.  He will trial conservative treatments for now.    6.  FOLLOW-UP: Patient will follow-up with me in 6 weeks to monitor progress with physical therapy/gabapentin.  If he has questions or concerns in the meantime, he should not hesitate to call.    Subjective:     Nadya Blake is a 59 year old male who presents today for follow-up regarding an acute on chronic low back pain with radiation into the right lower extremity.  I last saw this patient April 21, 2025.  I ordered an MRI lumbar spine.  He returns today to review the results.  He denies any change in his pain since he was last seen.    Patient complains of right-sided low back pain.  Pain radiates into the right flank.  Pain also radiates into the right buttock and down the right lateral thigh to the knee.  Denies pain distal to the knee.  He occasionally feels numbness and tingling.  Denies weakness.  Denies loss of bowel or bladder control.  Denies fevers.  He rates his pain today as a 5 out of 10.  At its worst it is an 8 out of 10.  At its best it is a 1 out of 10.  Pain is aggravated with walking and standing.  Pain is alleviated with sitting.          Treatment to date:  - Physical therapy for neck and back pain 2021 and 2022  - No spine surgeries  - No spine injections  - Tylenol somewhat helpful  - Diclofenac gel  - Gabapentin  -IcyHot somewhat helpful      Review of Systems:  Positive for pain much worse at night.  Negative for numbness/tingling, loss of bowel/bladder control,  inability to urinate, headache, trip/stumble/falls, difficulty swallowing, difficulty with hand skills, fevers, unintentional weight loss.     Objective:   CONSTITUTIONAL:  Vital signs as above.  No acute distress.  The patient is well nourished and well groomed.    PSYCHIATRIC:  The patient is awake, alert, oriented to person, place and time.  The patient is answering questions appropriately with clear speech.  Normal affect.  HEENT: Normocephalic, atraumatic.  Sclera clear.    SKIN:  Skin over the face, neck bilateral upper extremities is clean, dry, intact without rashes.  MUSCULOSKELETAL: 5/5 strength bilateral hip flexors, knee flexors, knee extensors, bilateral ankle dorsi/plantar flexors, EHL.    NEUROLOGICAL sensation light touch intact bilateral lower extremities throughout.    RESULTS:      I reviewed the MRI lumbar spine dated May 7, 2025.  At L5-S1 there is grade 1 anterolisthesis in the setting of chronic bilateral L5 pars defects.  This causes moderate to the right and moderate left foraminal stenosis which is stable compared with prior MRI from 2022.         Again, thank you for allowing me to participate in the care of your patient.        Sincerely,        Shara Velásquez PA-C    Electronically signed

## 2025-05-13 DIAGNOSIS — R35.1 BENIGN PROSTATIC HYPERPLASIA WITH NOCTURIA: ICD-10-CM

## 2025-05-13 DIAGNOSIS — N40.1 BENIGN PROSTATIC HYPERPLASIA WITH NOCTURIA: ICD-10-CM

## 2025-05-13 RX ORDER — FINASTERIDE 5 MG/1
1 TABLET, FILM COATED ORAL DAILY
Qty: 90 TABLET | Refills: 2 | Status: SHIPPED | OUTPATIENT
Start: 2025-05-13

## 2025-05-13 NOTE — PROGRESS NOTES
HEART CARE ENCOUNTER NOTE      [facility]  [phone]      Assessment/Recommendations   Assessment:   Coronary artery disease: As post four-vessel CABG with LIMA to LAD, right radial artery to right posterior descending artery, reverse SVG to obtuse marginal and diagonal artery on 6/24/2020.  Coronary angiogram 10/6/2022 noted occluded SVG graft to right PDA.  Patient remains on Imdur 30 mg daily.  We had increased to 30 mg twice daily but patient did not tolerate this.  Patient had an episode of severe chest discomfort lasting four hours yesterday at a time of emotional stress.  Discussed coronary angiogram but patient continues to defer this.  Will trial Ranexa.  Chronic congestive heart failure with preserved ejection fraction: Appears euvolemic on exam.  Cardiac catheterization 10/6/2022 showed normal left-sided filling pressure.  Patient is on beta-blocker, Jardiance  Essential hypertension: Controlled on metoprolol 25 mg daily, Imdur 30 mg daily.    Dyslipidemia: Controlled on rosuvastatin 40 mg daily.  Last lipids 9/12/24 showed LDL of 48.  Diabetes mellitus type 2: Non-insulin-dependent.  On Jardiance, metformin.  Last hemoglobin A1c 6.0.  HANNAH: Compliant on CPAP    Plan:   Continue current medications  Start Ranexa 500 mg twice daily  EKG in 5 days to monitor Qtc  We again discussed coronary angiogram given his severe chest pain yesterday lasting for hours the patient declined wanting to try medication first.  If chest pain is persisting patient will reconsider    Follow-up with Dr. Burns in 2 months, agnes Ordonez PA-C in 4 months per patient's request       History of Present Illness/Subjective    HPI: Nadya Blake is a 58 year old male with PMHx of coronary artery disease with history of NSTEMI status post four-vessel CABG with LIMA to LAD, right radial artery to RPDA, reverse SVG to OM and diagonal branch on 6/24/2020, HFpEF, hypertension, dyslipidemia presents for  follow-up. Patient has history of chest pain which is not necessarily exertional, but has been worse with emotional stress that improves with sublingual nitroglycerin.  Patient started isosorbide mononitrate 30 mg daily and has helped some with this chest pain but led to some lightheadedness.  Metoprolol dose was decreased from 150 to 100 mg daily.  I had decreased this further to 50 mg daily to see if this improved his bradycardia and fatigue.  Patient last saw Dr. Burns 7/12/2024 where they again discussed PCI of the native RCA, but patient was fearful and wanted to continue medical therapy as he had been doing well.  Patient did undergo sleep study, but was waiting for follow-up with sleep medicine to go over results.  I saw patient 9/20/2024 where patient had been noticing some worsening angina over the last 2 months that would go into his left arm with emotional stress while at rest.  We increased Imdur to 60 mg daily and decreased metoprolol to 25 mg daily given heart rates in the 40s and lightheadedness.  Patient saw Dr. Burns 1/14/2025 where patient had not been tolerating the 60 mg of Imdur and went down to 30 mg daily.  3-day Zio patch was obtained to look for significant bradycardia which showed average rate of 61 bpm.  Given patient's symptoms there was discussion again about PCI to RCA which patient was more open to now.  Had been better with decreased dose of metoprolol 25 mg daily.    Patient notes that he has been dealing with back pain for many years and even prior to his bypass surgery there was a conversation about back surgery though patient had declined surgery.  Notes he has been having worsening pain and yesterday met with a spinal doctor to discuss options.  Patient is doing physical therapy but this brought up a lot of anxiety and stress for him.  Yesterday in the midst of his emotional stress he developed 4 hours of severe chest pain reminiscent to how he felt before bypass surgery.   Patient notes this is the first time it has been this severe.  Continues to get occasional chest discomfort that is nonexertional.  Patient notes yesterday getting up and moving did not worsen the pain.  Denied any accompanying symptoms such as diaphoresis, shortness of breath, radiating pain into the jaw or arms.  Notes occasional lightheadedness but this is overall better.  Denies lower extremity edema or palpitations.  Patient continues to walk every day denying exertional angina or dyspnea.            Zio patch 1/29/2025  Zio monitoring from 1/20/2025 to 1/23/2025 (duration 3d 0h)  Predominant rhythm was sinus rhythm, 39 to 120bpm, average 61bpm.  No nonsustained or sustained tachyarrhythmias.  No atrial fibrillation.  Intermittent first degree AV block.  There were no pauses of greater than 3 seconds.  Rare premature atrial contractions beats (isolated <1%).  Rare premature ventricular contractions (isolated <1%).  Symptom triggers and diary entries (3) correlated to sinus rhythm 57 to 64bpm.    MR cardiac with stress 12/23/2021  1.  Pharmacological Regadenoson stress cardiac MRI is negative for inducible myocardial ischemia.   2.  Pharmacological stress ECG is negative for inducible myocardial ischemia.   3. Normal left ventricular size, wall thickness and systolic function. The quantified left ventricular  ejection fraction is 59 %.  Very small area of non-transmural myocardial scar in the mid inferior wall is  identified.    4.  Normal right ventricular size and systolic function.    5.  No significant valvular abnormalities.  6. Ascending aorta measures 38 mm.     Cardiac catheterization 10/6/2022:  Left ventricular filling pressures are normal.  3rd Mrg lesion is 90% stenosed.  Prox RCA lesion is 75% stenosed.  Prox RCA to Mid RCA lesion is 40% stenosed.  Dist RCA lesion is 75% stenosed.  Mid RCA lesion is 30% stenosed.  RPDA lesion is 50% stenosed.  RPAV lesion is 40% stenosed.  Prox LAD lesion is 70%  stenosed.  1st Diag-1 lesion is 95% stenosed.  1st Diag-2 lesion is 95% stenosed.  2nd Diag lesion is 99% stenosed.  Mid LAD-1 lesion is 75% stenosed.  Mid LAD-2 lesion is 75% stenosed.  Dist LAD lesion is 70% stenosed.  Vein graft to right PDA is totally occluded  Vein graft to second diagonal is widely patent  Vein graft to third OM is widely patent  LUZ MARIA graft to mid distal LAD is patent     Physical Examination  Review of Systems   Vitals: There were no vitals taken for this visit.  BMI= There is no height or weight on file to calculate BMI.  Wt Readings from Last 3 Encounters:   05/12/25 86.2 kg (190 lb)   05/09/25 86.6 kg (190 lb 14.4 oz)   04/21/25 86.1 kg (189 lb 14.4 oz)           ENT/Mouth: membranes moist, no oral lesions or bleeding gums.      EYES:  no scleral icterus, normal conjunctivae       Chest/Lungs:   lungs are clear to auscultation, no rales or wheezing,  equal chest wall expansion    Cardiovascular:   Regular. Normal first and second heart sounds with no murmurs, rubs, or gallops; the radial pulses are intact,  no edema bilaterally        Extremities: no cyanosis or clubbing   Skin: no xanthelasma, warm.    Neurologic: no tremors     Psychiatric: alert and oriented x3, calm        Please refer above for cardiac ROS details.        Medical History  Surgical History Family History Social History   Past Medical History:   Diagnosis Date    Acute non-ST elevation myocardial infarction (NSTEMI) (H) 6/22/2020    Adenomatous polyp of colon     Ascending aorta dilatation     Carpal tunnel syndrome     Chronic superficial gastritis     Coronary artery disease due to lipid rich plaque 6/23/2020    Depression with anxiety     Dyslipidemia, goal LDL below 70 6/23/2020    Essential hypertension 9/2/2012    Fatty liver disease, nonalcoholic     GERD (gastroesophageal reflux disease)     IBS (irritable bowel syndrome)     Left lumbar radiculopathy     Multinodular goiter     Old torn meniscus of knee      Paroxysmal atrial fibrillation (H)     Perianal abscess     Post herpetic neuralgia     Post-traumatic osteoarthritis of both knees     Primary osteoarthritis of left hip     Primary osteoarthritis of right hip     Pulmonary nodule     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)     Thyroid nodule     TMJ arthritis     Tobacco use disorder 11/1/2013    Vitamin D deficiency 9/30/2020     Past Surgical History:   Procedure Laterality Date    APPENDECTOMY  1995    BYPASS GRAFT ARTERY CORONARY  06/24/2020    Dr. Ragsdale    CV CORONARY ANGIOGRAM N/A 6/23/2020    Procedure: Coronary Angiogram;  Surgeon: Ariane Lester MD;  Location: [facility];  Service: Cardiology    CV CORONARY ANGIOGRAM N/A 10/6/2022    Procedure: Coronary Angiogram;  Surgeon: Javon John MD;  Location: [facility] CV    CV IABP INSERT N/A 6/24/2020    Procedure: Intra Aortic Balloon Pump Insertion;  Surgeon: Ariane Lester MD;  Location: [facility];  Service: Cardiology    CV LEFT HEART CATH N/A 10/6/2022    Procedure: Left Heart Catheterization;  Surgeon: Javon John MD;  Location: [facility] CV    CV LEFT HEART CATHETERIZATION WITHOUT LEFT VENTRICULOGRAM Left 6/23/2020    Procedure: Left Heart Catheterization Without Left Ventriculogram;  Surgeon: Ariane Lester MD;  Location: [facility];  Service: Cardiology    CV LEFT VENTRICULOGRAM N/A 10/6/2022    Procedure: Left Ventriculogram;  Surgeon: Javon John MD;  Location: [facility] CV     Family History   Problem Relation Age of Onset    No Known Problems Mother     Lung Cancer Father 56        fatal    No Known Problems Daughter     Other - See Comments Son         congenital deformity of right leg; he uses a leg prosthesis        Social History     Socioeconomic History    Marital status:      Spouse name: Carlee    Number of children: 2    Years of education: Not on file    Highest education  level: Not on file   Occupational History    Not on file   Tobacco Use    Smoking status: Former     Current packs/day: 0.00     Average packs/day: 1 pack/day for 30.0 years (30.0 ttl pk-yrs)     Types: Cigarettes     Start date: 3/23/1990     Quit date: 3/23/2020     Years since quittin.1     Passive exposure: Never    Smokeless tobacco: Never    Tobacco comments:     stopped 3/24/2020   Vaping Use    Vaping status: Never Used   Substance and Sexual Activity    Alcohol use: No    Drug use: Never    Sexual activity: Yes     Partners: Female   Other Topics Concern    Not on file   Social History Narrative    , Carlee, BA chemistry and taking AA courses at [employer].  From [place]; bachelors in geology and computer science. Son, Grace (2005) and Sherrie (2008).  On SSDI, PTSD.       Social Drivers of Health     Financial Resource Strain: Low Risk  (2023)    Financial Resource Strain     Within the past 12 months, have you or your family members you live with been unable to get utilities (heat, electricity) when it was really needed?: No   Food Insecurity: Low Risk  (2023)    Food Insecurity     Within the past 12 months, did you worry that your food would run out before you got money to buy more?: No     Within the past 12 months, did the food you bought just not last and you didn t have money to get more?: No   Transportation Needs: Low Risk  (2023)    Transportation Needs     Within the past 12 months, has lack of transportation kept you from medical appointments, getting your medicines, non-medical meetings or appointments, work, or from getting things that you need?: No   Physical Activity: Not on file   Stress: Not on file   Social Connections: Unknown (2022)    [facility] [facility]    Social Connections     Frequency of Communication with Friends and Family: Not on file   Interpersonal Safety: Low Risk  (2024)    Interpersonal Safety     Do you  feel physically and emotionally safe where you currently live?: Yes     Within the past 12 months, have you been hit, slapped, kicked or otherwise physically hurt by someone?: No     Within the past 12 months, have you been humiliated or emotionally abused in other ways by your partner or ex-partner?: No   Housing Stability: Low Risk  (12/27/2023)    Housing Stability     Do you have housing? : Yes     Are you worried about losing your housing?: No           Medications  Allergies   Current Outpatient Medications   Medication Sig Dispense Refill    acetaminophen (TYLENOL) 500 MG tablet TAKE 1 TO 2 TABLETS(500 TO 1000 MG) BY MOUTH EVERY 6 HOURS AS NEEDED FOR MILD PAIN 360 tablet 3    albuterol (VENTOLIN HFA) 108 (90 Base) MCG/ACT inhaler INHALE 2 PUFFS INTO THE LUNGS EVERY 6 HOURS AS NEEDED FOR SHORTNESS OF BREATH OR WHEEZING OR COUGH 18 g 2    aspirin (ASA) 81 MG chewable tablet Take 1 tablet (81 mg) by mouth daily. 90 tablet 4    azelastine (ASTELIN) 0.1 % nasal spray Spray 1 spray into both nostrils 2 times daily 30 mL 1    busPIRone (BUSPAR) 5 MG tablet Take 1 tablet (5 mg) by mouth 2 times daily 180 tablet 1    Cholecalciferol (VITAMIN D3) 50 MCG (2000 UT) CAPS TAKE 1 CAPSULE BY MOUTH DAILY 90 capsule 3    cyanocobalamin (VITAMIN B-12) 1000 MCG tablet Take 1 tablet (1,000 mcg) by mouth daily 90 tablet 4    empagliflozin (JARDIANCE) 25 MG TABS tablet Take 1 tablet (25 mg) by mouth daily. 90 tablet 2    finasteride (PROSCAR) 5 MG tablet Take 1 tablet (5 mg) by mouth daily 90 tablet 4    gabapentin (NEURONTIN) 300 MG capsule Take 1 capsule (300 mg) by mouth at bedtime. 30 capsule 1    ipratropium (ATROVENT) 0.06 % nasal spray Spray 2 sprays into both nostrils 4 times daily 15 mL 1    isosorbide mononitrate (IMDUR) 30 MG 24 hr tablet Take 1 tablet (30 mg) by mouth daily 90 tablet 4    metoprolol succinate ER (TOPROL XL) 25 MG 24 hr tablet Take 1 tablet (25 mg) by mouth daily. 90 tablet 3    nifedipine 0.2% in white  petrolatum 0.2 % OINT ointment Apply topically 4 times daily as needed (anal fissure) 100 g 1    nitroGLYcerin (NITROSTAT) 0.4 MG sublingual tablet For chest pain place 1 tablet under the tongue every 5 minutes for 3 doses. If symptoms persist 5 minutes after 1st dose call 911. 30 tablet 3    omeprazole (PRILOSEC) 20 MG DR capsule Take 1 capsule (20 mg) by mouth 2 times daily 180 capsule 4    oxyBUTYnin (DITROPAN) 5 MG tablet TAKE 1 TABLET(5 MG) BY MOUTH AT BEDTIME 90 tablet 2    PARoxetine (PAXIL) 30 MG tablet Take 1 tablet (30 mg) by mouth every morning. 90 tablet 1    polyethylene glycol (MIRALAX) 17 GM/Dose powder TAKE 17 GRAMS(1 CAPFUL) BY MOUTH DAILY 840 g 4    QUEtiapine (SEROQUEL) 50 MG tablet Take 1.5 tablets (75 mg) by mouth at bedtime 135 tablet 1    rosuvastatin (CRESTOR) 40 MG tablet TAKE 1 TABLET(40 MG) BY MOUTH DAILY 90 tablet 2    tamsulosin (FLOMAX) 0.4 MG capsule Take 2 capsules (0.8 mg) by mouth every evening 180 capsule 4       Allergies   Allergen Reactions    Atorvastatin Diarrhea    Cortisone Other (See Comments)     pain    Lisinopril Cough     started after CABG for unclear reason    Methylprednisolone Other (See Comments)     ?          Lab Results    Chemistry/lipid CBC Cardiac Enzymes/BNP/TSH/INR   Recent Labs   Lab Test 08/28/23  1436   CHOL 97   HDL 33*   LDL 32   TRIG 159*     Recent Labs   Lab Test 08/28/23  1436 01/18/23  1037 11/21/22  0947   LDL 32 30 27     Recent Labs   Lab Test 12/20/23 2039      POTASSIUM 4.3   CHLORIDE 105   CO2 26   *   BUN 16.2   CR 1.22*   GFRESTIMATED 70   TERESE 9.0     Recent Labs   Lab Test 12/20/23 2039 08/28/23  1436 06/27/23  1134   CR 1.22* 1.21* 1.10     Recent Labs   Lab Test 01/09/24  0940 10/03/23  0906 06/27/23  1134   A1C 5.5 5.6 5.7*          Recent Labs   Lab Test 12/20/23 2039   WBC 7.7   HGB 14.4   HCT 42.9   MCV 89        Recent Labs   Lab Test 12/20/23 2039 08/28/23  1436 01/18/23  1037   HGB 14.4 15.2 14.5    Recent  Labs   Lab Test 11/21/20  0001 07/27/20  0703 07/27/20  0115   TROPONINI <0.01 <0.01 <0.01     Recent Labs   Lab Test 12/20/23  2039 07/27/20  0115 06/22/20  2322   BNP  --  79* <10   NTBNP <36  --   --      Recent Labs   Lab Test 08/28/23  1436   TSH 1.24     Recent Labs   Lab Test 07/27/20  0115 06/25/20  0427 06/24/20  2012   INR 1.10 1.32* 1.39*        Teodora Ordonez PA-C

## 2025-05-14 ENCOUNTER — OFFICE VISIT (OUTPATIENT)
Dept: CARDIOLOGY | Facility: CLINIC | Age: 60
End: 2025-05-14
Payer: COMMERCIAL

## 2025-05-14 VITALS
DIASTOLIC BLOOD PRESSURE: 82 MMHG | HEIGHT: 65 IN | BODY MASS INDEX: 31.32 KG/M2 | SYSTOLIC BLOOD PRESSURE: 114 MMHG | WEIGHT: 188 LBS | RESPIRATION RATE: 17 BRPM | HEART RATE: 53 BPM

## 2025-05-14 DIAGNOSIS — E78.5 DYSLIPIDEMIA, GOAL LDL BELOW 70: Chronic | ICD-10-CM

## 2025-05-14 DIAGNOSIS — Z95.1 S/P CABG X 4: ICD-10-CM

## 2025-05-14 DIAGNOSIS — I50.32 CHRONIC HEART FAILURE WITH PRESERVED EJECTION FRACTION (H): ICD-10-CM

## 2025-05-14 DIAGNOSIS — I25.709 CORONARY ARTERY DISEASE INVOLVING CORONARY BYPASS GRAFT OF NATIVE HEART WITH ANGINA PECTORIS: Primary | ICD-10-CM

## 2025-05-14 DIAGNOSIS — R00.1 SINUS BRADYCARDIA: ICD-10-CM

## 2025-05-14 DIAGNOSIS — G47.33 OBSTRUCTIVE SLEEP APNEA SYNDROME: ICD-10-CM

## 2025-05-14 DIAGNOSIS — N32.81 OVERACTIVE BLADDER: ICD-10-CM

## 2025-05-14 DIAGNOSIS — E11.69 TYPE 2 DIABETES MELLITUS WITH OTHER SPECIFIED COMPLICATION, WITHOUT LONG-TERM CURRENT USE OF INSULIN (H): ICD-10-CM

## 2025-05-14 DIAGNOSIS — I10 ESSENTIAL HYPERTENSION: Chronic | ICD-10-CM

## 2025-05-14 DIAGNOSIS — G47.33 OSA (OBSTRUCTIVE SLEEP APNEA): ICD-10-CM

## 2025-05-14 PROCEDURE — 3079F DIAST BP 80-89 MM HG: CPT | Performed by: STUDENT IN AN ORGANIZED HEALTH CARE EDUCATION/TRAINING PROGRAM

## 2025-05-14 PROCEDURE — G2211 COMPLEX E/M VISIT ADD ON: HCPCS | Performed by: STUDENT IN AN ORGANIZED HEALTH CARE EDUCATION/TRAINING PROGRAM

## 2025-05-14 PROCEDURE — 3074F SYST BP LT 130 MM HG: CPT | Performed by: STUDENT IN AN ORGANIZED HEALTH CARE EDUCATION/TRAINING PROGRAM

## 2025-05-14 PROCEDURE — 99214 OFFICE O/P EST MOD 30 MIN: CPT | Performed by: STUDENT IN AN ORGANIZED HEALTH CARE EDUCATION/TRAINING PROGRAM

## 2025-05-14 RX ORDER — RANOLAZINE 500 MG/1
500 TABLET, EXTENDED RELEASE ORAL 2 TIMES DAILY
Qty: 180 TABLET | Refills: 2 | Status: SHIPPED | OUTPATIENT
Start: 2025-05-14

## 2025-05-14 NOTE — PATIENT INSTRUCTIONS
Nadya Blake,    It was a pleasure to see you today at the [facility].     My recommendations after this visit include:    - Continue current medications.   - Start ranexa 500 mg twice daily  - Monitor your chest pain and if the severe pain continues let us know and would strongly recommend coronary angiogram  - Follow up with Dr. Burns 7/8  - Follow up with me in September    - Please call [phone], if you have any questions or concerns      Teodora Ordonez PA-C

## 2025-05-14 NOTE — LETTER
5/14/2025    Az Briseno MD  [address] [facility] [address]    RE: Nadya Blake       Dear Colleague,     I had the pleasure of seeing Nadya Blake in the [facility].    HEART CARE ENCOUNTER NOTE      [facility]  [phone]      Assessment/Recommendations   Assessment:   Coronary artery disease: As post four-vessel CABG with LIMA to LAD, right radial artery to right posterior descending artery, reverse SVG to obtuse marginal and diagonal artery on 6/24/2020.  Coronary angiogram 10/6/2022 noted occluded SVG graft to right PDA.  Patient remains on Imdur 30 mg daily.  We had increased to 30 mg twice daily but patient did not tolerate this.  Patient had an episode of severe chest discomfort lasting four hours yesterday at a time of emotional stress.  Discussed coronary angiogram but patient continues to defer this.  Will trial Ranexa.  Chronic congestive heart failure with preserved ejection fraction: Appears euvolemic on exam.  Cardiac catheterization 10/6/2022 showed normal left-sided filling pressure.  Patient is on beta-blocker, Jardiance  Essential hypertension: Controlled on metoprolol 25 mg daily, Imdur 30 mg daily.    Dyslipidemia: Controlled on rosuvastatin 40 mg daily.  Last lipids 9/12/24 showed LDL of 48.  Diabetes mellitus type 2: Non-insulin-dependent.  On Jardiance, metformin.  Last hemoglobin A1c 6.0.  HANNAH: Compliant on CPAP    Plan:   Continue current medications  Start Ranexa 500 mg twice daily  EKG in 5 days to monitor Qtc  We again discussed coronary angiogram given his severe chest pain yesterday lasting for hours the patient declined wanting to try medication first.  If chest pain is persisting patient will reconsider    Follow-up with Dr. Burns in 2 months, agnes Ordonez PA-C in 4 months per patient's request       History of Present Illness/Subjective    HPI: Nadya Blake is a 58 year old male with PMHx of coronary artery  disease with history of NSTEMI status post four-vessel CABG with LIMA to LAD, right radial artery to RPDA, reverse SVG to OM and diagonal branch on 6/24/2020, HFpEF, hypertension, dyslipidemia presents for follow-up. Patient has history of chest pain which is not necessarily exertional, but has been worse with emotional stress that improves with sublingual nitroglycerin.  Patient started isosorbide mononitrate 30 mg daily and has helped some with this chest pain but led to some lightheadedness.  Metoprolol dose was decreased from 150 to 100 mg daily.  I had decreased this further to 50 mg daily to see if this improved his bradycardia and fatigue.  Patient last saw Dr. Burns 7/12/2024 where they again discussed PCI of the native RCA, but patient was fearful and wanted to continue medical therapy as he had been doing well.  Patient did undergo sleep study, but was waiting for follow-up with sleep medicine to go over results.  I saw patient 9/20/2024 where patient had been noticing some worsening angina over the last 2 months that would go into his left arm with emotional stress while at rest.  We increased Imdur to 60 mg daily and decreased metoprolol to 25 mg daily given heart rates in the 40s and lightheadedness.  Patient saw Dr. Burns 1/14/2025 where patient had not been tolerating the 60 mg of Imdur and went down to 30 mg daily.  3-day Zio patch was obtained to look for significant bradycardia which showed average rate of 61 bpm.  Given patient's symptoms there was discussion again about PCI to RCA which patient was more open to now.  Had been better with decreased dose of metoprolol 25 mg daily.    Patient notes that he has been dealing with back pain for many years and even prior to his bypass surgery there was a conversation about back surgery though patient had declined surgery.  Notes he has been having worsening pain and yesterday met with a spinal doctor to discuss options.  Patient is doing physical  therapy but this brought up a lot of anxiety and stress for him.  Yesterday in the midst of his emotional stress he developed 4 hours of severe chest pain reminiscent to how he felt before bypass surgery.  Patient notes this is the first time it has been this severe.  Continues to get occasional chest discomfort that is nonexertional.  Patient notes yesterday getting up and moving did not worsen the pain.  Denied any accompanying symptoms such as diaphoresis, shortness of breath, radiating pain into the jaw or arms.  Notes occasional lightheadedness but this is overall better.  Denies lower extremity edema or palpitations.  Patient continues to walk every day denying exertional angina or dyspnea.            Zio patch 1/29/2025  Zio monitoring from 1/20/2025 to 1/23/2025 (duration 3d 0h)  Predominant rhythm was sinus rhythm, 39 to 120bpm, average 61bpm.  No nonsustained or sustained tachyarrhythmias.  No atrial fibrillation.  Intermittent first degree AV block.  There were no pauses of greater than 3 seconds.  Rare premature atrial contractions beats (isolated <1%).  Rare premature ventricular contractions (isolated <1%).  Symptom triggers and diary entries (3) correlated to sinus rhythm 57 to 64bpm.    MR cardiac with stress 12/23/2021  1.  Pharmacological Regadenoson stress cardiac MRI is negative for inducible myocardial ischemia.   2.  Pharmacological stress ECG is negative for inducible myocardial ischemia.   3. Normal left ventricular size, wall thickness and systolic function. The quantified left ventricular  ejection fraction is 59 %.  Very small area of non-transmural myocardial scar in the mid inferior wall is  identified.    4.  Normal right ventricular size and systolic function.    5.  No significant valvular abnormalities.  6. Ascending aorta measures 38 mm.     Cardiac catheterization 10/6/2022:  Left ventricular filling pressures are normal.  3rd Mrg lesion is 90% stenosed.  Prox RCA lesion is 75%  stenosed.  Prox RCA to Mid RCA lesion is 40% stenosed.  Dist RCA lesion is 75% stenosed.  Mid RCA lesion is 30% stenosed.  RPDA lesion is 50% stenosed.  RPAV lesion is 40% stenosed.  Prox LAD lesion is 70% stenosed.  1st Diag-1 lesion is 95% stenosed.  1st Diag-2 lesion is 95% stenosed.  2nd Diag lesion is 99% stenosed.  Mid LAD-1 lesion is 75% stenosed.  Mid LAD-2 lesion is 75% stenosed.  Dist LAD lesion is 70% stenosed.  Vein graft to right PDA is totally occluded  Vein graft to second diagonal is widely patent  Vein graft to third OM is widely patent  LUZ MARIA graft to mid distal LAD is patent     Physical Examination  Review of Systems   Vitals: There were no vitals taken for this visit.  BMI= There is no height or weight on file to calculate BMI.  Wt Readings from Last 3 Encounters:   05/12/25 86.2 kg (190 lb)   05/09/25 86.6 kg (190 lb 14.4 oz)   04/21/25 86.1 kg (189 lb 14.4 oz)           ENT/Mouth: membranes moist, no oral lesions or bleeding gums.      EYES:  no scleral icterus, normal conjunctivae       Chest/Lungs:   lungs are clear to auscultation, no rales or wheezing,  equal chest wall expansion    Cardiovascular:   Regular. Normal first and second heart sounds with no murmurs, rubs, or gallops; the radial pulses are intact,  no edema bilaterally        Extremities: no cyanosis or clubbing   Skin: no xanthelasma, warm.    Neurologic: no tremors     Psychiatric: alert and oriented x3, calm        Please refer above for cardiac ROS details.        Medical History  Surgical History Family History Social History   Past Medical History:   Diagnosis Date     Acute non-ST elevation myocardial infarction (NSTEMI) (H) 6/22/2020     Adenomatous polyp of colon      Ascending aorta dilatation      Carpal tunnel syndrome      Chronic superficial gastritis      Coronary artery disease due to lipid rich plaque 6/23/2020     Depression with anxiety      Dyslipidemia, goal LDL below 70 6/23/2020     Essential hypertension  9/2/2012     Fatty liver disease, nonalcoholic      GERD (gastroesophageal reflux disease)      IBS (irritable bowel syndrome)      Left lumbar radiculopathy      Multinodular goiter      Old torn meniscus of knee      Paroxysmal atrial fibrillation (H)      Perianal abscess      Post herpetic neuralgia      Post-traumatic osteoarthritis of both knees      Primary osteoarthritis of left hip      Primary osteoarthritis of right hip      Pulmonary nodule      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)      Thyroid nodule      TMJ arthritis      Tobacco use disorder 11/1/2013     Vitamin D deficiency 9/30/2020     Past Surgical History:   Procedure Laterality Date     APPENDECTOMY  1995     BYPASS GRAFT ARTERY CORONARY  06/24/2020    Dr. Ragsdale     CV CORONARY ANGIOGRAM N/A 6/23/2020    Procedure: Coronary Angiogram;  Surgeon: Ariane Lester MD;  Location: [facility];  Service: Cardiology     CV CORONARY ANGIOGRAM N/A 10/6/2022    Procedure: Coronary Angiogram;  Surgeon: Javon John MD;  Location: [facility] CV     CV IABP INSERT N/A 6/24/2020    Procedure: Intra Aortic Balloon Pump Insertion;  Surgeon: Ariane Lester MD;  Location: [facility];  Service: Cardiology     CV LEFT HEART CATH N/A 10/6/2022    Procedure: Left Heart Catheterization;  Surgeon: Javon John MD;  Location: [facility] CV     CV LEFT HEART CATHETERIZATION WITHOUT LEFT VENTRICULOGRAM Left 6/23/2020    Procedure: Left Heart Catheterization Without Left Ventriculogram;  Surgeon: Ariane Lester MD;  Location: [facility];  Service: Cardiology     CV LEFT VENTRICULOGRAM N/A 10/6/2022    Procedure: Left Ventriculogram;  Surgeon: Javon John MD;  Location: [facility] CV     Family History   Problem Relation Age of Onset     No Known Problems Mother      Lung Cancer Father 56        fatal     No Known Problems Daughter      Other - See Comments Son          congenital deformity of right leg; he uses a leg prosthesis        Social History     Socioeconomic History     Marital status:      Spouse name: Carlee     Number of children: 2     Years of education: Not on file     Highest education level: Not on file   Occupational History     Not on file   Tobacco Use     Smoking status: Former     Current packs/day: 0.00     Average packs/day: 1 pack/day for 30.0 years (30.0 ttl pk-yrs)     Types: Cigarettes     Start date: 3/23/1990     Quit date: 3/23/2020     Years since quittin.1     Passive exposure: Never     Smokeless tobacco: Never     Tobacco comments:     stopped 3/24/2020   Vaping Use     Vaping status: Never Used   Substance and Sexual Activity     Alcohol use: No     Drug use: Never     Sexual activity: Yes     Partners: Female   Other Topics Concern     Not on file   Social History Narrative    , Carlee, BA chemistry and taking AA courses at [employer].  From [place]; bachelors in geology and computer science. Roberto, Grace (2005) and Sherrie (2008).  On SSDI, PTSD.       Social Drivers of Health     Financial Resource Strain: Low Risk  (2023)    Financial Resource Strain      Within the past 12 months, have you or your family members you live with been unable to get utilities (heat, electricity) when it was really needed?: No   Food Insecurity: Low Risk  (2023)    Food Insecurity      Within the past 12 months, did you worry that your food would run out before you got money to buy more?: No      Within the past 12 months, did the food you bought just not last and you didn t have money to get more?: No   Transportation Needs: Low Risk  (2023)    Transportation Needs      Within the past 12 months, has lack of transportation kept you from medical appointments, getting your medicines, non-medical meetings or appointments, work, or from getting things that you need?: No   Physical Activity: Not on file   Stress: Not on file   Social  Connections: Unknown (1/1/2022)    Received from [facility] [employer] [facility]    Social Connections      Frequency of Communication with Friends and Family: Not on file   Interpersonal Safety: Low Risk  (12/5/2024)    Interpersonal Safety      Do you feel physically and emotionally safe where you currently live?: Yes      Within the past 12 months, have you been hit, slapped, kicked or otherwise physically hurt by someone?: No      Within the past 12 months, have you been humiliated or emotionally abused in other ways by your partner or ex-partner?: No   Housing Stability: Low Risk  (12/27/2023)    Housing Stability      Do you have housing? : Yes      Are you worried about losing your housing?: No           Medications  Allergies   Current Outpatient Medications   Medication Sig Dispense Refill     acetaminophen (TYLENOL) 500 MG tablet TAKE 1 TO 2 TABLETS(500 TO 1000 MG) BY MOUTH EVERY 6 HOURS AS NEEDED FOR MILD PAIN 360 tablet 3     albuterol (VENTOLIN HFA) 108 (90 Base) MCG/ACT inhaler INHALE 2 PUFFS INTO THE LUNGS EVERY 6 HOURS AS NEEDED FOR SHORTNESS OF BREATH OR WHEEZING OR COUGH 18 g 2     aspirin (ASA) 81 MG chewable tablet Take 1 tablet (81 mg) by mouth daily. 90 tablet 4     azelastine (ASTELIN) 0.1 % nasal spray Spray 1 spray into both nostrils 2 times daily 30 mL 1     busPIRone (BUSPAR) 5 MG tablet Take 1 tablet (5 mg) by mouth 2 times daily 180 tablet 1     Cholecalciferol (VITAMIN D3) 50 MCG (2000 UT) CAPS TAKE 1 CAPSULE BY MOUTH DAILY 90 capsule 3     cyanocobalamin (VITAMIN B-12) 1000 MCG tablet Take 1 tablet (1,000 mcg) by mouth daily 90 tablet 4     empagliflozin (JARDIANCE) 25 MG TABS tablet Take 1 tablet (25 mg) by mouth daily. 90 tablet 2     finasteride (PROSCAR) 5 MG tablet Take 1 tablet (5 mg) by mouth daily 90 tablet 4     gabapentin (NEURONTIN) 300 MG capsule Take 1 capsule (300 mg) by mouth at bedtime. 30 capsule 1     ipratropium (ATROVENT) 0.06 % nasal spray Spray 2  sprays into both nostrils 4 times daily 15 mL 1     isosorbide mononitrate (IMDUR) 30 MG 24 hr tablet Take 1 tablet (30 mg) by mouth daily 90 tablet 4     metoprolol succinate ER (TOPROL XL) 25 MG 24 hr tablet Take 1 tablet (25 mg) by mouth daily. 90 tablet 3     nifedipine 0.2% in white petrolatum 0.2 % OINT ointment Apply topically 4 times daily as needed (anal fissure) 100 g 1     nitroGLYcerin (NITROSTAT) 0.4 MG sublingual tablet For chest pain place 1 tablet under the tongue every 5 minutes for 3 doses. If symptoms persist 5 minutes after 1st dose call 911. 30 tablet 3     omeprazole (PRILOSEC) 20 MG DR capsule Take 1 capsule (20 mg) by mouth 2 times daily 180 capsule 4     oxyBUTYnin (DITROPAN) 5 MG tablet TAKE 1 TABLET(5 MG) BY MOUTH AT BEDTIME 90 tablet 2     PARoxetine (PAXIL) 30 MG tablet Take 1 tablet (30 mg) by mouth every morning. 90 tablet 1     polyethylene glycol (MIRALAX) 17 GM/Dose powder TAKE 17 GRAMS(1 CAPFUL) BY MOUTH DAILY 840 g 4     QUEtiapine (SEROQUEL) 50 MG tablet Take 1.5 tablets (75 mg) by mouth at bedtime 135 tablet 1     rosuvastatin (CRESTOR) 40 MG tablet TAKE 1 TABLET(40 MG) BY MOUTH DAILY 90 tablet 2     tamsulosin (FLOMAX) 0.4 MG capsule Take 2 capsules (0.8 mg) by mouth every evening 180 capsule 4       Allergies   Allergen Reactions     Atorvastatin Diarrhea     Cortisone Other (See Comments)     pain     Lisinopril Cough     started after CABG for unclear reason     Methylprednisolone Other (See Comments)     ?          Lab Results    Chemistry/lipid CBC Cardiac Enzymes/BNP/TSH/INR   Recent Labs   Lab Test 08/28/23  1436   CHOL 97   HDL 33*   LDL 32   TRIG 159*     Recent Labs   Lab Test 08/28/23  1436 01/18/23  1037 11/21/22  0947   LDL 32 30 27     Recent Labs   Lab Test 12/20/23 2039      POTASSIUM 4.3   CHLORIDE 105   CO2 26   *   BUN 16.2   CR 1.22*   GFRESTIMATED 70   TERESE 9.0     Recent Labs   Lab Test 12/20/23 2039 08/28/23  1436 06/27/23  1134   CR 1.22*  1.21* 1.10     Recent Labs   Lab Test 01/09/24  0940 10/03/23  0906 06/27/23  1134   A1C 5.5 5.6 5.7*          Recent Labs   Lab Test 12/20/23 2039   WBC 7.7   HGB 14.4   HCT 42.9   MCV 89        Recent Labs   Lab Test 12/20/23 2039 08/28/23  1436 01/18/23  1037   HGB 14.4 15.2 14.5    Recent Labs   Lab Test 11/21/20  0001 07/27/20  0703 07/27/20  0115   TROPONINI <0.01 <0.01 <0.01     Recent Labs   Lab Test 12/20/23 2039 07/27/20  0115 06/22/20  2322   BNP  --  79* <10   NTBNP <36  --   --      Recent Labs   Lab Test 08/28/23  1436   TSH 1.24     Recent Labs   Lab Test 07/27/20  0115 06/25/20  0427 06/24/20  2012   INR 1.10 1.32* 1.39*        Teodora Ordonez PA-C                                         Thank you for allowing me to participate in the care of your patient.      Sincerely,     Teodora Ordonez PA-C     [facility]  cc:   Teodora Ordonez PA-C  [address] [place] [address]

## 2025-05-15 RX ORDER — OXYBUTYNIN CHLORIDE 5 MG/1
5 TABLET ORAL AT BEDTIME
Qty: 90 TABLET | Refills: 2 | Status: SHIPPED | OUTPATIENT
Start: 2025-05-15

## 2025-05-17 DIAGNOSIS — E53.8 VITAMIN B12 DEFICIENCY (NON ANEMIC): ICD-10-CM

## 2025-05-19 ENCOUNTER — MYC MEDICAL ADVICE (OUTPATIENT)
Dept: CARDIOLOGY | Facility: CLINIC | Age: 60
End: 2025-05-19

## 2025-05-19 ENCOUNTER — TELEPHONE (OUTPATIENT)
Dept: CARDIOLOGY | Facility: CLINIC | Age: 60
End: 2025-05-19

## 2025-05-19 RX ORDER — LANOLIN ALCOHOL/MO/W.PET/CERES
1000 CREAM (GRAM) TOPICAL DAILY
Qty: 90 TABLET | Refills: 2 | Status: SHIPPED | OUTPATIENT
Start: 2025-05-19

## 2025-05-19 NOTE — TELEPHONE ENCOUNTER
[facility] Call Center    Phone Message    May a detailed message be left on voicemail: yes     Reason for Call: Other: This is an FYI message to inform Teodora that Nadya is scheduled for his EKG tomorrow at 8:30. No further action is needed. Thank you!     Action Taken: Other: Cardiology    Travel Screening: Not Applicable    Thank you!  [facility]       Date of Service:

## 2025-05-20 NOTE — TELEPHONE ENCOUNTER
Phone call to patient with assistance of Tamazight  - informed him and his wife Carlee that today's EKG should be rescheduled to Thursday - both agreed - call transf to julissa  mg

## 2025-05-20 NOTE — TELEPHONE ENCOUNTER
Noted.  mg   OPERATIVE REPORT  PATIENT NAME: Neil Arguelles    :  1945  MRN: [id number]  Pt Location: [facility] OR ROOM 04    SURGERY DATE: 2024    Surgeons and Role:     * Eliza Jaimes MD - Primary     * Sarah Álvarez PA-C - Assisting    Preop Diagnosis:  Carpal tunnel syndrome of right wrist [G56.01]    Post-Op Diagnosis Codes:     * Carpal tunnel syndrome of right wrist [G56.01]    Procedure(s):  Right - RELEASE CARPAL TUNNEL    Specimen(s):  * No specimens in log *    Estimated Blood Loss:   Minimal    Drains:  * No LDAs found *    Anesthesia Type:   IV Sedation with Anesthesia    Operative Indications:  Carpal tunnel syndrome of right wrist [G56.01]      Operative Findings:  Thickened transverse carpal ligament  APB atrophy.  Patient able to bring thumb out of plane of palm.      Complications:   None    Procedure and Technique:  The patient was correctly identified in the preanesthesia holding area.  The operative site was identified and marked with a marker.  We reviewed the informed consent which included the planned surgical procedure, risk, benefits, alternatives, as well as, postoperative care expectations.  Questions were answered to the patient satisfaction.  The patient voluntarily signed informed consent.    The patient was taken back to the operating room.  The patient remained on the gurney with a hand table attached to the gurney.  I applied tourniquet to the operative extremity.  I prepped the skin with alcohol and injected local into the planned surgical site.  The operative extremity was prepped and draped in a sterile fashion.  A timeout was performed.  I used a marker to plan the surgical incision.  Next, I elevated the upper extremity, applied an Esmarch, and inflated the tourniquet to 250 mmHg.  I used a #15 blade to make the skin incision.  I carefully dissected to the level of the palmar fascia.  I repositioned the retractors to allow for direct visualization of the palmar  fascia.  I incised the palmar fascia using a #15 blade.  I repositioned the retractors to allow for direct visualization of the transverse carpal ligament.  I used a #15 blade to incise the transverse carpal ligament along its ulnar border, thereby, creating a radially based flap.  The distal extent of the release was marked by the presence of fat.  I turned my attention to release of the proximal portion of the transverse carpal ligament.  I used Littler scissors to bluntly dissect to create a space for position of a Ragnell retractor.  I position the retractors to allow for direct visualization of the proximal portion of the transverse carpal ligament and the distal forearm fascia.  I used a #15 blade to incise the proximal portion of the transverse carpal ligament and the distal forearm fascia.  Next I inserted a freer elevator to evaluate the quality of the release.  No fascial bands were appreciated.  I irrigated the wound with normal saline delivered the syringe.  The tourniquet  was deflated at 8 minutes.  Hemostasis was achieved using electrocautery and direct pressure.  I approximated the skin using 4-0 nylon in horizontal mattress pattern.  A sterile dressing and a volar splint were applied to the operative extremity.  The patient tolerated the procedure well and was taken to the postanesthesia care unit in stable condition.  I, Eliza Jaimes, performed the entire procedure. A qualified resident physician was not available. and A physician assistant was required during the procedure for retraction, tissue handling, dissection and suturing.    Postoperative plan:   The patient was instructed on activity limitations, elevation, and use of ice for pain management.    Pain management will include the use of over-the-counter Tylenol and ibuprofen.  The patient was given instructions on appropriate dosing and timing for postoperative use of Tylenol and ibuprofen.    The patient will follow-up in 7 to 10 days  for removal of the splint, sutures, and to receive a home exercise program to include scar massage and nerve glide exercises.        Patient Disposition:  PACU         SIGNATURE: Eliza Jaimes MD  DATE: July 22, 2024  TIME: 8:53 AM

## 2025-05-20 NOTE — TELEPHONE ENCOUNTER
"Per Teodora's 5-14-25 visit note \"Start Ranexa 500 mg twice daily  EKG in 5 days to monitor Qtc\"     Noted EKG sched for this am at 0830 - return MyChart msg sent to patient with instructions to reschedule appt to Thursday 5-22-25.  mg  "

## 2025-05-22 ENCOUNTER — ALLIED HEALTH/NURSE VISIT (OUTPATIENT)
Dept: CARDIOLOGY | Facility: CLINIC | Age: 60
End: 2025-05-22
Attending: STUDENT IN AN ORGANIZED HEALTH CARE EDUCATION/TRAINING PROGRAM
Payer: COMMERCIAL

## 2025-05-22 VITALS
RESPIRATION RATE: 16 BRPM | HEART RATE: 54 BPM | DIASTOLIC BLOOD PRESSURE: 86 MMHG | SYSTOLIC BLOOD PRESSURE: 116 MMHG | HEIGHT: 65 IN | BODY MASS INDEX: 31.02 KG/M2 | WEIGHT: 186.2 LBS

## 2025-05-22 DIAGNOSIS — I25.709 CORONARY ARTERY DISEASE INVOLVING CORONARY BYPASS GRAFT OF NATIVE HEART WITH ANGINA PECTORIS: ICD-10-CM

## 2025-05-22 DIAGNOSIS — R07.89 CHEST WALL PAIN: ICD-10-CM

## 2025-05-22 DIAGNOSIS — J20.9 ACUTE BRONCHITIS, UNSPECIFIED ORGANISM: ICD-10-CM

## 2025-05-22 DIAGNOSIS — J31.0 CHRONIC RHINITIS: ICD-10-CM

## 2025-05-22 DIAGNOSIS — R05.2 SUBACUTE COUGH: ICD-10-CM

## 2025-05-22 LAB
ATRIAL RATE - MUSE: 54 BPM
DIASTOLIC BLOOD PRESSURE - MUSE: NORMAL MMHG
INTERPRETATION ECG - MUSE: NORMAL
P AXIS - MUSE: 31 DEGREES
PR INTERVAL - MUSE: 242 MS
QRS DURATION - MUSE: 90 MS
QT - MUSE: 422 MS
QTC - MUSE: 400 MS
R AXIS - MUSE: -21 DEGREES
SYSTOLIC BLOOD PRESSURE - MUSE: NORMAL MMHG
T AXIS - MUSE: 46 DEGREES
VENTRICULAR RATE- MUSE: 54 BPM

## 2025-05-22 PROCEDURE — 93000 ELECTROCARDIOGRAM COMPLETE: CPT | Performed by: INTERNAL MEDICINE

## 2025-05-22 PROCEDURE — 99211 OFF/OP EST MAY X REQ PHY/QHP: CPT

## 2025-05-22 PROCEDURE — 3074F SYST BP LT 130 MM HG: CPT

## 2025-05-22 PROCEDURE — 3079F DIAST BP 80-89 MM HG: CPT

## 2025-05-22 NOTE — NURSING NOTE
Please review patient update and EKG ordered by Teodora after patient started Ranexa - follow-up with you on 7-8-25 - any new orders at this time?  mg

## 2025-05-22 NOTE — NURSING NOTE
Patient seen in clinic after EKG - patient and his wife Carlee reported that patient has been experiencing increased dizziness w/ position changes since starting Ranexa 5-17-25, in addition to constipation - patient stated he drinks plenty of water daily - patient also reports CP has decreased - /86 - EKG showed SB w/1st degree AV Blk, HR 54bpm, Qtc 400ms.    Informed patient and his wife that update / EKG would be reviewed by Dr. Burns in the absence of MARII and they would be contacted with response / recommendations once available - understanding verbalized.  mg

## 2025-05-22 NOTE — NURSING NOTE
Msg rec'd 5-22-25 @ 1239:  Sloan Burns MD Gorshe, Maureen, MICHELLE; Teodora Ordonez PA-C  ECG looks okay but possibly having side effects so let's stop ranolazine. I strongly recommend consideration of PCI. I would recommend he sees Dr. Palmer to discuss this in further detail.    Phone call to patient's wife Carlee - informed her of Dr. Burns's response / recommendations after review of EKG - Carlee verbalized understanding and explained that patient gets very nervous when someone talks about another procedure - after further discussion, Carlee agreed that arranging consult with Dr. Palmer would be appropriate and then patient can decide if he wants to proceed with procedure.    Ms sent to valve clinic coordinator w/ request to arrange consult.

## 2025-06-02 ENCOUNTER — THERAPY VISIT (OUTPATIENT)
Dept: PHYSICAL THERAPY | Facility: REHABILITATION | Age: 60
End: 2025-06-02
Attending: PHYSICIAN ASSISTANT
Payer: COMMERCIAL

## 2025-06-02 DIAGNOSIS — M54.16 LUMBAR RADICULAR PAIN: Primary | ICD-10-CM

## 2025-06-02 DIAGNOSIS — M43.16 SPONDYLOLISTHESIS OF LUMBAR REGION: ICD-10-CM

## 2025-06-02 PROCEDURE — 97110 THERAPEUTIC EXERCISES: CPT | Mod: GP | Performed by: PHYSICAL THERAPIST

## 2025-06-02 PROCEDURE — 97161 PT EVAL LOW COMPLEX 20 MIN: CPT | Mod: GP | Performed by: PHYSICAL THERAPIST

## 2025-06-02 RX ORDER — IPRATROPIUM BROMIDE 42 UG/1
2 SPRAY, METERED NASAL PRN
Qty: 15 ML | Refills: 1 | Status: SHIPPED | OUTPATIENT
Start: 2025-06-02

## 2025-06-02 RX ORDER — PSEUDOEPHED/ACETAMINOPH/DIPHEN 30MG-500MG
500-1000 TABLET ORAL EVERY 6 HOURS PRN
Qty: 360 TABLET | Refills: 3 | Status: SHIPPED | OUTPATIENT
Start: 2025-06-02

## 2025-06-02 RX ORDER — ALBUTEROL SULFATE 90 UG/1
2 INHALANT RESPIRATORY (INHALATION)
OUTPATIENT
Start: 2025-06-02

## 2025-06-02 RX ORDER — AZELASTINE 1 MG/ML
1 SPRAY, METERED NASAL PRN
Qty: 30 ML | Refills: 1 | Status: SHIPPED | OUTPATIENT
Start: 2025-06-02

## 2025-06-02 NOTE — PROGRESS NOTES
PHYSICAL THERAPY EVALUATION  Type of Visit: Evaluation       Fall Risk Screen:  Have you fallen 2 or more times in the past year?: Yes  Have you fallen and had an injury in the past year?: No  Is patient receiving Physical Therapy Services?: Yes (Low back)  Fall screen comments: Dizziness spells    Subjective         Presenting condition or subjective complaint:    Date of onset: 04/02/25    Relevant medical history:     Dates & types of surgery:      Prior diagnostic imaging/testing results:       Prior therapy history for the same diagnosis, illness or injury:        Patient reports that his pain started 2 months ago. He bent forward and stood up quickly, feeling sharp pain in the lower back. If the pain doesn't improve, recommended MRI of the lower back. He did PT for his lower back in 2022 with improvement.    Prior Level of Function  Transfers: Independent  Ambulation: Independent  ADL: Independent  IADL: Driving, Finances, Housekeeping, Laundry, Meal preparation, Medication management    Living Environment  Social support:     Type of home:     Stairs to enter the home:         Ramp:     Stairs inside the home:         Help at home:    Equipment owned:       Employment:      Hobbies/Interests:      Patient goals for therapy:      Pain assessment: Pain denied     Objective   LUMBAR SPINE EVALUATION  PAIN: Pain Level at Rest: 0/10  Pain Level with Use: 8/10  Pain Location: lumbar spine and R leg down to the foot  Pain Quality: Dull  Pain Frequency: intermittent  Pain is Worst: daytime  Pain is Exacerbated By: Walking, standing, sitting for long periods, twisting  Pain is Relieved By: IcyHot, Tylenol, gabapentin  Pain Progression: Improved  POSTURE: Standing Posture: Rounded shoulders, slight increased lumbar lordosis  GAIT:   Weightbearing Status: WBAT  Assistive Device(s): None  Gait Deviations: WFL  ROM:   (Degrees) Left AROM Left PROM  Right AROM Right PROM   Hip Flexion  Mod limited with pain  Mod limited  with pain   Hip Extension       Hip Abduction       Hip Adduction       Hip Internal Rotation  Mod limited with pain  Mod limited with pain   Hip External Rotation  Min limited with pain  Min limited with pain   Knee Flexion       Knee Extension       Lumbar Side glide Pain R more than bending R Pain on the R   Lumbar Flexion Pain R side bending to knees   Lumbar Extension Mod limited with pain R side   Pain:   End feel:   STRENGTH: Pain with knee flexion strength testing bilaterally  MYOTOMES:    Left Right   T12-L3 (Hip Flexion) 4+, ++ (mod) 4+, ++ (mod)   L2-4 (Quads)  5 5   L4 (Ankle DF) 5 5   L5 (Great Toe Ext)     S1 (Toe Raise)       LUMBAR/HIP Special Tests:    Left Right   KENNEDY     FADIR/Labrum/CAM     Femoral Nerve     Zhang's     Piriformis     Quadrant Testing     SLR     Slump Negative  Negative    Stork with Extension     Az             PALPATION: Tenderness R iliac crest    Assessment & Plan   CLINICAL IMPRESSIONS  Medical Diagnosis: Lumbar radicular pain  Spondylolisthesis of lumbar region    Treatment Diagnosis: Acute low back pain with radicular pain R   Impression/Assessment: Patient is a 59 year old male with complaints of acute R sided lower back pain with R LE pain.  The following significant findings have been identified: Pain, Decreased ROM/flexibility, Decreased joint mobility, Decreased strength, and Decreased activity tolerance. These impairments interfere with their ability to perform recreational activities, household chores, driving , household mobility, and community mobility as compared to previous level of function. His symptoms are consistent with facet arthropathy with pars defect. He is appropriate for skilled 1:1 PT services to address the listed impairments and improve function.    Clinical Decision Making (Complexity):  Clinical Presentation: Stable/Uncomplicated  Clinical Presentation Rationale: based on medical and personal factors listed in PT evaluation  Clinical  Decision Making (Complexity): Low complexity    PLAN OF CARE  Treatment Interventions:  Modalities: E-stim  Interventions: Gait Training, Manual Therapy, Neuromuscular Re-education, Therapeutic Activity, Therapeutic Exercise, Self-Care/Home Management    Long Term Goals     PT Goal 1  Goal Identifier: Self-management/HEP  Goal Description: Patient will be independent in self-management of condition and HEP to reach functional goals.  Target Date: 08/25/25  PT Goal 2  Goal Identifier: Treadmill  Goal Description: Patient will be able to walk on his treadmill for 20 mintues without pain in the lower back in order to return to PLOF.  Target Date: 08/25/25  PT Goal 3  Goal Identifier: Sitting  Goal Description: Patient will be able to sit for 1 hour without pain in the lower back in order to improve QOL.  Target Date: 08/25/25  PT Goal 4  Goal Identifier: Sleeping  Goal Description: Patient will be able to sleep throughout the night without waking d/t pain in order to improve QOL.  Target Date: 08/25/25      Frequency of Treatment: 1x/week to every other week  Duration of Treatment: 12 weeks    Recommended Referrals to Other Professionals: None  Education Assessment:   Learner/Method: Patient;Listening;Demonstration    Risks and benefits of evaluation/treatment have been explained.   Patient/Family/caregiver agrees with Plan of Care.     Evaluation Time:     PT Eval, Low Complexity Minutes (48833): 30     Signing Clinician: Jana Melchor, PT, DPT, MHA        [facility]                                                                                   OUTPATIENT PHYSICAL THERAPY      PLAN OF TREATMENT FOR OUTPATIENT REHABILITATION   Patient's Last Name, First Name, ESTHERMarshalChet Taylorjennifer  DAE YOB: 1965   Provider's Name   [facility]   Medical Record No.  [id number]     Onset Date: 04/02/25  Start of Care Date: 06/02/25     Medical Diagnosis:   Lumbar radicular pain  Spondylolisthesis of lumbar region      PT Treatment Diagnosis:  Acute low back pain with radicular pain R Plan of Treatment  Frequency/Duration: 1x/week to every other week/ 12 weeks    Certification date from 06/02/25 to 08/25/25         See note for plan of treatment details and functional goals     Jana Melchor, PT, DPT, MHA                         I CERTIFY THE NEED FOR THESE SERVICES FURNISHED UNDER        THIS PLAN OF TREATMENT AND WHILE UNDER MY CARE     (Physician attestation of this document indicates review and certification of the therapy plan).              Referring Provider:  Shara Velásquez    Initial Assessment  See Epic Evaluation- Start of Care Date: 06/02/25

## 2025-06-06 ENCOUNTER — TELEPHONE (OUTPATIENT)
Dept: INTERNAL MEDICINE | Facility: CLINIC | Age: 60
End: 2025-06-06
Payer: COMMERCIAL

## 2025-06-06 DIAGNOSIS — J31.0 CHRONIC RHINITIS: ICD-10-CM

## 2025-06-06 DIAGNOSIS — R05.2 SUBACUTE COUGH: ICD-10-CM

## 2025-06-06 NOTE — TELEPHONE ENCOUNTER
azelastine (ASTELIN) 0.1 % nasal spray 30 mL 1 6/2/2025 -- No   Sig - Route: Spray 1 spray into both nostrils as needed for rhinitis. - Both Nostrils   Sent to pharmacy as: Azelastine HCl 0.1 % Nasal Solution (ASTELIN)   Class: E-Prescribe     Pharmacy called requesting frequency for medication - please advise and send new script to [facility]

## 2025-06-09 RX ORDER — AZELASTINE 1 MG/ML
1 SPRAY, METERED NASAL 2 TIMES DAILY
Qty: 30 ML | Refills: 1 | Status: SHIPPED | OUTPATIENT
Start: 2025-06-09

## 2025-06-10 ENCOUNTER — PATIENT OUTREACH (OUTPATIENT)
Dept: CARE COORDINATION | Facility: CLINIC | Age: 60
End: 2025-06-10
Payer: COMMERCIAL

## 2025-06-10 NOTE — PROGRESS NOTES
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  Completed 8/31/2024      Start Date: 2/8/2023 Expected End Date: 2/7/2024    Recent Progress: 80%    Priority: Medium    Note:     Barriers: Health concerns  Strengths: Motivated. Strong family support.   Patient expressed understanding of goal: Yes  Action steps to achieve this goal:  1. I will look into the options of what type of painting interests me.   2. I will also look into community offered classes that may help get me started towards his goal.   3. I will report progress towards this goal at schedule outreach telephone calls from my CCC team.      Discussed on 1/3024                            Care Plan: Mental Health Completed 4/2/2025      Problem: Mental Health Symptoms Need Improvement  Resolved 4/2/2025      Goal: I would like establish care with a new psychiatric provider.  Completed 4/2/2025      Start Date: 8/28/2024 Expected End Date: 8/27/2025    This Visit's Progress: 100% Recent Progress: 80%    Priority: High    Note:     Barriers: current psychiatric provider retiring.   Strengths: strong advocate for himself, strong family support.   Patient expressed understanding of goal: yes  Action steps to achieve this goal:  1. I understand the [facility] RN will request a mental health referral from Dr. Briseno.   2. I understand once the mental health referral has been placed, Goran will assist me with finding a new provider.   3. I will attend all scheduled appointments with my new psychiatric provider.     Discussed on 4/2/25                            Care Plan: Physical Activity        Problem: Patient is inactive       Long-Range Goal: I would like to increase my walking.       Start Date: 5/12/2025 Expected End Date: 5/11/2026    This Visit's Progress: 20% Recent Progress: 10%    Priority: High    Note:     Barriers: chronic pain  Strengths: strong family support  Patient expressed understanding of goal: yes  Action steps to achieve this goal:  1. I will walk at least 3 days a week depending on my pain. (Continuous)  2. I will take my medications as directed. (Continuous)  3. I will attend all appointments with providers and will follow up on their recommendations. (Continuous)    Discussed on 6/10/25                          Intervention and Education during outreach:   Patient's wife stated that both her and the patient are doing well.   Patient has a few appointments coming up soon.   Patient has been walking a bit during the week. Carlee is encouraging him to walk to help with his back pain.   Unique has no new questions or concerns at the moment.    CHW Plan: Next outreach is scheduled for next month.      Hattie ADKINS Ambulatory CHW  [facility] Care Coordination   [facility]   Office: [phone]

## 2025-06-11 ENCOUNTER — ANCILLARY PROCEDURE (OUTPATIENT)
Dept: GENERAL RADIOLOGY | Facility: CLINIC | Age: 60
End: 2025-06-11
Attending: INTERNAL MEDICINE
Payer: COMMERCIAL

## 2025-06-11 ENCOUNTER — OFFICE VISIT (OUTPATIENT)
Dept: INTERNAL MEDICINE | Facility: CLINIC | Age: 60
End: 2025-06-11
Payer: COMMERCIAL

## 2025-06-11 VITALS
BODY MASS INDEX: 30.99 KG/M2 | HEIGHT: 65 IN | WEIGHT: 186 LBS | DIASTOLIC BLOOD PRESSURE: 80 MMHG | TEMPERATURE: 97.7 F | OXYGEN SATURATION: 97 % | HEART RATE: 55 BPM | RESPIRATION RATE: 13 BRPM | SYSTOLIC BLOOD PRESSURE: 128 MMHG

## 2025-06-11 DIAGNOSIS — N18.31 CHRONIC KIDNEY DISEASE, STAGE 3A (H): ICD-10-CM

## 2025-06-11 DIAGNOSIS — E11.9 TYPE 2 DIABETES MELLITUS WITHOUT COMPLICATION, WITHOUT LONG-TERM CURRENT USE OF INSULIN (H): ICD-10-CM

## 2025-06-11 DIAGNOSIS — K08.9 POOR DENTITION: Primary | ICD-10-CM

## 2025-06-11 DIAGNOSIS — I25.118 CORONARY ARTERY DISEASE OF NATIVE ARTERY OF NATIVE HEART WITH STABLE ANGINA PECTORIS: ICD-10-CM

## 2025-06-11 DIAGNOSIS — S89.91XA INJURY OF RIGHT LOWER EXTREMITY, INITIAL ENCOUNTER: ICD-10-CM

## 2025-06-11 PROCEDURE — 99214 OFFICE O/P EST MOD 30 MIN: CPT | Performed by: INTERNAL MEDICINE

## 2025-06-11 PROCEDURE — 3074F SYST BP LT 130 MM HG: CPT | Performed by: INTERNAL MEDICINE

## 2025-06-11 PROCEDURE — G2211 COMPLEX E/M VISIT ADD ON: HCPCS | Performed by: INTERNAL MEDICINE

## 2025-06-11 PROCEDURE — 1125F AMNT PAIN NOTED PAIN PRSNT: CPT | Performed by: INTERNAL MEDICINE

## 2025-06-11 PROCEDURE — 73590 X-RAY EXAM OF LOWER LEG: CPT | Mod: TC | Performed by: INTERNAL MEDICINE

## 2025-06-11 PROCEDURE — 3079F DIAST BP 80-89 MM HG: CPT | Performed by: INTERNAL MEDICINE

## 2025-06-11 ASSESSMENT — PAIN SCALES - GENERAL: PAINLEVEL_OUTOF10: MODERATE PAIN (5)

## 2025-06-11 ASSESSMENT — PATIENT HEALTH QUESTIONNAIRE - PHQ9: SUM OF ALL RESPONSES TO PHQ QUESTIONS 1-9: 7

## 2025-06-11 NOTE — PROGRESS NOTES
"  Office Visit - Follow Up   Chetjennifer Blake   59 year old male    Date of Visit: 6/11/2025    Chief Complaint   Patient presents with    Follow Up     Monthly follow up. Patient fell yesterday, lost balance, fell inside, did not lose consciousness or hit his head. Small bruising and swelling on R inside lower leg.    Letter Request     Patient needs letter of medical necessity for permanent bridge (dental work) apparently, patient is having issues with insurance, thinking it might help for PCP letter indicating medical necessity of procedure.        Assessment and Plan   1. Poor dentition (Primary)  Patient needs dental work and would benefit from a permanent bridge rather than something removable.  He is on a special diet to manage heart and kidney disease as well as diabetes.  He has tried removable fix before and he did not tolerate this as it did not allow him to adequately eat his prescribed plant based diet without falling out.      2. Chronic kidney disease, stage 3a (H)  Stable, continue with current care and renal diet    3. Coronary artery disease, CABG 6/2020  Stable, continue current cares and vegan/plant based diet    4. Type 2 diabetes mellitus without complication, without long-term current use of insulin (H)  Has been well controlled, as above    5. Injury of right lower extremity, initial encounter  - XR Tibia and Fibula Right 2 Views; Future    Return in about 4 weeks (around 7/9/2025) for Follow up.     History of Present Illness   This 59 year old comes in for follow up.  He fell and injured his right leg yesterday.  He has needs some dental work        Physical Exam   General Appearance:   NAD    /80 (BP Location: Left arm, Patient Position: Sitting, Cuff Size: Adult Regular)   Pulse 55   Temp 97.7  F (36.5  C) (Temporal)   Resp 13   Ht 1.651 m (5' 5\")   Wt 84.4 kg (186 lb)   SpO2 97%   BMI 30.95 kg/m      Bruising and swelling medial aspect right lower leg     Additional " Information   Current Outpatient Medications   Medication Sig Dispense Refill    acetaminophen (TYLENOL) 500 MG tablet Take 1-2 tablets (500-1,000 mg) by mouth every 6 hours as needed for mild pain. 360 tablet 3    albuterol (VENTOLIN HFA) 108 (90 Base) MCG/ACT inhaler INHALE 2 PUFFS INTO THE LUNGS EVERY 6 HOURS AS NEEDED FOR SHORTNESS OF BREATH OR WHEEZING OR COUGH 18 g 2    aspirin (ASA) 81 MG chewable tablet Take 1 tablet (81 mg) by mouth daily. 90 tablet 4    azelastine (ASTELIN) 0.1 % nasal spray Spray 1 spray into both nostrils 2 times daily. 30 mL 1    busPIRone (BUSPAR) 5 MG tablet Take 1 tablet (5 mg) by mouth 2 times daily 180 tablet 1    Cholecalciferol (VITAMIN D3) 50 MCG (2000 UT) CAPS TAKE 1 CAPSULE BY MOUTH DAILY 90 capsule 3    cyanocobalamin (VITAMIN B-12) 1000 MCG tablet TAKE 1 TABLET BY MOUTH EVERY DAY 90 tablet 2    empagliflozin (JARDIANCE) 25 MG TABS tablet Take 1 tablet (25 mg) by mouth daily. 90 tablet 2    finasteride (PROSCAR) 5 MG tablet Take 1 tablet (5 mg) by mouth daily. 90 tablet 2    gabapentin (NEURONTIN) 300 MG capsule Take 1 capsule (300 mg) by mouth at bedtime. 30 capsule 1    ipratropium (ATROVENT) 0.06 % nasal spray Spray 2 sprays into both nostrils as needed for rhinitis. 15 mL 1    isosorbide mononitrate (IMDUR) 30 MG 24 hr tablet Take 1 tablet (30 mg) by mouth daily 90 tablet 4    metoprolol succinate ER (TOPROL XL) 25 MG 24 hr tablet Take 1 tablet (25 mg) by mouth daily. 90 tablet 3    nifedipine 0.2% in white petrolatum 0.2 % OINT ointment Apply topically 4 times daily as needed (anal fissure) 100 g 1    omeprazole (PRILOSEC) 20 MG DR capsule Take 1 capsule (20 mg) by mouth 2 times daily 180 capsule 4    oxyBUTYnin (DITROPAN) 5 MG tablet TAKE 1 TABLET(5 MG) BY MOUTH AT BEDTIME 90 tablet 2    PARoxetine (PAXIL) 30 MG tablet Take 1 tablet (30 mg) by mouth every morning. 90 tablet 1    polyethylene glycol (MIRALAX) 17 GM/Dose powder TAKE 17 GRAMS(1 CAPFUL) BY MOUTH DAILY 840 g  4    QUEtiapine (SEROQUEL) 50 MG tablet Take 1.5 tablets (75 mg) by mouth at bedtime 135 tablet 1    ranolazine (RANEXA) 500 MG 12 hr tablet Take 1 tablet (500 mg) by mouth 2 times daily. 180 tablet 2    rosuvastatin (CRESTOR) 40 MG tablet TAKE 1 TABLET(40 MG) BY MOUTH DAILY 90 tablet 2    tamsulosin (FLOMAX) 0.4 MG capsule Take 2 capsules (0.8 mg) by mouth every evening 180 capsule 4    nitroGLYcerin (NITROSTAT) 0.4 MG sublingual tablet For chest pain place 1 tablet under the tongue every 5 minutes for 3 doses. If symptoms persist 5 minutes after 1st dose call 911. (Patient not taking: Reported on 6/11/2025) 30 tablet 3       Time:     The longitudinal plan of care for the diagnosis(es)/condition(s) as documented were addressed during this visit. Due to the added complexity in care, I will continue to support Nadya in the subsequent management and with ongoing continuity of care.     Az Briseno MD  Answers submitted by the patient for this visit:  General Questionnaire (Submitted on 6/11/2025)  Chief Complaint: Chronic problems general questions HPI Form  What is the reason for your visit today? : 1 month follow up  How many days per week do you miss taking your medication?: 0  Questionnaire about: Chronic problems general questions HPI Form (Submitted on 6/11/2025)  Chief Complaint: Chronic problems general questions HPI Form

## 2025-06-12 ENCOUNTER — RESULTS FOLLOW-UP (OUTPATIENT)
Dept: INTERNAL MEDICINE | Facility: CLINIC | Age: 60
End: 2025-06-12

## 2025-06-23 ENCOUNTER — OFFICE VISIT (OUTPATIENT)
Dept: CARDIOLOGY | Facility: CLINIC | Age: 60
End: 2025-06-23
Payer: COMMERCIAL

## 2025-06-23 VITALS
BODY MASS INDEX: 31.28 KG/M2 | DIASTOLIC BLOOD PRESSURE: 81 MMHG | RESPIRATION RATE: 16 BRPM | HEART RATE: 57 BPM | WEIGHT: 188 LBS | SYSTOLIC BLOOD PRESSURE: 126 MMHG

## 2025-06-23 DIAGNOSIS — E11.9 TYPE 2 DIABETES MELLITUS WITHOUT COMPLICATION, WITHOUT LONG-TERM CURRENT USE OF INSULIN (H): ICD-10-CM

## 2025-06-23 DIAGNOSIS — I25.709 CORONARY ARTERY DISEASE INVOLVING CORONARY BYPASS GRAFT OF NATIVE HEART WITH ANGINA PECTORIS: Primary | ICD-10-CM

## 2025-06-23 DIAGNOSIS — Z95.1 S/P CABG X 4: ICD-10-CM

## 2025-06-23 PROCEDURE — 99205 OFFICE O/P NEW HI 60 MIN: CPT | Performed by: INTERNAL MEDICINE

## 2025-06-23 PROCEDURE — G2211 COMPLEX E/M VISIT ADD ON: HCPCS | Performed by: INTERNAL MEDICINE

## 2025-06-23 PROCEDURE — 3079F DIAST BP 80-89 MM HG: CPT | Performed by: INTERNAL MEDICINE

## 2025-06-23 PROCEDURE — 3074F SYST BP LT 130 MM HG: CPT | Performed by: INTERNAL MEDICINE

## 2025-06-23 NOTE — PROGRESS NOTES
HEART CARE VALVE CLINIC ENCOUNTER CONSULTATON NOTE      [facility]  [phone]      Assessment/Recommendations   Assessment/Plan: Nadya Blake w/ CAD s/p CABG w/ L-LAD, rPDA, V-OM, V-D 6/20, HFPEF, HTN, HL, HANNAH who is here for evaluation of management options for his coronary artery disease.      -  I personally reviewed cardiac imaging including:   -  EKG, which showed SB w/ 1st degree AVB   -  angio, which showed native CAD w/ patent L-LAD, V-D, V-OM, occluded V-RCA and severe diffuse disease in the native CAD  -  after a long discussion of natural history, risk/benefit, and management options w/  the patient and family, they understand that RCA PCI may be feasible, depending on how things look on the angio now,  3 years after the latest one. He would like to think about it, but I would be willing to offer angio +/- PCI    The longitudinal plan of care for the diagnosis(es)/condition(s) as documented were addressed during this visit. Due to the added complexity in care, I will continue to support Nadya in the subsequent management and with ongoing continuity of care.      A total of >60 mins was spent reviewing prior records, including documentation, lab studies, cardiac testing/imaging, interview with patient, physical exam, and documentation     History of Present Illness/Subjective    HPI: Nadya Blake is a 59 year old male w/ CAD s/p CABG w/ L-LAD, rrPDA, V-OM, V-D 6/20, HFPEF, HTN, HL, DM, HANNAH who is here for evaluation of management options for his coronary artery disease.    He feels like his CP is actually better on imdur, chest pain is almost gone. When he is nervous or angry, or sometimes w/ walking up some stairs. He was offered angio by  but has been reluctant.  He had no ischemia on stress cardiac MRI on 12/23/2021 but he has been having exertional dyspnea with his saphenous venous graft to the right posterior descending artery noted to be occluded on  coronary angiography 10/6/2022. He had a somewhat traumatic experience w/ hematoma/urinary retention after prior cath, which led him to not seek staged PCI to RCA that was offered.    Recent Echocardiogram Results:  None recently    Recent Coronary Angiogram Results:  No Complications - Patient tolerated procedure well  DESCRIPTION:  1. Consent obtained with discussion of risks. All questions were answered.  2. Sterile prep and procedure.  3. Location with Sheaths:   Rt Femoral Arterial: A standard 18 guage needle with Sonosite guidance was used to establish vascular access. Access was successful. 6 & 7 Fr 10 cm (short) each. Attempts to access RFV unsucccessful under ULS, elect to discontinue as pt discomfort. Inadvertent 7F access RFA.  4. Access: Local anesthetic with lidocaine.   5. Diagnostic Catheters:The LMCA was successfully engaged with a 4 Fr JL 4  The RCA was successfully engaged with a 4 Fr JR4  The LIMA-LAD was successfully engaged with a 4 Fr LUZ MARIA  The SVG-OM was successfully engaged with a 4 Fr AR 1  The SVG-RCA was successfully engaged with a 4 Fr AR 1  The SVG-Dg was successfully engaged with a 4 Fr AR 1  6. Guiding Catheters:  None  7. Sheath Management:Two Sheaths: The Sheaths were manually removed in the cardiac catheterization laboratory. The RFA sheath was manually removed in the cardiac catheterization laboratory.  8. Estimated blood loss: < 25 mlThe attending interventional cardiologist was present and supervised all critical aspects the procedure.        Physical Examination  Review of Systems   Vitals: /81 (BP Location: Right arm, Patient Position: Sitting, Cuff Size: Adult Regular)   Pulse 57   Resp 16   Wt 85.3 kg (188 lb)   BMI 31.28 kg/m    BMI= Body mass index is 31.28 kg/m .  Wt Readings from Last 3 Encounters:   06/23/25 85.3 kg (188 lb)   06/11/25 84.4 kg (186 lb)   05/22/25 84.5 kg (186 lb 3.2 oz)       General Appearance:   no distress, normal body habitus   ENT/Mouth:  membranes moist, no oral lesions or bleeding gums.      EYES:  no scleral icterus, normal conjunctivae   Neck: no carotid bruits or thyromegaly   Chest/Lungs:   lungs are clear to auscultation, no rales or wheezing, no sternal scar, equal chest wall expansion    Cardiovascular:   Regular. Normal first and second heart sounds with 2/6 systolic murmur, no rubs, or gallops; the carotid, radial and posterior tibial pulses are intact, Jugular venous pressure 6, no edema bilaterally    Abdomen:  no organomegaly, masses, bruits, or tenderness; bowel sounds are present   Extremities: no cyanosis or clubbing   Skin: no xanthelasma, warm.    Neurologic: normal  bilateral, no tremors     Psychiatric: alert and oriented x3, calm        Please refer above for cardiac ROS details.        Medical History  Surgical History Family History Social History   Past Medical History:   Diagnosis Date    Acute non-ST elevation myocardial infarction (NSTEMI) (H) 6/22/2020    Adenomatous polyp of colon     Ascending aorta dilatation     Carpal tunnel syndrome     Chronic superficial gastritis     Coronary artery disease due to lipid rich plaque 6/23/2020    Depression with anxiety     Dyslipidemia, goal LDL below 70 6/23/2020    Essential hypertension 9/2/2012    Fatty liver disease, nonalcoholic     GERD (gastroesophageal reflux disease)     IBS (irritable bowel syndrome)     Left lumbar radiculopathy     Multinodular goiter     Old torn meniscus of knee     Paroxysmal atrial fibrillation (H)     Perianal abscess     Post herpetic neuralgia     Post-traumatic osteoarthritis of both knees     Primary osteoarthritis of left hip     Primary osteoarthritis of right hip     Pulmonary nodule     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)     Thyroid nodule     TMJ arthritis     Tobacco use disorder 11/1/2013    Vitamin D deficiency 9/30/2020     Past Surgical History:   Procedure Laterality Date    APPENDECTOMY  1995     BYPASS GRAFT ARTERY CORONARY  2020    Dr. Ragsdale    CV CORONARY ANGIOGRAM N/A 2020    Procedure: Coronary Angiogram;  Surgeon: Ariane Lester MD;  Location: [facility];  Service: Cardiology    CV CORONARY ANGIOGRAM N/A 10/6/2022    Procedure: Coronary Angiogram;  Surgeon: Javon John MD;  Location: [facility] CV    CV IABP INSERT N/A 2020    Procedure: Intra Aortic Balloon Pump Insertion;  Surgeon: Ariane Lester MD;  Location: [facility] Lab;  Service: Cardiology    CV LEFT HEART CATH N/A 10/6/2022    Procedure: Left Heart Catheterization;  Surgeon: Javon John MD;  Location: [facility] CV    CV LEFT HEART CATHETERIZATION WITHOUT LEFT VENTRICULOGRAM Left 2020    Procedure: Left Heart Catheterization Without Left Ventriculogram;  Surgeon: Ariane Lester MD;  Location: [facility] Lab;  Service: Cardiology    CV LEFT VENTRICULOGRAM N/A 10/6/2022    Procedure: Left Ventriculogram;  Surgeon: Javon John MD;  Location: [facility] CV     Family History   Problem Relation Age of Onset    No Known Problems Mother     Lung Cancer Father 56        fatal    No Known Problems Daughter     Other - See Comments Son         congenital deformity of right leg; he uses a leg prosthesis        Social History     Socioeconomic History    Marital status:      Spouse name: Carlee    Number of children: 2    Years of education: Not on file    Highest education level: Not on file   Occupational History    Not on file   Tobacco Use    Smoking status: Former     Current packs/day: 0.00     Average packs/day: 1 pack/day for 30.0 years (30.0 ttl pk-yrs)     Types: Cigarettes     Start date: 3/23/1990     Quit date: 3/23/2020     Years since quittin.2     Passive exposure: Never    Smokeless tobacco: Never    Tobacco comments:     stopped 3/24/2020   Vaping Use    Vaping status: Never Used   Substance and Sexual Activity    Alcohol use: No     Drug use: Never    Sexual activity: Yes     Partners: Female   Other Topics Concern    Not on file   Social History Narrative    , Carlee, BA chemistry and taking AA courses at [employer].  From [place]; bachelors in geology and computer science. Son, Grace (2005) and Sherrie (2008).  On SSDI, PTSD.       Social Drivers of Health     Financial Resource Strain: Low Risk  (12/27/2023)    Financial Resource Strain     Within the past 12 months, have you or your family members you live with been unable to get utilities (heat, electricity) when it was really needed?: No   Food Insecurity: Low Risk  (12/27/2023)    Food Insecurity     Within the past 12 months, did you worry that your food would run out before you got money to buy more?: No     Within the past 12 months, did the food you bought just not last and you didn t have money to get more?: No   Transportation Needs: Low Risk  (12/27/2023)    Transportation Needs     Within the past 12 months, has lack of transportation kept you from medical appointments, getting your medicines, non-medical meetings or appointments, work, or from getting things that you need?: No   Physical Activity: Not on file   Stress: Not on file   Social Connections: Unknown (1/1/2022)    [facility]    Social Connections     Frequency of Communication with Friends and Family: Not on file   Interpersonal Safety: Low Risk  (12/5/2024)    Interpersonal Safety     Do you feel physically and emotionally safe where you currently live?: Yes     Within the past 12 months, have you been hit, slapped, kicked or otherwise physically hurt by someone?: No     Within the past 12 months, have you been humiliated or emotionally abused in other ways by your partner or ex-partner?: No   Housing Stability: Low Risk  (12/27/2023)    Housing Stability     Do you have housing? : Yes     Are you worried about losing your housing?: No           Medications  Allergies    Current Outpatient Medications   Medication Sig Dispense Refill    acetaminophen (TYLENOL) 500 MG tablet Take 1-2 tablets (500-1,000 mg) by mouth every 6 hours as needed for mild pain. 360 tablet 3    albuterol (VENTOLIN HFA) 108 (90 Base) MCG/ACT inhaler INHALE 2 PUFFS INTO THE LUNGS EVERY 6 HOURS AS NEEDED FOR SHORTNESS OF BREATH OR WHEEZING OR COUGH 18 g 2    aspirin (ASA) 81 MG chewable tablet Take 1 tablet (81 mg) by mouth daily. 90 tablet 4    azelastine (ASTELIN) 0.1 % nasal spray Spray 1 spray into both nostrils 2 times daily. 30 mL 1    busPIRone (BUSPAR) 5 MG tablet Take 1 tablet (5 mg) by mouth 2 times daily 180 tablet 1    Cholecalciferol (VITAMIN D3) 50 MCG (2000 UT) CAPS TAKE 1 CAPSULE BY MOUTH DAILY 90 capsule 3    cyanocobalamin (VITAMIN B-12) 1000 MCG tablet TAKE 1 TABLET BY MOUTH EVERY DAY 90 tablet 2    empagliflozin (JARDIANCE) 25 MG TABS tablet Take 1 tablet (25 mg) by mouth daily. 90 tablet 2    finasteride (PROSCAR) 5 MG tablet Take 1 tablet (5 mg) by mouth daily. 90 tablet 2    gabapentin (NEURONTIN) 300 MG capsule Take 1 capsule (300 mg) by mouth at bedtime. 30 capsule 1    ipratropium (ATROVENT) 0.06 % nasal spray Spray 2 sprays into both nostrils as needed for rhinitis. 15 mL 1    isosorbide mononitrate (IMDUR) 30 MG 24 hr tablet Take 1 tablet (30 mg) by mouth daily 90 tablet 4    metoprolol succinate ER (TOPROL XL) 25 MG 24 hr tablet Take 1 tablet (25 mg) by mouth daily. 90 tablet 3    nifedipine 0.2% in white petrolatum 0.2 % OINT ointment Apply topically 4 times daily as needed (anal fissure) 100 g 1    nitroGLYcerin (NITROSTAT) 0.4 MG sublingual tablet For chest pain place 1 tablet under the tongue every 5 minutes for 3 doses. If symptoms persist 5 minutes after 1st dose call 911. 30 tablet 3    omeprazole (PRILOSEC) 20 MG DR capsule Take 1 capsule (20 mg) by mouth 2 times daily 180 capsule 4    oxyBUTYnin (DITROPAN) 5 MG tablet TAKE 1 TABLET(5 MG) BY MOUTH AT BEDTIME 90 tablet 2     PARoxetine (PAXIL) 30 MG tablet Take 1 tablet (30 mg) by mouth every morning. 90 tablet 1    polyethylene glycol (MIRALAX) 17 GM/Dose powder TAKE 17 GRAMS(1 CAPFUL) BY MOUTH DAILY 840 g 4    QUEtiapine (SEROQUEL) 50 MG tablet Take 1.5 tablets (75 mg) by mouth at bedtime 135 tablet 1    ranolazine (RANEXA) 500 MG 12 hr tablet Take 1 tablet (500 mg) by mouth 2 times daily. 180 tablet 2    rosuvastatin (CRESTOR) 40 MG tablet TAKE 1 TABLET(40 MG) BY MOUTH DAILY 90 tablet 2    tamsulosin (FLOMAX) 0.4 MG capsule Take 2 capsules (0.8 mg) by mouth every evening 180 capsule 4       Allergies   Allergen Reactions    Atorvastatin Diarrhea    Cortisone Other (See Comments)     pain    Lisinopril Cough     started after CABG for unclear reason    Methylprednisolone Other (See Comments)     ?          Lab Results    Chemistry/lipid CBC Cardiac Enzymes/BNP/TSH/INR   Recent Labs   Lab Test 09/12/24  1144   CHOL 104   HDL 36*   LDL 48   TRIG 98     Recent Labs   Lab Test 09/12/24  1144 08/28/23  1436 01/18/23  1037   LDL 48 32 30     Recent Labs   Lab Test 05/09/25  1138      POTASSIUM 4.9   CHLORIDE 106   CO2 24   *   BUN 15.7   CR 1.07   GFRESTIMATED 81   TEREES 8.9     Recent Labs   Lab Test 05/09/25  1138 11/12/24  1210 09/12/24  1144   CR 1.07 1.00 1.10     Recent Labs   Lab Test 05/09/25  1138 09/12/24  1144 06/14/24  0907   A1C 6.0* 5.8* 5.7*          Recent Labs   Lab Test 11/12/24  1210   WBC 7.1   HGB 14.9   HCT 45.0   MCV 90        Recent Labs   Lab Test 11/12/24  1210 09/12/24  1144 06/14/24  0907   HGB 14.9 14.2 14.3    Recent Labs   Lab Test 11/21/20  0001 07/27/20  0703 07/27/20  0115   TROPONINI <0.01 <0.01 <0.01     Recent Labs   Lab Test 12/20/23  2039 07/27/20  0115 06/22/20  2322   BNP  --  79* <10   NTBNP <36  --   --      Recent Labs   Lab Test 09/12/24  1144   TSH 1.28     Recent Labs   Lab Test 07/27/20  0115 06/25/20  0427 06/24/20  2012   INR 1.10 1.32* 1.39*        Serge Palmer,  MD

## 2025-06-23 NOTE — LETTER
6/23/2025    Az Briseno MD  [address]  [facility] [address]    RE: Nadya Blake       Dear Colleague,     I had the pleasure of seeing Nadya Blake in the [facility].    HEART CARE VALVE CLINIC ENCOUNTER CONSULTATON NOTE      [facility]  [phone]      Assessment/Recommendations   Assessment/Plan: Nadya Blake w/ CAD s/p CABG w/ L-LAD, rPDA, V-OM, V-D 6/20, HFPEF, HTN, HL, HANNAH who is here for evaluation of management options for his coronary artery disease.      -  I personally reviewed cardiac imaging including:   -  EKG, which showed SB w/ 1st degree AVB   -  angio, which showed native CAD w/ patent L-LAD, V-D, V-OM, occluded V-RCA and severe diffuse disease in the native CAD  -  after a long discussion of natural history, risk/benefit, and management options w/  the patient and family, they understand that RCA PCI may be feasible, depending on how things look on the angio now,  3 years after the latest one. He would like to think about it, but I would be willing to offer angio +/- PCI    The longitudinal plan of care for the diagnosis(es)/condition(s) as documented were addressed during this visit. Due to the added complexity in care, I will continue to support Nadya in the subsequent management and with ongoing continuity of care.      A total of >60 mins was spent reviewing prior records, including documentation, lab studies, cardiac testing/imaging, interview with patient, physical exam, and documentation     History of Present Illness/Subjective    HPI: Nadya Blake is a 59 year old male w/ CAD s/p CABG w/ L-LAD, rrPDA, V-OM, V-D 6/20, HFPEF, HTN, HL, DM, HANNAH who is here for evaluation of management options for his coronary artery disease.    He feels like his CP is actually better on imdur, chest pain is almost gone. When he is nervous or angry, or sometimes w/ walking up some stairs. He was offered angio by  but has been  reluctant.  He had no ischemia on stress cardiac MRI on 12/23/2021 but he has been having exertional dyspnea with his saphenous venous graft to the right posterior descending artery noted to be occluded on coronary angiography 10/6/2022. He had a somewhat traumatic experience w/ hematoma/urinary retention after prior cath, which led him to not seek staged PCI to RCA that was offered.    Recent Echocardiogram Results:  None recently    Recent Coronary Angiogram Results:  No Complications - Patient tolerated procedure well  DESCRIPTION:  1. Consent obtained with discussion of risks. All questions were answered.  2. Sterile prep and procedure.  3. Location with Sheaths:   Rt Femoral Arterial: A standard 18 guage needle with Sonosite guidance was used to establish vascular access. Access was successful. 6 & 7 Fr 10 cm (short) each. Attempts to access RFV unsucccessful under ULS, elect to discontinue as pt discomfort. Inadvertent 7F access RFA.  4. Access: Local anesthetic with lidocaine.   5. Diagnostic Catheters:The LMCA was successfully engaged with a 4 Fr JL 4  The RCA was successfully engaged with a 4 Fr JR4  The LIMA-LAD was successfully engaged with a 4 Fr LUZ MARIA  The SVG-OM was successfully engaged with a 4 Fr AR 1  The SVG-RCA was successfully engaged with a 4 Fr AR 1  The SVG-Dg was successfully engaged with a 4 Fr AR 1  6. Guiding Catheters:  None  7. Sheath Management:Two Sheaths: The Sheaths were manually removed in the cardiac catheterization laboratory. The RFA sheath was manually removed in the cardiac catheterization laboratory.  8. Estimated blood loss: < 25 mlThe attending interventional cardiologist was present and supervised all critical aspects the procedure.        Physical Examination  Review of Systems   Vitals: /81 (BP Location: Right arm, Patient Position: Sitting, Cuff Size: Adult Regular)   Pulse 57   Resp 16   Wt 85.3 kg (188 lb)   BMI 31.28 kg/m    BMI= Body mass index is 31.28  kg/m .  Wt Readings from Last 3 Encounters:   06/23/25 85.3 kg (188 lb)   06/11/25 84.4 kg (186 lb)   05/22/25 84.5 kg (186 lb 3.2 oz)       General Appearance:   no distress, normal body habitus   ENT/Mouth: membranes moist, no oral lesions or bleeding gums.      EYES:  no scleral icterus, normal conjunctivae   Neck: no carotid bruits or thyromegaly   Chest/Lungs:   lungs are clear to auscultation, no rales or wheezing, no sternal scar, equal chest wall expansion    Cardiovascular:   Regular. Normal first and second heart sounds with 2/6 systolic murmur, no rubs, or gallops; the carotid, radial and posterior tibial pulses are intact, Jugular venous pressure 6, no edema bilaterally    Abdomen:  no organomegaly, masses, bruits, or tenderness; bowel sounds are present   Extremities: no cyanosis or clubbing   Skin: no xanthelasma, warm.    Neurologic: normal  bilateral, no tremors     Psychiatric: alert and oriented x3, calm        Please refer above for cardiac ROS details.        Medical History  Surgical History Family History Social History   Past Medical History:   Diagnosis Date     Acute non-ST elevation myocardial infarction (NSTEMI) (H) 6/22/2020     Adenomatous polyp of colon      Ascending aorta dilatation      Carpal tunnel syndrome      Chronic superficial gastritis      Coronary artery disease due to lipid rich plaque 6/23/2020     Depression with anxiety      Dyslipidemia, goal LDL below 70 6/23/2020     Essential hypertension 9/2/2012     Fatty liver disease, nonalcoholic      GERD (gastroesophageal reflux disease)      IBS (irritable bowel syndrome)      Left lumbar radiculopathy      Multinodular goiter      Old torn meniscus of knee      Paroxysmal atrial fibrillation (H)      Perianal abscess      Post herpetic neuralgia      Post-traumatic osteoarthritis of both knees      Primary osteoarthritis of left hip      Primary osteoarthritis of right hip      Pulmonary nodule      Respiratory  bronchiolitis associated interstitial lung disease (H)      Thyroid nodule      TMJ arthritis      Tobacco use disorder 11/1/2013     Vitamin D deficiency 9/30/2020     Past Surgical History:   Procedure Laterality Date     APPENDECTOMY  1995     BYPASS GRAFT ARTERY CORONARY  06/24/2020    Dr. Ragsdale     CV CORONARY ANGIOGRAM N/A 6/23/2020    Procedure: Coronary Angiogram;  Surgeon: Ariane Lester MD;  Location: [facility] Cath Lab;  Service: Cardiology     CV CORONARY ANGIOGRAM N/A 10/6/2022    Procedure: Coronary Angiogram;  Surgeon: Javon John MD;  Location: [facility] CV     CV IABP INSERT N/A 6/24/2020    Procedure: Intra Aortic Balloon Pump Insertion;  Surgeon: Ariane Lester MD;  Location: [facility] Cath Lab;  Service: Cardiology     CV LEFT HEART CATH N/A 10/6/2022    Procedure: Left Heart Catheterization;  Surgeon: Javon John MD;  Location: [facility] CV     CV LEFT HEART CATHETERIZATION WITHOUT LEFT VENTRICULOGRAM Left 6/23/2020    Procedure: Left Heart Catheterization Without Left Ventriculogram;  Surgeon: Ariane Lester MD;  Location: [facility] Cath Lab;  Service: Cardiology     CV LEFT VENTRICULOGRAM N/A 10/6/2022    Procedure: Left Ventriculogram;  Surgeon: Javon John MD;  Location: [facility] LAB CV     Family History   Problem Relation Age of Onset     No Known Problems Mother      Lung Cancer Father 56        fatal     No Known Problems Daughter      Other - See Comments Son         congenital deformity of right leg; he uses a leg prosthesis        Social History     Socioeconomic History     Marital status:      Spouse name: Carlee     Number of children: 2     Years of education: Not on file     Highest education level: Not on file   Occupational History     Not on file   Tobacco Use     Smoking status: Former     Current packs/day: 0.00     Average packs/day: 1 pack/day for 30.0 years (30.0 ttl pk-yrs)     Types: Cigarettes     Start  date: 3/23/1990     Quit date: 3/23/2020     Years since quittin.2     Passive exposure: Never     Smokeless tobacco: Never     Tobacco comments:     stopped 3/24/2020   Vaping Use     Vaping status: Never Used   Substance and Sexual Activity     Alcohol use: No     Drug use: Never     Sexual activity: Yes     Partners: Female   Other Topics Concern     Not on file   Social History Narrative    , Carlee, BA chemistry and taking AA courses at [employer].  From [place]; bachelors in geology and computer science. Son, Grace (2005) and Sherrie (2008).  On SSDI, PTSD.       Social Drivers of Health     Financial Resource Strain: Low Risk  (2023)    Financial Resource Strain      Within the past 12 months, have you or your family members you live with been unable to get utilities (heat, electricity) when it was really needed?: No   Food Insecurity: Low Risk  (2023)    Food Insecurity      Within the past 12 months, did you worry that your food would run out before you got money to buy more?: No      Within the past 12 months, did the food you bought just not last and you didn t have money to get more?: No   Transportation Needs: Low Risk  (2023)    Transportation Needs      Within the past 12 months, has lack of transportation kept you from medical appointments, getting your medicines, non-medical meetings or appointments, work, or from getting things that you need?: No   Physical Activity: Not on file   Stress: Not on file   Social Connections: Unknown (2022)    [facility]    Social Connections      Frequency of Communication with Friends and Family: Not on file   Interpersonal Safety: Low Risk  (2024)    Interpersonal Safety      Do you feel physically and emotionally safe where you currently live?: Yes      Within the past 12 months, have you been hit, slapped, kicked or otherwise physically hurt by someone?: No      Within the past 12 months,  have you been humiliated or emotionally abused in other ways by your partner or ex-partner?: No   Housing Stability: Low Risk  (12/27/2023)    Housing Stability      Do you have housing? : Yes      Are you worried about losing your housing?: No           Medications  Allergies   Current Outpatient Medications   Medication Sig Dispense Refill     acetaminophen (TYLENOL) 500 MG tablet Take 1-2 tablets (500-1,000 mg) by mouth every 6 hours as needed for mild pain. 360 tablet 3     albuterol (VENTOLIN HFA) 108 (90 Base) MCG/ACT inhaler INHALE 2 PUFFS INTO THE LUNGS EVERY 6 HOURS AS NEEDED FOR SHORTNESS OF BREATH OR WHEEZING OR COUGH 18 g 2     aspirin (ASA) 81 MG chewable tablet Take 1 tablet (81 mg) by mouth daily. 90 tablet 4     azelastine (ASTELIN) 0.1 % nasal spray Spray 1 spray into both nostrils 2 times daily. 30 mL 1     busPIRone (BUSPAR) 5 MG tablet Take 1 tablet (5 mg) by mouth 2 times daily 180 tablet 1     Cholecalciferol (VITAMIN D3) 50 MCG (2000 UT) CAPS TAKE 1 CAPSULE BY MOUTH DAILY 90 capsule 3     cyanocobalamin (VITAMIN B-12) 1000 MCG tablet TAKE 1 TABLET BY MOUTH EVERY DAY 90 tablet 2     empagliflozin (JARDIANCE) 25 MG TABS tablet Take 1 tablet (25 mg) by mouth daily. 90 tablet 2     finasteride (PROSCAR) 5 MG tablet Take 1 tablet (5 mg) by mouth daily. 90 tablet 2     gabapentin (NEURONTIN) 300 MG capsule Take 1 capsule (300 mg) by mouth at bedtime. 30 capsule 1     ipratropium (ATROVENT) 0.06 % nasal spray Spray 2 sprays into both nostrils as needed for rhinitis. 15 mL 1     isosorbide mononitrate (IMDUR) 30 MG 24 hr tablet Take 1 tablet (30 mg) by mouth daily 90 tablet 4     metoprolol succinate ER (TOPROL XL) 25 MG 24 hr tablet Take 1 tablet (25 mg) by mouth daily. 90 tablet 3     nifedipine 0.2% in white petrolatum 0.2 % OINT ointment Apply topically 4 times daily as needed (anal fissure) 100 g 1     nitroGLYcerin (NITROSTAT) 0.4 MG sublingual tablet For chest pain place 1 tablet under the  tongue every 5 minutes for 3 doses. If symptoms persist 5 minutes after 1st dose call 911. 30 tablet 3     omeprazole (PRILOSEC) 20 MG DR capsule Take 1 capsule (20 mg) by mouth 2 times daily 180 capsule 4     oxyBUTYnin (DITROPAN) 5 MG tablet TAKE 1 TABLET(5 MG) BY MOUTH AT BEDTIME 90 tablet 2     PARoxetine (PAXIL) 30 MG tablet Take 1 tablet (30 mg) by mouth every morning. 90 tablet 1     polyethylene glycol (MIRALAX) 17 GM/Dose powder TAKE 17 GRAMS(1 CAPFUL) BY MOUTH DAILY 840 g 4     QUEtiapine (SEROQUEL) 50 MG tablet Take 1.5 tablets (75 mg) by mouth at bedtime 135 tablet 1     ranolazine (RANEXA) 500 MG 12 hr tablet Take 1 tablet (500 mg) by mouth 2 times daily. 180 tablet 2     rosuvastatin (CRESTOR) 40 MG tablet TAKE 1 TABLET(40 MG) BY MOUTH DAILY 90 tablet 2     tamsulosin (FLOMAX) 0.4 MG capsule Take 2 capsules (0.8 mg) by mouth every evening 180 capsule 4       Allergies   Allergen Reactions     Atorvastatin Diarrhea     Cortisone Other (See Comments)     pain     Lisinopril Cough     started after CABG for unclear reason     Methylprednisolone Other (See Comments)     ?          Lab Results    Chemistry/lipid CBC Cardiac Enzymes/BNP/TSH/INR   Recent Labs   Lab Test 09/12/24  1144   CHOL 104   HDL 36*   LDL 48   TRIG 98     Recent Labs   Lab Test 09/12/24  1144 08/28/23  1436 01/18/23  1037   LDL 48 32 30     Recent Labs   Lab Test 05/09/25  1138      POTASSIUM 4.9   CHLORIDE 106   CO2 24   *   BUN 15.7   CR 1.07   GFRESTIMATED 81   TERESE 8.9     Recent Labs   Lab Test 05/09/25  1138 11/12/24  1210 09/12/24  1144   CR 1.07 1.00 1.10     Recent Labs   Lab Test 05/09/25  1138 09/12/24  1144 06/14/24  0907   A1C 6.0* 5.8* 5.7*          Recent Labs   Lab Test 11/12/24  1210   WBC 7.1   HGB 14.9   HCT 45.0   MCV 90        Recent Labs   Lab Test 11/12/24  1210 09/12/24  1144 06/14/24  0907   HGB 14.9 14.2 14.3    Recent Labs   Lab Test 11/21/20  0001 07/27/20  0703 07/27/20  0115   TROPONINI  <0.01 <0.01 <0.01     Recent Labs   Lab Test 12/20/23 2039 07/27/20  0115 06/22/20  2322   BNP  --  79* <10   NTBNP <36  --   --      Recent Labs   Lab Test 09/12/24  1144   TSH 1.28     Recent Labs   Lab Test 07/27/20  0115 06/25/20  0427 06/24/20 2012   INR 1.10 1.32* 1.39*        Serge Palmer MD                                        Thank you for allowing me to participate in the care of your patient.      Sincerely,     Serge Palmer MD     [facility]  cc:   Sloan Burns MD  [address] [facility] [address]  [place] [id number]

## 2025-06-24 ENCOUNTER — PATIENT OUTREACH (OUTPATIENT)
Dept: CARE COORDINATION | Facility: CLINIC | Age: 60
End: 2025-06-24
Payer: COMMERCIAL

## 2025-06-24 NOTE — PROGRESS NOTES
Clinic Care Coordination Contact  Care Coordination Clinician Chart Review    Situation: Patient chart reviewed by Care Coordinator.       Background: Care Coordination Program started: 11/19/2020. Initial assessment completed and patient-centered care plan(s) were developed with participation from patient. Lead CC handed patient off to CHW for continued outreaches.       Assessment: Per chart review, patient outreach completed by CC CHW on 6/10/25.  Patient is actively working to accomplish goal(s). Patient's goal(s) appropriate and relevant at this time. Patient is due for updated Plan of Care.  Assessments will be completed annually or as needed/with change of patient status.      Care Plan: Physical Activity       Problem: Patient is inactive       Long-Range Goal: I would like to increase my walking.       Start Date: 5/12/2025 Expected End Date: 5/11/2026    This Visit's Progress: 20% Recent Progress: 10%    Priority: High    Note:     Barriers: chronic pain  Strengths: strong family support  Patient expressed understanding of goal: yes  Action steps to achieve this goal:  1. I will walk at least 3 days a week depending on my pain. (Continuous)  2. I will take my medications as directed. (Continuous)  3. I will attend all appointments with providers and will follow up on their recommendations. (Continuous)    Discussed on 6/10/25                                 Plan/Recommendations: The patient will continue working with Care Coordination to achieve goal(s) as above. CHW will continue outreaches at minimum every 30 days and will involve Lead CC as needed or if patient is ready to move to Maintenance. Lead CC will continue to monitor CHW outreaches and patient's progress to goal(s) every 6 weeks.     Plan of Care updated and sent to patient: Yes, via [employer] ([employer])

## 2025-06-24 NOTE — LETTER
[facility]  Patient Centered Plan of Care  About Me:        Patient Name:  Nadya Bejarano and Nadya,     I hope you are both doing well! Here is a copy of your Care Plan with the goal we are supporting you with at this time. Please let me know if I can help in any way.     Thanks,     Dave Myhre, RN   CCC RN  [phone]    YOB: 1965  Age:         59 year old   Tracy MRN:    [id number] Telephone Information:  Home Phone [phone]   Mobile [phone]       Address:  [address]  [facility] [address] Email address:  [email or web address]      Emergency Contact(s)    Name Relationship Lgl Grd Work Phone Home Phone Mobile Phone   1. RUSSELLJUANITA CAMP Spouse    [phone]           Primary language:  Estonian     needed? No   [facility] [employer]:  [phone] op. 1  Other communication barriers:None    Preferred Method of Communication:     Current living arrangement: I live in a private home with spouse    Mobility Status/ Medical Equipment: Independent        Health Maintenance  Health Maintenance Reviewed: Due/Overdue   Health Maintenance Due   Topic Date Due    ZOSTER VACCINE (1 of 2) Never done    DTAP/TDAP/TD VACCINE (3 - Td or Tdap) 04/30/2022          My Access Plan  Medical Emergency 911   Primary Clinic Line [facility] - [phone]   24 Hour Appointment Line [phone] or  [phone]) (toll-free)   24 Hour Nurse Line [phone] (toll-free)   Preferred Urgent Care [facility], [phone]     Preferred Hospital [facility]  [phone]     Preferred Pharmacy [id number] - [facility] [address] AT [facility] [address]     Behavioral Health Crisis Line The National Suicide Prevention Lifeline at 1-633.116.9238 or Text/Call 498           My Care Team Members  Patient Care Team         Relationship Specialty  Notifications Start End    Az Briseno MD PCP - General   9/22/20     Phone: [phone] Fax: [phone]         [address] [facility] [address]    Myhre, David J, RN Lead Care Coordinator Primary Care - CC Admissions 11/19/20      Phone: [phone]    Phone: [phone]         Az Briseno MD Assigned PCP   6/16/21     Phone: [phone] Fax: [phone]         [address] [facility] [address]    Inessa Cade [facility] worker   11/15/21     Phone: [phone]         Az Craft, APRN CNP Assigned Sleep Provider   1/21/23     Phone: [phone] Fax: [phone]         [address] [place] [address]    Jojo Soria RN Registered Nurse Diabetes Education  3/21/23     Gabbi Blackmon NP Assigned Behavioral Health Provider   11/4/23     Phone: [phone] Fax: [phone]          [facility]  [facility] [address]    Jojo Soria, RN Registered Nurse Diabetes Education  1/9/24     Jef Wang DO Assigned Surgical Provider   1/25/24     Phone: [phone] Fax: [phone]         [address] [facility] [address] [facility] [address]    Teodora Ordonez PA-C Assigned Heart and Vascular Provider   3/23/25     Phone: [phone] Fax: [phone]         [address] [place] [address]    Hattie Beltran CHW Community Health Worker  Admissions 5/6/25     Mimi Allen [facility] Pharmacist   5/9/25     Phone: [phone] Fax: [phone]         [facility] [place] [address]    Shara Velásquez PA-C Assigned Musculoskeletal Provider   5/23/25     Phone: [phone] Fax: [phone]         [address] [facility] [address]    Mimi Allen, GRACE Assigned MTM Pharmacist   5/23/25     Phone: [phone] Fax: [phone]         [facility] [address] [place] [address]                My Care Plans  Self Management and Treatment Plan    Care Plan  Care Plan: Physical Activity       Problem: Patient is inactive        Long-Range Goal: I would like to increase my walking.       Start Date: 5/12/2025 Expected End Date: 5/11/2026    This Visit's Progress: 20% Recent Progress: 10%    Priority: High    Note:     Barriers: chronic pain  Strengths: strong family support  Patient expressed understanding of goal: yes  Action steps to achieve this goal:  1. I will walk at least 3 days a week depending on my pain. (Continuous)  2. I will take my medications as directed. (Continuous)  3. I will attend all appointments with providers and will follow up on their recommendations. (Continuous)    Discussed on 6/10/25                              Action Plans on File:                       Advance Care Plans/Directives:   Advanced Care Plan/Directives on file: No    Discussed with patient/caregiver(s): Declined Further Information               My Medical and Care Information  Problem List   Patient Active Problem List   Diagnosis    Recurrent major depressive disorder, in partial remission    Gastroesophageal reflux disease with esophagitis without hemorrhage    Essential hypertension    Post-traumatic osteoarthritis of both knees    Primary osteoarthritis of left hip    Primary osteoarthritis of right hip    Coronary artery disease, CABG 6/2020    Dyslipidemia, goal LDL below 70    Benign prostatic hyperplasia with nocturia    PTSD (post-traumatic stress disorder)    Ascending aorta dilatation    Anal fissure - Dr. Campoverde    Simple chronic bronchitis (H)    History of colonic polyps    S/P CABG x 4    Thyroid nodule    Chronic kidney disease, stage 3a (H)    Microscopic hematuria - Dr. Tineo    Type 2 diabetes mellitus without complication, without long-term current use of insulin (H)    Gallstones    Obstructive sleep apnea syndrome    Lumbar radicular pain    Spondylolisthesis of lumbar region      Current Medications:  Please refer to the most recent medication list provided to you by your medical team and reach out to your provider with  any questions or to make any corrections.    Care Coordination Start Date: 11/19/2020   Frequency of Care Coordination: monthly, more frequently as needed     Form Last Updated: 06/24/2025

## 2025-07-08 ENCOUNTER — OFFICE VISIT (OUTPATIENT)
Dept: CARDIOLOGY | Facility: CLINIC | Age: 60
End: 2025-07-08
Payer: COMMERCIAL

## 2025-07-08 VITALS
DIASTOLIC BLOOD PRESSURE: 70 MMHG | HEIGHT: 65 IN | RESPIRATION RATE: 16 BRPM | HEART RATE: 55 BPM | BODY MASS INDEX: 31.65 KG/M2 | WEIGHT: 190 LBS | SYSTOLIC BLOOD PRESSURE: 124 MMHG

## 2025-07-08 DIAGNOSIS — G47.33 OSA (OBSTRUCTIVE SLEEP APNEA): ICD-10-CM

## 2025-07-08 DIAGNOSIS — E78.5 DYSLIPIDEMIA, GOAL LDL BELOW 70: Chronic | ICD-10-CM

## 2025-07-08 DIAGNOSIS — E11.69 TYPE 2 DIABETES MELLITUS WITH OTHER SPECIFIED COMPLICATION, WITHOUT LONG-TERM CURRENT USE OF INSULIN (H): ICD-10-CM

## 2025-07-08 DIAGNOSIS — I50.32 CHRONIC HEART FAILURE WITH PRESERVED EJECTION FRACTION (H): ICD-10-CM

## 2025-07-08 DIAGNOSIS — I10 ESSENTIAL HYPERTENSION: Chronic | ICD-10-CM

## 2025-07-08 DIAGNOSIS — I25.709 CORONARY ARTERY DISEASE INVOLVING CORONARY BYPASS GRAFT OF NATIVE HEART WITH ANGINA PECTORIS: Primary | ICD-10-CM

## 2025-07-08 PROCEDURE — 3078F DIAST BP <80 MM HG: CPT | Performed by: GENERAL ACUTE CARE HOSPITAL

## 2025-07-08 PROCEDURE — 99214 OFFICE O/P EST MOD 30 MIN: CPT | Performed by: GENERAL ACUTE CARE HOSPITAL

## 2025-07-08 PROCEDURE — 3074F SYST BP LT 130 MM HG: CPT | Performed by: GENERAL ACUTE CARE HOSPITAL

## 2025-07-08 PROCEDURE — G2211 COMPLEX E/M VISIT ADD ON: HCPCS | Performed by: GENERAL ACUTE CARE HOSPITAL

## 2025-07-08 NOTE — PROGRESS NOTES
HEART CARE ENCOUNTER NOTE        Assessment/Recommendations   Assessment:    Coronary artery disease status post four-vessel coronary artery bypass grafting (left internal mammary artery to the left anterior descending artery, right radial artery to the right posterior descending artery, reversed saphenous venous grafts to obtuse marginal and diagonal artery branches) on 6/24/2020. No ischemia seen on stress cardiac MRI on 12/23/2021 but he has been having exertional dyspnea with his saphenous venous graft to the right posterior descending artery noted to be occluded on coronary angiography 10/6/2022. He still has intermittent anginal symptoms which seem stable but are potentially activity-limiting.  Chronic heart failure with preserved left ventricular ejection fraction. He seems euvolemic and normal left-sided filling pressure was noted on cardiac catheterization 10/6/2022.  Essential hypertension. Controlled.  Dyslipidemia. Last LDL 48 mg/dL.  Non insulin-dependent diabetes mellitus type 2. Last hemoglobin A1c 6.0%.  Former smoker.  Moderate obstructive sleep apnea diagnosed on polysomnographic testing 6/24/2024 on home CPAP.  Body mass index is 31.62 kg/m     Plan:  Continue metoprolol succinate 25 mg daily. He developed lightheadedness on higher doses.  Continue isosorbide mononitrate at 30 mg daily as he felt unwell at higher doses.  Given his limited tolerance to anti-anginal medication, we again discussed coronary angiography and possible percutaneous coronary intervention. He is still hesitant to proceed.  Rosuvastatin 40 mg and aspirin 81 mg daily.   Follow-up with Teodora Ordonez in 2 months, with me in 4-6 months.       The longitudinal plan of care for the diagnosis(es)/condition(s) as documented were addressed during this visit. Due to the added complexity in care, I will continue to support Nadya in the subsequent management and with ongoing continuity of care.    History of Present Illness     Nadya Blake is a 59 year old male with a significant past history of CAD with history of NSTEMI s/p 4V CABG (LIMA to LAD, right radial artery to RPDA, reversed SVGs to an OM and diagonal artery branch) on 6/24/2020, HFpEF, HTN, dyslipidemia, and former smoker presenting for follow-up.     He continues to have exertional chest pain and shortness of breath. He has not tolerated higher doses of isosorbide mononitrate or metoprolol due to lightheadedness. He also was tried on ranolazine but this caused too caused lightheadedness and was discontinued. He met with our interventional cardiologist Dr. Palmer who offered coronary angiography and possible PCI, but he continues to be hesitant to proceed given his unpleasant experience with his angiogram in 2020.    No light headedness/dizziness, pre-syncope, syncope, lower extremity swelling, palpitations, paroxysmal nocturnal dyspnea (PND), or orthopnea.     Cardiac Problems and Cardiac Diagnostics     Most Recent Cardiac testing:  ECG 10/6/2022 (personally reviewed and interpreted): sinus bradycardia HR 57 bpm with 1st degree AV block  ms, nonspecific T wave changes     Zio patch 1/29/2025 (results reviewed):  Zio monitoring from 1/20/2025 to 1/23/2025 (duration 3d 0h)  Predominant rhythm was sinus rhythm, 39 to 120bpm, average 61bpm.  No nonsustained or sustained tachyarrhythmias.  No atrial fibrillation.  Intermittent first degree AV block.  There were no pauses of greater than 3 seconds.  Rare premature atrial contractions beats (isolated <1%).  Rare premature ventricular contractions (isolated <1%).  Symptom triggers and diary entries (3) correlated to sinus rhythm 57 to 64bpm.    ECHO 6/27/2020 (report reviewed):     Normal left ventricular size and systolic function. The left ventricular wall motion is normal. The calculated left ventricular ejection fraction is 71%.    Normal right ventricular size and systolic function.    No hemodynamically  significant valvular heart abnormalities.    The ascending aorta is mildly dilated.    When compared to the previous study dated 6/23/2020, no significant change.     Stress cardiac MRI 12/23/2021 (report reviewed):  1.  Pharmacological Regadenoson stress cardiac MRI is negative for inducible myocardial ischemia.   2.  Pharmacological stress ECG is negative for inducible myocardial ischemia.   3. Normal left ventricular size, wall thickness and systolic function. The quantified left ventricular  ejection fraction is 59 %.  Very small area of non-transmural myocardial scar in the mid inferior wall is identified.    4.  Normal right ventricular size and systolic function.    5.  No significant valvular abnormalities.  6. Ascending aorta measures 38 mm.      Stress test 5/21/2021 (report reviewed):    The nuclear stress test is negative for inducible myocardial ischemia or infarction.    The left ventricular ejection fraction at stress is 65%.    There is no prior study for comparison.     Cardiac cath 10/6/2022 (report reviewed):   Left ventricular filling pressures are normal.  3rd Mrg lesion is 90% stenosed.  Prox RCA lesion is 75% stenosed.  Prox RCA to Mid RCA lesion is 40% stenosed.  Dist RCA lesion is 75% stenosed.  Mid RCA lesion is 30% stenosed.  RPDA lesion is 50% stenosed.  RPAV lesion is 40% stenosed.  Prox LAD lesion is 70% stenosed.  1st Diag-1 lesion is 95% stenosed.  1st Diag-2 lesion is 95% stenosed.  2nd Diag lesion is 99% stenosed.  Mid LAD-1 lesion is 75% stenosed.  Mid LAD-2 lesion is 75% stenosed.  Dist LAD lesion is 70% stenosed.  Vein graft to right PDA is totally occluded  Vein graft to second diagonal is widely patent  Vein graft to third OM is widely patent  LUZ MARIA graft to mid distal LAD is patent     Medications  Allergies   Current Outpatient Medications   Medication Sig Dispense Refill    acetaminophen (TYLENOL) 500 MG tablet Take 1-2 tablets (500-1,000 mg) by mouth every 6 hours as needed  for mild pain. 360 tablet 3    albuterol (VENTOLIN HFA) 108 (90 Base) MCG/ACT inhaler INHALE 2 PUFFS INTO THE LUNGS EVERY 6 HOURS AS NEEDED FOR SHORTNESS OF BREATH OR WHEEZING OR COUGH 18 g 2    aspirin (ASA) 81 MG chewable tablet Take 1 tablet (81 mg) by mouth daily. 90 tablet 4    azelastine (ASTELIN) 0.1 % nasal spray Spray 1 spray into both nostrils 2 times daily. 30 mL 1    busPIRone (BUSPAR) 5 MG tablet Take 1 tablet (5 mg) by mouth 2 times daily 180 tablet 1    Cholecalciferol (VITAMIN D3) 50 MCG (2000 UT) CAPS TAKE 1 CAPSULE BY MOUTH DAILY 90 capsule 3    cyanocobalamin (VITAMIN B-12) 1000 MCG tablet TAKE 1 TABLET BY MOUTH EVERY DAY 90 tablet 2    empagliflozin (JARDIANCE) 25 MG TABS tablet TAKE 1 TABLET(25 MG) BY MOUTH DAILY 90 tablet 0    finasteride (PROSCAR) 5 MG tablet Take 1 tablet (5 mg) by mouth daily. 90 tablet 2    gabapentin (NEURONTIN) 300 MG capsule Take 1 capsule (300 mg) by mouth at bedtime. 30 capsule 1    ipratropium (ATROVENT) 0.06 % nasal spray Spray 2 sprays into both nostrils as needed for rhinitis. 15 mL 1    isosorbide mononitrate (IMDUR) 30 MG 24 hr tablet Take 1 tablet (30 mg) by mouth daily 90 tablet 4    metoprolol succinate ER (TOPROL XL) 25 MG 24 hr tablet Take 1 tablet (25 mg) by mouth daily. 90 tablet 3    nifedipine 0.2% in white petrolatum 0.2 % OINT ointment Apply topically 4 times daily as needed (anal fissure) 100 g 1    nitroGLYcerin (NITROSTAT) 0.4 MG sublingual tablet For chest pain place 1 tablet under the tongue every 5 minutes for 3 doses. If symptoms persist 5 minutes after 1st dose call 911. 30 tablet 3    omeprazole (PRILOSEC) 20 MG DR capsule Take 1 capsule (20 mg) by mouth 2 times daily 180 capsule 4    oxyBUTYnin (DITROPAN) 5 MG tablet TAKE 1 TABLET(5 MG) BY MOUTH AT BEDTIME 90 tablet 2    PARoxetine (PAXIL) 30 MG tablet Take 1 tablet (30 mg) by mouth every morning. 90 tablet 1    polyethylene glycol (MIRALAX) 17 GM/Dose powder TAKE 17 GRAMS(1 CAPFUL) BY MOUTH  "DAILY 840 g 4    QUEtiapine (SEROQUEL) 50 MG tablet Take 1.5 tablets (75 mg) by mouth at bedtime 135 tablet 1    ranolazine (RANEXA) 500 MG 12 hr tablet Take 1 tablet (500 mg) by mouth 2 times daily. 180 tablet 2    rosuvastatin (CRESTOR) 40 MG tablet TAKE 1 TABLET(40 MG) BY MOUTH DAILY 90 tablet 2    tamsulosin (FLOMAX) 0.4 MG capsule Take 2 capsules (0.8 mg) by mouth every evening 180 capsule 4      Allergies   Allergen Reactions    Atorvastatin Diarrhea    Cortisone Other (See Comments)     pain    Lisinopril Cough     started after CABG for unclear reason    Methylprednisolone Other (See Comments)     ?        Physical Examination Review of Systems   /70 (BP Location: Left arm, Patient Position: Sitting, Cuff Size: Adult Regular)   Pulse 55   Resp 16   Ht 1.651 m (5' 5\")   Wt 86.2 kg (190 lb)   BMI 31.62 kg/m    Body mass index is 31.62 kg/m .  Wt Readings from Last 3 Encounters:   07/08/25 86.2 kg (190 lb)   06/23/25 85.3 kg (188 lb)   06/11/25 84.4 kg (186 lb)       General Appearance:   Pleasant  male, appears  stated age. no acute distress, normal body habitus   ENT/Mouth: membranes moist, no apparent gingival bleeding.      EYES:  no scleral icterus, normal conjunctivae   Neck: no carotid bruits. No anterior cervical lymphadenopaty   Respiratory:   lungs are clear to auscultation, no rales or wheezing, well-healed sternal scar, equal chest wall expansion    Cardiovascular:   Regular rhythm, normal rate. Normal first and second heart sounds with no murmurs, rubs, or gallops; the carotid, radial and posterior tibial pulses are intact, Jugular venous pressure normal, no edema bilaterally    Abdomen/GI:  no organomegaly, masses, bruits, or tenderness; bowel sounds are present   Extremities: no cyanosis or clubbing   Skin: no xanthelasma, warm.    Heme/lymph/ Immunology No apparent bleeding noted.   Neurologic: Alert and oriented. normal gait, no tremors     Psychiatric: Pleasant, calm, appropriate " affect.    A complete 10 system review of systems was performed and is negative except as mentioned in the HPI/subjective.         Past History   Past Medical History:   Past Medical History:   Diagnosis Date    Acute non-ST elevation myocardial infarction (NSTEMI) (H) 6/22/2020    Adenomatous polyp of colon     Ascending aorta dilatation     Carpal tunnel syndrome     Chronic superficial gastritis     Coronary artery disease due to lipid rich plaque 6/23/2020    Depression with anxiety     Dyslipidemia, goal LDL below 70 6/23/2020    Essential hypertension 9/2/2012    Fatty liver disease, nonalcoholic     GERD (gastroesophageal reflux disease)     IBS (irritable bowel syndrome)     Left lumbar radiculopathy     Multinodular goiter     Old torn meniscus of knee     Paroxysmal atrial fibrillation (H)     Perianal abscess     Post herpetic neuralgia     Post-traumatic osteoarthritis of both knees     Primary osteoarthritis of left hip     Primary osteoarthritis of right hip     Pulmonary nodule     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 Ragsdale    CV CORONARY ANGIOGRAM N/A 6/23/2020    Procedure: Coronary Angiogram;  Surgeon: Ariane Lester MD;  Location: [facility] Cath Lab;  Service: Cardiology    CV CORONARY ANGIOGRAM N/A 10/6/2022    Procedure: Coronary Angiogram;  Surgeon: Javon John MD;  Location: [facility] CV    CV IABP INSERT N/A 6/24/2020    Procedure: Intra Aortic Balloon Pump Insertion;  Surgeon: Ariane Lester MD;  Location: [facility] Cath Lab;  Service: Cardiology    CV LEFT HEART CATH N/A 10/6/2022    Procedure: Left Heart Catheterization;  Surgeon: Javon John MD;  Location: [facility]  CATH LAB CV    CV LEFT HEART CATHETERIZATION WITHOUT LEFT VENTRICULOGRAM Left 2020    Procedure: Left Heart Catheterization Without Left Ventriculogram;  Surgeon: Ariane Lester MD;  Location: [facility] Cath Lab;  Service: Cardiology    CV LEFT VENTRICULOGRAM N/A 10/6/2022    Procedure: Left Ventriculogram;  Surgeon: Javon John MD;  Location: [facility] CATH LAB CV       Family History:   Family History   Problem Relation Age of Onset    No Known Problems Mother     Lung Cancer Father 56        fatal    No Known Problems Daughter     Other - See Comments Son         congenital deformity of right leg; he uses a leg prosthesis        Social History:   Social History     Socioeconomic History    Marital status:      Spouse name: Carlee    Number of children: 2    Years of education: Not on file    Highest education level: Not on file   Occupational History    Not on file   Tobacco Use    Smoking status: Former     Current packs/day: 0.00     Average packs/day: 1 pack/day for 30.0 years (30.0 ttl pk-yrs)     Types: Cigarettes     Start date: 3/23/1990     Quit date: 3/23/2020     Years since quittin.2     Passive exposure: Never    Smokeless tobacco: Never    Tobacco comments:     stopped 3/24/2020   Vaping Use    Vaping status: Never Used   Substance and Sexual Activity    Alcohol use: No    Drug use: Never    Sexual activity: Yes     Partners: Female   Other Topics Concern    Not on file   Social History Narrative    , Carlee, BA chemistry and taking AA courses at [employer].  From [place]; bachelors in geology and computer science. Son, Grace (2005) and Sherrie (2008).  On SSDI, PTSD.       Social Drivers of Health     Financial Resource Strain: Low Risk  (2023)    Financial Resource Strain     Within the past 12 months, have you or your family members you live with been unable to get utilities (heat, electricity) when it was really needed?: No   Food Insecurity: Low Risk  (2023)     Food Insecurity     Within the past 12 months, did you worry that your food would run out before you got money to buy more?: No     Within the past 12 months, did the food you bought just not last and you didn t have money to get more?: No   Transportation Needs: Low Risk  (12/27/2023)    Transportation Needs     Within the past 12 months, has lack of transportation kept you from medical appointments, getting your medicines, non-medical meetings or appointments, work, or from getting things that you need?: No   Physical Activity: Not on file   Stress: Not on file   Social Connections: Unknown (1/1/2022)    Received from [employer] [facility]    Social Connections     Frequency of Communication with Friends and Family: Not on file   Interpersonal Safety: Low Risk  (12/5/2024)    Interpersonal Safety     Do you feel physically and emotionally safe where you currently live?: Yes     Within the past 12 months, have you been hit, slapped, kicked or otherwise physically hurt by someone?: No     Within the past 12 months, have you been humiliated or emotionally abused in other ways by your partner or ex-partner?: No   Housing Stability: Low Risk  (12/27/2023)    Housing Stability     Do you have housing? : Yes     Are you worried about losing your housing?: No              Lab Results    Chemistry/lipid CBC Cardiac Enzymes/BNP/TSH/INR   Lab Results   Component Value Date    CHOL 104 09/12/2024    HDL 36 (L) 09/12/2024    LDL 48 09/12/2024    TRIG 98 09/12/2024    CR 1.07 05/09/2025    BUN 15.7 05/09/2025    POTASSIUM 4.9 05/09/2025     05/09/2025    CO2 24 05/09/2025      Lab Results   Component Value Date    WBC 7.1 11/12/2024    HGB 14.9 11/12/2024    HCT 45.0 11/12/2024    MCV 90 11/12/2024     11/12/2024    A1C 6.0 (H) 05/09/2025     Lab Results   Component Value Date    A1C 6.0 (H) 05/09/2025    Lab Results   Component Value Date    TROPONINI <0.01 11/21/2020    BNP 79 (H)  07/27/2020    NTBNP <36 12/20/2023    TSH 1.28 09/12/2024    INR 1.10 07/27/2020          Sloan Burns MD [facility]  Non-Invasive Cardiologist  [facility]  Pager [phone]

## 2025-07-08 NOTE — LETTER
7/8/2025    Az Briseno MD  [address] [facility] [address]    RE: Nadya DAE Hayden       Dear Colleague,     I had the pleasure of seeing Nadya Blake in the [facility].  HEART CARE ENCOUNTER NOTE        Assessment/Recommendations   Assessment:    Coronary artery disease status post four-vessel coronary artery bypass grafting (left internal mammary artery to the left anterior descending artery, right radial artery to the right posterior descending artery, reversed saphenous venous grafts to obtuse marginal and diagonal artery branches) on 6/24/2020. No ischemia seen on stress cardiac MRI on 12/23/2021 but he has been having exertional dyspnea with his saphenous venous graft to the right posterior descending artery noted to be occluded on coronary angiography 10/6/2022. He still has intermittent anginal symptoms which seem stable but are potentially activity-limiting.  Chronic heart failure with preserved left ventricular ejection fraction. He seems euvolemic and normal left-sided filling pressure was noted on cardiac catheterization 10/6/2022.  Essential hypertension. Controlled.  Dyslipidemia. Last LDL 48 mg/dL.  Non insulin-dependent diabetes mellitus type 2. Last hemoglobin A1c 6.0%.  Former smoker.  Moderate obstructive sleep apnea diagnosed on polysomnographic testing 6/24/2024 on home CPAP.  Body mass index is 31.62 kg/m     Plan:  Continue metoprolol succinate 25 mg daily. He developed lightheadedness on higher doses.  Continue isosorbide mononitrate at 30 mg daily as he felt unwell at higher doses.  Given his limited tolerance to anti-anginal medication, we again discussed coronary angiography and possible percutaneous coronary intervention. He is still hesitant to proceed.  Rosuvastatin 40 mg and aspirin 81 mg daily.   Follow-up with Teodora Ordonez in 2 months, with me in 4-6 months.       The longitudinal plan of care for the diagnosis(es)/condition(s) as documented were  addressed during this visit. Due to the added complexity in care, I will continue to support Nadya in the subsequent management and with ongoing continuity of care.    History of Present Illness   Mr. Nadya Blake is a 59 year old male with a significant past history of CAD with history of NSTEMI s/p 4V CABG (LIMA to LAD, right radial artery to RPDA, reversed SVGs to an OM and diagonal artery branch) on 6/24/2020, HFpEF, HTN, dyslipidemia, and former smoker presenting for follow-up.     He continues to have exertional chest pain and shortness of breath. He has not tolerated higher doses of isosorbide mononitrate or metoprolol due to lightheadedness. He also was tried on ranolazine but this caused too caused lightheadedness and was discontinued. He met with our interventional cardiologist Dr. Palmer who offered coronary angiography and possible PCI, but he continues to be hesitant to proceed given his unpleasant experience with his angiogram in 2020.    No light headedness/dizziness, pre-syncope, syncope, lower extremity swelling, palpitations, paroxysmal nocturnal dyspnea (PND), or orthopnea.     Cardiac Problems and Cardiac Diagnostics     Most Recent Cardiac testing:  ECG 10/6/2022 (personally reviewed and interpreted): sinus bradycardia HR 57 bpm with 1st degree AV block  ms, nonspecific T wave changes     Zio patch 1/29/2025 (results reviewed):  Zio monitoring from 1/20/2025 to 1/23/2025 (duration 3d 0h)  Predominant rhythm was sinus rhythm, 39 to 120bpm, average 61bpm.  No nonsustained or sustained tachyarrhythmias.  No atrial fibrillation.  Intermittent first degree AV block.  There were no pauses of greater than 3 seconds.  Rare premature atrial contractions beats (isolated <1%).  Rare premature ventricular contractions (isolated <1%).  Symptom triggers and diary entries (3) correlated to sinus rhythm 57 to 64bpm.    ECHO 6/27/2020 (report reviewed):     Normal left ventricular size and  systolic function. The left ventricular wall motion is normal. The calculated left ventricular ejection fraction is 71%.    Normal right ventricular size and systolic function.    No hemodynamically significant valvular heart abnormalities.    The ascending aorta is mildly dilated.    When compared to the previous study dated 6/23/2020, no significant change.     Stress cardiac MRI 12/23/2021 (report reviewed):  1.  Pharmacological Regadenoson stress cardiac MRI is negative for inducible myocardial ischemia.   2.  Pharmacological stress ECG is negative for inducible myocardial ischemia.   3. Normal left ventricular size, wall thickness and systolic function. The quantified left ventricular  ejection fraction is 59 %.  Very small area of non-transmural myocardial scar in the mid inferior wall is identified.    4.  Normal right ventricular size and systolic function.    5.  No significant valvular abnormalities.  6. Ascending aorta measures 38 mm.      Stress test 5/21/2021 (report reviewed):     The nuclear stress test is negative for inducible myocardial ischemia or infarction.     The left ventricular ejection fraction at stress is 65%.     There is no prior study for comparison.     Cardiac cath 10/6/2022 (report reviewed):   Left ventricular filling pressures are normal.  3rd Mrg lesion is 90% stenosed.  Prox RCA lesion is 75% stenosed.  Prox RCA to Mid RCA lesion is 40% stenosed.  Dist RCA lesion is 75% stenosed.  Mid RCA lesion is 30% stenosed.  RPDA lesion is 50% stenosed.  RPAV lesion is 40% stenosed.  Prox LAD lesion is 70% stenosed.  1st Diag-1 lesion is 95% stenosed.  1st Diag-2 lesion is 95% stenosed.  2nd Diag lesion is 99% stenosed.  Mid LAD-1 lesion is 75% stenosed.  Mid LAD-2 lesion is 75% stenosed.  Dist LAD lesion is 70% stenosed.  Vein graft to right PDA is totally occluded  Vein graft to second diagonal is widely patent  Vein graft to third OM is widely patent  LUZ MARIA graft to mid distal LAD is  patent     Medications  Allergies   Current Outpatient Medications   Medication Sig Dispense Refill     acetaminophen (TYLENOL) 500 MG tablet Take 1-2 tablets (500-1,000 mg) by mouth every 6 hours as needed for mild pain. 360 tablet 3     albuterol (VENTOLIN HFA) 108 (90 Base) MCG/ACT inhaler INHALE 2 PUFFS INTO THE LUNGS EVERY 6 HOURS AS NEEDED FOR SHORTNESS OF BREATH OR WHEEZING OR COUGH 18 g 2     aspirin (ASA) 81 MG chewable tablet Take 1 tablet (81 mg) by mouth daily. 90 tablet 4     azelastine (ASTELIN) 0.1 % nasal spray Spray 1 spray into both nostrils 2 times daily. 30 mL 1     busPIRone (BUSPAR) 5 MG tablet Take 1 tablet (5 mg) by mouth 2 times daily 180 tablet 1     Cholecalciferol (VITAMIN D3) 50 MCG (2000 UT) CAPS TAKE 1 CAPSULE BY MOUTH DAILY 90 capsule 3     cyanocobalamin (VITAMIN B-12) 1000 MCG tablet TAKE 1 TABLET BY MOUTH EVERY DAY 90 tablet 2     empagliflozin (JARDIANCE) 25 MG TABS tablet TAKE 1 TABLET(25 MG) BY MOUTH DAILY 90 tablet 0     finasteride (PROSCAR) 5 MG tablet Take 1 tablet (5 mg) by mouth daily. 90 tablet 2     gabapentin (NEURONTIN) 300 MG capsule Take 1 capsule (300 mg) by mouth at bedtime. 30 capsule 1     ipratropium (ATROVENT) 0.06 % nasal spray Spray 2 sprays into both nostrils as needed for rhinitis. 15 mL 1     isosorbide mononitrate (IMDUR) 30 MG 24 hr tablet Take 1 tablet (30 mg) by mouth daily 90 tablet 4     metoprolol succinate ER (TOPROL XL) 25 MG 24 hr tablet Take 1 tablet (25 mg) by mouth daily. 90 tablet 3     nifedipine 0.2% in white petrolatum 0.2 % OINT ointment Apply topically 4 times daily as needed (anal fissure) 100 g 1     nitroGLYcerin (NITROSTAT) 0.4 MG sublingual tablet For chest pain place 1 tablet under the tongue every 5 minutes for 3 doses. If symptoms persist 5 minutes after 1st dose call 911. 30 tablet 3     omeprazole (PRILOSEC) 20 MG DR capsule Take 1 capsule (20 mg) by mouth 2 times daily 180 capsule 4     oxyBUTYnin (DITROPAN) 5 MG tablet TAKE 1  "TABLET(5 MG) BY MOUTH AT BEDTIME 90 tablet 2     PARoxetine (PAXIL) 30 MG tablet Take 1 tablet (30 mg) by mouth every morning. 90 tablet 1     polyethylene glycol (MIRALAX) 17 GM/Dose powder TAKE 17 GRAMS(1 CAPFUL) BY MOUTH DAILY 840 g 4     QUEtiapine (SEROQUEL) 50 MG tablet Take 1.5 tablets (75 mg) by mouth at bedtime 135 tablet 1     ranolazine (RANEXA) 500 MG 12 hr tablet Take 1 tablet (500 mg) by mouth 2 times daily. 180 tablet 2     rosuvastatin (CRESTOR) 40 MG tablet TAKE 1 TABLET(40 MG) BY MOUTH DAILY 90 tablet 2     tamsulosin (FLOMAX) 0.4 MG capsule Take 2 capsules (0.8 mg) by mouth every evening 180 capsule 4      Allergies   Allergen Reactions     Atorvastatin Diarrhea     Cortisone Other (See Comments)     pain     Lisinopril Cough     started after CABG for unclear reason     Methylprednisolone Other (See Comments)     ?        Physical Examination Review of Systems   /70 (BP Location: Left arm, Patient Position: Sitting, Cuff Size: Adult Regular)   Pulse 55   Resp 16   Ht 1.651 m (5' 5\")   Wt 86.2 kg (190 lb)   BMI 31.62 kg/m    Body mass index is 31.62 kg/m .  Wt Readings from Last 3 Encounters:   07/08/25 86.2 kg (190 lb)   06/23/25 85.3 kg (188 lb)   06/11/25 84.4 kg (186 lb)       General Appearance:   Pleasant  male, appears  stated age. no acute distress, normal body habitus   ENT/Mouth: membranes moist, no apparent gingival bleeding.      EYES:  no scleral icterus, normal conjunctivae   Neck: no carotid bruits. No anterior cervical lymphadenopaty   Respiratory:   lungs are clear to auscultation, no rales or wheezing, well-healed sternal scar, equal chest wall expansion    Cardiovascular:   Regular rhythm, normal rate. Normal first and second heart sounds with no murmurs, rubs, or gallops; the carotid, radial and posterior tibial pulses are intact, Jugular venous pressure normal, no edema bilaterally    Abdomen/GI:  no organomegaly, masses, bruits, or tenderness; bowel sounds are " present   Extremities: no cyanosis or clubbing   Skin: no xanthelasma, warm.    Heme/lymph/ Immunology No apparent bleeding noted.   Neurologic: Alert and oriented. normal gait, no tremors     Psychiatric: Pleasant, calm, appropriate affect.    A complete 10 system review of systems was performed and is negative except as mentioned in the HPI/subjective.         Past History   Past Medical History:   Past Medical History:   Diagnosis Date     Acute non-ST elevation myocardial infarction (NSTEMI) (H) 6/22/2020     Adenomatous polyp of colon      Ascending aorta dilatation      Carpal tunnel syndrome      Chronic superficial gastritis      Coronary artery disease due to lipid rich plaque 6/23/2020     Depression with anxiety      Dyslipidemia, goal LDL below 70 6/23/2020     Essential hypertension 9/2/2012     Fatty liver disease, nonalcoholic      GERD (gastroesophageal reflux disease)      IBS (irritable bowel syndrome)      Left lumbar radiculopathy      Multinodular goiter      Old torn meniscus of knee      Paroxysmal atrial fibrillation (H)      Perianal abscess      Post herpetic neuralgia      Post-traumatic osteoarthritis of both knees      Primary osteoarthritis of left hip      Primary osteoarthritis of right hip      Pulmonary nodule      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 Ragsdale     CV CORONARY ANGIOGRAM N/A 6/23/2020    Procedure: Coronary Angiogram;  Surgeon: Ariane Lester MD;  Location: [facility] Cath Lab;  Service: Cardiology     CV CORONARY ANGIOGRAM N/A 10/6/2022    Procedure: Coronary Angiogram;  Surgeon: Javon Jonh MD;  Location: [facility] CATH LAB CV     CV IABP INSERT N/A 6/24/2020     Procedure: Intra Aortic Balloon Pump Insertion;  Surgeon: Ariane Lester MD;  Location: [facility] Cath Lab;  Service: Cardiology     CV LEFT HEART CATH N/A 10/6/2022    Procedure: Left Heart Catheterization;  Surgeon: Javon John MD;  Location: [facility] LAB CV     CV LEFT HEART CATHETERIZATION WITHOUT LEFT VENTRICULOGRAM Left 2020    Procedure: Left Heart Catheterization Without Left Ventriculogram;  Surgeon: Ariane Lester MD;  Location: [facility] Cath Lab;  Service: Cardiology     CV LEFT VENTRICULOGRAM N/A 10/6/2022    Procedure: Left Ventriculogram;  Surgeon: Javon John MD;  Location: [facility] LAB CV       Family History:   Family History   Problem Relation Age of Onset     No Known Problems Mother      Lung Cancer Father 56        fatal     No Known Problems Daughter      Other - See Comments Son         congenital deformity of right leg; he uses a leg prosthesis        Social History:   Social History     Socioeconomic History     Marital status:      Spouse name: Carlee     Number of children: 2     Years of education: Not on file     Highest education level: Not on file   Occupational History     Not on file   Tobacco Use     Smoking status: Former     Current packs/day: 0.00     Average packs/day: 1 pack/day for 30.0 years (30.0 ttl pk-yrs)     Types: Cigarettes     Start date: 3/23/1990     Quit date: 3/23/2020     Years since quittin.2     Passive exposure: Never     Smokeless tobacco: Never     Tobacco comments:     stopped 3/24/2020   Vaping Use     Vaping status: Never Used   Substance and Sexual Activity     Alcohol use: No     Drug use: Never     Sexual activity: Yes     Partners: Female   Other Topics Concern     Not on file   Social History Narrative    [place], [place], BA chemistry and taking AA courses at [employer].  From [place]; bachelors in geology and computer science. Son, Grace () and Sherrie (2008).  On SSDI, PTSD.       Social Drivers of Health      Financial Resource Strain: Low Risk  (12/27/2023)    Financial Resource Strain      Within the past 12 months, have you or your family members you live with been unable to get utilities (heat, electricity) when it was really needed?: No   Food Insecurity: Low Risk  (12/27/2023)    Food Insecurity      Within the past 12 months, did you worry that your food would run out before you got money to buy more?: No      Within the past 12 months, did the food you bought just not last and you didn t have money to get more?: No   Transportation Needs: Low Risk  (12/27/2023)    Transportation Needs      Within the past 12 months, has lack of transportation kept you from medical appointments, getting your medicines, non-medical meetings or appointments, work, or from getting things that you need?: No   Physical Activity: Not on file   Stress: Not on file   Social Connections: Unknown (1/1/2022)    Received [facility]    Social Connections      Frequency of Communication with Friends and Family: Not on file   Interpersonal Safety: Low Risk  (12/5/2024)    Interpersonal Safety      Do you feel physically and emotionally safe where you currently live?: Yes      Within the past 12 months, have you been hit, slapped, kicked or otherwise physically hurt by someone?: No      Within the past 12 months, have you been humiliated or emotionally abused in other ways by your partner or ex-partner?: No   Housing Stability: Low Risk  (12/27/2023)    Housing Stability      Do you have housing? : Yes      Are you worried about losing your housing?: No              Lab Results    Chemistry/lipid CBC Cardiac Enzymes/BNP/TSH/INR   Lab Results   Component Value Date    CHOL 104 09/12/2024    HDL 36 (L) 09/12/2024    LDL 48 09/12/2024    TRIG 98 09/12/2024    CR 1.07 05/09/2025    BUN 15.7 05/09/2025    POTASSIUM 4.9 05/09/2025     05/09/2025    CO2 24 05/09/2025      Lab Results   Component Value  Date    WBC 7.1 11/12/2024    HGB 14.9 11/12/2024    HCT 45.0 11/12/2024    MCV 90 11/12/2024     11/12/2024    A1C 6.0 (H) 05/09/2025     Lab Results   Component Value Date    A1C 6.0 (H) 05/09/2025    Lab Results   Component Value Date    TROPONINI <0.01 11/21/2020    BNP 79 (H) 07/27/2020    NTBNP <36 12/20/2023    TSH 1.28 09/12/2024    INR 1.10 07/27/2020          Sloan Burns MD [facility]  Non-Invasive Cardiologist  [facility]  Pager [phone]      Thank you for allowing me to participate in the care of your patient.      Sincerely,     Sloan Burns MD      Heart Care  cc:   No referring provider defined for this encounter.

## 2025-07-10 ENCOUNTER — PATIENT OUTREACH (OUTPATIENT)
Dept: CARE COORDINATION | Facility: CLINIC | Age: 60
End: 2025-07-10

## 2025-07-10 ENCOUNTER — PATIENT OUTREACH (OUTPATIENT)
Dept: NURSING | Facility: CLINIC | Age: 60
End: 2025-07-10
Payer: COMMERCIAL

## 2025-07-10 NOTE — PROGRESS NOTES
Clinic Care Coordination Contact  Community Health Worker Follow Up    Care Gaps:     Health Maintenance Due   Topic Date Due    ZOSTER VACCINE (1 of 2) Never done    DTAP/TDAP/TD VACCINE (3 - Td or Tdap) 04/30/2022    DEPRESSION 12 MO INDEX REPEAT PHQ-9  07/01/2025    EYE EXAM  08/01/2025       Patient has an eye appointment next month.    Care Plan:   Care Plan: Physical Activity       Problem: Patient is inactive       Long-Range Goal: I would like to increase my walking.       Start Date: 5/12/2025 Expected End Date: 5/11/2026    This Visit's Progress: 30% Recent Progress: 20%    Priority: High    Note:     Barriers: chronic pain  Strengths: strong family support  Patient expressed understanding of goal: yes  Action steps to achieve this goal:  1. I will walk at least 3 days a week depending on my back pain. (Continuous)  2. I will take my medications daily as directed. (Continuous)  3. I will attend all appointments with providers and will follow up on their recommendations. (Continuous)    Discussed on 7/10/25                          Intervention and Education during outreach:   Patient's wife stated that they are both doing well so far. She shared that patient needs a catheter soon, however patient is very nervous and anxious because of his last experience.  His wife stated that is he was put under it would be better, but since it is something he would be awake for it really scares him.  Patient's cardiologist and therapist have been trying to convince patient to get the catheter placed, for his own good.  Patient at the moment is just contemplating on getting the catheter placed.   Patient's wife has some concerns regarding braces for the patient. She explained that last time insurance covered it, but now they denied it. She stated that they need doctor orders and notes about the patient's health history. CHW scheduled them with CCC RN for today at 1400.  Patient's wife had no other questions or concerns.      CHW Plan: Next outreach is scheduled for next month.      Hattie ADKINS Ambulatory CHW  [facility] Care Coordination   [facility]   Office: [phone]

## 2025-07-10 NOTE — PROGRESS NOTES
Follow Up Progress Note      Assessment: CCC RN spoke with patient's wife Carlee today per request. She stated patient is needing a dental bridge, but their dentist is needing more information from his PCP related to his health issues. Patient has a follow up with is PCP tomorrow and they will discuss this concern with him tomorrow. No other needs or concerns.     Care Gaps:    Health Maintenance Due   Topic Date Due    ZOSTER VACCINE (1 of 2) Never done    DTAP/TDAP/TD VACCINE (3 - Td or Tdap) 04/30/2022    DEPRESSION 12 MO INDEX REPEAT PHQ-9  07/01/2025    EYE EXAM  08/01/2025       Scheduled with PCP on 7/11/25      Care Plans  Care Plan: Physical Activity       Problem: Patient is inactive       Long-Range Goal: I would like to increase my walking.       Start Date: 5/12/2025 Expected End Date: 5/11/2026    This Visit's Progress: 30% Recent Progress: 20%    Priority: High    Note:     Barriers: chronic pain  Strengths: strong family support  Patient expressed understanding of goal: yes  Action steps to achieve this goal:  1. I will walk at least 3 days a week depending on my back pain. (Continuous)  2. I will take my medications daily as directed. (Continuous)  3. I will attend all appointments with providers and will follow up on their recommendations. (Continuous)    Discussed on 7/10/25                              Intervention/Education provided during outreach: Encouraged them to contact Care Coordination for any additional needs or concerns.      Outreach Frequency: monthly, more frequently as needed    Plan: CCC RN will continue to monitor, support patient with current goal and will be available to assist as nursing needs arise. [facility] CHW will continue to reach out to patient on a monthly basis to discuss progression of this goal.     Care Coordinator will perform Chart Review in 45 days.      Reassessed pt vitals. VSS. No pt needs at this time.

## 2025-07-15 DIAGNOSIS — R05.2 SUBACUTE COUGH: ICD-10-CM

## 2025-07-16 DIAGNOSIS — I25.118 CORONARY ARTERY DISEASE OF NATIVE ARTERY OF NATIVE HEART WITH STABLE ANGINA PECTORIS: ICD-10-CM

## 2025-07-16 RX ORDER — NITROGLYCERIN 0.4 MG/1
TABLET SUBLINGUAL
Qty: 30 TABLET | Refills: 3 | Status: SHIPPED | OUTPATIENT
Start: 2025-07-16

## 2025-07-16 RX ORDER — OMEPRAZOLE 20 MG/1
20 CAPSULE, DELAYED RELEASE ORAL 2 TIMES DAILY
Qty: 180 CAPSULE | Refills: 1 | Status: SHIPPED | OUTPATIENT
Start: 2025-07-16

## 2025-07-30 DIAGNOSIS — E78.5 DYSLIPIDEMIA, GOAL LDL BELOW 70: ICD-10-CM

## 2025-07-30 RX ORDER — ROSUVASTATIN CALCIUM 40 MG/1
40 TABLET, COATED ORAL DAILY
Qty: 90 TABLET | Refills: 2 | Status: SHIPPED | OUTPATIENT
Start: 2025-07-30

## 2025-08-06 ENCOUNTER — PATIENT OUTREACH (OUTPATIENT)
Dept: CARE COORDINATION | Facility: CLINIC | Age: 60
End: 2025-08-06
Payer: COMMERCIAL

## 2025-08-10 ENCOUNTER — HEALTH MAINTENANCE LETTER (OUTPATIENT)
Age: 60
End: 2025-08-10

## 2025-08-13 ENCOUNTER — PATIENT OUTREACH (OUTPATIENT)
Dept: CARE COORDINATION | Facility: CLINIC | Age: 60
End: 2025-08-13
Payer: COMMERCIAL

## 2025-08-21 ENCOUNTER — OFFICE VISIT (OUTPATIENT)
Dept: INTERNAL MEDICINE | Facility: CLINIC | Age: 60
End: 2025-08-21
Payer: COMMERCIAL

## 2025-08-21 ENCOUNTER — PATIENT OUTREACH (OUTPATIENT)
Dept: CARE COORDINATION | Facility: CLINIC | Age: 60
End: 2025-08-21

## 2025-08-21 VITALS
SYSTOLIC BLOOD PRESSURE: 120 MMHG | TEMPERATURE: 98.1 F | HEART RATE: 62 BPM | HEIGHT: 65 IN | OXYGEN SATURATION: 97 % | WEIGHT: 191.5 LBS | RESPIRATION RATE: 24 BRPM | BODY MASS INDEX: 31.9 KG/M2 | DIASTOLIC BLOOD PRESSURE: 70 MMHG

## 2025-08-21 DIAGNOSIS — E78.5 DYSLIPIDEMIA, GOAL LDL BELOW 70: ICD-10-CM

## 2025-08-21 DIAGNOSIS — I25.118 CORONARY ARTERY DISEASE OF NATIVE ARTERY OF NATIVE HEART WITH STABLE ANGINA PECTORIS: ICD-10-CM

## 2025-08-21 DIAGNOSIS — I25.118 CORONARY ARTERY DISEASE OF NATIVE ARTERY OF NATIVE HEART WITH STABLE ANGINA PECTORIS: Primary | ICD-10-CM

## 2025-08-21 DIAGNOSIS — N18.31 CHRONIC KIDNEY DISEASE, STAGE 3A (H): ICD-10-CM

## 2025-08-21 DIAGNOSIS — F33.41 RECURRENT MAJOR DEPRESSIVE DISORDER, IN PARTIAL REMISSION: Chronic | ICD-10-CM

## 2025-08-21 DIAGNOSIS — F43.10 PTSD (POST-TRAUMATIC STRESS DISORDER): Chronic | ICD-10-CM

## 2025-08-21 DIAGNOSIS — E11.9 TYPE 2 DIABETES MELLITUS WITHOUT COMPLICATION, WITHOUT LONG-TERM CURRENT USE OF INSULIN (H): ICD-10-CM

## 2025-08-21 RX ORDER — ISOSORBIDE MONONITRATE 30 MG/1
30 TABLET, EXTENDED RELEASE ORAL DAILY
Qty: 90 TABLET | Refills: 1 | Status: SHIPPED | OUTPATIENT
Start: 2025-08-21

## 2025-08-21 RX ORDER — GABAPENTIN 300 MG/1
300 CAPSULE ORAL AT BEDTIME
Qty: 90 CAPSULE | Refills: 4 | Status: SHIPPED | OUTPATIENT
Start: 2025-08-21

## 2025-08-21 ASSESSMENT — PATIENT HEALTH QUESTIONNAIRE - PHQ9: SUM OF ALL RESPONSES TO PHQ QUESTIONS 1-9: 0

## 2025-08-25 ENCOUNTER — TELEPHONE (OUTPATIENT)
Dept: INTERNAL MEDICINE | Facility: CLINIC | Age: 60
End: 2025-08-25
Payer: COMMERCIAL

## 2025-08-27 ENCOUNTER — PATIENT OUTREACH (OUTPATIENT)
Dept: CARE COORDINATION | Facility: CLINIC | Age: 60
End: 2025-08-27
Payer: COMMERCIAL

## (undated) DEVICE — CATH DIAG 4FR STR PIG 538450S

## (undated) DEVICE — ELECTRODE ADULT PACING MULTI P-211-M1

## (undated) DEVICE — MANIFOLD KIT ANGIO AUTOMATED 014613

## (undated) DEVICE — CATH ANGIO INFINITI IM 4FRX100CM 538460

## (undated) DEVICE — CATH ANGIO INFINITI JL4 4FRX100CM 538420

## (undated) DEVICE — CATH ANGIO INFINITI JL5 4FRX100CM 538422

## (undated) DEVICE — CATH DIAG 4FR AR 1 MOD 538441

## (undated) DEVICE — CATH PULM ART 7FR X 110CM, SWA

## (undated) DEVICE — SYR ANGIOGRAPHY MULTIUSE KIT ACIST 014612

## (undated) DEVICE — RAD CLOSURE ANGIOSEAL 8FR  610131

## (undated) DEVICE — KIT HAND CONTROL ACIST 016795

## (undated) DEVICE — SHEATH ULTIMUM 6FR 12CM 407845

## (undated) DEVICE — KIT IN LINE ROOM TEMP INJ 93610

## (undated) DEVICE — CUSTOM PACK CORONARY SAN5BCRHEA

## (undated) DEVICE — INTRO TERUMO 7FRX10CM PINNACLE W/MARKER RSB702

## (undated) RX ORDER — FENTANYL CITRATE 50 UG/ML
INJECTION, SOLUTION INTRAMUSCULAR; INTRAVENOUS
Status: DISPENSED
Start: 2022-10-06

## (undated) RX ORDER — DIAZEPAM 10 MG
TABLET ORAL
Status: DISPENSED
Start: 2022-10-06

## (undated) RX ORDER — LIDOCAINE HYDROCHLORIDE 10 MG/ML
INJECTION, SOLUTION EPIDURAL; INFILTRATION; INTRACAUDAL; PERINEURAL
Status: DISPENSED
Start: 2022-10-06

## (undated) RX ORDER — GABAPENTIN 100 MG/1
CAPSULE ORAL
Status: DISPENSED
Start: 2022-10-06

## (undated) RX ORDER — HEPARIN SODIUM 1000 [USP'U]/ML
INJECTION, SOLUTION INTRAVENOUS; SUBCUTANEOUS
Status: DISPENSED
Start: 2022-10-06